# Patient Record
Sex: FEMALE | Race: WHITE | NOT HISPANIC OR LATINO | Employment: OTHER | URBAN - METROPOLITAN AREA
[De-identification: names, ages, dates, MRNs, and addresses within clinical notes are randomized per-mention and may not be internally consistent; named-entity substitution may affect disease eponyms.]

---

## 2017-01-23 ENCOUNTER — TELEPHONE (OUTPATIENT)
Dept: EDUCATION SERVICES | Facility: CLINIC | Age: 37
End: 2017-01-23

## 2017-01-23 DIAGNOSIS — E11.9 DIABETES MELLITUS (H): Primary | ICD-10-CM

## 2017-01-23 NOTE — TELEPHONE ENCOUNTER
Patient called stating she has changed insurance and will need to switch to a different meter that [facility] will cover. She still has to check on coverage for diabetes education.   She has appointment tonight with Dr. Ford. Will leave her a Contour meter and strips and lancets are ordered. She will call me back to schedule diabetes education follow up once she has information on insurance coverage.     Amanda Mari RDN, LD, CDE

## 2017-01-30 ENCOUNTER — OFFICE VISIT (OUTPATIENT)
Dept: FAMILY MEDICINE | Facility: CLINIC | Age: 37
End: 2017-01-30
Payer: COMMERCIAL

## 2017-01-30 VITALS
HEIGHT: 64 IN | HEART RATE: 86 BPM | SYSTOLIC BLOOD PRESSURE: 110 MMHG | OXYGEN SATURATION: 96 % | TEMPERATURE: 97.3 F | RESPIRATION RATE: 14 BRPM | DIASTOLIC BLOOD PRESSURE: 74 MMHG | WEIGHT: 229 LBS | BODY MASS INDEX: 39.09 KG/M2

## 2017-01-30 DIAGNOSIS — Z98.890 S/P CONIZATION OF CERVIX: ICD-10-CM

## 2017-01-30 DIAGNOSIS — Z12.4 SCREENING FOR MALIGNANT NEOPLASM OF CERVIX: ICD-10-CM

## 2017-01-30 DIAGNOSIS — E11.9 TYPE 2 DIABETES MELLITUS WITHOUT COMPLICATION, UNSPECIFIED LONG TERM INSULIN USE STATUS: ICD-10-CM

## 2017-01-30 DIAGNOSIS — J98.01 BRONCHOSPASM: ICD-10-CM

## 2017-01-30 DIAGNOSIS — Z23 NEED FOR PROPHYLACTIC VACCINATION AND INOCULATION AGAINST INFLUENZA: ICD-10-CM

## 2017-01-30 DIAGNOSIS — E66.01 MORBID OBESITY DUE TO EXCESS CALORIES (H): ICD-10-CM

## 2017-01-30 DIAGNOSIS — Z00.01 ENCOUNTER FOR WELL ADULT EXAM WITH ABNORMAL FINDINGS: Primary | ICD-10-CM

## 2017-01-30 LAB
ANION GAP SERPL CALCULATED.3IONS-SCNC: 8 MMOL/L (ref 3–14)
BUN SERPL-MCNC: 13 MG/DL (ref 7–30)
CALCIUM SERPL-MCNC: 8.6 MG/DL (ref 8.5–10.1)
CHLORIDE SERPL-SCNC: 106 MMOL/L (ref 94–109)
CO2 SERPL-SCNC: 27 MMOL/L (ref 20–32)
CREAT SERPL-MCNC: 0.83 MG/DL (ref 0.52–1.04)
CREAT UR-MCNC: 65 MG/DL
GFR SERPL CREATININE-BSD FRML MDRD: 78 ML/MIN/1.7M2
GLUCOSE SERPL-MCNC: 159 MG/DL (ref 70–99)
HBA1C MFR BLD: 6.6 % (ref 4.3–6)
MICROALBUMIN UR-MCNC: 6 MG/L
MICROALBUMIN/CREAT UR: 8.81 MG/G CR (ref 0–25)
POTASSIUM SERPL-SCNC: 3.9 MMOL/L (ref 3.4–5.3)
SODIUM SERPL-SCNC: 141 MMOL/L (ref 133–144)

## 2017-01-30 PROCEDURE — 90471 IMMUNIZATION ADMIN: CPT | Performed by: OBSTETRICS & GYNECOLOGY

## 2017-01-30 PROCEDURE — 82043 UR ALBUMIN QUANTITATIVE: CPT | Performed by: OBSTETRICS & GYNECOLOGY

## 2017-01-30 PROCEDURE — 80048 BASIC METABOLIC PNL TOTAL CA: CPT | Performed by: OBSTETRICS & GYNECOLOGY

## 2017-01-30 PROCEDURE — 83036 HEMOGLOBIN GLYCOSYLATED A1C: CPT | Performed by: OBSTETRICS & GYNECOLOGY

## 2017-01-30 PROCEDURE — G0145 SCR C/V CYTO,THINLAYER,RESCR: HCPCS | Performed by: OBSTETRICS & GYNECOLOGY

## 2017-01-30 PROCEDURE — 99395 PREV VISIT EST AGE 18-39: CPT | Mod: 25 | Performed by: OBSTETRICS & GYNECOLOGY

## 2017-01-30 PROCEDURE — 36415 COLL VENOUS BLD VENIPUNCTURE: CPT | Performed by: OBSTETRICS & GYNECOLOGY

## 2017-01-30 PROCEDURE — 87624 HPV HI-RISK TYP POOLED RSLT: CPT | Performed by: OBSTETRICS & GYNECOLOGY

## 2017-01-30 PROCEDURE — 90686 IIV4 VACC NO PRSV 0.5 ML IM: CPT | Performed by: OBSTETRICS & GYNECOLOGY

## 2017-01-30 ASSESSMENT — PAIN SCALES - GENERAL: PAINLEVEL: NO PAIN (0)

## 2017-01-30 NOTE — NURSING NOTE
"Chief Complaint   Patient presents with     Physical       Initial /74 mmHg  Pulse 86  Temp(Src) 97.3  F (36.3  C) (Temporal)  Resp 14  Ht 5' 4\" (1.626 m)  Wt 229 lb (103.874 kg)  BMI 39.29 kg/m2  SpO2 96%  LMP 01/01/2017  Breastfeeding? No Estimated body mass index is 39.29 kg/(m^2) as calculated from the following:    Height as of this encounter: 5' 4\" (1.626 m).    Weight as of this encounter: 229 lb (103.874 kg)..   BP completed using cuff size: doug Burton CMA    "

## 2017-01-30 NOTE — MR AVS SNAPSHOT
After Visit Summary   1/30/2017    Fallon Rivera    MRN: [id number]           Patient Information     Date Of Birth          1980        Visit Information        Provider Department      1/30/2017 4:40 PM Greg Ford MD [facility]        Today's Diagnoses     Encounter for well adult exam with abnormal findings    -  1     Morbid obesity due to excess calories (H)         Type 2 diabetes mellitus without complication, unspecified long term insulin use status (H)         Bronchospasm         Screening for malignant neoplasm of cervix            Follow-ups after your visit        Who to contact     If you have questions or need follow up information about today's clinic visit or your schedule please contact [facility] directly at [phone].  Normal or non-critical lab and imaging results will be communicated to you by MyChart, letter or phone within 4 business days after the clinic has received the results. If you do not hear from us within 7 days, please contact the clinic through [employer] or phone. If you have a critical or abnormal lab result, we will notify you by phone as soon as possible.  Submit refill requests through [employer] or call your pharmacy and they will forward the refill request to us. Please allow 3 business days for your refill to be completed.          Additional Information About Your Visit        MyChart Information     [employer] gives you secure access to your electronic health record. If you see a primary care provider, you can also send messages to your care team and make appointments. If you have questions, please call your primary care clinic.  If you do not have a primary care provider, please call [phone] and they will assist you.        Care EveryWhere ID     This is your Care EveryWhere ID. This could be used by other organizations to access your [place] medical records  [id number]        Your Vitals Were   "   Pulse Temperature Respirations    86 97.3  F (36.3  C) (Temporal) 14    Height BMI (Body Mass Index) Pulse Oximetry    5' 4\" (1.626 m) 39.29 kg/m2 96%    Last Period Breastfeeding?       01/01/2017 No        Blood Pressure from Last 3 Encounters:   01/30/17 110/74   11/02/16 120/78   11/02/16 119/79    Weight from Last 3 Encounters:   01/30/17 229 lb (103.874 kg)   10/24/16 219 lb (99.338 kg)   10/18/16 217 lb (98.431 kg)              We Performed the Following     Albumin Random Urine Quantitative     Basic metabolic panel     Hemoglobin A1c     HPV High Risk Types DNA Cervical     OFFICE/OUTPT VISIT,EST,LEVL III     Pap imaged thin layer screen with HPV - recommended age 30 - 65 years (select HPV order below)        Primary Care Provider Office Phone # Fax #    Greg Ford [phone]       [place] [id number] [place] [address]        Thank you!     Thank you for choosing [facility]  for your care. Our goal is always to provide you with excellent care. Hearing back from our patients is one way we can continue to improve our services. Please take a few minutes to complete the written survey that you may receive in the mail after your visit with us. Thank you!             Your Updated Medication List - Protect others around you: Learn how to safely use, store and throw away your medicines at www.disposemymeds.org.          This list is accurate as of: 1/30/17  5:48 PM.  Always use your most recent med list.                   Brand Name Dispense Instructions for use    ADVIL PO      Take 600 mg by mouth every 6 hours as needed       albuterol 108 (90 BASE) MCG/ACT Inhaler    PROAIR HFA/PROVENTIL HFA/VENTOLIN HFA    1 Inhaler    Inhale 2 puffs into the lungs every 6 hours as needed for shortness of breath / dyspnea or wheezing       blood glucose monitoring lancets     1 Box    Use to check blood sugar 1-2 times daily       blood glucose " monitoring test strip    LILLIAN CONTOUR NEXT    100 strip    Use to test blood sugar 2 times daily or as directed.       metFORMIN 500 MG tablet    GLUCOPHAGE    30 tablet    TAKE ONE TABLET BY MOUTH EVERY DAY WITH DINNER       MULTIPLE VITAMINS/WOMENS PO          omeprazole 20 MG tablet     30 tablet    Take 1 tablet (20 mg) by mouth daily Take 30-60 minutes before a meal.       oxyCODONE-acetaminophen 5-325 MG per tablet    PERCOCET    40 tablet    Take 1-2 tablets by mouth every 6 hours as needed for moderate to severe pain

## 2017-01-31 ASSESSMENT — PATIENT HEALTH QUESTIONNAIRE - PHQ9: SUM OF ALL RESPONSES TO PHQ QUESTIONS 1-9: 7

## 2017-01-31 NOTE — PROGRESS NOTES
Injectable Influenza Immunization Documentation    1.  Is the person to be vaccinated sick today?  No    2. Does the person to be vaccinated have an allergy to eggs or to a component of the vaccine?  No    3. Has the person to be vaccinated today ever had a serious reaction to influenza vaccine in the past?  No    4. Has the person to be vaccinated ever had Guillain-Jewell syndrome?  No     Form completed by Lynn Burton CMA

## 2017-02-01 NOTE — PROGRESS NOTES
Quick Note:    Lynn Please inform Fallon/ or caretaker that this result(s) is/are pretty good- the diabetes is in fairly good control, but weight loss will definitely improve the number on the A1C. Check it again in 3 months and continue to walk!!Thanks. SHARAN Ford MD    ______

## 2017-02-02 LAB
COPATH REPORT: NORMAL
PAP: NORMAL

## 2017-02-06 LAB
FINAL DIAGNOSIS: NORMAL
HPV HR 12 DNA CVX QL NAA+PROBE: NEGATIVE
HPV16 DNA SPEC QL NAA+PROBE: NEGATIVE
HPV18 DNA SPEC QL NAA+PROBE: NEGATIVE
SPECIMEN DESCRIPTION: NORMAL

## 2017-02-09 ENCOUNTER — TELEPHONE (OUTPATIENT)
Dept: FAMILY MEDICINE | Facility: CLINIC | Age: 37
End: 2017-02-09

## 2017-02-09 NOTE — TELEPHONE ENCOUNTER
Reason for Call:  Request for results:    Name of test or procedure: Pap results    Date of test of procedure: couple weeks ago    Location of the test or procedure: [facility] to leave the result message on voice mail or with a family member? YES    Phone number Patient can be reached at:  Home number on file [phone] (home)    Additional comments: Please call pt with results, if no answer, leave a message.     Call taken on 2/9/2017 at 7:21 AM by Vianca Gould

## 2017-02-11 ENCOUNTER — TRANSFERRED RECORDS (OUTPATIENT)
Dept: HEALTH INFORMATION MANAGEMENT | Facility: CLINIC | Age: 37
End: 2017-02-11

## 2017-03-22 DIAGNOSIS — E11.9 TYPE 2 DIABETES MELLITUS WITHOUT COMPLICATION, UNSPECIFIED LONG TERM INSULIN USE STATUS: ICD-10-CM

## 2017-03-22 NOTE — TELEPHONE ENCOUNTER
metFORMIN (GLUCOPHAGE) 500 MG   Last Written Prescription Date: 12/16/2016  Last Fill Quantity: 30, # refills: 1  Last Office Visit with [facility], P or  Health prescribing provider:  01/30/2017   Next 5 appointments (look out 90 days)     May 15, 2017  5:20 PM CDT   [facility]    [address] [place] [id number] [phone]                   BP Readings from Last 3 Encounters:   01/30/17 110/74   11/02/16 120/78   11/02/16 119/79     Lab Results   Component Value Date    MICROL 6 01/30/2017     No results found for: MICROALBUMIN  Creatinine   Date Value Ref Range Status   01/30/2017 0.83 0.52 - 1.04 mg/dL Final   ]  GFR Estimate   Date Value Ref Range Status   01/30/2017 78 >60 mL/min/1.7m2 Final     Comment:     Non  GFR Calc   01/30/2016 88 >60 mL/min/1.7m2 Final     Comment:     Non  GFR Calc   07/27/2015 81 >60 mL/min/1.7m2 Final     Comment:     Non  GFR Calc     GFR Estimate If Black   Date Value Ref Range Status   01/30/2017 >90   GFR Calc   >60 mL/min/1.7m2 Final   01/30/2016 >90   GFR Calc   >60 mL/min/1.7m2 Final   07/27/2015 >90   GFR Calc   >60 mL/min/1.7m2 Final     Lab Results   Component Value Date    CHOL 192 07/05/2016     Lab Results   Component Value Date    HDL 53 07/05/2016     Lab Results   Component Value Date     07/05/2016     Lab Results   Component Value Date    TRIG 166 07/05/2016     Lab Results   Component Value Date    CHOLHDLRATIO 3.0 05/08/2013     Lab Results   Component Value Date    AST 34 05/08/2014     Lab Results   Component Value Date    ALT 59 05/08/2014     Lab Results   Component Value Date    A1C 6.6 01/30/2017    A1C 6.6 07/05/2016    A1C 7.3 04/27/2016    A1C 6.2 07/27/2015    A1C 5.8 05/08/2013     Potassium   Date Value Ref Range Status   01/30/2017 3.9 3.4  - 5.3 mmol/L Final

## 2017-03-24 NOTE — TELEPHONE ENCOUNTER
Prescription approved per [facility] Refill Protocol.    Cece Kelley RN

## 2017-05-15 ENCOUNTER — OFFICE VISIT (OUTPATIENT)
Dept: FAMILY MEDICINE | Facility: CLINIC | Age: 37
End: 2017-05-15
Payer: COMMERCIAL

## 2017-05-15 VITALS
TEMPERATURE: 97.4 F | DIASTOLIC BLOOD PRESSURE: 74 MMHG | OXYGEN SATURATION: 100 % | RESPIRATION RATE: 16 BRPM | HEART RATE: 8 BPM | BODY MASS INDEX: 39.09 KG/M2 | SYSTOLIC BLOOD PRESSURE: 128 MMHG | WEIGHT: 229 LBS | HEIGHT: 64 IN

## 2017-05-15 DIAGNOSIS — E11.9 TYPE 2 DIABETES MELLITUS WITHOUT COMPLICATION, WITHOUT LONG-TERM CURRENT USE OF INSULIN (H): Primary | ICD-10-CM

## 2017-05-15 DIAGNOSIS — R82.90 CLOUDY URINE: ICD-10-CM

## 2017-05-15 LAB
ALBUMIN UR-MCNC: NEGATIVE MG/DL
ANION GAP SERPL CALCULATED.3IONS-SCNC: 8 MMOL/L (ref 3–14)
APPEARANCE UR: CLEAR
BILIRUB UR QL STRIP: NEGATIVE
BUN SERPL-MCNC: 9 MG/DL (ref 7–30)
CALCIUM SERPL-MCNC: 8.7 MG/DL (ref 8.5–10.1)
CHLORIDE SERPL-SCNC: 108 MMOL/L (ref 94–109)
CO2 SERPL-SCNC: 26 MMOL/L (ref 20–32)
COLOR UR AUTO: YELLOW
CREAT SERPL-MCNC: 0.67 MG/DL (ref 0.52–1.04)
GFR SERPL CREATININE-BSD FRML MDRD: ABNORMAL ML/MIN/1.7M2
GLUCOSE SERPL-MCNC: 104 MG/DL (ref 70–99)
GLUCOSE UR STRIP-MCNC: NEGATIVE MG/DL
HBA1C MFR BLD: 6.6 % (ref 4.3–6)
HGB UR QL STRIP: NEGATIVE
KETONES UR STRIP-MCNC: NEGATIVE MG/DL
LEUKOCYTE ESTERASE UR QL STRIP: NEGATIVE
NITRATE UR QL: NEGATIVE
PH UR STRIP: 7 PH (ref 5–7)
POTASSIUM SERPL-SCNC: 3.7 MMOL/L (ref 3.4–5.3)
SODIUM SERPL-SCNC: 142 MMOL/L (ref 133–144)
SP GR UR STRIP: 1.02 (ref 1–1.03)
URN SPEC COLLECT METH UR: NORMAL
UROBILINOGEN UR STRIP-MCNC: 0 MG/DL (ref 0–2)

## 2017-05-15 PROCEDURE — 36415 COLL VENOUS BLD VENIPUNCTURE: CPT | Performed by: OBSTETRICS & GYNECOLOGY

## 2017-05-15 PROCEDURE — 99214 OFFICE O/P EST MOD 30 MIN: CPT | Performed by: OBSTETRICS & GYNECOLOGY

## 2017-05-15 PROCEDURE — 80048 BASIC METABOLIC PNL TOTAL CA: CPT | Performed by: OBSTETRICS & GYNECOLOGY

## 2017-05-15 PROCEDURE — 83036 HEMOGLOBIN GLYCOSYLATED A1C: CPT | Performed by: OBSTETRICS & GYNECOLOGY

## 2017-05-15 PROCEDURE — 81003 URINALYSIS AUTO W/O SCOPE: CPT | Performed by: OBSTETRICS & GYNECOLOGY

## 2017-05-15 ASSESSMENT — PAIN SCALES - GENERAL: PAINLEVEL: NO PAIN (0)

## 2017-05-15 NOTE — PROGRESS NOTES
Subjective: Here for diabetic followup exam.   Has been monitoring sugars twice  times daily. Glucose values have been in the  130-180 range fasting, and 120-157 range postprandial.  There have been no   changes in vision except blurry sometimes when sugar is high.   No changes/ complaints   of neuropathy symptoms. - maybe some fidgty legs-  No other concerns        The past medical history, social history, past surgical history and family history as shown below have been reviewed by me today.  Past Medical History:   Diagnosis Date     Abnormal Pap smear 2006, 2007,     ASC-H, ASCUS + HPV 45     Calculus of kidney 2002     Cervical high risk HPV (human papillomavirus) test positive     + HPV 45 (high risk)     History of colposcopy with cervical biopsy 2/9/06, 3/15/07    koilocytosis 2007        Allergies   Allergen Reactions     Amoxicillin Hives     Anti-Nausea      Anxiety, agitation,severe paranoia. Zofran, Reglan are some of them.       Demerol Hcl [Meperidine Hcl] Nausea     Indomethacin Nausea and Vomiting     Macrobid [Nitrofuran Derivatives] Hives     Reglan [Metoclopramide] Other (See Comments)     Acute paranoia, severe     Zofran [Ondansetron] Other (See Comments)     Severe anxiety, agitation     Current Outpatient Prescriptions   Medication Sig Dispense Refill     metFORMIN (GLUCOPHAGE) 500 MG tablet TAKE ONE TABLET BY MOUTH EVERY DAY WITH DINNER 30 tablet 3     blood glucose monitoring (LILLIAN CONTOUR NEXT) test strip Use to test blood sugar 2 times daily or as directed. 100 strip 3     blood glucose monitoring (LILLIAN MICROLET) lancets Use to check blood sugar 1-2 times daily 1 Box 12     omeprazole 20 MG tablet Take 1 tablet (20 mg) by mouth daily Take 30-60 minutes before a meal. 30 tablet 9     oxyCODONE-acetaminophen (PERCOCET) 5-325 MG per tablet Take 1-2 tablets by mouth every 6 hours as needed for moderate to severe pain (Patient not taking: Reported on 5/15/2017) 40 tablet 0     Multiple  Vitamins-Minerals (MULTIPLE VITAMINS/WOMENS PO) Reported on 5/15/2017       albuterol (PROAIR HFA, PROVENTIL HFA, VENTOLIN HFA) 108 (90 BASE) MCG/ACT inhaler Inhale 2 puffs into the lungs every 6 hours as needed for shortness of breath / dyspnea or wheezing (Patient not taking: Reported on 5/15/2017) 1 Inhaler 1     Ibuprofen (ADVIL PO) Take 600 mg by mouth every 6 hours as needed Reported on 5/15/2017       Past Surgical History:   Procedure Laterality Date     C REMOVAL OF KIDNEY STONE      two surgeries, 2002,2003     CHOLECYSTECTOMY, LAPOROSCOPIC  5/11/2007    Cholecystectomy, Laparoscopic     COLONOSCOPY  05/17/10     CONIZATION  7/1/2011    Procedure:CONIZATION; cold knife and punch biopsy of mole on back; Surgeon:GREG FORD; Location:[facility] OR     DILATION AND CURETTAGE, HYSTEROSCOPY, LAPAROSCOPY, COMBINED Right 9/24/2015    Procedure: COMBINED DILATION AND CURETTAGE, HYSTEROSCOPY, LAPAROSCOPY;  Surgeon: Greg Ford MD;  Location: [facility] OR     ESOPHAGOSCOPY, GASTROSCOPY, DUODENOSCOPY (EGD), COMBINED  5/21/2014    Procedure: COMBINED ESOPHAGOSCOPY, GASTROSCOPY, DUODENOSCOPY (EGD), BIOPSY SINGLE OR MULTIPLE;  Surgeon: Earl Lane MD;  Location: [facility] GI     EXCISE LESION TRUNK  7/1/2011    Procedure:EXCISE LESION TRUNK; Surgeon:GREG FORD; Location:[facility] OR     HC FRAGMENTING OF KIDNEY STONE  11/30/2005     HC RX ECTOP PREG BY SCOPE,RMV TUBE/OVRY  12/20/2007    Right sided salpingectomy and removal of ruptured ectopic pregnancy. D&C.     HC UGI ENDOSCOPY, SIMPLE EXAM  09/01/09     LAPAROSCOPIC APPENDECTOMY N/A 9/24/2015    Procedure: LAPAROSCOPIC APPENDECTOMY;  Surgeon: James Cuellar MD;  Location: [facility] OR     LAPAROSCOPIC CYSTECTOMY OVARIAN (BENIGN) N/A 9/24/2015    Procedure: LAPAROSCOPIC CYSTECTOMY OVARIAN (BENIGN);  Surgeon: Greg Ford MD;  Location: [facility]     LAPAROSCOPIC OOPHORECTOMY Right 9/24/2015    Procedure: LAPAROSCOPIC OOPHORECTOMY;   Surgeon: Greg Ford MD;  Location: [facility] OR     LITHOTRIPSY  01/17/2007     PELVIS LAPAROSCOPY,DX  10/16/2000    Diagnostic laparoscopy, Endometrial biopsy.     TUBAL/ECTOPIC PREGNANCY  01/23/2007    Lap removal of left ruptured ectopic pregnancy & salpingectomy, D&C     Social History     Social History     Marital status:      Spouse name: Joseph     Number of children: 1     Years of education: 9     Occupational History      None     Social History Main Topics     Smoking status: Current Some Day Smoker     Years: 12.00     Types: Cigarettes     Last attempt to quit: 7/14/2009     Smokeless tobacco: Never Used      Comment: As of 10 /2014, pt has had a few cigs here and there     Alcohol use 0.0 oz/week     0 Standard drinks or equivalent per week      Comment: every few months     Drug use: No     Sexual activity: Yes     Partners: Male     Birth control/ protection: None     Other Topics Concern      Service No     Blood Transfusions No     Caffeine Concern Yes     Reports 1 can soda/week  Advised not more than 16 ounces caffeien/day.     Occupational Exposure No     Hobby Hazards No     Sleep Concern No     Stress Concern Yes     will discuss with      Weight Concern Yes     Eating disorder in childhood.  Hx. gestational diab.     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     Exercise No     Advised walking 30 min/day.     Bike Helmet No     Seat Belt Yes     Social History Narrative     and lives at home with her  and daughter.     Family History   Problem Relation Age of Onset     DIABETES Maternal Grandmother      adult onset     Anesthesia Reaction Maternal Grandmother      can't tolerate Novacaine.     Respiratory Maternal Grandmother      COPD and emphysema.     Thyroid Disease Maternal Grandmother      HEART DISEASE Maternal Grandmother      CHF     DIABETES Paternal Grandfather      adult o nset      "Hypertension Paternal Grandfather      CEREBROVASCULAR DISEASE Paternal Grandfather      HEART DISEASE Paternal Grandfather      MI, replaced valve,angioplasty     Thyroid Disease Paternal Grandfather      CANCER Maternal Grandfather      skin cancer     HEART DISEASE Maternal Grandfather      MI     Obesity Mother      on thyroid medication     Thyroid Disease Mother      DIABETES Mother      HEART DISEASE Father      Hypertension Father      and TIA     Lipids Father      Breast Cancer Paternal Grandmother      Arrhythmia Other      CANCER Maternal Aunt      breast/brain cancer     Depression Paternal Aunt      CEREBROVASCULAR DISEASE Paternal Uncle      CEREBROVASCULAR DISEASE Paternal Aunt        ROS: A 12 point review of systems was done. Except for what is listed above in the HPI, the systems review is negative .      Objective: Vital signs: Blood pressure 128/74, pulse (!) 8, temperature 97.4  F (36.3  C), temperature source Tympanic, resp. rate 16, height 5' 4\" (1.626 m), weight 229 lb (103.9 kg), SpO2 100 %, not currently breastfeeding.      HEENT:  Sclerae and conjunctiva are normal.   Ear canals and TMs look normal.  Nasal mucosa is pink  - no polyps or masses seen.  sinuses are non tender to palpation.  Throat is unremarkable . Mucous membranes are moist.   Fundi are benign- disc margins are sharp. No papilledema noted.    Neck is supple, mobile, no adenoapthy or masses palpable. Normal range of motion noted.  Chest is clear to auscultation. No wheezes, rales or rhonchi heard.  cardiac exam is normal with s1, s2, no murmurs or adventitious sounds.Normal rate and rhythm is heard.  Exam of the feet is negative for paresthesias.  Has normal sensation and color to both feet, and normal pulses and no trophic skin changes or ulcers.       Assessment: Diabetes is  uncertainly controlled.    Plan: 1. Ace inhibitor therapy:  We discussed- she declined for now.    2.Labs today: A1C     And lytes- she has had " lipids recently and UA/protein  3. Baby aspirin 65 or 81 mg advised daily as prophylaxis- watch for GI upset or tinnitus or bruising/signs of bleeding- stop if these occur.  4.Advised to recheck in  3 months.  Continue daily glucose monitoring. Recheck acutely if concerns.  5. Advised to have yearly ophthalmology exam,- she had this recently- also i advised her to see diabetic ed again- if A1C is high today we will advise increase of metformin- AND WEIGHT LOSS ADVISED- regular dental visits and keep up to date on flu vaccine and TDAP.   SHARAN Ford MD

## 2017-05-15 NOTE — MR AVS SNAPSHOT
"              After Visit Summary   5/15/2017    Fallon Rivera    MRN: [id number]           Patient Information     Date Of Birth          1980        Visit Information        Provider Department      5/15/2017 5:20 PM Greg Ford MD [facility]        Today's Diagnoses     Type 2 diabetes mellitus without complication, without long-term current use of insulin (H)    -  1    Cloudy urine           Follow-ups after your visit        Who to contact     If you have questions or need follow up information about today's clinic visit or your schedule please contact [facility] directly at [phone].  Normal or non-critical lab and imaging results will be communicated to you by [employer], letter or phone within 4 business days after the clinic has received the results. If you do not hear from us within 7 days, please contact the clinic through [employer] or phone. If you have a critical or abnormal lab result, we will notify you by phone as soon as possible.  Submit refill requests through [employer] or call your pharmacy and they will forward the refill request to us. Please allow 3 business days for your refill to be completed.          Additional Information About Your Visit        MyChart Information     [employer] gives you secure access to your electronic health record. If you see a primary care provider, you can also send messages to your care team and make appointments. If you have questions, please call your primary care clinic.  If you do not have a primary care provider, please call [phone] and they will assist you.        Care EveryWhere ID     This is your Care EveryWhere ID. This could be used by other organizations to access your [place] medical records  [id number]        Your Vitals Were     Pulse Temperature Respirations Height Pulse Oximetry Breastfeeding?    8 97.4  F (36.3  C) (Tympanic) 16 5' 4\" (1.626 m) 100% No    BMI (Body Mass Index)       "             39.31 kg/m2            Blood Pressure from Last 3 Encounters:   05/15/17 128/74   01/30/17 110/74   11/02/16 120/78    Weight from Last 3 Encounters:   05/15/17 229 lb (103.9 kg)   01/30/17 229 lb (103.9 kg)   10/24/16 219 lb (99.3 kg)              We Performed the Following     *UA reflex to Microscopic and Culture ([place]; [facility]; [facility]; [facility]; [place]; [place])     Basic metabolic panel     Hemoglobin A1c        Primary Care Provider Office Phone # Fax #    Greg Ford [phone]       [facility] [address] [place] [address]        Thank you!     Thank you for choosing [facility]  for your care. Our goal is always to provide you with excellent care. Hearing back from our patients is one way we can continue to improve our services. Please take a few minutes to complete the written survey that you may receive in the mail after your visit with us. Thank you!             Your Updated Medication List - Protect others around you: Learn how to safely use, store and throw away your medicines at www.disposemymeds.org.          This list is accurate as of: 5/15/17  5:24 PM.  Always use your most recent med list.                   Brand Name Dispense Instructions for use    ADVIL PO      Take 600 mg by mouth every 6 hours as needed Reported on 5/15/2017       albuterol 108 (90 BASE) MCG/ACT Inhaler    PROAIR HFA/PROVENTIL HFA/VENTOLIN HFA    1 Inhaler    Inhale 2 puffs into the lungs every 6 hours as needed for shortness of breath / dyspnea or wheezing       blood glucose monitoring lancets     1 Box    Use to check blood sugar 1-2 times daily       blood glucose monitoring test strip    LILLIAN CONTOUR NEXT    100 strip    Use to test blood sugar 2 times daily or as directed.       metFORMIN 500 MG tablet    GLUCOPHAGE    30 tablet    TAKE ONE TABLET BY MOUTH EVERY DAY WITH  DINNER       MULTIPLE VITAMINS/WOMENS PO      Reported on 5/15/2017       omeprazole 20 MG tablet     30 tablet    Take 1 tablet (20 mg) by mouth daily Take 30-60 minutes before a meal.       oxyCODONE-acetaminophen 5-325 MG per tablet    PERCOCET    40 tablet    Take 1-2 tablets by mouth every 6 hours as needed for moderate to severe pain

## 2017-05-15 NOTE — NURSING NOTE
"Chief Complaint   Patient presents with     Diabetes       Initial /74 (BP Location: Right arm, Patient Position: Chair, Cuff Size: Adult Large)  Pulse (!) 8  Temp 97.4  F (36.3  C) (Tympanic)  Resp 16  Ht 5' 4\" (1.626 m)  Wt 229 lb (103.9 kg)  SpO2 100%  Breastfeeding? No  BMI 39.31 kg/m2 Estimated body mass index is 39.31 kg/(m^2) as calculated from the following:    Height as of this encounter: 5' 4\" (1.626 m).    Weight as of this encounter: 229 lb (103.9 kg)..   BP completed using cuff size: large  Medication Rec Completed    Lynn Burton CMA    "

## 2017-05-16 NOTE — PROGRESS NOTES
Lynn Please inform Fallon/ or caretaker  that this result(s) is/are  Good- the A1C is 6.6- no change- that is good- but keep the appt with la from diabetic ed so she can figure out her diet better-Thanks. SHARAN Ford MD

## 2017-06-23 DIAGNOSIS — K21.9 GASTROESOPHAGEAL REFLUX DISEASE WITHOUT ESOPHAGITIS: ICD-10-CM

## 2017-06-23 NOTE — TELEPHONE ENCOUNTER
omeprazole 20 MG tablet      Last Written Prescription Date: 1/2/15  Last Fill Quantity: 30,  # refills: 9   Last Office Visit with G, [facility] or [facility] prescribing provider: 5/15/17

## 2017-06-23 NOTE — TELEPHONE ENCOUNTER
Routing refill request to provider for review/approval because:  A break in medication    Cece Kelley RN

## 2017-08-09 ENCOUNTER — TELEPHONE (OUTPATIENT)
Dept: FAMILY MEDICINE | Facility: CLINIC | Age: 37
End: 2017-08-09

## 2017-08-09 ENCOUNTER — OFFICE VISIT (OUTPATIENT)
Dept: FAMILY MEDICINE | Facility: CLINIC | Age: 37
End: 2017-08-09
Payer: COMMERCIAL

## 2017-08-09 VITALS
SYSTOLIC BLOOD PRESSURE: 126 MMHG | TEMPERATURE: 97.1 F | HEIGHT: 64 IN | BODY MASS INDEX: 38.24 KG/M2 | DIASTOLIC BLOOD PRESSURE: 72 MMHG | WEIGHT: 224 LBS | HEART RATE: 82 BPM | RESPIRATION RATE: 16 BRPM | OXYGEN SATURATION: 100 %

## 2017-08-09 DIAGNOSIS — E66.01 MORBID OBESITY DUE TO EXCESS CALORIES (H): Primary | ICD-10-CM

## 2017-08-09 DIAGNOSIS — F17.200 SMOKER: ICD-10-CM

## 2017-08-09 DIAGNOSIS — E11.9 TYPE 2 DIABETES MELLITUS WITHOUT COMPLICATION, UNSPECIFIED LONG TERM INSULIN USE STATUS: ICD-10-CM

## 2017-08-09 LAB
ALBUMIN UR-MCNC: NEGATIVE MG/DL
APPEARANCE UR: CLEAR
BILIRUB UR QL STRIP: NEGATIVE
CAOX CRY #/AREA URNS HPF: ABNORMAL /HPF
CHOLEST SERPL-MCNC: 194 MG/DL
COLOR UR AUTO: YELLOW
GLUCOSE UR STRIP-MCNC: NEGATIVE MG/DL
HBA1C MFR BLD: 6.3 % (ref 4.3–6)
HDLC SERPL-MCNC: 56 MG/DL
HGB UR QL STRIP: NEGATIVE
KETONES UR STRIP-MCNC: NEGATIVE MG/DL
LDLC SERPL CALC-MCNC: 118 MG/DL
LEUKOCYTE ESTERASE UR QL STRIP: NEGATIVE
MUCOUS THREADS #/AREA URNS LPF: PRESENT /LPF
NITRATE UR QL: NEGATIVE
NONHDLC SERPL-MCNC: 138 MG/DL
PH UR STRIP: 5 PH (ref 5–7)
RBC #/AREA URNS AUTO: <1 /HPF (ref 0–2)
SP GR UR STRIP: 1.03 (ref 1–1.03)
SQUAMOUS #/AREA URNS AUTO: 2 /HPF (ref 0–1)
TRIGL SERPL-MCNC: 102 MG/DL
URN SPEC COLLECT METH UR: ABNORMAL
UROBILINOGEN UR STRIP-MCNC: 0 MG/DL (ref 0–2)
WBC #/AREA URNS AUTO: <1 /HPF (ref 0–2)

## 2017-08-09 PROCEDURE — 36415 COLL VENOUS BLD VENIPUNCTURE: CPT | Performed by: OBSTETRICS & GYNECOLOGY

## 2017-08-09 PROCEDURE — 83036 HEMOGLOBIN GLYCOSYLATED A1C: CPT | Performed by: OBSTETRICS & GYNECOLOGY

## 2017-08-09 PROCEDURE — 99214 OFFICE O/P EST MOD 30 MIN: CPT | Performed by: OBSTETRICS & GYNECOLOGY

## 2017-08-09 PROCEDURE — 80061 LIPID PANEL: CPT | Performed by: OBSTETRICS & GYNECOLOGY

## 2017-08-09 PROCEDURE — 81001 URINALYSIS AUTO W/SCOPE: CPT | Performed by: OBSTETRICS & GYNECOLOGY

## 2017-08-09 RX ORDER — HYDROCODONE BITARTRATE AND ACETAMINOPHEN 5; 325 MG/1; MG/1
1 TABLET ORAL EVERY 6 HOURS PRN
COMMUNITY
End: 2017-10-28

## 2017-08-09 ASSESSMENT — PATIENT HEALTH QUESTIONNAIRE - PHQ9: SUM OF ALL RESPONSES TO PHQ QUESTIONS 1-9: 3

## 2017-08-09 ASSESSMENT — PAIN SCALES - GENERAL: PAINLEVEL: NO PAIN (0)

## 2017-08-09 NOTE — NURSING NOTE
"Chief Complaint   Patient presents with     Diabetes       Initial /72 (BP Location: Left arm, Patient Position: Chair, Cuff Size: Adult Regular)  Pulse 82  Temp 97.1  F (36.2  C) (Tympanic)  Resp 16  Ht 5' 4\" (1.626 m)  Wt 224 lb (101.6 kg)  LMP 07/31/2017  SpO2 100%  Breastfeeding? No  BMI 38.45 kg/m2 Estimated body mass index is 38.45 kg/(m^2) as calculated from the following:    Height as of this encounter: 5' 4\" (1.626 m).    Weight as of this encounter: 224 lb (101.6 kg)..   BP completed using cuff size: regular  Medication Rec Completed    Lynn Burton CMA    "

## 2017-08-09 NOTE — MR AVS SNAPSHOT
"              After Visit Summary   2017    Fallon Rivera    MRN: [id number]           Patient Information     Date Of Birth          1980        Visit Information        Provider Department      2017 7:30 AM Greg Ford MD [facility]        Today's Diagnoses     Type 2 diabetes mellitus without complication, unspecified long term insulin use status (H)           Follow-ups after your visit        Who to contact     If you have questions or need follow up information about today's clinic visit or your schedule please contact [facility] directly at [phone].  Normal or non-critical lab and imaging results will be communicated to you by [employer], letter or phone within 4 business days after the clinic has received the results. If you do not hear from us within 7 days, please contact the clinic through [employer] or phone. If you have a critical or abnormal lab result, we will notify you by phone as soon as possible.  Submit refill requests through [employer] or call your pharmacy and they will forward the refill request to us. Please allow 3 business days for your refill to be completed.          Additional Information About Your Visit        MyChart Information     [employer] lets you send messages to your doctor, view your test results, renew your prescriptions, schedule appointments and more. To sign up, go to [email or web address] . Click on \"Log in\" on the left side of the screen, which will take you to the Welcome page. Then click on \"Sign up Now\" on the right side of the page.     You will be asked to enter the access code listed below, as well as some personal information. Please follow the directions to create your username and password.     Your access code is: [email or web address]  Expires: 10/4/2017  4:03 PM     Your access code will  in 90 days. If you need help or a new code, please call your [facility] or [phone].        Care " "EveryWhere ID     This is your Care EveryWhere ID. This could be used by other organizations to access your [facility] medical records  [id number]        Your Vitals Were     Pulse Temperature Respirations Height Last Period Pulse Oximetry    82 97.1  F (36.2  C) (Tympanic) 16 5' 4\" (1.626 m) 07/31/2017 100%    Breastfeeding? BMI (Body Mass Index)                No 38.45 kg/m2           Blood Pressure from Last 3 Encounters:   08/09/17 126/72   05/15/17 128/74   01/30/17 110/74    Weight from Last 3 Encounters:   08/09/17 224 lb (101.6 kg)   05/15/17 229 lb (103.9 kg)   01/30/17 229 lb (103.9 kg)              We Performed the Following     Hemoglobin A1c     Lipid Profile     UA reflex to Microscopic and Culture          Today's Medication Changes          These changes are accurate as of: 8/9/17  7:48 AM.  If you have any questions, ask your nurse or doctor.               These medicines have changed or have updated prescriptions.        Dose/Directions    metFORMIN 500 MG tablet   Commonly known as:  GLUCOPHAGE   This may have changed:  See the new instructions.   Used for:  Type 2 diabetes mellitus without complication, unspecified long term insulin use status (H)   Changed by:  Greg Ford MD        Dose:  500 mg   Take 1 tablet (500 mg) by mouth daily (with dinner)   Quantity:  30 tablet   Refills:  3            Where to get your medicines      These medications were sent to [facility] [place] [address] [facility]   Formerly [facility], [facility] , [facility] [address]     Phone:  [phone]     metFORMIN 500 MG tablet                Primary Care Provider Office Phone # Fax #    Greg Ford [phone]       [facility]ly [facility], [facility] [place] [address]  [facility] [address]        Equal Access to Services     MICHAEL BALDWIN AH: Margarita Gaming, waainsley kaminski, qaybta kaalvida argueta, jeannie mcmahon. So [facility] [phone].    ATENCIÓN: Si habla " español, tiene a pierre disposición servicios gratuitos de asistencia lingüística. Tha morillo [phone].    We comply with applicable federal civil rights laws and [place] laws. We do not discriminate on the basis of race, color, national origin, age, disability sex, sexual orientation or gender identity.            Thank you!     Thank you for choosing [facility]  for your care. Our goal is always to provide you with excellent care. Hearing back from our patients is one way we can continue to improve our services. Please take a few minutes to complete the written survey that you may receive in the mail after your visit with us. Thank you!             Your Updated Medication List - Protect others around you: Learn how to safely use, store and throw away your medicines at www.disposemymeds.org.          This list is accurate as of: 8/9/17  7:48 AM.  Always use your most recent med list.                   Brand Name Dispense Instructions for use Diagnosis    ADVIL PO      Take 600 mg by mouth every 6 hours as needed Reported on 5/15/2017        albuterol 108 (90 BASE) MCG/ACT Inhaler    PROAIR HFA/PROVENTIL HFA/VENTOLIN HFA    1 Inhaler    Inhale 2 puffs into the lungs every 6 hours as needed for shortness of breath / dyspnea or wheezing    Cough, Upper respiratory tract infection, unspecified upper respiratory infection       blood glucose monitoring lancets     1 Box    Use to check blood sugar 1-2 times daily    Diabetes mellitus (H)       blood glucose monitoring test strip    LILLIAN CONTOUR NEXT    100 strip    Use to test blood sugar 2 times daily or as directed.    Diabetes mellitus (H)       HYDROcodone-acetaminophen 5-325 MG per tablet    NORCO     Take 1 tablet by mouth every 6 hours as needed for moderate to severe pain        metFORMIN 500 MG tablet    GLUCOPHAGE    30 tablet    Take 1 tablet (500 mg) by mouth daily (with dinner)    Type 2 diabetes mellitus without complication, unspecified  long term insulin use status (H)       MULTIPLE VITAMINS/WOMENS PO      Reported on 5/15/2017        omeprazole 20 MG tablet     30 tablet    Take 1 tablet (20 mg) by mouth daily Take 30-60 minutes before a meal.    Abdominal pain, other specified site

## 2017-08-09 NOTE — TELEPHONE ENCOUNTER
Pt returned Lynn's call, read her message from .  Pt was questioning urine?  Please call patient back. Thanks

## 2017-08-09 NOTE — TELEPHONE ENCOUNTER
----- Message from Greg Ford MD sent at 8/9/2017  9:13 AM CDT -----  Lynn Please inform Fallon/ or caretaker  that this result(s) is/are showing that the lipids are ok- about the same as last year- acceptable- and the A1C has improved- nice job!!Thanks. SHARAN Ford MD

## 2017-08-09 NOTE — PROGRESS NOTES
Subjective: Here for diabetic followup exam.   Has been monitoring sugars 2 times daily. Glucose values have been in the  120 range fasting, and 130 range postprandial.  There have been no   changes in vision  No changes/ complaints   of neuropathy symptoms.   No other concerns        The past medical history, social history, past surgical history and family history as shown below have been reviewed by me today.  Past Medical History:   Diagnosis Date     Abnormal Pap smear 2006, 2007,     ASC-H, ASCUS + HPV 45     Calculus of kidney 2002     Cervical high risk HPV (human papillomavirus) test positive     + HPV 45 (high risk)     History of colposcopy with cervical biopsy 2/9/06, 3/15/07    koilocytosis 2007        Allergies   Allergen Reactions     Amoxicillin Hives     Anti-Nausea      Anxiety, agitation,severe paranoia. Zofran, Reglan are some of them.       Demerol Hcl [Meperidine Hcl] Nausea     Indomethacin Nausea and Vomiting     Macrobid [Nitrofuran Derivatives] Hives     Reglan [Metoclopramide] Other (See Comments)     Acute paranoia, severe     Zofran [Ondansetron] Other (See Comments)     Severe anxiety, agitation     Current Outpatient Prescriptions   Medication Sig Dispense Refill     metFORMIN (GLUCOPHAGE) 500 MG tablet TAKE ONE TABLET BY MOUTH EVERY DAY WITH DINNER 30 tablet 3     blood glucose monitoring (LILLIAN CONTOUR NEXT) test strip Use to test blood sugar 2 times daily or as directed. 100 strip 3     blood glucose monitoring (LILLIAN MICROLET) lancets Use to check blood sugar 1-2 times daily 1 Box 12     Multiple Vitamins-Minerals (MULTIPLE VITAMINS/WOMENS PO) Reported on 5/15/2017       albuterol (PROAIR HFA, PROVENTIL HFA, VENTOLIN HFA) 108 (90 BASE) MCG/ACT inhaler Inhale 2 puffs into the lungs every 6 hours as needed for shortness of breath / dyspnea or wheezing 1 Inhaler 1     omeprazole 20 MG tablet Take 1 tablet (20 mg) by mouth daily Take 30-60 minutes before a meal. 30 tablet 9      HYDROcodone-acetaminophen (NORCO) 5-325 MG per tablet Take 1 tablet by mouth every 6 hours as needed for moderate to severe pain       Ibuprofen (ADVIL PO) Take 600 mg by mouth every 6 hours as needed Reported on 5/15/2017       Past Surgical History:   Procedure Laterality Date     C REMOVAL OF KIDNEY STONE      two surgeries, 2002,2003     CHOLECYSTECTOMY, LAPOROSCOPIC  5/11/2007    Cholecystectomy, Laparoscopic     COLONOSCOPY  05/17/10     CONIZATION  7/1/2011    Procedure:CONIZATION; cold knife and punch biopsy of mole on back; Surgeon:GREG FORD; Location:[facility]     DILATION AND CURETTAGE, HYSTEROSCOPY, LAPAROSCOPY, COMBINED Right 9/24/2015    Procedure: COMBINED DILATION AND CURETTAGE, HYSTEROSCOPY, LAPAROSCOPY;  Surgeon: Greg Ford MD;  Location: [facility]     ESOPHAGOSCOPY, GASTROSCOPY, DUODENOSCOPY (EGD), COMBINED  5/21/2014    Procedure: COMBINED ESOPHAGOSCOPY, GASTROSCOPY, DUODENOSCOPY (EGD), BIOPSY SINGLE OR MULTIPLE;  Surgeon: Earl Lane MD;  Location: [facility]     EXCISE LESION TRUNK  7/1/2011    Procedure:EXCISE LESION TRUNK; Surgeon:GREG FORD; Location:[facility] OR     HC FRAGMENTING OF KIDNEY STONE  11/30/2005     HC RX ECTOP PREG BY SCOPE,RMV TUBE/OVRY  12/20/2007    Right sided salpingectomy and removal of ruptured ectopic pregnancy. D&C.     HC UGI ENDOSCOPY, SIMPLE EXAM  09/01/09     LAPAROSCOPIC APPENDECTOMY N/A 9/24/2015    Procedure: LAPAROSCOPIC APPENDECTOMY;  Surgeon: James Cuellar MD;  Location: [facility]     LAPAROSCOPIC CYSTECTOMY OVARIAN (BENIGN) N/A 9/24/2015    Procedure: LAPAROSCOPIC CYSTECTOMY OVARIAN (BENIGN);  Surgeon: Greg Ford MD;  [facility]     LAPAROSCOPIC OOPHORECTOMY Right 9/24/2015    Procedure: LAPAROSCOPIC OOPHORECTOMY;  Surgeon: Greg Ford MD;  Location: [facility]     LITHOTRIPSY  01/17/2007     PELVIS LAPAROSCOPY,DX  10/16/2000    Diagnostic laparoscopy, Endometrial biopsy.      TUBAL/ECTOPIC PREGNANCY  01/23/2007    Lap removal of left ruptured ectopic pregnancy & salpingectomy, D&C     Social History     Social History     Marital status:      Spouse name: Joseph     Number of children: 1     Years of education: 9     Occupational History      None     Social History Main Topics     Smoking status: Current Some Day Smoker     Years: 12.00     Types: Cigarettes     Last attempt to quit: 7/14/2009     Smokeless tobacco: Never Used      Comment: As of 10 /2014, pt has had a few cigs here and there     Alcohol use 0.0 oz/week     0 Standard drinks or equivalent per week      Comment: every few months     Drug use: No     Sexual activity: Yes     Partners: Male     Birth control/ protection: None     Other Topics Concern      Service No     Blood Transfusions No     Caffeine Concern Yes     Reports 1 can soda/week  Advised not more than 16 ounces caffeien/day.     Occupational Exposure No     Hobby Hazards No     Sleep Concern No     Stress Concern Yes     will discuss with      Weight Concern Yes     Eating disorder in childhood.  Hx. gestational diab.     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     Exercise No     Advised walking 30 min/day.     Bike Helmet No     Seat Belt Yes     Social History Narrative     and lives at home with her  and daughter.     Family History   Problem Relation Age of Onset     DIABETES Maternal Grandmother      adult onset     Anesthesia Reaction Maternal Grandmother      can't tolerate Novacaine.     Respiratory Maternal Grandmother      COPD and emphysema.     Thyroid Disease Maternal Grandmother      HEART DISEASE Maternal Grandmother      CHF     DIABETES Paternal Grandfather      adult o nset     Hypertension Paternal Grandfather      CEREBROVASCULAR DISEASE Paternal Grandfather      HEART DISEASE Paternal Grandfather      MI, replaced valve,angioplasty     Thyroid Disease  "Paternal Grandfather      CANCER Maternal Grandfather      skin cancer     HEART DISEASE Maternal Grandfather      MI     Obesity Mother      on thyroid medication     Thyroid Disease Mother      DIABETES Mother      HEART DISEASE Father      Hypertension Father      and TIA     Lipids Father      Breast Cancer Paternal Grandmother      Arrhythmia Other      CANCER Maternal Aunt      breast/brain cancer     Depression Paternal Aunt      CEREBROVASCULAR DISEASE Paternal Uncle      CEREBROVASCULAR DISEASE Paternal Aunt        ROS: A 12 point review of systems was done. Except for what is listed above in the HPI, the systems review is negative .      Objective: Vital signs: Blood pressure 126/72, pulse 82, temperature 97.1  F (36.2  C), temperature source Tympanic, resp. rate 16, height 5' 4\" (1.626 m), weight 224 lb (101.6 kg), last menstrual period 07/31/2017, SpO2 100 %, not currently breastfeeding.      HEENT:  Sclerae and conjunctiva are normal.   Ear canals and TMs look normal.  Nasal mucosa is pink  - no polyps or masses seen.  sinuses are non tender to palpation.  Throat is unremarkable . Mucous membranes are moist.   Fundi not checked- normal at last visit.    Neck is supple, mobile, no adenoapthy or masses palpable. Normal range of motion noted.  Chest is clear to auscultation. No wheezes, rales or rhonchi heard.  cardiac exam is normal with s1, s2, no murmurs or adventitious sounds.Normal rate and rhythm is heard.  Exam of the feet is negative for paresthesias.  Has normal sensation and color to both feet, and normal pulses and no trophic skin changes or ulcers.       Assessment: Diabetes is  Moderately   well-controlled.  2. She is obese- we discussed weight control and she has lost weight-  3. She is a smoker- we discussed this- she is cutting down- smokes only occasionally    Plan: 1. Ace inhibitor therapy:     Not indicated.  She wants to avoid unnecessary pills  2.Labs today: A1C      Urine - she " wonders if she has a kidney stone-  Lipids    3. Baby aspirin 65 or 81 mg advised daily as prophylaxis- watch for GI upset or tinnitus or bruising/signs of bleeding- stop if these occur.  4.Advised to recheck in 3 months.  Continue daily glucose monitoring. Recheck acutely if concerns.  5. Advised to have yearly ophthalmology exam, regular dental visits and keep up to date on flu vaccine and TDAP.   SHARAN Ford MD

## 2017-08-09 NOTE — TELEPHONE ENCOUNTER
Patient calling back, gave message below, patient would still like a call back, call her on her cell #[phone]

## 2017-08-09 NOTE — PROGRESS NOTES
Lynn Please inform Fallon/ or caretaker  that this result(s) is/are normal.  Thanks. SHARAN Ford MD

## 2017-08-09 NOTE — PROGRESS NOTES
Lynn Please inform Fallon/ or caretaker  that this result(s) is/are showing that the lipids are ok- about the same as last year- acceptable- and the A1C has improved- nice job!!Thanks. SHARAN Ford MD

## 2017-08-09 NOTE — TELEPHONE ENCOUNTER
See result note on urine. Normal. Left message for patient to return call to clinic.  Lynn Burton, CMA

## 2017-09-22 ENCOUNTER — HOSPITAL ENCOUNTER (EMERGENCY)
Facility: CLINIC | Age: 37
Discharge: HOME | End: 2017-09-22
Attending: NURSE PRACTITIONER | Admitting: NURSE PRACTITIONER
Payer: COMMERCIAL

## 2017-09-22 VITALS
OXYGEN SATURATION: 99 % | RESPIRATION RATE: 18 BRPM | WEIGHT: 225.6 LBS | SYSTOLIC BLOOD PRESSURE: 121 MMHG | DIASTOLIC BLOOD PRESSURE: 85 MMHG | TEMPERATURE: 97.8 F | BODY MASS INDEX: 38.72 KG/M2

## 2017-09-22 DIAGNOSIS — Z87.442 PERSONAL HISTORY OF URINARY CALCULI: ICD-10-CM

## 2017-09-22 DIAGNOSIS — R10.9 FLANK PAIN: ICD-10-CM

## 2017-09-22 LAB
ALBUMIN SERPL-MCNC: 3.8 G/DL (ref 3.4–5)
ALBUMIN UR-MCNC: NEGATIVE MG/DL
ALP SERPL-CCNC: 70 U/L (ref 40–150)
ALT SERPL W P-5'-P-CCNC: 55 U/L (ref 0–50)
ANION GAP SERPL CALCULATED.3IONS-SCNC: 11 MMOL/L (ref 3–14)
APPEARANCE UR: ABNORMAL
AST SERPL W P-5'-P-CCNC: 32 U/L (ref 0–45)
BACTERIA #/AREA URNS HPF: ABNORMAL /HPF
BASOPHILS # BLD AUTO: 0 10E9/L (ref 0–0.2)
BASOPHILS NFR BLD AUTO: 0.3 %
BILIRUB SERPL-MCNC: 1.3 MG/DL (ref 0.2–1.3)
BILIRUB UR QL STRIP: NEGATIVE
BUN SERPL-MCNC: 11 MG/DL (ref 7–30)
CALCIUM SERPL-MCNC: 9.1 MG/DL (ref 8.5–10.1)
CHLORIDE SERPL-SCNC: 106 MMOL/L (ref 94–109)
CO2 SERPL-SCNC: 23 MMOL/L (ref 20–32)
COLOR UR AUTO: YELLOW
CREAT SERPL-MCNC: 0.71 MG/DL (ref 0.52–1.04)
CRP SERPL-MCNC: 3.9 MG/L (ref 0–8)
DIFFERENTIAL METHOD BLD: NORMAL
EOSINOPHIL # BLD AUTO: 0.1 10E9/L (ref 0–0.7)
EOSINOPHIL NFR BLD AUTO: 1.2 %
ERYTHROCYTE [DISTWIDTH] IN BLOOD BY AUTOMATED COUNT: 13 % (ref 10–15)
ERYTHROCYTE [SEDIMENTATION RATE] IN BLOOD BY WESTERGREN METHOD: 10 MM/H (ref 0–20)
GFR SERPL CREATININE-BSD FRML MDRD: >90 ML/MIN/1.7M2
GLUCOSE SERPL-MCNC: 118 MG/DL (ref 70–99)
GLUCOSE UR STRIP-MCNC: NEGATIVE MG/DL
HCG UR QL: NEGATIVE
HCT VFR BLD AUTO: 42.8 % (ref 35–47)
HGB BLD-MCNC: 14.9 G/DL (ref 11.7–15.7)
HGB UR QL STRIP: ABNORMAL
IMM GRANULOCYTES # BLD: 0 10E9/L (ref 0–0.4)
IMM GRANULOCYTES NFR BLD: 0.1 %
KETONES UR STRIP-MCNC: NEGATIVE MG/DL
LEUKOCYTE ESTERASE UR QL STRIP: NEGATIVE
LYMPHOCYTES # BLD AUTO: 2.2 10E9/L (ref 0.8–5.3)
LYMPHOCYTES NFR BLD AUTO: 29.1 %
MCH RBC QN AUTO: 30.5 PG (ref 26.5–33)
MCHC RBC AUTO-ENTMCNC: 34.8 G/DL (ref 31.5–36.5)
MCV RBC AUTO: 88 FL (ref 78–100)
MONOCYTES # BLD AUTO: 0.6 10E9/L (ref 0–1.3)
MONOCYTES NFR BLD AUTO: 7.6 %
MUCOUS THREADS #/AREA URNS LPF: PRESENT /LPF
NEUTROPHILS # BLD AUTO: 4.6 10E9/L (ref 1.6–8.3)
NEUTROPHILS NFR BLD AUTO: 61.7 %
NITRATE UR QL: NEGATIVE
PH UR STRIP: 5 PH (ref 5–7)
PLATELET # BLD AUTO: 161 10E9/L (ref 150–450)
POTASSIUM SERPL-SCNC: 3.9 MMOL/L (ref 3.4–5.3)
PROT SERPL-MCNC: 7.7 G/DL (ref 6.8–8.8)
RBC # BLD AUTO: 4.89 10E12/L (ref 3.8–5.2)
RBC #/AREA URNS AUTO: <1 /HPF (ref 0–2)
SODIUM SERPL-SCNC: 140 MMOL/L (ref 133–144)
SOURCE: ABNORMAL
SP GR UR STRIP: 1.02 (ref 1–1.03)
SQUAMOUS #/AREA URNS AUTO: 2 /HPF (ref 0–1)
UROBILINOGEN UR STRIP-MCNC: 0 MG/DL (ref 0–2)
WBC # BLD AUTO: 7.5 10E9/L (ref 4–11)
WBC #/AREA URNS AUTO: 1 /HPF (ref 0–2)

## 2017-09-22 PROCEDURE — 81025 URINE PREGNANCY TEST: CPT | Performed by: NURSE PRACTITIONER

## 2017-09-22 PROCEDURE — 85652 RBC SED RATE AUTOMATED: CPT | Performed by: NURSE PRACTITIONER

## 2017-09-22 PROCEDURE — 96375 TX/PRO/DX INJ NEW DRUG ADDON: CPT | Performed by: NURSE PRACTITIONER

## 2017-09-22 PROCEDURE — 86140 C-REACTIVE PROTEIN: CPT | Performed by: NURSE PRACTITIONER

## 2017-09-22 PROCEDURE — 25000132 ZZH RX MED GY IP 250 OP 250 PS 637: Performed by: NURSE PRACTITIONER

## 2017-09-22 PROCEDURE — 99285 EMERGENCY DEPT VISIT HI MDM: CPT | Mod: Z6 | Performed by: NURSE PRACTITIONER

## 2017-09-22 PROCEDURE — 96374 THER/PROPH/DIAG INJ IV PUSH: CPT | Performed by: NURSE PRACTITIONER

## 2017-09-22 PROCEDURE — 25000128 H RX IP 250 OP 636: Performed by: NURSE PRACTITIONER

## 2017-09-22 PROCEDURE — 96361 HYDRATE IV INFUSION ADD-ON: CPT | Performed by: NURSE PRACTITIONER

## 2017-09-22 PROCEDURE — 85025 COMPLETE CBC W/AUTO DIFF WBC: CPT | Performed by: NURSE PRACTITIONER

## 2017-09-22 PROCEDURE — 81001 URINALYSIS AUTO W/SCOPE: CPT | Performed by: EMERGENCY MEDICINE

## 2017-09-22 PROCEDURE — 80053 COMPREHEN METABOLIC PANEL: CPT | Performed by: NURSE PRACTITIONER

## 2017-09-22 PROCEDURE — 99285 EMERGENCY DEPT VISIT HI MDM: CPT | Mod: 25 | Performed by: NURSE PRACTITIONER

## 2017-09-22 RX ORDER — TAMSULOSIN HYDROCHLORIDE 0.4 MG/1
0.4 CAPSULE ORAL DAILY
Qty: 10 CAPSULE | Refills: 0 | Status: SHIPPED | OUTPATIENT
Start: 2017-09-22 | End: 2017-10-02

## 2017-09-22 RX ORDER — HYDROMORPHONE HYDROCHLORIDE 1 MG/ML
0.5 INJECTION, SOLUTION INTRAMUSCULAR; INTRAVENOUS; SUBCUTANEOUS
Status: DISCONTINUED | OUTPATIENT
Start: 2017-09-22 | End: 2017-09-22 | Stop reason: HOSPADM

## 2017-09-22 RX ORDER — SODIUM CHLORIDE 9 MG/ML
1000 INJECTION, SOLUTION INTRAVENOUS CONTINUOUS
Status: DISCONTINUED | OUTPATIENT
Start: 2017-09-22 | End: 2017-09-22 | Stop reason: HOSPADM

## 2017-09-22 RX ORDER — HYDROCODONE BITARTRATE AND ACETAMINOPHEN 5; 325 MG/1; MG/1
TABLET ORAL
Qty: 16 TABLET | Refills: 0 | Status: SHIPPED | OUTPATIENT
Start: 2017-09-22 | End: 2017-10-28

## 2017-09-22 RX ORDER — TAMSULOSIN HYDROCHLORIDE 0.4 MG/1
0.4 CAPSULE ORAL ONCE
Status: COMPLETED | OUTPATIENT
Start: 2017-09-22 | End: 2017-09-22

## 2017-09-22 RX ORDER — KETOROLAC TROMETHAMINE 30 MG/ML
30 INJECTION, SOLUTION INTRAMUSCULAR; INTRAVENOUS ONCE
Status: COMPLETED | OUTPATIENT
Start: 2017-09-22 | End: 2017-09-22

## 2017-09-22 RX ADMIN — TAMSULOSIN HYDROCHLORIDE 0.4 MG: 0.4 CAPSULE ORAL at 14:06

## 2017-09-22 RX ADMIN — KETOROLAC TROMETHAMINE 30 MG: 30 INJECTION, SOLUTION INTRAMUSCULAR at 14:06

## 2017-09-22 RX ADMIN — HYDROMORPHONE HYDROCHLORIDE 0.5 MG: 1 INJECTION, SOLUTION INTRAMUSCULAR; INTRAVENOUS; SUBCUTANEOUS at 14:07

## 2017-09-22 RX ADMIN — SODIUM CHLORIDE 1000 ML: 9 INJECTION, SOLUTION INTRAVENOUS at 14:06

## 2017-09-22 ASSESSMENT — ENCOUNTER SYMPTOMS
NAUSEA: 1
APPETITE CHANGE: 1
DYSURIA: 1
FLANK PAIN: 1
ABDOMINAL PAIN: 1

## 2017-09-22 NOTE — ED PROVIDER NOTES
History     Chief Complaint   Patient presents with     Flank Pain     HPI  Fallon Rivera is a 37 year old female who presents to the emergency department today with complaints of left flank pain that radiates to her lower abdomen for the last 2 days.  Patient reports she does have a history of ureteral stones, last one being 2 years ago.  Patient also has a history of ovarian cysts, she only has her left ovary but reports the pain typically does not radiate to her back with these.  Patient reports dysuria, nausea, she denies any fever, vomiting, diarrhea.  Patient has not taken anything for her pain today.  Patient denies any injury or trauma.  Patient reports that lying on her side improves the pain, the pain is stabbing in nature and comes and goes, nothing really makes the pain worse.  Patient's last normal menstrual cycle was 2 weeks ago.  She denies any lesia blood in her urine.    I have reviewed the Medications, Allergies, Past Medical and Surgical History, and Social History in the Epic system.    Allergies:   Allergies   Allergen Reactions     Amoxicillin Hives     Anti-Nausea      Anxiety, agitation,severe paranoia. Zofran, Reglan are some of them.       Demerol Hcl [Meperidine Hcl] Nausea     Indomethacin Nausea and Vomiting     Macrobid [Nitrofuran Derivatives] Hives     Reglan [Metoclopramide] Other (See Comments)     Acute paranoia, severe     Zofran [Ondansetron] Other (See Comments)     Severe anxiety, agitation         No current facility-administered medications on file prior to encounter.   Current Outpatient Prescriptions on File Prior to Encounter:  metFORMIN (GLUCOPHAGE) 500 MG tablet Take 1 tablet (500 mg) by mouth daily (with dinner)   albuterol (PROAIR HFA, PROVENTIL HFA, VENTOLIN HFA) 108 (90 BASE) MCG/ACT inhaler Inhale 2 puffs into the lungs every 6 hours as needed for shortness of breath / dyspnea or wheezing   Ibuprofen (ADVIL PO) Take 600 mg by mouth every 6 hours as needed  Reported on 5/15/2017   HYDROcodone-acetaminophen (NORCO) 5-325 MG per tablet Take 1 tablet by mouth every 6 hours as needed for moderate to severe pain   blood glucose monitoring (LILLIAN CONTOUR NEXT) test strip Use to test blood sugar 2 times daily or as directed.   blood glucose monitoring (LILLIAN MICROLET) lancets Use to check blood sugar 1-2 times daily   Multiple Vitamins-Minerals (MULTIPLE VITAMINS/WOMENS PO) Reported on 5/15/2017   omeprazole 20 MG tablet Take 1 tablet (20 mg) by mouth daily Take 30-60 minutes before a meal.       Patient Active Problem List   Diagnosis     Hemorrhage of rectum and anus     Female infertility     Papanicolaou smear of cervix with atypical squamous cells cannot exclude high grade squamous intraepithelial lesion (ASC-H)     S/P conization of cervix- KAYLA 2/3 in 2011     Kidney stones     Dyspnea     CARDIOVASCULAR SCREENING; LDL GOAL LESS THAN 160     GERD (gastroesophageal reflux disease)     Hyperlipidemia LDL goal <130     Mild major depression (H)     Right ovarian cyst     Glucosuria     Type 2 diabetes mellitus without complication (H)     Non morbid obesity due to excess calories       Past Surgical History:   Procedure Laterality Date     C REMOVAL OF KIDNEY STONE      two surgeries, 2002,2003     CHOLECYSTECTOMY, LAPOROSCOPIC  5/11/2007    Cholecystectomy, Laparoscopic     COLONOSCOPY  05/17/10     CONIZATION  7/1/2011    Procedure:CONIZATION; cold knife and punch biopsy of mole on back; Surgeon:SYDNEY FORD; Location:[facility]     DILATION AND CURETTAGE, HYSTEROSCOPY, LAPAROSCOPY, COMBINED Right 9/24/2015    Procedure: COMBINED DILATION AND CURETTAGE, HYSTEROSCOPY, LAPAROSCOPY;  Surgeon: Sydney Ford MD;  Location: [facility]     ESOPHAGOSCOPY, GASTROSCOPY, DUODENOSCOPY (EGD), COMBINED  5/21/2014    Procedure: COMBINED ESOPHAGOSCOPY, GASTROSCOPY, DUODENOSCOPY (EGD), BIOPSY SINGLE OR MULTIPLE;  Surgeon: Earl Lane MD;  Location: [facility]      "EXCISE LESION TRUNK  7/1/2011    Procedure:EXCISE LESION TRUNK; Surgeon:GREG GRAFF; Location:[facility]     HC FRAGMENTING OF KIDNEY STONE  11/30/2005     HC RX ECTOP PREG BY SCOPE,RMV TUBE/OVRY  12/20/2007    Right sided salpingectomy and removal of ruptured ectopic pregnancy. D&C.     HC UGI ENDOSCOPY, SIMPLE EXAM  09/01/09     LAPAROSCOPIC APPENDECTOMY N/A 9/24/2015    Procedure: LAPAROSCOPIC APPENDECTOMY;  Surgeon: James Cuellar MD;  Location[facility]     LAPAROSCOPIC CYSTECTOMY OVARIAN (BENIGN) N/A 9/24/2015    Procedure: LAPAROSCOPIC CYSTECTOMY OVARIAN (BENIGN);  Surgeon: Greg Graff MD;  Location[facility]     LAPAROSCOPIC OOPHORECTOMY Right 9/24/2015    Procedure: LAPAROSCOPIC OOPHORECTOMY;  Surgeon: Greg Graff MD;  Location: [facility]     LITHOTRIPSY  01/17/2007     PELVIS LAPAROSCOPY,DX  10/16/2000    Diagnostic laparoscopy, Endometrial biopsy.     TUBAL/ECTOPIC PREGNANCY  01/23/2007    Lap removal of left ruptured ectopic pregnancy & salpingectomy, D&C       Social History   Substance Use Topics     Smoking status: Current Some Day Smoker     Years: 12.00     Types: Cigarettes     Last attempt to quit: 7/14/2009     Smokeless tobacco: Never Used      Comment: As of 10 /2014, pt has had a few cigs here and there     Alcohol use 0.0 oz/week     0 Standard drinks or equivalent per week      Comment: every few months       Most Recent Immunizations   Administered Date(s) Administered     HPV 02/14/2008     Influenza (IIV3) 10/05/2010     Influenza Vaccine IM 3yrs+ 4 Valent IIV4 01/30/2017     TD (ADULT, 7+) 02/17/2002     TDAP Vaccine (Adacel) 05/07/2012       BMI: Estimated body mass index is 38.72 kg/(m^2) as calculated from the following:    Height as of 8/9/17: 1.626 m (5' 4\").    Weight as of this encounter: 102.3 kg (225 lb 9.6 oz).      Review of Systems   Constitutional: Positive for appetite change.   Gastrointestinal: Positive for abdominal pain and nausea. "   Genitourinary: Positive for dysuria and flank pain.   All other systems reviewed and are negative.      Physical Exam   BP: (!) 124/93  Heart Rate: 104  Temp: 97.8  F (36.6  C)  Resp: 18  Weight: 102.3 kg (225 lb 9.6 oz)  SpO2: 99 %  Physical Exam   Constitutional: She is oriented to person, place, and time. She appears well-developed and well-nourished. She appears distressed (Secondary to pain).   HENT:   Head: Normocephalic.   Eyes: Conjunctivae are normal.   Neck: Normal range of motion. Neck supple.   Cardiovascular: Regular rhythm.    Mildly tachycardic at 104   Pulmonary/Chest: Effort normal and breath sounds normal.   Abdominal: Soft. Bowel sounds are normal. There is tenderness (Left lower quadrant as well as left CVA tenderness).   Musculoskeletal: Normal range of motion.   Neurological: She is alert and oriented to person, place, and time.   Skin: Skin is warm and dry. She is not diaphoretic.   Psychiatric: She has a normal mood and affect.       ED Course     ED Course     Procedures    Results for orders placed or performed during the hospital encounter of 09/22/17   UA with Microscopic   Result Value Ref Range    Color Urine Yellow     Appearance Urine Slightly Cloudy     Glucose Urine Negative NEG^Negative mg/dL    Bilirubin Urine Negative NEG^Negative    Ketones Urine Negative NEG^Negative mg/dL    Specific Gravity Urine 1.020 1.003 - 1.035    Blood Urine Small (A) NEG^Negative    pH Urine 5.0 5.0 - 7.0 pH    Protein Albumin Urine Negative NEG^Negative mg/dL    Urobilinogen mg/dL 0.0 0.0 - 2.0 mg/dL    Nitrite Urine Negative NEG^Negative    Leukocyte Esterase Urine Negative NEG^Negative    Source Unspecified Urine     WBC Urine 1 0 - 2 /HPF    RBC Urine <1 0 - 2 /HPF    Bacteria Urine Few (A) NEG^Negative /HPF    Squamous Epithelial /HPF Urine 2 (H) 0 - 1 /HPF    Mucous Urine Present (A) NEG^Negative /LPF   CBC with platelets differential   Result Value Ref Range    WBC 7.5 4.0 - 11.0 10e9/L    RBC  Count 4.89 3.8 - 5.2 10e12/L    Hemoglobin 14.9 11.7 - 15.7 g/dL    Hematocrit 42.8 35.0 - 47.0 %    MCV 88 78 - 100 fl    MCH 30.5 26.5 - 33.0 pg    MCHC 34.8 31.5 - 36.5 g/dL    RDW 13.0 10.0 - 15.0 %    Platelet Count 161 150 - 450 10e9/L    Diff Method Automated Method     % Neutrophils 61.7 %    % Lymphocytes 29.1 %    % Monocytes 7.6 %    % Eosinophils 1.2 %    % Basophils 0.3 %    % Immature Granulocytes 0.1 %    Absolute Neutrophil 4.6 1.6 - 8.3 10e9/L    Absolute Lymphocytes 2.2 0.8 - 5.3 10e9/L    Absolute Monocytes 0.6 0.0 - 1.3 10e9/L    Absolute Eosinophils 0.1 0.0 - 0.7 10e9/L    Absolute Basophils 0.0 0.0 - 0.2 10e9/L    Abs Immature Granulocytes 0.0 0 - 0.4 10e9/L   Comprehensive metabolic panel   Result Value Ref Range    Sodium 140 133 - 144 mmol/L    Potassium 3.9 3.4 - 5.3 mmol/L    Chloride 106 94 - 109 mmol/L    Carbon Dioxide 23 20 - 32 mmol/L    Anion Gap 11 3 - 14 mmol/L    Glucose 118 (H) 70 - 99 mg/dL    Urea Nitrogen 11 7 - 30 mg/dL    Creatinine 0.71 0.52 - 1.04 mg/dL    GFR Estimate >90 >60 mL/min/1.7m2    GFR Estimate If Black >90 >60 mL/min/1.7m2    Calcium 9.1 8.5 - 10.1 mg/dL    Bilirubin Total 1.3 0.2 - 1.3 mg/dL    Albumin 3.8 3.4 - 5.0 g/dL    Protein Total 7.7 6.8 - 8.8 g/dL    Alkaline Phosphatase 70 40 - 150 U/L    ALT 55 (H) 0 - 50 U/L    AST 32 0 - 45 U/L   CRP inflammation   Result Value Ref Range    CRP Inflammation 3.9 0.0 - 8.0 mg/L   Erythrocyte sedimentation rate auto   Result Value Ref Range    Sed Rate 10 0 - 20 mm/h   HCG qualitative urine   Result Value Ref Range    HCG Qual Urine Negative NEG^Negative       Assessments & Plan (with Medical Decision Making)  Fallon is a 37-year-old female with a history of kidney stones who presents to the emergency department today with a 2 day history of flank pain radiating down into her left lower quadrant.  Please refer to HPI on focused exam.  Patient on exam is well-hydrated, she is nontoxic appearing, she is in distress  secondary to pain.  Peripheral IV was established and patient was given IV Toradol as well as IV Dilaudid which brought her pain on a 4 out of 10.    Patient reports severe intolerance to any antinausea medications so these were not attempted.  Patient is not actively vomiting.  Patient also reports that she was told that unless absolutely necessary she should not have any further CT scans as she has had so many in the past, she does have blood in her urine, I feel we can avoid this today if her blood work is reassuring.    CBC was obtained, this returned with a normal white count and hemoglobin, CMP returned with glucose of 118 and an ALT of 55 and is otherwise within normal limits.  CRP and ESR are negative, as is urine pregnancy.  Patient was given 1 dose of Flomax here in the ED as well as 1 L normal saline.  I feel she is stable to be discharged home, she will strain her urine at home, I will continue the Flomax as an outpatient medication, I encouraged ibuprofen and we'll give her a small supply Norco for severe pain.    Patient was instructed to follow-up with Dr. Barber on Monday whom she has seen in the past for her kidney stones, reasons to return to the ED were discussed in detail as well as narcotic safety.  I did check patient's narcotic use in the [place] prescription monitoring program, she does not have any signs concerning for opioid abuse.    Patient was encouraged to stay very well-hydrated, she is agreeable to plan of care and was discharged in stable condition with her mother driving.       I have reviewed the nursing notes.    I have reviewed the findings, diagnosis, plan and need for follow up with the patient.    New Prescriptions    HYDROCODONE-ACETAMINOPHEN (NORCO) 5-325 MG PER TABLET    1-2 tabs every 4-6 hours as needed for pain    TAMSULOSIN (FLOMAX) 0.4 MG CAPSULE    Take 1 capsule (0.4 mg) by mouth daily for 10 doses       Final diagnoses:   Flank pain - left, likely ureteral stone        9/22/2017   [facility] EMERGENCY DEPARTMENT     Anabella Mittal, SERENE CNP  09/22/17 1530

## 2017-09-22 NOTE — ED NOTES
Comes in with left flank pain that radiates to her back and lower abdomin. States has a history of kidney stones and ovarian cyst (has only one ovary on her left side.)

## 2017-09-22 NOTE — ED AVS SNAPSHOT
[facility] Emergency Department    [address] [place] [address]    Phone:  [phone]    Fax:  [phone]                                       Fallon Rivera   MRN: [id number]    Department:  [facility] Emergency Department   Date of Visit:  9/22/2017           After Visit Summary Signature Page     I have received my discharge instructions, and my questions have been answered. I have discussed any challenges I see with this plan with the nurse or doctor.    ..........................................................................................................................................  Patient/Patient Representative Signature      ..........................................................................................................................................  Patient Representative Print Name and Relationship to Patient    ..................................................               ................................................  Date                                            Time    ..........................................................................................................................................  Reviewed by Signature/Title    ...................................................              ..............................................  Date                                                            Time

## 2017-09-22 NOTE — DISCHARGE INSTRUCTIONS
"   * KIDNEY STONE (w/ Colic)    The sharp cramping pain and nausea/vomiting that you have is due to a small stone which has formed in the kidney and is now passing down a narrow tube (ureter) on its way to your bladder. Once it reaches your bladder, the pain will stop. The stone may pass in your urine stream in one piece. [The size may be 1/16\" to 1/4\" (1-6mm)]. Or, the stone may also break up into rhona fragments which you may not even notice.  Once you have had a kidney stone there is a risk for recurrence in the future.  HOME CARE:    Drink lots of fluid (at least 8-10 glasses of water a day).    Most stones will pass on their own, but may take from a few hours to a few days.    Each time you urinate, do so in a jar. Pour the urine from the jar through the strainer and into the toilet. Continue doing this until 24 hours after your pain stops. By then, if there was a kidney stone, it should pass from your bladder. Some stones dissolve into sand-like particles and pass right through the strainer. In that case, you won't ever see a stone.    Save any stone that you find in the strainer and bring it to your doctor for analysis. It may be possible to prevent certain types of stones from forming. Therefore, it is important to know what kind of stone you have.    Try to stay as active as possible since this will help the stone pass. Do not stay in bed unless your pain prevents you from getting up. You may notice a red, pink or brown color to your urine. This is normal while passing a kidney stone.  FOLLOW UP with your doctor or return to this facility if the pain lasts more than 48 hours.  GET PROMPT MEDICAL ATTENTION if any of the following occur:    Pain that is not controlled by the medicine given    Repeated vomiting or unable to keep down fluids    Weakness, dizziness or fainting    Fever over 101  F (38.3  C)    Passage of solid red or brown urine (can't see through it) or urine with lots of blood clots    Unable " to pass urine for 8 hours and increasing bladder pressure    [id number] [employer], [address] [place] [address], [place] [address]. All rights reserved. This information is not intended as a substitute for professional medical care. Always follow your healthcare professional's instructions.

## 2017-09-22 NOTE — ED AVS SNAPSHOT
" [facility]    [address] [place] [address]    Phone:  [phone]    Fax:  [phone]                                       Fallon Rivera   MRN: [id number]    Department:  [facility]   Date of Visit:  9/22/2017           Patient Information     Date Of Birth          1980        Your diagnoses for this visit were:     Flank pain left, likely ureteral stone       You were seen by Anabella Mittal, SERENE CNP.      Follow-up Information     Follow up with Km Barber MD In 3 days.    Specialty:  Urology    Contact information:    [address] [place] [address]  [phone]          Follow up with [facility].    Specialty:  EMERGENCY MEDICINE    Why:  If symptoms worsen    Contact information:    [address] [place] [address] [phone]    Additional information:    From [address]: Exit at [employer] on south side of [place]. Turn right on [address]. Turn left at stoplight on [address]. [facility] will be in view two blocks ahead        Discharge Instructions          * KIDNEY STONE (w/ Colic)    The sharp cramping pain and nausea/vomiting that you have is due to a small stone which has formed in the kidney and is now passing down a narrow tube (ureter) on its way to your bladder. Once it reaches your bladder, the pain will stop. The stone may pass in your urine stream in one piece. [The size may be 1/16\" to 1/4\" (1-6mm)]. Or, the stone may also break up into rhona fragments which you may not even notice.  Once you have had a kidney stone there is a risk for recurrence in the future.  HOME CARE:    Drink lots of fluid (at least 8-10 glasses of water a day).    Most stones will pass on their own, but may take from a few hours to a few days.    Each time you urinate, do so in a jar. Pour the urine from the jar through the strainer and " into the toilet. Continue doing this until 24 hours after your pain stops. By then, if there was a kidney stone, it should pass from your bladder. Some stones dissolve into sand-like particles and pass right through the strainer. In that case, you won't ever see a stone.    Save any stone that you find in the strainer and bring it to your doctor for analysis. It may be possible to prevent certain types of stones from forming. Therefore, it is important to know what kind of stone you have.    Try to stay as active as possible since this will help the stone pass. Do not stay in bed unless your pain prevents you from getting up. You may notice a red, pink or brown color to your urine. This is normal while passing a kidney stone.  FOLLOW UP with your doctor or return to this facility if the pain lasts more than 48 hours.  GET PROMPT MEDICAL ATTENTION if any of the following occur:    Pain that is not controlled by the medicine given    Repeated vomiting or unable to keep down fluids    Weakness, dizziness or fainting    Fever over 101  F (38.3  C)    Passage of solid red or brown urine (can't see through it) or urine with lots of blood clots    Unable to pass urine for 8 hours and increasing bladder pressure    [address] [place] [address]. All rights reserved. This information is not intended as a substitute for professional medical care. Always follow your healthcare professional's instructions.      24 Hour Appointment Hotline       To make an appointment at any [facility], call [phone] ([phone]). If you don't have a family doctor or clinic, we will help you find one. [facility] are conveniently located to serve the needs of you and your family.             Review of your medicines      START taking        Dose / Directions Last dose taken    tamsulosin 0.4 MG capsule   Commonly known as:  FLOMAX   Dose:  0.4 mg   Quantity:  10 capsule        Take 1  capsule (0.4 mg) by mouth daily for 10 doses   Refills:  0          CONTINUE these medicines which may have CHANGED, or have new prescriptions. If we are uncertain of the size of tablets/capsules you have at home, strength may be listed as something that might have changed.        Dose / Directions Last dose taken    * HYDROcodone-acetaminophen 5-325 MG per tablet   Commonly known as:  NORCO   Dose:  1 tablet   What changed:  Another medication with the same name was added. Make sure you understand how and when to take each.        Take 1 tablet by mouth every 6 hours as needed for moderate to severe pain   Refills:  0        * HYDROcodone-acetaminophen 5-325 MG per tablet   Commonly known as:  NORCO   What changed:  You were already taking a medication with the same name, and this prescription was added. Make sure you understand how and when to take each.   Quantity:  16 tablet        1-2 tabs every 4-6 hours as needed for pain   Refills:  0        * Notice:  This list has 2 medication(s) that are the same as other medications prescribed for you. Read the directions carefully, and ask your doctor or other care provider to review them with you.      Our records show that you are taking the medicines listed below. If these are incorrect, please call your family doctor or clinic.        Dose / Directions Last dose taken    ADVIL PO   Dose:  600 mg        Take 600 mg by mouth every 6 hours as needed Reported on 5/15/2017   Refills:  0        albuterol 108 (90 BASE) MCG/ACT Inhaler   Commonly known as:  PROAIR HFA/PROVENTIL HFA/VENTOLIN HFA   Dose:  2 puff   Quantity:  1 Inhaler        Inhale 2 puffs into the lungs every 6 hours as needed for shortness of breath / dyspnea or wheezing   Refills:  1        blood glucose monitoring lancets   Quantity:  1 Box        Use to check blood sugar 1-2 times daily   Refills:  12        blood glucose monitoring test strip   Commonly known as:  LILLIAN CONTOUR NEXT   Quantity:  100  strip        Use to test blood sugar 2 times daily or as directed.   Refills:  3        metFORMIN 500 MG tablet   Commonly known as:  GLUCOPHAGE   Dose:  500 mg   Quantity:  30 tablet        Take 1 tablet (500 mg) by mouth daily (with dinner)   Refills:  3        MULTIPLE VITAMINS/WOMENS PO        Reported on 5/15/2017   Refills:  0        omeprazole 20 MG tablet   Dose:  20 mg   Quantity:  30 tablet        Take 1 tablet (20 mg) by mouth daily Take 30-60 minutes before a meal.   Refills:  9                Prescriptions were sent or printed at these locations (2 Prescriptions)                   [facility] [place], [place] - [address]    [address] , [facility] [address]    Telephone:  [phone]   Fax:  [phone]   Hours:                  E-Prescribed (1 of 2)         tamsulosin (FLOMAX) 0.4 MG capsule                 Printed at Department/Unit printer (1 of 2)         HYDROcodone-acetaminophen (NORCO) 5-325 MG per tablet                Procedures and tests performed during your visit     CBC with platelets differential    CRP inflammation    Comprehensive metabolic panel    Erythrocyte sedimentation rate auto    HCG qualitative urine    Peripheral IV: Standard    UA with Microscopic      Orders Needing Specimen Collection     None      Pending Results     No orders found from 9/20/2017 to 9/23/2017.            Pending Culture Results     No orders found from 9/20/2017 to 9/23/2017.            Pending Results Instructions     If you had any lab results that were not finalized at the time of your Discharge, you can call the ED Lab Result RN at [phone]. You will be contacted by this team for any positive Lab results or changes in treatment. The nurses are available 7 days a week from 10A to 6:30P.  You can leave a message 24 hours per day and they will return your call.        Thank you for choosing [facility]       Thank you for choosing [facility] for your care. Our goal is always to  "provide you with excellent care. Hearing back from our patients is one way we can continue to improve our services. Please take a few minutes to complete the written survey that you may receive in the mail after you visit with us. Thank you!        [employer] Information     [employer] lets you send messages to your doctor, view your test results, renew your prescriptions, schedule appointments and more. To sign up, go to [email or web address] to [email or web address] . Click on \"Log in\" on the left side of the screen, which will take you to the Welcome page. Then click on \"Sign up Now\" on the right side of the page.     You will be asked to enter the access code listed below, as well as some personal information. Please follow the directions to create your username and password.     Your access code is: [email or web address]  Expires: 10/4/2017  4:03 PM     Your access code will  in 90 days. If you need help or a new code, please call your [facility] clinic or [phone].        Care EveryWhere ID     This is your Care EveryWhere ID. This could be used by other organizations to access your [facility] medical records  [id number]        Equal Access to Services     MICHAEL BALDWIN AH: [employer], waainsley kaminski, mohinder minoralvida argueta, jeannie mcmahon. So [facility] [phone].    ATENCIÓN: Si habla español, tiene a pierre disposición servicios gratuitos de asistencia lingüística. Tha al [phone].    We comply with applicable federal civil rights laws and [place] laws. We do not discriminate on the basis of race, color, national origin, age, disability sex, sexual orientation or gender identity.            After Visit Summary       This is your record. Keep this with you and show to your community pharmacist(s) and doctor(s) at your next visit.                  "

## 2017-09-28 ENCOUNTER — TELEPHONE (OUTPATIENT)
Dept: FAMILY MEDICINE | Facility: CLINIC | Age: 37
End: 2017-09-28

## 2017-09-28 NOTE — TELEPHONE ENCOUNTER
Reason for Call:  Request for results:    Name of test or procedure: lab     Date of test of procedure: 9/22/17  Location of the test or procedure:  [facility] ED     OK to leave the result message on voice mail or with a family member? YES    Phone number Patient can be reached at:  Home number on file [phone] (home)    Additional comments:      Call taken on 9/28/2017 at 11:08 AM by Allegra Gould

## 2017-09-28 NOTE — TELEPHONE ENCOUNTER
Spoke with patient, she is wondering if there was sugar in her urine, per RM no sugars noted. Patient informed  Lynn Burton CMA

## 2017-10-28 ENCOUNTER — HOSPITAL ENCOUNTER (EMERGENCY)
Facility: CLINIC | Age: 37
Discharge: HOME | End: 2017-10-28
Attending: EMERGENCY MEDICINE | Admitting: EMERGENCY MEDICINE
Payer: COMMERCIAL

## 2017-10-28 VITALS
TEMPERATURE: 98.6 F | HEART RATE: 130 BPM | SYSTOLIC BLOOD PRESSURE: 155 MMHG | WEIGHT: 225.53 LBS | RESPIRATION RATE: 16 BRPM | OXYGEN SATURATION: 98 % | DIASTOLIC BLOOD PRESSURE: 99 MMHG | BODY MASS INDEX: 38.71 KG/M2

## 2017-10-28 DIAGNOSIS — R10.9 RIGHT FLANK PAIN: ICD-10-CM

## 2017-10-28 DIAGNOSIS — N20.0 KIDNEY STONES: ICD-10-CM

## 2017-10-28 DIAGNOSIS — Z87.442 PERSONAL HISTORY OF URINARY CALCULI: ICD-10-CM

## 2017-10-28 DIAGNOSIS — R00.0 TACHYCARDIA: ICD-10-CM

## 2017-10-28 LAB
ALBUMIN SERPL-MCNC: 4 G/DL (ref 3.4–5)
ALBUMIN UR-MCNC: NEGATIVE MG/DL
ALP SERPL-CCNC: 93 U/L (ref 40–150)
ALT SERPL W P-5'-P-CCNC: 61 U/L (ref 0–50)
ANION GAP SERPL CALCULATED.3IONS-SCNC: 10 MMOL/L (ref 3–14)
APPEARANCE UR: CLEAR
AST SERPL W P-5'-P-CCNC: 29 U/L (ref 0–45)
BACTERIA #/AREA URNS HPF: ABNORMAL /HPF
BASOPHILS # BLD AUTO: 0 10E9/L (ref 0–0.2)
BASOPHILS NFR BLD AUTO: 0.2 %
BILIRUB SERPL-MCNC: 0.9 MG/DL (ref 0.2–1.3)
BILIRUB UR QL STRIP: NEGATIVE
BUN SERPL-MCNC: 11 MG/DL (ref 7–30)
CALCIUM SERPL-MCNC: 9 MG/DL (ref 8.5–10.1)
CHLORIDE SERPL-SCNC: 102 MMOL/L (ref 94–109)
CO2 SERPL-SCNC: 27 MMOL/L (ref 20–32)
COLOR UR AUTO: YELLOW
CREAT SERPL-MCNC: 0.81 MG/DL (ref 0.52–1.04)
CRP SERPL-MCNC: 3 MG/L (ref 0–8)
DIFFERENTIAL METHOD BLD: NORMAL
EOSINOPHIL # BLD AUTO: 0.1 10E9/L (ref 0–0.7)
EOSINOPHIL NFR BLD AUTO: 0.7 %
ERYTHROCYTE [DISTWIDTH] IN BLOOD BY AUTOMATED COUNT: 13.3 % (ref 10–15)
GFR SERPL CREATININE-BSD FRML MDRD: 80 ML/MIN/1.7M2
GLUCOSE SERPL-MCNC: 135 MG/DL (ref 70–99)
GLUCOSE UR STRIP-MCNC: NEGATIVE MG/DL
HCG UR QL: NEGATIVE
HCT VFR BLD AUTO: 45.6 % (ref 35–47)
HGB BLD-MCNC: 15.6 G/DL (ref 11.7–15.7)
HGB UR QL STRIP: NEGATIVE
IMM GRANULOCYTES # BLD: 0 10E9/L (ref 0–0.4)
IMM GRANULOCYTES NFR BLD: 0.3 %
KETONES UR STRIP-MCNC: NEGATIVE MG/DL
LEUKOCYTE ESTERASE UR QL STRIP: NEGATIVE
LYMPHOCYTES # BLD AUTO: 2.2 10E9/L (ref 0.8–5.3)
LYMPHOCYTES NFR BLD AUTO: 20.2 %
MCH RBC QN AUTO: 30.4 PG (ref 26.5–33)
MCHC RBC AUTO-ENTMCNC: 34.2 G/DL (ref 31.5–36.5)
MCV RBC AUTO: 89 FL (ref 78–100)
MONOCYTES # BLD AUTO: 0.6 10E9/L (ref 0–1.3)
MONOCYTES NFR BLD AUTO: 5 %
MUCOUS THREADS #/AREA URNS LPF: PRESENT /LPF
NEUTROPHILS # BLD AUTO: 8.1 10E9/L (ref 1.6–8.3)
NEUTROPHILS NFR BLD AUTO: 73.6 %
NITRATE UR QL: NEGATIVE
PH UR STRIP: 5 PH (ref 5–7)
PLATELET # BLD AUTO: 201 10E9/L (ref 150–450)
POTASSIUM SERPL-SCNC: 3.5 MMOL/L (ref 3.4–5.3)
PROT SERPL-MCNC: 8.5 G/DL (ref 6.8–8.8)
RBC # BLD AUTO: 5.14 10E12/L (ref 3.8–5.2)
RBC #/AREA URNS AUTO: <1 /HPF (ref 0–2)
SODIUM SERPL-SCNC: 139 MMOL/L (ref 133–144)
SOURCE: ABNORMAL
SP GR UR STRIP: 1.02 (ref 1–1.03)
SQUAMOUS #/AREA URNS AUTO: 3 /HPF (ref 0–1)
UROBILINOGEN UR STRIP-MCNC: 0 MG/DL (ref 0–2)
WBC # BLD AUTO: 11 10E9/L (ref 4–11)
WBC #/AREA URNS AUTO: 1 /HPF (ref 0–2)

## 2017-10-28 PROCEDURE — 25000128 H RX IP 250 OP 636: Performed by: EMERGENCY MEDICINE

## 2017-10-28 PROCEDURE — 99285 EMERGENCY DEPT VISIT HI MDM: CPT | Mod: 25 | Performed by: EMERGENCY MEDICINE

## 2017-10-28 PROCEDURE — 96365 THER/PROPH/DIAG IV INF INIT: CPT | Performed by: EMERGENCY MEDICINE

## 2017-10-28 PROCEDURE — 81001 URINALYSIS AUTO W/SCOPE: CPT | Performed by: EMERGENCY MEDICINE

## 2017-10-28 PROCEDURE — 96375 TX/PRO/DX INJ NEW DRUG ADDON: CPT | Performed by: EMERGENCY MEDICINE

## 2017-10-28 PROCEDURE — 86140 C-REACTIVE PROTEIN: CPT | Performed by: EMERGENCY MEDICINE

## 2017-10-28 PROCEDURE — 81025 URINE PREGNANCY TEST: CPT | Performed by: EMERGENCY MEDICINE

## 2017-10-28 PROCEDURE — 99285 EMERGENCY DEPT VISIT HI MDM: CPT | Mod: Z6 | Performed by: EMERGENCY MEDICINE

## 2017-10-28 PROCEDURE — 96374 THER/PROPH/DIAG INJ IV PUSH: CPT | Performed by: EMERGENCY MEDICINE

## 2017-10-28 PROCEDURE — 80053 COMPREHEN METABOLIC PANEL: CPT | Performed by: EMERGENCY MEDICINE

## 2017-10-28 PROCEDURE — 85025 COMPLETE CBC W/AUTO DIFF WBC: CPT | Performed by: EMERGENCY MEDICINE

## 2017-10-28 RX ORDER — HYDROMORPHONE HYDROCHLORIDE 1 MG/ML
0.5 INJECTION, SOLUTION INTRAMUSCULAR; INTRAVENOUS; SUBCUTANEOUS
Status: DISCONTINUED | OUTPATIENT
Start: 2017-10-28 | End: 2017-10-28 | Stop reason: HOSPADM

## 2017-10-28 RX ORDER — KETOROLAC TROMETHAMINE 15 MG/ML
15 INJECTION, SOLUTION INTRAMUSCULAR; INTRAVENOUS ONCE
Status: COMPLETED | OUTPATIENT
Start: 2017-10-28 | End: 2017-10-28

## 2017-10-28 RX ORDER — TAMSULOSIN HYDROCHLORIDE 0.4 MG/1
0.4 CAPSULE ORAL DAILY
Qty: 10 CAPSULE | Refills: 0 | Status: SHIPPED | OUTPATIENT
Start: 2017-10-28 | End: 2017-11-07

## 2017-10-28 RX ORDER — SODIUM CHLORIDE 9 MG/ML
1000 INJECTION, SOLUTION INTRAVENOUS CONTINUOUS
Status: DISCONTINUED | OUTPATIENT
Start: 2017-10-28 | End: 2017-10-28 | Stop reason: HOSPADM

## 2017-10-28 RX ORDER — HYDROCODONE BITARTRATE AND ACETAMINOPHEN 5; 325 MG/1; MG/1
1 TABLET ORAL EVERY 4 HOURS PRN
Qty: 20 TABLET | Refills: 0 | Status: SHIPPED | OUTPATIENT
Start: 2017-10-28 | End: 2018-03-27

## 2017-10-28 RX ADMIN — HYDROMORPHONE HYDROCHLORIDE 0.5 MG: 1 INJECTION, SOLUTION INTRAMUSCULAR; INTRAVENOUS; SUBCUTANEOUS at 17:39

## 2017-10-28 RX ADMIN — SODIUM CHLORIDE 1000 ML: 9 INJECTION, SOLUTION INTRAVENOUS at 17:36

## 2017-10-28 RX ADMIN — KETOROLAC TROMETHAMINE 15 MG: 15 INJECTION, SOLUTION INTRAMUSCULAR; INTRAVENOUS at 17:37

## 2017-10-28 ASSESSMENT — ENCOUNTER SYMPTOMS
CHILLS: 0
FLANK PAIN: 1
DIFFICULTY URINATING: 1
FEVER: 0

## 2017-10-28 NOTE — ED PROVIDER NOTES
History     Chief Complaint   Patient presents with     Flank Pain     Dysuria     The history is provided by the patient.     Fallon Rivera is a 37 year old female who presents to the ED for evaluation of right sided flank pain and difficulty voiding. Patient has a history of kidney stones. She woke up this morning feeling fine, but started experiencing a sharp pain around 1300 this afternoon. She tried voiding but had little amount of urine output. She has still continued to have difficulty voiding. She does not have a appendix, gallbladder or right ovary. She does not drink much caffeine. She is unsure of pregnancy, but does state that she hasn't had a period in two months. Denies fever or chills.      Problem List:    Patient Active Problem List    Diagnosis Date Noted     Non morbid obesity due to excess calories 07/05/2016     Priority: Medium     Type 2 diabetes mellitus without complication (H) 04/29/2016     Priority: Medium     Glucosuria 04/27/2016     Priority: Medium     Right ovarian cyst 09/15/2015     Priority: Medium     Hyperlipidemia LDL goal <130 07/18/2012     Priority: Medium     Mild major depression (H) 07/18/2012     Priority: Medium     GERD (gastroesophageal reflux disease) 07/03/2012     Priority: Medium     CARDIOVASCULAR SCREENING; LDL GOAL LESS THAN 160 10/31/2010     Priority: Medium     Dyspnea 11/24/2009     Priority: Medium     Kidney stones 02/17/2009     Priority: Medium     S/P conization of cervix- KAYLA 2/3 in 2011 06/27/2007     Priority: Medium     1/24/06 pap: ASC-H  2/9/06 colposcopy/bx (negative)/ECC (negative) pap- ASC-H  5/24/06 pap- ASC-H  9/6/07 pap NIL  1/23/07 pap ASCUS + HPV 45 (high risk)  3/15/07 pap ASCUS + HPV 45 (high risk) colposcopy- bx (negative)/ECC (koilocytosis)  6/19/07 ECC- koilocytosis.  Pap- AGS  10/23/07 pap- ASC-H, ECC- negative  2/14/08 pap NIL- return to yearly paps  2/17/09 pap NIL- repeat 1 year  3/8/10 pap NIL  3/3/11 pap ASCUS + HPV  45 (high risk)- colposcopy recommended  5/16/11 colpo- bx (KAYLA 2/3/HSIL) ECC (ASCUS)  pap (ASC-H)  6/2/11 recommend: CKC  7/1/11 CKC: path: KAYLA 2/3 with possible involvement of the endocervical margin.  ECC- neg.  Endometrium- neg.  7/14/11 plan: HIPOLITO in approx 2 months  10/10/11- hysterectomy planned.  (cancelled)  11/7/11 pap-- NIL. Plan--repeat pap in 6 months. (5/7/12)  5/7/12 pap NIL. Plan-- pap in 1 year (due 5/7/13)   5/8/13 pap NIL. Plan-pap in 1 year  5/8/14 pap NIL/neg HPV. Plan- repeat pap/HPV in 1 year (due 5/8/15)  10/14/14 pap NIL/neg HPV. Endometrial biopsy- WNL   1/6/16  Pap NIL/neg HR HPV Plan: paps yearly, per Dr. Ford.  1/30/17 NIL pap/neg HR HPV. Plan: pap in 1 year.          Papanicolaou smear of cervix with atypical squamous cells cannot exclude high grade squamous intraepithelial lesion (ASC-H) 09/07/2006     Priority: Medium     Female infertility 07/24/2006     Priority: Medium     Problem list name updated by automated process. Provider to review       Hemorrhage of rectum and anus 12/02/2004     Priority: Medium        Past Medical History:    Past Medical History:   Diagnosis Date     Abnormal Pap smear 2006, 2007,      Calculus of kidney 2002     Cervical high risk HPV (human papillomavirus) test positive      History of colposcopy with cervical biopsy 2/9/06, 3/15/07       Past Surgical History:    Past Surgical History:   Procedure Laterality Date     C REMOVAL OF KIDNEY STONE      two surgeries, 2002,2003     CHOLECYSTECTOMY, LAPOROSCOPIC  5/11/2007    Cholecystectomy, Laparoscopic     COLONOSCOPY  05/17/10     CONIZATION  7/1/2011    Procedure:CONIZATION; cold knife and punch biopsy of mole on back; Surgeon:GREG FORD; Location:[facility] OR     DILATION AND CURETTAGE, HYSTEROSCOPY, LAPAROSCOPY, COMBINED Right 9/24/2015    Procedure: COMBINED DILATION AND CURETTAGE, HYSTEROSCOPY, LAPAROSCOPY;  Surgeon: Greg Ford MD;  Location: [facility]     ESOPHAGOSCOPY,  GASTROSCOPY, DUODENOSCOPY (EGD), COMBINED  5/21/2014    Procedure: COMBINED ESOPHAGOSCOPY, GASTROSCOPY, DUODENOSCOPY (EGD), BIOPSY SINGLE OR MULTIPLE;  Surgeon: Earl Lane MD;  Location: [facility]     EXCISE LESION TRUNK  7/1/2011    Procedure:EXCISE LESION TRUNK; Surgeon:GREG GRAFF; Location:[facility] OR     HC FRAGMENTING OF KIDNEY STONE  11/30/2005     HC RX ECTOP PREG BY SCOPE,RMV TUBE/OVRY  12/20/2007    Right sided salpingectomy and removal of ruptured ectopic pregnancy. D&C.     HC UGI ENDOSCOPY, SIMPLE EXAM  09/01/09     LAPAROSCOPIC APPENDECTOMY N/A 9/24/2015    Procedure: LAPAROSCOPIC APPENDECTOMY;  Surgeon: James Cuellar MD;  Location: [facility]     LAPAROSCOPIC CYSTECTOMY OVARIAN (BENIGN) N/A 9/24/2015    Procedure: LAPAROSCOPIC CYSTECTOMY OVARIAN (BENIGN);  Surgeon: Greg Graff MD;  [facility]: [facility]     LAPAROSCOPIC OOPHORECTOMY Right 9/24/2015    Procedure: LAPAROSCOPIC OOPHORECTOMY;  Surgeon: Greg Graff MD;  Location: [facility]     LITHOTRIPSY  01/17/2007     PELVIS LAPAROSCOPY,DX  10/16/2000    Diagnostic laparoscopy, Endometrial biopsy.     TUBAL/ECTOPIC PREGNANCY  01/23/2007    Lap removal of left ruptured ectopic pregnancy & salpingectomy, D&C       Family History:    Family History   Problem Relation Age of Onset     DIABETES Maternal Grandmother      adult onset     Anesthesia Reaction Maternal Grandmother      can't tolerate Novacaine.     Respiratory Maternal Grandmother      COPD and emphysema.     Thyroid Disease Maternal Grandmother      HEART DISEASE Maternal Grandmother      CHF     DIABETES Paternal Grandfather      adult o nset     Hypertension Paternal Grandfather      CEREBROVASCULAR DISEASE Paternal Grandfather      HEART DISEASE Paternal Grandfather      MI, replaced valve,angioplasty     Thyroid Disease Paternal Grandfather      CANCER Maternal Grandfather      skin cancer     HEART DISEASE Maternal Grandfather      MI     Obesity  Mother      on thyroid medication     Thyroid Disease Mother      DIABETES Mother      HEART DISEASE Father      Hypertension Father      and TIA     Lipids Father      Breast Cancer Paternal Grandmother      Arrhythmia Other      CANCER Maternal Aunt      breast/brain cancer     Depression Paternal Aunt      CEREBROVASCULAR DISEASE Paternal Uncle      CEREBROVASCULAR DISEASE Paternal Aunt        Social History:  Marital Status:   [2]  Social History   Substance Use Topics     Smoking status: Current Some Day Smoker     Years: 12.00     Types: Cigarettes     Last attempt to quit: 7/14/2009     Smokeless tobacco: Never Used      Comment: As of 10 /2014, pt has had a few cigs here and there     Alcohol use 0.0 oz/week     0 Standard drinks or equivalent per week      Comment: every few months        Medications:      tamsulosin (FLOMAX) 0.4 MG capsule   HYDROcodone-acetaminophen (NORCO) 5-325 MG per tablet   metFORMIN (GLUCOPHAGE) 500 MG tablet   blood glucose monitoring (LILLIAN CONTOUR NEXT) test strip   blood glucose monitoring (LILLIAN MICROLET) lancets   Multiple Vitamins-Minerals (MULTIPLE VITAMINS/WOMENS PO)   albuterol (PROAIR HFA, PROVENTIL HFA, VENTOLIN HFA) 108 (90 BASE) MCG/ACT inhaler   omeprazole 20 MG tablet   Ibuprofen (ADVIL PO)       Review of Systems   Constitutional: Negative for chills and fever.   Genitourinary: Positive for difficulty urinating and flank pain (right-sided).   All other systems reviewed and are negative.    Physical Exam   BP: (!) 155/99  Pulse: 130  Temp: 98.6  F (37  C)  Resp: 16  Weight: 102.3 kg (225 lb 8.5 oz)  SpO2: 98 %    Physical Exam   Constitutional: She is oriented to person, place, and time. She appears well-developed and well-nourished. No distress.   HENT:   Head: Normocephalic and atraumatic.   Eyes: Conjunctivae are normal. No scleral icterus.   Neck: Normal range of motion.   Cardiovascular: Regular rhythm and normal heart sounds.  Tachycardia present.  Exam  reveals no gallop and no friction rub.    No murmur heard.  Pulmonary/Chest: Effort normal and breath sounds normal. No respiratory distress. She has no decreased breath sounds. She has no wheezes. She has no rhonchi. She has no rales.   Abdominal: There is CVA tenderness (right-sided).   Musculoskeletal: Normal range of motion.   Neurological: She is alert and oriented to person, place, and time.   Skin: Skin is warm and dry. No rash noted. No pallor.   Psychiatric: She has a normal mood and affect. Her behavior is normal.   Nursing note and vitals reviewed.    ED Course     ED Course     Procedures                    Results for orders placed or performed during the hospital encounter of 10/28/17 (from the past 24 hour(s))   Comprehensive metabolic panel   Result Value Ref Range    Sodium 139 133 - 144 mmol/L    Potassium 3.5 3.4 - 5.3 mmol/L    Chloride 102 94 - 109 mmol/L    Carbon Dioxide 27 20 - 32 mmol/L    Anion Gap 10 3 - 14 mmol/L    Glucose 135 (H) 70 - 99 mg/dL    Urea Nitrogen 11 7 - 30 mg/dL    Creatinine 0.81 0.52 - 1.04 mg/dL    GFR Estimate 80 >60 mL/min/1.7m2    GFR Estimate If Black >90 >60 mL/min/1.7m2    Calcium 9.0 8.5 - 10.1 mg/dL    Bilirubin Total 0.9 0.2 - 1.3 mg/dL    Albumin 4.0 3.4 - 5.0 g/dL    Protein Total 8.5 6.8 - 8.8 g/dL    Alkaline Phosphatase 93 40 - 150 U/L    ALT 61 (H) 0 - 50 U/L    AST 29 0 - 45 U/L   CRP inflammation   Result Value Ref Range    CRP Inflammation 3.0 0.0 - 8.0 mg/L   CBC with platelets differential   Result Value Ref Range    WBC 11.0 4.0 - 11.0 10e9/L    RBC Count 5.14 3.8 - 5.2 10e12/L    Hemoglobin 15.6 11.7 - 15.7 g/dL    Hematocrit 45.6 35.0 - 47.0 %    MCV 89 78 - 100 fl    MCH 30.4 26.5 - 33.0 pg    MCHC 34.2 31.5 - 36.5 g/dL    RDW 13.3 10.0 - 15.0 %    Platelet Count 201 150 - 450 10e9/L    Diff Method Automated Method     % Neutrophils 73.6 %    % Lymphocytes 20.2 %    % Monocytes 5.0 %    % Eosinophils 0.7 %    % Basophils 0.2 %    % Immature  Granulocytes 0.3 %    Absolute Neutrophil 8.1 1.6 - 8.3 10e9/L    Absolute Lymphocytes 2.2 0.8 - 5.3 10e9/L    Absolute Monocytes 0.6 0.0 - 1.3 10e9/L    Absolute Eosinophils 0.1 0.0 - 0.7 10e9/L    Absolute Basophils 0.0 0.0 - 0.2 10e9/L    Abs Immature Granulocytes 0.0 0 - 0.4 10e9/L   UA with Microscopic   Result Value Ref Range    Color Urine Yellow     Appearance Urine Clear     Glucose Urine Negative NEG^Negative mg/dL    Bilirubin Urine Negative NEG^Negative    Ketones Urine Negative NEG^Negative mg/dL    Specific Gravity Urine 1.016 1.003 - 1.035    Blood Urine Negative NEG^Negative    pH Urine 5.0 5.0 - 7.0 pH    Protein Albumin Urine Negative NEG^Negative mg/dL    Urobilinogen mg/dL 0.0 0.0 - 2.0 mg/dL    Nitrite Urine Negative NEG^Negative    Leukocyte Esterase Urine Negative NEG^Negative    Source Unspecified Urine     WBC Urine 1 0 - 2 /HPF    RBC Urine <1 0 - 2 /HPF    Bacteria Urine Few (A) NEG^Negative /HPF    Squamous Epithelial /HPF Urine 3 (H) 0 - 1 /HPF    Mucous Urine Present (A) NEG^Negative /LPF   HCG qualitative urine   Result Value Ref Range    HCG Qual Urine Negative NEG^Negative       Medications   HYDROmorphone (PF) (DILAUDID) injection 0.5 mg (0.5 mg Intravenous Given 10/28/17 1739)   0.9% sodium chloride BOLUS (0 mLs Intravenous Stopped 10/28/17 1836)     Followed by   0.9% sodium chloride infusion (not administered)   ketorolac (TORADOL) injection 15 mg (15 mg Intravenous Given 10/28/17 1737)       Assessments & Plan (with Medical Decision Making)  Fallon Rivera is a 37 year old female who presents to the ED for evaluation of right sided flank pain and difficulty voiding. Patient has a history of kidney stones. She woke up this morning feeling fine, but started experiencing a sharp pain around 1300 this afternoon.  She states that the pain is similar to previous episodes of kidney stones.  She is intolerant to antinausea medicines so she is declining these but she did have  substantial pain relief with Dilaudid IV.  Her labs are unremarkable and her pregnancy test is negative.  We will treat her as before with pain control using hydrocodone and Flomax for the ureterolithiasis that certainly is plaguing her.  No imaging was done because of the high volume of CTs done in the past.  The patient will follow-up with Dr. Barber as before for further care.       I have reviewed the nursing notes.    I have reviewed the findings, diagnosis, plan and need for follow up with the patient.       Discharge Medication List as of 10/28/2017  6:36 PM      START taking these medications    Details   tamsulosin (FLOMAX) 0.4 MG capsule Take 1 capsule (0.4 mg) by mouth daily for 10 doses, Disp-10 capsule, R-0, E-Prescribe      HYDROcodone-acetaminophen (NORCO) 5-325 MG per tablet Take 1 tablet by mouth every 4 hours as needed for moderate to severe pain, Disp-20 tablet, R-0, Local Print             Final diagnoses:   Kidney stones   Right flank pain     This document serves as a record of services personally performed by Earl Mcqueen MD. It was created on their behalf by Ellen Baker, a trained medical scribe. The creation of this record is based on the provider's personal observations and the statements of the patient. This document has been checked and approved by the attending provider.    Note: Chart documentation done in part with Dragon Voice Recognition software. Although reviewed after completion, some word and grammatical errors may remain.      10/28/2017   [facility] EMERGENCY DEPARTMENT     Earl Mcqueen MD  10/28/17 1917

## 2017-10-28 NOTE — ED AVS SNAPSHOT
[facility] Emergency Department    [address] [place] [address]    Phone:  [phone]    Fax:  [phone]                                       Fallon Rivera   MRN: [id number]    Department:  [facility] Emergency Department   Date of Visit:  10/28/2017           After Visit Summary Signature Page     I have received my discharge instructions, and my questions have been answered. I have discussed any challenges I see with this plan with the nurse or doctor.    ..........................................................................................................................................  Patient/Patient Representative Signature      ..........................................................................................................................................  Patient Representative Print Name and Relationship to Patient    ..................................................               ................................................  Date                                            Time    ..........................................................................................................................................  Reviewed by Signature/Title    ...................................................              ..............................................  Date                                                            Time

## 2017-10-28 NOTE — ED AVS SNAPSHOT
[facility] Emergency Department    [address] [place] [address]    Phone:  [phone]    Fax:  [phone]                                       Fallon Rivera   MRN: [id number]    Department:  [facility] Emergency Department   Date of Visit:  10/28/2017           Patient Information     Date Of Birth          1980        Your diagnoses for this visit were:     Kidney stones     Right flank pain        You were seen by Earl Mcqueen MD.      Follow-up Information     Schedule an appointment as soon as possible for a visit with Km Barber MD.    Specialty:  Urology    Contact information:    [address] [place] [address]  [phone]        Discharge References/Attachments     KIDNEY STONE W/ COLIC (ENGLISH)    FLANK PAIN, UNCERTAIN CAUSE (ENGLISH)      24 Hour Appointment Hotline       To make an appointment at any [facility] clinic, call [phone] ([phone]). If you don't have a family doctor or clinic, we will help you find one. [facility] clinics are conveniently located to serve the needs of you and your family.             Review of your medicines      START taking        Dose / Directions Last dose taken    HYDROcodone-acetaminophen 5-325 MG per tablet   Commonly known as:  NORCO   Dose:  1 tablet   Quantity:  20 tablet        Take 1 tablet by mouth every 4 hours as needed for moderate to severe pain   Refills:  0        tamsulosin 0.4 MG capsule   Commonly known as:  FLOMAX   Dose:  0.4 mg   Quantity:  10 capsule        Take 1 capsule (0.4 mg) by mouth daily for 10 doses   Refills:  0          Our records show that you are taking the medicines listed below. If these are incorrect, please call your family doctor or clinic.        Dose / Directions Last dose taken    ADVIL PO   Dose:  600 mg        Take 600 mg by mouth every 6 hours as needed Reported on 5/15/2017   Refills:  0        albuterol 108 (90 BASE) MCG/ACT Inhaler    Commonly known as:  PROAIR HFA/PROVENTIL HFA/VENTOLIN HFA   Dose:  2 puff   Quantity:  1 Inhaler        Inhale 2 puffs into the lungs every 6 hours as needed for shortness of breath / dyspnea or wheezing   Refills:  1        blood glucose monitoring lancets   Quantity:  1 Box        Use to check blood sugar 1-2 times daily   Refills:  12        blood glucose monitoring test strip   Commonly known as:  LILLIAN CONTOUR NEXT   Quantity:  100 strip        Use to test blood sugar 2 times daily or as directed.   Refills:  3        metFORMIN 500 MG tablet   Commonly known as:  GLUCOPHAGE   Dose:  500 mg   Quantity:  30 tablet        Take 1 tablet (500 mg) by mouth daily (with dinner)   Refills:  3        MULTIPLE VITAMINS/WOMENS PO        Reported on 5/15/2017   Refills:  0        omeprazole 20 MG tablet   Dose:  20 mg   Quantity:  30 tablet        Take 1 tablet (20 mg) by mouth daily Take 30-60 minutes before a meal.   Refills:  9                Prescriptions were sent or printed at these locations (2 Prescriptions)                   [facility]   [address] [address]    Telephone:  [phone]   Fax:  [phone]   Hours:                  Printed at Department/Unit printer (1 of 2)         HYDROcodone-acetaminophen (NORCO) 5-325 MG per tablet                 These medications are not ready yet because we are checking if your insurance will help you pay for them. Call your pharmacy to confirm that your medication is ready for pickup. It may take up to 24 hours for them to receive the prescription. If the prescription is not ready within 3 business days, please contact your clinic or your provider (1 of 2)         tamsulosin (FLOMAX) 0.4 MG capsule                Procedures and tests performed during your visit     CBC with platelets differential    CRP inflammation    Comprehensive metabolic panel    HCG qualitative urine    Peripheral IV: Standard    UA with Microscopic  "     Orders Needing Specimen Collection     None      Pending Results     No orders found from 10/26/2017 to 10/29/2017.            Pending Culture Results     No orders found from 10/26/2017 to 10/29/2017.            Pending Results Instructions     If you had any lab results that were not finalized at the time of your Discharge, you can call the ED Lab Result RN at [phone]. You will be contacted by this team for any positive Lab results or changes in treatment. The nurses are available 7 days a week from 10A to 6:30P.  You can leave a message 24 hours per day and they will return your call.        Thank you for choosing [facility]       Thank you for choosing [facility] for your care. Our goal is always to provide you with excellent care. Hearing back from our patients is one way we can continue to improve our services. Please take a few minutes to complete the written survey that you may receive in the mail after you visit with us. Thank you!        [employer] Information     [employer] lets you send messages to your doctor, view your test results, renew your prescriptions, schedule appointments and more. To sign up, go to [email or web address] . Click on \"Log in\" on the left side of the screen, which will take you to the Welcome page. Then click on \"Sign up Now\" on the right side of the page.     You will be asked to enter the access code listed below, as well as some personal information. Please follow the directions to create your username and password.     Your access code is: [email or web address]  Expires: 2018  6:36 PM     Your access code will  in 90 days. If you need help or a new code, please call your [facility] clinic or [phone].        Care EveryWhere ID     This is your Care EveryWhere ID. This could be used by other organizations to access your [facility] medical records  [id number]        Equal Access to Services     MICHAEL BALDWIN AH: Margarita Gaming, shanna kaminski, mohinder rubalcava " jeannie argueta ah. So [facility] [place] [phone].    ATENCIÓN: Si habla español, tiene a pierre disposición servicios gratuitos de asistencia lingüística. Llame al [phone].    We comply with applicable federal civil rights laws and [place] laws. We do not discriminate on the basis of race, color, national origin, age, disability, sex, sexual orientation, or gender identity.            After Visit Summary       This is your record. Keep this with you and show to your community pharmacist(s) and doctor(s) at your next visit.

## 2017-12-26 ENCOUNTER — OFFICE VISIT (OUTPATIENT)
Dept: FAMILY MEDICINE | Facility: CLINIC | Age: 37
End: 2017-12-26
Payer: COMMERCIAL

## 2017-12-26 VITALS
HEIGHT: 64 IN | HEART RATE: 86 BPM | BODY MASS INDEX: 39.09 KG/M2 | SYSTOLIC BLOOD PRESSURE: 126 MMHG | WEIGHT: 229 LBS | DIASTOLIC BLOOD PRESSURE: 74 MMHG | OXYGEN SATURATION: 99 % | RESPIRATION RATE: 16 BRPM | TEMPERATURE: 99.4 F

## 2017-12-26 DIAGNOSIS — Z23 NEED FOR VACCINATION: ICD-10-CM

## 2017-12-26 DIAGNOSIS — E78.5 HYPERLIPIDEMIA LDL GOAL <100: ICD-10-CM

## 2017-12-26 DIAGNOSIS — N20.0 CALCULUS OF KIDNEY: ICD-10-CM

## 2017-12-26 DIAGNOSIS — Z23 NEED FOR PROPHYLACTIC VACCINATION AND INOCULATION AGAINST INFLUENZA: ICD-10-CM

## 2017-12-26 DIAGNOSIS — E66.09 NON MORBID OBESITY DUE TO EXCESS CALORIES: ICD-10-CM

## 2017-12-26 DIAGNOSIS — E11.9 TYPE 2 DIABETES MELLITUS WITHOUT COMPLICATION, UNSPECIFIED LONG TERM INSULIN USE STATUS: Primary | ICD-10-CM

## 2017-12-26 PROCEDURE — 90732 PPSV23 VACC 2 YRS+ SUBQ/IM: CPT | Performed by: OBSTETRICS & GYNECOLOGY

## 2017-12-26 PROCEDURE — 90471 IMMUNIZATION ADMIN: CPT | Performed by: OBSTETRICS & GYNECOLOGY

## 2017-12-26 PROCEDURE — 99214 OFFICE O/P EST MOD 30 MIN: CPT | Mod: 25 | Performed by: OBSTETRICS & GYNECOLOGY

## 2017-12-26 PROCEDURE — 90686 IIV4 VACC NO PRSV 0.5 ML IM: CPT | Performed by: OBSTETRICS & GYNECOLOGY

## 2017-12-26 PROCEDURE — 90472 IMMUNIZATION ADMIN EACH ADD: CPT | Performed by: OBSTETRICS & GYNECOLOGY

## 2017-12-26 ASSESSMENT — PAIN SCALES - GENERAL: PAINLEVEL: NO PAIN (0)

## 2017-12-26 NOTE — NURSING NOTE
Prior to injection verified patient identity using patient's name and date of birth.  Per orders of Dr. Ford, injection of Pneumovax 23 and flu given by Lynn Burton. Patient instructed to remain in clinic for 15 minutes afterwards, and to report any adverse reaction to me immediately.  Lynn Burton, CMA

## 2017-12-26 NOTE — PROGRESS NOTES
Subjective: Here for diabetic followup exam.   Has been monitoring sugars 1-2 times daily. Glucose values have been in the  120-140 range fasting, and 135 range postprandial.  There have been no   changes in vision  No changes/ complaints   of neuropathy symptoms.   No other concerns        The past medical history, social history, past surgical history and family history as shown below have been reviewed by me today.  Past Medical History:   Diagnosis Date     Abnormal Pap smear 2006, 2007,     ASC-H, ASCUS + HPV 45     Calculus of kidney 2002     Cervical high risk HPV (human papillomavirus) test positive     + HPV 45 (high risk)     History of colposcopy with cervical biopsy 2/9/06, 3/15/07    koilocytosis 2007        Allergies   Allergen Reactions     Amoxicillin Hives     Anti-Nausea      Anxiety, agitation,severe paranoia. Zofran, Reglan are some of them.       Demerol Hcl [Meperidine Hcl] Nausea     Indomethacin Nausea and Vomiting     Macrobid [Nitrofuran Derivatives] Hives     Reglan [Metoclopramide] Other (See Comments)     Acute paranoia, severe     Zofran [Ondansetron] Other (See Comments)     Severe anxiety, agitation     Current Outpatient Prescriptions   Medication Sig Dispense Refill     HYDROcodone-acetaminophen (NORCO) 5-325 MG per tablet Take 1 tablet by mouth every 4 hours as needed for moderate to severe pain 20 tablet 0     metFORMIN (GLUCOPHAGE) 500 MG tablet Take 1 tablet (500 mg) by mouth daily (with dinner) 30 tablet 3     blood glucose monitoring (LILLIAN CONTOUR NEXT) test strip Use to test blood sugar 2 times daily or as directed. 100 strip 3     blood glucose monitoring (LILLIAN MICROLET) lancets Use to check blood sugar 1-2 times daily 1 Box 12     Multiple Vitamins-Minerals (MULTIPLE VITAMINS/WOMENS PO) Reported on 5/15/2017       albuterol (PROAIR HFA, PROVENTIL HFA, VENTOLIN HFA) 108 (90 BASE) MCG/ACT inhaler Inhale 2 puffs into the lungs every 6 hours as needed for shortness of breath  / dyspnea or wheezing 1 Inhaler 1     omeprazole 20 MG tablet Take 1 tablet (20 mg) by mouth daily Take 30-60 minutes before a meal. 30 tablet 9     Ibuprofen (ADVIL PO) Take 600 mg by mouth every 6 hours as needed Reported on 5/15/2017       Past Surgical History:   Procedure Laterality Date     C REMOVAL OF KIDNEY STONE      two surgeries, 2002,2003     CHOLECYSTECTOMY, LAPOROSCOPIC  5/11/2007    Cholecystectomy, Laparoscopic     COLONOSCOPY  05/17/10     CONIZATION  7/1/2011    Procedure:CONIZATION; cold knife and punch biopsy of mole on back; Surgeon:GREG FORD; Location:[facility] OR     DILATION AND CURETTAGE, HYSTEROSCOPY, LAPAROSCOPY, COMBINED Right 9/24/2015    Procedure: COMBINED DILATION AND CURETTAGE, HYSTEROSCOPY, LAPAROSCOPY;  Surgeon: Greg Ford MD;  Location: [facility] OR     ESOPHAGOSCOPY, GASTROSCOPY, DUODENOSCOPY (EGD), COMBINED  5/21/2014    Procedure: COMBINED ESOPHAGOSCOPY, GASTROSCOPY, DUODENOSCOPY (EGD), BIOPSY SINGLE OR MULTIPLE;  Surgeon: Earl Lane MD;  Location: [facility] GI     EXCISE LESION TRUNK  7/1/2011    Procedure:EXCISE LESION TRUNK; Surgeon:GREG FORD; Location:[facility] OR     HC FRAGMENTING OF KIDNEY STONE  11/30/2005     HC RX ECTOP PREG BY SCOPE,RMV TUBE/OVRY  12/20/2007    Right sided salpingectomy and removal of ruptured ectopic pregnancy. D&C.     HC UGI ENDOSCOPY, SIMPLE EXAM  09/01/09     LAPAROSCOPIC APPENDECTOMY N/A 9/24/2015    Procedure: LAPAROSCOPIC APPENDECTOMY;  Surgeon: James Cuellar MD;  Location: [facility]     LAPAROSCOPIC CYSTECTOMY OVARIAN (BENIGN) N/A 9/24/2015    Procedure: LAPAROSCOPIC CYSTECTOMY OVARIAN (BENIGN);  Surgeon: Greg Ford MD;  Location[facility]     LAPAROSCOPIC OOPHORECTOMY Right 9/24/2015    Procedure: LAPAROSCOPIC OOPHORECTOMY;  Surgeon: Greg Ford MD;  Location: [facility]     LITHOTRIPSY  01/17/2007     PELVIS LAPAROSCOPY,DX  10/16/2000    Diagnostic laparoscopy, Endometrial  biopsy.     TUBAL/ECTOPIC PREGNANCY  01/23/2007    Lap removal of left ruptured ectopic pregnancy & salpingectomy, D&C     Social History     Social History     Marital status:      Spouse name: Joseph     Number of children: 1     Years of education: 9     Occupational History      None     Social History Main Topics     Smoking status: Current Some Day Smoker     Years: 12.00     Types: Cigarettes     Last attempt to quit: 7/14/2009     Smokeless tobacco: Never Used      Comment: As of 10 /2014, pt has had a few cigs here and there     Alcohol use 0.0 oz/week     0 Standard drinks or equivalent per week      Comment: every few months     Drug use: No     Sexual activity: Yes     Partners: Male     Birth control/ protection: None     Other Topics Concern      Service No     Blood Transfusions No     Caffeine Concern Yes     Reports 1 can soda/week  Advised not more than 16 ounces caffeien/day.     Occupational Exposure No     Hobby Hazards No     Sleep Concern No     Stress Concern Yes     will discuss with      Weight Concern Yes     Eating disorder in childhood.  Hx. gestational diab.     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     Exercise No     Advised walking 30 min/day.     Bike Helmet No     Seat Belt Yes     Social History Narrative     and lives at home with her  and daughter.     Family History   Problem Relation Age of Onset     DIABETES Maternal Grandmother      adult onset     Anesthesia Reaction Maternal Grandmother      can't tolerate Novacaine.     Respiratory Maternal Grandmother      COPD and emphysema.     Thyroid Disease Maternal Grandmother      HEART DISEASE Maternal Grandmother      CHF     DIABETES Paternal Grandfather      adult o nset     Hypertension Paternal Grandfather      CEREBROVASCULAR DISEASE Paternal Grandfather      HEART DISEASE Paternal Grandfather      MI, replaced valve,angioplasty     Thyroid  "Disease Paternal Grandfather      CANCER Maternal Grandfather      skin cancer     HEART DISEASE Maternal Grandfather      MI     Obesity Mother      on thyroid medication     Thyroid Disease Mother      DIABETES Mother      HEART DISEASE Father      Hypertension Father      and TIA     Lipids Father      Breast Cancer Paternal Grandmother      Arrhythmia Other      CANCER Maternal Aunt      breast/brain cancer     Depression Paternal Aunt      CEREBROVASCULAR DISEASE Paternal Uncle      CEREBROVASCULAR DISEASE Paternal Aunt        ROS: A 12 point review of systems was done. Except for what is listed above in the HPI, the systems review is negative .      Objective: Vital signs: Blood pressure 126/74, pulse 86, temperature 99.4  F (37.4  C), temperature source Temporal, resp. rate 16, height 5' 4\" (1.626 m), weight 229 lb (103.9 kg), SpO2 99 %, not currently breastfeeding.      HEENT:  Sclerae and conjunctiva are normal.   Ear canals and TMs look normal.  Nasal mucosa is pink  - no polyps or masses seen.  sinuses are non tender to palpation.  Throat is unremarkable . Mucous membranes are moist.   Fundi are benign- disc margins are sharp. No papilledema noted.    Neck is supple, mobile, no adenoapthy or masses palpable. Normal range of motion noted.  Chest is clear to auscultation. No wheezes, rales or rhonchi heard.  cardiac exam is normal with s1, s2, no murmurs or adventitious sounds.Normal rate and rhythm is heard.  Exam of the feet is negative for paresthesias.  Has normal sensation and color to both feet, and normal pulses and no trophic skin changes or ulcers.       Assessment: Diabetes is  Moderately   well-controlled.    Plan: 1. Ace inhibitor therapy: we discussed it and she declined-     2.Labs today: A1C      Urine for microprotein    Lipids    All placed as standing orders-  3. Baby aspirin 65 or 81 mg advised daily as prophylaxis- watch for GI upset or tinnitus or bruising/signs of bleeding- stop if " these occur.  4.Advised to recheck in  3 months.  Continue daily glucose monitoring. Recheck acutely if concerns.  5. Advised to have yearly ophthalmology exam, regular dental visits and keep up to date on flu vaccine and TDAP.   SHARAN Ford MD

## 2017-12-26 NOTE — PROGRESS NOTES

## 2017-12-26 NOTE — NURSING NOTE
"Chief Complaint   Patient presents with     Diabetes       Initial /74  Pulse 86  Temp 99.4  F (37.4  C) (Temporal)  Resp 16  Ht 5' 4\" (1.626 m)  Wt 229 lb (103.9 kg)  SpO2 99%  Breastfeeding? No  BMI 39.31 kg/m2 Estimated body mass index is 39.31 kg/(m^2) as calculated from the following:    Height as of this encounter: 5' 4\" (1.626 m).    Weight as of this encounter: 229 lb (103.9 kg)..   BP completed using cuff size: large  Medication Rec Completed    Lynn Burton, MARI    "

## 2017-12-26 NOTE — MR AVS SNAPSHOT
"              After Visit Summary   12/26/2017    Fallon Rivera    MRN: [id number]           Patient Information     Date Of Birth          1980        Visit Information        Provider Department      12/26/2017 10:50 AM Greg Ford MD [facility]        Today's Diagnoses     Type 2 diabetes mellitus without complication, unspecified long term insulin use status (H)    -  1    Non morbid obesity due to excess calories        Hyperlipidemia LDL goal <100        Calculus of kidney           Follow-ups after your visit        Future tests that were ordered for you today     Open Future Orders        Priority Expected Expires Ordered    UA reflex to Microscopic and Culture Routine  12/26/2018 12/26/2017    Lipid Profile Routine  12/26/2018 12/26/2017    Hemoglobin A1c Routine  12/26/2018 12/26/2017            Who to contact     If you have questions or need follow up information about today's clinic visit or your schedule please contact [facility] directly at [phone].  Normal or non-critical lab and imaging results will be communicated to you by MyChart, letter or phone within 4 business days after the clinic has received the results. If you do not hear from us within 7 days, please contact the clinic through [employer] or phone. If you have a critical or abnormal lab result, we will notify you by phone as soon as possible.  Submit refill requests through [employer] or call your pharmacy and they will forward the refill request to us. Please allow 3 business days for your refill to be completed.          Additional Information About Your Visit        [employer] Information     [employer] lets you send messages to your doctor, view your test results, renew your prescriptions, schedule appointments and more. To sign up, go to [email or web address] . Click on \"Log in\" on the left side of the screen, which will take you to the Welcome page. Then click on \"Sign up Now\" " "on the right side of the page.     You will be asked to enter the access code listed below, as well as some personal information. Please follow the directions to create your username and password.     Your access code is: [email or web address]  Expires: 2018  5:36 PM     Your access code will  in 90 days. If you need help or a new code, please call your [facility] clinic or [phone].        Care EveryWhere ID     This is your Care EveryWhere ID. This could be used by other organizations to access your [facility] medical records  [id number]        Your Vitals Were     Pulse Temperature Respirations Height Pulse Oximetry Breastfeeding?    86 99.4  F (37.4  C) (Temporal) 16 5' 4\" (1.626 m) 99% No    BMI (Body Mass Index)                   39.31 kg/m2            Blood Pressure from Last 3 Encounters:   17 126/74   10/28/17 (!) 155/99   17 121/85    Weight from Last 3 Encounters:   17 229 lb (103.9 kg)   10/28/17 225 lb 8.5 oz (102.3 kg)   17 225 lb 9.6 oz (102.3 kg)               Primary Care Provider Office Phone # Fax #    Greg Fabian Ford [phone]       [address] [place] [address]        Equal Access to Services     [facility] Hospital[facility] AH: Hadii aad ku hadasho Soomaali, waaxda luqadaha, qaybta kaalmada adeegyada, jeannie whitney . So [facility] [phone].    ATENCIÓN: Si habla español, tiene a pierre disposición servicios gratuitos de asistencia lingüística. Llame al [phone].    We comply with applicable federal civil rights laws and [place] laws. We do not discriminate on the basis of race, color, national origin, age, disability, sex, sexual orientation, or gender identity.            Thank you!     Thank you for choosing [facility]  for your care. Our goal is always to provide you with excellent care. Hearing back from our patients is one way we can continue to improve our services. Please take a few minutes to " complete the written survey that you may receive in the mail after your visit with us. Thank you!             Your Updated Medication List - Protect others around you: Learn how to safely use, store and throw away your medicines at www.disposemymeds.org.          This list is accurate as of: 12/26/17 11:34 AM.  Always use your most recent med list.                   Brand Name Dispense Instructions for use Diagnosis    ADVIL PO      Take 600 mg by mouth every 6 hours as needed Reported on 5/15/2017        albuterol 108 (90 BASE) MCG/ACT Inhaler    PROAIR HFA/PROVENTIL HFA/VENTOLIN HFA    1 Inhaler    Inhale 2 puffs into the lungs every 6 hours as needed for shortness of breath / dyspnea or wheezing    Cough, Upper respiratory tract infection, unspecified upper respiratory infection       blood glucose monitoring lancets     1 Box    Use to check blood sugar 1-2 times daily    Diabetes mellitus (H)       blood glucose monitoring test strip    LILLIAN CONTOUR NEXT    100 strip    Use to test blood sugar 2 times daily or as directed.    Diabetes mellitus (H)       HYDROcodone-acetaminophen 5-325 MG per tablet    NORCO    20 tablet    Take 1 tablet by mouth every 4 hours as needed for moderate to severe pain        metFORMIN 500 MG tablet    GLUCOPHAGE    30 tablet    Take 1 tablet (500 mg) by mouth daily (with dinner)    Type 2 diabetes mellitus without complication, unspecified long term insulin use status (H)       MULTIPLE VITAMINS/WOMENS PO      Reported on 5/15/2017        omeprazole 20 MG tablet     30 tablet    Take 1 tablet (20 mg) by mouth daily Take 30-60 minutes before a meal.    Abdominal pain, other specified site

## 2018-01-07 DIAGNOSIS — N20.0 CALCULUS OF KIDNEY: ICD-10-CM

## 2018-01-07 DIAGNOSIS — E11.9 TYPE 2 DIABETES MELLITUS WITHOUT COMPLICATION, UNSPECIFIED LONG TERM INSULIN USE STATUS: ICD-10-CM

## 2018-01-07 DIAGNOSIS — E78.5 HYPERLIPIDEMIA LDL GOAL <100: ICD-10-CM

## 2018-01-07 LAB
ALBUMIN UR-MCNC: NEGATIVE MG/DL
APPEARANCE UR: CLEAR
BILIRUB UR QL STRIP: NEGATIVE
CHOLEST SERPL-MCNC: 198 MG/DL
COLOR UR AUTO: YELLOW
GLUCOSE UR STRIP-MCNC: NEGATIVE MG/DL
HBA1C MFR BLD: 7.1 % (ref 4.3–6)
HDLC SERPL-MCNC: 60 MG/DL
HGB UR QL STRIP: NEGATIVE
KETONES UR STRIP-MCNC: NEGATIVE MG/DL
LDLC SERPL CALC-MCNC: 97 MG/DL
LEUKOCYTE ESTERASE UR QL STRIP: NEGATIVE
MUCOUS THREADS #/AREA URNS LPF: PRESENT /LPF
NITRATE UR QL: NEGATIVE
NONHDLC SERPL-MCNC: 138 MG/DL
PH UR STRIP: 6 PH (ref 5–7)
RBC #/AREA URNS AUTO: 1 /HPF (ref 0–2)
SOURCE: ABNORMAL
SP GR UR STRIP: 1.02 (ref 1–1.03)
SQUAMOUS #/AREA URNS AUTO: 3 /HPF (ref 0–1)
TRIGL SERPL-MCNC: 206 MG/DL
UROBILINOGEN UR STRIP-MCNC: 0 MG/DL (ref 0–2)
WBC #/AREA URNS AUTO: 1 /HPF (ref 0–2)

## 2018-01-07 PROCEDURE — 36415 COLL VENOUS BLD VENIPUNCTURE: CPT | Performed by: OBSTETRICS & GYNECOLOGY

## 2018-01-07 PROCEDURE — 83036 HEMOGLOBIN GLYCOSYLATED A1C: CPT | Mod: QW | Performed by: OBSTETRICS & GYNECOLOGY

## 2018-01-07 PROCEDURE — 80061 LIPID PANEL: CPT | Performed by: OBSTETRICS & GYNECOLOGY

## 2018-01-07 PROCEDURE — 81001 URINALYSIS AUTO W/SCOPE: CPT | Performed by: OBSTETRICS & GYNECOLOGY

## 2018-01-08 NOTE — PROGRESS NOTES
Lynn Please inform Fallon/ or caretaker  that this result(s) is/are ok- the UA  Is normal, the lipids are acceptable(triglycerides a little high- weight loss wiill help with that), and the A1c=7.1- weight loss will help bring that down like it was before-encourage a long walk every day-Gab Ford MD

## 2018-03-08 DIAGNOSIS — E11.9 TYPE 2 DIABETES MELLITUS WITHOUT COMPLICATION, UNSPECIFIED LONG TERM INSULIN USE STATUS: ICD-10-CM

## 2018-03-08 NOTE — TELEPHONE ENCOUNTER
"Requested Prescriptions   Pending Prescriptions Disp Refills     metFORMIN (GLUCOPHAGE) 500 MG tablet [Pharmacy Med Name: METFORMIN HCL 500MG TABS] 30 tablet 3     Sig: TAKE ONE TABLET BY MOUTH EVERY DAY WITH DINNER    Biguanide Agents Failed    3/8/2018 11:00 AM       Failed - Patient has had a Microalbumin in the past 12 mos.    Recent Labs   Lab Test  01/30/17   1735   MICROL  6   UMALCR  8.81            Passed - Blood pressure less than 140/90 in past 6 months    BP Readings from Last 3 Encounters:   12/26/17 126/74   10/28/17 (!) 155/99   09/22/17 121/85                Passed - Patient has documented LDL within the past 12 mos.    Recent Labs   Lab Test  01/07/18   0833   LDL  97            Passed - Patient is age 10 or older       Passed - Patient has documented A1c within the specified period of time.    Recent Labs   Lab Test  01/07/18   0833   A1C  7.1*            Passed - Patient's CR is NOT>1.4 OR Patient's EGFR is NOT<45 within past 12 mos.    Recent Labs   Lab Test  10/28/17   1730   GFRESTIMATED  80   GFRESTBLACK  >90       Recent Labs   Lab Test  10/28/17   1730   CR  0.81            Passed - Patient does NOT have a diagnosis of CHF.       Passed - Patient is not pregnant       Passed - Patient has not had a positive pregnancy test within the past 12 mos.        Passed - Recent (6 mo) or future (30 days) visit within the authorizing provider's specialty    Patient had office visit in the last 6 months or has a visit in the next 30 days with authorizing provider.  See \"Patient Info\" tab in inbasket, or \"Choose Columns\" in Meds & Orders section of the refill encounter.              Last Written Prescription Date:  8/9/17  Last Fill Quantity: 30,  # refills: 3   Last Office Visit with G, P or [facility] prescribing provider:  12/26/17   Future Office Visit:    Next 5 appointments (look out 90 days)     Mar 12, 2018  5:40 PM CDT   Office Visit with Greg Ford MD   [facility] [place] ([facility])    [address] [address] [phone]

## 2018-03-12 ENCOUNTER — OFFICE VISIT (OUTPATIENT)
Dept: FAMILY MEDICINE | Facility: CLINIC | Age: 38
End: 2018-03-12
Payer: COMMERCIAL

## 2018-03-12 VITALS
WEIGHT: 230 LBS | OXYGEN SATURATION: 97 % | DIASTOLIC BLOOD PRESSURE: 86 MMHG | SYSTOLIC BLOOD PRESSURE: 138 MMHG | TEMPERATURE: 98.2 F | BODY MASS INDEX: 39.27 KG/M2 | HEIGHT: 64 IN | HEART RATE: 102 BPM | RESPIRATION RATE: 18 BRPM

## 2018-03-12 DIAGNOSIS — Z12.4 SCREENING FOR MALIGNANT NEOPLASM OF CERVIX: ICD-10-CM

## 2018-03-12 DIAGNOSIS — Z00.01 ENCOUNTER FOR WELL ADULT EXAM WITH ABNORMAL FINDINGS: Primary | ICD-10-CM

## 2018-03-12 PROCEDURE — G0145 SCR C/V CYTO,THINLAYER,RESCR: HCPCS | Performed by: OBSTETRICS & GYNECOLOGY

## 2018-03-12 PROCEDURE — 99395 PREV VISIT EST AGE 18-39: CPT | Performed by: OBSTETRICS & GYNECOLOGY

## 2018-03-12 PROCEDURE — 87624 HPV HI-RISK TYP POOLED RSLT: CPT | Performed by: OBSTETRICS & GYNECOLOGY

## 2018-03-12 ASSESSMENT — PAIN SCALES - GENERAL: PAINLEVEL: MODERATE PAIN (4)

## 2018-03-12 NOTE — TELEPHONE ENCOUNTER
Routing refill request to provider for review/approval because:  Labs out of range:  BP  1 month T'd up to get to appointment on 4/12/18.  Sia Landin, BSN, RN

## 2018-03-12 NOTE — LETTER
March 21, 2018    Fallon Rivera  [address] [place] [address]  [place] [address]    Dear Fallon,  We are happy to inform you that your PAP smear result from 3/12/18 is normal.  We are now able to do a follow up test on PAP smears. The DNA test is for HPV (Human Papilloma Virus). Cervical cancer is closely linked with certain types of HPV. Your result showed no evidence of high risk HPV.  Therefore we recommend you return in 1 year for your next pap smear and HPV test.  You will still need to return to the clinic every year for an annual exam and other preventive tests.  Please contact the clinic at [phone] with any questions.  Sincerely,    Greg Ford MD/altagracia

## 2018-03-12 NOTE — NURSING NOTE
"Chief Complaint   Patient presents with     Breast Pain       Initial /86 (Cuff Size: Adult Regular)  Pulse 102  Temp 98.2  F (36.8  C) (Temporal)  Resp 18  Ht 5' 4\" (1.626 m)  Wt 230 lb (104.3 kg)  LMP 02/28/2018 (Exact Date)  SpO2 97%  Breastfeeding? No  BMI 39.48 kg/m2 Estimated body mass index is 39.48 kg/(m^2) as calculated from the following:    Height as of this encounter: 5' 4\" (1.626 m).    Weight as of this encounter: 230 lb (104.3 kg).  Medication Reconciliation: complete   Brody Smalls MA      "

## 2018-03-12 NOTE — MR AVS SNAPSHOT
"              After Visit Summary   3/12/2018    Fallon Rivera    MRN: [id number]           Patient Information     Date Of Birth          1980        Visit Information        Provider Department      3/12/2018 5:40 PM Greg Fodr MD [facility]        Today's Diagnoses     Encounter for well adult exam with abnormal findings    -  1    Screening for malignant neoplasm of cervix           Follow-ups after your visit        Who to contact     If you have questions or need follow up information about today's clinic visit or your schedule please contact [facility] directly at [phone].  Normal or non-critical lab and imaging results will be communicated to you by [employer], letter or phone within 4 business days after the clinic has received the results. If you do not hear from us within 7 days, please contact the clinic through [employer] or phone. If you have a critical or abnormal lab result, we will notify you by phone as soon as possible.  Submit refill requests through [employer] or call your pharmacy and they will forward the refill request to us. Please allow 3 business days for your refill to be completed.          Additional Information About Your Visit        MyChart Information     [employer] lets you send messages to your doctor, view your test results, renew your prescriptions, schedule appointments and more. To sign up, go to [email or web address] . Click on \"Log in\" on the left side of the screen, which will take you to the Welcome page. Then click on \"Sign up Now\" on the right side of the page.     You will be asked to enter the access code listed below, as well as some personal information. Please follow the directions to create your username and password.     Your access code is: [email or web address]  Expires: 6/10/2018  6:19 PM     Your access code will  in 90 days. If you need help or a new code, please call your [facility] or [phone].   " "     Care EveryWhere ID     This is your Care EveryWhere ID. This could be used by other organizations to access your [place] medical records  [id number]        Your Vitals Were     Pulse Temperature Respirations Height Last Period Pulse Oximetry    102 98.2  F (36.8  C) (Temporal) 18 5' 4\" (1.626 m) 02/28/2018 (Exact Date) 97%    Breastfeeding? BMI (Body Mass Index)                No 39.48 kg/m2           Blood Pressure from Last 3 Encounters:   03/12/18 138/86   12/26/17 126/74   10/28/17 (!) 155/99    Weight from Last 3 Encounters:   03/12/18 230 lb (104.3 kg)   12/26/17 229 lb (103.9 kg)   10/28/17 225 lb 8.5 oz (102.3 kg)              We Performed the Following     HPV High Risk Types DNA Cervical     Pap imaged thin layer screen with HPV - recommended age 30 - 65 years (select HPV order below)        Primary Care Provider Office Phone # Fax #    Greg Ford [phone]       [address] [place] [address]        Equal Access to Services     [facility] CenterSHARAN : Hadii rehana escudero hadasho Soraduali, waaxda luqadaha, qaybta kaalmada adesammie, jeannie whitney . So [facility] [phone].    ATENCIÓN: Si habla español, tiene a pierre disposición servicios gratuitos de asistencia lingüística. [facility] [phone].    We comply with applicable federal civil rights laws and [place] laws. We do not discriminate on the basis of race, color, national origin, age, disability, sex, sexual orientation, or gender identity.            Thank you!     Thank you for choosing [facility]  for your care. Our goal is always to provide you with excellent care. Hearing back from our patients is one way we can continue to improve our services. Please take a few minutes to complete the written survey that you may receive in the mail after your visit with us. Thank you!             Your Updated Medication List - Protect others around you: Learn how to safely use, store " and throw away your medicines at www.disposemymeds.org.          This list is accurate as of 3/12/18  6:19 PM.  Always use your most recent med list.                   Brand Name Dispense Instructions for use Diagnosis    ADVIL PO      Take 600 mg by mouth every 6 hours as needed Reported on 5/15/2017        albuterol 108 (90 BASE) MCG/ACT Inhaler    PROAIR HFA/PROVENTIL HFA/VENTOLIN HFA    1 Inhaler    Inhale 2 puffs into the lungs every 6 hours as needed for shortness of breath / dyspnea or wheezing    Cough, Upper respiratory tract infection, unspecified upper respiratory infection       blood glucose monitoring lancets     1 Box    Use to check blood sugar 1-2 times daily    Diabetes mellitus (H)       blood glucose monitoring test strip    Gaudena CONTOUR NEXT    100 strip    Use to test blood sugar 2 times daily or as directed.    Diabetes mellitus (H)       HYDROcodone-acetaminophen 5-325 MG per tablet    NORCO    20 tablet    Take 1 tablet by mouth every 4 hours as needed for moderate to severe pain        metFORMIN 500 MG tablet    GLUCOPHAGE    30 tablet    TAKE ONE TABLET BY MOUTH EVERY DAY WITH DINNER    Type 2 diabetes mellitus without complication, unspecified long term insulin use status (H)       MULTIPLE VITAMINS/WOMENS PO      Reported on 5/15/2017        omeprazole 20 MG tablet     30 tablet    Take 1 tablet (20 mg) by mouth daily Take 30-60 minutes before a meal.    Abdominal pain, other specified site

## 2018-03-12 NOTE — PROGRESS NOTES
Subjective:Fallon is here for yearly physical. Current concerns are:  None   She has type 2 DM and states that her last A1C and diabetic exam was a month ago.        Past Medical History:   Diagnosis Date     Abnormal Pap smear 2006, 2007,     ASC-H, ASCUS + HPV 45     Calculus of kidney 2002     Cervical high risk HPV (human papillomavirus) test positive     + HPV 45 (high risk)     History of colposcopy with cervical biopsy 2/9/06, 3/15/07    koilocytosis 2007      Current Outpatient Prescriptions   Medication Sig Dispense Refill     metFORMIN (GLUCOPHAGE) 500 MG tablet TAKE ONE TABLET BY MOUTH EVERY DAY WITH DINNER 30 tablet 0     HYDROcodone-acetaminophen (NORCO) 5-325 MG per tablet Take 1 tablet by mouth every 4 hours as needed for moderate to severe pain 20 tablet 0     blood glucose monitoring (LILLIAN CONTOUR NEXT) test strip Use to test blood sugar 2 times daily or as directed. 100 strip 3     blood glucose monitoring (LILLIAN MICROLET) lancets Use to check blood sugar 1-2 times daily 1 Box 12     Multiple Vitamins-Minerals (MULTIPLE VITAMINS/WOMENS PO) Reported on 5/15/2017       albuterol (PROAIR HFA, PROVENTIL HFA, VENTOLIN HFA) 108 (90 BASE) MCG/ACT inhaler Inhale 2 puffs into the lungs every 6 hours as needed for shortness of breath / dyspnea or wheezing 1 Inhaler 1     omeprazole 20 MG tablet Take 1 tablet (20 mg) by mouth daily Take 30-60 minutes before a meal. 30 tablet 9     Ibuprofen (ADVIL PO) Take 600 mg by mouth every 6 hours as needed Reported on 5/15/2017        Allergies   Allergen Reactions     Amoxicillin Hives     Anti-Nausea      Anxiety, agitation,severe paranoia. Zofran, Reglan are some of them.       Demerol Hcl [Meperidine Hcl] Nausea     Indomethacin Nausea and Vomiting     Macrobid [Nitrofuran Derivatives] Hives     Reglan [Metoclopramide] Other (See Comments)     Acute paranoia, severe     Zofran [Ondansetron] Other (See Comments)     Severe anxiety, agitation      History    Smoking Status     Current Some Day Smoker     Years: 12.00     Types: Cigarettes     Last attempt to quit: 7/14/2009   Smokeless Tobacco     Never Used     Comment: As of 10 /2014, pt has had a few cigs here and there      Past Surgical History:   Procedure Laterality Date     C REMOVAL OF KIDNEY STONE      two surgeries, 2002,2003     CHOLECYSTECTOMY, LAPOROSCOPIC  5/11/2007    Cholecystectomy, Laparoscopic     COLONOSCOPY  05/17/10     CONIZATION  7/1/2011    Procedure:CONIZATION; cold knife and punch biopsy of mole on back; Surgeon:GREG FORD; Location:[facility]     DILATION AND CURETTAGE, HYSTEROSCOPY, LAPAROSCOPY, COMBINED Right 9/24/2015    Procedure: COMBINED DILATION AND CURETTAGE, HYSTEROSCOPY, LAPAROSCOPY;  Surgeon: Greg Ford MD;  Location: [facility] OR     ESOPHAGOSCOPY, GASTROSCOPY, DUODENOSCOPY (EGD), COMBINED  5/21/2014    Procedure: COMBINED ESOPHAGOSCOPY, GASTROSCOPY, DUODENOSCOPY (EGD), BIOPSY SINGLE OR MULTIPLE;  Surgeon: Earl Lane MD;  Location: [facility] GI     EXCISE LESION TRUNK  7/1/2011    Procedure:EXCISE LESION TRUNK; Surgeon:GREG FORD; Location:[facility] OR     HC FRAGMENTING OF KIDNEY STONE  11/30/2005     HC RX ECTOP PREG BY SCOPE,RMV TUBE/OVRY  12/20/2007    Right sided salpingectomy and removal of ruptured ectopic pregnancy. D&C.     HC UGI ENDOSCOPY, SIMPLE EXAM  09/01/09     LAPAROSCOPIC APPENDECTOMY N/A 9/24/2015    Procedure: LAPAROSCOPIC APPENDECTOMY;  Surgeon: James Cuellar MD;  Location: [facility]     LAPAROSCOPIC CYSTECTOMY OVARIAN (BENIGN) N/A 9/24/2015    Procedure: LAPAROSCOPIC CYSTECTOMY OVARIAN (BENIGN);  Surgeon: Greg Ford MD;  Location: [facility]     LAPAROSCOPIC OOPHORECTOMY Right 9/24/2015    Procedure: LAPAROSCOPIC OOPHORECTOMY;  Surgeon: Greg Ford MD;  Location: [facility]     LITHOTRIPSY  01/17/2007     PELVIS LAPAROSCOPY,DX  10/16/2000    Diagnostic laparoscopy, Endometrial biopsy.      "TUBAL/ECTOPIC PREGNANCY  01/23/2007    Lap removal of left ruptured ectopic pregnancy & salpingectomy, D&C      Social History   Substance Use Topics     Smoking status: Current Some Day Smoker     Years: 12.00     Types: Cigarettes     Last attempt to quit: 7/14/2009     Smokeless tobacco: Never Used      Comment: As of 10 /2014, pt has had a few cigs here and there     Alcohol use 0.0 oz/week     0 Standard drinks or equivalent per week      Comment: every few months      Family History   Problem Relation Age of Onset     DIABETES Maternal Grandmother      adult onset     Anesthesia Reaction Maternal Grandmother      can't tolerate Novacaine.     Respiratory Maternal Grandmother      COPD and emphysema.     Thyroid Disease Maternal Grandmother      HEART DISEASE Maternal Grandmother      CHF     DIABETES Paternal Grandfather      adult o nset     Hypertension Paternal Grandfather      CEREBROVASCULAR DISEASE Paternal Grandfather      HEART DISEASE Paternal Grandfather      MI, replaced valve,angioplasty     Thyroid Disease Paternal Grandfather      CANCER Maternal Grandfather      skin cancer     HEART DISEASE Maternal Grandfather      MI     Obesity Mother      on thyroid medication     Thyroid Disease Mother      DIABETES Mother      HEART DISEASE Father      Hypertension Father      and TIA     Lipids Father      Breast Cancer Paternal Grandmother      Arrhythmia Other      CANCER Maternal Aunt      breast/brain cancer     Depression Paternal Aunt      CEREBROVASCULAR DISEASE Paternal Uncle      CEREBROVASCULAR DISEASE Paternal Aunt        ROS: A 12 point review of systems is negative except for the following:  No concerns      Physical Exam: /86 (Cuff Size: Adult Regular)  Pulse 102  Temp 98.2  F (36.8  C) (Temporal)  Resp 18  Ht 5' 4\" (1.626 m)  Wt 230 lb (104.3 kg)  LMP 02/28/2018 (Exact Date)  SpO2 97%  Breastfeeding? No  BMI 39.48 kg/m2  HEENT:    Sclerae and conjunctiva are normal.   Ear " canals and TMs look normal.  Nasal mucosa is pink  - no polyps or masses seen.  sinuses are non tender to palpation.  Throat is unremarkable . Mucous membranes are moist.   Neck is supple, mobile, no adenopathy or masses palpable. The thyroid feels normal.   Normal range of motion noted.  Chest is clear to auscultation.  No wheezes, rales or rhonchi heard.   Cardiac exam is normal with s1, s2, no murmurs or adventitious sounds.Normal rate and rhythm is heard.   Abdomen is soft,  nondistended, No masses felt.No HSM. No guarding or rigidity or rebound   noted. Palpation reveals  no    tenderness   Normal bowel sounds heard.   Pelvic exam:My nurse Brody Paez was present to chaperone the exam.  The external genitalia appeared normal.    The vaginal vault was without bleeding  or   discharge   or odor.   The cervix was smooth   and shiny and normal in appearance.    A pap was obtained.    No vaginal support defects were noted,    Bimanual exam revealed a 7 week  sized uterus. It does not   descend   well in the vaginal vault.    No adnexal masses were felt.    There was no  cervical motion tenderness.    Exam was MODERATELY   limited by the patient's body habitus.          Breast exam:   An exam was performed in supine position.   My nurse was present as a chaperone.  No dominant masses were felt.   No asymmetry noted.   No nipple discharge noted.      fibrocystic changes were felt-   pt reassured that these are common and considered normal.            Assessment/Plan:        The yearly exam is normal except for :    1. Type 2 diabetes    2. Morbid obesity- we discussed walking and daily exercise and diet changes    3. Fibrocystic changes- bilateral- unchanged            Additional Plan:Diet and rest and exercise discussed.      I have advised going for a 5 mile walk daily if possible       See labs and orders.    .Immunizations reviewed(TDAP, pneumovax, shingles vaccine,etc)and discussed-including advice for yearly flu  shot/tetanus update.     Vaccines up to date      Calcium supplementation advised         Mammogram  Is advised beginning at age 40-pt to be responsible for getting this done         Labs done: see orders      I advised the following exams with specialists:    1. Dental evaluation yearly    2. Dermatology evaluation for mole exam yearly    3. Ophthalmology exam yearly to check for glaucoma, etc          Living will was discussed.         Followup in 12   months, sooner if concerns arise.   SHARAN Ford MD(electronic signature)

## 2018-03-14 LAB
COPATH REPORT: NORMAL
PAP: NORMAL

## 2018-03-15 LAB
FINAL DIAGNOSIS: NORMAL
HPV HR 12 DNA CVX QL NAA+PROBE: NEGATIVE
HPV16 DNA SPEC QL NAA+PROBE: NEGATIVE
HPV18 DNA SPEC QL NAA+PROBE: NEGATIVE
SPECIMEN DESCRIPTION: NORMAL
SPECIMEN SOURCE CVX/VAG CYTO: NORMAL

## 2018-03-23 ENCOUNTER — OFFICE VISIT (OUTPATIENT)
Dept: INTERNAL MEDICINE | Facility: CLINIC | Age: 38
End: 2018-03-23
Payer: COMMERCIAL

## 2018-03-23 VITALS
WEIGHT: 230 LBS | BODY MASS INDEX: 39.48 KG/M2 | DIASTOLIC BLOOD PRESSURE: 70 MMHG | HEART RATE: 94 BPM | SYSTOLIC BLOOD PRESSURE: 122 MMHG | OXYGEN SATURATION: 98 % | RESPIRATION RATE: 16 BRPM | TEMPERATURE: 97.4 F

## 2018-03-23 DIAGNOSIS — F32.0 MILD MAJOR DEPRESSION (H): ICD-10-CM

## 2018-03-23 DIAGNOSIS — J20.9 ACUTE BRONCHITIS, UNSPECIFIED ORGANISM: Primary | ICD-10-CM

## 2018-03-23 PROCEDURE — 99213 OFFICE O/P EST LOW 20 MIN: CPT | Performed by: INTERNAL MEDICINE

## 2018-03-23 RX ORDER — AZITHROMYCIN 250 MG/1
TABLET, FILM COATED ORAL
Qty: 6 TABLET | Refills: 0 | Status: SHIPPED | OUTPATIENT
Start: 2018-03-23 | End: 2018-04-05

## 2018-03-23 ASSESSMENT — PAIN SCALES - GENERAL: PAINLEVEL: NO PAIN (0)

## 2018-03-23 NOTE — MR AVS SNAPSHOT
"              After Visit Summary   3/23/2018    Fallon Rivera    MRN: [id number]           Patient Information     Date Of Birth          1980        Visit Information        Provider Department      3/23/2018 2:45 PM Spencer Mcintyre MD [facility]         Follow-ups after your visit        Your next 10 appointments already scheduled     Mar 23, 2018  2:45 PM CDT   [facility] with [facility]    [address] [place] [id number] [phone]              Who to contact     If you have questions or need follow up information about today's clinic visit or your schedule please contact [facility] directly at [phone].  Normal or non-critical lab and imaging results will be communicated to you by MyChart, letter or phone within 4 business days after the clinic has received the results. If you do not hear from us within 7 days, please contact the clinic through MyChart or phone. If you have a critical or abnormal lab result, we will notify you by phone as soon as possible.  Submit refill requests through [employer] or call your pharmacy and they will forward the refill request to us. Please allow 3 business days for your refill to be completed.          Additional Information About Your Visit        [employer] Information     [employer] lets you send messages to your doctor, view your test results, renew your prescriptions, schedule appointments and more. To sign up, go to [email or web address] . Click on \"Log in\" on the left side of the screen, which will take you to the Welcome page. Then click on \"Sign up Now\" on the right side of the page.     You will be asked to enter the access code listed below, as well as some personal information. Please follow the directions to create your username and password.     Your access code is: [email or web address]  Expires: 6/10/2018  6:19 PM     Your access code will  in 90 " days. If you need help or a new code, please call your [facility] clinic or [phone].        Care EveryWhere ID     This is your Care EveryWhere ID. This could be used by other organizations to access your [facility] medical records  [id number]        Your Vitals Were     Pulse Temperature Respirations Last Period Pulse Oximetry BMI (Body Mass Index)    94 97.4  F (36.3  C) (Temporal) 16 02/28/2018 (Exact Date) 98% 39.48 kg/m2       Blood Pressure from Last 3 Encounters:   03/23/18 122/70   03/12/18 138/86   12/26/17 126/74    Weight from Last 3 Encounters:   03/23/18 230 lb (104.3 kg)   03/12/18 230 lb (104.3 kg)   12/26/17 229 lb (103.9 kg)              Today, you had the following     No orders found for display         Today's Medication Changes          These changes are accurate as of 3/23/18  2:38 PM.  If you have any questions, ask your nurse or doctor.               These medicines have changed or have updated prescriptions.        Dose/Directions    HYDROcodone-acetaminophen 5-325 MG per tablet   Commonly known as:  NORCO   This may have changed:  when to take this        Dose:  1 tablet   Take 1 tablet by mouth every 4 hours as needed for moderate to severe pain   Quantity:  20 tablet   Refills:  0                Primary Care Provider Office Phone # Fax #    Greg Fabian Ford [phone]       [address] [place] [address]        Equal Access to Services     [facility] Hospital[facility]AN AH: Margarita mcneill Sonancy, waaxda lujeffadaha, qaybta kaalmajeannie abad [facility] Healthkhai mcmahon. So [facility] [phone].    ATENCIÓN: Si habla español, tiene a pierre disposición servicios gratuitos de asistencia lingüística. Llame al [phone].    We comply with applicable federal civil rights laws and [place] laws. We do not discriminate on the basis of race, color, national origin, age, disability, sex, sexual orientation, or gender identity.            Thank you!     Thank  you for choosing [facility]  for your care. Our goal is always to provide you with excellent care. Hearing back from our patients is one way we can continue to improve our services. Please take a few minutes to complete the written survey that you may receive in the mail after your visit with us. Thank you!             Your Updated Medication List - Protect others around you: Learn how to safely use, store and throw away your medicines at www.disposemymeds.org.          This list is accurate as of 3/23/18  2:38 PM.  Always use your most recent med list.                   Brand Name Dispense Instructions for use Diagnosis    ADVIL PO      Take 600 mg by mouth every 6 hours as needed Reported on 5/15/2017        blood glucose monitoring lancets     1 Box    Use to check blood sugar 1-2 times daily    Diabetes mellitus (H)       blood glucose monitoring test strip    LILLIAN CONTOUR NEXT    100 strip    Use to test blood sugar 2 times daily or as directed.    Diabetes mellitus (H)       HYDROcodone-acetaminophen 5-325 MG per tablet    NORCO    20 tablet    Take 1 tablet by mouth every 4 hours as needed for moderate to severe pain        metFORMIN 500 MG tablet    GLUCOPHAGE    30 tablet    TAKE ONE TABLET BY MOUTH EVERY DAY WITH DINNER    Type 2 diabetes mellitus without complication, unspecified long term insulin use status (H)       MULTIPLE VITAMINS/WOMENS PO      Reported on 5/15/2017        omeprazole 20 MG tablet     30 tablet    Take 1 tablet (20 mg) by mouth daily Take 30-60 minutes before a meal.    Abdominal pain, other specified site

## 2018-03-24 ASSESSMENT — PATIENT HEALTH QUESTIONNAIRE - PHQ9: SUM OF ALL RESPONSES TO PHQ QUESTIONS 1-9: 8

## 2018-03-27 ENCOUNTER — TRANSFERRED RECORDS (OUTPATIENT)
Dept: HEALTH INFORMATION MANAGEMENT | Facility: CLINIC | Age: 38
End: 2018-03-27

## 2018-03-27 ENCOUNTER — HOSPITAL ENCOUNTER (OUTPATIENT)
Dept: MRI IMAGING | Facility: CLINIC | Age: 38
Discharge: HOME | End: 2018-03-27
Attending: OBSTETRICS & GYNECOLOGY | Admitting: OBSTETRICS & GYNECOLOGY
Payer: COMMERCIAL

## 2018-03-27 ENCOUNTER — TELEPHONE (OUTPATIENT)
Dept: FAMILY MEDICINE | Facility: CLINIC | Age: 38
End: 2018-03-27

## 2018-03-27 ENCOUNTER — HOSPITAL ENCOUNTER (EMERGENCY)
Facility: CLINIC | Age: 38
Discharge: HOME | End: 2018-03-27
Attending: FAMILY MEDICINE | Admitting: FAMILY MEDICINE
Payer: COMMERCIAL

## 2018-03-27 VITALS
BODY MASS INDEX: 39.82 KG/M2 | HEART RATE: 88 BPM | WEIGHT: 232 LBS | TEMPERATURE: 97.7 F | DIASTOLIC BLOOD PRESSURE: 85 MMHG | RESPIRATION RATE: 18 BRPM | SYSTOLIC BLOOD PRESSURE: 120 MMHG | OXYGEN SATURATION: 97 %

## 2018-03-27 DIAGNOSIS — M54.16 LUMBAR RADICULOPATHY: Primary | ICD-10-CM

## 2018-03-27 DIAGNOSIS — M54.16 LUMBAR RADICULOPATHY: ICD-10-CM

## 2018-03-27 PROCEDURE — 72148 MRI LUMBAR SPINE W/O DYE: CPT

## 2018-03-27 PROCEDURE — 99284 EMERGENCY DEPT VISIT MOD MDM: CPT | Mod: Z6 | Performed by: FAMILY MEDICINE

## 2018-03-27 PROCEDURE — 25000128 H RX IP 250 OP 636: Performed by: FAMILY MEDICINE

## 2018-03-27 PROCEDURE — 99285 EMERGENCY DEPT VISIT HI MDM: CPT | Mod: 25 | Performed by: FAMILY MEDICINE

## 2018-03-27 PROCEDURE — 96375 TX/PRO/DX INJ NEW DRUG ADDON: CPT | Performed by: FAMILY MEDICINE

## 2018-03-27 PROCEDURE — 96374 THER/PROPH/DIAG INJ IV PUSH: CPT | Performed by: FAMILY MEDICINE

## 2018-03-27 RX ORDER — METHYLPREDNISOLONE SODIUM SUCCINATE 125 MG/2ML
125 INJECTION, POWDER, LYOPHILIZED, FOR SOLUTION INTRAMUSCULAR; INTRAVENOUS ONCE
Status: COMPLETED | OUTPATIENT
Start: 2018-03-27 | End: 2018-03-27

## 2018-03-27 RX ORDER — CYCLOBENZAPRINE HCL 5 MG
5 TABLET ORAL 3 TIMES DAILY PRN
Qty: 10 TABLET | Refills: 0 | Status: SHIPPED | OUTPATIENT
Start: 2018-03-27 | End: 2018-04-02

## 2018-03-27 RX ORDER — PREDNISONE 20 MG/1
TABLET ORAL
Qty: 10 TABLET | Refills: 0 | Status: SHIPPED | OUTPATIENT
Start: 2018-03-27 | End: 2018-04-05

## 2018-03-27 RX ORDER — HYDROCODONE BITARTRATE AND ACETAMINOPHEN 5; 325 MG/1; MG/1
1-2 TABLET ORAL EVERY 4 HOURS PRN
Qty: 15 TABLET | Refills: 0 | Status: SHIPPED | OUTPATIENT
Start: 2018-03-27 | End: 2018-07-20 | Stop reason: ALTCHOICE

## 2018-03-27 RX ORDER — KETOROLAC TROMETHAMINE 30 MG/ML
30 INJECTION, SOLUTION INTRAMUSCULAR; INTRAVENOUS ONCE
Status: COMPLETED | OUTPATIENT
Start: 2018-03-27 | End: 2018-03-27

## 2018-03-27 RX ADMIN — METHYLPREDNISOLONE SODIUM SUCCINATE 125 MG: 125 INJECTION, POWDER, FOR SOLUTION INTRAMUSCULAR; INTRAVENOUS at 01:36

## 2018-03-27 RX ADMIN — KETOROLAC TROMETHAMINE 30 MG: 30 INJECTION, SOLUTION INTRAMUSCULAR at 01:32

## 2018-03-27 RX ADMIN — HYDROMORPHONE HYDROCHLORIDE 1 MG: 1 INJECTION, SOLUTION INTRAMUSCULAR; INTRAVENOUS; SUBCUTANEOUS at 01:34

## 2018-03-27 NOTE — TELEPHONE ENCOUNTER
Reason for Call:  Other call back    Detailed comments: Patient is requesting a call back from PCP, patient states she was advised from the ED Doctor (seen last night) to follow up with PCP on next steps, patient states a MRI is needed, does patient need to see PCP first prior to the order, please advise    Phone Number Patient can be reached at: Cell number on file:    Telephone Information:   Mobile [phone]       Best Time:     Can we leave a detailed message on this number? YES    Call taken on 3/27/2018 at 7:59 AM by Lubna Mejia

## 2018-03-27 NOTE — ED PROVIDER NOTES
History     Chief Complaint   Patient presents with     Leg Pain     HPI  Fallon Rivera is a 37 year old female who presents with low back pain and left leg pain this been going on since last 3 days.  Patient has had a history of herniated disc in her back but that resolved with just chiropractic treatment.  She states in the last 3 days she has been having this stabbing pain going down her leg and down into her foot.  She is also noticed some numbness over the lateral aspect of her left foot.  Patient denies any bowel or bladder incontinence, denies any saddle anesthesia, denies any leg weakness.  Patient also has pain in the left side of her back.  Nothing seems to make the pain better or worse.  She is having trouble sleeping because of the pain.    Problem List:    Patient Active Problem List    Diagnosis Date Noted     Non morbid obesity due to excess calories 07/05/2016     Priority: Medium     Type 2 diabetes mellitus without complication (H) 04/29/2016     Priority: Medium     Glucosuria 04/27/2016     Priority: Medium     Right ovarian cyst 09/15/2015     Priority: Medium     Hyperlipidemia LDL goal <130 07/18/2012     Priority: Medium     Mild major depression (H) 07/18/2012     Priority: Medium     GERD (gastroesophageal reflux disease) 07/03/2012     Priority: Medium     CARDIOVASCULAR SCREENING; LDL GOAL LESS THAN 160 10/31/2010     Priority: Medium     Dyspnea 11/24/2009     Priority: Medium     Kidney stones 02/17/2009     Priority: Medium     S/P conization of cervix- KAYLA 2/3 in 2011 06/27/2007     Priority: Medium     1/24/06 pap: ASC-H  2/9/06 colposcopy/bx (negative)/ECC (negative) pap- ASC-H  5/24/06 pap- ASC-H  9/6/07 pap NIL  1/23/07 pap ASCUS + HPV 45 (high risk)  3/15/07 pap ASCUS + HPV 45 (high risk) colposcopy- bx (negative)/ECC (koilocytosis)  6/19/07 ECC- koilocytosis.  Pap- AGS  10/23/07 pap- ASC-H, ECC- negative  2/14/08 pap NIL- return to yearly paps  2/17/09 pap NIL- repeat  1 year  3/8/10 pap NIL  3/3/11 pap ASCUS + HPV 45 (high risk)- colposcopy recommended  5/16/11 colpo- bx (KAYLA 2/3/HSIL) ECC (ASCUS)  pap (ASC-H)  6/2/11 recommend: [facility]  7/1/11 [facility]: path: KAYLA 2/3 with possible involvement of the endocervical margin.  ECC- neg.  Endometrium- neg.  7/14/11 plan: HIPOLITO in approx 2 months  10/10/11- hysterectomy planned.  (cancelled)  11/7/11 pap-- NIL. Plan--repeat pap in 6 months. (5/7/12)  5/7/12 pap NIL. Plan-- pap in 1 year (due 5/7/13)   5/8/13 pap NIL. Plan-pap in 1 year  5/8/14 NIL pap, neg HR HPV.  Plan- repeat pap/HPV in 1 year (due 5/8/15)  10/14/14 NIL pap, neg HR HPV. Endometrial biopsy- WNL   1/6/16  NIL pap, neg HR HPV.  Plan: paps yearly, per Dr. Ford.  1/30/17 NIL pap/neg HR HPV. Plan: pap in 1 year.   3/12/18 NIL pap, neg HR HPV. Plan: cotest annually, per Dr. Ford.         Papanicolaou smear of cervix with atypical squamous cells cannot exclude high grade squamous intraepithelial lesion (ASC-H) 09/07/2006     Priority: Medium     Female infertility 07/24/2006     Priority: Medium     Problem list name updated by automated process. Provider to review       Hemorrhage of rectum and anus 12/02/2004     Priority: Medium        Past Medical History:    Past Medical History:   Diagnosis Date     Abnormal Pap smear 2006, 2007,      Calculus of kidney 2002     Cervical high risk HPV (human papillomavirus) test positive      History of colposcopy with cervical biopsy 2/9/06, 3/15/07       Past Surgical History:    Past Surgical History:   Procedure Laterality Date     C REMOVAL OF KIDNEY STONE      two surgeries, 2002,2003     CHOLECYSTECTOMY, LAPOROSCOPIC  5/11/2007    Cholecystectomy, Laparoscopic     COLONOSCOPY  05/17/10     CONIZATION  7/1/2011    Procedure:CONIZATION; cold knife and punch biopsy of mole on back; Surgeon:SYDNEY FORD; Location:[facility]     DILATION AND CURETTAGE, HYSTEROSCOPY, LAPAROSCOPY, COMBINED Right 9/24/2015    Procedure: COMBINED  DILATION AND CURETTAGE, HYSTEROSCOPY, LAPAROSCOPY;  Surgeon: Greg Ford MD;  Location: [facility]     ESOPHAGOSCOPY, GASTROSCOPY, DUODENOSCOPY (EGD), COMBINED  5/21/2014    Procedure: COMBINED ESOPHAGOSCOPY, GASTROSCOPY, DUODENOSCOPY (EGD), BIOPSY SINGLE OR MULTIPLE;  Surgeon: Earl Lane MD;  Location: [facility]     EXCISE LESION TRUNK  7/1/2011    Procedure:EXCISE LESION TRUNK; Surgeon:GREG FORD; Location:[facility] OR     HC FRAGMENTING OF KIDNEY STONE  11/30/2005     HC RX ECTOP PREG BY SCOPE,RMV TUBE/OVRY  12/20/2007    Right sided salpingectomy and removal of ruptured ectopic pregnancy. D&C.     HC UGI ENDOSCOPY, SIMPLE EXAM  09/01/09     LAPAROSCOPIC APPENDECTOMY N/A 9/24/2015    Procedure: LAPAROSCOPIC APPENDECTOMY;  Surgeon: James Cuellar MD;  [facility]: [facility]     LAPAROSCOPIC CYSTECTOMY OVARIAN (BENIGN) N/A 9/24/2015    Procedure: LAPAROSCOPIC CYSTECTOMY OVARIAN (BENIGN);  Surgeon: Greg Ford MD;  [facility]     LAPAROSCOPIC OOPHORECTOMY Right 9/24/2015    Procedure: LAPAROSCOPIC OOPHORECTOMY;  Surgeon: Greg Ford MD;  Location: [facility]     LITHOTRIPSY  01/17/2007     PELVIS LAPAROSCOPY,DX  10/16/2000    Diagnostic laparoscopy, Endometrial biopsy.     TUBAL/ECTOPIC PREGNANCY  01/23/2007    Lap removal of left ruptured ectopic pregnancy & salpingectomy, D&C       Family History:    Family History   Problem Relation Age of Onset     DIABETES Maternal Grandmother      adult onset     Anesthesia Reaction Maternal Grandmother      can't tolerate Novacaine.     Respiratory Maternal Grandmother      COPD and emphysema.     Thyroid Disease Maternal Grandmother      HEART DISEASE Maternal Grandmother      CHF     DIABETES Paternal Grandfather      adult o nset     Hypertension Paternal Grandfather      CEREBROVASCULAR DISEASE Paternal Grandfather      HEART DISEASE Paternal Grandfather      MI, replaced valve,angioplasty     Thyroid Disease  Paternal Grandfather      CANCER Maternal Grandfather      skin cancer     HEART DISEASE Maternal Grandfather      MI     Obesity Mother      on thyroid medication     Thyroid Disease Mother      DIABETES Mother      HEART DISEASE Father      Hypertension Father      and TIA     Lipids Father      Breast Cancer Paternal Grandmother      Arrhythmia Other      CANCER Maternal Aunt      breast/brain cancer     Depression Paternal Aunt      CEREBROVASCULAR DISEASE Paternal Uncle      CEREBROVASCULAR DISEASE Paternal Aunt        Social History:  Marital Status:   [2]  Social History   Substance Use Topics     Smoking status: Current Some Day Smoker     Years: 12.00     Types: Cigarettes     Last attempt to quit: 7/14/2009     Smokeless tobacco: Never Used      Comment: As of 10 /2014, pt has had a few cigs here and there     Alcohol use 0.0 oz/week     0 Standard drinks or equivalent per week      Comment: every few months        Medications:      azithromycin (ZITHROMAX) 250 MG tablet   metFORMIN (GLUCOPHAGE) 500 MG tablet   HYDROcodone-acetaminophen (NORCO) 5-325 MG per tablet   Multiple Vitamins-Minerals (MULTIPLE VITAMINS/WOMENS PO)   omeprazole 20 MG tablet   Ibuprofen (ADVIL PO)   blood glucose monitoring (LILLIAN CONTOUR NEXT) test strip   blood glucose monitoring (LILLIAN MICROLET) lancets         Review of Systems   All other systems reviewed and are negative.      Physical Exam   BP: 145/89  Heart Rate: 119  Temp: 97.7  F (36.5  C)  Resp: 24  Weight: 105.2 kg (232 lb)  SpO2: 100 %      Physical Exam   Constitutional: She appears well-developed and well-nourished. No distress.   Cardiovascular: Intact distal pulses and normal pulses.    Musculoskeletal:        Lumbar back: She exhibits tenderness. She exhibits normal range of motion.        Back:         Left upper leg: She exhibits tenderness.        Legs:  Neurological: She has normal strength. No cranial nerve deficit or sensory deficit.   Skin: She is not  diaphoretic.   Nursing note and vitals reviewed.      ED Course     ED Course     Procedures            Medications   HYDROmorphone (DILAUDID) injection 1 mg (1 mg Intravenous Given 3/27/18 0134)   ketorolac (TORADOL) injection 30 mg (30 mg Intravenous Given 3/27/18 0132)   methylPREDNISolone sodium succinate (solu-MEDROL) injection 125 mg (125 mg Intravenous Given 3/27/18 0136)       Patient is feeling much better after the above medications.  Her pain seems most consistent with lumbar radiculopathy.  I will have the patient call her primary care doctor  I am concerned with the numbness that the patient may need an outpatient MRI relatively soon for further evaluation of this.  We will discharge the patient home with some hydrocodone and prednisone and some Flexeril.  Patient will call her primary care doctor tomorrow to discuss further evaluation.  Another option would be is physical therapy referral.    Assessments & Plan (with Medical Decision Making)  Lumbar radiculopathy     I have reviewed the nursing notes.    I have reviewed the findings, diagnosis, plan and need for follow up with the patient.        3/27/2018   [facility] EMERGENCY DEPARTMENT     Edgardo Ferraro MD  03/27/18 0228

## 2018-03-27 NOTE — ED AVS SNAPSHOT
[facility] Emergency Department    [address] [place] [address]    Phone:  [phone]    Fax:  [phone]                                       Fallon Rivera   MRN: [id number]    Department:  [facility] Emergency Department   Date of Visit:  3/27/2018           Patient Information     Date Of Birth          1980        Your diagnoses for this visit were:     Lumbar radiculopathy        You were seen by Edgardo Ferraro MD.      Follow-up Information     Follow up with Greg Ford MD. Call in 1 day.    Specialties:  Family Practice, OB/Gyn    Contact information:    [address] [place] [address] [phone]        Discharge References/Attachments     BACK PAIN (LOW) OR LEG PAIN: POSSIBLE CAUSES (ENGLISH)    BACK EXERCISES, LUMBAR (ENGLISH)      24 Hour Appointment Hotline       To make an appointment at any [facility] clinic, call [phone] ([phone]). If you don't have a family doctor or clinic, we will help you find one. [facility] clinics are conveniently located to serve the needs of you and your family.             Review of your medicines      START taking        Dose / Directions Last dose taken    cyclobenzaprine 5 MG tablet   Commonly known as:  FLEXERIL   Dose:  5 mg   Quantity:  10 tablet        Take 1 tablet (5 mg) by mouth 3 times daily as needed for muscle spasms   Refills:  0        predniSONE 20 MG tablet   Commonly known as:  DELTASONE   Quantity:  10 tablet        Take two tablets (= 40mg) each day for 5 (five) days   Refills:  0          CONTINUE these medicines which may have CHANGED, or have new prescriptions. If we are uncertain of the size of tablets/capsules you have at home, strength may be listed as something that might have changed.        Dose / Directions Last dose taken    HYDROcodone-acetaminophen 5-325 MG per tablet   Commonly known as:  NORCO   Dose:  1-2 tablet   What changed:  how much to  take   Quantity:  15 tablet        Take 1-2 tablets by mouth every 4 hours as needed for moderate to severe pain   Refills:  0          Our records show that you are taking the medicines listed below. If these are incorrect, please call your family doctor or clinic.        Dose / Directions Last dose taken    ADVIL PO   Dose:  600 mg        Take 600 mg by mouth every 6 hours as needed Reported on 5/15/2017   Refills:  0        azithromycin 250 MG tablet   Commonly known as:  ZITHROMAX   Quantity:  6 tablet        Two tablets first day, then one tablet daily for four days.   Refills:  0        blood glucose monitoring lancets   Quantity:  1 Box        Use to check blood sugar 1-2 times daily   Refills:  12        blood glucose monitoring test strip   Commonly known as:  LILLIAN CONTOUR NEXT   Quantity:  100 strip        Use to test blood sugar 2 times daily or as directed.   Refills:  3        metFORMIN 500 MG tablet   Commonly known as:  GLUCOPHAGE   Quantity:  30 tablet        TAKE ONE TABLET BY MOUTH EVERY DAY WITH DINNER   Refills:  0        MULTIPLE VITAMINS/WOMENS PO        Reported on 5/15/2017   Refills:  0        omeprazole 20 MG tablet   Dose:  20 mg   Quantity:  30 tablet        Take 1 tablet (20 mg) by mouth daily Take 30-60 minutes before a meal.   Refills:  9                Prescriptions were sent or printed at these locations (3 Prescriptions)                   [facility] - [address]   [address] [place] [address]    Telephone:  [phone]   Fax:  [phone]   Hours:                  E-Prescribed (2 of 2)         cyclobenzaprine (FLEXERIL) 5 MG tablet               predniSONE (DELTASONE) 20 MG tablet                     Other Prescriptions                Printed at Department/Unit printer (1 of 1)         HYDROcodone-acetaminophen (NORCO) 5-325 MG per tablet                Procedures and tests performed during your visit     Peripheral IV  "catheter      Orders Needing Specimen Collection     None      Pending Results     No orders found from 3/25/2018 to 3/28/2018.            Pending Culture Results     No orders found from 3/25/2018 to 3/28/2018.            Pending Results Instructions     If you had any lab results that were not finalized at the time of your Discharge, you can call the ED Lab Result RN at [phone]. You will be contacted by this team for any positive Lab results or changes in treatment. The nurses are available 7 days a week from 10A to 6:30P.  You can leave a message 24 hours per day and they will return your call.        Thank you for choosing [facility]       Thank you for choosing [facility] for your care. Our goal is always to provide you with excellent care. Hearing back from our patients is one way we can continue to improve our services. Please take a few minutes to complete the written survey that you may receive in the mail after you visit with us. Thank you!        [employer] Information     [employer] lets you send messages to your doctor, view your test results, renew your prescriptions, schedule appointments and more. To sign up, go to [email or web address] . Click on \"Log in\" on the left side of the screen, which will take you to the Welcome page. Then click on \"Sign up Now\" on the right side of the page.     You will be asked to enter the access code listed below, as well as some personal information. Please follow the directions to create your username and password.     Your access code is: [email or web address]  Expires: 6/10/2018  6:19 PM     Your access code will  in 90 days. If you need help or a new code, please call your [facility] clinic or [phone].        Care EveryWhere ID     This is your Care EveryWhere ID. This could be used by other organizations to access your [facility] medical records  [id number]        Equal Access to Services     MICHAEL LOZA: shanna Pinzon qaybta " jeannie mlian ah. So [facility] [phone].    ATENCIÓN: Si habla español, tiene a pierre disposición servicios gratuitos de asistencia lingüística. Llame al [phone].    We comply with applicable federal civil rights laws and [place] laws. We do not discriminate on the basis of race, color, national origin, age, disability, sex, sexual orientation, or gender identity.            After Visit Summary       This is your record. Keep this with you and show to your community pharmacist(s) and doctor(s) at your next visit.

## 2018-03-27 NOTE — ED NOTES
Pt reports she started having pain on Saturday in left leg, states this evening she woke to intolerable pain in lower back going down her left leg. Pt denies any injury that would cause pain.

## 2018-03-27 NOTE — ED AVS SNAPSHOT
[facility] Emergency Department    [address] [place] [address]    Phone:  [phone]    Fax:  [phone]                                       Fallon Rivera   MRN: [id number]    Department:  [facility] Emergency Department   Date of Visit:  3/27/2018           After Visit Summary Signature Page     I have received my discharge instructions, and my questions have been answered. I have discussed any challenges I see with this plan with the nurse or doctor.    ..........................................................................................................................................  Patient/Patient Representative Signature      ..........................................................................................................................................  Patient Representative Print Name and Relationship to Patient    ..................................................               ................................................  Date                                            Time    ..........................................................................................................................................  Reviewed by Signature/Title    ...................................................              ..............................................  Date                                                            Time

## 2018-03-27 NOTE — TELEPHONE ENCOUNTER
Ok per RM for MRI of LS spine. Order placed. Patient informed and appt scheduled for today.  Lynn Burton CMA

## 2018-03-27 NOTE — ED NOTES
"Pt reports feeling warm, tingling and sweaty from her waist to below her buttocks, \"everywhere I put icy hot\". Pt reports her warm blanket and Dr. Ferraro notified.   "

## 2018-03-28 ENCOUNTER — TELEPHONE (OUTPATIENT)
Dept: FAMILY MEDICINE | Facility: CLINIC | Age: 38
End: 2018-03-28

## 2018-03-28 DIAGNOSIS — M51.26 PROTRUSION OF LUMBAR INTERVERTEBRAL DISC: Primary | ICD-10-CM

## 2018-03-28 NOTE — TELEPHONE ENCOUNTER
Referral placed and patient informed. appt scheduled. informed patient to seek emergency room if any loss of bowels or bladder.  Lynn Burton, CMA

## 2018-03-28 NOTE — TELEPHONE ENCOUNTER
----- Message from Greg Ford MD sent at 3/28/2018  8:16 AM CDT -----  Lynn Please inform Fallon/ or caretaker  that this result(s) is/are NOT NORMAL=L5-S1 disc protrusion--she needs to be referred to Dr. Roe please-Thanks. SHARAN Ford MD

## 2018-04-05 ENCOUNTER — OFFICE VISIT (OUTPATIENT)
Dept: NEUROSURGERY | Facility: OTHER | Age: 38
End: 2018-04-05
Attending: OBSTETRICS & GYNECOLOGY
Payer: COMMERCIAL

## 2018-04-05 VITALS — TEMPERATURE: 97.7 F | BODY MASS INDEX: 39.61 KG/M2 | WEIGHT: 232 LBS | HEIGHT: 64 IN

## 2018-04-05 DIAGNOSIS — M54.16 LUMBAR RADICULOPATHY: Primary | ICD-10-CM

## 2018-04-05 PROCEDURE — 99243 OFF/OP CNSLTJ NEW/EST LOW 30: CPT | Performed by: NEUROLOGICAL SURGERY

## 2018-04-05 RX ORDER — CYCLOBENZAPRINE HCL 5 MG
5 TABLET ORAL 3 TIMES DAILY PRN
Qty: 90 TABLET | Refills: 0 | Status: SHIPPED | OUTPATIENT
Start: 2018-04-05 | End: 2018-09-08

## 2018-04-05 NOTE — MR AVS SNAPSHOT
"              After Visit Summary   2018    Fallon Rivera    MRN: [id number]           Patient Information     Date Of Birth          1980        Visit Information        Provider Department      2018 10:40 AM Mason Roe MD [facility]        Today's Diagnoses     Lumbar radiculopathy    -  1      Care Instructions    1. Order for flexeril sent to pharmacy  2. Chiropractic care  3. Please call our clinic with any questions or concerns: [phone]            Follow-ups after your visit        Who to contact     If you have questions or need follow up information about today's clinic visit or your schedule please contact [facility] directly at [phone].  Normal or non-critical lab and imaging results will be communicated to you by MyChart, letter or phone within 4 business days after the clinic has received the results. If you do not hear from us within 7 days, please contact the clinic through MyChart or phone. If you have a critical or abnormal lab result, we will notify you by phone as soon as possible.  Submit refill requests through [facility] or call your pharmacy and they will forward the refill request to us. Please allow 3 business days for your refill to be completed.          Additional Information About Your Visit        [employer] Information     [facility] lets you send messages to your doctor, view your test results, renew your prescriptions, schedule appointments and more. To sign up, go to [email or web address] . Click on \"Log in\" on the left side of the screen, which will take you to the Welcome page. Then click on \"Sign up Now\" on the right side of the page.     You will be asked to enter the access code listed below, as well as some personal information. Please follow the directions to create your username and password.     Your access code is: [email or web address]  Expires: 6/10/2018  6:19 PM     Your access code will  in 90 days. If you " "need help or a new code, please call your [facility] clinic or [phone].        Care EveryWhere ID     This is your Care EveryWhere ID. This could be used by other organizations to access your [facility] medical records  [id number]        Your Vitals Were     Temperature Height BMI (Body Mass Index)             97.7  F (36.5  C) (Temporal) 5' 4\" (1.626 m) 39.82 kg/m2          Blood Pressure from Last 3 Encounters:   03/27/18 120/85   03/23/18 122/70   03/12/18 138/86    Weight from Last 3 Encounters:   04/05/18 232 lb (105.2 kg)   03/27/18 232 lb (105.2 kg)   03/23/18 230 lb (104.3 kg)              Today, you had the following     No orders found for display         Today's Medication Changes          These changes are accurate as of 4/5/18 11:19 AM.  If you have any questions, ask your nurse or doctor.               Start taking these medicines.        Dose/Directions    cyclobenzaprine 5 MG tablet   Commonly known as:  FLEXERIL   Used for:  Lumbar radiculopathy        Dose:  5 mg   Take 1 tablet (5 mg) by mouth 3 times daily as needed for muscle spasms   Quantity:  90 tablet   Refills:  0            Where to get your medicines      These medications were sent to [facility] [address] [address]     Phone:  [phone]     cyclobenzaprine 5 MG tablet                Primary Care Provider Office Phone # Fax #    Greg oFrd [phone]       [address] [place] [address]        Equal Access to Services     [facility] AH: Margarita Gaming, shanna kaminski, jeannie mckeon. So [facility] [phone].    ATENCIÓN: Si habla español, tiene a pierre disposición servicios gratuitos de asistencia lingüística. Llame al [phone].    We comply with applicable federal civil rights laws and [place] laws. We do not discriminate on the basis of race, color, " national origin, age, disability, sex, sexual orientation, or gender identity.            Thank you!     Thank you for choosing [facility]  for your care. Our goal is always to provide you with excellent care. Hearing back from our patients is one way we can continue to improve our services. Please take a few minutes to complete the written survey that you may receive in the mail after your visit with us. Thank you!             Your Updated Medication List - Protect others around you: Learn how to safely use, store and throw away your medicines at www.disposemymeds.org.          This list is accurate as of 4/5/18 11:19 AM.  Always use your most recent med list.                   Brand Name Dispense Instructions for use Diagnosis    ADVIL PO      Take 600 mg by mouth every 6 hours as needed Reported on 5/15/2017        blood glucose monitoring lancets     1 Box    Use to check blood sugar 1-2 times daily    Diabetes mellitus (H)       blood glucose monitoring test strip    LILLIAN CONTOUR NEXT    100 strip    Use to test blood sugar 2 times daily or as directed.    Diabetes mellitus (H)       cyclobenzaprine 5 MG tablet    FLEXERIL    90 tablet    Take 1 tablet (5 mg) by mouth 3 times daily as needed for muscle spasms    Lumbar radiculopathy       HYDROcodone-acetaminophen 5-325 MG per tablet    NORCO    15 tablet    Take 1-2 tablets by mouth every 4 hours as needed for moderate to severe pain        metFORMIN 500 MG tablet    GLUCOPHAGE    30 tablet    TAKE ONE TABLET BY MOUTH EVERY DAY WITH DINNER    Type 2 diabetes mellitus without complication, unspecified long term insulin use status (H)       MULTIPLE VITAMINS/WOMENS PO      Reported on 5/15/2017        omeprazole 20 MG tablet     30 tablet    Take 1 tablet (20 mg) by mouth daily Take 30-60 minutes before a meal.    Abdominal pain, other specified site

## 2018-04-05 NOTE — LETTER
4/5/2018         RE: Fallon Rivera  [address] [place] [id number]        Dear Colleague,    Thank you for referring your patient, Fallon Rivera, to the [facility]. Please see a copy of my visit note below.    I was asked by Dr. Ford to see this patient in consultation    37F w/ left leg pain, L5-S1 disk herniation.  Started 2 weeks ago, severe left leg pain.  No inciting factor.  7/10 sharp pain from left hip to thigh, calf, and lateral foot, with numbness and burning in lateral 3 toes.  No motor changes.       Past Medical History:   Diagnosis Date     Abnormal Pap smear 2006, 2007,     ASC-H, ASCUS + HPV 45     Calculus of kidney 2002     Cervical high risk HPV (human papillomavirus) test positive     + HPV 45 (high risk)     History of colposcopy with cervical biopsy 2/9/06, 3/15/07    koilocytosis 2007     Past Surgical History:   Procedure Laterality Date     C REMOVAL OF KIDNEY STONE      two surgeries, 2002,2003     CHOLECYSTECTOMY, LAPOROSCOPIC  5/11/2007    Cholecystectomy, Laparoscopic     COLONOSCOPY  05/17/10     CONIZATION  7/1/2011    Procedure:CONIZATION; cold knife and punch biopsy of mole on back; Surgeon:GREG FORD; [facility]     DILATION AND CURETTAGE, HYSTEROSCOPY, LAPAROSCOPY, COMBINED Right 9/24/2015    Procedure: COMBINED DILATION AND CURETTAGE, HYSTEROSCOPY, LAPAROSCOPY;  Surgeon: Greg Ford MD;  [facility]     ESOPHAGOSCOPY, GASTROSCOPY, DUODENOSCOPY (EGD), COMBINED  5/21/2014    Procedure: COMBINED ESOPHAGOSCOPY, GASTROSCOPY, DUODENOSCOPY (EGD), BIOPSY SINGLE OR MULTIPLE;  Surgeon: Earl Lane MD;  [facility]     EXCISE LESION TRUNK  7/1/2011    Procedure:EXCISE LESION TRUNK; Surgeon:GREG FORD; Location:[facility]     HC FRAGMENTING OF KIDNEY STONE  11/30/2005     HC RX ECTOP PREG BY SCOPE,RMV TUBE/OVRY  12/20/2007    Right sided salpingectomy and removal of ruptured ectopic  pregnancy. D&C.     HC UGI ENDOSCOPY, SIMPLE EXAM  09/01/09     LAPAROSCOPIC APPENDECTOMY N/A 9/24/2015    Procedure: LAPAROSCOPIC APPENDECTOMY;  Surgeon: James Cuellar MD;  [facility]     LAPAROSCOPIC CYSTECTOMY OVARIAN (BENIGN) N/A 9/24/2015    Procedure: LAPAROSCOPIC CYSTECTOMY OVARIAN (BENIGN);  Surgeon: Greg Ford MD;  [facility]     LAPAROSCOPIC OOPHORECTOMY Right 9/24/2015    Procedure: LAPAROSCOPIC OOPHORECTOMY;  Surgeon: Greg Ford MD;  [facility]     LITHOTRIPSY  01/17/2007     PELVIS LAPAROSCOPY,DX  10/16/2000    Diagnostic laparoscopy, Endometrial biopsy.     TUBAL/ECTOPIC PREGNANCY  01/23/2007    Lap removal of left ruptured ectopic pregnancy & salpingectomy, D&C     Social History     Social History     Marital status:      Spouse name: Joseph     Number of children: 1     Years of education: 9     Occupational History      None     Social History Main Topics     Smoking status: Current Some Day Smoker     Years: 12.00     Types: Cigarettes     Last attempt to quit: 7/14/2009     Smokeless tobacco: Never Used      Comment: As of 10 /2014, pt has had a few cigs here and there     Alcohol use 0.0 oz/week     0 Standard drinks or equivalent per week      Comment: every few months     Drug use: No     Sexual activity: Yes     Partners: Male     Birth control/ protection: None     Other Topics Concern      Service No     Blood Transfusions No     Caffeine Concern Yes     Reports 1 can soda/week  Advised not more than 16 ounces caffeien/day.     Occupational Exposure No     Hobby Hazards No     Sleep Concern No     Stress Concern Yes     will discuss with      Weight Concern Yes     Eating disorder in childhood.  Hx. gestational diab.     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     Exercise No     Advised walking 30 min/day.     Bike Helmet No     Seat Belt Yes     Social History  "Narrative     and lives at home with her  and daughter.     Family History   Problem Relation Age of Onset     DIABETES Maternal Grandmother      adult onset     Anesthesia Reaction Maternal Grandmother      can't tolerate Novacaine.     Respiratory Maternal Grandmother      COPD and emphysema.     Thyroid Disease Maternal Grandmother      HEART DISEASE Maternal Grandmother      CHF     DIABETES Paternal Grandfather      adult o nset     Hypertension Paternal Grandfather      CEREBROVASCULAR DISEASE Paternal Grandfather      HEART DISEASE Paternal Grandfather      MI, replaced valve,angioplasty     Thyroid Disease Paternal Grandfather      CANCER Maternal Grandfather      skin cancer     HEART DISEASE Maternal Grandfather      MI     Obesity Mother      on thyroid medication     Thyroid Disease Mother      DIABETES Mother      HEART DISEASE Father      Hypertension Father      and TIA     Lipids Father      Breast Cancer Paternal Grandmother      Arrhythmia Other      CANCER Maternal Aunt      breast/brain cancer     Depression Paternal Aunt      CEREBROVASCULAR DISEASE Paternal Uncle      CEREBROVASCULAR DISEASE Paternal Aunt         ROS: 10 point ROS neg other than the symptoms noted above in the HPI.    Physical Exam  Temp 97.7  F (36.5  C) (Temporal)  Ht 5' 4\" (1.626 m)  Wt 232 lb (105.2 kg)  BMI 39.82 kg/m2  HEENT:  Normocephalic, atraumatic.  PERRLA.  EOM s intact.  Visual fields full to gross exam  Neck:  Supple, non-tender, without lymphadenopathy.  Heart:  No peripheral edema  Lungs:  No SOB  Abdomen:  Non-distended.   Skin:  Warm and dry.  Extremities:  No edema, cyanosis or clubbing.  Psychiatric:  No apparent distress  Musculoskeletal:  Normal bulk and tone    NEUROLOGICAL EXAMINATION:     Mental status:  Alert and Oriented x 3, speech is fluent.  Cranial nerves:  II-XII intact.   Motor:    Shoulder Abduction:  Right:  5/5   Left:  5/5  Biceps:                      Right:  5/5   Left:  " 5/5  Triceps:                     Right:  5/5   Left:  5/5  Wrist Extensors:       Right:  5/5   Left:  5/5  Wrist Flexors:           Right:  5/5   Left:  5/5  interosseus :            Right:  5/5   Left:  5/5   Hip Flexor:                Right: 5/5  Left:  5/5  Hip Adductor:             Right:  5/5  Left:  5/5  Hip Abductor:             Right:  5/5  Left:  5/5  Gastroc Soleus:        Right:  5/5  Left:  5/5  Tib/Ant:                      Right:  5/5  Left:  5/5  EHL:                     Right:  5/5  Left:  5/5  Sensation:  Intact  Reflexes:  Negative Babinski.  Negative Clonus.  Negative Vivas's.  Coordination:  Smooth finger to nose testing.   Negative pronator drift.  Smooth tandem walking.    A/P:  37F w/ left leg pain, L5-S1 disk herniation    I had a discussion with the patient, reviewing the history, symptoms, and imaging  She is going to start working with a chiropractor  We also offered the option of PT, but she preferred to work with a chiropractor  If symptoms persist she will call and we will schedule ARMANDO           Again, thank you for allowing me to participate in the care of your patient.        Sincerely,        Mason Roe MD

## 2018-04-05 NOTE — PROGRESS NOTES
I was asked by Dr. Ford to see this patient in consultation    37F w/ left leg pain, L5-S1 disk herniation.  Started 2 weeks ago, severe left leg pain.  No inciting factor.  7/10 sharp pain from left hip to thigh, calf, and lateral foot, with numbness and burning in lateral 3 toes.  No motor changes.       Past Medical History:   Diagnosis Date     Abnormal Pap smear 2006, 2007,     ASC-H, ASCUS + HPV 45     Calculus of kidney 2002     Cervical high risk HPV (human papillomavirus) test positive     + HPV 45 (high risk)     History of colposcopy with cervical biopsy 2/9/06, 3/15/07    koilocytosis 2007     Past Surgical History:   Procedure Laterality Date     C REMOVAL OF KIDNEY STONE      two surgeries, 2002,2003     CHOLECYSTECTOMY, LAPOROSCOPIC  5/11/2007    Cholecystectomy, Laparoscopic     COLONOSCOPY  05/17/10     CONIZATION  7/1/2011    Procedure:CONIZATION; cold knife and punch biopsy of mole on back; Surgeon:GREG FORD; [facility]     DILATION AND CURETTAGE, HYSTEROSCOPY, LAPAROSCOPY, COMBINED Right 9/24/2015    Procedure: COMBINED DILATION AND CURETTAGE, HYSTEROSCOPY, LAPAROSCOPY;  Surgeon: Greg Ford MD;  [facility]     ESOPHAGOSCOPY, GASTROSCOPY, DUODENOSCOPY (EGD), COMBINED  5/21/2014    Procedure: COMBINED ESOPHAGOSCOPY, GASTROSCOPY, DUODENOSCOPY (EGD), BIOPSY SINGLE OR MULTIPLE;  Surgeon: Earl Lane MD;  [facility]     EXCISE LESION TRUNK  7/1/2011    Procedure:EXCISE LESION TRUNK; Surgeon:GREG FORD; [facility]     HC FRAGMENTING OF KIDNEY STONE  11/30/2005     HC RX ECTOP PREG BY SCOPE,RMV TUBE/OVRY  12/20/2007    Right sided salpingectomy and removal of ruptured ectopic pregnancy. D&C.     HC UGI ENDOSCOPY, SIMPLE EXAM  09/01/09     LAPAROSCOPIC APPENDECTOMY N/A 9/24/2015    Procedure: LAPAROSCOPIC APPENDECTOMY;  Surgeon: James Cuellar MD;  [facility]     LAPAROSCOPIC CYSTECTOMY OVARIAN (BENIGN) N/A  9/24/2015    Procedure: LAPAROSCOPIC CYSTECTOMY OVARIAN (BENIGN);  Surgeon: Greg Ford MD;  [facility]     LAPAROSCOPIC OOPHORECTOMY Right 9/24/2015    Procedure: LAPAROSCOPIC OOPHORECTOMY;  Surgeon: Greg Ford MD;  [facility]     LITHOTRIPSY  01/17/2007     PELVIS LAPAROSCOPY,DX  10/16/2000    Diagnostic laparoscopy, Endometrial biopsy.     TUBAL/ECTOPIC PREGNANCY  01/23/2007    Lap removal of left ruptured ectopic pregnancy & salpingectomy, D&C     Social History     Social History     Marital status:      Spouse name: Joseph     Number of children: 1     Years of education: 9     Occupational History      None     Social History Main Topics     Smoking status: Current Some Day Smoker     Years: 12.00     Types: Cigarettes     Last attempt to quit: 7/14/2009     Smokeless tobacco: Never Used      Comment: As of 10 /2014, pt has had a few cigs here and there     Alcohol use 0.0 oz/week     0 Standard drinks or equivalent per week      Comment: every few months     Drug use: No     Sexual activity: Yes     Partners: Male     Birth control/ protection: None     Other Topics Concern      Service No     Blood Transfusions No     Caffeine Concern Yes     Reports 1 can soda/week  Advised not more than 16 ounces caffeien/day.     Occupational Exposure No     Hobby Hazards No     Sleep Concern No     Stress Concern Yes     will discuss with      Weight Concern Yes     Eating disorder in childhood.  Hx. gestational diab.     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     Exercise No     Advised walking 30 min/day.     Bike Helmet No     Seat Belt Yes     Social History Narrative     and lives at home with her  and daughter.     Family History   Problem Relation Age of Onset     DIABETES Maternal Grandmother      adult onset     Anesthesia Reaction Maternal Grandmother      can't tolerate Novacaine.      "Respiratory Maternal Grandmother      COPD and emphysema.     Thyroid Disease Maternal Grandmother      HEART DISEASE Maternal Grandmother      CHF     DIABETES Paternal Grandfather      adult o nset     Hypertension Paternal Grandfather      CEREBROVASCULAR DISEASE Paternal Grandfather      HEART DISEASE Paternal Grandfather      MI, replaced valve,angioplasty     Thyroid Disease Paternal Grandfather      CANCER Maternal Grandfather      skin cancer     HEART DISEASE Maternal Grandfather      MI     Obesity Mother      on thyroid medication     Thyroid Disease Mother      DIABETES Mother      HEART DISEASE Father      Hypertension Father      and TIA     Lipids Father      Breast Cancer Paternal Grandmother      Arrhythmia Other      CANCER Maternal Aunt      breast/brain cancer     Depression Paternal Aunt      CEREBROVASCULAR DISEASE Paternal Uncle      CEREBROVASCULAR DISEASE Paternal Aunt         ROS: 10 point ROS neg other than the symptoms noted above in the HPI.    Physical Exam  Temp 97.7  F (36.5  C) (Temporal)  Ht 5' 4\" (1.626 m)  Wt 232 lb (105.2 kg)  BMI 39.82 kg/m2  HEENT:  Normocephalic, atraumatic.  PERRLA.  EOM s intact.  Visual fields full to gross exam  Neck:  Supple, non-tender, without lymphadenopathy.  Heart:  No peripheral edema  Lungs:  No SOB  Abdomen:  Non-distended.   Skin:  Warm and dry.  Extremities:  No edema, cyanosis or clubbing.  Psychiatric:  No apparent distress  Musculoskeletal:  Normal bulk and tone    NEUROLOGICAL EXAMINATION:     Mental status:  Alert and Oriented x 3, speech is fluent.  Cranial nerves:  II-XII intact.   Motor:    Shoulder Abduction:  Right:  5/5   Left:  5/5  Biceps:                      Right:  5/5   Left:  5/5  Triceps:                     Right:  5/5   Left:  5/5  Wrist Extensors:       Right:  5/5   Left:  5/5  Wrist Flexors:           Right:  5/5   Left:  5/5  interosseus :            Right:  5/5   Left:  5/5   Hip Flexor:                Right: 5/5  " Left:  5/5  Hip Adductor:             Right:  5/5  Left:  5/5  Hip Abductor:             Right:  5/5  Left:  5/5  Gastroc Soleus:        Right:  5/5  Left:  5/5  Tib/Ant:                      Right:  5/5  Left:  5/5  EHL:                     Right:  5/5  Left:  5/5  Sensation:  Intact  Reflexes:  Negative Babinski.  Negative Clonus.  Negative Vivas's.  Coordination:  Smooth finger to nose testing.   Negative pronator drift.  Smooth tandem walking.    A/P:  37F w/ left leg pain, L5-S1 disk herniation    I had a discussion with the patient, reviewing the history, symptoms, and imaging  She is going to start working with a chiropractor  We also offered the option of PT, but she preferred to work with a chiropractor  If symptoms persist she will call and we will schedule ARMANDO

## 2018-04-05 NOTE — NURSING NOTE
"Fallon Rivera is a 37 year old female who presents for:  Chief Complaint   Patient presents with     Consult     L5-S1 disc extrusion         Initial Vitals:  Temp 97.7  F (36.5  C) (Temporal)  Ht 5' 4\" (1.626 m)  Wt 232 lb (105.2 kg)  BMI 39.82 kg/m2 Estimated body mass index is 39.82 kg/(m^2) as calculated from the following:    Height as of this encounter: 5' 4\" (1.626 m).    Weight as of this encounter: 232 lb (105.2 kg).. Body surface area is 2.18 meters squared. BP completed using cuff size: NA (Not Taken)  Data Unavailable    Do you feel safe in your environment?  Yes  Do you need any refills today? Yes    Nursing Comments: [facility] refill needed        Raymond Overton    "

## 2018-04-05 NOTE — PATIENT INSTRUCTIONS
1. Order for flexeril sent to pharmacy  2. Chiropractic care  3. Please call our clinic with any questions or concerns: [phone]

## 2018-04-18 DIAGNOSIS — K21.9 GASTROESOPHAGEAL REFLUX DISEASE WITHOUT ESOPHAGITIS: ICD-10-CM

## 2018-04-18 DIAGNOSIS — E11.9 TYPE 2 DIABETES MELLITUS WITHOUT COMPLICATION, UNSPECIFIED LONG TERM INSULIN USE STATUS: ICD-10-CM

## 2018-04-18 NOTE — TELEPHONE ENCOUNTER
Prilosec:  Routing refill request to provider for review/approval because:  Drug interaction warning      Metformin:  Routing refill request to provider for review/approval because:  Labs not current:  Microalbumin - ordered.  Allergy/Contraindicated  T'd up 1 month for provider review.    Patient has an appointment on 5/14/18. Can get labs done then.  Sia Landin, NUZHATN, RN

## 2018-04-18 NOTE — TELEPHONE ENCOUNTER
"Requested Prescriptions   Pending Prescriptions Disp Refills     omeprazole (PRILOSEC) 20 MG CR capsule [Pharmacy Med Name: OMEPRAZOLE 20MG CPDR] 30 capsule 5     Sig: TAKE 1 CAPSULE BY MOUTH DAILY, 30 TO 60 MINUTES BEFORE A MEAL.    PPI Protocol Passed    4/18/2018 11:53 AM       Passed - Not on Clopidogrel (unless Pantoprazole ordered)       Passed - No diagnosis of osteoporosis on record       Passed - Recent (12 mo) or future (30 days) visit within the authorizing provider's specialty    Patient had office visit in the last 12 months or has a visit in the next 30 days with authorizing provider or within the authorizing provider's specialty.  See \"Patient Info\" tab in inbasket, or \"Choose Columns\" in Meds & Orders section of the refill encounter.           Passed - Patient is age 18 or older       Passed - No active pregnacy on record       Passed - No positive pregnancy test in past 12 months        metFORMIN (GLUCOPHAGE) 500 MG tablet [Pharmacy Med Name: METFORMIN HCL 500MG TABS] 30 tablet 0     Sig: TAKE ONE TABLET BY MOUTH EVERY DAY WITH DINNER    Biguanide Agents Failed    4/18/2018 11:53 AM       Failed - Patient has had a Microalbumin in the past 12 mos.    Recent Labs   Lab Test  01/30/17   1735   MICROL  6   UMALCR  8.81            Passed - Blood pressure less than 140/90 in past 6 months    BP Readings from Last 3 Encounters:   03/27/18 120/85   03/23/18 122/70   03/12/18 138/86                Passed - Patient has documented LDL within the past 12 mos.    Recent Labs   Lab Test  01/07/18   0833   LDL  97            Passed - Patient is age 10 or older       Passed - Patient has documented A1c within the specified period of time.    Recent Labs   Lab Test  01/07/18   0833   A1C  7.1*            Passed - Patient's CR is NOT>1.4 OR Patient's EGFR is NOT<45 within past 12 mos.    Recent Labs   Lab Test  10/28/17   1730   GFRESTIMATED  80   GFRESTBLACK  >90       Recent Labs   Lab Test  10/28/17   1730   CR  " "0.81            Passed - Patient does NOT have a diagnosis of CHF.       Passed - Patient is not pregnant       Passed - Patient has not had a positive pregnancy test within the past 12 mos.        Passed - Recent (6 mo) or future (30 days) visit within the authorizing provider's specialty    Patient had office visit in the last 6 months or has a visit in the next 30 days with authorizing provider or within the authorizing provider's specialty.  See \"Patient Info\" tab in inbasket, or \"Choose Columns\" in Meds & Orders section of the refill encounter.              Last Written Prescription Date:  1/2/15  Last Fill Quantity: 30,  # refills: 9   Last Office Visit with [facility], [facility] or [facility] prescribing provider:  3/12/18   Future Office Visit:    Next 5 appointments (look out 90 days)     May 14, 2018  5:40 PM CDT   Office Visit with Greg Ford MD   [facility] ([address] [id number] [phone]                 Metformin 500 MG      Last Written Prescription Date:  3/12/18  Last Fill Quantity: 30,   # refills: 0  Last Office Visit: 3/12/18  Future Office visit:    Next 5 appointments (look out 90 days)     May 14, 2018  5:40 PM CDT   Office Visit with Greg Ford MD   [facility] ([address] [id number]   [phone]                       "

## 2018-05-21 ENCOUNTER — OFFICE VISIT (OUTPATIENT)
Dept: FAMILY MEDICINE | Facility: CLINIC | Age: 38
End: 2018-05-21
Payer: COMMERCIAL

## 2018-05-21 VITALS
WEIGHT: 224 LBS | HEART RATE: 82 BPM | BODY MASS INDEX: 38.24 KG/M2 | TEMPERATURE: 97.6 F | SYSTOLIC BLOOD PRESSURE: 134 MMHG | DIASTOLIC BLOOD PRESSURE: 82 MMHG | OXYGEN SATURATION: 100 % | RESPIRATION RATE: 16 BRPM | HEIGHT: 64 IN

## 2018-05-21 DIAGNOSIS — E11.9 DIABETES MELLITUS (H): ICD-10-CM

## 2018-05-21 DIAGNOSIS — K21.9 GASTROESOPHAGEAL REFLUX DISEASE WITHOUT ESOPHAGITIS: ICD-10-CM

## 2018-05-21 DIAGNOSIS — E11.9 TYPE 2 DIABETES MELLITUS WITHOUT COMPLICATION, UNSPECIFIED LONG TERM INSULIN USE STATUS: ICD-10-CM

## 2018-05-21 DIAGNOSIS — L03.031 PARONYCHIA OF TOE, RIGHT: ICD-10-CM

## 2018-05-21 DIAGNOSIS — E11.9 TYPE 2 DIABETES MELLITUS WITHOUT COMPLICATION, WITHOUT LONG-TERM CURRENT USE OF INSULIN (H): Primary | ICD-10-CM

## 2018-05-21 LAB
ALBUMIN SERPL-MCNC: 3.9 G/DL (ref 3.4–5)
ALP SERPL-CCNC: 96 U/L (ref 40–150)
ALT SERPL W P-5'-P-CCNC: 68 U/L (ref 0–50)
ANION GAP SERPL CALCULATED.3IONS-SCNC: 8 MMOL/L (ref 3–14)
AST SERPL W P-5'-P-CCNC: 33 U/L (ref 0–45)
BILIRUB SERPL-MCNC: 0.7 MG/DL (ref 0.2–1.3)
BUN SERPL-MCNC: 10 MG/DL (ref 7–30)
CALCIUM SERPL-MCNC: 8.9 MG/DL (ref 8.5–10.1)
CHLORIDE SERPL-SCNC: 104 MMOL/L (ref 94–109)
CO2 SERPL-SCNC: 28 MMOL/L (ref 20–32)
CREAT SERPL-MCNC: 0.74 MG/DL (ref 0.52–1.04)
GFR SERPL CREATININE-BSD FRML MDRD: 89 ML/MIN/1.7M2
GLUCOSE SERPL-MCNC: 171 MG/DL (ref 70–99)
HBA1C MFR BLD: 8.2 % (ref 0–5.6)
POTASSIUM SERPL-SCNC: 3.7 MMOL/L (ref 3.4–5.3)
PROT SERPL-MCNC: 8 G/DL (ref 6.8–8.8)
SODIUM SERPL-SCNC: 140 MMOL/L (ref 133–144)

## 2018-05-21 PROCEDURE — 99214 OFFICE O/P EST MOD 30 MIN: CPT | Performed by: OBSTETRICS & GYNECOLOGY

## 2018-05-21 PROCEDURE — 36415 COLL VENOUS BLD VENIPUNCTURE: CPT | Performed by: OBSTETRICS & GYNECOLOGY

## 2018-05-21 PROCEDURE — 83036 HEMOGLOBIN GLYCOSYLATED A1C: CPT | Performed by: OBSTETRICS & GYNECOLOGY

## 2018-05-21 PROCEDURE — 80053 COMPREHEN METABOLIC PANEL: CPT | Performed by: OBSTETRICS & GYNECOLOGY

## 2018-05-21 RX ORDER — CLINDAMYCIN HCL 300 MG
300 CAPSULE ORAL 3 TIMES DAILY
Qty: 30 CAPSULE | Refills: 0 | Status: SHIPPED | OUTPATIENT
Start: 2018-05-21 | End: 2018-06-05

## 2018-05-21 ASSESSMENT — PAIN SCALES - GENERAL: PAINLEVEL: NO PAIN (0)

## 2018-05-21 NOTE — PROGRESS NOTES
Subjective: Here for diabetic followup exam.   Has been monitoring sugars 2 times daily. Glucose values have been in the  150 range fasting, and 180 range postprandial.  There have been no   changes in vision  No changes/ complaints   of neuropathy symptoms.   No other concerns        The past medical history, social history, past surgical history and family history as shown below have been reviewed by me today.  Past Medical History:   Diagnosis Date     Abnormal Pap smear 2006, 2007,     ASC-H, ASCUS + HPV 45     Calculus of kidney 2002     Cervical high risk HPV (human papillomavirus) test positive     + HPV 45 (high risk)     History of colposcopy with cervical biopsy 2/9/06, 3/15/07    koilocytosis 2007        Allergies   Allergen Reactions     Amoxicillin Hives     Anti-Nausea      Anxiety, agitation,severe paranoia. Zofran, Reglan are some of them.       Demerol Hcl [Meperidine Hcl] Nausea     Indomethacin Nausea and Vomiting     Macrobid [Nitrofuran Derivatives] Hives     Reglan [Metoclopramide] Other (See Comments)     Acute paranoia, severe     Zofran [Ondansetron] Other (See Comments)     Severe anxiety, agitation     Current Outpatient Prescriptions   Medication Sig Dispense Refill     blood glucose monitoring (LILLIAN CONTOUR NEXT) test strip Use to test blood sugar 2 times daily or as directed. 100 strip 3     blood glucose monitoring (LILLIAN MICROLET) lancets Use to check blood sugar 1-2 times daily 1 Box 12     HYDROcodone-acetaminophen (NORCO) 5-325 MG per tablet Take 1-2 tablets by mouth every 4 hours as needed for moderate to severe pain 15 tablet 0     Ibuprofen (ADVIL PO) Take 600 mg by mouth every 6 hours as needed Reported on 5/15/2017       metFORMIN (GLUCOPHAGE) 500 MG tablet TAKE ONE TABLET BY MOUTH EVERY DAY WITH DINNER 30 tablet 0     Multiple Vitamins-Minerals (MULTIPLE VITAMINS/WOMENS PO) Reported on 5/15/2017       omeprazole (PRILOSEC) 20 MG CR capsule TAKE 1 CAPSULE BY MOUTH DAILY, 30  TO 60 MINUTES BEFORE A MEAL. 30 capsule 5     omeprazole 20 MG tablet Take 1 tablet (20 mg) by mouth daily Take 30-60 minutes before a meal. 30 tablet 9     cyclobenzaprine (FLEXERIL) 5 MG tablet Take 1 tablet (5 mg) by mouth 3 times daily as needed for muscle spasms (Patient not taking: Reported on 5/21/2018) 90 tablet 0     Past Surgical History:   Procedure Laterality Date     C REMOVAL OF KIDNEY STONE      two surgeries, 2002,2003     CHOLECYSTECTOMY, LAPOROSCOPIC  5/11/2007    Cholecystectomy, Laparoscopic     COLONOSCOPY  05/17/10     CONIZATION  7/1/2011    Procedure:CONIZATION; cold knife and punch biopsy of mole on back; Surgeon:GREG FORD; Location:[facility]     DILATION AND CURETTAGE, HYSTEROSCOPY, LAPAROSCOPY, COMBINED Right 9/24/2015    Procedure: COMBINED DILATION AND CURETTAGE, HYSTEROSCOPY, LAPAROSCOPY;  Surgeon: Greg Ford MD;  Location: [facility]     ESOPHAGOSCOPY, GASTROSCOPY, DUODENOSCOPY (EGD), COMBINED  5/21/2014    Procedure: COMBINED ESOPHAGOSCOPY, GASTROSCOPY, DUODENOSCOPY (EGD), BIOPSY SINGLE OR MULTIPLE;  Surgeon: Earl Lane MD;  Location: [facility]     EXCISE LESION TRUNK  7/1/2011    Procedure:EXCISE LESION TRUNK; Surgeon:GREG FORD; Location:[facility]     HC FRAGMENTING OF KIDNEY STONE  11/30/2005     HC RX ECTOP PREG BY SCOPE,RMV TUBE/OVRY  12/20/2007    Right sided salpingectomy and removal of ruptured ectopic pregnancy. D&C.     HC UGI ENDOSCOPY, SIMPLE EXAM  09/01/09     LAPAROSCOPIC APPENDECTOMY N/A 9/24/2015    Procedure: LAPAROSCOPIC APPENDECTOMY;  Surgeon: James Cuellar MD;  [facility]     LAPAROSCOPIC CYSTECTOMY OVARIAN (BENIGN) N/A 9/24/2015    Procedure: LAPAROSCOPIC CYSTECTOMY OVARIAN (BENIGN);  Surgeon: Greg Ford MD;  [facility]     LAPAROSCOPIC OOPHORECTOMY Right 9/24/2015    Procedure: LAPAROSCOPIC OOPHORECTOMY;  Surgeon: Greg Ford MD;  Location[facility]     LITHOTRIPSY   01/17/2007     PELVIS LAPAROSCOPY,DX  10/16/2000    Diagnostic laparoscopy, Endometrial biopsy.     TUBAL/ECTOPIC PREGNANCY  01/23/2007    Lap removal of left ruptured ectopic pregnancy & salpingectomy, D&C     Social History     Social History     Marital status:      Spouse name: Joseph     Number of children: 1     Years of education: 9     Occupational History      None     Social History Main Topics     Smoking status: Current Some Day Smoker     Years: 12.00     Types: Cigarettes     Last attempt to quit: 7/14/2009     Smokeless tobacco: Never Used      Comment: As of 10 /2014, pt has had a few cigs here and there     Alcohol use 0.0 oz/week     0 Standard drinks or equivalent per week      Comment: every few months     Drug use: No     Sexual activity: Yes     Partners: Male     Birth control/ protection: None     Other Topics Concern      Service No     Blood Transfusions No     Caffeine Concern Yes     Reports 1 can soda/week  Advised not more than 16 ounces caffeien/day.     Occupational Exposure No     Hobby Hazards No     Sleep Concern No     Stress Concern Yes     will discuss with DR     Weight Concern Yes     Eating disorder in childhood.  Hx. gestational diab.     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     Exercise No     Advised walking 30 min/day.     Bike Helmet No     Seat Belt Yes     Social History Narrative     and lives at home with her  and daughter.     Family History   Problem Relation Age of Onset     DIABETES Maternal Grandmother      adult onset     Anesthesia Reaction Maternal Grandmother      can't tolerate Novacaine.     Respiratory Maternal Grandmother      COPD and emphysema.     Thyroid Disease Maternal Grandmother      HEART DISEASE Maternal Grandmother      CHF     DIABETES Paternal Grandfather      adult o nset     Hypertension Paternal Grandfather      CEREBROVASCULAR DISEASE Paternal Grandfather   "    HEART DISEASE Paternal Grandfather      MI, replaced valve,angioplasty     Thyroid Disease Paternal Grandfather      CANCER Maternal Grandfather      skin cancer     HEART DISEASE Maternal Grandfather      MI     Obesity Mother      on thyroid medication     Thyroid Disease Mother      DIABETES Mother      HEART DISEASE Father      Hypertension Father      and TIA     Lipids Father      Breast Cancer Paternal Grandmother      Arrhythmia Other      CANCER Maternal Aunt      breast/brain cancer     Depression Paternal Aunt      CEREBROVASCULAR DISEASE Paternal Uncle      CEREBROVASCULAR DISEASE Paternal Aunt        ROS: A 12 point review of systems was done. Except for what is listed above in the HPI, the systems review is negative .      Objective: Vital signs: Blood pressure 134/82, pulse 82, temperature 97.6  F (36.4  C), temperature source Temporal, resp. rate 16, height 5' 4\" (1.626 m), weight 224 lb (101.6 kg), SpO2 100 %, not currently breastfeeding.      HEENT:  Sclerae and conjunctiva are normal.   Ear canals and TMs look normal.  Nasal mucosa is pink  - no polyps or masses seen.  sinuses are non tender to palpation.  Throat is unremarkable . Mucous membranes are moist.   Fundi are benign- disc margins are sharp. No papilledema noted.    Neck is supple, mobile, no adenoapthy or masses palpable. Normal range of motion noted.  Chest is clear to auscultation. No wheezes, rales or rhonchi heard.  cardiac exam is normal with s1, s2, no murmurs or adventitious sounds.Normal rate and rhythm is heard.  Exam of the feet is negative for paresthesias.  Has normal sensation and color to both feet, and normal pulses and no trophic skin changes or ulcers.     Her right great toe has a mild ingrown nail/paronychial infection- she already opened it up and it drained- nothing to culture or open at this point.      Assessment: Diabetes is  Moderately   well-controlled.    Plan: 1. Ace inhibitor therapy:   Declined-  2.Labs " today: A1C      Urine for microprotein      3. Baby aspirin 65 or 81 mg advised daily as prophylaxis- watch for GI upset or tinnitus or bruising/signs of bleeding- stop if these occur.  4.Advised to recheck in  3 months.  Continue daily glucose monitoring. Recheck acutely if concerns.  5. Advised to have yearly ophthalmology exam, regular dental visits and keep up to date on flu vaccine and TDAP.     6. See rx for clindamycin for the paronychial infection- soak the toe daily to keep the skin soft so the nail will grow straight.. I explained that antibiotics can cause a rash or allergic reaction to develop and so the medication should be stopped if this occurs. Also there is a risk of diarrhea or clostridium difficile pseudomembranous enterocolitis with any antibiotic use so it should be stopped if diarrhea develops and then the clinic should be called so that we have a followup evaluation.    SHARAN Ford MD

## 2018-05-21 NOTE — MR AVS SNAPSHOT
"              After Visit Summary   5/21/2018    Fallon Rivera    MRN: [id number]           Patient Information     Date Of Birth          1980        Visit Information        Provider Department      5/21/2018 5:00 PM Greg Ford MD [facility]        Today's Diagnoses     Type 2 diabetes mellitus without complication, without long-term current use of insulin (H)    -  1    Paronychia of toe, right        Type 2 diabetes mellitus without complication, unspecified long term insulin use status (H)        Gastroesophageal reflux disease without esophagitis           Follow-ups after your visit        Who to contact     If you have questions or need follow up information about today's clinic visit or your schedule please contact [facility] directly at [phone].  Normal or non-critical lab and imaging results will be communicated to you by MyChart, letter or phone within 4 business days after the clinic has received the results. If you do not hear from us within 7 days, please contact the clinic through MyChart or phone. If you have a critical or abnormal lab result, we will notify you by phone as soon as possible.  Submit refill requests through [employer] or call your pharmacy and they will forward the refill request to us. Please allow 3 business days for your refill to be completed.          Additional Information About Your Visit        [employer] Information     [employer] lets you send messages to your doctor, view your test results, renew your prescriptions, schedule appointments and more. To sign up, go to [email or web address] . Click on \"Log in\" on the left side of the screen, which will take you to the Welcome page. Then click on \"Sign up Now\" on the right side of the page.     You will be asked to enter the access code listed below, as well as some personal information. Please follow the directions to create your username and password.     Your access " "code is: [email or web address]  Expires: 6/10/2018  6:19 PM     Your access code will  in 90 days. If you need help or a new code, please call your [place] clinic or [phone].        Care EveryWhere ID     This is your Care EveryWhere ID. This could be used by other organizations to access your [place] medical records  [id number]        Your Vitals Were     Pulse Temperature Respirations Height Pulse Oximetry Breastfeeding?    82 97.6  F (36.4  C) (Temporal) 16 5' 4\" (1.626 m) 100% No    BMI (Body Mass Index)                   38.45 kg/m2            Blood Pressure from Last 3 Encounters:   18 134/82   18 120/85   18 122/70    Weight from Last 3 Encounters:   18 224 lb (101.6 kg)   18 232 lb (105.2 kg)   18 232 lb (105.2 kg)              We Performed the Following     Comprehensive metabolic panel     Hemoglobin A1c          Today's Medication Changes          These changes are accurate as of 18  5:43 PM.  If you have any questions, ask your nurse or doctor.               Start taking these medicines.        Dose/Directions    clindamycin 300 MG capsule   Commonly known as:  CLEOCIN   Used for:  Paronychia of toe, right   Started by:  Greg Ford MD        Dose:  300 mg   Take 1 capsule (300 mg) by mouth 3 times daily   Quantity:  30 capsule   Refills:  0         These medicines have changed or have updated prescriptions.        Dose/Directions    metFORMIN 500 MG tablet   Commonly known as:  GLUCOPHAGE   This may have changed:  See the new instructions.   Used for:  Type 2 diabetes mellitus without complication, unspecified long term insulin use status (H), Gastroesophageal reflux disease without esophagitis   Changed by:  Greg Ford MD        TAKE ONE TABLET BY MOUTH EVERY DAY WITH DINNER   Quantity:  30 tablet   Refills:  11            Where to get your medicines      These medications were sent to [facility] [place], " [place] - [address]   [address] [place] [address]     Phone:  [phone]     clindamycin 300 MG capsule    metFORMIN 500 MG tablet                Primary Care Provider Office Phone # Fax #    Greg Fabian Ford [phone] [address] [place] [address]        Equal Access to Services     [facility]: Hadii aad ku hadasho Soomaali, waaxda luqadaha, qaybta kaalmada adeegyada, waxay idiin hayaan adeeg kharash la'aan ah. So [facility] [phone].    ATENCIÓN: Si habla español, tiene a pierre disposición servicios gratuitos de asistencia lingüística. Gunnerame al [phone].    We comply with applicable federal civil rights laws and [place] laws. We do not discriminate on the basis of race, color, national origin, age, disability, sex, sexual orientation, or gender identity.            Thank you!     Thank you for choosing [facility]  for your care. Our goal is always to provide you with excellent care. Hearing back from our patients is one way we can continue to improve our services. Please take a few minutes to complete the written survey that you may receive in the mail after your visit with us. Thank you!             Your Updated Medication List - Protect others around you: Learn how to safely use, store and throw away your medicines at www.disposemymeds.org.          This list is accurate as of 5/21/18  5:43 PM.  Always use your most recent med list.                   Brand Name Dispense Instructions for use Diagnosis    ADVIL PO      Take 600 mg by mouth every 6 hours as needed Reported on 5/15/2017        blood glucose monitoring lancets     1 Box    Use to check blood sugar 1-2 times daily    Diabetes mellitus (H)       blood glucose monitoring test strip    LILLIAN CONTOUR NEXT    100 strip    Use to test blood sugar 2 times daily or as directed.    Diabetes mellitus (H)       clindamycin 300 MG capsule    CLEOCIN    30 capsule    Take 1 capsule (300  mg) by mouth 3 times daily    Paronychia of toe, right       cyclobenzaprine 5 MG tablet    FLEXERIL    90 tablet    Take 1 tablet (5 mg) by mouth 3 times daily as needed for muscle spasms    Lumbar radiculopathy       HYDROcodone-acetaminophen 5-325 MG per tablet    NORCO    15 tablet    Take 1-2 tablets by mouth every 4 hours as needed for moderate to severe pain        metFORMIN 500 MG tablet    GLUCOPHAGE    30 tablet    TAKE ONE TABLET BY MOUTH EVERY DAY WITH DINNER    Type 2 diabetes mellitus without complication, unspecified long term insulin use status (H), Gastroesophageal reflux disease without esophagitis       MULTIPLE VITAMINS/WOMENS PO      Reported on 5/15/2017        * omeprazole 20 MG tablet     30 tablet    Take 1 tablet (20 mg) by mouth daily Take 30-60 minutes before a meal.    Abdominal pain, other specified site       * omeprazole 20 MG CR capsule    priLOSEC    30 capsule    TAKE 1 CAPSULE BY MOUTH DAILY, 30 TO 60 MINUTES BEFORE A MEAL.    Gastroesophageal reflux disease without esophagitis, Type 2 diabetes mellitus without complication, unspecified long term insulin use status (H)       * Notice:  This list has 2 medication(s) that are the same as other medications prescribed for you. Read the directions carefully, and ask your doctor or other care provider to review them with you.

## 2018-05-22 ENCOUNTER — TELEPHONE (OUTPATIENT)
Dept: FAMILY MEDICINE | Facility: CLINIC | Age: 38
End: 2018-05-22

## 2018-05-22 DIAGNOSIS — R94.5 ABNORMAL RESULTS OF LIVER FUNCTION STUDIES: Primary | ICD-10-CM

## 2018-05-22 NOTE — TELEPHONE ENCOUNTER
----- Message from Greg Ford MD sent at 5/22/2018 12:39 PM CDT -----  Lynn Please inform Fallon/ or caretaker  that this result(s) is/are showing a worse A1C as we expected- also the liver testing isnt quite normal but not bad- I would advise that we repeat the liver test in 1 month because she takes the metformin- please set up lab appt for 1 month to check ALT and AST-Thanks. SHARAN Ford MD

## 2018-05-22 NOTE — PROGRESS NOTES
Lynn Please inform Fallon/ or caretaker  that this result(s) is/are showing a worse A1C as we expected- also the liver testing isnt quite normal but not bad- I would advise that we repeat the liver test in 1 month because she takes the metformin- please set up lab appt for 1 month to check ALT and AST-Thanks. SHARAN Ford MD

## 2018-05-22 NOTE — TELEPHONE ENCOUNTER
"Requested Prescriptions   Pending Prescriptions Disp Refills     CONTOUR NEXT TEST test strip [Pharmacy Med Name: CONTOUR NEXT TEST  STRP] 100 each 3     Sig: USE TO TEST BLOOD SUGAR TWO TIMES A DAY OR AS DIRECTED    Diabetic Supplies Protocol Passed    5/21/2018  5:44 PM       Passed - Patient is 18 years of age or older       Passed - Recent (6 mo) or future (30 days) visit within the authorizing provider's specialty    Patient had office visit in the last 6 months or has a visit in the next 30 days with authorizing provider.  See \"Patient Info\" tab in inbasket, or \"Choose Columns\" in Meds & Orders section of the refill encounter.            Last Written Prescription Date:  1/23/17  Last Fill Quantity: 100,  # refills: 3   Last office visit: 7/5/2016 with prescribing provider:     Future Office Visit:      "

## 2018-05-22 NOTE — TELEPHONE ENCOUNTER
Patient informed and agreeable to plan. Future orders placed.  Lynn Burton, [facility]

## 2018-06-05 ENCOUNTER — HOSPITAL ENCOUNTER (EMERGENCY)
Facility: CLINIC | Age: 38
Discharge: HOME | End: 2018-06-05
Attending: PHYSICIAN ASSISTANT | Admitting: PHYSICIAN ASSISTANT
Payer: COMMERCIAL

## 2018-06-05 VITALS
WEIGHT: 224 LBS | TEMPERATURE: 98.7 F | OXYGEN SATURATION: 94 % | BODY MASS INDEX: 38.24 KG/M2 | SYSTOLIC BLOOD PRESSURE: 135 MMHG | DIASTOLIC BLOOD PRESSURE: 87 MMHG | HEIGHT: 64 IN | HEART RATE: 109 BPM | RESPIRATION RATE: 16 BRPM

## 2018-06-05 DIAGNOSIS — M51.26 LUMBAR DISC HERNIATION: ICD-10-CM

## 2018-06-05 PROCEDURE — 25000128 H RX IP 250 OP 636: Performed by: PHYSICIAN ASSISTANT

## 2018-06-05 PROCEDURE — 96374 THER/PROPH/DIAG INJ IV PUSH: CPT

## 2018-06-05 PROCEDURE — 99285 EMERGENCY DEPT VISIT HI MDM: CPT | Mod: Z6 | Performed by: PHYSICIAN ASSISTANT

## 2018-06-05 PROCEDURE — 96376 TX/PRO/DX INJ SAME DRUG ADON: CPT

## 2018-06-05 PROCEDURE — 25000132 ZZH RX MED GY IP 250 OP 250 PS 637: Performed by: PHYSICIAN ASSISTANT

## 2018-06-05 PROCEDURE — 99285 EMERGENCY DEPT VISIT HI MDM: CPT

## 2018-06-05 PROCEDURE — 96375 TX/PRO/DX INJ NEW DRUG ADDON: CPT

## 2018-06-05 RX ORDER — METHYLPREDNISOLONE 4 MG
TABLET, DOSE PACK ORAL
Qty: 21 TABLET | Refills: 0 | Status: SHIPPED | OUTPATIENT
Start: 2018-06-05 | End: 2018-07-20

## 2018-06-05 RX ORDER — LORAZEPAM 2 MG/ML
0.5 INJECTION INTRAMUSCULAR ONCE
Status: COMPLETED | OUTPATIENT
Start: 2018-06-05 | End: 2018-06-05

## 2018-06-05 RX ORDER — OXYCODONE AND ACETAMINOPHEN 5; 325 MG/1; MG/1
1-2 TABLET ORAL EVERY 6 HOURS PRN
Qty: 12 TABLET | Refills: 0 | Status: SHIPPED | OUTPATIENT
Start: 2018-06-05 | End: 2018-09-08

## 2018-06-05 RX ORDER — HYDROMORPHONE HYDROCHLORIDE 1 MG/ML
0.5 INJECTION, SOLUTION INTRAMUSCULAR; INTRAVENOUS; SUBCUTANEOUS
Status: DISCONTINUED | OUTPATIENT
Start: 2018-06-05 | End: 2018-06-06 | Stop reason: HOSPADM

## 2018-06-05 RX ORDER — LIDOCAINE 4 G/G
1 PATCH TOPICAL ONCE
Status: DISCONTINUED | OUTPATIENT
Start: 2018-06-05 | End: 2018-06-06 | Stop reason: HOSPADM

## 2018-06-05 RX ADMIN — LIDOCAINE 1 PATCH: 560 PATCH PERCUTANEOUS; TOPICAL; TRANSDERMAL at 20:44

## 2018-06-05 RX ADMIN — HYDROMORPHONE HYDROCHLORIDE 0.5 MG: 1 INJECTION, SOLUTION INTRAMUSCULAR; INTRAVENOUS; SUBCUTANEOUS at 21:04

## 2018-06-05 RX ADMIN — HYDROMORPHONE HYDROCHLORIDE 0.5 MG: 1 INJECTION, SOLUTION INTRAMUSCULAR; INTRAVENOUS; SUBCUTANEOUS at 20:32

## 2018-06-05 RX ADMIN — LORAZEPAM 0.5 MG: 2 INJECTION INTRAMUSCULAR; INTRAVENOUS at 20:33

## 2018-06-05 NOTE — ED AVS SNAPSHOT
[facility] Emergency Department    [address] [place] [address]    Phone:  [phone]    Fax:  [phone]                                       Fallon Rivera   MRN: [id number]    Department:  [facility] Emergency Department   Date of Visit:  6/5/2018           After Visit Summary Signature Page     I have received my discharge instructions, and my questions have been answered. I have discussed any challenges I see with this plan with the nurse or doctor.    ..........................................................................................................................................  Patient/Patient Representative Signature      ..........................................................................................................................................  Patient Representative Print Name and Relationship to Patient    ..................................................               ................................................  Date                                            Time    ..........................................................................................................................................  Reviewed by Signature/Title    ...................................................              ..............................................  Date                                                            Time

## 2018-06-05 NOTE — ED AVS SNAPSHOT
[facility] Emergency Department    [address] [place] [address]    Phone:  [phone]    Fax:  [phone]                                       Fallon Rivera   MRN: [id number]    Department:  [facility] Emergency Department   Date of Visit:  6/5/2018           Patient Information     Date Of Birth          1980        Your diagnoses for this visit were:     Lumbar disc herniation        You were seen by Freddy Dewitt PA-C.      Follow-up Information     Follow up with [facility] Emergency Department.    Specialty:  EMERGENCY MEDICINE    Why:  As needed, If symptoms worsen    Contact information:    [address]   [place] [address] [phone]    Additional information:    From [address]: Exit at [employer] on south side of [place]. Turn right on [address]. Turn left at stoplight on [address]. [facility] will be in view two blocks ahead        Discharge Instructions       It was a pleasure working with you today!  I hope your condition improves rapidly!     Please continue your home physical therapy exercises. It is okay to resume chiropractic care as well.    You can take ibuprofen 800 mg every 8 hours as needed for pain. If this does not work, then you can use the Percocet as needed for breakthrough pain. Try to use only 5 mg of the Flexeril as needed for muscle spasm, to avoid too much sedation when combined with Percocet. If the lidocaine patches work well, you can purchase these over-the-counter.    Please schedule a follow-up appointment with your neurosurgeon if you do not rebound well from this recent exacerbation.    [facility] [place] [facility] Scheduling Hotline     To schedule an appointment at [facility], please call [phone]. If you don't have a family doctor or clinic, we will help you find one. [facility] clinics are conveniently located to serve the needs of you and your family.           Review of your  medicines      START taking        Dose / Directions Last dose taken    methylPREDNISolone 4 MG tablet   Commonly known as:  MEDROL DOSEPAK   Quantity:  21 tablet        Follow package directions.   Refills:  0        oxyCODONE-acetaminophen 5-325 MG per tablet   Commonly known as:  PERCOCET   Dose:  1-2 tablet   Quantity:  12 tablet        Take 1-2 tablets by mouth every 6 hours as needed for pain   Refills:  0          Our records show that you are taking the medicines listed below. If these are incorrect, please call your family doctor or clinic.        Dose / Directions Last dose taken    ADVIL PO   Dose:  600 mg        Take 600 mg by mouth every 6 hours as needed Reported on 5/15/2017   Refills:  0        blood glucose monitoring lancets   Quantity:  1 Box        Use to check blood sugar 1-2 times daily   Refills:  12        CONTOUR NEXT TEST test strip   Quantity:  100 each   Generic drug:  blood glucose monitoring        USE TO TEST BLOOD SUGAR TWO TIMES A DAY OR AS DIRECTED   Refills:  3        cyclobenzaprine 5 MG tablet   Commonly known as:  FLEXERIL   Dose:  5 mg   Quantity:  90 tablet        Take 1 tablet (5 mg) by mouth 3 times daily as needed for muscle spasms   Refills:  0        HYDROcodone-acetaminophen 5-325 MG per tablet   Commonly known as:  NORCO   Dose:  1-2 tablet   Quantity:  15 tablet        Take 1-2 tablets by mouth every 4 hours as needed for moderate to severe pain   Refills:  0        metFORMIN 500 MG tablet   Commonly known as:  GLUCOPHAGE   Quantity:  30 tablet        TAKE ONE TABLET BY MOUTH EVERY DAY WITH DINNER   Refills:  11        MULTIPLE VITAMINS/WOMENS PO        Reported on 5/15/2017   Refills:  0        * omeprazole 20 MG tablet   Dose:  20 mg   Quantity:  30 tablet        Take 1 tablet (20 mg) by mouth daily Take 30-60 minutes before a meal.   Refills:  9        * omeprazole 20 MG CR capsule   Commonly known as:  priLOSEC   Quantity:  30 capsule        TAKE 1 CAPSULE BY MOUTH  DAILY, 30 TO 60 MINUTES BEFORE A MEAL.   Refills:  5        * Notice:  This list has 2 medication(s) that are the same as other medications prescribed for you. Read the directions carefully, and ask your doctor or other care provider to review them with you.            Information about OPIOIDS     PRESCRIPTION OPIOIDS: WHAT YOU NEED TO KNOW   You have a prescription for an opioid (narcotic) pain medicine. Opioids can cause addiction. If you have a history of chemical dependency of any type, you are at a higher risk of becoming addicted to opioids. Only take this medicine after all other options have been tried. Take it for as short a time and as few doses as possible.     Do not:    Drive. If you drive while taking these medicines, you could be arrested for driving under the influence (DUI).    Operate heavy machinery    Do any other dangerous activities while taking these medicines.     Drink any alcohol while taking these medicines.      Take with any other medicines that contain acetaminophen. Read all labels carefully. Look for the word  acetaminophen  or  Tylenol.  Ask your pharmacist if you have questions or are unsure.    Store your pills in a secure place, locked if possible. We will not replace any lost or stolen medicine. If you don t finish your medicine, please throw away (dispose) as directed by your pharmacist. The [place] Pollution Control Agency has more information about safe disposal: https://www.pca.[facility].[place]/living-green/managing-unwanted-medications    All opioids tend to cause constipation. Drink plenty of water and eat foods that have a lot of fiber, such as fruits, vegetables, prune juice, apple juice and high-fiber cereal. Take a laxative (Miralax, milk of magnesia, Colace, Senna) if you don t move your bowels at least every other day.         Prescriptions were sent or printed at these locations (2 Prescriptions)                   [facility] - [place], [place] - [address]  "[address] [address]    Telephone:  [phone]   Fax:  [phone]   Hours:                  Printed at Department/Unit printer (2 of 2)         methylPREDNISolone (MEDROL DOSEPAK) 4 MG tablet               oxyCODONE-acetaminophen (PERCOCET) 5-325 MG per tablet                Procedures and tests performed during your visit     Peripheral IV catheter      Orders Needing Specimen Collection     None      Pending Results     No orders found from 6/3/2018 to 6/6/2018.            Pending Culture Results     No orders found from 6/3/2018 to 6/6/2018.            Pending Results Instructions     If you had any lab results that were not finalized at the time of your Discharge, you can call the ED Lab Result RN at [phone]. You will be contacted by this team for any positive Lab results or changes in treatment. The nurses are available 7 days a week from 10A to 6:30P.  You can leave a message 24 hours per day and they will return your call.        Thank you for choosing [facility]       Thank you for choosing [facility] for your care. Our goal is always to provide you with excellent care. Hearing back from our patients is one way we can continue to improve our services. Please take a few minutes to complete the written survey that you may receive in the mail after you visit with us. Thank you!        [employer] Information     [employer] lets you send messages to your doctor, view your test results, renew your prescriptions, schedule appointments and more. To sign up, go to [email or web address] . Click on \"Log in\" on the left side of the screen, which will take you to the Welcome page. Then click on \"Sign up Now\" on the right side of the page.     You will be asked to enter the access code listed below, as well as some personal information. Please follow the directions to create your username and password.     Your access code is: [email or web address]  Expires: 6/10/2018  6:19 PM     Your access " code will  in 90 days. If you need help or a new code, please call your [facility] clinic or [phone].        Care EveryWhere ID     This is your Care EveryWhere ID. This could be used by other organizations to access your [facility] medical records  [id number]        Equal Access to Services     MICHAEL BALDWIN : Margarita Gaming, waaxda luqadaha, qaybta kaalmada kendall, jeannie mcmahon. So [facility] [phone].    ATENCIÓN: Si habla español, tiene a pierre disposición servicios gratuitos de asistencia lingüística. Llame al [phone].    We comply with applicable federal civil rights laws and [place] laws. We do not discriminate on the basis of race, color, national origin, age, disability, sex, sexual orientation, or gender identity.            After Visit Summary       This is your record. Keep this with you and show to your community pharmacist(s) and doctor(s) at your next visit.

## 2018-06-05 NOTE — ED AVS SNAPSHOT
[facility] Emergency Department    [address] [place] [address]    Phone:  [phone]    Fax:  [phone]                                       Fallon Rivera   MRN: [id number]    Department:  [facility] Emergency Department   Date of Visit:  6/5/2018           Patient Information     Date Of Birth          1980        Your diagnoses for this visit were:     Lumbar disc herniation        You were seen by Freddy Dewitt PA-C.      Follow-up Information     Follow up with [facility] Emergency Department.    Specialty:  EMERGENCY MEDICINE    Why:  As needed, If symptoms worsen    Contact information:    [address]   [place] [address] [phone]    Additional information:    From [address]: Exit at [employer] on south side of [place]. Turn right on [address]. Turn left at stoplight on [address]. [facility] will be in view two blocks ahead        Discharge Instructions       It was a pleasure working with you today!  I hope your condition improves rapidly!     Please continue your home physical therapy exercises. It is okay to resume chiropractic care as well.    You can take ibuprofen 800 mg every 8 hours as needed for pain. If this does not work, then you can use the Percocet as needed for breakthrough pain. Try to use only 5 mg of the Flexeril as needed for muscle spasm, to avoid too much sedation when combined with Percocet. If the lidocaine patches work well, you can purchase these over-the-counter.    Please schedule a follow-up appointment with your neurosurgeon if you do not rebound well from this recent exacerbation.    24 Hour Appointment Hotline       To make an appointment at any [facility], call [phone] ([phone]). If you don't have a family doctor or clinic, we will help you find one. [facility] clinics are conveniently located to serve the needs of you and your family.              Review of your medicines      START taking        Dose / Directions Last dose taken    methylPREDNISolone 4 MG tablet   Commonly known as:  MEDROL DOSEPAK   Quantity:  21 tablet        Follow package directions.   Refills:  0        oxyCODONE-acetaminophen 5-325 MG per tablet   Commonly known as:  PERCOCET   Dose:  1-2 tablet   Quantity:  12 tablet        Take 1-2 tablets by mouth every 6 hours as needed for pain   Refills:  0          Our records show that you are taking the medicines listed below. If these are incorrect, please call your family doctor or clinic.        Dose / Directions Last dose taken    ADVIL PO   Dose:  600 mg        Take 600 mg by mouth every 6 hours as needed Reported on 5/15/2017   Refills:  0        blood glucose monitoring lancets   Quantity:  1 Box        Use to check blood sugar 1-2 times daily   Refills:  12        CONTOUR NEXT TEST test strip   Quantity:  100 each   Generic drug:  blood glucose monitoring        USE TO TEST BLOOD SUGAR TWO TIMES A DAY OR AS DIRECTED   Refills:  3        cyclobenzaprine 5 MG tablet   Commonly known as:  FLEXERIL   Dose:  5 mg   Quantity:  90 tablet        Take 1 tablet (5 mg) by mouth 3 times daily as needed for muscle spasms   Refills:  0        HYDROcodone-acetaminophen 5-325 MG per tablet   Commonly known as:  NORCO   Dose:  1-2 tablet   Quantity:  15 tablet        Take 1-2 tablets by mouth every 4 hours as needed for moderate to severe pain   Refills:  0        metFORMIN 500 MG tablet   Commonly known as:  GLUCOPHAGE   Quantity:  30 tablet        TAKE ONE TABLET BY MOUTH EVERY DAY WITH DINNER   Refills:  11        MULTIPLE VITAMINS/WOMENS PO        Reported on 5/15/2017   Refills:  0        * omeprazole 20 MG tablet   Dose:  20 mg   Quantity:  30 tablet        Take 1 tablet (20 mg) by mouth daily Take 30-60 minutes before a meal.   Refills:  9        * omeprazole 20 MG CR capsule   Commonly known as:  priLOSEC   Quantity:  30 capsule        TAKE 1  CAPSULE BY MOUTH DAILY, 30 TO 60 MINUTES BEFORE A MEAL.   Refills:  5        * Notice:  This list has 2 medication(s) that are the same as other medications prescribed for you. Read the directions carefully, and ask your doctor or other care provider to review them with you.            Information about OPIOIDS     PRESCRIPTION OPIOIDS: WHAT YOU NEED TO KNOW   You have a prescription for an opioid (narcotic) pain medicine. Opioids can cause addiction. If you have a history of chemical dependency of any type, you are at a higher risk of becoming addicted to opioids. Only take this medicine after all other options have been tried. Take it for as short a time and as few doses as possible.     Do not:    Drive. If you drive while taking these medicines, you could be arrested for driving under the influence (DUI).    Operate heavy machinery    Do any other dangerous activities while taking these medicines.     Drink any alcohol while taking these medicines.      Take with any other medicines that contain acetaminophen. Read all labels carefully. Look for the word  acetaminophen  or  Tylenol.  Ask your pharmacist if you have questions or are unsure.    Store your pills in a secure place, locked if possible. We will not replace any lost or stolen medicine. If you don t finish your medicine, please throw away (dispose) as directed by your pharmacist. The [place] Pollution Control Agency has more information about safe disposal: https://www.pca.[facility].[place]/living-green/managing-unwanted-medications    All opioids tend to cause constipation. Drink plenty of water and eat foods that have a lot of fiber, such as fruits, vegetables, prune juice, apple juice and high-fiber cereal. Take a laxative (Miralax, milk of magnesia, Colace, Senna) if you don t move your bowels at least every other day.         Prescriptions were sent or printed at these locations (2 Prescriptions)                   [facility] -  "[address]   [address] [place] [address]    Telephone:  [phone]   Fax:  [phone]   Hours:                  Printed at Department/Unit printer (2 of 2)         methylPREDNISolone (MEDROL DOSEPAK) 4 MG tablet               oxyCODONE-acetaminophen (PERCOCET) 5-325 MG per tablet                Procedures and tests performed during your visit     Peripheral IV catheter      Orders Needing Specimen Collection     None      Pending Results     No orders found from 6/3/2018 to 6/6/2018.            Pending Culture Results     No orders found from 6/3/2018 to 6/6/2018.            Pending Results Instructions     If you had any lab results that were not finalized at the time of your Discharge, you can call the ED Lab Result RN at [phone]. You will be contacted by this team for any positive Lab results or changes in treatment. The nurses are available 7 days a week from 10A to 6:30P.  You can leave a message 24 hours per day and they will return your call.        Thank you for choosing [facility]       Thank you for choosing [facility] for your care. Our goal is always to provide you with excellent care. Hearing back from our patients is one way we can continue to improve our services. Please take a few minutes to complete the written survey that you may receive in the mail after you visit with us. Thank you!        [employer] Information     [employer] lets you send messages to your doctor, view your test results, renew your prescriptions, schedule appointments and more. To sign up, go to [email or web address] . Click on \"Log in\" on the left side of the screen, which will take you to the Welcome page. Then click on \"Sign up Now\" on the right side of the page.     You will be asked to enter the access code listed below, as well as some personal information. Please follow the directions to create your username and password.     Your access code is: [email or web address]  Expires: 6/10/2018  6:19 " PM     Your access code will  in 90 days. If you need help or a new code, please call your [facility] clinic or [phone].        Care EveryWhere ID     This is your Care EveryWhere ID. This could be used by other organizations to access your [facility] medical records  [id number]        Equal Access to Services     MICHAEL BALDWIN : Margarita Gaming, waaxda luqsuresh, qaybta kaalvida argueta, jeannie mcmahon. So [facility] [phone].    ATENCIÓN: Si habla español, tiene a pierre disposición servicios gratuitos de asistencia lingüística. Llame al [phone].    We comply with applicable federal civil rights laws and [place] laws. We do not discriminate on the basis of race, color, national origin, age, disability, sex, sexual orientation, or gender identity.            After Visit Summary       This is your record. Keep this with you and show to your community pharmacist(s) and doctor(s) at your next visit.

## 2018-06-06 NOTE — ED PROVIDER NOTES
History     Chief Complaint   Patient presents with     Back Pain     HPI  Fallon Rivera is a 38 year old female who presents for evaluation of a low back pain flare. She has no one to L5/S1 lumbar disc herniation and did see neurosurgery on or/5/18 for this. She opted for conservative management at that time and was under the care of a chiropractor with good results. She was down to once monthly maintenance visits and was functioning well without significant back pain. Symptoms worsened yesterday. She denies any injury. No falls. She started to feel stiff in her back yesterday. Symptoms worsened throughout the day today where she had severe low back pain with radiation down the left lower extremity extending into the plantar aspect of the foot, and also the third through fifth toes. Associated numbness and tingling. Pain rated 10 on a scale of 10 despite taking Lexapro, hydrocodone, and ibuprofen. Denies any loss of bowel/bladder function. No fever, chills, unexplained weight loss. No rashes.      Excerpt from her neurosurgery consultation on 4/5/18:  A/P:  37F w/ left leg pain, L5-S1 disk herniation     I had a discussion with the patient, reviewing the history, symptoms, and imaging  She is going to start working with a chiropractor  We also offered the option of PT, but she preferred to work with a chiropractor  If symptoms persist she will call and we will schedule ARMANDO    Dr. Roe - Neurosurgery.              MRI LUMBAR SPINE WITHOUT CONTRAST   3/27/2018 3:50 PM      HISTORY: Lumbar radiculopathy.     TECHNIQUE: Multiplanar multisequence MRI of the lumbar spine without  contrast.     COMPARISON: X-ray from 2/15/2011. MRI from 4/15/2008.     FINDINGS: The report is dictated assuming five lumbar-type vertebral  bodies. Sagittal images demonstrate normal vertebral body height. Bone  marrow signal is unremarkable. Tip of the conus medullaris and cauda  equina are unremarkable.      T11-T12: No disc  herniation or stenosis. Facet joints are  unremarkable.     T12-L1: No disc herniation or stenosis. Facet joints are unremarkable.     L1-L2: No disc herniation or stenosis. Facet joints are unremarkable.         L2-L3: No disc herniation or stenosis. Facet joints are unremarkable.     L3-L4: Mild disc dehydration and disc bulge. No stenosis. Mild facet  degenerative changes.     L4-L5: Mild facet degenerative changes. Degenerative disc disease with  loss of disc space height and mild disc bulge and osteophytic ridging.  No central stenosis. Mild inferior foraminal narrowing bilaterally.     L5-S1: There is a new large left central disc extrusion which extends  inferior from the disc level approximately 0.9 cm. This results in  posterior displacement and probable compression of the descending left  S1 nerve root. There is also moderate to severe left foraminal  stenosis. Mild inferior foraminal narrowing on the right as result of  broad-based disc bulge. Moderate facet degenerative changes.     Paraspinous soft tissues: Rounded area of increased signal intensity  in the right posterior iliac bone is likely a focus of fatty marrow.         IMPRESSION:    1. At L5-S1, there is a new large left central disc extrusion which  extends inferior from the disc level approximately 0.9 cm with  posterior displacement and probable compression of the descending left  S1 nerve root. Moderate to severe left foraminal stenosis. Mild  inferior foraminal narrowing on the right.  2. At L4-L5, there is mild inferior foraminal narrowing bilaterally.  3. At L3-L4, there is mild degenerative disc disease, but no stenosis.  See above for details at each level.     TIMO GALLOWAY MD        Problem List:    Patient Active Problem List    Diagnosis Date Noted     Non morbid obesity due to excess calories 07/05/2016     Priority: Medium     Type 2 diabetes mellitus without complication (H) 04/29/2016     Priority: Medium     Glucosuria  04/27/2016     Priority: Medium     Right ovarian cyst 09/15/2015     Priority: Medium     Hyperlipidemia LDL goal <130 07/18/2012     Priority: Medium     Mild major depression (H) 07/18/2012     Priority: Medium     GERD (gastroesophageal reflux disease) 07/03/2012     Priority: Medium     CARDIOVASCULAR SCREENING; LDL GOAL LESS THAN 160 10/31/2010     Priority: Medium     Dyspnea 11/24/2009     Priority: Medium     Kidney stones 02/17/2009     Priority: Medium     S/P conization of cervix- KAYLA 2/3 in 2011 06/27/2007     Priority: Medium     1/24/06 pap: ASC-H  2/9/06 colposcopy/bx (negative)/ECC (negative) pap- ASC-H  5/24/06 pap- ASC-H  9/6/07 pap NIL  1/23/07 pap ASCUS + HPV 45 (high risk)  3/15/07 pap ASCUS + HPV 45 (high risk) colposcopy- bx (negative)/ECC (koilocytosis)  6/19/07 ECC- koilocytosis.  Pap- AGS  10/23/07 pap- ASC-H, ECC- negative  2/14/08 pap NIL- return to yearly paps  2/17/09 pap NIL- repeat 1 year  3/8/10 pap NIL  3/3/11 pap ASCUS + HPV 45 (high risk)- colposcopy recommended  5/16/11 colpo- bx (KAYLA 2/3/HSIL) ECC (ASCUS)  pap (ASC-H)  6/2/11 recommend: [facility]  7/1/11 [facility]: path: KAYLA 2/3 with possible involvement of the endocervical margin.  ECC- neg.  Endometrium- neg.  7/14/11 plan: HIPOLITO in approx 2 months  10/10/11- hysterectomy planned.  (cancelled)  11/7/11 pap-- NIL. Plan--repeat pap in 6 months. (5/7/12)  5/7/12 pap NIL. Plan-- pap in 1 year (due 5/7/13)   5/8/13 pap NIL. Plan-pap in 1 year  5/8/14 NIL pap, neg HR HPV.  Plan- repeat pap/HPV in 1 year (due 5/8/15)  10/14/14 NIL pap, neg HR HPV. Endometrial biopsy- WNL   1/6/16  NIL pap, neg HR HPV.  Plan: paps yearly, per Dr. Ford.  1/30/17 NIL pap/neg HR HPV. Plan: pap in 1 year.   3/12/18 NIL pap, neg HR HPV. Plan: cotest annually, per Dr. Ford.         Papanicolaou smear of cervix with atypical squamous cells cannot exclude high grade squamous intraepithelial lesion (ASC-H) 09/07/2006     Priority: Medium     Female infertility  07/24/2006     Priority: Medium     Problem list name updated by automated process. Provider to review       Hemorrhage of rectum and anus 12/02/2004     Priority: Medium        Past Medical History:    Past Medical History:   Diagnosis Date     Abnormal Pap smear 2006, 2007,      Calculus of kidney 2002     Cervical high risk HPV (human papillomavirus) test positive      History of colposcopy with cervical biopsy 2/9/06, 3/15/07       Past Surgical History:    Past Surgical History:   Procedure Laterality Date     C REMOVAL OF KIDNEY STONE      two surgeries, 2002,2003     CHOLECYSTECTOMY, LAPOROSCOPIC  5/11/2007    Cholecystectomy, Laparoscopic     COLONOSCOPY  05/17/10     CONIZATION  7/1/2011    Procedure:CONIZATION; cold knife and punch biopsy of mole on back; Surgeon:GREG FORD; Location:[facility]     DILATION AND CURETTAGE, HYSTEROSCOPY, LAPAROSCOPY, COMBINED Right 9/24/2015    Procedure: COMBINED DILATION AND CURETTAGE, HYSTEROSCOPY, LAPAROSCOPY;  Surgeon: Greg Ford MD;  Location: [facility]     ESOPHAGOSCOPY, GASTROSCOPY, DUODENOSCOPY (EGD), COMBINED  5/21/2014    Procedure: COMBINED ESOPHAGOSCOPY, GASTROSCOPY, DUODENOSCOPY (EGD), BIOPSY SINGLE OR MULTIPLE;  Surgeon: Earl Lane MD;  Location: [facility]     EXCISE LESION TRUNK  7/1/2011    Procedure:EXCISE LESION TRUNK; Surgeon:GREG FORD; Location:[facility]     HC FRAGMENTING OF KIDNEY STONE  11/30/2005     HC RX ECTOP PREG BY SCOPE,RMV TUBE/OVRY  12/20/2007    Right sided salpingectomy and removal of ruptured ectopic pregnancy. D&C.     HC UGI ENDOSCOPY, SIMPLE EXAM  09/01/09     LAPAROSCOPIC APPENDECTOMY N/A 9/24/2015    Procedure: LAPAROSCOPIC APPENDECTOMY;  Surgeon: James Cuellar MD;  Location: [facility]     LAPAROSCOPIC CYSTECTOMY OVARIAN (BENIGN) N/A 9/24/2015    Procedure: LAPAROSCOPIC CYSTECTOMY OVARIAN (BENIGN);  Surgeon: Greg Ford MD;  [facility]     LAPAROSCOPIC OOPHORECTOMY  Right 9/24/2015    Procedure: LAPAROSCOPIC OOPHORECTOMY;  Surgeon: Greg Ford MD;  Location: [facility]     LITHOTRIPSY  01/17/2007     PELVIS LAPAROSCOPY,DX  10/16/2000    Diagnostic laparoscopy, Endometrial biopsy.     TUBAL/ECTOPIC PREGNANCY  01/23/2007    Lap removal of left ruptured ectopic pregnancy & salpingectomy, D&C       Family History:    Family History   Problem Relation Age of Onset     DIABETES Maternal Grandmother      adult onset     Anesthesia Reaction Maternal Grandmother      can't tolerate Novacaine.     Respiratory Maternal Grandmother      COPD and emphysema.     Thyroid Disease Maternal Grandmother      HEART DISEASE Maternal Grandmother      CHF     DIABETES Paternal Grandfather      adult o nset     Hypertension Paternal Grandfather      CEREBROVASCULAR DISEASE Paternal Grandfather      HEART DISEASE Paternal Grandfather      MI, replaced valve,angioplasty     Thyroid Disease Paternal Grandfather      CANCER Maternal Grandfather      skin cancer     HEART DISEASE Maternal Grandfather      MI     Obesity Mother      on thyroid medication     Thyroid Disease Mother      DIABETES Mother      HEART DISEASE Father      Hypertension Father      and TIA     Lipids Father      Breast Cancer Paternal Grandmother      Arrhythmia Other      CANCER Maternal Aunt      breast/brain cancer     Depression Paternal Aunt      CEREBROVASCULAR DISEASE Paternal Uncle      CEREBROVASCULAR DISEASE Paternal Aunt        Social History:  Marital Status:   [2]  Social History   Substance Use Topics     Smoking status: Current Some Day Smoker     Years: 12.00     Types: Cigarettes     Last attempt to quit: 7/14/2009     Smokeless tobacco: Never Used      Comment: As of 10 /2014, pt has had a few cigs here and there     Alcohol use 0.0 oz/week     0 Standard drinks or equivalent per week      Comment: every few months        Medications:      cyclobenzaprine (FLEXERIL) 5 MG tablet  "  HYDROcodone-acetaminophen (NORCO) 5-325 MG per tablet   Ibuprofen (ADVIL PO)   metFORMIN (GLUCOPHAGE) 500 MG tablet   methylPREDNISolone (MEDROL DOSEPAK) 4 MG tablet   Multiple Vitamins-Minerals (MULTIPLE VITAMINS/WOMENS PO)   omeprazole (PRILOSEC) 20 MG CR capsule   oxyCODONE-acetaminophen (PERCOCET) 5-325 MG per tablet   blood glucose monitoring (LILLIAN MICROLET) lancets   CONTOUR NEXT TEST test strip   omeprazole 20 MG tablet         Review of Systems   All other systems reviewed and are negative.      Physical Exam   BP: (!) 147/95  Pulse: 109  Heart Rate: 100  Temp: 97.6  F (36.4  C)  Resp: 20  Height: 162.6 cm (5' 4\")  Weight: 101.6 kg (224 lb)  SpO2: 100 %      Physical Exam   Nursing note and vitals reviewed.   laying supine in the exam room. Appears in distress secondary to her pain.  Skin:  No rashes or lesions are noted on inspection of the torso, face, and upper extremities.   Heart:  RRR with normal S1 and S2.  No S3 or S4.  No murmur, rub, gallop, or click.  PMI is nondisplaced.   Lungs:  CTA bilaterally without wheezes, rales, or rhonchi.  Good breath sounds heard throughout all lung fields.  Tympanitic to percussion with no areas of dullness.   Abdomen: Positive bowel sounds in all four quadrants.  No tenderness to palpation throughout.  No rebound and no guarding.  No hepatosplenomegaly.  No masses.   Lumbosacral spine area reveals no mass but there is tenderness of the paravertebral muscles.  Tender to palpation over the spinous processes of L4 through S1. Limited and painful lumbosacral range of motion is noted. Straight leg raise is positive at 20 degrees on the left. DTR's, motor strength and sensation normal. Peripheral pulses are palpable. Hips and knees have full range of motion without pain.         ED Course     ED Course     Procedures               Critical Care time:  none               No results found for this or any previous visit (from the past 24 hour(s)).    Medications "   HYDROmorphone (PF) (DILAUDID) injection 0.5 mg (0.5 mg Intravenous Given 6/5/18 2104)   Lidocaine (LIDOCARE) 4 % Patch 1 patch (1 patch Transdermal Given 6/5/18 2044)     And   lidocaine patch REMOVAL (not administered)     And   lidocaine patch in PLACE (1 each Transdermal Given 6/5/18 2044)   LORazepam (ATIVAN) injection 0.5 mg (0.5 mg Intravenous Given 6/5/18 2033)       Assessments & Plan (with Medical Decision Making)  Lumbar disc herniation     38 year old female with a prior history of L5/S1 disc herniation who presents for an exacerbation of her symptoms over the last 1.5 days. She had been doing quite well under the care of a chiropractor, but symptoms worse despite no injury. Increased pain in the low back with radiation into the left lower extremity associated with numbness and tingling. No red flag symptoms. No improvement with hydrocodone, ibuprofen, and Flexeril at home. Pain rated 10 on a scale of 10. On exam she is afebrile with temperature 97.6. Significant tenderness in the L4 to S1 area with paravertebral muscular spasm. Straight leg raising test positive on the left. Reflexes and sensation intact. Patient appears to have a flare of her underlying L5/S1 disc herniation. IV was established and she was given IV Dilaudid and IV Ativan with success. She felt much improved. Pain went down to 4 on a scale of 10. She was able to move her left lower extremity easily. Discussed home treatment to include a Medrol Dosepak to treat the acute inflammation. Possible side effects discussed. Monitor carbohydrate intake very closely to help prevent significant hyperglycemia. Monitor blood sugars closely. Percocet use as needed for breakthrough pain, #12 tablets given. Flexeril as needed for muscle spasm. Lidocaine patches to be used as needed. Return visit to her chiropractor, as this worked quite well for her past. Restart home physical therapy exercises. Patient was in agreement with this plan and was suitable  for discharge. Her  is present to drive her home.      I have reviewed the nursing notes.    I have reviewed the findings, diagnosis, plan and need for follow up with the patient.       Discharge Medication List as of 6/5/2018 10:08 PM      START taking these medications    Details   methylPREDNISolone (MEDROL DOSEPAK) 4 MG tablet Follow package directions., Disp-21 tablet, R-0, Local Print      oxyCODONE-acetaminophen (PERCOCET) 5-325 MG per tablet Take 1-2 tablets by mouth every 6 hours as needed for pain, Disp-12 tablet, R-0, Local Print             Final diagnoses:   Lumbar disc herniation       Disclaimer: This note consists of symbols derived from keyboarding, dictation and/or voice recognition software. As a result, there may be errors in the script that have gone undetected. Please consider this when interpreting information found in this chart.    6/5/2018   Freddy Dewitt PA-C   [facility] EMERGENCY DEPARTMENT     Freddy Dewitt PA-C  06/05/18 [id number]

## 2018-06-06 NOTE — DISCHARGE INSTRUCTIONS
It was a pleasure working with you today!  I hope your condition improves rapidly!     Please continue your home physical therapy exercises. It is okay to resume chiropractic care as well.    You can take ibuprofen 800 mg every 8 hours as needed for pain. If this does not work, then you can use the Percocet as needed for breakthrough pain. Try to use only 5 mg of the Flexeril as needed for muscle spasm, to avoid too much sedation when combined with Percocet. If the lidocaine patches work well, you can purchase these over-the-counter.    Please schedule a follow-up appointment with your neurosurgeon if you do not rebound well from this recent exacerbation.

## 2018-07-05 DIAGNOSIS — R94.5 ABNORMAL RESULTS OF LIVER FUNCTION STUDIES: ICD-10-CM

## 2018-07-05 DIAGNOSIS — K21.9 GASTROESOPHAGEAL REFLUX DISEASE WITHOUT ESOPHAGITIS: ICD-10-CM

## 2018-07-05 DIAGNOSIS — E11.9 TYPE 2 DIABETES MELLITUS WITHOUT COMPLICATION, UNSPECIFIED LONG TERM INSULIN USE STATUS: ICD-10-CM

## 2018-07-05 LAB
ALT SERPL W P-5'-P-CCNC: 67 U/L (ref 0–50)
AST SERPL W P-5'-P-CCNC: 31 U/L (ref 0–45)
CREAT UR-MCNC: 176 MG/DL
MICROALBUMIN UR-MCNC: 20 MG/L
MICROALBUMIN/CREAT UR: 11.19 MG/G CR (ref 0–25)

## 2018-07-05 PROCEDURE — 84450 TRANSFERASE (AST) (SGOT): CPT | Performed by: OBSTETRICS & GYNECOLOGY

## 2018-07-05 PROCEDURE — 84460 ALANINE AMINO (ALT) (SGPT): CPT | Performed by: OBSTETRICS & GYNECOLOGY

## 2018-07-05 PROCEDURE — 82043 UR ALBUMIN QUANTITATIVE: CPT | Performed by: OBSTETRICS & GYNECOLOGY

## 2018-07-05 PROCEDURE — 36415 COLL VENOUS BLD VENIPUNCTURE: CPT | Performed by: OBSTETRICS & GYNECOLOGY

## 2018-07-08 NOTE — PROGRESS NOTES
Lynn Please inform Fallon/ or caretaker  that this result(s) is/are no worse -the liver function tests is still slightly high.  Please set up an appointment for her to see Dr. Robles or Dr. Mcintyre just for an opinion about whether the Metformin should be stopped because of this slight liver elevation.  Thanks. SHARAN Ford MD

## 2018-07-09 NOTE — PROGRESS NOTES
Pt notified of labs results, No questions or concerns. appt scheduled with Dr. Francisco Javier Burton, MARI

## 2018-07-20 ENCOUNTER — OFFICE VISIT (OUTPATIENT)
Dept: INTERNAL MEDICINE | Facility: CLINIC | Age: 38
End: 2018-07-20
Payer: COMMERCIAL

## 2018-07-20 VITALS
HEART RATE: 88 BPM | TEMPERATURE: 96.8 F | OXYGEN SATURATION: 99 % | RESPIRATION RATE: 16 BRPM | DIASTOLIC BLOOD PRESSURE: 66 MMHG | WEIGHT: 217 LBS | BODY MASS INDEX: 37.25 KG/M2 | SYSTOLIC BLOOD PRESSURE: 118 MMHG

## 2018-07-20 DIAGNOSIS — Z72.0 TOBACCO ABUSE: ICD-10-CM

## 2018-07-20 DIAGNOSIS — E66.09 NON MORBID OBESITY DUE TO EXCESS CALORIES: ICD-10-CM

## 2018-07-20 DIAGNOSIS — E11.9 TYPE 2 DIABETES MELLITUS WITHOUT COMPLICATION, UNSPECIFIED LONG TERM INSULIN USE STATUS: ICD-10-CM

## 2018-07-20 DIAGNOSIS — R74.01 ELEVATED ALT MEASUREMENT: Primary | ICD-10-CM

## 2018-07-20 PROCEDURE — 99214 OFFICE O/P EST MOD 30 MIN: CPT | Performed by: INTERNAL MEDICINE

## 2018-07-20 RX ORDER — MULTIVIT-MIN/IRON/FOLIC ACID/K 18-600-40
CAPSULE ORAL DAILY
COMMUNITY
End: 2019-01-16

## 2018-07-20 RX ORDER — NAPROXEN SODIUM 220 MG
220 TABLET ORAL 2 TIMES DAILY WITH MEALS
Status: ON HOLD | COMMUNITY
End: 2019-01-23

## 2018-07-20 RX ORDER — PYRIDOXINE HCL (VITAMIN B6) 100 MG
TABLET ORAL DAILY
COMMUNITY
End: 2019-01-16

## 2018-07-20 ASSESSMENT — PAIN SCALES - GENERAL: PAINLEVEL: NO PAIN (0)

## 2018-07-20 NOTE — PROGRESS NOTES
SUBJECTIVE:   Fallon Rivera is a 38 year old female who presents to clinic today for the following health issues:    Chief Complaint   Patient presents with     Consult     Liver functions per Dr. Ford     Had some high ALT for quite a while.  AST was ok.    Smokes a little bit. 4 cigarettes a month.      two children.      No alcohol.      Weight is going down slowly.      Needs to stay on metformin for diabetes.      Past Medical History:   Diagnosis Date     Abnormal Pap smear 2006, 2007,     ASC-H, ASCUS + HPV 45     Calculus of kidney 2002     Cervical high risk HPV (human papillomavirus) test positive     + HPV 45 (high risk)     History of colposcopy with cervical biopsy 2/9/06, 3/15/07    koilocytosis 2007     Current Outpatient Prescriptions   Medication     B Complex-Biotin-FA (B COMPLETE) TABS     cyclobenzaprine (FLEXERIL) 5 MG tablet     metFORMIN (GLUCOPHAGE) 500 MG tablet     Multiple Vitamins-Minerals (MULTIPLE VITAMINS/WOMENS PO)     naproxen sodium (ALEVE) 220 MG tablet     omeprazole (PRILOSEC) 20 MG CR capsule     oxyCODONE-acetaminophen (PERCOCET) 5-325 MG per tablet     Vitamin D, Cholecalciferol, 1000 units TABS     blood glucose monitoring (LILLIAN MICROLET) lancets     CONTOUR NEXT TEST test strip     No current facility-administered medications for this visit.      Social History   Substance Use Topics     Smoking status: Current Some Day Smoker     Years: 12.00     Types: Cigarettes     Last attempt to quit: 7/14/2009     Smokeless tobacco: Never Used      Comment: As of 10 /2014, pt has had a few cigs here and there     Alcohol use 0.0 oz/week     0 Standard drinks or equivalent per week      Comment: every few months     Review of Systems  Constitutional-No fevers, chills, or weight changes..  Cardiac-No chest pain or palpitations.  Respiratory-No cough, sob, or hemoptysis.  GI-No nausea, vomitting, diarrhea, constipation, or blood in the stool.  Musculoskeletal-No  muscles aches or joint pains.  Skin-No rashes.  Neuro-No numbness, tingling, or weakness.    Physical Exam  /66  Pulse 88  Temp 96.8  F (36  C) (Temporal)  Resp 16  Wt 217 lb (98.4 kg)  SpO2 99%  BMI 37.25 kg/m2  General Appearance-healthy, alert, no distress  Cardiac-regular rate and rhythm  with normal S1, S2 ; no murmur, rub or gallops  Lungs-clear to auscultation  GI-Soft, nontender.  Normal bowel sounds.  No hepatosplenomegaly or abnormal masses  Extremities-no peripheral edema, peripheral pulses normal    ASSESSMENT:  This is a 38-year-old woman who has type 2 diabetes, obesity, gastroesophageal reflux disease.  She has had her appendix removed she had her gallbladder removed.  The concern and reason she came to see me today as her ALT continues to be elevated.  It has been this way for the last 10 years.  Comes and goes anywhere between 60 and 150 when it was worse.  Recently she has been at 60-67 for her ALT.  She denies any alcohol use, denies Tylenol use.  Her regular doctor had a question of whether she could continue metformin with an elevated ALT I would continue it I think her diabetes is more important she needs to treat her diabetes, get the fats out of her liver and she presumably has a fatty liver.  Will not do an ultrasound but I would assume she has that if we did one.  Avoid alcohol avoid Tylenol.  She can reduce her weight with exercise she is already down 7 pounds and congratulated on this.  She loses another 20 pounds I think her ALT he will return to normal.      Smoking cessation again recommended to her. She should try nicotine lozenges.      Electronically signed by Spencer Mcintyre MD

## 2018-07-20 NOTE — MR AVS SNAPSHOT
After Visit Summary   7/20/2018    Fallon Rivera    MRN: [id number]           Patient Information     Date Of Birth          1980        Visit Information        Provider Department      7/20/2018 3:15 PM Spencer Mcintyre MD [facility]         Follow-ups after your visit        Who to contact     If you have questions or need follow up information about today's clinic visit or your schedule please contact [facility] directly at [phone].  Normal or non-critical lab and imaging results will be communicated to you by [employer], letter or phone within 4 business days after the clinic has received the results. If you do not hear from us within 7 days, please contact the clinic through [employer] or phone. If you have a critical or abnormal lab result, we will notify you by phone as soon as possible.  Submit refill requests through [employer] or call your pharmacy and they will forward the refill request to us. Please allow 3 business days for your refill to be completed.          Additional Information About Your Visit        MyChart Information     [employer] gives you secure access to your electronic health record. If you see a primary care provider, you can also send messages to your care team and make appointments. If you have questions, please call your primary care clinic.  If you do not have a primary care provider, please call [phone] and they will assist you.        Care EveryWhere ID     This is your Care EveryWhere ID. This could be used by other organizations to access your [facility] medical records  [id number]        Your Vitals Were     Pulse Temperature Respirations Pulse Oximetry BMI (Body Mass Index)       88 96.8  F (36  C) (Temporal) 16 99% 37.25 kg/m2        Blood Pressure from Last 3 Encounters:   07/20/18 118/66   06/05/18 135/87   05/21/18 134/82    Weight from Last 3 Encounters:   07/20/18 217 lb (98.4 kg)   06/05/18 224 lb (101.6 kg)    05/21/18 224 lb (101.6 kg)              Today, you had the following     No orders found for display         Today's Medication Changes          These changes are accurate as of 7/20/18  3:19 PM.  If you have any questions, ask your nurse or doctor.               Stop taking these medicines if you haven't already. Please contact your care team if you have questions.     ADVIL PO   Stopped by:  Spencer Mcintyre MD           HYDROcodone-acetaminophen 5-325 MG per tablet   Commonly known as:  NORCO   Stopped by:  Spencer Mcintyre MD                    Primary Care Provider Office Phone # Fax #    Greg Fabian Ford [phone] [address] [place] [address]        Equal Access to Services     [facility]: Hadii rehana escudero hadasho Soomaali, waaxda luqadaha, qaybta kaalmada adeegyada, jeannie whitney . So [facility] [place] [phone].    ATENCIÓN: Si habla español, tiene a pierre disposición servicios gratuitos de asistencia lingüística. Llame al [phone].    We comply with applicable federal civil rights laws and [place] laws. We do not discriminate on the basis of race, color, national origin, age, disability, sex, sexual orientation, or gender identity.            Thank you!     Thank you for choosing [facility]  for your care. Our goal is always to provide you with excellent care. Hearing back from our patients is one way we can continue to improve our services. Please take a few minutes to complete the written survey that you may receive in the mail after your visit with us. Thank you!             Your Updated Medication List - Protect others around you: Learn how to safely use, store and throw away your medicines at www.disposemymeds.org.          This list is accurate as of 7/20/18  3:19 PM.  Always use your most recent med list.                   Brand Name Dispense Instructions for use Diagnosis    ALEVE 220 MG tablet   Generic drug:   naproxen sodium      Take 220 mg by mouth 2 times daily (with meals)        B COMPLETE Tabs      Take by mouth daily        blood glucose monitoring lancets     1 Box    Use to check blood sugar 1-2 times daily    Diabetes mellitus (H)       CONTOUR NEXT TEST test strip   Generic drug:  blood glucose monitoring     100 each    USE TO TEST BLOOD SUGAR TWO TIMES A DAY OR AS DIRECTED    Diabetes mellitus (H)       cyclobenzaprine 5 MG tablet    FLEXERIL    90 tablet    Take 1 tablet (5 mg) by mouth 3 times daily as needed for muscle spasms    Lumbar radiculopathy       metFORMIN 500 MG tablet    GLUCOPHAGE    30 tablet    TAKE ONE TABLET BY MOUTH EVERY DAY WITH DINNER    Type 2 diabetes mellitus without complication, unspecified long term insulin use status (H), Gastroesophageal reflux disease without esophagitis       MULTIPLE VITAMINS/WOMENS PO      Reported on 5/15/2017        omeprazole 20 MG CR capsule    priLOSEC    30 capsule    TAKE 1 CAPSULE BY MOUTH DAILY, 30 TO 60 MINUTES BEFORE A MEAL.    Gastroesophageal reflux disease without esophagitis, Type 2 diabetes mellitus without complication, unspecified long term insulin use status (H)       oxyCODONE-acetaminophen 5-325 MG per tablet    PERCOCET    12 tablet    Take 1-2 tablets by mouth every 6 hours as needed for pain        Vitamin D (Cholecalciferol) 1000 units Tabs      Take by mouth daily

## 2018-09-08 ENCOUNTER — HOSPITAL ENCOUNTER (EMERGENCY)
Facility: CLINIC | Age: 38
Discharge: HOME | End: 2018-09-08
Attending: FAMILY MEDICINE | Admitting: FAMILY MEDICINE
Payer: COMMERCIAL

## 2018-09-08 ENCOUNTER — APPOINTMENT (OUTPATIENT)
Dept: CT IMAGING | Facility: CLINIC | Age: 38
End: 2018-09-08
Attending: FAMILY MEDICINE
Payer: COMMERCIAL

## 2018-09-08 ENCOUNTER — APPOINTMENT (OUTPATIENT)
Dept: MRI IMAGING | Facility: CLINIC | Age: 38
End: 2018-09-08
Attending: FAMILY MEDICINE
Payer: COMMERCIAL

## 2018-09-08 VITALS
HEART RATE: 132 BPM | SYSTOLIC BLOOD PRESSURE: 124 MMHG | BODY MASS INDEX: 37.42 KG/M2 | HEIGHT: 64 IN | OXYGEN SATURATION: 100 % | TEMPERATURE: 97.6 F | RESPIRATION RATE: 16 BRPM | WEIGHT: 219.2 LBS | DIASTOLIC BLOOD PRESSURE: 75 MMHG

## 2018-09-08 DIAGNOSIS — R32 URINARY INCONTINENCE, UNSPECIFIED TYPE: ICD-10-CM

## 2018-09-08 DIAGNOSIS — M54.16 LUMBAR RADICULOPATHY: ICD-10-CM

## 2018-09-08 DIAGNOSIS — M54.50 ACUTE BILATERAL LOW BACK PAIN WITHOUT SCIATICA: ICD-10-CM

## 2018-09-08 LAB
ALBUMIN UR-MCNC: NEGATIVE MG/DL
ANION GAP SERPL CALCULATED.3IONS-SCNC: 9 MMOL/L (ref 3–14)
APPEARANCE UR: CLEAR
BASOPHILS # BLD AUTO: 0 10E9/L (ref 0–0.2)
BASOPHILS NFR BLD AUTO: 0.4 %
BILIRUB UR QL STRIP: NEGATIVE
BUN SERPL-MCNC: 12 MG/DL (ref 7–30)
CALCIUM SERPL-MCNC: 8.7 MG/DL (ref 8.5–10.1)
CHLORIDE SERPL-SCNC: 105 MMOL/L (ref 94–109)
CO2 SERPL-SCNC: 25 MMOL/L (ref 20–32)
COLOR UR AUTO: YELLOW
CREAT SERPL-MCNC: 0.55 MG/DL (ref 0.52–1.04)
DIFFERENTIAL METHOD BLD: NORMAL
EOSINOPHIL NFR BLD AUTO: 2.6 %
ERYTHROCYTE [DISTWIDTH] IN BLOOD BY AUTOMATED COUNT: 12.4 % (ref 10–15)
GFR SERPL CREATININE-BSD FRML MDRD: >90 ML/MIN/1.7M2
GLUCOSE SERPL-MCNC: 177 MG/DL (ref 70–99)
GLUCOSE UR STRIP-MCNC: NEGATIVE MG/DL
HCT VFR BLD AUTO: 42.3 % (ref 35–47)
HGB BLD-MCNC: 14.9 G/DL (ref 11.7–15.7)
HGB UR QL STRIP: NEGATIVE
IMM GRANULOCYTES # BLD: 0 10E9/L (ref 0–0.4)
IMM GRANULOCYTES NFR BLD: 0.1 %
KETONES UR STRIP-MCNC: NEGATIVE MG/DL
LEUKOCYTE ESTERASE UR QL STRIP: NEGATIVE
LYMPHOCYTES # BLD AUTO: 1.8 10E9/L (ref 0.8–5.3)
LYMPHOCYTES NFR BLD AUTO: 24.9 %
MCH RBC QN AUTO: 30.8 PG (ref 26.5–33)
MCHC RBC AUTO-ENTMCNC: 35.2 G/DL (ref 31.5–36.5)
MCV RBC AUTO: 88 FL (ref 78–100)
MONOCYTES # BLD AUTO: 0.5 10E9/L (ref 0–1.3)
MONOCYTES NFR BLD AUTO: 7 %
MUCOUS THREADS #/AREA URNS LPF: PRESENT /LPF
NEUTROPHILS # BLD AUTO: 4.7 10E9/L (ref 1.6–8.3)
NEUTROPHILS NFR BLD AUTO: 65 %
NITRATE UR QL: NEGATIVE
NRBC # BLD AUTO: 0 10*3/UL
NRBC BLD AUTO-RTO: 0 /100
PH UR STRIP: 6 PH (ref 5–7)
PLATELET # BLD AUTO: 186 10E9/L (ref 150–450)
POTASSIUM SERPL-SCNC: 3.6 MMOL/L (ref 3.4–5.3)
RBC # BLD AUTO: 4.83 10E12/L (ref 3.8–5.2)
RBC #/AREA URNS AUTO: 1 /HPF (ref 0–2)
SODIUM SERPL-SCNC: 139 MMOL/L (ref 133–144)
SOURCE: ABNORMAL
SP GR UR STRIP: 1.02 (ref 1–1.03)
SQUAMOUS #/AREA URNS AUTO: 1 /HPF (ref 0–1)
UROBILINOGEN UR STRIP-MCNC: 0 MG/DL (ref 0–2)
WBC # BLD AUTO: 7.3 10E9/L (ref 4–11)
WBC #/AREA URNS AUTO: 1 /HPF (ref 0–5)

## 2018-09-08 PROCEDURE — 96375 TX/PRO/DX INJ NEW DRUG ADDON: CPT | Performed by: FAMILY MEDICINE

## 2018-09-08 PROCEDURE — 81001 URINALYSIS AUTO W/SCOPE: CPT | Performed by: FAMILY MEDICINE

## 2018-09-08 PROCEDURE — 72148 MRI LUMBAR SPINE W/O DYE: CPT

## 2018-09-08 PROCEDURE — 96374 THER/PROPH/DIAG INJ IV PUSH: CPT | Performed by: FAMILY MEDICINE

## 2018-09-08 PROCEDURE — 80048 BASIC METABOLIC PNL TOTAL CA: CPT | Performed by: FAMILY MEDICINE

## 2018-09-08 PROCEDURE — 96361 HYDRATE IV INFUSION ADD-ON: CPT | Performed by: FAMILY MEDICINE

## 2018-09-08 PROCEDURE — 99285 EMERGENCY DEPT VISIT HI MDM: CPT | Mod: Z6 | Performed by: FAMILY MEDICINE

## 2018-09-08 PROCEDURE — 25000128 H RX IP 250 OP 636: Performed by: FAMILY MEDICINE

## 2018-09-08 PROCEDURE — 85025 COMPLETE CBC W/AUTO DIFF WBC: CPT | Performed by: FAMILY MEDICINE

## 2018-09-08 PROCEDURE — 99285 EMERGENCY DEPT VISIT HI MDM: CPT | Mod: 25 | Performed by: FAMILY MEDICINE

## 2018-09-08 PROCEDURE — 74176 CT ABD & PELVIS W/O CONTRAST: CPT

## 2018-09-08 RX ORDER — SODIUM CHLORIDE 9 MG/ML
1000 INJECTION, SOLUTION INTRAVENOUS CONTINUOUS
Status: DISCONTINUED | OUTPATIENT
Start: 2018-09-08 | End: 2018-09-08 | Stop reason: HOSPADM

## 2018-09-08 RX ORDER — METHYLPREDNISOLONE 4 MG
TABLET, DOSE PACK ORAL
Qty: 21 TABLET | Refills: 0 | Status: SHIPPED | OUTPATIENT
Start: 2018-09-08 | End: 2018-10-16

## 2018-09-08 RX ORDER — KETOROLAC TROMETHAMINE 30 MG/ML
30 INJECTION, SOLUTION INTRAMUSCULAR; INTRAVENOUS ONCE
Status: COMPLETED | OUTPATIENT
Start: 2018-09-08 | End: 2018-09-08

## 2018-09-08 RX ORDER — CYCLOBENZAPRINE HCL 5 MG
5 TABLET ORAL 3 TIMES DAILY PRN
Qty: 90 TABLET | Refills: 0 | Status: SHIPPED | OUTPATIENT
Start: 2018-09-08 | End: 2018-12-27

## 2018-09-08 RX ORDER — LORAZEPAM 2 MG/ML
1 INJECTION INTRAMUSCULAR ONCE
Status: COMPLETED | OUTPATIENT
Start: 2018-09-08 | End: 2018-09-08

## 2018-09-08 RX ORDER — OXYCODONE AND ACETAMINOPHEN 5; 325 MG/1; MG/1
1-2 TABLET ORAL EVERY 6 HOURS PRN
Qty: 12 TABLET | Refills: 0 | Status: SHIPPED | OUTPATIENT
Start: 2018-09-08 | End: 2018-10-16

## 2018-09-08 RX ADMIN — SODIUM CHLORIDE 1000 ML: 9 INJECTION, SOLUTION INTRAVENOUS at 03:31

## 2018-09-08 RX ADMIN — HYDROMORPHONE HYDROCHLORIDE 1 MG: 1 INJECTION, SOLUTION INTRAMUSCULAR; INTRAVENOUS; SUBCUTANEOUS at 05:08

## 2018-09-08 RX ADMIN — KETOROLAC TROMETHAMINE 30 MG: 30 INJECTION, SOLUTION INTRAMUSCULAR at 03:35

## 2018-09-08 RX ADMIN — LORAZEPAM 1 MG: 2 INJECTION INTRAMUSCULAR; INTRAVENOUS at 08:02

## 2018-09-08 NOTE — ED PROVIDER NOTES
SIGN OUT NOTE    Patient signed out to me at 7 AM by Dr. Noe.  This is a 38-year-old female with history of kidney stones and low back pain presenting with back pain.  She is also had incontinence of urine.  She has an L5/S1 disc herniation being managed conservatively.    She had a CT scan done that showed no kidney stones although she has had incontinence of urine with stones in the past.  Her urine is unremarkable.  Plan is for MRI scan after discussion with Dr. Roe.  Her pain is being managed by Dilaudid.    Results for orders placed or performed during the hospital encounter of 09/08/18   CT Abdomen Pelvis w/o Contrast    Narrative    CT ABDOMEN AND PELVIS WITHOUT CONTRAST  9/8/2018 3:47 AM     HISTORY: Bilateral back pain. Urinary frequency.    COMPARISON: 10/20/2014.    TECHNIQUE: Without intravenous or oral contrast, helical sections were  acquired from the top of the diaphragm through the pubic symphysis.  Coronal reconstructions were generated. Radiation dose for this scan  was reduced using automated exposure control, adjustment of the mA  and/or kV according to the patient's size, or iterative reconstruction  technique. (Renal stone protocol).    FINDINGS:  Right urinary tract: No renal or ureteral calculi. No dilatation of  the intrarenal collecting system or ureter.    Left urinary tract: No renal or ureteral calculi. No dilatation of the  intrarenal collecting system or ureter. Small low attenuation lesion  in the upper pole of the kidney, too small to characterize.    Urinary bladder: No visualized calculi.    Remainder of the abdomen and pelvis: Mild diffuse fatty infiltration  of the liver. The liver, spleen, pancreas and adrenal glands are  otherwise unremarkable to the limits of a noncontrast CT scan. Prior  cholecystectomy. The small and large bowel are normal in caliber. The  appendix is not visualized. No bowel wall thickening, pneumatosis or  free intraperitoneal gas. The uterus is  present. No enlarged lymph  nodes or free fluid in the abdomen or pelvis.    Scan through the lower chest is unremarkable.      Impression    IMPRESSION:  1. No cause of acute pain identified in the abdomen or pelvis.  2. No renal or ureteral calculi or evidence of urinary obstruction.    JUNIOR RAYMOND MD   Lumbar spine MRI w/o contrast    Narrative    MRI LUMBAR SPINE WITHOUT CONTRAST   9/8/2018 8:49 AM     HISTORY: Low back pain, two episodes of urinary incontinence in past  four days, known disc at L5-S1.      TECHNIQUE: Multiplanar multisequence MRI of the lumbar spine without  contrast.    COMPARISON: Lumbar spine MR 3/27/2018.     FINDINGS: There are 5 lumbar-type vertebral bodies assumed for the  purposes of this dictation. The distal spinal cord and cauda equina  appear normal.     Alignment of the lumbar spine is normal. No loss of vertebral body  height. There are no destructive osseous lesions. Marked loss of  intervertebral disc height with disc desiccation and Modic type  degenerative endplate changes at L4-L5. Moderate loss of disc height  and disc desiccation at L3-L4 and L5-S1. Mild disc desiccation at  T11-T12. The other disc levels appear normal. No significant endplate  marrow edema in any level.    Level by level as follows:     T11-T12: No significant spinal canal or neural foraminal narrowing.    T12-L1:  No significant spinal canal or neural foraminal narrowing.     L1-L2:  No significant spinal canal or neural foraminal narrowing.     L2-L3:  No significant spinal canal or neural foraminal narrowing.     L3-L4:  Mild posterior disc bulge without spinal canal or neural  foraminal narrowing.     L4-L5:  Mild posterior disc bulge and mild facet hypertrophy without  significant spinal canal or neural foraminal narrowing.     L5-S1:  Large left subarticular disc extrusion extending inferiorly to  the upper aspect of S1. This disc extrusion compresses the traversing  left S1 nerve root and the  lateral recess. It also causes moderate  left neural foraminal narrowing. There is no significant spinal canal  or right neural foraminal narrowing. This finding is unchanged since  previous MR.    Paraspinous soft tissues:  Normal.       Impression    IMPRESSION:    1. Large left subarticular disc extrusion at L5-S1 which compresses  the traversing left S1 nerve root and causes moderate left neural  foraminal narrowing. This is unchanged since previous MR 3/27/2018.  2. Mild degenerative disc changes at T11-12, L3-L4, and L4-L5 as  described above without significant change.    JADON CALVILLO MD   Routine UA with microscopic   Result Value Ref Range    Color Urine Yellow     Appearance Urine Clear     Glucose Urine Negative NEG^Negative mg/dL    Bilirubin Urine Negative NEG^Negative    Ketones Urine Negative NEG^Negative mg/dL    Specific Gravity Urine 1.021 1.003 - 1.035    Blood Urine Negative NEG^Negative    pH Urine 6.0 5.0 - 7.0 pH    Protein Albumin Urine Negative NEG^Negative mg/dL    Urobilinogen mg/dL 0.0 0.0 - 2.0 mg/dL    Nitrite Urine Negative NEG^Negative    Leukocyte Esterase Urine Negative NEG^Negative    Source Midstream Urine     WBC Urine 1 0 - 5 /HPF    RBC Urine 1 0 - 2 /HPF    Squamous Epithelial /HPF Urine 1 0 - 1 /HPF    Mucous Urine Present (A) NEG^Negative /LPF   CBC with platelets differential   Result Value Ref Range    WBC 7.3 4.0 - 11.0 10e9/L    RBC Count 4.83 3.8 - 5.2 10e12/L    Hemoglobin 14.9 11.7 - 15.7 g/dL    Hematocrit 42.3 35.0 - 47.0 %    MCV 88 78 - 100 fl    MCH 30.8 26.5 - 33.0 pg    MCHC 35.2 31.5 - 36.5 g/dL    RDW 12.4 10.0 - 15.0 %    Platelet Count 186 150 - 450 10e9/L    Diff Method Automated Method     % Neutrophils 65.0 %    % Lymphocytes 24.9 %    % Monocytes 7.0 %    % Eosinophils 2.6 %    % Basophils 0.4 %    % Immature Granulocytes 0.1 %    Nucleated RBCs 0 0 /100    Absolute Neutrophil 4.7 1.6 - 8.3 10e9/L    Absolute Lymphocytes 1.8 0.8 - 5.3 10e9/L     Absolute Monocytes 0.5 0.0 - 1.3 10e9/L    Absolute Basophils 0.0 0.0 - 0.2 10e9/L    Abs Immature Granulocytes 0.0 0 - 0.4 10e9/L    Absolute Nucleated RBC 0.0    Basic metabolic panel   Result Value Ref Range    Sodium 139 133 - 144 mmol/L    Potassium 3.6 3.4 - 5.3 mmol/L    Chloride 105 94 - 109 mmol/L    Carbon Dioxide 25 20 - 32 mmol/L    Anion Gap 9 3 - 14 mmol/L    Glucose 177 (H) 70 - 99 mg/dL    Urea Nitrogen 12 7 - 30 mg/dL    Creatinine 0.55 0.52 - 1.04 mg/dL    GFR Estimate >90 >60 mL/min/1.7m2    GFR Estimate If Black >90 >60 mL/min/1.7m2    Calcium 8.7 8.5 - 10.1 mg/dL        MRI scan is unchanged from previous.  Results discussed with the patient.  Plan is to discharge home.  She feels improved and comfortable with the plan.  Follow-up with spine specialist as recommended.  Rest, ice or heat to painful areas.  Return for concerns.    Clinical impression:  (M54.5) Acute bilateral low back pain without sciatica    (R32) Urinary incontinence, unspecified type  Comment: x2 episodes    (M54.16) Lumbar radiculopathy  Plan: cyclobenzaprine (FLEXERIL) 5 MG tablet    Disposition: Patient discharged home in stable condition.  Plan as above.  Return for concerns.           Farida Munroe MD  09/10/18 [phone]

## 2018-09-08 NOTE — ED TRIAGE NOTES
Back pain off and on for the past few days, has a history of kidney stones, patient states she loss control of her bladder, woke up tonight because she had an accident.  States this did happen a few days ago when she was standing outside.

## 2018-09-08 NOTE — ED PROVIDER NOTES
History     Chief Complaint   Patient presents with     Back Pain     HPI  Fallon Rivera is a 38 year old female who presents to the ED this evening with bilateral back pain.  It started suddenly on Wednesday, 2-1/2 days ago.  Feels very similar to previous kidney stones.  She did lose control of her bladder on Wednesday and then again tonight (early Saturday morning) which woke her from sleep.  She has lost control of her bladder in the past with previous kidney stones.  She has had some nausea and vomiting as well.  Feels like she has to urinate all the time.  There has been no dysuria.  Has not documented any fevers.  Does have some upper respiratory issues going on right now.    She does have a history of an L5-S1 disc herniation but that has been managed conservatively with chiropractic care with good results.  She does not have pain in her lumbar region right now and this pain is not like her disc pain.  She does not have any pain down the legs.  Does have some chronic numbness in the left leg and foot but nothing has changed in that regard.  She does not think this pain is because of her herniated disc.    She does not tolerate antiemetics including Zofran.  She would rather be nauseous and vomit then be treated with nausea medications.  Has not taken anything for the pain tonight.  Here with her .    She has had 2 ectopic pregnancies and both of her tubes have been removed.  Right ovary has also been removed.    ================  MRI    3/27/2018   =====================    IMPRESSION:    1. At L5-S1, there is a new large left central disc extrusion which  extends inferior from the disc level approximately 0.9 cm with  posterior displacement and probable compression of the descending left  S1 nerve root. Moderate to severe left foraminal stenosis. Mild  inferior foraminal narrowing on the right.  2. At L4-L5, there is mild inferior foraminal narrowing bilaterally.  3. At L3-L4, there is mild  degenerative disc disease, but no stenosis.  See above for details at each level.     ===================================================    Problem List:    Patient Active Problem List    Diagnosis Date Noted     Non morbid obesity due to excess calories 07/05/2016     Priority: Medium     Type 2 diabetes mellitus without complication (H) 04/29/2016     Priority: Medium     Glucosuria 04/27/2016     Priority: Medium     Right ovarian cyst 09/15/2015     Priority: Medium     Hyperlipidemia LDL goal <130 07/18/2012     Priority: Medium     Mild major depression (H) 07/18/2012     Priority: Medium     GERD (gastroesophageal reflux disease) 07/03/2012     Priority: Medium     CARDIOVASCULAR SCREENING; LDL GOAL LESS THAN 160 10/31/2010     Priority: Medium     Dyspnea 11/24/2009     Priority: Medium     Kidney stones 02/17/2009     Priority: Medium     S/P conization of cervix- KAYLA 2/3 in 2011 06/27/2007     Priority: Medium     1/24/06 pap: ASC-H  2/9/06 colposcopy/bx (negative)/ECC (negative) pap- ASC-H  5/24/06 pap- ASC-H  9/6/07 pap NIL  1/23/07 pap ASCUS + HPV 45 (high risk)  3/15/07 pap ASCUS + HPV 45 (high risk) colposcopy- bx (negative)/ECC (koilocytosis)  6/19/07 ECC- koilocytosis.  Pap- AGS  10/23/07 pap- ASC-H, ECC- negative  2/14/08 pap NIL- return to yearly paps  2/17/09 pap NIL- repeat 1 year  3/8/10 pap NIL  3/3/11 pap ASCUS + HPV 45 (high risk)- colposcopy recommended  5/16/11 colpo- bx (KAYLA 2/3/HSIL) ECC (ASCUS)  pap (ASC-H)  6/2/11 recommend: [facility]  7/1/11 [facility]: path: KAYLA 2/3 with possible involvement of the endocervical margin.  ECC- neg.  Endometrium- neg.  7/14/11 plan: HIPOLITO in approx 2 months  10/10/11- hysterectomy planned.  (cancelled)  11/7/11 pap-- NIL. Plan--repeat pap in 6 months. (5/7/12)  5/7/12 pap NIL. Plan-- pap in 1 year (due 5/7/13)   5/8/13 pap NIL. Plan-pap in 1 year  5/8/14 NIL pap, neg HR HPV.  Plan- repeat pap/HPV in 1 year (due 5/8/15)  10/14/14 NIL pap, neg HR HPV. Endometrial  biopsy- WNL   1/6/16  NIL pap, neg HR HPV.  Plan: paps yearly, per Dr. Ford.  1/30/17 NIL pap/neg HR HPV. Plan: pap in 1 year.   3/12/18 NIL pap, neg HR HPV. Plan: cotest annually, per Dr. Ford.         Papanicolaou smear of cervix with atypical squamous cells cannot exclude high grade squamous intraepithelial lesion (ASC-H) 09/07/2006     Priority: Medium     Female infertility 07/24/2006     Priority: Medium     Problem list name updated by automated process. Provider to review       Hemorrhage of rectum and anus 12/02/2004     Priority: Medium        Past Medical History:    Past Medical History:   Diagnosis Date     Abnormal Pap smear 2006, 2007,      Calculus of kidney 2002     Cervical high risk HPV (human papillomavirus) test positive      History of colposcopy with cervical biopsy 2/9/06, 3/15/07       Past Surgical History:    Past Surgical History:   Procedure Laterality Date     C REMOVAL OF KIDNEY STONE      two surgeries, 2002,2003     CHOLECYSTECTOMY, LAPOROSCOPIC  5/11/2007    Cholecystectomy, Laparoscopic     COLONOSCOPY  05/17/10     CONIZATION  7/1/2011    Procedure:CONIZATION; cold knife and punch biopsy of mole on back; Surgeon:GREG FORD; Location:[facility]     DILATION AND CURETTAGE, HYSTEROSCOPY, LAPAROSCOPY, COMBINED Right 9/24/2015    Procedure: COMBINED DILATION AND CURETTAGE, HYSTEROSCOPY, LAPAROSCOPY;  Surgeon: Greg Ford MD;  Location: [facility]     ESOPHAGOSCOPY, GASTROSCOPY, DUODENOSCOPY (EGD), COMBINED  5/21/2014    Procedure: COMBINED ESOPHAGOSCOPY, GASTROSCOPY, DUODENOSCOPY (EGD), BIOPSY SINGLE OR MULTIPLE;  Surgeon: Earl Lane MD;  Location: [facility]     EXCISE LESION TRUNK  7/1/2011    Procedure:EXCISE LESION TRUNK; Surgeon:GREG FORD; Location:[facility]     HC FRAGMENTING OF KIDNEY STONE  11/30/2005     HC RX ECTOP PREG BY SCOPE,RMV TUBE/OVRY  12/20/2007    Right sided salpingectomy and removal of ruptured ectopic pregnancy.  D&C.      UGI ENDOSCOPY, SIMPLE EXAM  09/01/09     LAPAROSCOPIC APPENDECTOMY N/A 9/24/2015    Procedure: LAPAROSCOPIC APPENDECTOMY;  Surgeon: James Cuellar MD;  Location: [facility]     LAPAROSCOPIC CYSTECTOMY OVARIAN (BENIGN) N/A 9/24/2015    Procedure: LAPAROSCOPIC CYSTECTOMY OVARIAN (BENIGN);  Surgeon: Greg Ford MD;  [facility]     LAPAROSCOPIC OOPHORECTOMY Right 9/24/2015    Procedure: LAPAROSCOPIC OOPHORECTOMY;  Surgeon: Greg Ford MD;  Location: [facility]     LITHOTRIPSY  01/17/2007     PELVIS LAPAROSCOPY,DX  10/16/2000    Diagnostic laparoscopy, Endometrial biopsy.     TUBAL/ECTOPIC PREGNANCY  01/23/2007    Lap removal of left ruptured ectopic pregnancy & salpingectomy, D&C       Family History:    Family History   Problem Relation Age of Onset     Diabetes Maternal Grandmother      adult onset     Anesthesia Reaction Maternal Grandmother      can't tolerate Novacaine.     Respiratory Maternal Grandmother      COPD and emphysema.     Thyroid Disease Maternal Grandmother      HEART DISEASE Maternal Grandmother      CHF     Diabetes Paternal Grandfather      adult o nset     Hypertension Paternal Grandfather      Cerebrovascular Disease Paternal Grandfather      HEART DISEASE Paternal Grandfather      MI, replaced valve,angioplasty     Thyroid Disease Paternal Grandfather      Cancer Maternal Grandfather      skin cancer     HEART DISEASE Maternal Grandfather      MI     Obesity Mother      on thyroid medication     Thyroid Disease Mother      Diabetes Mother      HEART DISEASE Father      Hypertension Father      and TIA     Lipids Father      Breast Cancer Paternal Grandmother      Arrhythmia Other      Cancer Maternal Aunt      breast/brain cancer     Depression Paternal Aunt      Cerebrovascular Disease Paternal Uncle      Cerebrovascular Disease Paternal Aunt        Social History:  Marital Status:   [2]  Social History   Substance Use Topics     Smoking  "status: Current Some Day Smoker     Years: 12.00     Types: Cigarettes     Last attempt to quit: 7/14/2009     Smokeless tobacco: Never Used      Comment: As of 10 /2014, pt has had a few cigs here and there     Alcohol use 0.0 oz/week     0 Standard drinks or equivalent per week      Comment: every few months        Medications:      B Complex-Biotin-FA (B COMPLETE) TABS   cyclobenzaprine (FLEXERIL) 5 MG tablet   metFORMIN (GLUCOPHAGE) 500 MG tablet   naproxen sodium (ALEVE) 220 MG tablet   omeprazole (PRILOSEC) 20 MG CR capsule   blood glucose monitoring (LILLIAN MICROLET) lancets   CONTOUR NEXT TEST test strip   Multiple Vitamins-Minerals (MULTIPLE VITAMINS/WOMENS PO)   oxyCODONE-acetaminophen (PERCOCET) 5-325 MG per tablet   Vitamin D, Cholecalciferol, 1000 units TABS         Review of Systems   All other systems reviewed and are negative.      Physical Exam   BP: (!) 139/96  Pulse: 132  Temp: 97.6  F (36.4  C)  Resp: 16  Height: 162.6 cm (5' 4\")  Weight: 99.4 kg (219 lb 3.2 oz)  SpO2: 100 %      Physical Exam   Constitutional: She is oriented to person, place, and time. She appears well-developed and well-nourished. No distress.   HENT:   Head: Normocephalic.   Eyes: EOM are normal.   Cardiovascular: Normal rate, regular rhythm and normal heart sounds.    Pulmonary/Chest: Effort normal. No respiratory distress.   Abdominal: Soft. There is CVA tenderness (moderate bilateral).   Genitourinary: Rectal exam shows anal tone normal.   Musculoskeletal: Normal range of motion.   Neurological: She is alert and oriented to person, place, and time. She has normal strength. No cranial nerve deficit or sensory deficit (no acute deficits). GCS eye subscore is 4. GCS verbal subscore is 5. GCS motor subscore is 6.   Reflex Scores:       Patellar reflexes are 2+ on the right side and 2+ on the left side.       Achilles reflexes are 1+ on the right side and 1+ on the left side.  She has no weakness or new numbness in her lower " extremities.  No perineal sensory loss.   Skin: Skin is warm and dry.   Psychiatric: She has a normal mood and affect.       ED Course  (with Medical Decision Making)    38-year-old female with an extensive history of kidney stones now with 3 day history of bilateral CVA pain without radiation.  This started suddenly and feels very similar to previous kidney stones.  She has been incontinent of urine twice, the first time on the day of onset 3 days ago and again tonight which woke her from sleep.  This has happened before with previous kidney stones as well.      She also has a known disc herniation at L5-S1 but this pain does not feel like that and she has no radicular pain tonight.    IV placed.  Labs drawn.  Urine sent.  1 L normal saline.  Toradol for pain.  She declined nausea medication.    Her urine is clear.    Abdominal CT was stone protocol ordered to look for evidence of ureteral calculi.  I'm concerned about her herniated disc causing her pain and urinary incontinence but she states this feels different than that and she has had incontinent episodes with previous stones.  If the CT is unrevealing, we will need to look at her lumbar spine more closely.    CT shows no evidence of renal or ureteral calculi.  Toradol was ineffective for pain.  Dilaudid 1 mg IV ordered and will discuss with neurosurgery.  I think she needs another MRI.    4:29 AM:  Neurosurgery ([facility]) paged.    5:01 AM:  Neurosurgery ([facility]) repaged.    5:15 AM:  I paged Dr Roe himself as we still haven't heard back from the provider on call.    5:32 AM: I called Dr. Roe directly on his cell phone and he was quite helpful.  He agreed with my plan to get an MRI this morning.  We should be able to get that around 8:00.  Neither of us think we will be able to get one any sooner by transferring her to another facility.  Will check a postvoid residual to see if she is retaining urine.    Post void residual is 83 mL's.  Her rectal tone is  excellent.      MRI has been ordered.  I also ordered some Ativan 1 mg prior to the MRI as she does have claustrophobia.  MRI is typically here at 8:00 and we hope to get this done right away.  Dr. Munroe will follow up on her MRI results and contact Dr. Roe if need be.      See Dr. Munroe's note for final diagnosis and disposition.         ED Course     Procedures               Critical Care time:  none               Results for orders placed or performed during the hospital encounter of 09/08/18 (from the past 24 hour(s))   Routine UA with microscopic   Result Value Ref Range    Color Urine Yellow     Appearance Urine Clear     Glucose Urine Negative NEG^Negative mg/dL    Bilirubin Urine Negative NEG^Negative    Ketones Urine Negative NEG^Negative mg/dL    Specific Gravity Urine 1.021 1.003 - 1.035    Blood Urine Negative NEG^Negative    pH Urine 6.0 5.0 - 7.0 pH    Protein Albumin Urine Negative NEG^Negative mg/dL    Urobilinogen mg/dL 0.0 0.0 - 2.0 mg/dL    Nitrite Urine Negative NEG^Negative    Leukocyte Esterase Urine Negative NEG^Negative    Source Midstream Urine     WBC Urine 1 0 - 5 /HPF    RBC Urine 1 0 - 2 /HPF    Squamous Epithelial /HPF Urine 1 0 - 1 /HPF    Mucous Urine Present (A) NEG^Negative /LPF   CBC with platelets differential   Result Value Ref Range    WBC 7.3 4.0 - 11.0 10e9/L    RBC Count 4.83 3.8 - 5.2 10e12/L    Hemoglobin 14.9 11.7 - 15.7 g/dL    Hematocrit 42.3 35.0 - 47.0 %    MCV 88 78 - 100 fl    MCH 30.8 26.5 - 33.0 pg    MCHC 35.2 31.5 - 36.5 g/dL    RDW 12.4 10.0 - 15.0 %    Platelet Count 186 150 - 450 10e9/L    Diff Method Automated Method     % Neutrophils 65.0 %    % Lymphocytes 24.9 %    % Monocytes 7.0 %    % Eosinophils 2.6 %    % Basophils 0.4 %    % Immature Granulocytes 0.1 %    Nucleated RBCs 0 0 /100    Absolute Neutrophil 4.7 1.6 - 8.3 10e9/L    Absolute Lymphocytes 1.8 0.8 - 5.3 10e9/L    Absolute Monocytes 0.5 0.0 - 1.3 10e9/L    Absolute Basophils 0.0 0.0 - 0.2  10e9/L    Abs Immature Granulocytes 0.0 0 - 0.4 10e9/L    Absolute Nucleated RBC 0.0    Basic metabolic panel   Result Value Ref Range    Sodium 139 133 - 144 mmol/L    Potassium 3.6 3.4 - 5.3 mmol/L    Chloride 105 94 - 109 mmol/L    Carbon Dioxide 25 20 - 32 mmol/L    Anion Gap 9 3 - 14 mmol/L    Glucose 177 (H) 70 - 99 mg/dL    Urea Nitrogen 12 7 - 30 mg/dL    Creatinine 0.55 0.52 - 1.04 mg/dL    GFR Estimate >90 >60 mL/min/1.7m2    GFR Estimate If Black >90 >60 mL/min/1.7m2    Calcium 8.7 8.5 - 10.1 mg/dL   CT Abdomen Pelvis w/o Contrast    Narrative    CT ABDOMEN AND PELVIS WITHOUT CONTRAST  9/8/2018 3:47 AM     HISTORY: Bilateral back pain. Urinary frequency.    COMPARISON: 10/20/2014.    TECHNIQUE: Without intravenous or oral contrast, helical sections were  acquired from the top of the diaphragm through the pubic symphysis.  Coronal reconstructions were generated. Radiation dose for this scan  was reduced using automated exposure control, adjustment of the mA  and/or kV according to the patient's size, or iterative reconstruction  technique. (Renal stone protocol).    FINDINGS:  Right urinary tract: No renal or ureteral calculi. No dilatation of  the intrarenal collecting system or ureter.    Left urinary tract: No renal or ureteral calculi. No dilatation of the  intrarenal collecting system or ureter. Small low attenuation lesion  in the upper pole of the kidney, too small to characterize.    Urinary bladder: No visualized calculi.    Remainder of the abdomen and pelvis: Mild diffuse fatty infiltration  of the liver. The liver, spleen, pancreas and adrenal glands are  otherwise unremarkable to the limits of a noncontrast CT scan. Prior  cholecystectomy. The small and large bowel are normal in caliber. The  appendix is not visualized. No bowel wall thickening, pneumatosis or  free intraperitoneal gas. The uterus is present. No enlarged lymph  nodes or free fluid in the abdomen or pelvis.    Scan through the  lower chest is unremarkable.      Impression    IMPRESSION:  1. No cause of acute pain identified in the abdomen or pelvis.  2. No renal or ureteral calculi or evidence of urinary obstruction.       Medications   0.9% sodium chloride BOLUS (0 mLs Intravenous Stopped 9/8/18 0584)     Followed by   sodium chloride 0.9% infusion (1,000 mLs Intravenous Not Given 9/8/18 0711)   LORazepam (ATIVAN) injection 1 mg (not administered)   ketorolac (TORADOL) injection 30 mg (30 mg Intravenous Given 9/8/18 4851)   HYDROmorphone (DILAUDID) injection 1 mg (1 mg Intravenous Given 9/8/18 7581)       Assessments & Plan      I have reviewed the nursing notes.    I have reviewed the findings, diagnosis, plan and need for follow up with the patient.       Current Discharge Medication List          Final diagnoses:   Acute bilateral low back pain without sciatica   Urinary incontinence, unspecified type - x2 episodes       9/8/2018   [facility] EMERGENCY DEPARTMENT     Jose Meraz MD  09/08/18 2523

## 2018-09-08 NOTE — DISCHARGE INSTRUCTIONS
Continue your usual regimen for pain and see your chiropractor also.    Medrol Dosepak as prescribed.  Take with food or milk.    See Dr. Roe for continued or worsening symptoms.    I also would consider seeing Dr. Barber for any further loss of urine.    I hope you feel better quickly!

## 2018-09-08 NOTE — ED AVS SNAPSHOT
[facility]    [address] [place] [address]    Phone:  [phone]    Fax:  [phone]                                       Fallon Rivera   MRN: [id number]    Department:  [facility]   Date of Visit:  9/8/2018           After Visit Summary Signature Page     I have received my discharge instructions, and my questions have been answered. I have discussed any challenges I see with this plan with the nurse or doctor.    ..........................................................................................................................................  Patient/Patient Representative Signature      ..........................................................................................................................................  Patient Representative Print Name and Relationship to Patient    ..................................................               ................................................  Date                                            Time    ..........................................................................................................................................  Reviewed by Signature/Title    ...................................................              ..............................................  Date                                                            Time          22EPIC Rev 08/18

## 2018-09-28 ENCOUNTER — TELEPHONE (OUTPATIENT)
Dept: FAMILY MEDICINE | Facility: CLINIC | Age: 38
End: 2018-09-28

## 2018-09-28 ENCOUNTER — TELEPHONE (OUTPATIENT)
Dept: UROLOGY | Facility: OTHER | Age: 38
End: 2018-09-28

## 2018-09-28 NOTE — TELEPHONE ENCOUNTER
Called and spoke to patient.   Dr. Barber reviewed CT no stones.   Patient contacted.   Giovanna Rojas RN

## 2018-09-28 NOTE — TELEPHONE ENCOUNTER
Reason for Call:  Other call back    Detailed comments: patient is calling stating that she was in the ED 09/08 with back/kidney pain and loss of bladder control. States that it went away for a little while but its back. She is wanting Dr. Contreras opinion on whether she should go to her urologist to see if this is related to her kidneys?    Phone Number Patient can be reached at: Cell number on file:    Telephone Information:   Mobile [phone]       Best Time: any    Can we leave a detailed message on this number? YES    Call taken on 9/28/2018 at 12:48 PM by Kasia Kelley

## 2018-09-28 NOTE — TELEPHONE ENCOUNTER
Patient is called and states she does not want to go back to the ED as it costs too much money and they did testing, but ultimately controlled her pain.  She still has Percocet at home, so does not need pain control.  She would like to know if she should call Dr. Barber for further instructions.  She is informed that Dr. Ford is not in office today and that if she would like an opinion on her Kidneys, then yes, she should call for an appointment with Dr. Barber.  Patient understands and agrees to this plan.  Closing this encounter.  Sia Landin, NUZHATN, RN

## 2018-09-28 NOTE — TELEPHONE ENCOUNTER
Reason for Call:  Request for results:    Name of test or procedure: CT scan    Date of test of procedure: 9-8-18    Location of the test or procedure: [facility]    OK to leave the result message on voice mail or with a family member? NO    Phone number Patient can be reached at:  [phone]    Additional comments: Patient would like Dr Barber to go over the CT scan. Do you think it could be kidney stones again?    Call taken on 9/28/2018 at 1:34 PM by Amber Stone

## 2018-10-16 ENCOUNTER — OFFICE VISIT (OUTPATIENT)
Dept: FAMILY MEDICINE | Facility: CLINIC | Age: 38
End: 2018-10-16
Payer: COMMERCIAL

## 2018-10-16 VITALS
HEIGHT: 64 IN | TEMPERATURE: 96.9 F | RESPIRATION RATE: 16 BRPM | BODY MASS INDEX: 37.05 KG/M2 | WEIGHT: 217 LBS | DIASTOLIC BLOOD PRESSURE: 70 MMHG | OXYGEN SATURATION: 98 % | HEART RATE: 78 BPM | SYSTOLIC BLOOD PRESSURE: 114 MMHG

## 2018-10-16 DIAGNOSIS — E66.01 MORBID OBESITY (H): ICD-10-CM

## 2018-10-16 DIAGNOSIS — Z23 NEED FOR PROPHYLACTIC VACCINATION AND INOCULATION AGAINST INFLUENZA: ICD-10-CM

## 2018-10-16 DIAGNOSIS — N97.0 ANOVULATION: ICD-10-CM

## 2018-10-16 DIAGNOSIS — E11.9 TYPE 2 DIABETES MELLITUS WITHOUT COMPLICATION, WITHOUT LONG-TERM CURRENT USE OF INSULIN (H): Primary | ICD-10-CM

## 2018-10-16 LAB
ANION GAP SERPL CALCULATED.3IONS-SCNC: 7 MMOL/L (ref 3–14)
BUN SERPL-MCNC: 12 MG/DL (ref 7–30)
CALCIUM SERPL-MCNC: 8.6 MG/DL (ref 8.5–10.1)
CHLORIDE SERPL-SCNC: 105 MMOL/L (ref 94–109)
CHOLEST SERPL-MCNC: 177 MG/DL
CO2 SERPL-SCNC: 28 MMOL/L (ref 20–32)
CREAT SERPL-MCNC: 0.75 MG/DL (ref 0.52–1.04)
CREAT UR-MCNC: 280 MG/DL
GFR SERPL CREATININE-BSD FRML MDRD: 87 ML/MIN/1.7M2
GLUCOSE SERPL-MCNC: 171 MG/DL (ref 70–99)
HBA1C MFR BLD: 7.3 % (ref 0–5.6)
HDLC SERPL-MCNC: 53 MG/DL
LDLC SERPL CALC-MCNC: 101 MG/DL
MICROALBUMIN UR-MCNC: 79 MG/L
MICROALBUMIN/CREAT UR: 28.18 MG/G CR (ref 0–25)
NONHDLC SERPL-MCNC: 124 MG/DL
POTASSIUM SERPL-SCNC: 3.9 MMOL/L (ref 3.4–5.3)
SODIUM SERPL-SCNC: 140 MMOL/L (ref 133–144)
TRIGL SERPL-MCNC: 117 MG/DL

## 2018-10-16 PROCEDURE — 90686 IIV4 VACC NO PRSV 0.5 ML IM: CPT | Performed by: OBSTETRICS & GYNECOLOGY

## 2018-10-16 PROCEDURE — 80048 BASIC METABOLIC PNL TOTAL CA: CPT | Performed by: OBSTETRICS & GYNECOLOGY

## 2018-10-16 PROCEDURE — 83036 HEMOGLOBIN GLYCOSYLATED A1C: CPT | Performed by: OBSTETRICS & GYNECOLOGY

## 2018-10-16 PROCEDURE — 82043 UR ALBUMIN QUANTITATIVE: CPT | Performed by: OBSTETRICS & GYNECOLOGY

## 2018-10-16 PROCEDURE — 80061 LIPID PANEL: CPT | Performed by: OBSTETRICS & GYNECOLOGY

## 2018-10-16 PROCEDURE — 36415 COLL VENOUS BLD VENIPUNCTURE: CPT | Performed by: OBSTETRICS & GYNECOLOGY

## 2018-10-16 PROCEDURE — 90471 IMMUNIZATION ADMIN: CPT | Performed by: OBSTETRICS & GYNECOLOGY

## 2018-10-16 PROCEDURE — 99214 OFFICE O/P EST MOD 30 MIN: CPT | Mod: 25 | Performed by: OBSTETRICS & GYNECOLOGY

## 2018-10-16 RX ORDER — MEDROXYPROGESTERONE ACETATE 10 MG
10 TABLET ORAL DAILY
Qty: 10 TABLET | Refills: 0 | Status: SHIPPED | OUTPATIENT
Start: 2018-10-16 | End: 2018-12-08

## 2018-10-16 ASSESSMENT — PAIN SCALES - GENERAL: PAINLEVEL: NO PAIN (0)

## 2018-10-16 NOTE — NURSING NOTE
Prior to injection verified patient identity using patient's name and date of birth.  Due to injection administration, patient instructed to remain in clinic for 15 minutes  afterwards, and to report any adverse reaction to me immediately.    Lynn Burton CMA

## 2018-10-16 NOTE — MR AVS SNAPSHOT
"              After Visit Summary   10/16/2018    Fallon Rivera    MRN: [id number]           Patient Information     Date Of Birth          1980        Visit Information        Provider Department      10/16/2018 7:30 AM Greg Ford MD [facility]        Today's Diagnoses     Type 2 diabetes mellitus without complication, without long-term current use of insulin (H)    -  1    Morbid obesity (H)        Anovulation           Follow-ups after your visit        Who to contact     If you have questions or need follow up information about today's clinic visit or your schedule please contact [facility] directly at [phone].  Normal or non-critical lab and imaging results will be communicated to you by MyChart, letter or phone within 4 business days after the clinic has received the results. If you do not hear from us within 7 days, please contact the clinic through [employer] or phone. If you have a critical or abnormal lab result, we will notify you by phone as soon as possible.  Submit refill requests through [employer] or call your pharmacy and they will forward the refill request to us. Please allow 3 business days for your refill to be completed.          Additional Information About Your Visit        MyChart Information     [employer] gives you secure access to your electronic health record. If you see a primary care provider, you can also send messages to your care team and make appointments. If you have questions, please call your primary care clinic.  If you do not have a primary care provider, please call [phone] and they will assist you.        Care EveryWhere ID     This is your Care EveryWhere ID. This could be used by other organizations to access your [place] medical records  [id number]        Your Vitals Were     Pulse Temperature Respirations Height Pulse Oximetry Breastfeeding?    78 96.9  F (36.1  C) (Temporal) 16 5' 4\" (1.626 m) 98% No    " BMI (Body Mass Index)                   37.25 kg/m2            Blood Pressure from Last 3 Encounters:   10/16/18 114/70   09/08/18 124/75   07/20/18 118/66    Weight from Last 3 Encounters:   10/16/18 217 lb (98.4 kg)   09/08/18 219 lb 3.2 oz (99.4 kg)   07/20/18 217 lb (98.4 kg)              We Performed the Following     Albumin Random Urine Quantitative with Creat Ratio     Basic metabolic panel     Hemoglobin A1c     Lipid Profile        Primary Care Provider Office Phone # Fax #    Greg Ford [phone] [address] [place] [address]        Equal Access to Services     MICHAEL BALDWIN : Margarita Gaming, shanna kaminski, mohinder kaalmamona argueta, jeannie mcmahon. So [facility] [phone].    ATENCIÓN: Si habla español, tiene a pierre disposición servicios gratuitos de asistencia lingüística. Llame al [phone].    We comply with applicable federal civil rights laws and [place] laws. We do not discriminate on the basis of race, color, national origin, age, disability, sex, sexual orientation, or gender identity.            Thank you!     Thank you for choosing [facility]  for your care. Our goal is always to provide you with excellent care. Hearing back from our patients is one way we can continue to improve our services. Please take a few minutes to complete the written survey that you may receive in the mail after your visit with us. Thank you!             Your Updated Medication List - Protect others around you: Learn how to safely use, store and throw away your medicines at www.disposemymeds.org.          This list is accurate as of 10/16/18  7:48 AM.  Always use your most recent med list.                   Brand Name Dispense Instructions for use Diagnosis    ALEVE 220 MG tablet   Generic drug:  naproxen sodium      Take 220 mg by mouth 2 times daily (with meals)        B COMPLETE Tabs      Take by mouth  daily        blood glucose monitoring lancets     1 Box    Use to check blood sugar 1-2 times daily    Diabetes mellitus (H)       CONTOUR NEXT TEST test strip   Generic drug:  blood glucose monitoring     100 each    USE TO TEST BLOOD SUGAR TWO TIMES A DAY OR AS DIRECTED    Diabetes mellitus (H)       cyclobenzaprine 5 MG tablet    FLEXERIL    90 tablet    Take 1 tablet (5 mg) by mouth 3 times daily as needed for muscle spasms    Lumbar radiculopathy       metFORMIN 500 MG tablet    GLUCOPHAGE    30 tablet    TAKE ONE TABLET BY MOUTH EVERY DAY WITH DINNER    Type 2 diabetes mellitus without complication, unspecified long term insulin use status, Gastroesophageal reflux disease without esophagitis       MULTIPLE VITAMINS/WOMENS PO      Reported on 5/15/2017        omeprazole 20 MG CR capsule    priLOSEC    30 capsule    TAKE 1 CAPSULE BY MOUTH DAILY, 30 TO 60 MINUTES BEFORE A MEAL.    Gastroesophageal reflux disease without esophagitis, Type 2 diabetes mellitus without complication, unspecified long term insulin use status       Vitamin D (Cholecalciferol) 1000 units Tabs      Take by mouth daily

## 2018-10-16 NOTE — PROGRESS NOTES
Subjective: Here for diabetic followup exam.   Has been monitoring sugars 2 times daily. Glucose values have been in the  140-200 range fasting, and  16--220 range postprandial.  There have been no   changes in vision  No changes/ complaints   of neuropathy symptoms.   No other concerns  Except she has irregular menses and the PCOS may be making her anovulatory- she has had bilateral salpingectomy for ectopic so she cant be pregnant- she can go months without a period and then spots for a few days- wants to use provera again to help regulate her.      The past medical history, social history, past surgical history and family history as shown below have been reviewed by me today.  Past Medical History:   Diagnosis Date     Abnormal Pap smear 2006, 2007,     ASC-H, ASCUS + HPV 45     Calculus of kidney 2002     Cervical high risk HPV (human papillomavirus) test positive     + HPV 45 (high risk)     History of colposcopy with cervical biopsy 2/9/06, 3/15/07    koilocytosis 2007        Allergies   Allergen Reactions     Amoxicillin Hives     Anti-Nausea      Anxiety, agitation,severe paranoia. Zofran, Reglan are some of them.       Demerol Hcl [Meperidine Hcl] Nausea     Indomethacin Nausea and Vomiting     Macrobid [Nitrofuran Derivatives] Hives     Reglan [Metoclopramide] Other (See Comments)     Acute paranoia, severe     Zofran [Ondansetron] Other (See Comments)     Severe anxiety, agitation     Current Outpatient Prescriptions   Medication Sig Dispense Refill     B Complex-Biotin-FA (B COMPLETE) TABS Take by mouth daily       blood glucose monitoring (LILLIAN MICROLET) lancets Use to check blood sugar 1-2 times daily 1 Box 12     CONTOUR NEXT TEST test strip USE TO TEST BLOOD SUGAR TWO TIMES A DAY OR AS DIRECTED 100 each 3     cyclobenzaprine (FLEXERIL) 5 MG tablet Take 1 tablet (5 mg) by mouth 3 times daily as needed for muscle spasms 90 tablet 0     metFORMIN (GLUCOPHAGE) 500 MG tablet TAKE ONE TABLET BY MOUTH  EVERY DAY WITH DINNER 30 tablet 11     Multiple Vitamins-Minerals (MULTIPLE VITAMINS/WOMENS PO) Reported on 5/15/2017       naproxen sodium (ALEVE) 220 MG tablet Take 220 mg by mouth 2 times daily (with meals)       omeprazole (PRILOSEC) 20 MG CR capsule TAKE 1 CAPSULE BY MOUTH DAILY, 30 TO 60 MINUTES BEFORE A MEAL. 30 capsule 5     Vitamin D, Cholecalciferol, 1000 units TABS Take by mouth daily       Past Surgical History:   Procedure Laterality Date     C REMOVAL OF KIDNEY STONE      two surgeries, 2002,2003     CHOLECYSTECTOMY, LAPOROSCOPIC  5/11/2007    Cholecystectomy, Laparoscopic     COLONOSCOPY  05/17/10     CONIZATION  7/1/2011    Procedure:CONIZATION; cold knife and punch biopsy of mole on back; Surgeon:GREG FORD; Location:[facility]     DILATION AND CURETTAGE, HYSTEROSCOPY, LAPAROSCOPY, COMBINED Right 9/24/2015    Procedure: COMBINED DILATION AND CURETTAGE, HYSTEROSCOPY, LAPAROSCOPY;  Surgeon: Greg Ford MD;  Location: [facility]     ESOPHAGOSCOPY, GASTROSCOPY, DUODENOSCOPY (EGD), COMBINED  5/21/2014    Procedure: COMBINED ESOPHAGOSCOPY, GASTROSCOPY, DUODENOSCOPY (EGD), BIOPSY SINGLE OR MULTIPLE;  Surgeon: Earl Lane MD;  Location: [facility] GI     EXCISE LESION TRUNK  7/1/2011    Procedure:EXCISE LESION TRUNK; Surgeon:GREG FORD; Location:[facility] OR     HC FRAGMENTING OF KIDNEY STONE  11/30/2005     HC RX ECTOP PREG BY SCOPE,RMV TUBE/OVRY  12/20/2007    Right sided salpingectomy and removal of ruptured ectopic pregnancy. D&C.     HC UGI ENDOSCOPY, SIMPLE EXAM  09/01/09     LAPAROSCOPIC APPENDECTOMY N/A 9/24/2015    Procedure: LAPAROSCOPIC APPENDECTOMY;  Surgeon: James Cuellar MD;  [facility]: [facility]     LAPAROSCOPIC CYSTECTOMY OVARIAN (BENIGN) N/A 9/24/2015    Procedure: LAPAROSCOPIC CYSTECTOMY OVARIAN (BENIGN);  Surgeon: Greg Ford MD;  Location: [facility]     LAPAROSCOPIC OOPHORECTOMY Right 9/24/2015    Procedure: LAPAROSCOPIC OOPHORECTOMY;   Surgeon: Greg Ford MD;  Location: [facility]     LITHOTRIPSY  01/17/2007     PELVIS LAPAROSCOPY,DX  10/16/2000    Diagnostic laparoscopy, Endometrial biopsy.     TUBAL/ECTOPIC PREGNANCY  01/23/2007    Lap removal of left ruptured ectopic pregnancy & salpingectomy, D&C     Social History     Social History     Marital status:      Spouse name: Joseph     Number of children: 1     Years of education: 9     Occupational History      None     Social History Main Topics     Smoking status: Current Some Day Smoker     Years: 12.00     Types: Cigarettes     Last attempt to quit: 7/14/2009     Smokeless tobacco: Never Used      Comment: As of 10 /2014, pt has had a few cigs here and there     Alcohol use 0.0 oz/week     0 Standard drinks or equivalent per week      Comment: every few months     Drug use: No     Sexual activity: Yes     Partners: Male     Birth control/ protection: None     Other Topics Concern      Service No     Blood Transfusions No     Caffeine Concern Yes     Reports 1 can soda/week  Advised not more than 16 ounces caffeien/day.     Occupational Exposure No     Hobby Hazards No     Sleep Concern No     Stress Concern Yes     will discuss with      Weight Concern Yes     Eating disorder in childhood.  Hx. gestational diab.     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     Exercise No     Advised walking 30 min/day.     Bike Helmet No     Seat Belt Yes     Social History Narrative     and lives at home with her  and daughter.     Family History   Problem Relation Age of Onset     Diabetes Maternal Grandmother      adult onset     Anesthesia Reaction Maternal Grandmother      can't tolerate Novacaine.     Respiratory Maternal Grandmother      COPD and emphysema.     Thyroid Disease Maternal Grandmother      HEART DISEASE Maternal Grandmother      CHF     Diabetes Paternal Grandfather      adult o nset      "Hypertension Paternal Grandfather      Cerebrovascular Disease Paternal Grandfather      HEART DISEASE Paternal Grandfather      MI, replaced valve,angioplasty     Thyroid Disease Paternal Grandfather      Cancer Maternal Grandfather      skin cancer     HEART DISEASE Maternal Grandfather      MI     Obesity Mother      on thyroid medication     Thyroid Disease Mother      Diabetes Mother      Rheumatologic Disease Mother      HEART DISEASE Father      Hypertension Father      and TIA     Lipids Father      Breast Cancer Paternal Grandmother      Arrhythmia Other      Cancer Maternal Aunt      breast/brain cancer     Depression Paternal Aunt      Cerebrovascular Disease Paternal Uncle      Cerebrovascular Disease Paternal Aunt        ROS: A 12 point review of systems was done. Except for what is listed above in the HPI, the systems review is negative .      Objective: Vital signs: Blood pressure 114/70, pulse 78, temperature 96.9  F (36.1  C), temperature source Temporal, resp. rate 16, height 5' 4\" (1.626 m), weight 217 lb (98.4 kg), SpO2 98 %, not currently breastfeeding.      HEENT:  Sclerae and conjunctiva are normal.   Ear canals and TMs look normal.  Nasal mucosa is pink  - no polyps or masses seen.  sinuses are non tender to palpation.  Throat is unremarkable . Mucous membranes are moist.   Fundi are benign- disc margins are sharp. No papilledema noted.    Neck is supple, mobile, no adenoapthy or masses palpable. Normal range of motion noted.  Chest is clear to auscultation. No wheezes, rales or rhonchi heard.  cardiac exam is normal with s1, s2, no murmurs or adventitious sounds.Normal rate and rhythm is heard.  Abdomen is soft,  nondistended, No masses felt.No HSM. No guarding or rigidity or rebound   noted. Palpation reveals  no    tenderness   Normal bowel sounds heard.     Exam of the feet is negative for paresthesias.  Has normal sensation and color to both feet, and normal pulses and no trophic skin " changes or ulcers.       Assessment: Diabetes is  Moderately   well-controlled.I will know for sure when I see the A1C from today-    Plan: 1. Ace inhibitor therapy:   Declined-  2.Labs today: A1C      Urine for microprotein    Lipids  Basic panel.  3. Baby aspirin 65 or 81 mg advised daily as prophylaxis- watch for GI upset or tinnitus or bruising/signs of bleeding- stop if these occur.  4.Advised to recheck in  3 months.  Continue daily glucose monitoring. Recheck acutely if concerns.  5. Advised to have yearly ophthalmology exam, regular dental visits and keep up to date on flu vaccine and TDAP.   6. Will start provera 10 mg daily for 10 days. The potential side effects and risks of the medication were thoroughly discussed with the patient.  If the irregular menses persists will consider embx.  SHARAN Ford MD

## 2018-10-17 ENCOUNTER — TELEPHONE (OUTPATIENT)
Dept: FAMILY MEDICINE | Facility: CLINIC | Age: 38
End: 2018-10-17

## 2018-10-17 ASSESSMENT — PATIENT HEALTH QUESTIONNAIRE - PHQ9: SUM OF ALL RESPONSES TO PHQ QUESTIONS 1-9: 0

## 2018-10-17 NOTE — TELEPHONE ENCOUNTER
Reason for Call:  Request for results:    Name of test or procedure: Labs    Date of test of procedure: 10.16    Location of the test or procedure: [facility]    OK to leave the result message on voice mail or with a family member? YES    Phone number Patient can be reached at:  Home number on file [phone] (home)    Additional comments: Please call patient with lab results    Call taken on 10/17/2018 at 11:59 AM by Dari Frankel

## 2018-10-17 NOTE — PROGRESS NOTES
Lynn Please inform Fallon/ or caretaker  that this result(s) is/are much improved- the lipid levels are good and the A1C is down to 7.3   It wasd 8.2   That is excellent!!Thanks. SHARAN Ford MD

## 2018-10-18 NOTE — TELEPHONE ENCOUNTER
Please inform Fallon/ or caretaker  that this result(s) is/are much improved- the lipid levels are good and the A1C is down to 7.3   It wasd 8.2   That is excellent!!Thanks. SHARAN Ford MD     Results with provider comments released to patients leobardo Burton, CMA

## 2018-11-14 ENCOUNTER — MYC REFILL (OUTPATIENT)
Dept: FAMILY MEDICINE | Facility: CLINIC | Age: 38
End: 2018-11-14

## 2018-11-14 DIAGNOSIS — E11.9 TYPE 2 DIABETES MELLITUS (H): ICD-10-CM

## 2018-11-14 DIAGNOSIS — K21.9 GASTROESOPHAGEAL REFLUX DISEASE WITHOUT ESOPHAGITIS: Primary | ICD-10-CM

## 2018-11-14 NOTE — TELEPHONE ENCOUNTER
Message from MyChart:  Original authorizing provider: MD Fallon Worley would like a refill of the following medications:  omeprazole (PRILOSEC) 20 MG CR capsule [Yamil Talley MD]    Preferred pharmacy: [address]     Comment:

## 2018-11-15 NOTE — TELEPHONE ENCOUNTER
"Bree refill given per RN protocol.   Pt notified via My Chart of refill given and that she is due for office visit f/u.     Anna Kolb RN, BSN      prilosec  Last Written Prescription Date:  4/19/2018  Last Fill Quantity: 30,  # refills: 5   Last office visit: 10/16/2018 with prescribing provider:   Future Office Visit:      Requested Prescriptions   Pending Prescriptions Disp Refills     omeprazole (PRILOSEC) 20 MG CR capsule 30 capsule 5     Sig: TAKE 1 CAPSULE BY MOUTH DAILY, 30 TO 60 MINUTES BEFORE A MEAL.    PPI Protocol Passed    11/14/2018  3:40 PM       Passed - Not on Clopidogrel (unless Pantoprazole ordered)       Passed - No diagnosis of osteoporosis on record       Passed - Recent (12 mo) or future (30 days) visit within the authorizing provider's specialty    Patient had office visit in the last 12 months or has a visit in the next 30 days with authorizing provider or within the authorizing provider's specialty.  See \"Patient Info\" tab in inbasket, or \"Choose Columns\" in Meds & Orders section of the refill encounter.             Passed - Patient is age 18 or older       Passed - No active pregnacy on record       Passed - No positive pregnancy test in past 12 months        Anna Kolb RN on 11/15/2018 at 11:32 AM    "

## 2018-12-08 ENCOUNTER — OFFICE VISIT (OUTPATIENT)
Dept: URGENT CARE | Facility: RETAIL CLINIC | Age: 38
End: 2018-12-08
Payer: COMMERCIAL

## 2018-12-08 VITALS — TEMPERATURE: 98.5 F | HEART RATE: 99 BPM | DIASTOLIC BLOOD PRESSURE: 83 MMHG | SYSTOLIC BLOOD PRESSURE: 127 MMHG

## 2018-12-08 DIAGNOSIS — R30.0 DYSURIA: Primary | ICD-10-CM

## 2018-12-08 LAB
BILIRUB UR QL: NORMAL
CLARITY: CLEAR
COLOR UR: YELLOW
GLUCOSE URINE: NORMAL MG/DL
HGB UR QL: NORMAL
KETONES UR QL: NORMAL MG/DL
NITRITE UR QL STRIP: NORMAL
PH UR STRIP: 6 PH (ref 5–7)
PROT UR QL: NORMAL MG/DL
SP GR UR STRIP: 1.03 (ref 1–1.03)
SPECIMEN VOL UR: NORMAL ML
UROBILINOGEN UR QL STRIP: 0.2 EU/DL (ref 0.2–1)
WBC #/AREA URNS HPF: NORMAL /[HPF]

## 2018-12-08 PROCEDURE — 99213 OFFICE O/P EST LOW 20 MIN: CPT | Performed by: FAMILY MEDICINE

## 2018-12-08 PROCEDURE — 87086 URINE CULTURE/COLONY COUNT: CPT | Performed by: FAMILY MEDICINE

## 2018-12-08 PROCEDURE — 81003 URINALYSIS AUTO W/O SCOPE: CPT | Performed by: FAMILY MEDICINE

## 2018-12-08 NOTE — PROGRESS NOTES
SUBJECTIVE:  Fallon Rivera is a 38 year old female who  presents today for a possible UTI. Symptoms of dysuria and burning have been going on for 5day(s).  Hematuria no.  still presentand mild.  There is no history of fever, chills, nausea or vomiting.  No history of vaginal or penile discharge. This patient does  have a history of urinary tract infections. Patient denies long duration, rigors, flank pain and temperature > 101 degrees F. or vaginal discharge and vaginal odor     Past Medical History:   Diagnosis Date     Abnormal Pap smear 2006, 2007,     ASC-H, ASCUS + HPV 45     Calculus of kidney 2002     Cervical high risk HPV (human papillomavirus) test positive     + HPV 45 (high risk)     History of colposcopy with cervical biopsy 2/9/06, 3/15/07    koilocytosis 2007     Current Outpatient Prescriptions   Medication Sig Dispense Refill     B Complex-Biotin-FA (B COMPLETE) TABS Take by mouth daily       blood glucose monitoring (LILLIAN MICROLET) lancets Use to check blood sugar 1-2 times daily 1 Box 12     CONTOUR NEXT TEST test strip USE TO TEST BLOOD SUGAR TWO TIMES A DAY OR AS DIRECTED 100 each 3     cyclobenzaprine (FLEXERIL) 5 MG tablet Take 1 tablet (5 mg) by mouth 3 times daily as needed for muscle spasms 90 tablet 0     metFORMIN (GLUCOPHAGE) 500 MG tablet TAKE ONE TABLET BY MOUTH EVERY DAY WITH DINNER 30 tablet 11     naproxen sodium (ALEVE) 220 MG tablet Take 220 mg by mouth 2 times daily (with meals)       omeprazole (PRILOSEC) 20 MG CR capsule TAKE 1 CAPSULE BY MOUTH DAILY, 30 TO 60 MINUTES BEFORE A MEAL. 30 capsule 1     Vitamin D, Cholecalciferol, 1000 units TABS Take by mouth daily       Social History   Substance Use Topics     Smoking status: Current Some Day Smoker     Years: 12.00     Types: Cigarettes     Last attempt to quit: 7/14/2009     Smokeless tobacco: Never Used      Comment: As of 10 /2014, pt has had a few cigs here and there     Alcohol use 0.0 oz/week     0 Standard  drinks or equivalent per week      Comment: every few months       ROS:   Review of systems negative except as stated above.    OBJECTIVE:  /83 (Cuff Size: Adult Large)  Pulse 99  Temp 98.5  F (36.9  C) (Oral)  GENERAL APPEARANCE: healthy, alert and no distress  RESP: lungs clear to auscultation - no rales, rhonchi or wheezes  CV: regular rates and rhythm, normal S1 S2, no murmur noted  ABDOMEN:  soft, nontender, no HSM or masses and bowel sounds normal  BACK: No CVA tenderness  SKIN: no suspicious lesions or rashes  UA: negative  ASSESSMENT:   Lower, uncomplicated urinary tract infection.    PLAN: Await UC.  See PCP if loss of bladder control  Drink plenty of fluids.  Prevention and treatment of UTI's discussed.Signs and symptoms of pyelonephritis mentioned.  Follow up with primary care provider if not improving.

## 2018-12-08 NOTE — MR AVS SNAPSHOT
After Visit Summary   12/8/2018    Fallon Rivera    MRN: [id number]           Patient Information     Date Of Birth          1980        Visit Information        Provider Department      12/8/2018 10:40 AM Earl Jones MD [facility]        Today's Diagnoses     Dysuria    -  1       Follow-ups after your visit        Who to contact     You can reach your care team any time of the day by calling [phone].  Notification of test results:  If you have an abnormal lab result, we will notify you by phone as soon as possible.         Additional Information About Your Visit        MyChart Information     [employer] gives you secure access to your electronic health record. If you see a primary care provider, you can also send messages to your care team and make appointments. If you have questions, please call your primary care clinic.  If you do not have a primary care provider, please call [phone] and they will assist you.        Care EveryWhere ID     This is your Care EveryWhere ID. This could be used by other organizations to access your [place] medical records  [id number]        Your Vitals Were     Pulse Temperature                99 98.5  F (36.9  C) (Oral)           Blood Pressure from Last 3 Encounters:   12/08/18 127/83   10/16/18 114/70   09/08/18 124/75    Weight from Last 3 Encounters:   10/16/18 217 lb (98.4 kg)   09/08/18 219 lb 3.2 oz (99.4 kg)   07/20/18 217 lb (98.4 kg)              We Performed the Following     UA without Microscopic     Urine Culture Aerobic Bacterial        Primary Care Provider Office Phone # Fax #    Greg Fabian Ford [phone]       [address] [place] [address]        Equal Access to Services     [facility] CenterSHARAN : Hadjinny Gaming, shanna kaminski, jeannie mckeon. So [facility] [phone].    ATENCIÓN: mayda Judge  a pierre disposición servicios gratuitos de asistencia lingüística. Tha morillo [phone].    We comply with applicable federal civil rights laws and [place] laws. We do not discriminate on the basis of race, color, national origin, age, disability, sex, sexual orientation, or gender identity.            Thank you!     Thank you for choosing [facility]  for your care. Our goal is always to provide you with excellent care. Hearing back from our patients is one way we can continue to improve our services. Please take a few minutes to complete the written survey that you may receive in the mail after your visit with us. Thank you!             Your Updated Medication List - Protect others around you: Learn how to safely use, store and throw away your medicines at www.disposemymeds.org.          This list is accurate as of 12/8/18 10:45 AM.  Always use your most recent med list.                   Brand Name Dispense Instructions for use Diagnosis    ALEVE 220 MG tablet   Generic drug:  naproxen sodium      Take 220 mg by mouth 2 times daily (with meals)        B COMPLETE Tabs      Take by mouth daily        blood glucose monitoring lancets     1 Box    Use to check blood sugar 1-2 times daily    Diabetes mellitus (H)       CONTOUR NEXT TEST test strip   Generic drug:  blood glucose monitoring     100 each    USE TO TEST BLOOD SUGAR TWO TIMES A DAY OR AS DIRECTED    Diabetes mellitus (H)       cyclobenzaprine 5 MG tablet    FLEXERIL    90 tablet    Take 1 tablet (5 mg) by mouth 3 times daily as needed for muscle spasms    Lumbar radiculopathy       metFORMIN 500 MG tablet    GLUCOPHAGE    30 tablet    TAKE ONE TABLET BY MOUTH EVERY DAY WITH DINNER    Type 2 diabetes mellitus without complication, unspecified long term insulin use status, Gastroesophageal reflux disease without esophagitis       omeprazole 20 MG DR capsule    priLOSEC    30 capsule    TAKE 1 CAPSULE BY MOUTH DAILY, 30 TO 60 MINUTES  BEFORE A MEAL.    Gastroesophageal reflux disease without esophagitis, Type 2 diabetes mellitus (H)       Vitamin D (Cholecalciferol) 1000 units Tabs      Take by mouth daily

## 2018-12-09 LAB
BACTERIA SPEC CULT: NORMAL
Lab: NORMAL
SPECIMEN SOURCE: NORMAL

## 2018-12-11 NOTE — RESULT ENCOUNTER NOTE
UC final >100,000 colonies/mL mixed urogenital kee, normal vaginal kee, no UTI. Not on antibiotics. No change in plan.  Destini Navarro PA-C  [facility]

## 2018-12-14 ENCOUNTER — TRANSFERRED RECORDS (OUTPATIENT)
Dept: HEALTH INFORMATION MANAGEMENT | Facility: CLINIC | Age: 38
End: 2018-12-14

## 2018-12-17 ENCOUNTER — HOSPITAL ENCOUNTER (EMERGENCY)
Facility: CLINIC | Age: 38
Discharge: HOME | End: 2018-12-17
Attending: FAMILY MEDICINE | Admitting: FAMILY MEDICINE
Payer: COMMERCIAL

## 2018-12-17 ENCOUNTER — TELEPHONE (OUTPATIENT)
Dept: FAMILY MEDICINE | Facility: CLINIC | Age: 38
End: 2018-12-17

## 2018-12-17 VITALS
TEMPERATURE: 98 F | RESPIRATION RATE: 16 BRPM | SYSTOLIC BLOOD PRESSURE: 138 MMHG | OXYGEN SATURATION: 99 % | DIASTOLIC BLOOD PRESSURE: 90 MMHG

## 2018-12-17 DIAGNOSIS — M54.16 LUMBAR RADICULOPATHY: ICD-10-CM

## 2018-12-17 PROCEDURE — 99285 EMERGENCY DEPT VISIT HI MDM: CPT | Performed by: FAMILY MEDICINE

## 2018-12-17 PROCEDURE — 99284 EMERGENCY DEPT VISIT MOD MDM: CPT | Mod: Z6 | Performed by: FAMILY MEDICINE

## 2018-12-17 PROCEDURE — 96372 THER/PROPH/DIAG INJ SC/IM: CPT | Performed by: FAMILY MEDICINE

## 2018-12-17 PROCEDURE — 25000128 H RX IP 250 OP 636: Performed by: FAMILY MEDICINE

## 2018-12-17 RX ORDER — OXYCODONE AND ACETAMINOPHEN 5; 325 MG/1; MG/1
1-2 TABLET ORAL EVERY 4 HOURS PRN
Qty: 12 TABLET | Refills: 0 | Status: SHIPPED | OUTPATIENT
Start: 2018-12-17 | End: 2019-04-25

## 2018-12-17 RX ORDER — LIDOCAINE 4 G/G
1 PATCH TOPICAL EVERY 24 HOURS
COMMUNITY
End: 2019-08-01

## 2018-12-17 RX ORDER — PREDNISONE 20 MG/1
60 TABLET ORAL DAILY
Qty: 90 TABLET | Refills: 0 | Status: SHIPPED | OUTPATIENT
Start: 2018-12-17 | End: 2019-01-04

## 2018-12-17 RX ORDER — KETOROLAC TROMETHAMINE 30 MG/ML
60 INJECTION, SOLUTION INTRAMUSCULAR; INTRAVENOUS ONCE
Status: COMPLETED | OUTPATIENT
Start: 2018-12-17 | End: 2018-12-17

## 2018-12-17 RX ADMIN — KETOROLAC TROMETHAMINE 60 MG: 30 INJECTION, SOLUTION INTRAMUSCULAR at 09:02

## 2018-12-17 RX ADMIN — HYDROMORPHONE HYDROCHLORIDE 1 MG: 1 INJECTION, SOLUTION INTRAMUSCULAR; INTRAVENOUS; SUBCUTANEOUS at 09:04

## 2018-12-17 NOTE — TELEPHONE ENCOUNTER
Reason for Call:  Medication or medication refill:    Do you use a [facility]?  Name of the pharmacy and phone number for the current request:  [facility]- [phone]    Name of the medication requested:  Medrol Dosepak     Other request: back pain. Pt stated she will be seen if needed. Would like to avoid a medical bill at this time of year if poss. Pt aware RM out of clinic today.    Can we leave a detailed message on this number? YES    Phone number patient can be reached at:     Best Time:     Call taken on 12/17/2018 at 7:38 AM by Lupe Lim

## 2018-12-17 NOTE — ED TRIAGE NOTES
Presents with low back pain. States she had a flare up that started last Thursday. She has seen the chiropractor but is unable to get the pain under control. She has a history of a herniated disc.

## 2018-12-17 NOTE — ED AVS SNAPSHOT
[facility] Emergency Department  [address] [place] [address]  Phone:  [phone]  Fax:  [phone]                                    Fallon Rivera   MRN: [id number]    Department:  [facility] Emergency Department   Date of Visit:  12/17/2018           After Visit Summary Signature Page    I have received my discharge instructions, and my questions have been answered. I have discussed any challenges I see with this plan with the nurse or doctor.    ..........................................................................................................................................  Patient/Patient Representative Signature      ..........................................................................................................................................  Patient Representative Print Name and Relationship to Patient    ..................................................               ................................................  Date                                   Time    ..........................................................................................................................................  Reviewed by Signature/Title    ...................................................              ..............................................  Date                                               Time          22EPIC Rev 08/18

## 2018-12-17 NOTE — ED PROVIDER NOTES
History     Chief Complaint   Patient presents with     Back Pain     HPI  Fallon Rivera is a 38 year old female who presents with exacerbation of her chronic back pain.  Patient was last seen here in September when she was having significant back issues.  She had a repeat MRI that showed everything was stable.  She is supposed to follow-up with Dr. Roe, neurosurgery but has not followed up yet.  Patient states about 4 days ago her back started flaring up again.  She is tried Aleve at home, Lidoderm patches and muscle relaxants but this is not helped the pain.  Patient denies any bowel or bladder incontinence.  She is having pain going down her left leg.  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    Problem List:    Patient Active Problem List    Diagnosis Date Noted     Obesity (BMI 35.0-39.9) with comorbidity (H) 10/16/2018     Priority: Medium     Non morbid obesity due to excess calories 07/05/2016     Priority: Medium     Type 2 diabetes mellitus without complication (H) 04/29/2016     Priority: Medium     Glucosuria 04/27/2016     Priority: Medium     Right ovarian cyst 09/15/2015     Priority: Medium     Hyperlipidemia LDL goal <130 07/18/2012     Priority: Medium     Mild major depression (H) 07/18/2012     Priority: Medium     GERD (gastroesophageal reflux disease) 07/03/2012     Priority: Medium     CARDIOVASCULAR SCREENING; LDL GOAL LESS THAN 160 10/31/2010     Priority: Medium     Dyspnea 11/24/2009     Priority: Medium     Kidney stones 02/17/2009     Priority: Medium     S/P conization of cervix- KAYLA 2/3 in 2011 06/27/2007     Priority: Medium     1/24/06 pap: ASC-H  2/9/06 colposcopy/bx (negative)/ECC (negative) pap- ASC-H  5/24/06 pap- ASC-H  9/6/07 pap NIL  1/23/07 pap ASCUS + HPV 45 (high risk)  3/15/07 pap ASCUS + HPV 45 (high risk) colposcopy- bx (negative)/ECC (koilocytosis)  6/19/07 ECC- koilocytosis.  Pap-  AGS  10/23/07 pap- ASC-H, ECC- negative  2/14/08 pap NIL- return to yearly paps  2/17/09 pap NIL- repeat 1 year  3/8/10 pap NIL  3/3/11 pap ASCUS + HPV 45 (high risk)- colposcopy recommended  5/16/11 colpo- bx (KAYLA 2/3/HSIL) ECC (ASCUS)  pap (ASC-H)  6/2/11 recommend: [facility]  7/1/11 [facility]: path: KAYLA 2/3 with possible involvement of the endocervical margin.  ECC- neg.  Endometrium- neg.  7/14/11 plan: HIPOLITO in approx 2 months  10/10/11- hysterectomy planned.  (cancelled)  11/7/11 pap-- NIL. Plan--repeat pap in 6 months. (5/7/12)  5/7/12 pap NIL. Plan-- pap in 1 year (due 5/7/13)   5/8/13 pap NIL. Plan-pap in 1 year  5/8/14 NIL pap, neg HR HPV.  Plan- repeat pap/HPV in 1 year (due 5/8/15)  10/14/14 NIL pap, neg HR HPV. Endometrial biopsy- WNL   1/6/16  NIL pap, neg HR HPV.  Plan: paps yearly, per Dr. Ford.  1/30/17 NIL pap/neg HR HPV. Plan: pap in 1 year.   3/12/18 NIL pap, neg HR HPV. Plan: cotest annually, per Dr. Ford.         Papanicolaou smear of cervix with atypical squamous cells cannot exclude high grade squamous intraepithelial lesion (ASC-H) 09/07/2006     Priority: Medium     Female infertility 07/24/2006     Priority: Medium     Problem list name updated by automated process. Provider to review       Hemorrhage of rectum and anus 12/02/2004     Priority: Medium        Past Medical History:    Past Medical History:   Diagnosis Date     Abnormal Pap smear 2006, 2007,      Calculus of kidney 2002     Cervical high risk HPV (human papillomavirus) test positive      History of colposcopy with cervical biopsy 2/9/06, 3/15/07       Past Surgical History:    Past Surgical History:   Procedure Laterality Date     C REMOVAL OF KIDNEY STONE      two surgeries, 2002,2003     CHOLECYSTECTOMY, LAPOROSCOPIC  5/11/2007    Cholecystectomy, Laparoscopic     COLONOSCOPY  05/17/10     CONIZATION  7/1/2011    Procedure:CONIZATION; cold knife and punch biopsy of mole on back; Surgeon:SYDNEY FORD; Location: OR      DILATION AND CURETTAGE, HYSTEROSCOPY, LAPAROSCOPY, COMBINED Right 9/24/2015    Procedure: COMBINED DILATION AND CURETTAGE, HYSTEROSCOPY, LAPAROSCOPY;  Surgeon: Greg Ford MD;  Location: [facility] OR     ESOPHAGOSCOPY, GASTROSCOPY, DUODENOSCOPY (EGD), COMBINED  5/21/2014    Procedure: COMBINED ESOPHAGOSCOPY, GASTROSCOPY, DUODENOSCOPY (EGD), BIOPSY SINGLE OR MULTIPLE;  Surgeon: Earl Lane MD;  Location: [facility] GI     EXCISE LESION TRUNK  7/1/2011    Procedure:EXCISE LESION TRUNK; Surgeon:GREG FORD; Location:[facility] OR     HC FRAGMENTING OF KIDNEY STONE  11/30/2005     HC RX ECTOP PREG BY SCOPE,RMV TUBE/OVRY  12/20/2007    Right sided salpingectomy and removal of ruptured ectopic pregnancy. D&C.     HC UGI ENDOSCOPY, SIMPLE EXAM  09/01/09     LAPAROSCOPIC APPENDECTOMY N/A 9/24/2015    Procedure: LAPAROSCOPIC APPENDECTOMY;  Surgeon: James Cuellar MD;  Location: [facility] OR     LAPAROSCOPIC CYSTECTOMY OVARIAN (BENIGN) N/A 9/24/2015    Procedure: LAPAROSCOPIC CYSTECTOMY OVARIAN (BENIGN);  Surgeon: Greg Ford MD;  Location: [facility]     LAPAROSCOPIC OOPHORECTOMY Right 9/24/2015    Procedure: LAPAROSCOPIC OOPHORECTOMY;  Surgeon: Greg Ford MD;  Location: [facility]     LITHOTRIPSY  01/17/2007     PELVIS LAPAROSCOPY,DX  10/16/2000    Diagnostic laparoscopy, Endometrial biopsy.     TUBAL/ECTOPIC PREGNANCY  01/23/2007    Lap removal of left ruptured ectopic pregnancy & salpingectomy, D&C       Family History:    Family History   Problem Relation Age of Onset     Diabetes Maternal Grandmother         adult onset     Anesthesia Reaction Maternal Grandmother         can't tolerate Novacaine.     Respiratory Maternal Grandmother         COPD and emphysema.     Thyroid Disease Maternal Grandmother      Heart Disease Maternal Grandmother         CHF     Diabetes Paternal Grandfather         adult o nset     Hypertension Paternal Grandfather      Cerebrovascular Disease  Paternal Grandfather      Heart Disease Paternal Grandfather         MI, replaced valve,angioplasty     Thyroid Disease Paternal Grandfather      Cancer Maternal Grandfather         skin cancer     Heart Disease Maternal Grandfather         MI     Obesity Mother         on thyroid medication     Thyroid Disease Mother      Diabetes Mother      Rheumatologic Disease Mother      Heart Disease Father      Hypertension Father         and TIA     Lipids Father      Breast Cancer Paternal Grandmother      Arrhythmia Other      Cancer Maternal Aunt         breast/brain cancer     Depression Paternal Aunt      Cerebrovascular Disease Paternal Uncle      Cerebrovascular Disease Paternal Aunt        Social History:  Marital Status:   [2]  Social History     Tobacco Use     Smoking status: Current Some Day Smoker     Years: 12.00     Types: Cigarettes     Last attempt to quit: 2009     Years since quittin.4     Smokeless tobacco: Never Used     Tobacco comment: As of 10 /2014, pt has had a few cigs here and there   Substance Use Topics     Alcohol use: Yes     Alcohol/week: 0.0 oz     Comment: every few months     Drug use: No        Medications:      Lidocaine (LIDOCARE) 4 % Patch   B Complex-Biotin-FA (B COMPLETE) TABS   blood glucose monitoring (LILLIAN MICROLET) lancets   CONTOUR NEXT TEST test strip   cyclobenzaprine (FLEXERIL) 5 MG tablet   metFORMIN (GLUCOPHAGE) 500 MG tablet   naproxen sodium (ALEVE) 220 MG tablet   omeprazole (PRILOSEC) 20 MG CR capsule   Vitamin D, Cholecalciferol, 1000 units TABS         Review of Systems   All other systems reviewed and are negative.      Physical Exam   BP: (!) 149/99  Heart Rate: 117  Temp: 98  F (36.7  C)  Resp: 16  SpO2: 96 %      Physical Exam   Constitutional: She is oriented to person, place, and time. She appears well-developed and well-nourished. No distress.   Musculoskeletal: Normal range of motion.        Lumbar back: She exhibits tenderness (Right lumbar  area) and pain. She exhibits no bony tenderness, no swelling, no edema, no deformity, no laceration, no spasm and normal pulse.   Neurological: She is alert and oriented to person, place, and time. She displays normal reflexes. No sensory deficit.   Skin: She is not diaphoretic.   Nursing note and vitals reviewed.      ED Course        Procedures          Medications   ketorolac (TORADOL) injection 60 mg (not administered)   HYDROmorphone (DILAUDID) injection 1 mg (not administered)     Patient appears to have an exacerbation of her chronic back pain.  There are no red flag symptoms, no indications for advanced imaging at the moment.  Will discharge patient home with a few Percocets and put her back on a course of steroids.  She was strongly encouraged to follow-up with neurosurgery this time.  Patient was given their number.    Assessments & Plan (with Medical Decision Making)  Lumbar radiculopathy     I have reviewed the nursing notes.    I have reviewed the findings, diagnosis, plan and need for follow up with the patient.              12/17/2018   [facility] EMERGENCY DEPARTMENT     Edgardo Ferraro MD  12/17/18 [id number]

## 2018-12-27 ENCOUNTER — OFFICE VISIT (OUTPATIENT)
Dept: NEUROSURGERY | Facility: CLINIC | Age: 38
End: 2018-12-27
Payer: COMMERCIAL

## 2018-12-27 ENCOUNTER — TELEPHONE (OUTPATIENT)
Dept: NEUROSURGERY | Facility: OTHER | Age: 38
End: 2018-12-27

## 2018-12-27 VITALS
DIASTOLIC BLOOD PRESSURE: 88 MMHG | SYSTOLIC BLOOD PRESSURE: 136 MMHG | WEIGHT: 218.7 LBS | TEMPERATURE: 99.8 F | BODY MASS INDEX: 37.34 KG/M2 | HEIGHT: 64 IN

## 2018-12-27 DIAGNOSIS — M54.16 LUMBAR RADICULOPATHY: ICD-10-CM

## 2018-12-27 DIAGNOSIS — M51.9 DISC DISORDER OF LUMBAR REGION: Primary | ICD-10-CM

## 2018-12-27 PROCEDURE — 99213 OFFICE O/P EST LOW 20 MIN: CPT | Performed by: NEUROLOGICAL SURGERY

## 2018-12-27 RX ORDER — CYCLOBENZAPRINE HCL 5 MG
5 TABLET ORAL 3 TIMES DAILY PRN
Qty: 90 TABLET | Refills: 0 | Status: SHIPPED | OUTPATIENT
Start: 2018-12-27 | End: 2019-02-04

## 2018-12-27 RX ORDER — OXYCODONE AND ACETAMINOPHEN 5; 325 MG/1; MG/1
1 TABLET ORAL EVERY 6 HOURS PRN
Qty: 12 TABLET | Refills: 0 | Status: SHIPPED | OUTPATIENT
Start: 2018-12-27 | End: 2019-03-11

## 2018-12-27 ASSESSMENT — PAIN SCALES - GENERAL: PAINLEVEL: MODERATE PAIN (4)

## 2018-12-27 ASSESSMENT — MIFFLIN-ST. JEOR: SCORE: 1667.02

## 2018-12-27 NOTE — PROGRESS NOTES
"Fallon Rivera is a 38 year old female who presents for:  Chief Complaint   Patient presents with     Neurologic Problem     Lumbar radiculopathy, seen in ED on 12/17/18        Initial Vitals:  /88   Temp 99.8  F (37.7  C) (Temporal)   Ht 5' 4\" (1.626 m)   Wt 218 lb 11.2 oz (99.2 kg)   BMI 37.54 kg/m   Estimated body mass index is 37.54 kg/m  as calculated from the following:    Height as of this encounter: 5' 4\" (1.626 m).    Weight as of this encounter: 218 lb 11.2 oz (99.2 kg).. Body surface area is 2.12 meters squared. BP completed using cuff size: large  Moderate Pain (4)    Do you feel safe in your environment?  Yes  Do you need any refills today? Yes    Nursing Comments: Down to 3 pain med tablets        Nelida Reyes    "

## 2018-12-27 NOTE — NURSING NOTE
Bhupinder told patient she can now taper from prednisone. Writer Instructed patient how to do so.    Percocet and flexeril scripts filled.    Sia Maxwell RN on 12/27/2018 at 3:50 PM

## 2018-12-27 NOTE — TELEPHONE ENCOUNTER
Type of surgery: MIS Left L5-S1 microdiscectomy  Location of surgery: [facility]   Date of surgery: 1/23/19  Surgeon: Dr. Roe  Pre-Op Appt Date: 1/17/19  Post-Op Appt Date: 3/7/19, 4/25/19   Packet sent out: Surgery packet was given to patient in clinic.   Pre-cert/Authorization completed: NA  Date: 12/27/2018    Mikayla Carnes  Surgery Scheduler

## 2018-12-27 NOTE — NURSING NOTE
Patient Education    Education included but not limited to:  - Surgical risks: blood clots, urinating difficulties, nerve damage, infection.  - Pre-operative physical with primary care physician within 30 days of surgical date.   - Pre-operative clearance from other pertaining specialties.   - Discontinue NSAIDS x 7 days prior to surgical date.   -Do not begin taking NSAIDs (Advil, Motrin, Ibuprofen, Nuprin, Diclofenac, Meloxicam, Aleve, Celebrex, Aspirin, etc.) until 6 weeks after surgery if you had a fusion. May cause bleeding and interfere with bone healing.    -May try Tylenol for pain.  - Smoking cessation:we advise you to stop immediately, through recovery to improve healing. Smoking Cessation handout provided.  -Discussed being off work after surgery, short term disability, FMLA, etc.   -Forms to be completed    -Pre-op timeline: NPO, shower, medications    -Hospital stay: Checking in, surgery, recovery room, hospital room.    - Post operative pain management: narcotics, muscle relaxants, ice, etc.   -No driving while taking narcotics     -Post operative incision care:   Keep your incision clean and dry.   Okay to shower. No submerging in water until incision healed.   Watch for signs of infection and notify clinic if drainage or fever develops.   - Post operative activity limitations recommended until follow up appointment: no lifting > 10 pounds; limited bending, twisting, overhead reaching.  -If a brace is required per Dr. Roe, [facility] will fit you for the brace in the hospital.  - Follow up appointments:  3 months post op.) 6 week post op, 3 months post op. You will need to an xray before each appointment. Please call to schedule follow up appointment at [phone].   - Education printout was also given to the patient for further review.     Patient anxious      Patient verbalized understanding of above instructions. All questions were answered to the best of my ability and the patient's  satisfaction. Patient advised to call with any additional questions or concerns.    Sia Maxwell RN ([facility] Nurse)  [facility]  [address] [address]  T:  [phone]  F:  [phone]

## 2018-12-27 NOTE — PATIENT INSTRUCTIONS
Surgery scheduled at [facility] for L5-S1 microdiscectomy     Pre-Operative:  -Surgical risks: blood clots in the leg or lung, problems urinating, nerve damage, drainage from the incision, infection, stiffness  - Pre-operative physical with primary care physician within 30 days of surgical date.   -Stop all foods and liquids 8 hours prior to surgery.  -Shower procedure: please shower with antibacterial soap the night before surgery and morning of surgery. Refer to information sheet in folder.   - Discontinue Aspirin, NSAIDs (Advil, Ibuprofen, Naproxen, Nuprin, Diclofenac, Meloxicam, Aleve, Celebrex) x 7 days prior to surgical date. After surgery, do not begin taking these medications until given clearance.  - May take Tylenol for pain.  Smoking Cessation: You are advised to quit smoking immediately through recovery to help with healing and reduce risk of complications.    Post-Operative:  -Hospital stay: likely same day procedure with discharge home day of surgery, may stay for 23 hour observation hospitalization for monitoring.  - Post operative pain  will require pain medications and muscle relaxants. You will receive medication upon discharge.  -Do NOT drive while taking narcotic pain medication.  -Post operative incision care-    -Watch for signs of infection: redness, swelling, warmth, drainage, and fever of 101 degrees or higher. Notify clinic [phone].   -Keep incision clean and dry. You may shower. No submerging incision in water such as pools, hot tubs, baths for at least 8 weeks or until incision is healed.   - Post operative activity limitations for 4-6 weeks after surgery: no lifting > 10 pounds, limited bending, twisting, or overhead reaching. You will be re-evaluated at your follow up appointments.   -If you are currently employed, you will need to be off work for recovery and healing. Please fax any FMLA/short term disability paperwork to [phone]. You may call our clinic when  you'd like to return to work and we can provide a work letter.   - Follow up appointments: 6 week post op and 3 months post op. Please call to schedule follow up appointment at [phone].  -Surgery folder provided to patient.    Sia TSANG RN ([facility] Nurse)  [facility]  [place] [address]  [place] [address]  T:  [phone]  F:  [phone]

## 2018-12-27 NOTE — LETTER
"    12/27/2018         RE: Fallon Rivera  [address] [place] [id number]        Dear Colleague,    Thank you for referring your patient, Fallon Rivera, to the [facility]. Please see a copy of my visit note below.    Fallon Rivera is a 38 year old female who presents for:  Chief Complaint   Patient presents with     Neurologic Problem     Lumbar radiculopathy, seen in ED on 12/17/18        Initial Vitals:  /88   Temp 99.8  F (37.7  C) (Temporal)   Ht 5' 4\" (1.626 m)   Wt 218 lb 11.2 oz (99.2 kg)   BMI 37.54 kg/m    Estimated body mass index is 37.54 kg/m  as calculated from the following:    Height as of this encounter: 5' 4\" (1.626 m).    Weight as of this encounter: 218 lb 11.2 oz (99.2 kg).. Body surface area is 2.12 meters squared. BP completed using cuff size: large  Moderate Pain (4)    Do you feel safe in your environment?  Yes  Do you need any refills today? Yes    Nursing Comments: Down to 3 pain med tablets        Nelida Reyes      I was asked by Dr. Ford to see this patient in consultation    37F w/ left leg pain, L5-S1 disk herniation.  Started 2 weeks ago, severe left leg pain.  No inciting factor.  7/10 sharp pain from left hip to thigh, calf, and lateral foot, with numbness and burning in lateral 3 toes.  No motor changes.    Returns for follow up.  Extensive waxing/waning pain throughout the year.  PT and Chiropractic care without improvement.       Past Medical History:   Diagnosis Date     Abnormal Pap smear 2006, 2007,     ASC-H, ASCUS + HPV 45     Calculus of kidney 2002     Cervical high risk HPV (human papillomavirus) test positive     + HPV 45 (high risk)     History of colposcopy with cervical biopsy 2/9/06, 3/15/07    koilocytosis 2007     Past Surgical History:   Procedure Laterality Date     C REMOVAL OF KIDNEY STONE      two surgeries, 2002,2003     CHOLECYSTECTOMY, LAPOROSCOPIC  5/11/2007    Cholecystectomy, Laparoscopic     " COLONOSCOPY  05/17/10     CONIZATION  7/1/2011    Procedure:CONIZATION; cold knife and punch biopsy of mole on back; Surgeon:SYDNEY FORD; Location:[facility] OR     DILATION AND CURETTAGE, HYSTEROSCOPY, LAPAROSCOPY, COMBINED Right 9/24/2015    Procedure: COMBINED DILATION AND CURETTAGE, HYSTEROSCOPY, LAPAROSCOPY;  Surgeon: Sydney Ford MD;  Location: [facility]     ESOPHAGOSCOPY, GASTROSCOPY, DUODENOSCOPY (EGD), COMBINED  5/21/2014    Procedure: COMBINED ESOPHAGOSCOPY, GASTROSCOPY, DUODENOSCOPY (EGD), BIOPSY SINGLE OR MULTIPLE;  Surgeon: Earl Lane MD;  Location: [facility]     EXCISE LESION TRUNK  7/1/2011    Procedure:EXCISE LESION TRUNK; Surgeon:SYDNEY FORD; Location:[facility] OR     HC FRAGMENTING OF KIDNEY STONE  11/30/2005     HC RX ECTOP PREG BY SCOPE,RMV TUBE/OVRY  12/20/2007    Right sided salpingectomy and removal of ruptured ectopic pregnancy. D&C.     HC UGI ENDOSCOPY, SIMPLE EXAM  09/01/09     LAPAROSCOPIC APPENDECTOMY N/A 9/24/2015    Procedure: LAPAROSCOPIC APPENDECTOMY;  Surgeon: James Cuellar MD;  [facility]     LAPAROSCOPIC CYSTECTOMY OVARIAN (BENIGN) N/A 9/24/2015    Procedure: LAPAROSCOPIC CYSTECTOMY OVARIAN (BENIGN);  Surgeon: Sydney Ford MD;  [facility]     LAPAROSCOPIC OOPHORECTOMY Right 9/24/2015    Procedure: LAPAROSCOPIC OOPHORECTOMY;  Surgeon: Sydney Ford MD;  Location: [facility]     LITHOTRIPSY  01/17/2007     PELVIS LAPAROSCOPY,DX  10/16/2000    Diagnostic laparoscopy, Endometrial biopsy.     TUBAL/ECTOPIC PREGNANCY  01/23/2007    Lap removal of left ruptured ectopic pregnancy & salpingectomy, D&C     Social History     Socioeconomic History     Marital status:      Spouse name: Joseph     Number of children: 1     Years of education: 9     Highest education level: Not on file   Social Needs     Financial resource strain: Not on file     Food insecurity - worry: Not on file     Food insecurity - inability: Not  on file     Transportation needs - medical: Not on file     Transportation needs - non-medical: Not on file   Occupational History     Employer: NONE   Tobacco Use     Smoking status: Current Some Day Smoker     Years: 12.00     Types: Cigarettes     Last attempt to quit: 2009     Years since quittin.4     Smokeless tobacco: Never Used     Tobacco comment: As of 10 /2014, pt has had a few cigs here and there   Substance and Sexual Activity     Alcohol use: Yes     Alcohol/week: 0.0 oz     Comment: every few months     Drug use: No     Sexual activity: Yes     Partners: Male     Birth control/protection: None   Other Topics Concern      Service No     Blood Transfusions No     Caffeine Concern Yes     Comment: Reports 1 can soda/week  Advised not more than 16 ounces caffeien/day.     Occupational Exposure No     Hobby Hazards No     Sleep Concern No     Stress Concern Yes     Comment: will discuss with      Weight Concern Yes     Comment: Eating disorder in childhood.  Hx. gestational diab.     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     Exercise No     Comment: Advised walking 30 min/day.     Bike Helmet No     Seat Belt Yes     Self-Exams Not Asked     Parent/sibling w/ CABG, MI or angioplasty before 65F 55M? Not Asked   Social History Narrative     and lives at home with her  and daughter.     Family History   Problem Relation Age of Onset     Diabetes Maternal Grandmother         adult onset     Anesthesia Reaction Maternal Grandmother         can't tolerate Novacaine.     Respiratory Maternal Grandmother         COPD and emphysema.     Thyroid Disease Maternal Grandmother      Heart Disease Maternal Grandmother         CHF     Diabetes Paternal Grandfather         adult o nset     Hypertension Paternal Grandfather      Cerebrovascular Disease Paternal Grandfather      Heart Disease Paternal Grandfather         MI,  "replaced valve,angioplasty     Thyroid Disease Paternal Grandfather      Cancer Maternal Grandfather         skin cancer     Heart Disease Maternal Grandfather         MI     Obesity Mother         on thyroid medication     Thyroid Disease Mother      Diabetes Mother      Rheumatologic Disease Mother      Heart Disease Father      Hypertension Father         and TIA     Lipids Father      Breast Cancer Paternal Grandmother      Arrhythmia Other      Cancer Maternal Aunt         breast/brain cancer     Depression Paternal Aunt      Cerebrovascular Disease Paternal Uncle      Cerebrovascular Disease Paternal Aunt         ROS: 10 point ROS neg other than the symptoms noted above in the HPI.    Physical Exam  /88   Temp 99.8  F (37.7  C) (Temporal)   Ht 1.626 m (5' 4\")   Wt 99.2 kg (218 lb 11.2 oz)   BMI 37.54 kg/m     HEENT:  Normocephalic, atraumatic.  PERRLA.  EOM s intact.  Visual fields full to gross exam  Neck:  Supple, non-tender, without lymphadenopathy.  Heart:  No peripheral edema  Lungs:  No SOB  Abdomen:  Non-distended.   Skin:  Warm and dry.  Extremities:  No edema, cyanosis or clubbing.  Psychiatric:  No apparent distress  Musculoskeletal:  Normal bulk and tone    NEUROLOGICAL EXAMINATION:     Mental status:  Alert and Oriented x 3, speech is fluent.  Cranial nerves:  II-XII intact.   Motor:    Shoulder Abduction:  Right:  5/5   Left:  5/5  Biceps:                      Right:  5/5   Left:  5/5  Triceps:                     Right:  5/5   Left:  5/5  Wrist Extensors:       Right:  5/5   Left:  5/5  Wrist Flexors:           Right:  5/5   Left:  5/5  interosseus :            Right:  5/5   Left:  5/5   Hip Flexor:                Right: 5/5  Left:  5/5  Hip Adductor:             Right:  5/5  Left:  5/5  Hip Abductor:             Right:  5/5  Left:  5/5  Gastroc Soleus:        Right:  5/5  Left:  5/5  Tib/Ant:                      Right:  5/5  Left:  5/5  EHL:                     Right:  5/5  Left:  " 5/5  Sensation:  Intact  Reflexes:  Negative Babinski.  Negative Clonus.  Negative Vivas's.  Coordination:  Smooth finger to nose testing.   Negative pronator drift.  Smooth tandem walking.    A/P:  37F w/ left leg pain, L5-S1 disk herniation    Will plan for microdiscectomy  Risks and benefits discussed           Again, thank you for allowing me to participate in the care of your patient.        Sincerely,        Mason Roe MD

## 2019-01-04 ENCOUNTER — OFFICE VISIT (OUTPATIENT)
Dept: URGENT CARE | Facility: RETAIL CLINIC | Age: 39
End: 2019-01-04
Payer: COMMERCIAL

## 2019-01-04 ENCOUNTER — E-VISIT (OUTPATIENT)
Dept: FAMILY MEDICINE | Facility: CLINIC | Age: 39
End: 2019-01-04
Payer: COMMERCIAL

## 2019-01-04 VITALS
HEART RATE: 111 BPM | OXYGEN SATURATION: 100 % | DIASTOLIC BLOOD PRESSURE: 78 MMHG | SYSTOLIC BLOOD PRESSURE: 131 MMHG | TEMPERATURE: 98.9 F

## 2019-01-04 DIAGNOSIS — Z53.9 ERRONEOUS ENCOUNTER--DISREGARD: Primary | ICD-10-CM

## 2019-01-04 DIAGNOSIS — B37.2 CANDIDIASIS OF SKIN: Primary | ICD-10-CM

## 2019-01-04 PROCEDURE — 99213 OFFICE O/P EST LOW 20 MIN: CPT | Performed by: INTERNAL MEDICINE

## 2019-01-04 RX ORDER — NYSTATIN 100000 [USP'U]/G
POWDER TOPICAL 3 TIMES DAILY
Qty: 60 G | Refills: 1 | Status: SHIPPED | OUTPATIENT
Start: 2019-01-04 | End: 2019-03-11

## 2019-01-04 RX ORDER — PREDNISONE 20 MG/1
TABLET ORAL
Refills: 0 | COMMUNITY
Start: 2018-12-17 | End: 2019-01-16

## 2019-01-04 NOTE — PROGRESS NOTES
[facility] Care Progress Note        Floyd Constantino MD, MPH  01/04/2019    aFllon Rivera is a pleasant   38  year old female seen for a painless and mildly  pruritic rash involving lower abdominal skin fold for  5 days . There has not been any change in the diet or new detergent, soap or toiletries.  No new medications, no insect bite. No fever or chills and no recent illness. No cough or shortness of breath or wheezing is referred.  No nausea, vomiting or diarrhea.  No arthralgia or myalgia.  No tongue, lip or mouth swelling or swallowing problems are referred. Patient has HX of diabetes and was on steroid recently for back pain.    Physical Exam   /78   Pulse 111   Temp 98.9  F (37.2  C) (Oral)   SpO2 100%      Constitutional: Patient is in no distress The patient is pleasant and cooperative.   HEENT: Head:  Head is atraumatic, normocephalic.    Eyes: Pupils are equal, round and reactive to light and accomodation.  Sclera is non-icteric. No conjunctival injection, or exudate noted. Extraocular motion is intact. Visual acuity is intact bilaterally.  Ears:  External acoustic canals are patent and clear.  There is no erythema and bulging( exudate)  of the ( R/L ) tympanic membrane(s ).   Nose:  No Nasal congestion w/o drainage or mucosal ulceration is noted.  Throat:  Oral mucosa is moist.  No oral lesions are noted.  No posterior pharyngeal hyperemia or exudate noted.     Neck Supple.  There is no cervical lymphadenopathy.  No nuchal rigidity noted.  There is no meningismus.     Cardiovascular: Heart is regular to rate and rhythm.  No murmur is noted.     Chest. Chest Symmetrical, no soft tissues, swelling, or tenderness upon palpation   Lungs: Clear in the anterior and posterior pulmonary fields.   Abdomen: Soft and non-tender.    Back No flank tenderness is noted.   Extremeties No edema, no calf tenderness.   Neuro: No focal deficit.   Skin No petechiae or purpura is noted.   There is an erythematous rash   involving: Central lower abdomen in the skin fold ( intertriginous) without ulcerations, pustules or vesicles and without dermatomal pattern. No cellulitis or lymphangitis is noted. No exudate, scaly or crusty lesions.     Mood Normal         Assessment & Plan          Candidiasis of skin (  Central lower abdomen):    - nystatin (MYCOSTATIN) 272291 UNIT/GM external powder; Apply topically 3 times daily for 14 days  Dispense: 60 g; Refill: 1       Follow-up with your PCP in 4-5 days, earlier if symptoms worsen..  Advised to avoid hot baths/showers.  Advised to avoid irritating soap/detergents.  Avoid warm environments..  Please wash the skin with soap and water daily.

## 2019-01-08 ENCOUNTER — TELEPHONE (OUTPATIENT)
Dept: NEUROSURGERY | Facility: OTHER | Age: 39
End: 2019-01-08

## 2019-01-08 NOTE — TELEPHONE ENCOUNTER
Please fax forms to our [facility] #[phone]. Will complete forms once they're received.

## 2019-01-08 NOTE — TELEPHONE ENCOUNTER
Reason for Call:  Form, our goal is to have forms completed with 72 hours, however, some forms may require a visit or additional information.    Type of letter, form or note:  disability    Who is the form from?: Insurance comp    Where did the form come from: Patient or family brought in       What clinic location was the form placed at?: [place]    Where the form was placed: 's Box    What number is listed as a contact on the form?: Fax [phone]  dept of labor for  James Rivera       Additional comments: none    Call taken on 1/8/2019 at 3:45 PM by Sia Lind

## 2019-01-16 RX ORDER — OXYCODONE AND ACETAMINOPHEN 5; 325 MG/1; MG/1
1 TABLET ORAL EVERY 6 HOURS PRN
Status: ON HOLD | COMMUNITY
End: 2019-01-23

## 2019-01-16 RX ORDER — ACETAMINOPHEN 500 MG
1000 TABLET ORAL EVERY 6 HOURS PRN
COMMUNITY
End: 2022-08-19

## 2019-01-16 NOTE — NURSING NOTE
"Chief Complaint   Patient presents with     URI     cough with chest congestion - started last Friday pm       Initial /70  Pulse 94  Temp 97.4  F (36.3  C) (Temporal)  Resp 16  Wt 230 lb (104.3 kg)  LMP 02/28/2018 (Exact Date)  SpO2 98%  BMI 39.48 kg/m2 Estimated body mass index is 39.48 kg/(m^2) as calculated from the following:    Height as of 3/12/18: 5' 4\" (1.626 m).    Weight as of this encounter: 230 lb (104.3 kg).  Medication Reconciliation: complete    "
Detail Level: Detailed

## 2019-01-17 ENCOUNTER — OFFICE VISIT (OUTPATIENT)
Dept: FAMILY MEDICINE | Facility: CLINIC | Age: 39
End: 2019-01-17
Payer: COMMERCIAL

## 2019-01-17 VITALS
TEMPERATURE: 98.3 F | BODY MASS INDEX: 37.29 KG/M2 | RESPIRATION RATE: 16 BRPM | HEART RATE: 88 BPM | OXYGEN SATURATION: 99 % | DIASTOLIC BLOOD PRESSURE: 78 MMHG | WEIGHT: 218.44 LBS | HEIGHT: 64 IN | SYSTOLIC BLOOD PRESSURE: 120 MMHG

## 2019-01-17 DIAGNOSIS — E66.01 MORBID OBESITY (H): ICD-10-CM

## 2019-01-17 DIAGNOSIS — F32.0 MILD MAJOR DEPRESSION (H): ICD-10-CM

## 2019-01-17 DIAGNOSIS — E11.9 TYPE 2 DIABETES MELLITUS WITHOUT COMPLICATION, WITHOUT LONG-TERM CURRENT USE OF INSULIN (H): ICD-10-CM

## 2019-01-17 DIAGNOSIS — Z01.818 PREOP GENERAL PHYSICAL EXAM: Primary | ICD-10-CM

## 2019-01-17 LAB
ANION GAP SERPL CALCULATED.3IONS-SCNC: 6 MMOL/L (ref 3–14)
B-HCG SERPL-ACNC: <1 IU/L (ref 0–5)
BUN SERPL-MCNC: 9 MG/DL (ref 7–30)
CALCIUM SERPL-MCNC: 8.4 MG/DL (ref 8.5–10.1)
CHLORIDE SERPL-SCNC: 103 MMOL/L (ref 94–109)
CO2 SERPL-SCNC: 29 MMOL/L (ref 20–32)
CREAT SERPL-MCNC: 0.71 MG/DL (ref 0.52–1.04)
GFR SERPL CREATININE-BSD FRML MDRD: >90 ML/MIN/{1.73_M2}
GLUCOSE SERPL-MCNC: 203 MG/DL (ref 70–99)
POTASSIUM SERPL-SCNC: 3.8 MMOL/L (ref 3.4–5.3)
SODIUM SERPL-SCNC: 138 MMOL/L (ref 133–144)

## 2019-01-17 PROCEDURE — 80048 BASIC METABOLIC PNL TOTAL CA: CPT | Performed by: OBSTETRICS & GYNECOLOGY

## 2019-01-17 PROCEDURE — 99214 OFFICE O/P EST MOD 30 MIN: CPT | Performed by: OBSTETRICS & GYNECOLOGY

## 2019-01-17 PROCEDURE — 36415 COLL VENOUS BLD VENIPUNCTURE: CPT | Performed by: OBSTETRICS & GYNECOLOGY

## 2019-01-17 PROCEDURE — 84702 CHORIONIC GONADOTROPIN TEST: CPT | Performed by: OBSTETRICS & GYNECOLOGY

## 2019-01-17 ASSESSMENT — MIFFLIN-ST. JEOR: SCORE: 1665.83

## 2019-01-17 ASSESSMENT — PAIN SCALES - GENERAL: PAINLEVEL: MODERATE PAIN (4)

## 2019-01-17 NOTE — PROGRESS NOTES
[address] [address]  [phone]  Dept: [phone]    PRE-OP EVALUATION:  Today's date: 2019    Fallon Rivera (: 1980) presents for pre-operative evaluation assessment as requested by Dr. Roe.  She requires evaluation and anesthesia risk assessment prior to undergoing surgery/procedure for treatment of Minimally Invasive Surgery left lumbar 5-Sacral 1 microdisectomy .      Primary Physician: Greg Ford  Type of Anesthesia Anticipated: General    Patient has a Health Care Directive or Living Will:  NO    Preop Questions 2019   Who is doing your surgery? Bhupinder   What are you having done? back surgery   Date of Surgery/Procedure: 2019   Facility or Hospital where procedure/surgery will be performed: [facility]   1.  Do you have a history of Heart attack, stroke, stent, coronary bypass surgery, or other heart surgery? No   2.  Do you ever have any pain or discomfort in your chest? No   3.  Do you have a history of  Heart Failure? No   4.   Are you troubled by shortness of breath when:  walking on a level surface, or up a slight hill, or at night? No   5.  Do you currently have a cold, bronchitis or other respiratory infection? No   6.  Do you have a cough, shortness of breath, or wheezing? No   7.  Do you sometimes get pains in the calves of your legs when you walk? YES - may be related to her disc issue- she has pain after walking for a long time.   8. Do you or anyone in your family have previous history of blood clots? YES - dad had a stroke- also her aunt-   9.  Do you or does anyone in your family have a serious bleeding problem such as prolonged bleeding following surgeries or cuts? No   10. Have you ever had problems with anemia or been told to take iron pills? No   11. Have you had any abnormal blood loss such as black, tarry or bloody stools, or abnormal vaginal bleeding? No   12. Have you ever had a  blood transfusion? UNKNOWN -     13. Have you or any of your relatives ever had problems with anesthesia? No   14. Do you have sleep apnea, excessive snoring or daytime drowsiness? No   15. Do you have any prosthetic heart valves? No   16. Do you have prosthetic joints? No   17. Is there any chance that you may be pregnant? No     Preoperative History and Physical    Chief complaint/indicated for surgery/planned surgery:    Fallon is planning to undergo lumbar microdiscectomy by Dr Roe.    Past Medical History:   Diagnosis Date     Abnormal Pap smear 2006, 2007,     ASC-H, ASCUS + HPV 45     Calculus of kidney 2002     Cervical high risk HPV (human papillomavirus) test positive     + HPV 45 (high risk)     History of colposcopy with cervical biopsy 2/9/06, 3/15/07    koilocytosis 2007     Current Outpatient Medications   Medication Sig Dispense Refill     acetaminophen (TYLENOL) 500 MG tablet Take 1,000 mg by mouth every 6 hours as needed for mild pain       blood glucose monitoring (LILLIAN MICROLET) lancets Use to check blood sugar 1-2 times daily 1 Box 12     CONTOUR NEXT TEST test strip USE TO TEST BLOOD SUGAR TWO TIMES A DAY OR AS DIRECTED 100 each 3     cyclobenzaprine (FLEXERIL) 5 MG tablet Take 1 tablet (5 mg) by mouth 3 times daily as needed for muscle spasms 90 tablet 0     metFORMIN (GLUCOPHAGE) 500 MG tablet TAKE ONE TABLET BY MOUTH EVERY DAY WITH DINNER 30 tablet 11     nystatin (MYCOSTATIN) 677645 UNIT/GM external powder Apply topically 3 times daily for 14 days 60 g 1     omeprazole (PRILOSEC) 20 MG CR capsule TAKE 1 CAPSULE BY MOUTH DAILY, 30 TO 60 MINUTES BEFORE A MEAL. 30 capsule 1     oxyCODONE-acetaminophen (PERCOCET) 5-325 MG tablet Take 1 tablet by mouth every 6 hours as needed for severe pain       phenylephrine-cocoa butter (PREPARATION H) 0.25-88.44 % suppository Place 1 suppository rectally nightly as needed for hemorrhoids or itching       pramox-pe-glycerin-petrolatum (PREPARATION H)  1-0.25-14.4-15 % CREA cream Place rectally as needed for hemorrhoids       Lidocaine (LIDOCARE) 4 % Patch Place 1 patch onto the skin every 24 hours       naproxen sodium (ALEVE) 220 MG tablet Take 220 mg by mouth 2 times daily (with meals)         There has been no recent use of OTC or herbal medications.   The patient denies any recent ASA or NSAID use.   The patient denies any alcohol or drug use.     She did use prednisone for 3 weeks but weaned off of it - none for the past 2 weeks.    Allergies   Allergen Reactions     Amoxicillin Hives     Anti-Nausea      Anxiety, agitation,severe paranoia. Zofran, Reglan are some of them.  DRAMAMINE WITHOUT ISSUES       Demerol Hcl [Meperidine Hcl] Nausea     Indomethacin Nausea and Vomiting     Macrobid [Nitrofuran Derivatives] Hives     Reglan [Metoclopramide] Other (See Comments)     Acute paranoia, severe     Zofran [Ondansetron] Other (See Comments)     Severe anxiety, agitation     History   Smoking Status     Current Some Day Smoker     Years: 12.00     Types: Cigarettes     Last attempt to quit: 7/14/2009   Smokeless Tobacco     Never Used     Comment: As of 10 /2014, pt has had a few cigs here and there     Past Surgical History:   Procedure Laterality Date     C REMOVAL OF KIDNEY STONE      two surgeries, 2002,2003     CHOLECYSTECTOMY, LAPOROSCOPIC  5/11/2007    Cholecystectomy, Laparoscopic     COLONOSCOPY  05/17/10     CONIZATION  7/1/2011    Procedure:CONIZATION; cold knife and punch biopsy of mole on back; Surgeon:GREG FORD; Location:[facility]     DILATION AND CURETTAGE, HYSTEROSCOPY, LAPAROSCOPY, COMBINED Right 9/24/2015    Procedure: COMBINED DILATION AND CURETTAGE, HYSTEROSCOPY, LAPAROSCOPY;  Surgeon: Greg Ford MD;  Location: [facility]     ESOPHAGOSCOPY, GASTROSCOPY, DUODENOSCOPY (EGD), COMBINED  5/21/2014    Procedure: COMBINED ESOPHAGOSCOPY, GASTROSCOPY, DUODENOSCOPY (EGD), BIOPSY SINGLE OR MULTIPLE;  Surgeon: Earl Lane  MD Fabian;  Location: [facility]     EXCISE LESION TRUNK  2011    Procedure:EXCISE LESION TRUNK; Surgeon:SYDNEY GRAFF; Location:[facility]     HC FRAGMENTING OF KIDNEY STONE  2005     HC RX ECTOP PREG BY SCOPE,RMV TUBE/OVRY  2007    Right sided salpingectomy and removal of ruptured ectopic pregnancy. D&C.     HC UGI ENDOSCOPY, SIMPLE EXAM  09     LAPAROSCOPIC APPENDECTOMY N/A 2015    Procedure: LAPAROSCOPIC APPENDECTOMY;  Surgeon: James Cuellar MD;  [facility]     LAPAROSCOPIC CYSTECTOMY OVARIAN (BENIGN) N/A 2015    Procedure: LAPAROSCOPIC CYSTECTOMY OVARIAN (BENIGN);  Surgeon: Sydney Graff MD;  [facility]     LAPAROSCOPIC OOPHORECTOMY Right 2015    Procedure: LAPAROSCOPIC OOPHORECTOMY;  Surgeon: Sydney Graff MD;  Location[facility]     LITHOTRIPSY  2007     PELVIS LAPAROSCOPY,DX  10/16/2000    Diagnostic laparoscopy, Endometrial biopsy.     TUBAL/ECTOPIC PREGNANCY  2007    Lap removal of left ruptured ectopic pregnancy & salpingectomy, D&C     Social History     Socioeconomic History     Marital status:      Spouse name: Joseph     Number of children: 1     Years of education: 9     Highest education level: Not on file   Social Needs     Financial resource strain: Not on file     Food insecurity - worry: Not on file     Food insecurity - inability: Not on file     Transportation needs - medical: Not on file     Transportation needs - non-medical: Not on file   Occupational History     Employer: NONE   Tobacco Use     Smoking status: Current Some Day Smoker     Years: 12.00     Types: Cigarettes     Last attempt to quit: 2009     Years since quittin.5     Smokeless tobacco: Never Used     Tobacco comment: As of 10 /2014, pt has had a few cigs here and there   Substance and Sexual Activity     Alcohol use: Yes     Alcohol/week: 0.0 oz     Comment: every few months     Drug use: No     Sexual activity: Yes      Partners: Male     Birth control/protection: None   Other Topics Concern      Service No     Blood Transfusions No     Caffeine Concern Yes     Comment: Reports 1 can soda/week  Advised not more than 16 ounces caffeien/day.     Occupational Exposure No     Hobby Hazards No     Sleep Concern No     Stress Concern Yes     Comment: will discuss with      Weight Concern Yes     Comment: Eating disorder in childhood.  Hx. gestational diab.     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     Exercise No     Comment: Advised walking 30 min/day.     Bike Helmet No     Seat Belt Yes     Self-Exams Not Asked     Parent/sibling w/ CABG, MI or angioplasty before 65F 55M? Not Asked   Social History Narrative     and lives at home with her  and daughter.     Family History   Problem Relation Age of Onset     Diabetes Maternal Grandmother         adult onset     Anesthesia Reaction Maternal Grandmother         can't tolerate Novacaine.     Respiratory Maternal Grandmother         COPD and emphysema.     Thyroid Disease Maternal Grandmother      Heart Disease Maternal Grandmother         CHF     Diabetes Paternal Grandfather         adult o nset     Hypertension Paternal Grandfather      Cerebrovascular Disease Paternal Grandfather      Heart Disease Paternal Grandfather         MI, replaced valve,angioplasty     Thyroid Disease Paternal Grandfather      Cancer Maternal Grandfather         skin cancer     Heart Disease Maternal Grandfather         MI     Obesity Mother         on thyroid medication     Thyroid Disease Mother      Diabetes Mother      Rheumatologic Disease Mother      Heart Disease Father      Hypertension Father         and TIA     Lipids Father      Breast Cancer Paternal Grandmother      Arrhythmia Other      Cancer Maternal Aunt         breast/brain cancer     Depression Paternal Aunt      Cerebrovascular Disease Paternal Uncle  "     Cerebrovascular Disease Paternal Aunt        There is no family history of anesthesia reactions or malignant hyperthermia or psevdocholinestergse problem or porphyria.    Review Of Systems  Skin: negative  Eyes: negative  Ears/Nose/Throat: negative  Respiratory: No shortness of breath, dyspnea on exertion, cough, or hemoptysis  Cardiovascular: negative  Gastrointestinal: negative  Genitourinary: negative  Musculoskeletal: negative  Neurologic: negative  Psychiatric: negative  Hematologic/Lymphatic/Immunologic: negative  Endocrine: HER BLOOD SUGARS WERE QUITE HIGH WHILE SHE WAS ON PREDNISONE BUT SHE HAS BEEN UNDER 200 CONSISTENTLY SINCE STOPPING IT 3 WEEKS AGO. SHE IS TYPE 2 DIABETIC AND TAKING METFORMIN.    Physical Exam:/78   Pulse 88   Temp 98.3  F (36.8  C) (Temporal)   Resp 16   Ht 1.626 m (5' 4\")   Wt 99.1 kg (218 lb 7 oz)   SpO2 99%   BMI 37.49 kg/m      HEENT:    Sclerae and conjunctiva are normal.   Ear canals and TMs look normal.  Nasal mucosa is pink  - no polyps or masses seen.  sinuses are non tender to palpation.  Throat is unremarkable . Mucous membranes are moist.     Neck is supple, mobile, no adenopathy or masses palpable. The thyroid feels normal.   Normal range of motion noted.    Chest is clear to auscultation.  No wheezes, rales or rhonchi heard.  Cardiac exam is normal with s1, s2, no murmurs or adventitious sounds.Normal rate and rhythm is heard.     Abdomen is soft,  nondistended, No masses felt.No HSM. No guarding or rigidity or rebound   noted. Palpation reveals  no    tenderness   Normal bowel sounds heard.     Extremities are pink and there is no cyanosis and there is no edema. Pulses are physiologic.    Motor and sensory exams are grossly normal. Cranial nerves 2-12 are intact. Cerebellar exam is normal.           Other:  Labs: She had a normal CBC on 9/8/18-this will not be repeated now.  A1C not repeated now because it will be disstorted by recent prednisone use. Will " check again in 1 month.  Basic panel is normal except glucose is 203. The recent A1C in October was 7.3 which is reassuring.    Assessment/Impression:  1. Herniation of a lumbar disc- to have microdiscectomy by Dr Roe    2.Preoperative  Examination: The patient is at MODERATE risk for general anesthesia for the proposed surgery.  She has type 2 diabetes mellitus, and morbid obesity. She is otherwise healthy-I believe the diabetic control is returning to normal now that she stopped prednisone  Several weeks ago.    3. Mild major depression- she is doing ok but having some rough days related to the upcoming surgery, etc.-    4. GERD- on omeprazole    Recommendations:  Approval given for general anesthesia.    Medications are to be stopped before surgery.   Discontinue ASA and NSAIDS 5 days prior to surgery to reduce bleeding risk.    If the surgery is of short duration, no special considerations should be given for steroids, but if a longer duration surgery (>1 hour) THEN I WOULD ADVISE CONSIDERATION FOR STRESS STEROIDS TO BE GIVEN AT THE TIME OF SURGERY DUE TO RECENT 3 WEEK BURST AND TAPER OF PREDNISONE. IF IT CAN BE AVOIDED IT WOULD BE PREFERABLE DUE TO THE FACT THAT IT WILL RAISE HER BLOOD SUGAR.         SHARAN Ford MD

## 2019-01-17 NOTE — NURSING NOTE
"Chief Complaint   Patient presents with     Pre-Op Exam     DOS 1/23/19     Estimated body mass index is 37.49 kg/m  as calculated from the following:    Height as of this encounter: 1.626 m (5' 4\").    Weight as of this encounter: 99.1 kg (218 lb 7 oz).  BP Readings from Last 1 Encounters:   01/17/19 120/78   ]  BP cuff size:  large  Do you feel safe in your environment?  Not asked  Does the patient need any medication refills today? No  Grace Madison CMA (AAMA)     "

## 2019-01-17 NOTE — TELEPHONE ENCOUNTER
Patient is stopping by to check on the progress of this form.  Please advise patient when received and completed.  Thanks

## 2019-01-21 ENCOUNTER — DOCUMENTATION ONLY (OUTPATIENT)
Dept: NEUROSURGERY | Facility: OTHER | Age: 39
End: 2019-01-21

## 2019-01-21 NOTE — PROGRESS NOTES
Completed forms for  (James Rivera)  Type: LOYDA    Faxed completed forms to attn: Shyla Cabello @ [phone]    Copy placed in bin and sent to medical records

## 2019-01-22 ENCOUNTER — ANESTHESIA EVENT (OUTPATIENT)
Dept: SURGERY | Facility: CLINIC | Age: 39
End: 2019-01-22
Payer: COMMERCIAL

## 2019-01-22 ASSESSMENT — LIFESTYLE VARIABLES: TOBACCO_USE: 1

## 2019-01-23 ENCOUNTER — HOSPITAL ENCOUNTER (OUTPATIENT)
Facility: CLINIC | Age: 39
Discharge: HOME | End: 2019-01-23
Attending: NEUROLOGICAL SURGERY | Admitting: NEUROLOGICAL SURGERY
Payer: COMMERCIAL

## 2019-01-23 ENCOUNTER — ANESTHESIA (OUTPATIENT)
Dept: SURGERY | Facility: CLINIC | Age: 39
End: 2019-01-23
Payer: COMMERCIAL

## 2019-01-23 ENCOUNTER — HOSPITAL ENCOUNTER (OUTPATIENT)
Dept: GENERAL RADIOLOGY | Facility: CLINIC | Age: 39
End: 2019-01-23
Attending: NEUROLOGICAL SURGERY | Admitting: NEUROLOGICAL SURGERY
Payer: COMMERCIAL

## 2019-01-23 VITALS
OXYGEN SATURATION: 96 % | SYSTOLIC BLOOD PRESSURE: 122 MMHG | DIASTOLIC BLOOD PRESSURE: 80 MMHG | RESPIRATION RATE: 10 BRPM | HEART RATE: 96 BPM

## 2019-01-23 DIAGNOSIS — M51.26 LUMBAR DISC HERNIATION: ICD-10-CM

## 2019-01-23 DIAGNOSIS — Z98.890 S/P LUMBAR MICRODISCECTOMY: Primary | ICD-10-CM

## 2019-01-23 LAB
GLUCOSE BLDC GLUCOMTR-MCNC: 151 MG/DL (ref 70–99)
GLUCOSE BLDC GLUCOMTR-MCNC: 177 MG/DL (ref 70–99)

## 2019-01-23 PROCEDURE — 71000014 ZZH RECOVERY PHASE 1 LEVEL 2 FIRST HR: Performed by: NEUROLOGICAL SURGERY

## 2019-01-23 PROCEDURE — 25000128 H RX IP 250 OP 636: Performed by: NURSE ANESTHETIST, CERTIFIED REGISTERED

## 2019-01-23 PROCEDURE — 27110028 ZZH OR GENERAL SUPPLY NON-STERILE: Performed by: NEUROLOGICAL SURGERY

## 2019-01-23 PROCEDURE — 25000125 ZZHC RX 250: Performed by: NEUROLOGICAL SURGERY

## 2019-01-23 PROCEDURE — 40000306 ZZH STATISTIC PRE PROC ASSESS II: Performed by: NEUROLOGICAL SURGERY

## 2019-01-23 PROCEDURE — 40000278 XR SURGERY CARM FLUORO LESS THAN 5 MIN: Mod: TC

## 2019-01-23 PROCEDURE — 25000128 H RX IP 250 OP 636: Performed by: PHYSICIAN ASSISTANT

## 2019-01-23 PROCEDURE — 36000063 ZZH SURGERY LEVEL 4 EA 15 ADDTL MIN: Performed by: NEUROLOGICAL SURGERY

## 2019-01-23 PROCEDURE — 27211024 ZZHC OR SUPPLY OTHER OPNP: Performed by: NEUROLOGICAL SURGERY

## 2019-01-23 PROCEDURE — 27210794 ZZH OR GENERAL SUPPLY STERILE: Performed by: NEUROLOGICAL SURGERY

## 2019-01-23 PROCEDURE — 25000566 ZZH SEVOFLURANE, EA 15 MIN: Performed by: NEUROLOGICAL SURGERY

## 2019-01-23 PROCEDURE — 82962 GLUCOSE BLOOD TEST: CPT

## 2019-01-23 PROCEDURE — 71000027 ZZH RECOVERY PHASE 2 EACH 15 MINS: Performed by: NEUROLOGICAL SURGERY

## 2019-01-23 PROCEDURE — 63030 LAMOT DCMPRN NRV RT 1 LMBR: CPT | Mod: AS | Performed by: PHYSICIAN ASSISTANT

## 2019-01-23 PROCEDURE — 25000125 ZZHC RX 250: Performed by: NURSE ANESTHETIST, CERTIFIED REGISTERED

## 2019-01-23 PROCEDURE — 25000128 H RX IP 250 OP 636: Performed by: NEUROLOGICAL SURGERY

## 2019-01-23 PROCEDURE — 27210995 ZZH RX 272: Performed by: NEUROLOGICAL SURGERY

## 2019-01-23 PROCEDURE — 25000132 ZZH RX MED GY IP 250 OP 250 PS 637: Performed by: PHYSICIAN ASSISTANT

## 2019-01-23 PROCEDURE — 37000009 ZZH ANESTHESIA TECHNICAL FEE, EACH ADDTL 15 MIN: Performed by: NEUROLOGICAL SURGERY

## 2019-01-23 PROCEDURE — 63030 LAMOT DCMPRN NRV RT 1 LMBR: CPT | Mod: LT | Performed by: NEUROLOGICAL SURGERY

## 2019-01-23 PROCEDURE — 36000065 ZZH SURGERY LEVEL 4 W FLUORO 1ST 30 MIN: Performed by: NEUROLOGICAL SURGERY

## 2019-01-23 PROCEDURE — 71000015 ZZH RECOVERY PHASE 1 LEVEL 2 EA ADDTL HR: Performed by: NEUROLOGICAL SURGERY

## 2019-01-23 PROCEDURE — 37000008 ZZH ANESTHESIA TECHNICAL FEE, 1ST 30 MIN: Performed by: NEUROLOGICAL SURGERY

## 2019-01-23 PROCEDURE — C1894 INTRO/SHEATH, NON-LASER: HCPCS | Performed by: NEUROLOGICAL SURGERY

## 2019-01-23 RX ORDER — CLINDAMYCIN PHOSPHATE 900 MG/50ML
900 INJECTION, SOLUTION INTRAVENOUS SEE ADMIN INSTRUCTIONS
Status: DISCONTINUED | OUTPATIENT
Start: 2019-01-23 | End: 2019-01-23 | Stop reason: HOSPADM

## 2019-01-23 RX ORDER — NAPROXEN SODIUM 220 MG
220 TABLET ORAL 2 TIMES DAILY WITH MEALS
Qty: 60 TABLET | Refills: 0 | Status: SHIPPED | OUTPATIENT
Start: 2019-02-06 | End: 2019-03-11

## 2019-01-23 RX ORDER — CLINDAMYCIN PHOSPHATE 900 MG/50ML
900 INJECTION, SOLUTION INTRAVENOUS
Status: COMPLETED | OUTPATIENT
Start: 2019-01-23 | End: 2019-01-23

## 2019-01-23 RX ORDER — OXYCODONE HYDROCHLORIDE 5 MG/1
5-10 TABLET ORAL
Status: COMPLETED | OUTPATIENT
Start: 2019-01-23 | End: 2019-01-23

## 2019-01-23 RX ORDER — NALOXONE HYDROCHLORIDE 0.4 MG/ML
.1-.4 INJECTION, SOLUTION INTRAMUSCULAR; INTRAVENOUS; SUBCUTANEOUS
Status: DISCONTINUED | OUTPATIENT
Start: 2019-01-23 | End: 2019-01-23 | Stop reason: HOSPADM

## 2019-01-23 RX ORDER — DIMENHYDRINATE 50 MG/ML
25 INJECTION, SOLUTION INTRAMUSCULAR; INTRAVENOUS
Status: COMPLETED | OUTPATIENT
Start: 2019-01-23 | End: 2019-01-23

## 2019-01-23 RX ORDER — METHYLPREDNISOLONE ACETATE 40 MG/ML
INJECTION, SUSPENSION INTRA-ARTICULAR; INTRALESIONAL; INTRAMUSCULAR; SOFT TISSUE PRN
Status: DISCONTINUED | OUTPATIENT
Start: 2019-01-23 | End: 2019-01-23 | Stop reason: HOSPADM

## 2019-01-23 RX ORDER — SODIUM CHLORIDE, SODIUM LACTATE, POTASSIUM CHLORIDE, CALCIUM CHLORIDE 600; 310; 30; 20 MG/100ML; MG/100ML; MG/100ML; MG/100ML
INJECTION, SOLUTION INTRAVENOUS CONTINUOUS
Status: DISCONTINUED | OUTPATIENT
Start: 2019-01-23 | End: 2019-01-23 | Stop reason: HOSPADM

## 2019-01-23 RX ORDER — OXYCODONE HYDROCHLORIDE 5 MG/1
5-10 TABLET ORAL EVERY 4 HOURS PRN
Qty: 50 TABLET | Refills: 0 | Status: SHIPPED | OUTPATIENT
Start: 2019-01-23 | End: 2019-02-07

## 2019-01-23 RX ORDER — FENTANYL CITRATE 50 UG/ML
25-50 INJECTION, SOLUTION INTRAMUSCULAR; INTRAVENOUS
Status: DISCONTINUED | OUTPATIENT
Start: 2019-01-23 | End: 2019-01-23 | Stop reason: HOSPADM

## 2019-01-23 RX ORDER — LIDOCAINE HYDROCHLORIDE 20 MG/ML
INJECTION, SOLUTION INFILTRATION; PERINEURAL PRN
Status: DISCONTINUED | OUTPATIENT
Start: 2019-01-23 | End: 2019-01-23

## 2019-01-23 RX ORDER — HYDROMORPHONE HYDROCHLORIDE 1 MG/ML
.3-.5 INJECTION, SOLUTION INTRAMUSCULAR; INTRAVENOUS; SUBCUTANEOUS EVERY 10 MIN PRN
Status: DISCONTINUED | OUTPATIENT
Start: 2019-01-23 | End: 2019-01-23 | Stop reason: HOSPADM

## 2019-01-23 RX ORDER — CYCLOBENZAPRINE HCL 5 MG
5-10 TABLET ORAL 3 TIMES DAILY
Status: DISCONTINUED | OUTPATIENT
Start: 2019-01-23 | End: 2019-01-23 | Stop reason: HOSPADM

## 2019-01-23 RX ORDER — PROPOFOL 10 MG/ML
INJECTION, EMULSION INTRAVENOUS PRN
Status: DISCONTINUED | OUTPATIENT
Start: 2019-01-23 | End: 2019-01-23

## 2019-01-23 RX ORDER — NYSTATIN 100000 [USP'U]/G
POWDER TOPICAL
COMMUNITY
End: 2019-03-11

## 2019-01-23 RX ORDER — LIDOCAINE HYDROCHLORIDE AND EPINEPHRINE 10; 10 MG/ML; UG/ML
INJECTION, SOLUTION INFILTRATION; PERINEURAL PRN
Status: DISCONTINUED | OUTPATIENT
Start: 2019-01-23 | End: 2019-01-23 | Stop reason: HOSPADM

## 2019-01-23 RX ORDER — AMOXICILLIN 250 MG
1-2 CAPSULE ORAL DAILY PRN
Qty: 30 TABLET | Refills: 0 | Status: SHIPPED | OUTPATIENT
Start: 2019-01-23 | End: 2019-03-11

## 2019-01-23 RX ORDER — HYDROXYZINE HYDROCHLORIDE 10 MG/1
10 TABLET, FILM COATED ORAL
Status: DISCONTINUED | OUTPATIENT
Start: 2019-01-23 | End: 2019-01-23 | Stop reason: HOSPADM

## 2019-01-23 RX ORDER — ESMOLOL HYDROCHLORIDE 10 MG/ML
INJECTION INTRAVENOUS PRN
Status: DISCONTINUED | OUTPATIENT
Start: 2019-01-23 | End: 2019-01-23

## 2019-01-23 RX ORDER — SCOLOPAMINE TRANSDERMAL SYSTEM 1 MG/1
1 PATCH, EXTENDED RELEASE TRANSDERMAL
Status: DISCONTINUED | OUTPATIENT
Start: 2019-01-23 | End: 2019-01-23 | Stop reason: HOSPADM

## 2019-01-23 RX ADMIN — HYDROMORPHONE HYDROCHLORIDE 0.5 MG: 1 INJECTION, SOLUTION INTRAMUSCULAR; INTRAVENOUS; SUBCUTANEOUS at 14:55

## 2019-01-23 RX ADMIN — ROCURONIUM BROMIDE 10 MG: 10 INJECTION INTRAVENOUS at 13:25

## 2019-01-23 RX ADMIN — SUGAMMADEX 200 MG: 100 INJECTION, SOLUTION INTRAVENOUS at 13:54

## 2019-01-23 RX ADMIN — ESMOLOL HYDROCHLORIDE 30 MG: 10 INJECTION, SOLUTION INTRAVENOUS at 12:54

## 2019-01-23 RX ADMIN — OXYCODONE HYDROCHLORIDE 5 MG: 5 TABLET ORAL at 15:13

## 2019-01-23 RX ADMIN — HYDROMORPHONE HYDROCHLORIDE 0.5 MG: 1 INJECTION, SOLUTION INTRAMUSCULAR; INTRAVENOUS; SUBCUTANEOUS at 13:37

## 2019-01-23 RX ADMIN — FENTANYL CITRATE 50 MCG: 50 INJECTION, SOLUTION INTRAMUSCULAR; INTRAVENOUS at 12:31

## 2019-01-23 RX ADMIN — MIDAZOLAM 2 MG: 1 INJECTION INTRAMUSCULAR; INTRAVENOUS at 12:31

## 2019-01-23 RX ADMIN — FENTANYL CITRATE 50 MCG: 50 INJECTION, SOLUTION INTRAMUSCULAR; INTRAVENOUS at 12:38

## 2019-01-23 RX ADMIN — FENTANYL CITRATE 50 MCG: 50 INJECTION, SOLUTION INTRAMUSCULAR; INTRAVENOUS at 14:39

## 2019-01-23 RX ADMIN — SCOPALAMINE 1 PATCH: 1 PATCH, EXTENDED RELEASE TRANSDERMAL at 15:54

## 2019-01-23 RX ADMIN — ESMOLOL HYDROCHLORIDE 20 MG: 10 INJECTION, SOLUTION INTRAVENOUS at 12:51

## 2019-01-23 RX ADMIN — LIDOCAINE HYDROCHLORIDE 100 MG: 20 INJECTION, SOLUTION INFILTRATION; PERINEURAL at 12:38

## 2019-01-23 RX ADMIN — ROCURONIUM BROMIDE 50 MG: 10 INJECTION INTRAVENOUS at 12:38

## 2019-01-23 RX ADMIN — VANCOMYCIN HYDROCHLORIDE 1500 MG: 10 INJECTION, POWDER, LYOPHILIZED, FOR SOLUTION INTRAVENOUS at 11:43

## 2019-01-23 RX ADMIN — FENTANYL CITRATE 50 MCG: 50 INJECTION, SOLUTION INTRAMUSCULAR; INTRAVENOUS at 14:17

## 2019-01-23 RX ADMIN — DIMENHYDRINATE 12.5 MG: 50 INJECTION, SOLUTION INTRAMUSCULAR; INTRAVENOUS at 14:45

## 2019-01-23 RX ADMIN — PROPOFOL 200 MG: 10 INJECTION, EMULSION INTRAVENOUS at 12:38

## 2019-01-23 RX ADMIN — SODIUM CHLORIDE, POTASSIUM CHLORIDE, SODIUM LACTATE AND CALCIUM CHLORIDE: 600; 310; 30; 20 INJECTION, SOLUTION INTRAVENOUS at 11:08

## 2019-01-23 RX ADMIN — FENTANYL CITRATE 50 MCG: 50 INJECTION, SOLUTION INTRAMUSCULAR; INTRAVENOUS at 14:13

## 2019-01-23 RX ADMIN — CLINDAMYCIN PHOSPHATE 900 MG: 18 INJECTION, SOLUTION INTRAVENOUS at 12:43

## 2019-01-23 NOTE — BRIEF OP NOTE
[facility] Brief Operative Note    Pre-operative diagnosis: Lumbar disc herniation   Post-operative diagnosis same   Procedure: Procedure(s):  Minimally Invasive Surgery Left Lumbar 5-Sacral 1 microdiscectomy   Surgeon(s): Surgeon(s) and Role:     * Mason Roe MD - Primary     * Thierno Sampson PA-C - Assisting   Estimated blood loss: 25 mL   Specimens: * No specimens in log *   Findings: Lumbar disc herniation         Thierno Sampson PA-C  [facility]

## 2019-01-23 NOTE — ANESTHESIA POSTPROCEDURE EVALUATION
Patient: Fallon Rivera    Procedure(s):  Minimally Invasive Surgery Left Lumbar 5-Sacral 1 microdiscectomy    Diagnosis:Lumbar disc herniation  Diagnosis Additional Information: No value filed.    Anesthesia Type:  General, ETT    Note:  Anesthesia Post Evaluation    Patient location during evaluation: Phase 2 and Bedside  Patient participation: Able to fully participate in evaluation  Level of consciousness: awake and alert  Pain management: adequate  Airway patency: patent  Cardiovascular status: acceptable  Respiratory status: acceptable  Hydration status: acceptable  PONV: none     Anesthetic complications: None    Comments: Patient was pleased with her care today. No complications noted. Will follow as needed.        Last vitals:  Vitals:    01/23/19 1415 01/23/19 1420   BP: 128/69 132/79   Pulse:  108   Resp: 10 8   SpO2: 100% 99%         Electronically Signed By: SERENE Sparks CRNA  January 23, 2019  2:47 PM

## 2019-01-23 NOTE — OP NOTE
Date of surgery: 1/23/2018  Surgeon: Mason Roe MD  Assistant: AIDEN Pedraza  Note: Thierno Sampson was present for and assisted with the entire surgery, and his role as an assistant was crucial for aid in positioning, exposure, suctioning, retraction, and closure    Preoperative diagnosis: Lumbar disk herniation  Postoperative diagnosis: Lumbar disk herniation    Procedure:  1.  Left L5-S1 MIS microdiscectomy  2.  Use of Medtronic Metrx minimally invasive system  3.  Use of intraoperative microscope and fluoroscopy    EBL: 25 mL    Indications: 38 year-old female who presented with severe leg pain and disk herniation.  Underwent non-operative management with failure to improve.  Risks, benefits, indications, and alternatives were discussed with the patient and family in detail, and they wished to proceed.    Description of surgery: The patient was positioned prone.  Sterile prepping and draping procedures were performed.  Antibiotics were administered and timeout was performed.  A paramedian lumbar incision was performed.  The minimally invasive dilating system was used to dock on the hemilamina.  L5-S1 laminotomies and medial facetectomy were performed with the high speed drill.  The kerrison rongeur was used to remove the ligamentum flavum, and the thecal sac and nerve root were exposed.  The nerve root was retracted medially, and the extruded disk fragment was removed with the pituitary rongeurs.  Hemostasis was achieved and antibiotic irrigation was performed.  The fascia was closed with 0-Vicryl sutures, and the dermal layer was closed with 2-0 vicryl sutures.  The skin was closed with a running subcuticular stitch.  There were no intraprocedural complications.

## 2019-01-23 NOTE — OR NURSING
Spoke with Dr. Roe and procedure should be under 1 hour, therefore no need for stress induced steroids.

## 2019-01-23 NOTE — ANESTHESIA CARE TRANSFER NOTE
Patient: Fallon Moranenter    Procedure(s):  Minimally Invasive Surgery Left Lumbar 5-Sacral 1 microdiscectomy    Diagnosis: Lumbar disc herniation  Diagnosis Additional Information: No value filed.    Anesthesia Type:   General, ETT     Note:  Airway :Face Mask  Patient transferred to:PACU  Handoff Report: Identifed the Patient, Identified the Reponsible Provider, Reviewed the pertinent medical history, Discussed the surgical course, Reviewed Intra-OP anesthesia mangement and issues during anesthesia, Set expectations for post-procedure period and Allowed opportunity for questions and acknowledgement of understanding      Vitals: (Last set prior to Anesthesia Care Transfer)    CRNA VITALS  1/23/2019 1333 - 1/23/2019 1426      1/23/2019             Pulse:  113    SpO2:  99 %                Electronically Signed By: SERENE Sparks CRNA  January 23, 2019  2:26 PM

## 2019-01-23 NOTE — OR NURSING
S-(situation): IV irritation    B-(background): In pre-op preparing for surgery.    A-(assessment): Patient had call light on and stated IV site is itching (patient receiving Vancomycin).  IV insertion site reddened, but patient denies any other symptoms, stated it is just that part of her arm that is itching.  Antibiotic stopped; IV line flushed; ice pack applied to site. Spoke with Dr. Roe, who stated to talk with anesthesia.  Saad Pickett CRNA notified-- currently here to see patient.    R-(recommendations): Monitor IV site.

## 2019-01-23 NOTE — DISCHARGE INSTRUCTIONS
Spine Clinic at [facility]   Dr. Roe Discharge Instructions Following Spine Surgery   [phone] ([facility] Office)   Monday - Friday; 8:00 AM - 4:00 PM   In General:   After you have had surgery on your spine, remember do not twist, or excessively flex or extend the area that you had surgery. These activities can prevent healing. Pain is normal and to be expected following surgery. Please call our office to schedule your appointment follow up appointment.   Bowel Care:   Many people have constipation (hard stools) after surgery. To help prevent constipation: Drink plenty of fluid (8-10 glasses/day); Eat more fiber, such as whole grain bread, bran cereal, and fruits and vegetables; Stay active by walking; Over the counter stool softener may also help.   Medications:   Spine surgery and pain management is unique to all patients. You will generally be given medications for pain, muscle spasms or tightness, and for constipation during the immediate post op period. It is important that you use these as prescribed. Please remember to bring your pill bottles to all of your appointments. Avoid driving while taking narcotic pain medications. Avoid alcoholic beverages while taking narcotic pain medications. You can use ice to areas of pain as needed, 20 minutes at a time. Changing positions and walking will help loosen your muscles as well. No NSAIDs (Ibuprofen, Advil, Motrin, Aleve, Naproxen) for 7 days.   Driving:   No driving while on narcotic pain medications. It is state law not to drive while under the influence of a drug to a degree which renders you incapable of safely driving. The narcotic medication you will be taking after surgery falls under this category.   Activity:   After surgery, most people feel less pain than they have had in a long time. Walking and light activities will help you regain the use of your muscles. You are encouraged to walk: start with short walks 5-10 minutes at a time  for 4-5 times per day and increase as tolerated. Stair climbing as tolerated, we recommend you use the railing. No lifting greater than 10 pounds: approximately equal to one gallon of milk. No twisting, bending in the area you have had surgery. No housework, vacuuming, laundry, leaf raking, lawn mowing, or snow removal. Wear your brace (if ordered) as directed.   Showers:   If you have sutures or staples you may shower two days after surgery. It is ok to let water run over your incision but do not touch or scrub on the incision. Pat dry immediately after showering. If there is a dressing in place, you may remove it 2 days after surgery. If you were closed with Derma steward (glue), you may shower without covering the incision. No baths, hot tubs, or pool activity for at least 6 weeks.   Nutrition:   In general, your diet restrictions will not change with your surgery. You may need to eat small frequent meals initially until your appetite returns. Eat plenty of high fiber foods and drink plenty of fluids. If you do not have a fluid restriction from or prior to surgery, we recommend 6-8 (8oz) glasses of water per day. Other fluids are fine, but water is best. Nausea is not uncommon; it is a common side effect to many pain medications. We recommend that you take the pain medications with food, if this does not improve your symptoms, please call us.   Smoking:   For proper healing it is required that you quit using all tobacco products. This includes smoking, chewing, nicotine gums, and nicotine patches.   Follow-Up Appointment   Neurosurgery Appointment with Dinorah Enrique PA-C or Thierno Sampson PA-C at the clinic in 6 weeks. Call [phone] for appointment.   Call Dr. Roe at [phone] if these occur: Drainage from your incision, increased pain/redness/swelling, temperatures greater than 101.5, increased leg pain or swelling or unrelieved headaches   Go to the nearest Emergency Room if you experience: chest pain, shortness  of breath, neck swelling or swallowing problems      [facility] Same-Day Surgery   Adult Discharge Orders & Instructions     For 24 hours after surgery    1. Get plenty of rest.  A responsible adult must stay with you for at least 24 hours after you leave the hospital.   2. Do not drive or use heavy equipment.  If you have weakness or tingling, don't drive or use heavy equipment until this feeling goes away.  3. Do not drink alcohol.  4. Avoid strenuous or risky activities.  Ask for help when climbing stairs.   5. You may feel lightheaded.  If so, sit for a few minutes before standing.  Have someone help you get up.   6. You may have a slight fever. Call the doctor if your fever is over 100 F (37.7 C) (taken under the tongue) or lasts longer than 24 hours.  7. You may have a dry mouth, a sore throat, muscle aches or trouble sleeping.  These should go away after 24 hours.  8. Do not make important or legal decisions.  We don t expect you to have any problems from the surgery or treatment you had today. Just in case, here s what to do if you have pain, upset stomach (nausea), bleeding or infection:  Pain:  Take medicines your doctor has prescribed or over-the-counter medicine they have suggested. Resting and using ice packs can help, too. For surgery on an arm or leg, raise it on a pillow to ease swelling. Call your doctor if these methods don t work.  Copyright [employer], Licensed under [employer]  Upset stomach (nausea):  Take anti-nausea medicine approved by your doctor. Drink clear liquids like apple juice, ginger ale, broth or 7-Up. Be sure to drink enough fluids. Rest can help, too. Move to normal foods when you re ready. Bleeding:  In the first 24 hours, you may see a little blood on your dressing, about the size of a quarter. You don t need to worry about this much blood, but if the blood spot keeps getting bigger:    Put pressure on the wound if you can, AND    Call your  doctor.  Copyright [employer], Licensed under [employer]  Fever/Infection: Please call your doctor if you have any of these signs:    Redness    Swelling    Wound feels warm    Pain gets worse    Bad-smelling fluid leaks from wound    Fever or chills  Call your doctor for any of the followin.  It has been over 8 to 10 hours since surgery and you are still not able to urinate (pass water).    2.  Headache for over 24 hours.    3.  Numbness , tingling or weakness in your legs the day after surgery (if you had spinal anesthesia).    Nurse advice line: [phone] (24 hour)    DISCHARGE INSTRUCTIONS FOR PATIENT   SCOPOLAMINE TRANSDERMAL PATCH  You may leave the patch on behind your ear for three days, but NO LONGER. You may have withdrawal symptoms (nausea, vomiting, headache, dizziness) if used longer.  When you remove the patch, you may wash and dry your hands thoroughly and before touching your eyes, as pupil may dilate.  Discard patch (away from children and pets).  You may develop some urinary hesitancy or urine retention.

## 2019-01-23 NOTE — ANESTHESIA PREPROCEDURE EVALUATION
Anesthesia Pre-Procedure Evaluation    Patient: Fallon Rivera   MRN: [id number] : 1980          Preoperative Diagnosis: Lumbar disc herniation    Procedure(s):  Minimally Invasive Surgery Left Lumbar 5-Sacral 1 microdiscectomy    Past Medical History:   Diagnosis Date     Abnormal Pap smear , ,     ASC-H, ASCUS + HPV 45     Calculus of kidney      Cervical high risk HPV (human papillomavirus) test positive     + HPV 45 (high risk)     History of colposcopy with cervical biopsy 06, 3/15/07    koilocytosis      Past Surgical History:   Procedure Laterality Date     C REMOVAL OF KIDNEY STONE      two surgeries, ,     CHOLECYSTECTOMY, LAPOROSCOPIC  2007    Cholecystectomy, Laparoscopic     COLONOSCOPY  05/17/10     CONIZATION  2011    Procedure:CONIZATION; cold knife and punch biopsy of mole on back; Surgeon:SYDNEY FORD; Location:[facility] OR     DILATION AND CURETTAGE, HYSTEROSCOPY, LAPAROSCOPY, COMBINED Right 2015    Procedure: COMBINED DILATION AND CURETTAGE, HYSTEROSCOPY, LAPAROSCOPY;  Surgeon: Sydney Ford MD;  Location: [facility] OR     ESOPHAGOSCOPY, GASTROSCOPY, DUODENOSCOPY (EGD), COMBINED  2014    Procedure: COMBINED ESOPHAGOSCOPY, GASTROSCOPY, DUODENOSCOPY (EGD), BIOPSY SINGLE OR MULTIPLE;  Surgeon: Earl Lane MD;  Location: [facility] GI     EXCISE LESION TRUNK  2011    Procedure:EXCISE LESION TRUNK; Surgeon:SYDNEY FORD; Location:[facility] OR     HC FRAGMENTING OF KIDNEY STONE  2005     HC RX ECTOP PREG BY SCOPE,RMV TUBE/OVRY  2007    Right sided salpingectomy and removal of ruptured ectopic pregnancy. D&C.      UGI ENDOSCOPY, SIMPLE EXAM  09     LAPAROSCOPIC APPENDECTOMY N/A 2015    Procedure: LAPAROSCOPIC APPENDECTOMY;  Surgeon: James Cuellar MD;  Location: [facility] OR     LAPAROSCOPIC CYSTECTOMY OVARIAN (BENIGN) N/A 2015    Procedure: LAPAROSCOPIC CYSTECTOMY OVARIAN (BENIGN);   "Surgeon: rGeg Ford MD;  Location: [facility] OR     LAPAROSCOPIC OOPHORECTOMY Right 9/24/2015    Procedure: LAPAROSCOPIC OOPHORECTOMY;  Surgeon: Greg Ford MD;  Location: [facility] OR     LITHOTRIPSY  01/17/2007     PELVIS LAPAROSCOPY,DX  10/16/2000    Diagnostic laparoscopy, Endometrial biopsy.     TUBAL/ECTOPIC PREGNANCY  01/23/2007    Lap removal of left ruptured ectopic pregnancy & salpingectomy, D&C       Anesthesia Evaluation     . Pt has had prior anesthetic. Type: General and MAC    History of anesthetic complications   - PONV        ROS/MED HX    ENT/Pulmonary:     (+)ELIZABETH risk factors snores loudly, obese, tobacco use, Current use 1 ppd x 12 years - quit 2004 packs/day  , . .    Neurologic:  - neg neurologic ROS     Cardiovascular:     (+) Dyslipidemia, ----. : . . . :. . Previous cardiac testing date:results:Stress Testdate:3-2011 results:Exam Information     Exam Date Exam Time Accession # Results   3/28/11  9:58 AM [id number]   Component  XCELERA     Interpretation Summary  The results of the stress echocardiogram were conveyed to the patient today.   THIS IS A NORMAL STRESS ECHO AND STRESS EKG.  This study was performed for   \"atypical\" chest pain.  PatientHeight: 64 in  PatientWeight: 215 lbs  SystolicPressure: 114 mmHg  DiastolicPressure: 78 mmHg  HeartRate: 101 bpm  BSA 2.0 m^2     Baseline  The patient is in normal sinus rhythm.  Resting ECG is normal.  Normal left ventricular wall motion.  No regional wall motion abnormalities noted.  The visual ejection fraction is estimated at 55-60%.     Stress  Normal blood pressure response to exercise.  The patient exhibited no chest pain during exercise.  The patient exercised 9:00.  Target Heart Rate was achieved.  No arrhythmia noted.  A treadmill exercise test according to the Jose protocol was performed.  A moderate workload was achieved.  There was no chest pain or significant ST changes with exercise.  The EKG portion of this " "stress test was negative for inducible ischemia (see   echo results below).  This study was performed for \"atypical\" chest pain.  Normal resting wall motion and no stress-induced wall motion abnormality.  The visual ejection fraction is estimated at >70%.  This was a normal stress echocardiogram.  Left ventricular cavity size decreases with exercise.  Global LV systolic function augments with exercise.     Mitral Valve  There is no mitral regurgitation noted.  There is no mitral valve stenosis.     Aortic Valve  Normal tricuspid aortic valve.     Procedure  Stress Echo Complete.        Stress Results  Protocol:  MN HEART HEVER    Target HR: 163 bpm            Maximum Predicted HR: 192 bpm      Stage        Duration(mm:ss)  HR(bpm)   BP        DOSE  Comment                    03:00          148       134/78                                     03:00          166       140/78                                     03:00          184       150/78          RPP [id number]   RECOVERY       09:05          129       122/78                          Stress Duration: 09:00 mm:ss  Recovery Time: 09:05 mm:ss    Maximum Stress HR: 184 bpm    METS: 11      Interpreting Physician:  Frederic Argueta MD electronically signed on 03-   11:58:58    ECG reviewed date: results:SR date: results:          METS/Exercise Tolerance:     Hematologic:         Musculoskeletal:         GI/Hepatic:     (+) GERD Asymptomatic on medication,       Renal/Genitourinary:  - ROS Renal section negative   (+) Nephrolithiasis ,       Endo:  - neg endo ROS   (+) type II DM Last HgA1c: 7.3 date:  Not using insulin - not using insulin pump Obesity, .      Psychiatric:     (+) psychiatric history anxiety and depression      Infectious Disease:  - neg infectious disease ROS       Malignancy:      - no malignancy   Other:    (+) Possibly pregnant   - neg other ROS                      Physical Exam  Normal systems: cardiovascular, pulmonary and " "dental    Airway   Mallampati: II  TM distance: >3 FB  Neck ROM: full    Dental   (+) missing  Comment: Left upper incisor missing    Cardiovascular   Rhythm and rate: regular and normal  (-) no murmur    Pulmonary    breath sounds clear to auscultation            Lab Results   Component Value Date    WBC 7.3 09/08/2018    HGB 14.9 09/08/2018    HCT 42.3 09/08/2018     09/08/2018    CRP 3.0 10/28/2017    SED 10 09/22/2017     01/17/2019    POTASSIUM 3.8 01/17/2019    CHLORIDE 103 01/17/2019    CO2 29 01/17/2019    BUN 9 01/17/2019    CR 0.71 01/17/2019     (H) 01/17/2019    SHADE 8.4 (L) 01/17/2019    ALBUMIN 3.9 05/21/2018    PROTTOTAL 8.0 05/21/2018    ALT 67 (H) 07/05/2018    AST 31 07/05/2018    ALKPHOS 96 05/21/2018    BILITOTAL 0.7 05/21/2018    LIPASE 103 05/08/2014    AMYLASE 66 05/08/2014    PTT 31 07/11/2011    INR 0.99 07/11/2011    TSH 2.43 07/27/2015    HCG Negative 10/28/2017       Preop Vitals  BP Readings from Last 3 Encounters:   01/17/19 120/78   01/04/19 131/78   12/27/18 136/88    Pulse Readings from Last 3 Encounters:   01/17/19 88   01/04/19 111   12/08/18 99      Resp Readings from Last 3 Encounters:   01/17/19 16   12/17/18 16   10/16/18 16    SpO2 Readings from Last 3 Encounters:   01/17/19 99%   01/04/19 100%   12/17/18 99%      Temp Readings from Last 1 Encounters:   01/17/19 98.3  F (36.8  C) (Temporal)    Ht Readings from Last 1 Encounters:   01/17/19 1.626 m (5' 4\")      Wt Readings from Last 1 Encounters:   01/17/19 99.1 kg (218 lb 7 oz)    Estimated body mass index is 37.49 kg/m  as calculated from the following:    Height as of 1/17/19: 1.626 m (5' 4\").    Weight as of 1/17/19: 99.1 kg (218 lb 7 oz).       Anesthesia Plan      History & Physical Review  History and physical reviewed and following examination; no interval change.    ASA Status:  2 .    NPO Status:  > 8 hours    Plan for General and ETT with Intravenous and Propofol induction. Maintenance will be " Balanced.    PONV prophylaxis:  Meclizine or Dimenhydrinate       Postoperative Care  Postoperative pain management:  IV analgesics, Oral pain medications and Multi-modal analgesia.      Consents  Anesthetic plan, risks, benefits and alternatives discussed with:  Patient or representative and Patient.  Use of blood products discussed: No .   .                 SERENE Sparks CRNA

## 2019-01-24 NOTE — OR NURSING
"[facility] Same Day Surgery  Discharge Call Back  Fallon Rivera  1980  MRN: [id number]  Home: [phone] (home)   PCP: Greg Ford    We are calling to see how you're doing since your surgery/procedure with us?   Comments: \"I'm doing really well, thank you.\"  Clinical Questions  1. Have you had time to look at your discharge instructions? Do you have any questions in regards to the instructions?   Comment: yes/no  2. Do you feel your pain is being controlled with the regimen the surgeon sent you home on? (ie: prescription medications, over the counter pain medications, ice packs)   Comments: yes  3. Have you noticed any drainage on your dressing? Do you know what to do if you have bleeding as a result of your procedure?   Comments: no/yes  4. Have you had any nausea/vomiting? Do you know how to treat this?   Comment: no/yes  5. Have you had any signs/symptoms of infection? (ie: fever, swelling, heat, drainage or redness) Do you know what to do if you have?   Comment: no/yes  6. Do you have a follow up appointment made with your surgeon? Do you have a number to contact them at if you need it?   Comment: yes/yes  Retained Foreign Object (JEAN PAUL, Hemovac, Penrose, Wound Packing, Vaginal Packing, Nasal Splints, Urethral Stents, Saldana Catheter)  1. Do you still have NA in place?   2. If the item is still in place, can you review the plan for removal with me? NA  Recognition  Is there anyone from your care team that you would like to recognize?   Comments: \"the whole team did an awesome job, especially the nurses!  Improvement  Our goal is to be the best. Do you have any suggestions for things that we could improve on?   Comments: none noted  You may be randomly selected to fill out a [facility] survey. We would appreciate you taking the time to fill this out. It is important to us if you would answer all of the questions on the survey.            "

## 2019-01-28 ENCOUNTER — NURSE TRIAGE (OUTPATIENT)
Dept: NURSING | Facility: CLINIC | Age: 39
End: 2019-01-28

## 2019-01-28 NOTE — TELEPHONE ENCOUNTER
Clinic Action Needed:No  Reason for Call: Fallon is calling to discuss the use of her pain medications post spine/back surgery.  She was relying heavily on her Oxy and is trying to use alternate pain control.  She said she isn't able to start her Naproxen yet and wanted instructions about her Tylenol usage.  I advised that generally speaking with recommend limiting Tylenol to 3000 mg daily.  She said that her narcotic pain medication does not have Tylenol in it.  Paged on call provider for [facility] to speak to caller at [phone].  AIDEN Sampson is on call, page sent @ 4:31 pm via smart web.    Caller advised to call back if they have not heard back from on call provider within 20 minutes.  Caller appears to understand directives and agrees with plan.    Routed to: Not routed.    Rose Guzman RN  [facility]

## 2019-01-31 ENCOUNTER — TELEPHONE (OUTPATIENT)
Dept: NEUROSURGERY | Facility: CLINIC | Age: 39
End: 2019-01-31

## 2019-01-31 NOTE — TELEPHONE ENCOUNTER
Contacted patient for post-op follow up call. Patient is s/p lumbar microdiscectomy on 1/23. After surgery patient is doing well overall, but reports some incisional and hip pain. Pain is controlled by ocycodone, flexeril, and tylenol. Patient has been eating and drinking well. No issues with bowel or bladder; advised to increase fluid, fiber intake, and use OTC stool softener if needed. No issues or concerns with incision. Reminded patient to watch for s/sx of infection, use proper incision care, and follow activity restrictions. Patient will call to schedule 6 week post op f/u appt.  All questions were answered to the best of my ability and the patient's satisfaction. Patient verbalized understanding and advised to call with any additional questions or concerns.

## 2019-02-03 DIAGNOSIS — E11.9 TYPE 2 DIABETES MELLITUS (H): ICD-10-CM

## 2019-02-03 DIAGNOSIS — K21.9 GASTROESOPHAGEAL REFLUX DISEASE WITHOUT ESOPHAGITIS: Primary | ICD-10-CM

## 2019-02-04 DIAGNOSIS — M54.16 LUMBAR RADICULOPATHY: ICD-10-CM

## 2019-02-04 RX ORDER — CYCLOBENZAPRINE HCL 5 MG
5 TABLET ORAL 3 TIMES DAILY PRN
Qty: 90 TABLET | Refills: 0 | Status: SHIPPED | OUTPATIENT
Start: 2019-02-04 | End: 2020-09-14 | Stop reason: ALTCHOICE

## 2019-02-04 NOTE — TELEPHONE ENCOUNTER
"Prilosec  Last Written Prescription Date:  11/15/18  Last Fill Quantity: 30,  # refills: 1    Last office visit: 1/17/2019 with prescribing provider:     Future Office Visit:   Next 5 appointments (look out 90 days)    Mar 11, 2019  3:10 PM CDT  Return Visit with Thierno Sampson PA-C  [facility] ([address] [address]  [phone]   Apr 25, 2019  2:30 PM CDT  Return Visit with Dinorah Enrique PA-C  [facility] ([address] [address]  [phone]         Requested Prescriptions   Pending Prescriptions Disp Refills     omeprazole (PRILOSEC) 20 MG DR capsule [Pharmacy Med Name: OMEPRAZOLE 20MG CPDR] 30 capsule 1     Sig: TAKE 1 CAPSULE BY MOUTH DAILY, 30 TO 60 MINUTES BEFORE A MEAL.    PPI Protocol Passed - 2/3/2019 10:42 AM       Passed - Not on Clopidogrel (unless Pantoprazole ordered)       Passed - No diagnosis of osteoporosis on record       Passed - Recent (12 mo) or future (30 days) visit within the authorizing provider's specialty    Patient had office visit in the last 12 months or has a visit in the next 30 days with authorizing provider or within the authorizing provider's specialty.  See \"Patient Info\" tab in inbasket, or \"Choose Columns\" in Meds & Orders section of the refill encounter.             Passed - Medication is active on med list       Passed - Patient is age 18 or older       Passed - No active pregnacy on record       Passed - No positive pregnancy test in past 12 months      Prescription approved per [facility] [place].  KRISTOFER Monroy      "

## 2019-02-04 NOTE — PROGRESS NOTES
Patient calling to request a refill on her flexeril S/p lumbar microdisc on 1/23, last refill on 12/21/18.

## 2019-02-06 DIAGNOSIS — Z98.890 S/P LUMBAR MICRODISCECTOMY: ICD-10-CM

## 2019-02-06 NOTE — TELEPHONE ENCOUNTER
Patient is calling to request a refill on her oxycodone. S/p lumbar microdiscectomy on 1/23, last refill on 1/23 for # 50. Patient is taking approximately 4 tablet per day to help with her post op pain. Patient would like to  refill if approved in , patient aware unable to complete this request until 2/7. Will forward to care team for review.

## 2019-02-07 RX ORDER — OXYCODONE HYDROCHLORIDE 5 MG/1
5-10 TABLET ORAL EVERY 4 HOURS PRN
Qty: 50 TABLET | Refills: 0 | Status: SHIPPED | OUTPATIENT
Start: 2019-02-07 | End: 2019-04-25

## 2019-03-11 ENCOUNTER — OFFICE VISIT (OUTPATIENT)
Dept: FAMILY MEDICINE | Facility: CLINIC | Age: 39
End: 2019-03-11
Payer: COMMERCIAL

## 2019-03-11 ENCOUNTER — OFFICE VISIT (OUTPATIENT)
Dept: NEUROSURGERY | Facility: CLINIC | Age: 39
End: 2019-03-11
Payer: COMMERCIAL

## 2019-03-11 VITALS
HEIGHT: 64 IN | HEART RATE: 100 BPM | OXYGEN SATURATION: 98 % | BODY MASS INDEX: 37.56 KG/M2 | WEIGHT: 220 LBS | RESPIRATION RATE: 18 BRPM | SYSTOLIC BLOOD PRESSURE: 110 MMHG | DIASTOLIC BLOOD PRESSURE: 74 MMHG

## 2019-03-11 VITALS
SYSTOLIC BLOOD PRESSURE: 130 MMHG | BODY MASS INDEX: 37.73 KG/M2 | WEIGHT: 219.8 LBS | DIASTOLIC BLOOD PRESSURE: 72 MMHG | TEMPERATURE: 97.5 F

## 2019-03-11 DIAGNOSIS — M79.605 LEFT LEG PAIN: Primary | ICD-10-CM

## 2019-03-11 DIAGNOSIS — Z00.01 ENCOUNTER FOR WELL ADULT EXAM WITH ABNORMAL FINDINGS: Primary | ICD-10-CM

## 2019-03-11 DIAGNOSIS — Z98.890 S/P CONIZATION OF CERVIX: ICD-10-CM

## 2019-03-11 DIAGNOSIS — E11.9 TYPE 2 DIABETES MELLITUS WITHOUT COMPLICATION, WITHOUT LONG-TERM CURRENT USE OF INSULIN (H): ICD-10-CM

## 2019-03-11 DIAGNOSIS — Z12.4 SCREENING FOR MALIGNANT NEOPLASM OF CERVIX: ICD-10-CM

## 2019-03-11 DIAGNOSIS — Z98.890 S/P LUMBAR MICRODISCECTOMY: ICD-10-CM

## 2019-03-11 PROCEDURE — 87624 HPV HI-RISK TYP POOLED RSLT: CPT | Performed by: OBSTETRICS & GYNECOLOGY

## 2019-03-11 PROCEDURE — G0145 SCR C/V CYTO,THINLAYER,RESCR: HCPCS | Performed by: OBSTETRICS & GYNECOLOGY

## 2019-03-11 PROCEDURE — 57505 ENDOCERVICAL CURETTAGE: CPT | Performed by: OBSTETRICS & GYNECOLOGY

## 2019-03-11 PROCEDURE — 88305 TISSUE EXAM BY PATHOLOGIST: CPT | Performed by: OBSTETRICS & GYNECOLOGY

## 2019-03-11 PROCEDURE — 99213 OFFICE O/P EST LOW 20 MIN: CPT | Mod: 25 | Performed by: OBSTETRICS & GYNECOLOGY

## 2019-03-11 PROCEDURE — 99395 PREV VISIT EST AGE 18-39: CPT | Mod: 25 | Performed by: OBSTETRICS & GYNECOLOGY

## 2019-03-11 PROCEDURE — 99024 POSTOP FOLLOW-UP VISIT: CPT | Performed by: PHYSICIAN ASSISTANT

## 2019-03-11 ASSESSMENT — ENCOUNTER SYMPTOMS
DIARRHEA: 1
COUGH: 1
ARTHRALGIAS: 1
HEMATOCHEZIA: 0
MYALGIAS: 1
FREQUENCY: 1
ABDOMINAL PAIN: 1
CONSTIPATION: 0
HEADACHES: 1
WEAKNESS: 1
JOINT SWELLING: 1
SHORTNESS OF BREATH: 0
HEMATURIA: 0
FEVER: 0
CHILLS: 0
PARESTHESIAS: 0
DYSURIA: 0
EYE PAIN: 0
DIZZINESS: 1
NERVOUS/ANXIOUS: 0
HEARTBURN: 1

## 2019-03-11 ASSESSMENT — MIFFLIN-ST. JEOR: SCORE: 1672.91

## 2019-03-11 ASSESSMENT — PAIN SCALES - GENERAL: PAINLEVEL: SEVERE PAIN (6)

## 2019-03-11 NOTE — PROGRESS NOTES
Subjective:Fallon is here for yearly physical. Current concerns are:      She has type 2 diabetes for the past several years.  She is currently taking metformin to treat this.  Her last visit with an ophthalmologist was 1 year ago when she has another appointment this coming month.  She does have some neuropathy on her left foot which is related to previous back surgery.  She does have burning in both feet and she is this is related to her back surgery as well.  It is relatively new in onset but her diabetes is only been diagnosed 2 years ago.  No numbness on the right foot.  Otherwise no current concerns.        Past Medical History:   Diagnosis Date     Abnormal Pap smear 2006, 2007,     ASC-H, ASCUS + HPV 45     Calculus of kidney 2002     Cervical high risk HPV (human papillomavirus) test positive     + HPV 45 (high risk)     History of colposcopy with cervical biopsy 2/9/06, 3/15/07    koilocytosis 2007      Current Outpatient Medications   Medication Sig Dispense Refill     acetaminophen (TYLENOL) 500 MG tablet Take 1,000 mg by mouth every 6 hours as needed for mild pain       blood glucose monitoring ([employer]) lancets Use to check blood sugar 1-2 times daily 1 Box 12     CONTOUR NEXT TEST test strip USE TO TEST BLOOD SUGAR TWO TIMES A DAY OR AS DIRECTED 100 each 3     cyclobenzaprine (FLEXERIL) 5 MG tablet Take 1 tablet (5 mg) by mouth 3 times daily as needed for muscle spasms 90 tablet 0     metFORMIN (GLUCOPHAGE) 500 MG tablet TAKE ONE TABLET BY MOUTH EVERY DAY WITH DINNER 30 tablet 11     omeprazole (PRILOSEC) 20 MG DR capsule TAKE 1 CAPSULE BY MOUTH DAILY, 30 TO 60 MINUTES BEFORE A MEAL. 30 capsule 11     oxyCODONE (ROXICODONE) 5 MG tablet Take 1-2 tablets (5-10 mg) by mouth every 4 hours as needed for moderate to severe pain 50 tablet 0     Lidocaine (LIDOCARE) 4 % Patch Place 1 patch onto the skin every 24 hours       phenylephrine-cocoa butter (PREPARATION H) 0.25-88.44 % suppository Place 1  suppository rectally nightly as needed for hemorrhoids or itching       pramox-pe-glycerin-petrolatum (PREPARATION H) 1-0.25-14.4-15 % CREA cream Place rectally as needed for hemorrhoids        Allergies   Allergen Reactions     Amoxicillin Hives     Anti-Nausea      Anxiety, agitation,severe paranoia. Zofran, Reglan are some of them.  DRAMAMINE WITHOUT ISSUES       Demerol Hcl [Meperidine Hcl] Nausea     Indomethacin Nausea and Vomiting     Macrobid [Nitrofuran Derivatives] Hives     Reglan [Metoclopramide] Other (See Comments)     Acute paranoia, severe     Zofran [Ondansetron] Other (See Comments)     Severe anxiety, agitation      History   Smoking Status     Current Some Day Smoker     Years: 12.00     Types: Cigarettes     Last attempt to quit: 7/14/2009   Smokeless Tobacco     Never Used     Comment: As of 10 /2014, pt has had a few cigs here and there      Past Surgical History:   Procedure Laterality Date     C REMOVAL OF KIDNEY STONE      two surgeries, 2002,2003     CHOLECYSTECTOMY, LAPOROSCOPIC  5/11/2007    Cholecystectomy, Laparoscopic     COLONOSCOPY  05/17/10     CONIZATION  7/1/2011    Procedure:CONIZATION; cold knife and punch biopsy of mole on back; Surgeon:GREG FORD; Location:[facility]     DILATION AND CURETTAGE, HYSTEROSCOPY, LAPAROSCOPY, COMBINED Right 9/24/2015    Procedure: COMBINED DILATION AND CURETTAGE, HYSTEROSCOPY, LAPAROSCOPY;  Surgeon: Greg Ford MD;  Location: [facility]     DISCECTOMY LUMBAR MINIMALLY INVASIVE ONE LEVEL Left 1/23/2019    Procedure: Minimally Invasive Surgery Left Lumbar 5-Sacral 1 microdiscectomy;  Surgeon: Mason Roe MD;  Location: [facility]     ESOPHAGOSCOPY, GASTROSCOPY, DUODENOSCOPY (EGD), COMBINED  5/21/2014    Procedure: COMBINED ESOPHAGOSCOPY, GASTROSCOPY, DUODENOSCOPY (EGD), BIOPSY SINGLE OR MULTIPLE;  Surgeon: Earl Lane MD;  Location: [facility]     EXCISE LESION TRUNK  7/1/2011    Procedure:EXCISE LESION TRUNK;  Surgeon:GREG GRAFF; Location:[facility]     HC FRAGMENTING OF KIDNEY STONE  2005     HC RX ECTOP PREG BY SCOPE,RMV TUBE/OVRY  2007    Right sided salpingectomy and removal of ruptured ectopic pregnancy. D&C.     HC UGI ENDOSCOPY, SIMPLE EXAM  09     LAPAROSCOPIC APPENDECTOMY N/A 2015    Procedure: LAPAROSCOPIC APPENDECTOMY;  Surgeon: James Cuellar MD;  [facility]     LAPAROSCOPIC CYSTECTOMY OVARIAN (BENIGN) N/A 2015    Procedure: LAPAROSCOPIC CYSTECTOMY OVARIAN (BENIGN);  Surgeon: Greg Graff MD;  [facility]     LAPAROSCOPIC OOPHORECTOMY Right 2015    Procedure: LAPAROSCOPIC OOPHORECTOMY;  Surgeon: Greg Graff MD;  Location: [facility]     LITHOTRIPSY  2007     PELVIS LAPAROSCOPY,DX  10/16/2000    Diagnostic laparoscopy, Endometrial biopsy.     TUBAL/ECTOPIC PREGNANCY  2007    Lap removal of left ruptured ectopic pregnancy & salpingectomy, D&C      Social History     Tobacco Use     Smoking status: Current Some Day Smoker     Years: 12.00     Types: Cigarettes     Last attempt to quit: 2009     Years since quittin.6     Smokeless tobacco: Never Used     Tobacco comment: As of 10 /2014, pt has had a few cigs here and there   Substance Use Topics     Alcohol use: Yes     Alcohol/week: 0.0 oz     Comment: every few months     Drug use: No      Family History   Problem Relation Age of Onset     Diabetes Maternal Grandmother         adult onset     Anesthesia Reaction Maternal Grandmother         can't tolerate Novacaine.     Respiratory Maternal Grandmother         COPD and emphysema.     Thyroid Disease Maternal Grandmother      Heart Disease Maternal Grandmother         CHF     Diabetes Paternal Grandfather         adult o nset     Hypertension Paternal Grandfather      Cerebrovascular Disease Paternal Grandfather      Heart Disease Paternal Grandfather         MI, replaced valve,angioplasty     Thyroid  "Disease Paternal Grandfather      Cancer Maternal Grandfather         skin cancer     Heart Disease Maternal Grandfather         MI     Obesity Mother         on thyroid medication     Thyroid Disease Mother      Diabetes Mother      Rheumatologic Disease Mother      Heart Disease Father      Hypertension Father         and TIA     Lipids Father      Breast Cancer Paternal Grandmother      Arrhythmia Other      Cancer Maternal Aunt         breast/brain cancer     Depression Paternal Aunt      Cerebrovascular Disease Paternal Uncle      Cerebrovascular Disease Paternal Aunt        ROS: A 12 point review of systems is negative except for the following: See above      Physical Exam: /74   Pulse 100   Resp 18   Ht 1.626 m (5' 4\")   Wt 99.8 kg (220 lb)   SpO2 98%   BMI 37.76 kg/m   Recheck of pulse is 80  HEENT:    Sclerae and conjunctiva are normal.   Ear canals and TMs look normal.  Nasal mucosa is pink  - no polyps or masses seen.  sinuses are non tender to palpation.  Throat is unremarkable . Mucous membranes are moist.     Fundi are benign- disc margins are sharp. No papilledema noted.      Neck is supple, mobile, no adenopathy or masses palpable. The thyroid feels normal.   Normal range of motion noted.  Chest is clear to auscultation.  No wheezes, rales or rhonchi heard.  Cardiac exam is normal with s1, s2, no murmurs or adventitious sounds.Normal rate and rhythm is heard.   Abdomen is soft,  nondistended, No masses felt.No HSM. No guarding or rigidity or rebound   noted. Palpation reveals  no    tenderness   Normal bowel sounds heard.   Exam of the feet is negative for paresthesias except on the left lateral sole of the foot and this developed after her back surgery.. OTHERWISE-Has normal sensation and color to both feet, and normal pulses and no trophic skin changes or ulcers.  Pelvic exam:My nurse Lynn   was present to chaperone the exam.  The external genitalia appeared normal.    The vaginal " vault was without bleeding  or   discharge   or odor.   The cervix was smooth   and shiny and normal in appearance.    A pap was obtained.     No vaginal support defects were noted,    Bimanual exam revealed a   week   Nulliparous  sized uterus. It does does not   descend   well in the vaginal vault.    No adnexal masses were felt.    There was no  cervical motion tenderness.    Exam was  MODERATELY  limited by the patient's body habitus.            The breast exam was declined.      We did discuss the option for an exam   and I suggested that she should be doing a self-breast exam   monthly and after the age of 40 a yearly mammogram   and she feels comfortable that this is sufficient.           Assessment/Plan:      The yearly exam is normal except for :    1. TYPE 2 DIABETES MELLITUS-this is stable with metformin medication.  She declines lisinopril at this time.  She is using a baby aspirin daily.  We will check fasting electrolytes and kidney function and lipids and A1c and urine for micro protein.  Future orders have been placed.    2.  We will call with Pap results.      Additional Plan:Diet and rest and exercise discussed.      I have advised going for a 5 mile walk daily if possible       See labs and orders.    .Immunizations reviewed(TDAP, pneumovax, shingles vaccine,etc)and discussed-including advice for yearly flu shot/tetanus update.     Vaccines were discussed and  declined        Calcium supplementation advised     Colonoscopy at age 50      Mammogram  Is advised beginning at age 40-pt to be responsible for getting this done     DEXA is advised beginning at age 65      Labs done: see orders      I advised the following exams with specialists:    1. Dental evaluation yearly    2. Dermatology evaluation for mole exam yearly    3. Ophthalmology exam yearly to check for glaucoma, etc        Living will was discussed.         Followup in 12   months, sooner if concerns arise.   SHARAN Ford MD(electronic  signature)    Answers for HPI/ROS submitted by the patient on 3/11/2019   Annual Exam:  Frequency of exercise:: 2-3 days/week  Getting at least 3 servings of Calcium per day:: Yes  Diet:: Diabetic  Taking medications regularly:: Yes  Medication side effects:: Muscle aches, Lightheadedness  Bi-annual eye exam:: Yes  Dental care twice a year:: Yes  Sleep apnea or symptoms of sleep apnea:: None  Positive for the following: abdominal pain, cough, diarrhea, dizziness, frequency, headaches, heartburn, arthralgias, joint swelling, myalgias, weakness  Negative for the following: Blood in stool, Blood in urine, chest pain, chills, congestion, constipation, ear pain, eye pain, nervous/anxious, fever, genital sores, hearing loss, peripheral edema, mood changes, nausea, dysuria, palpitations, Skin sensation changes, sore throat, urgency, rash, shortness of breath, visual disturbance  vaginal bleeding: No  tenderness: Yes  breast discharge: No  Additional concerns today:: No  Duration of exercise:: 30-45 minutes    Addendum a separate ECC was sent for pathology.SHARAN Ford MD

## 2019-03-11 NOTE — LETTER
3/11/2019         RE: Fallon Rivera  [address] [place] [address] [place] [address]        Dear Colleague,    Thank you for referring your patient, Fallon Rivera, to the [facility]. Please see a copy of my visit note below.    [facility]  Neurosurgery followup:    HPI: 6 weeks out from left L5-S1 microdiscectomy.  She is doing very well.  No leg pain.  Exam:  Constitutional:  Alert, well nourished, NAD.  HEENT: Normocephalic, atraumatic.   Pulm:  Without shortness of breath   CV:  No pitting edema of BLE.      Neurological:  Awake  Alert  Oriented x 3  Motor exam:        IP Q DF PF EHL  R   5  5   5   5    5  L   5  5   5   5    5     Reflexes are 2+ in the patellar and Achilles. There is no clonus. Downgoing Babinski.      Able to spontaneously move L/E bilaterally  Sensation intact throughout all L/E dermatomes     Incision: Well-healed.      A/P:  6 weeks status post left L5-S1  microdiscectomy.  She has had good resolution of her leg pain.  At this point she can continue to increase activity gradually.  She voiced agreement and understanding.  We will see her back at 3 months for repeat evaluation.  - Wean from brace per patient comfort     - Call the clinic at [phone] for increasing redness, swelling or pus draining from the incision, increased pain or any other questions and concerns.      Thierno Sampson PA-C  [facility]  [address] [address]    Tel [phone]  Pager [phone]      Again, thank you for allowing me to participate in the care of your patient.        Sincerely,        Thierno Sampson PA-C

## 2019-03-13 LAB
COPATH REPORT: NORMAL
COPATH REPORT: NORMAL
PAP: NORMAL

## 2019-03-16 DIAGNOSIS — E11.9 TYPE 2 DIABETES MELLITUS WITHOUT COMPLICATION, WITHOUT LONG-TERM CURRENT USE OF INSULIN (H): ICD-10-CM

## 2019-03-16 LAB
ANION GAP SERPL CALCULATED.3IONS-SCNC: 7 MMOL/L (ref 3–14)
BUN SERPL-MCNC: 10 MG/DL (ref 7–30)
CALCIUM SERPL-MCNC: 8.3 MG/DL (ref 8.5–10.1)
CHLORIDE SERPL-SCNC: 104 MMOL/L (ref 94–109)
CHOLEST SERPL-MCNC: 214 MG/DL
CO2 SERPL-SCNC: 28 MMOL/L (ref 20–32)
CREAT SERPL-MCNC: 0.67 MG/DL (ref 0.52–1.04)
CREAT UR-MCNC: 137 MG/DL
GFR SERPL CREATININE-BSD FRML MDRD: >90 ML/MIN/{1.73_M2}
GLUCOSE SERPL-MCNC: 177 MG/DL (ref 70–99)
HBA1C MFR BLD: 8.7 % (ref 0–5.6)
HDLC SERPL-MCNC: 50 MG/DL
LDLC SERPL CALC-MCNC: 126 MG/DL
MICROALBUMIN UR-MCNC: 21 MG/L
MICROALBUMIN/CREAT UR: 15.04 MG/G CR (ref 0–25)
NONHDLC SERPL-MCNC: 164 MG/DL
POTASSIUM SERPL-SCNC: 3.8 MMOL/L (ref 3.4–5.3)
SODIUM SERPL-SCNC: 139 MMOL/L (ref 133–144)
TRIGL SERPL-MCNC: 188 MG/DL

## 2019-03-16 PROCEDURE — 80048 BASIC METABOLIC PNL TOTAL CA: CPT | Performed by: OBSTETRICS & GYNECOLOGY

## 2019-03-16 PROCEDURE — 36415 COLL VENOUS BLD VENIPUNCTURE: CPT | Performed by: OBSTETRICS & GYNECOLOGY

## 2019-03-16 PROCEDURE — 82043 UR ALBUMIN QUANTITATIVE: CPT | Performed by: OBSTETRICS & GYNECOLOGY

## 2019-03-16 PROCEDURE — 83036 HEMOGLOBIN GLYCOSYLATED A1C: CPT | Mod: QW | Performed by: OBSTETRICS & GYNECOLOGY

## 2019-03-16 PROCEDURE — 80061 LIPID PANEL: CPT | Performed by: OBSTETRICS & GYNECOLOGY

## 2019-03-18 ENCOUNTER — TELEPHONE (OUTPATIENT)
Dept: FAMILY MEDICINE | Facility: CLINIC | Age: 39
End: 2019-03-18

## 2019-03-18 NOTE — TELEPHONE ENCOUNTER
Called and left message for patient to call clinic.  When they call back please give them the providers message.  Brody Smalls MA

## 2019-03-18 NOTE — TELEPHONE ENCOUNTER
----- Message from Greg Ford MD sent at 3/18/2019  2:51 PM CDT -----  Lynn Please inform Fallon/ or caretaker  that this result(s) is/are normal.  The Pap and HPV testing and the endocervical curettings are all normal.  She should just repeat in 1 year.  Thanks. SHARAN Ford MD

## 2019-03-18 NOTE — RESULT ENCOUNTER NOTE
Lynn Please inform Fallon/ or caretaker  that this result(s) is/are showing that her cholesterol numbers are worse and her diabetes is less well controlled.  I think if she can lose even a small amount of weight it will go a long way to helping both conditions.  We will plan to recheck it in 3 months.  Thanks. SHARAN Ford MD

## 2019-03-27 ENCOUNTER — HOSPITAL ENCOUNTER (OUTPATIENT)
Dept: PHYSICAL THERAPY | Facility: CLINIC | Age: 39
Setting detail: THERAPIES SERIES
End: 2019-03-27
Attending: PHYSICIAN ASSISTANT
Payer: COMMERCIAL

## 2019-03-27 DIAGNOSIS — M79.605 LEFT LEG PAIN: ICD-10-CM

## 2019-03-27 PROCEDURE — 97162 PT EVAL MOD COMPLEX 30 MIN: CPT | Mod: GP | Performed by: PHYSICAL THERAPIST

## 2019-03-27 NOTE — PROGRESS NOTES
" 03/27/19 5107   General Information   Type of Visit Initial OP Ortho PT Evaluation   Start of Care Date 03/27/19   Referring Physician Thierno Sampson PA-C   Patient/Family Goals Statement 'To strengthen leg muscles and lower back muscles, as well as core muscles, improve stability.  Get back to walking at least 5 miles a day (prior to surgery, was able to walk up to 9 miles/day)\".    Orders Evaluate and Treat   Date of Order 03/11/19   Insurance Type [employer]  ([facility])   Insurance Comments/Visits Authorized Send email 2 weeks prior to 41st visit   Medical Diagnosis Left leg pain    Surgical/Medical history reviewed Yes   Precautions/Limitations no known precautions/limitations   Weight-Bearing Status - LUE full weight-bearing   Weight-Bearing Status - RUE full weight-bearing   Weight-Bearing Status - LLE full weight-bearing   Weight-Bearing Status - RLE full weight-bearing   General Information Comments PMH: Hx of colposcopy with cervical biopsy, Calculus of kidney, Abnormal pap smear.  PSH: Tubal/ectopic pregnancy, Pelvic laparoscopy, Lithotripsy, Laparoscopic oophorectomy, Laparoscopic cystectomy ovarian, Laparoscopic appendectomy, UGI endoscopy, Rx ectop preg by score, Fragmenting of kidney stone, Excise lesion trunk, Lumbar discectomy Left, Dilation and curettage, Conization, Conoscopy, Cholecystectomy, Removal of kidney stone       Present No   Body Part(s)   Body Part(s) Lumbar Spine/SI   Presentation and Etiology   Pertinent history of current problem (include personal factors and/or comorbidities that impact the POC) Patient reports pain that started after pregnancy and birth of her first child 14 years ago.  Patient reports she had difficulty walking, wasn't able to stand up straight, she was \"angled\" (leaned forward and to the right), was visiting the ER almost every 2 months with pain.  Tried strengthening exercises, traction, TENS, chiropractor.  She had lost feeling in both " "of her feet, which was temporarily relieved for a few months, but then returned after sitting on bleachers for 2 hours at one of her kid's sports game.  She underwent left L5-S1 microdiscectomy on 1/23/19.     Impairments A. Pain;D. Decreased ROM;E. Decreased flexibility;F. Decreased strength and endurance;H. Impaired gait   Functional Limitations perform activities of daily living;perform desired leisure / sports activities   Symptom Location Patient reports stiffness and pain in low back (patient points to SIJ region) and reports it is mostly on the R side.  She also reports leg pain that is \"all on the left\", from the lateral lower leg to plantar aspect of foot.  She also reports pain and numbness in 4th and 5th toes on the L.  No pain in upper L leg or thigh region.     How/Where did it occur From insidious onset   Onset date of current episode/exacerbation 01/23/19   Chronicity Chronic   Pain rating (0-10 point scale) Best (/10);Worst (/10)   Best (/10) 1-2/10   Worst (/10) 6/10   Pain quality D. Burning;G. Cramping   Frequency of pain/symptoms D. Other   Pain frequency comment Patient reports she usually doesn't have pain with activity, but has pain the next day    Pain/symptoms are: Other   Pain symptoms comment Patient reports pain is worst in the morning and at night - \"I do pretty well in between\"   Pain/symptoms exacerbated by B. Walking   Pain/symptoms eased by I. OTC medication(s);K. Other   Pain eased by comment Hot shower   Progression of symptoms since onset: Unchanged   Current / Previous Interventions   Diagnostic Tests: MRI   MRI Results Results   MRI results MRI on 9/8/18: Large left subarticular disc extrusion at L5-S1 which compresses the traversing left S1 nerve root and causes moderate left neural foraminal narrowing.  This is unchanged since previous MR 3/27/2018.  Mild degenerative disc changes at T11-12, L3-L4, and L4-L5 as described above without significant change.    Prior Level of " Function   Prior Level of Function-Mobility Independent   Prior Level of Function-ADLs Independent   Current Level of Function   Patient role/employment history C. Homemaker   Living environment House/townhome   Home/community accessibility Split-level home, no issues with stairs    Current equipment-Gait/Locomotion None   Current equipment-ADL None   Fall Risk Screen   Fall screen completed by PT   Have you fallen 2 or more times in the past year? Yes  (Lost feeling in both feet, slipped going down the stairs)   Have you fallen and had an injury in the past year? No   Is patient a fall risk? No   Functional Scales   Functional Scales Other   Other Scales  LEFS score 67/80   Lumbar Spine/SI Objective Findings   Observation Increased B pronation in WB, very noticeable without shoes on   Posture Forward head   Gait/Locomotion B pronation, B excessive pelvic rotation    Balance/Proprioception (Single Leg Stance) R 15 secs, L 30 secs, some pain in L heel - got worse with increased time   Flexion ROM Limited, no pain    Extension ROM Limited, no pain    Right Side Bending ROM Hand to knee, slight compensation with rotation, no pain    Left Side Bending ROM Hand to knee, slight compensation with rotation, no pain    Repeated Extension-Standing ROM Limited, no pain    Repeated Flexion-Standing ROM Limited, some soreness R low back    Lumbar ROM Comment Excessive compensation with B legs   Hip Flexion (L2) Strength 5/5 B   Hip Abduction Strength 5/5 B   Hip Adduction Strength 5/5 B   Hip Extension Strength 3/5 B   Knee Flexion Strength 5/5 B   Knee Extension (L3) Strength 5/5 B   Ankle Dorsiflexion (L4) Strength 5/5 B   Great Toe Extension (L5) Strength 5/5 B   Ankle Plantar Flexion (S1) Strength 5/5 B   Hamstring Flexibility Increased tightness   Hip Flexor Flexibility Increased tightness    Quadricep Flexibility Increased tightness    Lumbar/SI Flexibility Comments PSIS equal with forward bending    SLR Negative R,  Positive L    Repeated Extension Prone Unable to maintain contact with pelvis on mat    Slump Test Negative B    Lumbar/SI Special Tests Comments No change with sacral compression, equal movement of PSIS with forward flexion    Segmental Mobility Pain with PA to L1   Sensation Testing Light touch sensation intact B   Patellar Tendon Reflexes  Present B    Achilles Tendon Reflexes Present on R, Absent on L    Planned Therapy Interventions   Planned Therapy Interventions joint mobilization;manual therapy;gait training;balance training;neuromuscular re-education;ROM;strengthening;stretching   Planned Modality Interventions   Planned Modality Interventions Comments Modalities as needed for symptom relief    Clinical Impression   Criteria for Skilled Therapeutic Interventions Met yes, treatment indicated   PT Diagnosis L LE pain, Decreased AROM lumbar extension, Decreased core/trunk stability, Increased muscle tightness   Influenced by the following impairments Pain, Decreased AROM, Muscle tightness, Decreased stability    Functional limitations due to impairments Difficulty sitting for prolonged periods of time, walking for long distances   Clinical Presentation Evolving/Changing   Clinical Presentation Rationale Based on observation, history, evaluation and clinical judgment.    Clinical Decision Making (Complexity) Moderate complexity   Therapy Frequency 2 times/Week   Predicted Duration of Therapy Intervention (days/wks) 12 weeks   Risk & Benefits of therapy have been explained Yes   Patient, Family & other staff in agreement with plan of care Yes   Clinical Impression Comments Patient presents with signs and symptoms consistent with referring diagnosis of left leg pain.  She demonstrates L LE pain, decreased AROM lumbar extension, decreased core/trunk stability, increased muscle tightness.  Functionally, she has increased pain and difficulty with sitting for longer than 45 mins, walking for distances longer than    "mile.  Patient will benefit from skilled physical therapy interventions to address physical impairments for return to functional activities.    Education Assessment   Preferred Learning Style Listening;Reading;Demonstration;Pictures/video   Barriers to Learning No barriers   ORTHO GOALS   PT Ortho Eval Goals 1;2;3   Ortho Goal 1   Goal Identifier 1   Goal Description Patient will tolerate sitting for at least 3 hours in order to tolerate plane ride in June.     Target Date 05/22/19   Ortho Goal 2   Goal Identifier 2   Goal Description Patient will tolerate walking at least 2 miles/day without \"paying for it\" the next day in order to prepare for upcoming trip in June.    Target Date 05/22/19   Ortho Goal 3   Goal Identifier 3   Goal Description Patient will demonstrate improvement as indicated by increase in LEFS score to at least 76/80.    Target Date 05/22/19   Total Evaluation Time   PT Eval, Moderate Complexity Minutes ([id number]) 57         Thank you for your referral.    Tiana Dickens, PT, DPT  [phone]  [facility]    "

## 2019-04-02 ENCOUNTER — HOSPITAL ENCOUNTER (OUTPATIENT)
Dept: PHYSICAL THERAPY | Facility: CLINIC | Age: 39
Setting detail: THERAPIES SERIES
End: 2019-04-02
Attending: PHYSICIAN ASSISTANT
Payer: COMMERCIAL

## 2019-04-02 PROCEDURE — 97112 NEUROMUSCULAR REEDUCATION: CPT | Mod: GP

## 2019-04-02 PROCEDURE — 97530 THERAPEUTIC ACTIVITIES: CPT | Mod: GP

## 2019-04-06 ENCOUNTER — TRANSFERRED RECORDS (OUTPATIENT)
Dept: HEALTH INFORMATION MANAGEMENT | Facility: CLINIC | Age: 39
End: 2019-04-06

## 2019-04-16 ENCOUNTER — HOSPITAL ENCOUNTER (OUTPATIENT)
Dept: PHYSICAL THERAPY | Facility: CLINIC | Age: 39
Setting detail: THERAPIES SERIES
End: 2019-04-16
Attending: PHYSICIAN ASSISTANT
Payer: COMMERCIAL

## 2019-04-16 PROCEDURE — 97530 THERAPEUTIC ACTIVITIES: CPT | Mod: GP | Performed by: PHYSICAL THERAPIST

## 2019-04-18 ENCOUNTER — HOSPITAL ENCOUNTER (OUTPATIENT)
Dept: PHYSICAL THERAPY | Facility: CLINIC | Age: 39
Setting detail: THERAPIES SERIES
End: 2019-04-18
Attending: PHYSICIAN ASSISTANT
Payer: COMMERCIAL

## 2019-04-18 PROCEDURE — 97110 THERAPEUTIC EXERCISES: CPT | Mod: GP | Performed by: PHYSICAL THERAPIST

## 2019-04-23 ENCOUNTER — HOSPITAL ENCOUNTER (OUTPATIENT)
Dept: PHYSICAL THERAPY | Facility: CLINIC | Age: 39
Setting detail: THERAPIES SERIES
End: 2019-04-23
Attending: PHYSICIAN ASSISTANT
Payer: COMMERCIAL

## 2019-04-23 PROCEDURE — 97110 THERAPEUTIC EXERCISES: CPT | Mod: GP | Performed by: PHYSICAL THERAPIST

## 2019-04-25 ENCOUNTER — OFFICE VISIT (OUTPATIENT)
Dept: NEUROSURGERY | Facility: CLINIC | Age: 39
End: 2019-04-25
Payer: COMMERCIAL

## 2019-04-25 VITALS — RESPIRATION RATE: 16 BRPM | HEIGHT: 64 IN | BODY MASS INDEX: 37.73 KG/M2 | WEIGHT: 221 LBS

## 2019-04-25 DIAGNOSIS — Z98.890 S/P LUMBAR MICRODISCECTOMY: Primary | ICD-10-CM

## 2019-04-25 PROCEDURE — 99024 POSTOP FOLLOW-UP VISIT: CPT | Performed by: NURSE PRACTITIONER

## 2019-04-25 ASSESSMENT — MIFFLIN-ST. JEOR: SCORE: 1677.45

## 2019-04-25 ASSESSMENT — PAIN SCALES - GENERAL: PAINLEVEL: MILD PAIN (3)

## 2019-04-25 NOTE — NURSING NOTE
"Fallon Rivera is a 38 year old female who presents for:  Chief Complaint   Patient presents with     Neurologic Problem     S/P left L5-S1 microdiscectomy        Initial Vitals:  Resp 16   Ht 1.626 m (5' 4\")   Wt 100.2 kg (221 lb)   BMI 37.93 kg/m   Estimated body mass index is 37.93 kg/m  as calculated from the following:    Height as of this encounter: 1.626 m (5' 4\").    Weight as of this encounter: 100.2 kg (221 lb).. Body surface area is 2.13 meters squared. BP completed using cuff size: NA (Not Taken)  Mild Pain (3)        Nursing Comments:         Bethanie Mg  "

## 2019-04-25 NOTE — LETTER
4/25/2019         RE: Fallon Rivera  [address] [place] [id number]        Dear Colleague,    Thank you for referring your patient, Fallon Rivera, to the [facility]. Please see a copy of my visit note below.    [facility]  Neurosurgery followup:    HPI: 3 months out from left L5-S1 microdiscectomy. Doing well. Denies leg pain. Continues to have residual numbness in left toes. Working with therapy.    Exam:  Constitutional:  Alert, well nourished, NAD.  HEENT: Normocephalic, atraumatic.   Pulm:  Without shortness of breath   CV:  No pitting edema of BLE.      Neurological:  Awake  Alert  Oriented x 3  Motor exam:        IP Q DF PF EHL  R   5  5   5   5    5  L   5  5   5   5    5     Able to spontaneously move L/E bilaterally  Sensation intact throughout all L/E dermatomes     Incisions:  Healed nicely    A/P: 3 months out from left L5-S1 microdiscectomy. Doing well. Denies leg pain. Continues to have residual numbness in left toes. Working with therapy.  Discussed increasing activity as tolerated. She is to contact her PCP regarding ongoing pain management. No scheduled follow up. She voiced understanding and agreement.    Patient Instructions   - May increase lifting restriction to 30+ pounds and increase activity as tolerated  - Continue physical therapy.  - Contact your primary care provider for ongoing pain management.  - No scheduled follow up.   - Call the clinic at [phone] for increased pain or any other questions and concerns.    Fallon Craig CNP  [facility]  [address] [address]    Tel [phone]      Again, thank you for allowing me to participate in the care of your patient.        Sincerely,        Fallon Craig NP

## 2019-04-25 NOTE — PATIENT INSTRUCTIONS
- May increase lifting restriction to 30+ pounds and increase activity as tolerated  - Continue physical therapy.  - Contact your primary care provider for ongoing pain management.  - No scheduled follow up.   - Call the clinic at [phone] for increased pain or any other questions and concerns.

## 2019-04-25 NOTE — PROGRESS NOTES
[facility]  Neurosurgery followup:    HPI: 3 months out from left L5-S1 microdiscectomy. Doing well. Denies leg pain. Continues to have residual numbness in left toes. Working with therapy.    Exam:  Constitutional:  Alert, well nourished, NAD.  HEENT: Normocephalic, atraumatic.   Pulm:  Without shortness of breath   CV:  No pitting edema of BLE.      Neurological:  Awake  Alert  Oriented x 3  Motor exam:        IP Q DF PF EHL  R   5  5   5   5    5  L   5  5   5   5    5     Able to spontaneously move L/E bilaterally  Sensation intact throughout all L/E dermatomes     Incisions:  Healed nicely    A/P: 3 months out from left L5-S1 microdiscectomy. Doing well. Denies leg pain. Continues to have residual numbness in left toes. Working with therapy.  Discussed increasing activity as tolerated. She is to contact her PCP regarding ongoing pain management. No scheduled follow up. She voiced understanding and agreement.    Patient Instructions   - May increase lifting restriction to 30+ pounds and increase activity as tolerated  - Continue physical therapy.  - Contact your primary care provider for ongoing pain management.  - No scheduled follow up.   - Call the clinic at [phone] for increased pain or any other questions and concerns.    Fallon Craig, CNP  [facility]  [address] [address]    Tel [phone]

## 2019-04-30 ENCOUNTER — HOSPITAL ENCOUNTER (OUTPATIENT)
Dept: PHYSICAL THERAPY | Facility: CLINIC | Age: 39
Setting detail: THERAPIES SERIES
End: 2019-04-30
Attending: PHYSICIAN ASSISTANT
Payer: COMMERCIAL

## 2019-04-30 PROCEDURE — 97530 THERAPEUTIC ACTIVITIES: CPT | Mod: GP | Performed by: PHYSICAL THERAPIST

## 2019-04-30 PROCEDURE — 97110 THERAPEUTIC EXERCISES: CPT | Mod: GP | Performed by: PHYSICAL THERAPIST

## 2019-05-14 ENCOUNTER — HOSPITAL ENCOUNTER (OUTPATIENT)
Dept: PHYSICAL THERAPY | Facility: CLINIC | Age: 39
Setting detail: THERAPIES SERIES
End: 2019-05-14
Attending: PHYSICIAN ASSISTANT
Payer: COMMERCIAL

## 2019-05-14 DIAGNOSIS — E11.9 TYPE 2 DIABETES MELLITUS WITHOUT COMPLICATION (H): Primary | ICD-10-CM

## 2019-05-14 PROCEDURE — 97110 THERAPEUTIC EXERCISES: CPT | Mod: GP | Performed by: PHYSICAL THERAPIST

## 2019-05-14 NOTE — TELEPHONE ENCOUNTER
"Requested Prescriptions   Pending Prescriptions Disp Refills     blood glucose monitoring (NO BRAND SPECIFIED) meter device kit 1 kit 0     Sig: Use to test blood sugar 2 times daily or as directed.       Diabetic Supplies Protocol Failed - 5/14/2019 12:54 PM        Failed - Medication is active on med list        Passed - Patient is 18 years of age or older        Passed - Recent (6 mo) or future (30 days) visit within the authorizing provider's specialty     Patient had office visit in the last 6 months or has a visit in the next 30 days with authorizing provider.  See \"Patient Info\" tab in inbasket, or \"Choose Columns\" in Meds & Orders section of the refill encounter.            Rx refilled per RN protocol.  NUZHAT StewardN, RN    "

## 2019-05-14 NOTE — TELEPHONE ENCOUNTER
Reason for Call:  Medication or medication refill:    Do you use a [facility]?  Name of the pharmacy and phone number for the current request:  [facility] - [phone]    Name of the medication requested: New glucose monitor. It is not working anymore. Would like to get it through our diabetic educator or through [facility] pharmacy. Please call her back to discuss.     Other request:     Can we leave a detailed message on this number? YES    Phone number patient can be reached at: Cell number on file:    Telephone Information:   Mobile [phone]       Best Time: any    Call taken on 5/14/2019 at 12:26 PM by Adina Mata

## 2019-05-16 ENCOUNTER — HOSPITAL ENCOUNTER (OUTPATIENT)
Dept: PHYSICAL THERAPY | Facility: CLINIC | Age: 39
Setting detail: THERAPIES SERIES
End: 2019-05-16
Attending: PHYSICIAN ASSISTANT
Payer: COMMERCIAL

## 2019-05-16 PROCEDURE — 97110 THERAPEUTIC EXERCISES: CPT | Mod: GP | Performed by: PHYSICAL THERAPIST

## 2019-05-21 ENCOUNTER — HOSPITAL ENCOUNTER (OUTPATIENT)
Dept: PHYSICAL THERAPY | Facility: CLINIC | Age: 39
Setting detail: THERAPIES SERIES
End: 2019-05-21
Attending: PHYSICIAN ASSISTANT
Payer: COMMERCIAL

## 2019-05-21 PROCEDURE — 97110 THERAPEUTIC EXERCISES: CPT | Mod: GP | Performed by: PHYSICAL THERAPIST

## 2019-05-23 ENCOUNTER — HOSPITAL ENCOUNTER (OUTPATIENT)
Dept: PHYSICAL THERAPY | Facility: CLINIC | Age: 39
Setting detail: THERAPIES SERIES
End: 2019-05-23
Attending: PHYSICIAN ASSISTANT
Payer: COMMERCIAL

## 2019-05-23 DIAGNOSIS — K21.9 GASTROESOPHAGEAL REFLUX DISEASE WITHOUT ESOPHAGITIS: ICD-10-CM

## 2019-05-23 DIAGNOSIS — E11.9 TYPE 2 DIABETES MELLITUS WITHOUT COMPLICATION (H): Primary | ICD-10-CM

## 2019-05-23 PROCEDURE — 97110 THERAPEUTIC EXERCISES: CPT | Mod: GP | Performed by: PHYSICAL THERAPIST

## 2019-05-23 NOTE — TELEPHONE ENCOUNTER
: 1980  PHONE #'s: [phone] (home)     PRESENTING PROBLEM:  Was doing CPR today on a woman that urinated on her. It happened around 1 pm today. Wondering if she needs to be concerned or not??    NURSING ASSESSMENT  Description:   I am a diabetic and just thought I should ask if there is anything I need to worry about.?  RN asked if the pt was HIV positive?    We aren't allowed to know what conditions the patient has.   Onset/duration:  Today around 1 pm.  Precip. factors:  Was doing CPR on someone and they got urinated on.   Assoc. Sx:  nond  Improves/worsens Sx:  NA  Pain scale (1-10)   0/10  Sx specific meds: Pt Is a diabetic and on Metformin  LMP/preg/breast feeding:  na  Last exam/Tx:  Has not been seen for this.     RECOMMENDED DISPOSITION:  Home care advice - Just thoroughly wash off in the shower with soap and water. Don't know if this is a serious concern. Never heard of bein worried about getting sick if someone's urine touches you.   Will comply with recommendation: Will not be concerned then a she showered off soon afrter the incident.   If further questions/concerns or if Sx do not improve, worsen or new Sx develop, call your PCP or [facility] as soon as possible.    NOTES:  Disposition was determined by the first positive assessment question, therefore all previous assessment questions were negative.  Informed to check provider manual or call insurance company to assure coverage.    Guideline used:  Basic nursing knowledge    Soheila Skinner RN    Metformin  Last Written Prescription Date:  18  Last Fill Quantity: 30,  # refills: 11   Last office visit: 3/11/2019 with prescribing provider:     Future Office Visit:  NONE  Requested Prescriptions   Pending Prescriptions Disp Refills     metFORMIN (GLUCOPHAGE) 500 MG tablet [Pharmacy Med Name: METFORMIN HCL 500MG TABS] 30 tablet 11     Sig: TAKE ONE TABLET BY MOUTH EVERY DAY WITH DINNER       Biguanide Agents Passed -  "5/23/2019  8:43 AM        Passed - Blood pressure less than 140/90 in past 6 months     BP Readings from Last 3 Encounters:   03/11/19 130/72   03/11/19 110/74   01/23/19 122/80           Passed - Patient has documented LDL within the past 12 mos.     Recent Labs   Lab Test 03/16/19  0840   *           Passed - Patient has had a Microalbumin in the past 15 mos.     Recent Labs   Lab Test 03/16/19  0840   MICROL 21   UMALCR 15.04           Passed - Patient is age 10 or older        Passed - Patient has documented A1c within the specified period of time.     If HgbA1C is 8 or greater, it needs to be on file within the past 3 months.  If less than 8, must be on file within the past 6 months.     Recent Labs   Lab Test 03/16/19  0840   A1C 8.7*           Passed - Patient's CR is NOT>1.4 OR Patient's EGFR is NOT<45 within past 12 mos.     Recent Labs   Lab Test 03/16/19  0840   GFRESTIMATED >90   GFRESTBLACK >90       Recent Labs   Lab Test 03/16/19  0840   CR 0.67             Passed - Patient does NOT have a diagnosis of CHF.        Passed - Medication is active on med list        Passed - Patient is not pregnant        Passed - Patient has not had a positive pregnancy test within the past 12 mos.         Passed - Recent (6 mo) or future (30 days) visit within the authorizing provider's specialty     Patient had office visit in the last 6 months or has a visit in the next 30 days with authorizing provider or within the authorizing provider's specialty.  See \"Patient Info\" tab in inbasket, or \"Choose Columns\" in Meds & Orders section of the refill encounter.          Prescription approved per [facility] [place] Protocol......KRISTOFER Monroy        "

## 2019-05-23 NOTE — PROGRESS NOTES
Outpatient Physical Therapy Progress Note     Patient: Fallon Rivera  : 1980    Beginning/End Dates of Reporting Period:  3/27/2019 to 2019    Referring Provider: Thierno Sampson PA-C       Therapy Diagnosis: L LE pain, Decreased AROM lumbar extension, Decreased core/trunk stability, Increased muscle tightness     Client Self Report: Patient reports buttocks are feeling good today.  No questions regarding pool HEP or clamshells.  Has been unable to get into her pool d/t rain and chilly weather recently.  Reports pain in L groin, but thinks it might be an ovarian cyst related.      Objective Measurements:  Objective Measure: LEFS  Details: Lower Extremity Functional Scale (LEFS) assesses the patients level of difficulty with various activities. The higher the score, the greater the level of function a patient demonstrates. Pt scored 59 points out of 80 possible indicating patient is at 73.75% of maximal function. MCID is 9 points. LEFS is validated for patients 18 years and older, and if completed by a younger patient, is done to help determine functional deficits and activity limitations for goal use.    Objective Measure: Pain   Details: 1/10 at start of session - in L groin    Goals:  Goal Identifier 1   Goal Description Patient will tolerate sitting for at least 3 hours in order to tolerate plane ride in .     Target Date 19   Date Met  19   Progress: Patient reports when at home she will usually only tolerate sitting for 1 hour, but states it is mostly because she feels fidgety and restless and tingling in her legs.  She reports she was able to tolerate sitting during a movie for 3 hours.  She states her legs were restless and tingling during then as well, but she was able to tolerate it  because I had to .  She feels she will be able to tolerate the plane ridge in  without too much difficulty.     Goal Identifier 2   Goal Description Patient will tolerate walking at least  "2 miles/day without \"paying for it\" the next day in order to prepare for upcoming trip in June.    Target Date 05/22/19   Date Met  05/23/19   Progress: Patient reports yesterday she was able to walk 2 miles yesterday and she didn t feel any pain afterwards and today she is still feeling great.  She made sure to do proper stretching before and after and thinks that made a lot of difference from previous long walks.         Goal Identifier 3   Goal Description Patient will demonstrate improvement as indicated by increase in LEFS score to at least 76/80.    Target Date 05/22/19   Date Met      Progress: Score on 3/27 = 67/80, score on 5/23 = 59/80.  Discussed with patient the reason for the decrease in score, as she reports that overall she is able to participate in more activities with less difficulty, but the score does not reflect that.  For instance, two months ago she stated she had no difficulty hopping but today she indicated quite a bit of difficulty hopping.  Patient clarified by saying that she would never have tried hopping before because she was under restrictions and it would have been too hard/too painful, so she just put 'no difficulty' because it is something she doesn't do.  In other words, she interpreted the questionnaire and the scoring of the questionnaire differently when she originally filled it out as compared to now.  As a result, scores cannot be accurately compared and most likely are not an accurate depiction of functional change.       Progress Toward Goals:   Progress this reporting period: Patient demonstrates improvement since initial evaluation on 3/27/19.  She reports overall decrease in pain, increased ability to participate in functional activities.  She has been able to garden, go for long walks (2+ miles), attend her kid s sporting events, all with significantly less difficulty and pain.  Continues to feel sore after therapy sessions, but reports feeling better the next day.  She " has acquired arch supports and more supportive footwear, which she reports wearing at all times (even in the house) and has noticed an improvement in overall B leg pain.  She feels her ankle strength and stability has improved, and she feels prepared for her vacation in June that will include a 3 hour plane ride and a 13 mile hike.      Plan:  Continue therapy per current plan of care.  Currently patient s last appointment is scheduled for 6/6/19 and she leaves for vacation on 6/7/19.  Will leave chart open for several weeks and will touch base with patient again towards the end of June/early July to see if further treatment is warranted at that time, and if not, will formally discharge from therapy.        Discharge:  No

## 2019-05-28 ENCOUNTER — HOSPITAL ENCOUNTER (OUTPATIENT)
Dept: PHYSICAL THERAPY | Facility: CLINIC | Age: 39
Setting detail: THERAPIES SERIES
End: 2019-05-28
Attending: PHYSICIAN ASSISTANT
Payer: COMMERCIAL

## 2019-05-28 PROCEDURE — 97110 THERAPEUTIC EXERCISES: CPT | Mod: GP | Performed by: PHYSICAL THERAPIST

## 2019-05-30 ENCOUNTER — HOSPITAL ENCOUNTER (OUTPATIENT)
Dept: PHYSICAL THERAPY | Facility: CLINIC | Age: 39
Setting detail: THERAPIES SERIES
End: 2019-05-30
Attending: PHYSICIAN ASSISTANT
Payer: COMMERCIAL

## 2019-05-30 PROCEDURE — 97110 THERAPEUTIC EXERCISES: CPT | Mod: GP | Performed by: PHYSICAL THERAPIST

## 2019-06-06 ENCOUNTER — HOSPITAL ENCOUNTER (OUTPATIENT)
Dept: PHYSICAL THERAPY | Facility: CLINIC | Age: 39
Setting detail: THERAPIES SERIES
End: 2019-06-06
Attending: PHYSICIAN ASSISTANT
Payer: COMMERCIAL

## 2019-06-06 PROCEDURE — 97110 THERAPEUTIC EXERCISES: CPT | Mod: GP | Performed by: PHYSICAL THERAPIST

## 2019-07-13 ENCOUNTER — APPOINTMENT (OUTPATIENT)
Dept: GENERAL RADIOLOGY | Facility: CLINIC | Age: 39
End: 2019-07-13
Attending: FAMILY MEDICINE
Payer: COMMERCIAL

## 2019-07-13 ENCOUNTER — HOSPITAL ENCOUNTER (EMERGENCY)
Facility: CLINIC | Age: 39
Discharge: HOME | End: 2019-07-14
Attending: FAMILY MEDICINE | Admitting: FAMILY MEDICINE
Payer: COMMERCIAL

## 2019-07-13 DIAGNOSIS — R10.9 RIGHT SIDED ABDOMINAL PAIN: ICD-10-CM

## 2019-07-13 LAB
ALBUMIN UR-MCNC: NEGATIVE MG/DL
APPEARANCE UR: ABNORMAL
BACTERIA #/AREA URNS HPF: ABNORMAL /HPF
BILIRUB UR QL STRIP: NEGATIVE
COLOR UR AUTO: YELLOW
GLUCOSE UR STRIP-MCNC: NEGATIVE MG/DL
HGB UR QL STRIP: NEGATIVE
KETONES UR STRIP-MCNC: NEGATIVE MG/DL
LEUKOCYTE ESTERASE UR QL STRIP: NEGATIVE
MUCOUS THREADS #/AREA URNS LPF: PRESENT /LPF
NITRATE UR QL: NEGATIVE
PH UR STRIP: 5 PH (ref 5–7)
RBC #/AREA URNS AUTO: <1 /HPF (ref 0–2)
SOURCE: ABNORMAL
SP GR UR STRIP: 1.01 (ref 1–1.03)
SQUAMOUS #/AREA URNS AUTO: 1 /HPF (ref 0–1)
UROBILINOGEN UR STRIP-MCNC: 0 MG/DL (ref 0–2)
WBC #/AREA URNS AUTO: 1 /HPF (ref 0–5)

## 2019-07-13 PROCEDURE — 99284 EMERGENCY DEPT VISIT MOD MDM: CPT | Mod: 25

## 2019-07-13 PROCEDURE — 81001 URINALYSIS AUTO W/SCOPE: CPT | Performed by: FAMILY MEDICINE

## 2019-07-13 PROCEDURE — 99283 EMERGENCY DEPT VISIT LOW MDM: CPT | Mod: Z6 | Performed by: FAMILY MEDICINE

## 2019-07-13 PROCEDURE — 74019 RADEX ABDOMEN 2 VIEWS: CPT | Mod: TC

## 2019-07-13 NOTE — ED AVS SNAPSHOT
[facility]  [address] [place] [address]  Phone:  [phone]  Fax:  [phone]                                    Fallon Rivera   MRN: [id number]    Department:  [facility]   Date of Visit:  7/13/2019           After Visit Summary Signature Page    I have received my discharge instructions, and my questions have been answered. I have discussed any challenges I see with this plan with the nurse or doctor.    ..........................................................................................................................................  Patient/Patient Representative Signature      ..........................................................................................................................................  Patient Representative Print Name and Relationship to Patient    ..................................................               ................................................  Date                                   Time    ..........................................................................................................................................  Reviewed by Signature/Title    ...................................................              ..............................................  Date                                               Time          22EPIC Rev 08/18

## 2019-07-14 VITALS
DIASTOLIC BLOOD PRESSURE: 90 MMHG | OXYGEN SATURATION: 96 % | TEMPERATURE: 97.8 F | RESPIRATION RATE: 17 BRPM | SYSTOLIC BLOOD PRESSURE: 130 MMHG

## 2019-07-14 NOTE — ED PROVIDER NOTES
ED Provider Note     Fallon Rivera  [id number]  July 13, 2019      CC:     Chief Complaint   Patient presents with     Flank Pain          History is obtained from the patient.  She is accompanied by her .    HPI: Fallon Rivera is a 39 year old female with a past history of kidney stones, lumbar discectomy, cholecystectomy, appendectomy, and oophorectomy presenting with acute right-sided abdominal pain, similar to her previous episodes of kidney stones.  Patient states that she has had previous episodes of kidney stones which runs in the family.  Her last known kidney stone was about 2 to 3 years ago.  Her father is in the hospital with 18 kidney stones, soon to be undergoing some surgery.  Patient reports onset of symptoms this evening.  She states that the pain was shooting down towards the groin earlier today.  She has not noticed any gross hematuria or frequency.  Pain is rated 7/10 and is not associated with nausea or vomiting.  She is having 4-5 bowel movements a day which is normal since she had her cholecystectomy.    PMH/Problem List:   Past Medical History:   Diagnosis Date     Abnormal Pap smear 2006, 2007,      Calculus of kidney 2002     Cervical high risk HPV (human papillomavirus) test positive      History of colposcopy with cervical biopsy 2/9/06, 3/15/07       PSH:   Past Surgical History:   Procedure Laterality Date     C REMOVAL OF KIDNEY STONE      two surgeries, 2002,2003     CHOLECYSTECTOMY, LAPOROSCOPIC  5/11/2007    Cholecystectomy, Laparoscopic     COLONOSCOPY  05/17/10     CONIZATION  7/1/2011    Procedure:CONIZATION; cold knife and punch biopsy of mole on back; Surgeon:GREG FORD; Location:[facility]     DILATION AND CURETTAGE, HYSTEROSCOPY, LAPAROSCOPY, COMBINED Right 9/24/2015    Procedure: COMBINED DILATION AND CURETTAGE, HYSTEROSCOPY, LAPAROSCOPY;  Surgeon: Greg Ford MD;   Location: [facility]     DISCECTOMY LUMBAR MINIMALLY INVASIVE ONE LEVEL Left 1/23/2019    Procedure: Minimally Invasive Surgery Left Lumbar 5-Sacral 1 microdiscectomy;  Surgeon: Mason Roe MD;  Location: [facility] OR     ESOPHAGOSCOPY, GASTROSCOPY, DUODENOSCOPY (EGD), COMBINED  5/21/2014    Procedure: COMBINED ESOPHAGOSCOPY, GASTROSCOPY, DUODENOSCOPY (EGD), BIOPSY SINGLE OR MULTIPLE;  Surgeon: Earl Lane MD;  Location: [facility] GI     EXCISE LESION TRUNK  7/1/2011    Procedure:EXCISE LESION TRUNK; Surgeon:SYDNEY FORD; Location:[facility] OR     HC FRAGMENTING OF KIDNEY STONE  11/30/2005     HC RX ECTOP PREG BY SCOPE,RMV TUBE/OVRY  12/20/2007    Right sided salpingectomy and removal of ruptured ectopic pregnancy. D&C.     HC UGI ENDOSCOPY, SIMPLE EXAM  09/01/09     LAPAROSCOPIC APPENDECTOMY N/A 9/24/2015    Procedure: LAPAROSCOPIC APPENDECTOMY;  Surgeon: James Cuellar MD;  [facility]     LAPAROSCOPIC CYSTECTOMY OVARIAN (BENIGN) N/A 9/24/2015    Procedure: LAPAROSCOPIC CYSTECTOMY OVARIAN (BENIGN);  Surgeon: Sydney Ford MD;  [facility]     LAPAROSCOPIC OOPHORECTOMY Right 9/24/2015    Procedure: LAPAROSCOPIC OOPHORECTOMY;  Surgeon: Sydney Ford MD;  Location: [facility]     LITHOTRIPSY  01/17/2007     PELVIS LAPAROSCOPY,DX  10/16/2000    Diagnostic laparoscopy, Endometrial biopsy.     TUBAL/ECTOPIC PREGNANCY  01/23/2007    Lap removal of left ruptured ectopic pregnancy & salpingectomy, D&C       MEDS:     No current facility-administered medications on file prior to encounter.   Current Outpatient Medications on File Prior to Encounter:  acetaminophen (TYLENOL) 500 MG tablet Take 1,000 mg by mouth every 6 hours as needed for mild pain   blood glucose monitoring (LILLIAN MICROLET) lancets Use to check blood sugar 1-2 times daily   blood glucose monitoring (NO BRAND SPECIFIED) meter device kit Use to test blood sugar 2 times daily or as directed.   CONTOUR NEXT TEST test  strip USE TO TEST BLOOD SUGAR TWO TIMES A DAY OR AS DIRECTED   cyclobenzaprine (FLEXERIL) 5 MG tablet Take 1 tablet (5 mg) by mouth 3 times daily as needed for muscle spasms   Lidocaine (LIDOCARE) 4 % Patch Place 1 patch onto the skin every 24 hours   metFORMIN (GLUCOPHAGE) 500 MG tablet TAKE ONE TABLET BY MOUTH EVERY DAY WITH DINNER   omeprazole (PRILOSEC) 20 MG DR capsule TAKE 1 CAPSULE BY MOUTH DAILY, 30 TO 60 MINUTES BEFORE A MEAL.       Allergies: Amoxicillin; Anti-nausea; Demerol hcl [meperidine hcl]; Indomethacin; Macrobid [nitrofuran derivatives]; Reglan [metoclopramide]; and Zofran [ondansetron]    Triage and nursing notes were reviewed.    ROS: All other systems were reviewed and are negative    Physical Exam:  Vitals:    07/13/19 2311 07/13/19 2313 07/14/19 0030   BP: (!) 152/110  130/90   Resp: 19 17   Temp:  97.8  F (36.6  C)    TempSrc:  Oral    SpO2: 98%  96%     GENERAL APPEARANCE: Alert, mild to moderate distress due to pain  HEAD: atraumatic  EYES: PERRL, EOMI  HENT: Normal external exam  NECK: no adenopathy or masses, trachea is midline  RESP: lungs clear to auscultation - no rales, rhonchi or wheezes  CV: regular rate and rhythm, no significant murmurs or rubs  ABDOMEN: soft, no localized tenderness, no masses with normal bowel sounds  EXT: Unremarkable  SKIN: no rash; as above  NEURO: mentation and speech normal; no focal deficits      Labs/Imaging Results:  Results for orders placed or performed during the hospital encounter of 07/13/19 (from the past 24 hour(s))   UA reflex to Microscopic   Result Value Ref Range    Color Urine Yellow     Appearance Urine Slightly Cloudy     Glucose Urine Negative NEG^Negative mg/dL    Bilirubin Urine Negative NEG^Negative    Ketones Urine Negative NEG^Negative mg/dL    Specific Gravity Urine 1.011 1.003 - 1.035    Blood Urine Negative NEG^Negative    pH Urine 5.0 5.0 - 7.0 pH    Protein Albumin Urine Negative NEG^Negative mg/dL    Urobilinogen mg/dL 0.0 0.0  - 2.0 mg/dL    Nitrite Urine Negative NEG^Negative    Leukocyte Esterase Urine Negative NEG^Negative    Source Midstream Urine     RBC Urine <1 0 - 2 /HPF    WBC Urine 1 0 - 5 /HPF    Bacteria Urine Few (A) NEG^Negative /HPF    Squamous Epithelial /HPF Urine 1 0 - 1 /HPF    Mucous Urine Present (A) NEG^Negative /LPF   KUB XR    Narrative    XR KUB   7/13/2019 11:44 PM     HISTORY: Right-sided abdominal and flank pain.    COMPARISON: None.       Impression    IMPRESSION: No calcifications are seen overlying the kidneys or  expected course of the ureters. The bowel gas pattern is unremarkable.    KRISTI HALL MD           Impression:  Final diagnoses:   Right sided abdominal pain         ED Course & Medical Decision Making (Plan):  Fallon Rivera is a 39 year old female with acute right-sided abdominal pain with a distant history of kidney stones.  The patient's pain coupled with her having 4-5 bowel movements a day seems to be more associated with a bowel issue versus kidney stones.  Patient's work-up today reveals a normal urinalysis without any blood.  She states that she has had kidney stones in the past without blood.  KUB reveals no calcifications seen overlying the kidneys or ureter.  Bowel gas is unremarkable but there is a moderate to large amount of retained stool.  I asked the patient to do a Fleet enema, and begin MiraLAX times a day for the next 7-10 days.  Follow-up with your primary care provider in 5 days if not improving.    Written after-visit summary and instructions were given at the time of discharge.          Follow Up Plan:  Greg Ford MD  [address] [place] [id number] [phone]    In 5 days  if not improving    [facility]  [address] [place] [address] [phone]    If symptoms worsen        Discharge Meds: Over-the-counter MiraLAX, Fleet enema          Disclaimer: This note consists of words and  symbols derived from keyboarding and dictation using voice recognition software.  As a result, there may be errors that have gone undetected.  Please consider this when interpreting information found in this note.       Corona Tellez MD  07/14/19 0618

## 2019-07-14 NOTE — DISCHARGE INSTRUCTIONS
We did not find worrisome cause for your abdominal pain.  The urinalysis was clear.  We do not see any calcifications in your x-ray.  This does not completely rule out a small stone in the ureter.  Your x-ray does show a moderate to large amount of stool that is retained.  Given that you are having 4-5 bowel movements a day, I suspect that you are constipated.  Do a Fleet enema to empty out the lower part of the colon, and begin MiraLAX 1 capful in a glass of water or Gatorade 2-3 times a day to promote more complete emptying of your bowels.  Do this for the next 7-10 days.  Follow-up with your primary care provider in 4-5 days if not improved.  Return to the emergency department at any time if your symptoms worsen.

## 2019-07-14 NOTE — ED TRIAGE NOTES
Pt presents with concerns of right sided flank pain.  Pt states that pain started tonight.  Pt stated that earlier this morning when she urinated there was pain up into the right side.  Pt states that she has a history of kidney stones.

## 2019-07-17 NOTE — PROGRESS NOTES
"Outpatient Physical Therapy Discharge Note     Patient: Fallon Rivera  : 1980    Beginning/End Dates of Reporting Period:  3/27/2019 to 2019    Referring Provider: Thierno Sampson PA-C    Therapy Diagnosis: L LE pain, Decreased AROM lumbar extension, Decreased core/trunk stability, Increased muscle tightness     Client Self Report: Patient reports she mowed the lawn yesterday (using a riding ), it was difficult because her yard is very bumpy and would cause jarring sensations occasionally.  On Friday (), she spent about 8 hours without wearing shoes and said she \"really paid for it\" for the next two days.  She states it was all she needed to really learn her lesson.      Objective Measurements:    Objective Measure: LEFS  Details: Lower Extremity Functional Scale (LEFS) assesses the patients level of difficulty with various activities. The higher the score, the greater the level of function a patient demonstrates. Pt scored 75 points out of 80 possible indicating patient is at 93.75% of maximal function. MCID is 9 points. LEFS is validated for patients 18 years and older, and if completed by a younger patient, is done to help determine functional deficits and activity limitations for goal use.    Objective Measure: Pain   Details: 2/10 at start of session - L calf and lower back     Goals:  Goal Identifier 1   Goal Description Patient will tolerate sitting for at least 3 hours in order to tolerate plane ride in .     Target Date 19   Date Met  19   Progress: Goal met     Goal Identifier 2   Goal Description Patient will tolerate walking at least 2 miles/day without \"paying for it\" the next day in order to prepare for upcoming trip in .    Target Date 19   Date Met  19   Progress: Goal met     Goal Identifier 3   Goal Description Patient will demonstrate improvement as indicated by increase in LEFS score to at least 76/80.    Target Date 19   Date " Met  06/06/19(Score 75/80 on 6/6/19 )   Progress: Goal met       Progress Toward Goals:   Progress this reporting period: Patient demonstrates improvement since initial evaluation on 3/27/19.  She reports overall decrease in pain and increased ability to participate in functional activities.  She has acquired arch supports and more supportive footwear, which she reports wearing at all times (even in the house) and has noticed an improvement in overall B leg pain.  She feels her ankle strength and stability has improved, and she feels prepared for her vacation in June that will include a 3 hour plane ride and a 13 mile hike.  Her LEFS score improved from 67/80 on 3/27/19 to 75/80 on 6/6/19, indicating patient is functioning at 93.75% maximal function.        Plan:  Discharge from therapy.    Discharge:    Reason for Discharge: Patient has met all goals.    Equipment Issued: N/A    Discharge Plan: Patient to continue home program.

## 2019-07-17 NOTE — ADDENDUM NOTE
Encounter addended by: Tiana Dickens, PT on: 7/17/2019 8:46 AM   Actions taken: Sign clinical note, Episode resolved

## 2019-08-01 ENCOUNTER — OFFICE VISIT (OUTPATIENT)
Dept: FAMILY MEDICINE | Facility: CLINIC | Age: 39
End: 2019-08-01
Payer: COMMERCIAL

## 2019-08-01 VITALS
TEMPERATURE: 97.7 F | HEART RATE: 78 BPM | OXYGEN SATURATION: 98 % | RESPIRATION RATE: 10 BRPM | BODY MASS INDEX: 37.3 KG/M2 | DIASTOLIC BLOOD PRESSURE: 82 MMHG | WEIGHT: 217.3 LBS | SYSTOLIC BLOOD PRESSURE: 112 MMHG

## 2019-08-01 DIAGNOSIS — K59.01 SLOW TRANSIT CONSTIPATION: ICD-10-CM

## 2019-08-01 DIAGNOSIS — K21.9 GASTROESOPHAGEAL REFLUX DISEASE WITHOUT ESOPHAGITIS: ICD-10-CM

## 2019-08-01 DIAGNOSIS — Z72.0 TOBACCO ABUSE: ICD-10-CM

## 2019-08-01 DIAGNOSIS — E11.9 TYPE 2 DIABETES MELLITUS WITHOUT COMPLICATION, WITHOUT LONG-TERM CURRENT USE OF INSULIN (H): ICD-10-CM

## 2019-08-01 DIAGNOSIS — M21.42 PES PLANUS OF BOTH FEET: Primary | ICD-10-CM

## 2019-08-01 DIAGNOSIS — M21.41 PES PLANUS OF BOTH FEET: Primary | ICD-10-CM

## 2019-08-01 DIAGNOSIS — E66.01 MORBID OBESITY (H): ICD-10-CM

## 2019-08-01 LAB
ALT SERPL W P-5'-P-CCNC: 55 U/L (ref 0–50)
ANION GAP SERPL CALCULATED.3IONS-SCNC: 8 MMOL/L (ref 3–14)
AST SERPL W P-5'-P-CCNC: 31 U/L (ref 0–45)
BUN SERPL-MCNC: 9 MG/DL (ref 7–30)
CALCIUM SERPL-MCNC: 8.5 MG/DL (ref 8.5–10.1)
CHLORIDE SERPL-SCNC: 105 MMOL/L (ref 94–109)
CO2 SERPL-SCNC: 26 MMOL/L (ref 20–32)
CREAT SERPL-MCNC: 0.77 MG/DL (ref 0.52–1.04)
GFR SERPL CREATININE-BSD FRML MDRD: >90 ML/MIN/{1.73_M2}
GLUCOSE SERPL-MCNC: 206 MG/DL (ref 70–99)
HBA1C MFR BLD: 8.4 % (ref 0–5.6)
POTASSIUM SERPL-SCNC: 4 MMOL/L (ref 3.4–5.3)
SODIUM SERPL-SCNC: 139 MMOL/L (ref 133–144)

## 2019-08-01 PROCEDURE — 80048 BASIC METABOLIC PNL TOTAL CA: CPT | Performed by: OBSTETRICS & GYNECOLOGY

## 2019-08-01 PROCEDURE — 84460 ALANINE AMINO (ALT) (SGPT): CPT | Performed by: OBSTETRICS & GYNECOLOGY

## 2019-08-01 PROCEDURE — 83036 HEMOGLOBIN GLYCOSYLATED A1C: CPT | Performed by: OBSTETRICS & GYNECOLOGY

## 2019-08-01 PROCEDURE — 99207 ZZC FOOT EXAM  NO CHARGE: CPT | Performed by: OBSTETRICS & GYNECOLOGY

## 2019-08-01 PROCEDURE — 36415 COLL VENOUS BLD VENIPUNCTURE: CPT | Performed by: OBSTETRICS & GYNECOLOGY

## 2019-08-01 PROCEDURE — 84450 TRANSFERASE (AST) (SGOT): CPT | Performed by: OBSTETRICS & GYNECOLOGY

## 2019-08-01 PROCEDURE — 99214 OFFICE O/P EST MOD 30 MIN: CPT | Performed by: OBSTETRICS & GYNECOLOGY

## 2019-08-01 RX ORDER — IBUPROFEN 200 MG
200 TABLET ORAL EVERY 4 HOURS PRN
Status: ON HOLD | COMMUNITY
End: 2020-07-28

## 2019-08-01 ASSESSMENT — PATIENT HEALTH QUESTIONNAIRE - PHQ9
SUM OF ALL RESPONSES TO PHQ QUESTIONS 1-9: 5
SUM OF ALL RESPONSES TO PHQ QUESTIONS 1-9: 5

## 2019-08-01 ASSESSMENT — PAIN SCALES - GENERAL: PAINLEVEL: MILD PAIN (3)

## 2019-08-01 NOTE — H&P (VIEW-ONLY)
Subjective: Here for diabetic followup exam.   Has been monitoring sugars 2 times daily. Glucose values have been in the  170-190 range fasting, and the same  range postprandial.  There have been no   changes in vision  No changes/ complaints   of neuropathy symptoms.   No other concerns except muscle cramps are more frequent.    She has Foot pain and she would like to see the podiatrist.    Also she had some recent constipation and was in the emergency room about this.  She has had a previous history of colon polyps and was told to repeat her colonoscopy.  She wants to have this arranged now.      The past medical history, social history, past surgical history and family history as shown below have been reviewed by me today.  Past Medical History:   Diagnosis Date     Abnormal Pap smear 2006, 2007,     ASC-H, ASCUS + HPV 45     Calculus of kidney 2002     Cervical high risk HPV (human papillomavirus) test positive     + HPV 45 (high risk)     History of colposcopy with cervical biopsy 2/9/06, 3/15/07    koilocytosis 2007        Allergies   Allergen Reactions     Amoxicillin Hives     Anti-Nausea      Anxiety, agitation,severe paranoia. Zofran, Reglan are some of them.  DRAMAMINE WITHOUT ISSUES       Demerol Hcl [Meperidine Hcl] Nausea     Indomethacin Nausea and Vomiting     Macrobid [Nitrofuran Derivatives] Hives     Reglan [Metoclopramide] Other (See Comments)     Acute paranoia, severe     Zofran [Ondansetron] Other (See Comments)     Severe anxiety, agitation     Current Outpatient Medications   Medication Sig Dispense Refill     acetaminophen (TYLENOL) 500 MG tablet Take 1,000 mg by mouth every 6 hours as needed for mild pain       blood glucose monitoring (NO BRAND SPECIFIED) meter device kit Use to test blood sugar 2 times daily or as directed. 1 kit 0     CONTOUR NEXT TEST test strip USE TO TEST BLOOD SUGAR TWO TIMES A DAY OR AS DIRECTED 100 each 3     cyclobenzaprine (FLEXERIL) 5 MG tablet Take 1 tablet (5  mg) by mouth 3 times daily as needed for muscle spasms 90 tablet 0     ibuprofen (ADVIL) 200 MG tablet Take 200 mg by mouth every 4 hours as needed for mild pain       metFORMIN (GLUCOPHAGE) 500 MG tablet TAKE ONE TABLET BY MOUTH EVERY DAY WITH DINNER 30 tablet 5     omeprazole (PRILOSEC) 20 MG DR capsule TAKE 1 CAPSULE BY MOUTH DAILY, 30 TO 60 MINUTES BEFORE A MEAL. 30 capsule 11     Past Surgical History:   Procedure Laterality Date     C REMOVAL OF KIDNEY STONE      two surgeries, 2002,2003     CHOLECYSTECTOMY, LAPOROSCOPIC  5/11/2007    Cholecystectomy, Laparoscopic     COLONOSCOPY  05/17/10     CONIZATION  7/1/2011    Procedure:CONIZATION; cold knife and punch biopsy of mole on back; Surgeon:SYDNEY FORD; Location:[facility]     DILATION AND CURETTAGE, HYSTEROSCOPY, LAPAROSCOPY, COMBINED Right 9/24/2015    Procedure: COMBINED DILATION AND CURETTAGE, HYSTEROSCOPY, LAPAROSCOPY;  Surgeon: Sydney Ford MD;  Location: [facility]     DISCECTOMY LUMBAR MINIMALLY INVASIVE ONE LEVEL Left 1/23/2019    Procedure: Minimally Invasive Surgery Left Lumbar 5-Sacral 1 microdiscectomy;  Surgeon: Mason Roe MD;  Location: [facility]     ESOPHAGOSCOPY, GASTROSCOPY, DUODENOSCOPY (EGD), COMBINED  5/21/2014    Procedure: COMBINED ESOPHAGOSCOPY, GASTROSCOPY, DUODENOSCOPY (EGD), BIOPSY SINGLE OR MULTIPLE;  Surgeon: Earl Lane MD;  Location: [facility]     EXCISE LESION TRUNK  7/1/2011    Procedure:EXCISE LESION TRUNK; Surgeon:SYDNEY FORD; Location:[facility]     HC FRAGMENTING OF KIDNEY STONE  11/30/2005     HC RX ECTOP PREG BY SCOPE,RMV TUBE/OVRY  12/20/2007    Right sided salpingectomy and removal of ruptured ectopic pregnancy. D&C.     HC UGI ENDOSCOPY, SIMPLE EXAM  09/01/09     LAPAROSCOPIC APPENDECTOMY N/A 9/24/2015    Procedure: LAPAROSCOPIC APPENDECTOMY;  Surgeon: James Cuellar MD;  [facility]     LAPAROSCOPIC CYSTECTOMY OVARIAN (BENIGN) N/A 9/24/2015    Procedure: LAPAROSCOPIC  CYSTECTOMY OVARIAN (BENIGN);  Surgeon: Greg Ford MD;  [facility]     LAPAROSCOPIC OOPHORECTOMY Right 9/24/2015    Procedure: LAPAROSCOPIC OOPHORECTOMY;  Surgeon: Greg Ford MD;  [facility]     LITHOTRIPSY  01/17/2007     PELVIS LAPAROSCOPY,DX  10/16/2000    Diagnostic laparoscopy, Endometrial biopsy.     TUBAL/ECTOPIC PREGNANCY  01/23/2007    Lap removal of left ruptured ectopic pregnancy & salpingectomy, D&C     Social History     Socioeconomic History     Marital status:      Spouse name: Joseph     Number of children: 1     Years of education: 9     Highest education level: None   Occupational History     Employer: NONE   Social Needs     Financial resource strain: None     Food insecurity:     Worry: None     Inability: None     Transportation needs:     Medical: None     Non-medical: None   Tobacco Use     Smoking status: Current Some Day Smoker     Years: 12.00     Types: Cigarettes     Last attempt to quit: 7/14/2009     Years since quitting: 10.0     Smokeless tobacco: Never Used     Tobacco comment: As of 10 /2014, pt has had a few cigs here and there   Substance and Sexual Activity     Alcohol use: Yes     Alcohol/week: 0.0 oz     Comment: every few months     Drug use: No     Sexual activity: Yes     Partners: Male     Birth control/protection: None   Lifestyle     Physical activity:     Days per week: None     Minutes per session: None     Stress: None   Relationships     Social connections:     Talks on phone: None     Gets together: None     Attends Church service: None     Active member of club or organization: None     Attends meetings of clubs or organizations: None     Relationship status: None     Intimate partner violence:     Fear of current or ex partner: None     Emotionally abused: None     Physically abused: None     Forced sexual activity: None   Other Topics Concern      Service No     Blood Transfusions No     Caffeine Concern Yes      Comment: Reports 1 can soda/week  Advised not more than 16 ounces caffeien/day.     Occupational Exposure No     Hobby Hazards No     Sleep Concern No     Stress Concern Yes     Comment: will discuss with      Weight Concern Yes     Comment: Eating disorder in childhood.  Hx. gestational diab.     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     Exercise No     Comment: Advised walking 30 min/day.     Bike Helmet No     Seat Belt Yes     Self-Exams Not Asked     Parent/sibling w/ CABG, MI or angioplasty before 65F 55M? Not Asked   Social History Narrative     and lives at home with her  and daughter.     Family History   Problem Relation Age of Onset     Diabetes Maternal Grandmother         adult onset     Anesthesia Reaction Maternal Grandmother         can't tolerate Novacaine.     Respiratory Maternal Grandmother         COPD and emphysema.     Thyroid Disease Maternal Grandmother      Heart Disease Maternal Grandmother         CHF     Diabetes Paternal Grandfather         adult o nset     Hypertension Paternal Grandfather      Cerebrovascular Disease Paternal Grandfather      Heart Disease Paternal Grandfather         MI, replaced valve,angioplasty     Thyroid Disease Paternal Grandfather      Cancer Maternal Grandfather         skin cancer     Heart Disease Maternal Grandfather         MI     Obesity Mother         on thyroid medication     Thyroid Disease Mother      Diabetes Mother      Rheumatologic Disease Mother      Heart Disease Father      Hypertension Father         and TIA     Lipids Father      Breast Cancer Paternal Grandmother      Arrhythmia Other      Cancer Maternal Aunt         breast/brain cancer     Depression Paternal Aunt      Cerebrovascular Disease Paternal Uncle      Cerebrovascular Disease Paternal Aunt        ROS: A 12 point review of systems was done. Except for what is listed above in the HPI, the systems  review is negative .      Objective: Vital signs: Blood pressure 112/82, pulse 78, temperature 97.7  F (36.5  C), temperature source Temporal, resp. rate 10, weight 98.6 kg (217 lb 4.8 oz), last menstrual period 07/01/2019, SpO2 98 %, not currently breastfeeding.      HEENT:  Sclerae and conjunctiva are normal.   Ear canals and TMs look normal.  Nasal mucosa is pink  - no polyps or masses seen.  sinuses are non tender to palpation.  Throat is unremarkable . Mucous membranes are moist.   Fundi are benign- disc margins are sharp. No papilledema noted.    Neck is supple, mobile, no adenoapthy or masses palpable. Normal range of motion noted.  Chest is clear to auscultation. No wheezes, rales or rhonchi heard.  cardiac exam is normal with s1, s2, no murmurs or adventitious sounds.Normal rate and rhythm is heard.    Abdomen is soft,  nondistended, No masses felt.No HSM. No guarding or rigidity or rebound   noted. Palpation reveals  no    tenderness   Normal bowel sounds heard.     Exam of the feet is negative for paresthesias.  Has normal sensation and color to both feet, and normal pulses and no trophic skin changes or ulcers.       Assessment/Plan: 1. Type 2   Diabetes is  Not  well-controlled.    . Ace inhibitor therapy:  She declined lisinopril   N    Labs today: A1C      Urine for microprotein  Was done in march-  Lipids  -done in March-she declines rechekc of these now- declines medication for this-     Baby aspirin 65 or 81 mg advised daily as prophylaxis- watch for GI upset or tinnitus or bruising/signs of bleeding- stop if these occur.    Because of her sugar readings at home and because the A1c was elevated on the last check I have advised her to increase the metformin to 500 mg twice daily.  I did tell her that if her sugars continue to be high then we may need to consider insulin.    Advised to recheck in 3 months.    Continue daily glucose monitoring. Recheck acutely if concerns.   Advised to have yearly  ophthalmology exam, regular dental visits and keep up to date on flu vaccine and TDAP.     2.  She continues to smoke-I have offered nicotine patch but she declined.  Strongly advised her to stop this at this time.    3.  She is morbidly obese and I have advised her to lose weight.  I strongly advised a [place] Walk daily. She is likely to have improved diabetic control if she can lose even 20 pounds. First goal is to get under 200 lbs.    4.  Recheck in 3 months.  I am checking liver function test today because of previous ALT in July 2018 was 67 which is slightly high.  Told her not to increase the metformin until after I see the results of the liver function test from today.    5.  Constipation and history of colon polyps-I have advised her to have another colonoscopy now.  We will schedule her for this.    SHARAN Ford MD

## 2019-08-01 NOTE — PROGRESS NOTES
Subjective: Here for diabetic followup exam.   Has been monitoring sugars 2 times daily. Glucose values have been in the  170-190 range fasting, and the same  range postprandial.  There have been no   changes in vision  No changes/ complaints   of neuropathy symptoms.   No other concerns except muscle cramps are more frequent.    She has Foot pain and she would like to see the podiatrist.    Also she had some recent constipation and was in the emergency room about this.  She has had a previous history of colon polyps and was told to repeat her colonoscopy.  She wants to have this arranged now.      The past medical history, social history, past surgical history and family history as shown below have been reviewed by me today.  Past Medical History:   Diagnosis Date     Abnormal Pap smear 2006, 2007,     ASC-H, ASCUS + HPV 45     Calculus of kidney 2002     Cervical high risk HPV (human papillomavirus) test positive     + HPV 45 (high risk)     History of colposcopy with cervical biopsy 2/9/06, 3/15/07    koilocytosis 2007        Allergies   Allergen Reactions     Amoxicillin Hives     Anti-Nausea      Anxiety, agitation,severe paranoia. Zofran, Reglan are some of them.  DRAMAMINE WITHOUT ISSUES       Demerol Hcl [Meperidine Hcl] Nausea     Indomethacin Nausea and Vomiting     Macrobid [Nitrofuran Derivatives] Hives     Reglan [Metoclopramide] Other (See Comments)     Acute paranoia, severe     Zofran [Ondansetron] Other (See Comments)     Severe anxiety, agitation     Current Outpatient Medications   Medication Sig Dispense Refill     acetaminophen (TYLENOL) 500 MG tablet Take 1,000 mg by mouth every 6 hours as needed for mild pain       blood glucose monitoring (NO BRAND SPECIFIED) meter device kit Use to test blood sugar 2 times daily or as directed. 1 kit 0     CONTOUR NEXT TEST test strip USE TO TEST BLOOD SUGAR TWO TIMES A DAY OR AS DIRECTED 100 each 3     cyclobenzaprine (FLEXERIL) 5 MG tablet Take 1 tablet (5  mg) by mouth 3 times daily as needed for muscle spasms 90 tablet 0     ibuprofen (ADVIL) 200 MG tablet Take 200 mg by mouth every 4 hours as needed for mild pain       metFORMIN (GLUCOPHAGE) 500 MG tablet TAKE ONE TABLET BY MOUTH EVERY DAY WITH DINNER 30 tablet 5     omeprazole (PRILOSEC) 20 MG DR capsule TAKE 1 CAPSULE BY MOUTH DAILY, 30 TO 60 MINUTES BEFORE A MEAL. 30 capsule 11     Past Surgical History:   Procedure Laterality Date     C REMOVAL OF KIDNEY STONE      two surgeries, 2002,2003     CHOLECYSTECTOMY, LAPOROSCOPIC  5/11/2007    Cholecystectomy, Laparoscopic     COLONOSCOPY  05/17/10     CONIZATION  7/1/2011    Procedure:CONIZATION; cold knife and punch biopsy of mole on back; Surgeon:SYDNEY FORD; [facility]     DILATION AND CURETTAGE, HYSTEROSCOPY, LAPAROSCOPY, COMBINED Right 9/24/2015    Procedure: COMBINED DILATION AND CURETTAGE, HYSTEROSCOPY, LAPAROSCOPY;  Surgeon: Sydney Ford MD;  [facility]     DISCECTOMY LUMBAR MINIMALLY INVASIVE ONE LEVEL Left 1/23/2019    Procedure: Minimally Invasive Surgery Left Lumbar 5-Sacral 1 microdiscectomy;  Surgeon: Mason Roe MD;  [facility]     ESOPHAGOSCOPY, GASTROSCOPY, DUODENOSCOPY (EGD), COMBINED  5/21/2014    Procedure: COMBINED ESOPHAGOSCOPY, GASTROSCOPY, DUODENOSCOPY (EGD), BIOPSY SINGLE OR MULTIPLE;  Surgeon: Earl Lane MD;  [facility]     EXCISE LESION TRUNK  7/1/2011    Procedure:EXCISE LESION TRUNK; Surgeon:SYDNEY FORD; [facility]     HC FRAGMENTING OF KIDNEY STONE  11/30/2005     HC RX ECTOP PREG BY SCOPE,RMV TUBE/OVRY  12/20/2007    Right sided salpingectomy and removal of ruptured ectopic pregnancy. D&C.     HC UGI ENDOSCOPY, SIMPLE EXAM  09/01/09     LAPAROSCOPIC APPENDECTOMY N/A 9/24/2015    Procedure: LAPAROSCOPIC APPENDECTOMY;  Surgeon: James Cuellar MD;  [facility]     LAPAROSCOPIC CYSTECTOMY OVARIAN (BENIGN) N/A 9/24/2015    Procedure: LAPAROSCOPIC  CYSTECTOMY OVARIAN (BENIGN);  Surgeon: Greg Ford MD;  [facility]     LAPAROSCOPIC OOPHORECTOMY Right 9/24/2015    Procedure: LAPAROSCOPIC OOPHORECTOMY;  Surgeon: Greg Ford MD;  [facility]     LITHOTRIPSY  01/17/2007     PELVIS LAPAROSCOPY,DX  10/16/2000    Diagnostic laparoscopy, Endometrial biopsy.     TUBAL/ECTOPIC PREGNANCY  01/23/2007    Lap removal of left ruptured ectopic pregnancy & salpingectomy, D&C     Social History     Socioeconomic History     Marital status:      Spouse name: Joseph     Number of children: 1     Years of education: 9     Highest education level: None   Occupational History     Employer: NONE   Social Needs     Financial resource strain: None     Food insecurity:     Worry: None     Inability: None     Transportation needs:     Medical: None     Non-medical: None   Tobacco Use     Smoking status: Current Some Day Smoker     Years: 12.00     Types: Cigarettes     Last attempt to quit: 7/14/2009     Years since quitting: 10.0     Smokeless tobacco: Never Used     Tobacco comment: As of 10 /2014, pt has had a few cigs here and there   Substance and Sexual Activity     Alcohol use: Yes     Alcohol/week: 0.0 oz     Comment: every few months     Drug use: No     Sexual activity: Yes     Partners: Male     Birth control/protection: None   Lifestyle     Physical activity:     Days per week: None     Minutes per session: None     Stress: None   Relationships     Social connections:     Talks on phone: None     Gets together: None     Attends Oriental orthodox service: None     Active member of club or organization: None     Attends meetings of clubs or organizations: None     Relationship status: None     Intimate partner violence:     Fear of current or ex partner: None     Emotionally abused: None     Physically abused: None     Forced sexual activity: None   Other Topics Concern      Service No     Blood Transfusions No     Caffeine Concern Yes      Comment: Reports 1 can soda/week  Advised not more than 16 ounces caffeien/day.     Occupational Exposure No     Hobby Hazards No     Sleep Concern No     Stress Concern Yes     Comment: will discuss with      Weight Concern Yes     Comment: Eating disorder in childhood.  Hx. gestational diab.     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     Exercise No     Comment: Advised walking 30 min/day.     Bike Helmet No     Seat Belt Yes     Self-Exams Not Asked     Parent/sibling w/ CABG, MI or angioplasty before 65F 55M? Not Asked   Social History Narrative     and lives at home with her  and daughter.     Family History   Problem Relation Age of Onset     Diabetes Maternal Grandmother         adult onset     Anesthesia Reaction Maternal Grandmother         can't tolerate Novacaine.     Respiratory Maternal Grandmother         COPD and emphysema.     Thyroid Disease Maternal Grandmother      Heart Disease Maternal Grandmother         CHF     Diabetes Paternal Grandfather         adult o nset     Hypertension Paternal Grandfather      Cerebrovascular Disease Paternal Grandfather      Heart Disease Paternal Grandfather         MI, replaced valve,angioplasty     Thyroid Disease Paternal Grandfather      Cancer Maternal Grandfather         skin cancer     Heart Disease Maternal Grandfather         MI     Obesity Mother         on thyroid medication     Thyroid Disease Mother      Diabetes Mother      Rheumatologic Disease Mother      Heart Disease Father      Hypertension Father         and TIA     Lipids Father      Breast Cancer Paternal Grandmother      Arrhythmia Other      Cancer Maternal Aunt         breast/brain cancer     Depression Paternal Aunt      Cerebrovascular Disease Paternal Uncle      Cerebrovascular Disease Paternal Aunt        ROS: A 12 point review of systems was done. Except for what is listed above in the HPI, the systems  review is negative .      Objective: Vital signs: Blood pressure 112/82, pulse 78, temperature 97.7  F (36.5  C), temperature source Temporal, resp. rate 10, weight 98.6 kg (217 lb 4.8 oz), last menstrual period 07/01/2019, SpO2 98 %, not currently breastfeeding.      HEENT:  Sclerae and conjunctiva are normal.   Ear canals and TMs look normal.  Nasal mucosa is pink  - no polyps or masses seen.  sinuses are non tender to palpation.  Throat is unremarkable . Mucous membranes are moist.   Fundi are benign- disc margins are sharp. No papilledema noted.    Neck is supple, mobile, no adenoapthy or masses palpable. Normal range of motion noted.  Chest is clear to auscultation. No wheezes, rales or rhonchi heard.  cardiac exam is normal with s1, s2, no murmurs or adventitious sounds.Normal rate and rhythm is heard.    Abdomen is soft,  nondistended, No masses felt.No HSM. No guarding or rigidity or rebound   noted. Palpation reveals  no    tenderness   Normal bowel sounds heard.     Exam of the feet is negative for paresthesias.  Has normal sensation and color to both feet, and normal pulses and no trophic skin changes or ulcers.       Assessment/Plan: 1. Type 2   Diabetes is  Not  well-controlled.    . Ace inhibitor therapy:  She declined lisinopril   N    Labs today: A1C      Urine for microprotein  Was done in march-  Lipids  -done in March-she declines rechekc of these now- declines medication for this-     Baby aspirin 65 or 81 mg advised daily as prophylaxis- watch for GI upset or tinnitus or bruising/signs of bleeding- stop if these occur.    Because of her sugar readings at home and because the A1c was elevated on the last check I have advised her to increase the metformin to 500 mg twice daily.  I did tell her that if her sugars continue to be high then we may need to consider insulin.    Advised to recheck in 3 months.    Continue daily glucose monitoring. Recheck acutely if concerns.   Advised to have yearly  ophthalmology exam, regular dental visits and keep up to date on flu vaccine and TDAP.     2.  She continues to smoke-I have offered nicotine patch but she declined.  Strongly advised her to stop this at this time.    3.  She is morbidly obese and I have advised her to lose weight.  I strongly advised a [place] Walk daily. She is likely to have improved diabetic control if she can lose even 20 pounds. First goal is to get under 200 lbs.    4.  Recheck in 3 months.  I am checking liver function test today because of previous ALT in July 2018 was 67 which is slightly high.  Told her not to increase the metformin until after I see the results of the liver function test from today.    5.  Constipation and history of colon polyps-I have advised her to have another colonoscopy now.  We will schedule her for this.    SHARAN Ford MD

## 2019-08-05 ENCOUNTER — TELEPHONE (OUTPATIENT)
Dept: FAMILY MEDICINE | Facility: CLINIC | Age: 39
End: 2019-08-05

## 2019-08-05 NOTE — RESULT ENCOUNTER NOTE
Triny/Brody Paez,Please inform Fallon/ or caretaker  that this result(s) is/are again showing that the hemoglobin A1c is high but it is 8.4 which is a slight improvement.  Also the liver function test has improved.  I would like her to repeat the AST and ALT in 1 month along with the hemoglobin A1c to see if the increased metformin is working.  If not then we will consider insulin.  Thanks. SHARAN Ford MD

## 2019-08-05 NOTE — LETTER
[address] [address]  [phone]        August 5, 2019    Fallon Rivera  [address] [place], [place] [address] [address]

## 2019-08-05 NOTE — LETTER

## 2019-08-07 ENCOUNTER — OFFICE VISIT (OUTPATIENT)
Dept: PODIATRY | Facility: CLINIC | Age: 39
End: 2019-08-07
Payer: COMMERCIAL

## 2019-08-07 VITALS
WEIGHT: 219 LBS | DIASTOLIC BLOOD PRESSURE: 82 MMHG | SYSTOLIC BLOOD PRESSURE: 124 MMHG | HEIGHT: 64 IN | BODY MASS INDEX: 37.39 KG/M2 | TEMPERATURE: 98.3 F

## 2019-08-07 DIAGNOSIS — Q66.6 PES VALGUS: Primary | ICD-10-CM

## 2019-08-07 DIAGNOSIS — E11.9 TYPE 2 DIABETES MELLITUS WITHOUT COMPLICATION, WITHOUT LONG-TERM CURRENT USE OF INSULIN (H): ICD-10-CM

## 2019-08-07 DIAGNOSIS — E66.01 MORBID OBESITY (H): ICD-10-CM

## 2019-08-07 PROCEDURE — 99203 OFFICE O/P NEW LOW 30 MIN: CPT | Performed by: PODIATRIST

## 2019-08-07 ASSESSMENT — MIFFLIN-ST. JEOR: SCORE: 1663.38

## 2019-08-07 ASSESSMENT — PAIN SCALES - GENERAL: PAINLEVEL: MILD PAIN (3)

## 2019-08-07 NOTE — LETTER
8/7/2019         RE: Fallon Rivera  [address] [place] [id number]        Dear Colleague,    Thank you for referring your patient, Fallon Rivera, to the [facility]. Please see a copy of my visit note below.    HPI:  Patient has painful flat feet loss of arch height early fatigue and aching in her feet after longer periods of walking.  She has collapsed disks that she feels her flat feet are no longer protecting.  She wonders what her options are to help her back secondary to flat feet.    Body mass index is 37.59 kg/m .     Fallon to follow up with Primary Care provider regarding elevated blood pressure.  Weight management plan: Patient was referred to their PCP to discuss a diet and exercise plan.    Patient to follow up with Primary Care provider regarding elevated blood pressure.    ROS:  10 point ROS neg other than the symptoms noted above in the HPI.    Patient Active Problem List   Diagnosis     Hemorrhage of rectum and anus     Female infertility     Papanicolaou smear of cervix with atypical squamous cells cannot exclude high grade squamous intraepithelial lesion (ASC-H)     S/P conization of cervix- KAYLA 2/3 in 2011     Kidney stones     Dyspnea     CARDIOVASCULAR SCREENING; LDL GOAL LESS THAN 160     GERD (gastroesophageal reflux disease)     Hyperlipidemia LDL goal <130     Mild major depression (H)     Right ovarian cyst     Glucosuria     Type 2 diabetes mellitus without complication (H)     Non morbid obesity due to excess calories     Obesity (BMI 35.0-39.9) with comorbidity (H)       PAST MEDICAL HISTORY:   Past Medical History:   Diagnosis Date     Abnormal Pap smear 2006, 2007,     ASC-H, ASCUS + HPV 45     Calculus of kidney 2002     Cervical high risk HPV (human papillomavirus) test positive     + HPV 45 (high risk)     History of colposcopy with cervical biopsy 2/9/06, 3/15/07    koilocytosis 2007        PAST SURGICAL HISTORY:   Past Surgical History:    Procedure Laterality Date     C REMOVAL OF KIDNEY STONE      two surgeries, 2002,2003     CHOLECYSTECTOMY, LAPOROSCOPIC  5/11/2007    Cholecystectomy, Laparoscopic     COLONOSCOPY  05/17/10     CONIZATION  7/1/2011    Procedure:CONIZATION; cold knife and punch biopsy of mole on back; Surgeon:SYDNEY FORD; [facility]     DILATION AND CURETTAGE, HYSTEROSCOPY, LAPAROSCOPY, COMBINED Right 9/24/2015    Procedure: COMBINED DILATION AND CURETTAGE, HYSTEROSCOPY, LAPAROSCOPY;  Surgeon: Sydney Ford MD;  [facility]     DISCECTOMY LUMBAR MINIMALLY INVASIVE ONE LEVEL Left 1/23/2019    Procedure: Minimally Invasive Surgery Left Lumbar 5-Sacral 1 microdiscectomy;  Surgeon: Mason Roe MD;  [facility]     ESOPHAGOSCOPY, GASTROSCOPY, DUODENOSCOPY (EGD), COMBINED  5/21/2014    Procedure: COMBINED ESOPHAGOSCOPY, GASTROSCOPY, DUODENOSCOPY (EGD), BIOPSY SINGLE OR MULTIPLE;  Surgeon: Earl Lane MD;  [facility]     EXCISE LESION TRUNK  7/1/2011    Procedure:EXCISE LESION TRUNK; Surgeon:SYDNEY FORD; [facility]     HC FRAGMENTING OF KIDNEY STONE  11/30/2005     HC RX ECTOP PREG BY SCOPE,RMV TUBE/OVRY  12/20/2007    Right sided salpingectomy and removal of ruptured ectopic pregnancy. D&C.     HC UGI ENDOSCOPY, SIMPLE EXAM  09/01/09     LAPAROSCOPIC APPENDECTOMY N/A 9/24/2015    Procedure: LAPAROSCOPIC APPENDECTOMY;  Surgeon: James Cuellar MD;  [facility]     LAPAROSCOPIC CYSTECTOMY OVARIAN (BENIGN) N/A 9/24/2015    Procedure: LAPAROSCOPIC CYSTECTOMY OVARIAN (BENIGN);  Surgeon: Sydney Ford MD;  [facility]     LAPAROSCOPIC OOPHORECTOMY Right 9/24/2015    Procedure: LAPAROSCOPIC OOPHORECTOMY;  Surgeon: Sydney Ford MD;  [facility]     LITHOTRIPSY  01/17/2007     PELVIS LAPAROSCOPY,DX  10/16/2000    Diagnostic laparoscopy, Endometrial biopsy.     TUBAL/ECTOPIC PREGNANCY  01/23/2007    Lap removal of left  ruptured ectopic pregnancy & salpingectomy, D&C        MEDICATIONS:   Current Outpatient Medications:      acetaminophen (TYLENOL) 500 MG tablet, Take 1,000 mg by mouth every 6 hours as needed for mild pain, Disp: , Rfl:      blood glucose (CONTOUR NEXT TEST) test strip, USE TO TEST BLOOD SUGAR TWO TIMES A DAY OR AS DIRECTED, Disp: 100 each, Rfl: 3     blood glucose monitoring (NO BRAND SPECIFIED) meter device kit, Use to test blood sugar 2 times daily or as directed., Disp: 1 kit, Rfl: 0     cyclobenzaprine (FLEXERIL) 5 MG tablet, Take 1 tablet (5 mg) by mouth 3 times daily as needed for muscle spasms, Disp: 90 tablet, Rfl: 0     ibuprofen (ADVIL) 200 MG tablet, Take 200 mg by mouth every 4 hours as needed for mild pain, Disp: , Rfl:      metFORMIN (GLUCOPHAGE) 500 MG tablet, 1 tablet BID, Disp: 60 tablet, Rfl: 11     omeprazole (PRILOSEC) 20 MG DR capsule, TAKE 1 CAPSULE BY MOUTH DAILY, 30 TO 60 MINUTES BEFORE A MEAL., Disp: 30 capsule, Rfl: 11     ALLERGIES:    Allergies   Allergen Reactions     Amoxicillin Hives     Anti-Nausea      Anxiety, agitation,severe paranoia. Zofran, Reglan are some of them.  DRAMAMINE WITHOUT ISSUES       Demerol Hcl [Meperidine Hcl] Nausea     Indomethacin Nausea and Vomiting     Macrobid [Nitrofuran Derivatives] Hives     Reglan [Metoclopramide] Other (See Comments)     Acute paranoia, severe     Zofran [Ondansetron] Other (See Comments)     Severe anxiety, agitation        SOCIAL HISTORY:   Social History     Socioeconomic History     Marital status:      Spouse name: Joseph     Number of children: 1     Years of education: 9     Highest education level: Not on file   Occupational History     Employer: NONE   Social Needs     Financial resource strain: Not on file     Food insecurity:     Worry: Not on file     Inability: Not on file     Transportation needs:     Medical: Not on file     Non-medical: Not on file   Tobacco Use     Smoking status: Current Some Day Smoker      Years: 12.00     Types: Cigarettes     Last attempt to quit: 7/14/2009     Years since quitting: 10.0     Smokeless tobacco: Never Used     Tobacco comment: As of 10 /2014, pt has had a few cigs here and there   Substance and Sexual Activity     Alcohol use: Yes     Alcohol/week: 0.0 oz     Comment: every few months     Drug use: No     Sexual activity: Yes     Partners: Male     Birth control/protection: None   Lifestyle     Physical activity:     Days per week: Not on file     Minutes per session: Not on file     Stress: Not on file   Relationships     Social connections:     Talks on phone: Not on file     Gets together: Not on file     Attends Jewish service: Not on file     Active member of club or organization: Not on file     Attends meetings of clubs or organizations: Not on file     Relationship status: Not on file     Intimate partner violence:     Fear of current or ex partner: Not on file     Emotionally abused: Not on file     Physically abused: Not on file     Forced sexual activity: Not on file   Other Topics Concern      Service No     Blood Transfusions No     Caffeine Concern Yes     Comment: Reports 1 can soda/week  Advised not more than 16 ounces caffeien/day.     Occupational Exposure No     Hobby Hazards No     Sleep Concern No     Stress Concern Yes     Comment: will discuss with      Weight Concern Yes     Comment: Eating disorder in childhood.  Hx. gestational diab.     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     Exercise No     Comment: Advised walking 30 min/day.     Bike Helmet No     Seat Belt Yes     Self-Exams Not Asked     Parent/sibling w/ CABG, MI or angioplasty before 65F 55M? Not Asked   Social History Narrative     and lives at home with her  and daughter.        FAMILY HISTORY:   Family History   Problem Relation Age of Onset     Diabetes Maternal Grandmother         adult onset      "Anesthesia Reaction Maternal Grandmother         can't tolerate Novacaine.     Respiratory Maternal Grandmother         COPD and emphysema.     Thyroid Disease Maternal Grandmother      Heart Disease Maternal Grandmother         CHF     Diabetes Paternal Grandfather         adult o nset     Hypertension Paternal Grandfather      Cerebrovascular Disease Paternal Grandfather      Heart Disease Paternal Grandfather         MI, replaced valve,angioplasty     Thyroid Disease Paternal Grandfather      Cancer Maternal Grandfather         skin cancer     Heart Disease Maternal Grandfather         MI     Obesity Mother         on thyroid medication     Thyroid Disease Mother      Diabetes Mother      Rheumatologic Disease Mother      Heart Disease Father      Hypertension Father         and TIA     Lipids Father      Breast Cancer Paternal Grandmother      Arrhythmia Other      Cancer Maternal Aunt         breast/brain cancer     Depression Paternal Aunt      Cerebrovascular Disease Paternal Uncle      Cerebrovascular Disease Paternal Aunt         EXAM:Vitals: /82   Temp 98.3  F (36.8  C) (Temporal)   Ht 1.626 m (5' 4\")   Wt 99.3 kg (219 lb)   BMI 37.59 kg/m     BMI= Body mass index is 37.59 kg/m .    General appearance: Patient is alert and fully cooperative with history & exam.  No sign of distress is noted during the visit.     Psychiatric: Affect is pleasant & appropriate.  Patient appears motivated to improve health.     Respiratory: Breathing is regular & unlabored while sitting.     HEENT: Hearing is intact to spoken word.  Speech is clear.  No gross evidence of visual impairment that would impact ambulation.     Vascular: DP & PT pulses are intact & regular bilaterally.  No significant edema or varicosities noted.  CFT and skin temperature is normal to both lower extremities.     Neurologic: Lower extremity sensation is intact to light touch.  No evidence of weakness or contracture in the lower extremities. "  No evidence of neuropathy.    Dermatologic: Skin is intact to both lower extremities with adequate texture, turgor and tone about the integument.  No paronychia or evidence of soft tissue infection is noted.     Musculoskeletal: Patient is ambulatory without assistive device or brace.  Patient is obese.  Patient has hypermobility about the subtalar joint with a medially deviated subtalar joint axis.  10 degrees of ankle joint dorsiflexion however this is likely due to considerable subluxation at the subtalar joint.  No crepitus throughout range of motion of the ankle subtalar midtarsal metatarsal phalangeal joints.  Manual muscle strength was 5/5 to all 4 quadrants.         Hemoglobin A1C (%)   Date Value   08/01/2019 8.4 (H)   03/16/2019 8.7 (H)   10/16/2018 7.3 (H)   05/21/2018 8.2 (H)   01/07/2018 7.1 (H)   08/09/2017 6.3 (H)   05/15/2017 6.6 (H)   01/30/2017 6.6 (H)     Creatinine (mg/dL)   Date Value   08/01/2019 0.77   03/16/2019 0.67   01/17/2019 0.71   10/16/2018 0.75   09/08/2018 0.55   05/21/2018 0.74       ASSESSMENT:       ICD-10-CM    1. Pes valgus Q66.6 ORTHOTICS REFERRAL   2. Obesity (BMI 35.0-39.9) with comorbidity (H) E66.01    3. Type 2 diabetes mellitus without complication, without long-term current use of insulin (H) E11.9         PLAN:  Reviewed patient's chart in [facility].      8/7/2019   Order for orthotic to reduce valgus  Written instructions regarding proper shoe gear  Recommended weight loss and discussed how this contributes to increased valgus.  All questions were answered.  She will follow-up in about 5 weeks after utilizing the orthotics.       Valentino Toribio DPM      Again, thank you for allowing me to participate in the care of your patient.        Sincerely,        Valentino Toribio DPM

## 2019-08-07 NOTE — PROGRESS NOTES
HPI:  Patient has painful flat feet loss of arch height early fatigue and aching in her feet after longer periods of walking.  She has collapsed disks that she feels her flat feet are no longer protecting.  She wonders what her options are to help her back secondary to flat feet.    Body mass index is 37.59 kg/m .     Fallon to follow up with Primary Care provider regarding elevated blood pressure.  Weight management plan: Patient was referred to their PCP to discuss a diet and exercise plan.    Patient to follow up with Primary Care provider regarding elevated blood pressure.    ROS:  10 point ROS neg other than the symptoms noted above in the HPI.    Patient Active Problem List   Diagnosis     Hemorrhage of rectum and anus     Female infertility     Papanicolaou smear of cervix with atypical squamous cells cannot exclude high grade squamous intraepithelial lesion (ASC-H)     S/P conization of cervix- KAYLA 2/3 in 2011     Kidney stones     Dyspnea     CARDIOVASCULAR SCREENING; LDL GOAL LESS THAN 160     GERD (gastroesophageal reflux disease)     Hyperlipidemia LDL goal <130     Mild major depression (H)     Right ovarian cyst     Glucosuria     Type 2 diabetes mellitus without complication (H)     Non morbid obesity due to excess calories     Obesity (BMI 35.0-39.9) with comorbidity (H)       PAST MEDICAL HISTORY:   Past Medical History:   Diagnosis Date     Abnormal Pap smear 2006, 2007,     ASC-H, ASCUS + HPV 45     Calculus of kidney 2002     Cervical high risk HPV (human papillomavirus) test positive     + HPV 45 (high risk)     History of colposcopy with cervical biopsy 2/9/06, 3/15/07    koilocytosis 2007        PAST SURGICAL HISTORY:   Past Surgical History:   Procedure Laterality Date     C REMOVAL OF KIDNEY STONE      two surgeries, 2002,2003     CHOLECYSTECTOMY, LAPOROSCOPIC  5/11/2007    Cholecystectomy, Laparoscopic     COLONOSCOPY  05/17/10     CONIZATION  7/1/2011    Procedure:CONIZATION; cold knife  and punch biopsy of mole on back; Surgeon:SYDNEY FORD; Location:[facility]     DILATION AND CURETTAGE, HYSTEROSCOPY, LAPAROSCOPY, COMBINED Right 9/24/2015    Procedure: COMBINED DILATION AND CURETTAGE, HYSTEROSCOPY, LAPAROSCOPY;  Surgeon: Sydney Ford MD;  [facility]     DISCECTOMY LUMBAR MINIMALLY INVASIVE ONE LEVEL Left 1/23/2019    Procedure: Minimally Invasive Surgery Left Lumbar 5-Sacral 1 microdiscectomy;  Surgeon: Mason Roe MD;  [facility]     ESOPHAGOSCOPY, GASTROSCOPY, DUODENOSCOPY (EGD), COMBINED  5/21/2014    Procedure: COMBINED ESOPHAGOSCOPY, GASTROSCOPY, DUODENOSCOPY (EGD), BIOPSY SINGLE OR MULTIPLE;  Surgeon: Earl Lane MD;  Location[facility]     EXCISE LESION TRUNK  7/1/2011    Procedure:EXCISE LESION TRUNK; Surgeon:SYDNEY FORD; Location[facility]     HC FRAGMENTING OF KIDNEY STONE  11/30/2005     HC RX ECTOP PREG BY SCOPE,RMV TUBE/OVRY  12/20/2007    Right sided salpingectomy and removal of ruptured ectopic pregnancy. D&C.     HC UGI ENDOSCOPY, SIMPLE EXAM  09/01/09     LAPAROSCOPIC APPENDECTOMY N/A 9/24/2015    Procedure: LAPAROSCOPIC APPENDECTOMY;  Surgeon: James Cuellar MD;  [facility]     LAPAROSCOPIC CYSTECTOMY OVARIAN (BENIGN) N/A 9/24/2015    Procedure: LAPAROSCOPIC CYSTECTOMY OVARIAN (BENIGN);  Surgeon: Sydney Ford MD;  [facility]     LAPAROSCOPIC OOPHORECTOMY Right 9/24/2015    Procedure: LAPAROSCOPIC OOPHORECTOMY;  Surgeon: Sydney Ford MD;  [facility]     LITHOTRIPSY  01/17/2007     PELVIS LAPAROSCOPY,DX  10/16/2000    Diagnostic laparoscopy, Endometrial biopsy.     TUBAL/ECTOPIC PREGNANCY  01/23/2007    Lap removal of left ruptured ectopic pregnancy & salpingectomy, D&C        MEDICATIONS:   Current Outpatient Medications:      acetaminophen (TYLENOL) 500 MG tablet, Take 1,000 mg by mouth every 6 hours as needed for mild pain, Disp: , Rfl:      blood glucose (CONTOUR NEXT  TEST) test strip, USE TO TEST BLOOD SUGAR TWO TIMES A DAY OR AS DIRECTED, Disp: 100 each, Rfl: 3     blood glucose monitoring (NO BRAND SPECIFIED) meter device kit, Use to test blood sugar 2 times daily or as directed., Disp: 1 kit, Rfl: 0     cyclobenzaprine (FLEXERIL) 5 MG tablet, Take 1 tablet (5 mg) by mouth 3 times daily as needed for muscle spasms, Disp: 90 tablet, Rfl: 0     ibuprofen (ADVIL) 200 MG tablet, Take 200 mg by mouth every 4 hours as needed for mild pain, Disp: , Rfl:      metFORMIN (GLUCOPHAGE) 500 MG tablet, 1 tablet BID, Disp: 60 tablet, Rfl: 11     omeprazole (PRILOSEC) 20 MG DR capsule, TAKE 1 CAPSULE BY MOUTH DAILY, 30 TO 60 MINUTES BEFORE A MEAL., Disp: 30 capsule, Rfl: 11     ALLERGIES:    Allergies   Allergen Reactions     Amoxicillin Hives     Anti-Nausea      Anxiety, agitation,severe paranoia. Zofran, Reglan are some of them.  DRAMAMINE WITHOUT ISSUES       Demerol Hcl [Meperidine Hcl] Nausea     Indomethacin Nausea and Vomiting     Macrobid [Nitrofuran Derivatives] Hives     Reglan [Metoclopramide] Other (See Comments)     Acute paranoia, severe     Zofran [Ondansetron] Other (See Comments)     Severe anxiety, agitation        SOCIAL HISTORY:   Social History     Socioeconomic History     Marital status:      Spouse name: Joesph     Number of children: 1     Years of education: 9     Highest education level: Not on file   Occupational History     Employer: NONE   Social Needs     Financial resource strain: Not on file     Food insecurity:     Worry: Not on file     Inability: Not on file     Transportation needs:     Medical: Not on file     Non-medical: Not on file   Tobacco Use     Smoking status: Current Some Day Smoker     Years: 12.00     Types: Cigarettes     Last attempt to quit: 7/14/2009     Years since quitting: 10.0     Smokeless tobacco: Never Used     Tobacco comment: As of 10 /2014, pt has had a few cigs here and there   Substance and Sexual Activity     Alcohol use:  Yes     Alcohol/week: 0.0 oz     Comment: every few months     Drug use: No     Sexual activity: Yes     Partners: Male     Birth control/protection: None   Lifestyle     Physical activity:     Days per week: Not on file     Minutes per session: Not on file     Stress: Not on file   Relationships     Social connections:     Talks on phone: Not on file     Gets together: Not on file     Attends Spiritism service: Not on file     Active member of club or organization: Not on file     Attends meetings of clubs or organizations: Not on file     Relationship status: Not on file     Intimate partner violence:     Fear of current or ex partner: Not on file     Emotionally abused: Not on file     Physically abused: Not on file     Forced sexual activity: Not on file   Other Topics Concern      Service No     Blood Transfusions No     Caffeine Concern Yes     Comment: Reports 1 can soda/week  Advised not more than 16 ounces caffeien/day.     Occupational Exposure No     Hobby Hazards No     Sleep Concern No     Stress Concern Yes     Comment: will discuss with      Weight Concern Yes     Comment: Eating disorder in childhood.  Hx. gestational diab.     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     Exercise No     Comment: Advised walking 30 min/day.     Bike Helmet No     Seat Belt Yes     Self-Exams Not Asked     Parent/sibling w/ CABG, MI or angioplasty before 65F 55M? Not Asked   Social History Narrative     and lives at home with her  and daughter.        FAMILY HISTORY:   Family History   Problem Relation Age of Onset     Diabetes Maternal Grandmother         adult onset     Anesthesia Reaction Maternal Grandmother         can't tolerate Novacaine.     Respiratory Maternal Grandmother         COPD and emphysema.     Thyroid Disease Maternal Grandmother      Heart Disease Maternal Grandmother         CHF     Diabetes Paternal Grandfather   "       adult o nset     Hypertension Paternal Grandfather      Cerebrovascular Disease Paternal Grandfather      Heart Disease Paternal Grandfather         MI, replaced valve,angioplasty     Thyroid Disease Paternal Grandfather      Cancer Maternal Grandfather         skin cancer     Heart Disease Maternal Grandfather         MI     Obesity Mother         on thyroid medication     Thyroid Disease Mother      Diabetes Mother      Rheumatologic Disease Mother      Heart Disease Father      Hypertension Father         and TIA     Lipids Father      Breast Cancer Paternal Grandmother      Arrhythmia Other      Cancer Maternal Aunt         breast/brain cancer     Depression Paternal Aunt      Cerebrovascular Disease Paternal Uncle      Cerebrovascular Disease Paternal Aunt         EXAM:Vitals: /82   Temp 98.3  F (36.8  C) (Temporal)   Ht 1.626 m (5' 4\")   Wt 99.3 kg (219 lb)   BMI 37.59 kg/m    BMI= Body mass index is 37.59 kg/m .    General appearance: Patient is alert and fully cooperative with history & exam.  No sign of distress is noted during the visit.     Psychiatric: Affect is pleasant & appropriate.  Patient appears motivated to improve health.     Respiratory: Breathing is regular & unlabored while sitting.     HEENT: Hearing is intact to spoken word.  Speech is clear.  No gross evidence of visual impairment that would impact ambulation.     Vascular: DP & PT pulses are intact & regular bilaterally.  No significant edema or varicosities noted.  CFT and skin temperature is normal to both lower extremities.     Neurologic: Lower extremity sensation is intact to light touch.  No evidence of weakness or contracture in the lower extremities.  No evidence of neuropathy.    Dermatologic: Skin is intact to both lower extremities with adequate texture, turgor and tone about the integument.  No paronychia or evidence of soft tissue infection is noted.     Musculoskeletal: Patient is ambulatory without " assistive device or brace.  Patient is obese.  Patient has hypermobility about the subtalar joint with a medially deviated subtalar joint axis.  10 degrees of ankle joint dorsiflexion however this is likely due to considerable subluxation at the subtalar joint.  No crepitus throughout range of motion of the ankle subtalar midtarsal metatarsal phalangeal joints.  Manual muscle strength was 5/5 to all 4 quadrants.         Hemoglobin A1C (%)   Date Value   08/01/2019 8.4 (H)   03/16/2019 8.7 (H)   10/16/2018 7.3 (H)   05/21/2018 8.2 (H)   01/07/2018 7.1 (H)   08/09/2017 6.3 (H)   05/15/2017 6.6 (H)   01/30/2017 6.6 (H)     Creatinine (mg/dL)   Date Value   08/01/2019 0.77   03/16/2019 0.67   01/17/2019 0.71   10/16/2018 0.75   09/08/2018 0.55   05/21/2018 0.74       ASSESSMENT:       ICD-10-CM    1. Pes valgus Q66.6 ORTHOTICS REFERRAL   2. Obesity (BMI 35.0-39.9) with comorbidity (H) E66.01    3. Type 2 diabetes mellitus without complication, without long-term current use of insulin (H) E11.9         PLAN:  Reviewed patient's chart in [facility].      8/7/2019   Order for orthotic to reduce valgus  Written instructions regarding proper shoe gear  Recommended weight loss and discussed how this contributes to increased valgus.  All questions were answered.  She will follow-up in about 5 weeks after utilizing the orthotics.       Valentino Toribio DPM

## 2019-08-07 NOTE — PATIENT INSTRUCTIONS
Reliable shoe stores: To maximize your experience and provide the best possible fit.  Be sure to show them your foot concerns and tell them Dr. Toribio sent you.      Stores listed in bold have only athletic shoes, and stores that are not bold are mostly casual or variety of shoes    [employer]  [address] [place] [address]  [phone]    [facility] [employer] [facility] [place]  [address] [place] [address]  [phone]     [employer] [facility]  [address] [place] [address]  [phone]    [employer]  [address] [facility]  [facility] [address]  [phone]    [employer]  [address] [place], [place] [address] [phone]    [employer]  [address] [place] [address]  [phone]                         [employer] Locations:     [address] [place] [address]   [phone]     [address] [place] [address] [address] [place] [address]   [phone]     [address] [place] [address]   [phone]     [address] [facility] [place] [address]   [phone]     [address] [place] [address]   [phone]     [address] [place] [address]   [phone]     [address] [facility] [address]   [place] [address]   [phone]     [address] [facility]   [place] [address]   [phone]             How to find reasonable shoes          The correct width    Correct Fitting    Correct Length      Foot Distortion    Posture Distortion                          Torsional Rigidity      Grasp behind the heel and underneath the foot and twist      Bad    Excessive torsion/twist in midfoot     Less torsion/twist in midfoot is better                   Heel Counter Rigidity      Grasp just above   midsole and squeeze      Bad    Soft heel counter      Good    Rigid Heel Counter      Flexion Rigidity      Grasp shoe and bend from forefoot to rearfoot            DIABETES AND YOUR FEET    What effect does diabetes  "have on the feet?  Diabetes can result in several problems in the feet including contractures of the tendons leading to deformities and reduced function of the bones, skin ulcers or open sores on pressure points or prominent deformities, reduced sensation, reduced blood flow and thus reduced oxygen and immune cells to the tiny vessels in our feet. This all leads to higher risk of hospitalization, infections, and amputations.     What is neuropathy?  Neuropathy is a term used to describe a loss of nerve function.  Patients with diabetes are at risk of developing neuropathy if their sugars continue to run high and are above the normal value of 140.  The elevated blood sugar in the body enters the nerves causing it to swell and impair nerve function.  The higher the blood sugar and the longer it is elevated, the more damage is done to nerves.  This damage is permanent and irreversible.  These damaged sensory nerves can then cause reduced feeling or cause pain.  Damaged motor nerves can reduce blood flow and white blood cells into into your foot, skin and bones reducing your ability to heal a small problem. And neuropathy can cause tendons to become unbalanced and contribute to the formation of deformity and contractures in our feet. Often times, neuropathy can be prevented by controlling your blood sugar.  Your risk of developing neuropathy goes up dramatically as your hemoglobin A1C raises above 7.5.      How do I know if I have neuropathy?  When a person develops neuropathy, they usually begin to feel numbness or tingling in their feet and sometimes in their legs.  Other symptoms may include painful burning or hot feet, tingling, electrical sensations or feeling like insects or ants are crawling on your feet or legs.  If blood sugar remains above 140  for long periods of time, neuropathy can also occur in the hands.  When a person loses their \"protective threshold\" or ability to detect a 5.07 Manson Jeremi " monofilament is when they have elevated risk for developing foot deformity, contractures, foot infections, amputations, Charcot arthropathy, or other complications. Keep your hemoglobin A1C below 7.5 to reduce this risk.    What is vascular disease?  Peripheral vascular disease is a term used to describe a loss or decrease in circulation (blood flow).  There is a problem in getting blood, immune cells, and oxygen to areas that need it.  Similar to neuropathy, sugars can build up in the walls of the arteries (blood vessels) and cause them to become swollen, thickened and hardened.  This decreases the amount of blood that can go to an area that needs it.  Though this is common in the legs of diabetic patients, it can also affect other arteries (blood vessels) in the body such as in the heart, kidney, eyes, and the blood flow into bones.  It is often seen first in the small vessels of her body notably our feet and toes.    How do I know if I have vascular disease?  In the legs, vascular disease usually results in cramping.  Patients who develop leg cramps after walking the same distance every time (i.e. One block, half a mile, ect.) need to let their doctors know so that their circulation may be checked.  Cramps causing severe pain in the feet and/or legs while sleeping and the cramps go away when you stand or hang your legs off the side of the bed, may also be a sign of poor blood circulation.  Occasional cramping in cold weather or on rare occasions with activity may not be due to poor circulation, but you should inform your doctor.    How can these problems be prevented?  The key to prevention is good blood sugar control all day every day.  Inadequate blood sugar control is the most common way patients experience these problems. Reducing, controlling and measuring your daily consumption of sugar or carbohydrates is essential to understanding and managing diabetes.  Physical activity (exercise) is a very good way to  help decrease your blood sugars.  Exercise can lower your blood sugar, blood pressure, and cholesterol.  It also reduces your risk for heart disease and stroke, relieves stress, and strengthens your heart, muscles and bones. Physical activity also increases your balance and reduces development of contractures and foot deformities over time. In addition, regular activity helps insulin work better, improves your blood circulation, and keeps your joints flexible.  If you're trying to lose weight, a combination of exercise and wise food choices can help you reach your target weight and maintain it.  Activity and exercise alone can not make up for poor diet choices, eating too much, or eating too many sugars or carbohydrates.  Ask your doctor for help when you are not meeting your blood sugar goals. Changes or increases in medication are powerful tools in reducing your blood sugar.    Know your blood sugar and hemoglobin A1C trend.  Upon first diagnosis or during acute illness, checking your blood sugar 4 times a day can help you understand how your diet, activity, and lifestyle affect your blood sugar.  Monitoring your hemoglobin A1C can help you understand how well you are managing blood sugar over the long run.  Your hemoglobin A1C tells you what your blood sugar averages all day, every day, over the past 90 days.       To experience the lowest risk of complications associated with diabetes such as neuropathy, loss of blood flow, bone or joint infection, charcot arthropathy, or amputation, the American Diabetes Association recommends a target hemoglobin A1C of less than 7.0%, while the American Association of Clinical Endocrinologists' recommendation is 6.5% or less.  Both organizations advise that the goals be individualized based on patient factors such as other health conditions, history of hypoglycemia, education, and life expectancy.  A patients risk of experiencing complications associated with diabetes is only  slightly elevated with a hemoglobin A1C above 6.0.  However, this risk goes up exponentially when the hemoglobin A1C is above 7.5.  The longer the hemoglobin A1C is elevated, the more risk that patient will experience in their lifetime. The damage that occurs to nerves, blood vessels, tendons, bones and body organs, while their hemoglobin A1C is elevated is mostly irreversible and worsens with each additional time period of elevated hemoglobin A1C.     You must understand and manage your disease.  Your health insurance or medical team cannot manage this disease for you.  When you take responsibility for understanding and managing your disease, you can expect to experience fewer problems associated with diabetes in your lifetime.  You will  Also experience a higher quality of life and health and reduced cost of health care.    Diabetic Foot Care Recommendations  The following are recommendations for avoiding serious foot problems or injury    DO'S  1. Be aware of your hemoglobin A1C and continue to follow up with your medical team for adjustments in your lifestyle and medication until your reach your A1C goal.  Keep this below 7.5 to reduce your risk of developing complications associated with diabetes.    2.  Wash your feet with lukewarm water and a mild soap and then dry them thoroughly, especially between the toes.  Gently floss your towel or washcloth between each toe at every bath.  Soaking your feet in water cannot clean dead skin, debris, and bacteria from your feet and is not necessary.   3. Examine your feet daily looking for cuts, corns, blisters, cracks, ect..., especially after wearing new shoes or increased or changed activities.  Make sure to look between your toes.  If you cannot see the bottom of your feet, set a mirror on the floor and hold your foot over it, or ask a family member to examine your feet for you daily.  Contact your doctor immediately if new problems are noted or if sores are not  healing.  4.  Immediately apply moisturizer cream such as Cetaphil to the tops and bottoms of your feet, avoiding areas between the toes.  Apply sunscreen or cover your feet if they will be exposed to extended sunlight.  5.Use clean comfortable shoes.  Socks should not have thick seams or cut off the circulation around the leg.  Break in new shoes slowly and rotate with older shoes until broken in.  Check the inside of your shoes with your hand to look for areas of irritation or objects that may have fallen into your shoes.    6. Keep slippers by the side of your bed for use during the night.  7. Shoes should be fitted by a professional and should not cause areas of irritation.  Check your feet regularly when wearing a new pair of shoes and replace them as needed.  8.  Talk to your doctor about proper exercise.  Exercise and stretching stimulate blood flow to your feet and maintain proper glucose levels.  Use it or lose it!  9.  Monitor your blood glucose level and your hemoglobin A1C.  Notify your doctor immediately if your blood sugar is abnormally high or low.  10.  Cut your nails straight across, but then gently round any sharp edges with a nail file.  If you have neuropathy, peripheral vascular disease or cannot see that well to trim your own toenails, see a medical professional for care.    DONT'S  1.  Do not soak your feet if you have an open sore or your provider has informed you that you have neuropathy or loss of protective threshold.  Use only lukewarm water and always check the temperature with your hand as hot water can easily burn your feet.    2.  Never use a hot water bottle or heating pad on your feet.  Also do not apply hot or cold compresses to your feet.  With decreased sensation, you could burn or freeze your feet.  Do not rest your feet near a heat source such as a heater or heat register.    3.  Do not apply any of these to your feet:    - over the counter medicine for corns or warts    -   Harsh chemicals like boric acid    -  Do not self-treat corns, cuts, blisters or infections.  Always consult your doctor.   4.  Do not wear sandals, slippers or walk barefoot, especially on harsh surfaces.  5.  If you smoke, stop!!!

## 2019-08-27 ENCOUNTER — ANESTHESIA (OUTPATIENT)
Dept: GASTROENTEROLOGY | Facility: CLINIC | Age: 39
End: 2019-08-27
Payer: COMMERCIAL

## 2019-08-27 ENCOUNTER — HOSPITAL ENCOUNTER (OUTPATIENT)
Facility: CLINIC | Age: 39
Discharge: HOME | End: 2019-08-27
Attending: SURGERY | Admitting: SURGERY
Payer: COMMERCIAL

## 2019-08-27 ENCOUNTER — ANESTHESIA EVENT (OUTPATIENT)
Dept: GASTROENTEROLOGY | Facility: CLINIC | Age: 39
End: 2019-08-27
Payer: COMMERCIAL

## 2019-08-27 VITALS
RESPIRATION RATE: 16 BRPM | OXYGEN SATURATION: 98 % | SYSTOLIC BLOOD PRESSURE: 123 MMHG | HEART RATE: 80 BPM | TEMPERATURE: 98 F | DIASTOLIC BLOOD PRESSURE: 83 MMHG

## 2019-08-27 LAB
COLONOSCOPY: NORMAL
GLUCOSE BLDC GLUCOMTR-MCNC: 161 MG/DL (ref 70–99)

## 2019-08-27 PROCEDURE — 25000125 ZZHC RX 250: Performed by: NURSE ANESTHETIST, CERTIFIED REGISTERED

## 2019-08-27 PROCEDURE — 45378 DIAGNOSTIC COLONOSCOPY: CPT | Performed by: SURGERY

## 2019-08-27 PROCEDURE — 25800030 ZZH RX IP 258 OP 636: Performed by: NURSE ANESTHETIST, CERTIFIED REGISTERED

## 2019-08-27 PROCEDURE — 25000128 H RX IP 250 OP 636: Performed by: NURSE ANESTHETIST, CERTIFIED REGISTERED

## 2019-08-27 PROCEDURE — 82962 GLUCOSE BLOOD TEST: CPT

## 2019-08-27 PROCEDURE — 37000008 ZZH ANESTHESIA TECHNICAL FEE, 1ST 30 MIN: Performed by: SURGERY

## 2019-08-27 PROCEDURE — 25000132 ZZH RX MED GY IP 250 OP 250 PS 637: Performed by: SURGERY

## 2019-08-27 PROCEDURE — 25800030 ZZH RX IP 258 OP 636: Performed by: SURGERY

## 2019-08-27 PROCEDURE — 25000125 ZZHC RX 250: Performed by: SURGERY

## 2019-08-27 RX ORDER — SODIUM CHLORIDE, SODIUM LACTATE, POTASSIUM CHLORIDE, CALCIUM CHLORIDE 600; 310; 30; 20 MG/100ML; MG/100ML; MG/100ML; MG/100ML
INJECTION, SOLUTION INTRAVENOUS CONTINUOUS
Status: DISCONTINUED | OUTPATIENT
Start: 2019-08-27 | End: 2019-08-27 | Stop reason: HOSPADM

## 2019-08-27 RX ORDER — ONDANSETRON 2 MG/ML
4 INJECTION INTRAMUSCULAR; INTRAVENOUS EVERY 6 HOURS PRN
Status: DISCONTINUED | OUTPATIENT
Start: 2019-08-27 | End: 2019-08-27 | Stop reason: HOSPADM

## 2019-08-27 RX ORDER — PROPOFOL 10 MG/ML
INJECTION, EMULSION INTRAVENOUS CONTINUOUS PRN
Status: DISCONTINUED | OUTPATIENT
Start: 2019-08-27 | End: 2019-08-27

## 2019-08-27 RX ORDER — NALOXONE HYDROCHLORIDE 0.4 MG/ML
.1-.4 INJECTION, SOLUTION INTRAMUSCULAR; INTRAVENOUS; SUBCUTANEOUS
Status: DISCONTINUED | OUTPATIENT
Start: 2019-08-27 | End: 2019-08-27 | Stop reason: HOSPADM

## 2019-08-27 RX ORDER — ONDANSETRON 4 MG/1
4 TABLET, ORALLY DISINTEGRATING ORAL EVERY 6 HOURS PRN
Status: DISCONTINUED | OUTPATIENT
Start: 2019-08-27 | End: 2019-08-27 | Stop reason: HOSPADM

## 2019-08-27 RX ORDER — ONDANSETRON 2 MG/ML
4 INJECTION INTRAMUSCULAR; INTRAVENOUS
Status: DISCONTINUED | OUTPATIENT
Start: 2019-08-27 | End: 2019-08-27 | Stop reason: HOSPADM

## 2019-08-27 RX ORDER — FLUMAZENIL 0.1 MG/ML
0.2 INJECTION, SOLUTION INTRAVENOUS
Status: DISCONTINUED | OUTPATIENT
Start: 2019-08-27 | End: 2019-08-27 | Stop reason: HOSPADM

## 2019-08-27 RX ORDER — PROPOFOL 10 MG/ML
INJECTION, EMULSION INTRAVENOUS PRN
Status: DISCONTINUED | OUTPATIENT
Start: 2019-08-27 | End: 2019-08-27

## 2019-08-27 RX ORDER — MEPERIDINE HYDROCHLORIDE 25 MG/ML
12.5 INJECTION INTRAMUSCULAR; INTRAVENOUS; SUBCUTANEOUS
Status: DISCONTINUED | OUTPATIENT
Start: 2019-08-27 | End: 2019-08-27 | Stop reason: HOSPADM

## 2019-08-27 RX ORDER — LIDOCAINE 40 MG/G
CREAM TOPICAL
Status: DISCONTINUED | OUTPATIENT
Start: 2019-08-27 | End: 2019-08-27 | Stop reason: HOSPADM

## 2019-08-27 RX ADMIN — PROPOFOL 50 MG: 10 INJECTION, EMULSION INTRAVENOUS at 08:42

## 2019-08-27 RX ADMIN — PROPOFOL 200 MCG/KG/MIN: 10 INJECTION, EMULSION INTRAVENOUS at 08:37

## 2019-08-27 RX ADMIN — PROPOFOL 50 MG: 10 INJECTION, EMULSION INTRAVENOUS at 08:37

## 2019-08-27 RX ADMIN — SODIUM CHLORIDE, POTASSIUM CHLORIDE, SODIUM LACTATE AND CALCIUM CHLORIDE: 600; 310; 30; 20 INJECTION, SOLUTION INTRAVENOUS at 08:06

## 2019-08-27 RX ADMIN — LIDOCAINE HYDROCHLORIDE 1 ML: 10 INJECTION, SOLUTION EPIDURAL; INFILTRATION; INTRACAUDAL; PERINEURAL at 08:06

## 2019-08-27 NOTE — ANESTHESIA POSTPROCEDURE EVALUATION
Patient: Fallon Rivera    Procedure(s):  COLONOSCOPY    Diagnosis:Gastroesophageal reflux disease without esophagitis, Constipation  Diagnosis Additional Information: No value filed.    Anesthesia Type:  MAC    Note:  Anesthesia Post Evaluation    Patient location during evaluation: Phase 2  Patient participation: Able to fully participate in evaluation  Level of consciousness: awake and alert  Pain management: adequate  Airway patency: patent  Cardiovascular status: acceptable  Respiratory status: acceptable  Hydration status: acceptable  PONV: none       Comments: Pt doing well         Last vitals:  Vitals:    08/27/19 0905 08/27/19 0910 08/27/19 0915   BP:   123/83   Pulse:   80   Resp:      Temp:      SpO2: 96% 98% 98%         Electronically Signed By: SERENE Montoya CRNA  August 27, 2019  9:34 AM

## 2019-08-27 NOTE — ANESTHESIA PREPROCEDURE EVALUATION
Anesthesia Pre-Procedure Evaluation    Patient: Fallon Rivera   MRN: [id number] : 1980          Preoperative Diagnosis: Gastroesophageal reflux disease without esophagitis, Constipation    Procedure(s):  COLONOSCOPY    Past Medical History:   Diagnosis Date     Abnormal Pap smear , ,     ASC-H, ASCUS + HPV 45     Calculus of kidney      Cervical high risk HPV (human papillomavirus) test positive     + HPV 45 (high risk)     History of colposcopy with cervical biopsy 06, 3/15/07    koilocytosis      Past Surgical History:   Procedure Laterality Date     C REMOVAL OF KIDNEY STONE      two surgeries, ,     CHOLECYSTECTOMY, LAPOROSCOPIC  2007    Cholecystectomy, Laparoscopic     COLONOSCOPY  05/17/10     CONIZATION  2011    Procedure:CONIZATION; cold knife and punch biopsy of mole on back; Surgeon:SYDNEY FORD; [facility]     DILATION AND CURETTAGE, HYSTEROSCOPY, LAPAROSCOPY, COMBINED Right 2015    Procedure: COMBINED DILATION AND CURETTAGE, HYSTEROSCOPY, LAPAROSCOPY;  Surgeon: Sydney Ford MD;  [facility]     DISCECTOMY LUMBAR MINIMALLY INVASIVE ONE LEVEL Left 2019    Procedure: Minimally Invasive Surgery Left Lumbar 5-Sacral 1 microdiscectomy;  Surgeon: Mason Roe MD;  [facility]     ESOPHAGOSCOPY, GASTROSCOPY, DUODENOSCOPY (EGD), COMBINED  2014    Procedure: COMBINED ESOPHAGOSCOPY, GASTROSCOPY, DUODENOSCOPY (EGD), BIOPSY SINGLE OR MULTIPLE;  Surgeon: Earl Lane MD;  [facility]     EXCISE LESION TRUNK  2011    Procedure:EXCISE LESION TRUNK; Surgeon:SYDNEY FORD; [facility]     HC FRAGMENTING OF KIDNEY STONE  2005     HC RX ECTOP PREG BY SCOPE,RMV TUBE/OVRY  2007    Right sided salpingectomy and removal of ruptured ectopic pregnancy. D&C.     HC UGI ENDOSCOPY, SIMPLE EXAM  09     LAPAROSCOPIC APPENDECTOMY N/A 2015    Procedure: LAPAROSCOPIC  APPENDECTOMY;  Surgeon: James Cuellar MD;  [facility]     LAPAROSCOPIC CYSTECTOMY OVARIAN (BENIGN) N/A 9/24/2015    Procedure: LAPAROSCOPIC CYSTECTOMY OVARIAN (BENIGN);  Surgeon: Greg Ford MD;  [facility]     LAPAROSCOPIC OOPHORECTOMY Right 9/24/2015    Procedure: LAPAROSCOPIC OOPHORECTOMY;  Surgeon: Greg Ford MD;  [facility]     LITHOTRIPSY  01/17/2007     PELVIS LAPAROSCOPY,DX  10/16/2000    Diagnostic laparoscopy, Endometrial biopsy.     TUBAL/ECTOPIC PREGNANCY  01/23/2007    Lap removal of left ruptured ectopic pregnancy & salpingectomy, D&C       Anesthesia Evaluation     . Pt has had prior anesthetic. Type: General and MAC           ROS/MED HX    ENT/Pulmonary:     (+)ELIZABETH risk factors snores loudly, obese, , . .    Neurologic:  - neg neurologic ROS     Cardiovascular:         METS/Exercise Tolerance:  >4 METS   Hematologic:  - neg hematologic  ROS       Musculoskeletal:  - neg musculoskeletal ROS       GI/Hepatic:     (+) GERD Asymptomatic on medication,       Renal/Genitourinary:  - ROS Renal section negative       Endo:     (+) type II DM Obesity, .      Psychiatric:  - neg psychiatric ROS       Infectious Disease:  - neg infectious disease ROS       Malignancy:      - no malignancy   Other:    - neg other ROS                      Physical Exam  Normal systems: cardiovascular, pulmonary and dental    Airway   Mallampati: II  TM distance: >3 FB    Dental     Cardiovascular   Rhythm and rate: regular and normal      Pulmonary    breath sounds clear to auscultation            Lab Results   Component Value Date    WBC 7.3 09/08/2018    HGB 14.9 09/08/2018    HCT 42.3 09/08/2018     09/08/2018    CRP 3.0 10/28/2017    SED 10 09/22/2017     08/01/2019    POTASSIUM 4.0 08/01/2019    CHLORIDE 105 08/01/2019    CO2 26 08/01/2019    BUN 9 08/01/2019    CR 0.77 08/01/2019     (H) 08/01/2019    SHADE 8.5 08/01/2019    ALBUMIN 3.9 05/21/2018     "PROTTOTAL 8.0 05/21/2018    ALT 55 (H) 08/01/2019    AST 31 08/01/2019    ALKPHOS 96 05/21/2018    BILITOTAL 0.7 05/21/2018    LIPASE 103 05/08/2014    AMYLASE 66 05/08/2014    PTT 31 07/11/2011    INR 0.99 07/11/2011    TSH 2.43 07/27/2015    HCG Negative 10/28/2017       Preop Vitals  BP Readings from Last 3 Encounters:   08/07/19 124/82   08/01/19 112/82   07/14/19 130/90    Pulse Readings from Last 3 Encounters:   08/01/19 78   03/11/19 100   01/23/19 96      Resp Readings from Last 3 Encounters:   08/01/19 10   07/14/19 17   04/25/19 16    SpO2 Readings from Last 3 Encounters:   08/01/19 98%   07/14/19 96%   03/11/19 98%      Temp Readings from Last 1 Encounters:   08/07/19 98.3  F (36.8  C) (Temporal)    Ht Readings from Last 1 Encounters:   08/07/19 1.626 m (5' 4\")      Wt Readings from Last 1 Encounters:   08/07/19 99.3 kg (219 lb)    Estimated body mass index is 37.59 kg/m  as calculated from the following:    Height as of 8/7/19: 1.626 m (5' 4\").    Weight as of 8/7/19: 99.3 kg (219 lb).       Anesthesia Plan      History & Physical Review  History and physical reviewed and following examination; no interval change.    ASA Status:  3 .    NPO Status:  > 2 hours    Plan for MAC with Intravenous induction. Reason for MAC:  Deep or markedly invasive procedure (G8)    H&P to be completed by Dr. Kingston prior to anesthetic      Postoperative Care      Consents  Anesthetic plan, risks, benefits and alternatives discussed with:  Patient..                 SERENE Montoya CRNA  "

## 2019-08-27 NOTE — ANESTHESIA CARE TRANSFER NOTE
Patient: Fallon Moranenter    Procedure(s):  COLONOSCOPY    Diagnosis: Gastroesophageal reflux disease without esophagitis, Constipation  Diagnosis Additional Information: No value filed.    Anesthesia Type:   MAC     Note:  Airway :Room Air  Patient transferred to:Phase II  Comments: Patient doing well awake and spont breathing. Handoff Report: Identifed the Patient, Identified the Reponsible Provider, Reviewed the pertinent medical history, Discussed the surgical course, Reviewed Intra-OP anesthesia mangement and issues during anesthesia, Set expectations for post-procedure period and Allowed opportunity for questions and acknowledgement of understanding      Vitals: (Last set prior to Anesthesia Care Transfer)    CRNA VITALS  8/27/2019 0822 - 8/27/2019 0854      8/27/2019             Pulse:  78    SpO2:  99 %    Resp Rate (observed):  17                Electronically Signed By: SERENE Montoya CRNA  August 27, 2019  8:54 AM

## 2019-08-27 NOTE — DISCHARGE INSTRUCTIONS
[facility]    Home Care Following Endoscopy          Activity:    You have just undergone an endoscopic procedure usually performed with conscious sedation.  Do not work or operate machinery (including a car) for at least 12 hours.      I encourage you to walk and attempt to pass this air as soon as possible.    Diet:    Return to the diet you were on before your procedure but eat lightly for the first 12-24 hours.    Drink plenty of water.    Resume any regular medications unless otherwise advised by your physician.  Please begin any new medication prescribed as a result of your procedure as directed by your physician.     If you had any biopsy or polyp removed please refrain from aspirin or aspirin products for 2 days.  If on Coumadin please restart as instructed by your physician.   Pain:    You may take Tylenol as needed for pain.  Expected Recovery:    You can expect some mild abdominal fullness and/or discomfort due to the air used to inflate your intestinal tract. It is also normal to have a mild sore throat after upper endoscopy.    Call Your Physician if You Have:    After Colonoscopy:  o Worsening persisting abdominal pain which is worse with activity.  o Fevers (>101 degrees F), chills or shakes.  o Passage of continued blood with bowel movements.   Any questions or concerns about your recovery, please call [phone] or after hours [phone] Nurse Advice Line.    Follow-up Care:    You should receive a call or letter with your results within 1 week. Please call if you have not received a notification of your results.  If asked to return to clinic please make an appointment 1 week after your procedure.  Call [phone].     [facility] Same-Day Surgery   Adult Discharge Orders & Instructions     For 24 hours after surgery    1. Get plenty of rest.  A responsible adult must stay with you for at least 24 hours after you leave the hospital.   2. Do not drive or use heavy  equipment.  If you have weakness or tingling, don't drive or use heavy equipment until this feeling goes away.  3. Do not drink alcohol.  4. Avoid strenuous or risky activities.  Ask for help when climbing stairs.   5. You may feel lightheaded.  If so, sit for a few minutes before standing.  Have someone help you get up.   6. You may have a slight fever. Call the doctor if your fever is over 100 F (37.7 C) (taken under the tongue) or lasts longer than 24 hours.  7. You may have a dry mouth, a sore throat, muscle aches or trouble sleeping.  These should go away after 24 hours.  8. Do not make important or legal decisions.  We don t expect you to have any problems from the surgery or treatment you had today. Just in case, here s what to do if you have pain, upset stomach (nausea), bleeding or infection:  Pain:  Take medicines your doctor has prescribed or over-the-counter medicine they have suggested. Resting and using ice packs can help, too. For surgery on an arm or leg, raise it on a pillow to ease swelling. Call your doctor if these methods don t work.  Copyright [employer], Licensed under [employer]  Upset stomach (nausea):  Take anti-nausea medicine approved by your doctor. Drink clear liquids like apple juice, ginger ale, broth or 7-Up. Be sure to drink enough fluids. Rest can help, too. Move to normal foods when you re ready. Bleeding:  In the first 24 hours, you may see a little blood on your dressing, about the size of a quarter. You don t need to worry about this much blood, but if the blood spot keeps getting bigger:    Put pressure on the wound if you can, AND    Call your doctor.  Copyright [employer], Licensed under [employer]  Fever/Infection: Please call your doctor if you have any of these signs:    Redness    Swelling    Wound feels warm    Pain gets worse    Bad-smelling fluid leaks from wound    Fever or chills  Call your doctor for any of the followin.  It has been  over 8 to 10 hours since surgery and you are still not able to urinate (pass water).    2.  Headache for over 24 hours.    3.  Numbness, tingling or weakness in your legs the day after surgery (if you had spinal anesthesia).    Nurse advice line: [phone]

## 2019-09-05 ENCOUNTER — HOSPITAL ENCOUNTER (EMERGENCY)
Facility: CLINIC | Age: 39
Discharge: HOME | End: 2019-09-05
Attending: EMERGENCY MEDICINE | Admitting: EMERGENCY MEDICINE
Payer: COMMERCIAL

## 2019-09-05 ENCOUNTER — APPOINTMENT (OUTPATIENT)
Dept: CT IMAGING | Facility: CLINIC | Age: 39
End: 2019-09-05
Attending: EMERGENCY MEDICINE
Payer: COMMERCIAL

## 2019-09-05 VITALS
OXYGEN SATURATION: 99 % | DIASTOLIC BLOOD PRESSURE: 102 MMHG | RESPIRATION RATE: 16 BRPM | HEART RATE: 128 BPM | BODY MASS INDEX: 37.25 KG/M2 | TEMPERATURE: 98.9 F | WEIGHT: 217 LBS | SYSTOLIC BLOOD PRESSURE: 152 MMHG

## 2019-09-05 DIAGNOSIS — R10.9 FLANK PAIN: ICD-10-CM

## 2019-09-05 LAB
ALBUMIN SERPL-MCNC: 4 G/DL (ref 3.4–5)
ALBUMIN UR-MCNC: NEGATIVE MG/DL
ALP SERPL-CCNC: 109 U/L (ref 40–150)
ALT SERPL W P-5'-P-CCNC: 48 U/L (ref 0–50)
ANION GAP SERPL CALCULATED.3IONS-SCNC: 7 MMOL/L (ref 3–14)
APPEARANCE UR: CLEAR
AST SERPL W P-5'-P-CCNC: 17 U/L (ref 0–45)
BASOPHILS # BLD AUTO: 0 10E9/L (ref 0–0.2)
BASOPHILS NFR BLD AUTO: 0.4 %
BILIRUB SERPL-MCNC: 1 MG/DL (ref 0.2–1.3)
BILIRUB UR QL STRIP: NEGATIVE
BUN SERPL-MCNC: 12 MG/DL (ref 7–30)
CALCIUM SERPL-MCNC: 9 MG/DL (ref 8.5–10.1)
CHLORIDE SERPL-SCNC: 102 MMOL/L (ref 94–109)
CO2 SERPL-SCNC: 28 MMOL/L (ref 20–32)
COLOR UR AUTO: ABNORMAL
CREAT SERPL-MCNC: 0.68 MG/DL (ref 0.52–1.04)
DIFFERENTIAL METHOD BLD: ABNORMAL
EOSINOPHIL NFR BLD AUTO: 1 %
ERYTHROCYTE [DISTWIDTH] IN BLOOD BY AUTOMATED COUNT: 12.2 % (ref 10–15)
GFR SERPL CREATININE-BSD FRML MDRD: >90 ML/MIN/{1.73_M2}
GLUCOSE SERPL-MCNC: 223 MG/DL (ref 70–99)
GLUCOSE UR STRIP-MCNC: >499 MG/DL
HCT VFR BLD AUTO: 45.5 % (ref 35–47)
HGB BLD-MCNC: 15.9 G/DL (ref 11.7–15.7)
HGB UR QL STRIP: ABNORMAL
IMM GRANULOCYTES # BLD: 0 10E9/L (ref 0–0.4)
IMM GRANULOCYTES NFR BLD: 0.3 %
KETONES UR STRIP-MCNC: NEGATIVE MG/DL
LACTATE BLD-SCNC: 1.6 MMOL/L (ref 0.7–2)
LEUKOCYTE ESTERASE UR QL STRIP: NEGATIVE
LYMPHOCYTES # BLD AUTO: 1.9 10E9/L (ref 0.8–5.3)
LYMPHOCYTES NFR BLD AUTO: 26 %
MCH RBC QN AUTO: 30.8 PG (ref 26.5–33)
MCHC RBC AUTO-ENTMCNC: 34.9 G/DL (ref 31.5–36.5)
MCV RBC AUTO: 88 FL (ref 78–100)
MONOCYTES # BLD AUTO: 0.4 10E9/L (ref 0–1.3)
MONOCYTES NFR BLD AUTO: 5.2 %
MUCOUS THREADS #/AREA URNS LPF: PRESENT /LPF
NEUTROPHILS # BLD AUTO: 4.8 10E9/L (ref 1.6–8.3)
NEUTROPHILS NFR BLD AUTO: 67.1 %
NITRATE UR QL: NEGATIVE
NRBC # BLD AUTO: 0 10*3/UL
NRBC BLD AUTO-RTO: 0 /100
PH UR STRIP: 6 PH (ref 5–7)
PLATELET # BLD AUTO: 216 10E9/L (ref 150–450)
POTASSIUM SERPL-SCNC: 3.8 MMOL/L (ref 3.4–5.3)
PROT SERPL-MCNC: 8.1 G/DL (ref 6.8–8.8)
RBC # BLD AUTO: 5.16 10E12/L (ref 3.8–5.2)
RBC #/AREA URNS AUTO: 35 /HPF (ref 0–2)
SODIUM SERPL-SCNC: 137 MMOL/L (ref 133–144)
SOURCE: ABNORMAL
SP GR UR STRIP: 1.01 (ref 1–1.03)
SQUAMOUS #/AREA URNS AUTO: <1 /HPF (ref 0–1)
UROBILINOGEN UR STRIP-MCNC: 0 MG/DL (ref 0–2)
WBC # BLD AUTO: 7.2 10E9/L (ref 4–11)
WBC #/AREA URNS AUTO: 3 /HPF (ref 0–5)

## 2019-09-05 PROCEDURE — 81001 URINALYSIS AUTO W/SCOPE: CPT | Performed by: EMERGENCY MEDICINE

## 2019-09-05 PROCEDURE — 74176 CT ABD & PELVIS W/O CONTRAST: CPT

## 2019-09-05 PROCEDURE — 87086 URINE CULTURE/COLONY COUNT: CPT | Performed by: EMERGENCY MEDICINE

## 2019-09-05 PROCEDURE — 96361 HYDRATE IV INFUSION ADD-ON: CPT | Performed by: EMERGENCY MEDICINE

## 2019-09-05 PROCEDURE — 99284 EMERGENCY DEPT VISIT MOD MDM: CPT | Mod: Z6 | Performed by: EMERGENCY MEDICINE

## 2019-09-05 PROCEDURE — 99285 EMERGENCY DEPT VISIT HI MDM: CPT | Mod: 25 | Performed by: EMERGENCY MEDICINE

## 2019-09-05 PROCEDURE — 96374 THER/PROPH/DIAG INJ IV PUSH: CPT | Performed by: EMERGENCY MEDICINE

## 2019-09-05 PROCEDURE — 25000128 H RX IP 250 OP 636: Performed by: EMERGENCY MEDICINE

## 2019-09-05 PROCEDURE — 80053 COMPREHEN METABOLIC PANEL: CPT | Performed by: EMERGENCY MEDICINE

## 2019-09-05 PROCEDURE — 85025 COMPLETE CBC W/AUTO DIFF WBC: CPT | Performed by: EMERGENCY MEDICINE

## 2019-09-05 PROCEDURE — 83605 ASSAY OF LACTIC ACID: CPT | Performed by: EMERGENCY MEDICINE

## 2019-09-05 RX ORDER — SODIUM CHLORIDE 9 MG/ML
1000 INJECTION, SOLUTION INTRAVENOUS CONTINUOUS
Status: DISCONTINUED | OUTPATIENT
Start: 2019-09-05 | End: 2019-09-05 | Stop reason: HOSPADM

## 2019-09-05 RX ORDER — KETOROLAC TROMETHAMINE 30 MG/ML
30 INJECTION, SOLUTION INTRAMUSCULAR; INTRAVENOUS ONCE
Status: COMPLETED | OUTPATIENT
Start: 2019-09-05 | End: 2019-09-05

## 2019-09-05 RX ORDER — CYCLOBENZAPRINE HCL 10 MG
10 TABLET ORAL 3 TIMES DAILY PRN
Qty: 20 TABLET | Refills: 0 | Status: SHIPPED | OUTPATIENT
Start: 2019-09-05 | End: 2019-12-08

## 2019-09-05 RX ADMIN — SODIUM CHLORIDE 1000 ML: 9 INJECTION, SOLUTION INTRAVENOUS at 13:44

## 2019-09-05 RX ADMIN — KETOROLAC TROMETHAMINE 30 MG: 30 INJECTION, SOLUTION INTRAMUSCULAR at 13:44

## 2019-09-05 NOTE — ED AVS SNAPSHOT
[facility] Emergency Department  [address] [place] [address]  Phone:  [phone]  Fax:  [phone]                                    Fallon Rivera   MRN: [id number]    Department:  [facility] Emergency Department   Date of Visit:  9/5/2019           After Visit Summary Signature Page    I have received my discharge instructions, and my questions have been answered. I have discussed any challenges I see with this plan with the nurse or doctor.    ..........................................................................................................................................  Patient/Patient Representative Signature      ..........................................................................................................................................  Patient Representative Print Name and Relationship to Patient    ..................................................               ................................................  Date                                   Time    ..........................................................................................................................................  Reviewed by Signature/Title    ...................................................              ..............................................  Date                                               Time          22EPIC Rev 08/18

## 2019-09-05 NOTE — ED PROVIDER NOTES
History     Chief Complaint   Patient presents with     Flank Pain     The history is provided by the patient.     Fallon Rivera is a 39 year old female who presents to the emergency department for flank pain. Patient reports having right flank pain since last night. She states the pain started last night along with vomiting. Patient is unsure if she has hematuria, is not sure what it looks like. She has had a history of kidney infection and pyelitis. She reports being seen in 7/19, diagnosed with constipation, however, she states she passed a kidney stone 2 days later. She has had both of her tubes removed, ovaries, appendix and gallbladder.    Allergies:  Allergies   Allergen Reactions     Amoxicillin Hives     Anti-Nausea      Anxiety, agitation,severe paranoia. Zofran, Reglan are some of them.  DRAMAMINE WITHOUT ISSUES       Demerol Hcl [Meperidine Hcl] Nausea     Indomethacin Nausea and Vomiting     Macrobid [Nitrofuran Derivatives] Hives     Reglan [Metoclopramide] Other (See Comments)     Acute paranoia, severe     Zofran [Ondansetron] Other (See Comments)     Severe anxiety, agitation       Problem List:    Patient Active Problem List    Diagnosis Date Noted     Obesity (BMI 35.0-39.9) with comorbidity (H) 10/16/2018     Priority: Medium     Non morbid obesity due to excess calories 07/05/2016     Priority: Medium     Type 2 diabetes mellitus without complication (H) 04/29/2016     Priority: Medium     Glucosuria 04/27/2016     Priority: Medium     Right ovarian cyst 09/15/2015     Priority: Medium     Hyperlipidemia LDL goal <130 07/18/2012     Priority: Medium     Mild major depression (H) 07/18/2012     Priority: Medium     GERD (gastroesophageal reflux disease) 07/03/2012     Priority: Medium     CARDIOVASCULAR SCREENING; LDL GOAL LESS THAN 160 10/31/2010     Priority: Medium     Dyspnea 11/24/2009     Priority: Medium     Kidney stones 02/17/2009     Priority: Medium     S/P conization of  cervix- KAYLA 2/3 in 2011 06/27/2007     Priority: Medium     1/24/06 pap: ASC-H  2/9/06 colposcopy/bx (negative)/ECC (negative) pap- ASC-H  5/24/06 pap- ASC-H  9/6/07 pap NIL  1/23/07 pap ASCUS + HPV 45 (high risk)  3/15/07 pap ASCUS + HPV 45 (high risk) colposcopy- bx (negative)/ECC (koilocytosis)  6/19/07 ECC- koilocytosis.  Pap- AGS  10/23/07 pap- ASC-H, ECC- negative  2/14/08 pap NIL- return to yearly paps  2/17/09 pap NIL- repeat 1 year  3/8/10 pap NIL  3/3/11 pap ASCUS + HPV 45 (high risk)- colposcopy recommended  5/16/11 colpo- bx (KAYLA 2/3/HSIL) ECC (ASCUS)  pap (ASC-H)  6/2/11 recommend: [facility]C  7/1/11 CKC: path: KAYLA 2/3 with possible involvement of the endocervical margin.  ECC- neg.  Endometrium- neg.  7/14/11 plan: HIPOLITO in approx 2 months  10/10/11- hysterectomy planned.  (cancelled)  11/7/11 pap-- NIL. Plan--repeat pap in 6 months. (5/7/12)  5/7/12 pap NIL. Plan-- pap in 1 year (due 5/7/13)   5/8/13 pap NIL. Plan-pap in 1 year  5/8/14 NIL pap, neg HR HPV.  Plan- repeat pap/HPV in 1 year (due 5/8/15)  10/14/14 NIL pap, neg HR HPV. Endometrial biopsy- WNL   1/6/16  NIL pap, neg HR HPV.  Plan: paps yearly, per Dr. Ford.  1/30/17 NIL pap, neg HR HPV. Plan: pap in 1 year.   3/12/18 NIL pap, neg HR HPV. Plan: cotest annually, per Dr. Ford.  3/11/19 ECC: negative. NIL pap, neg HR HPV. Plan: cotest in 1 yr         Papanicolaou smear of cervix with atypical squamous cells cannot exclude high grade squamous intraepithelial lesion (ASC-H) 09/07/2006     Priority: Medium     Female infertility 07/24/2006     Priority: Medium     Problem list name updated by automated process. Provider to review       Hemorrhage of rectum and anus 12/02/2004     Priority: Medium        Past Medical History:    Past Medical History:   Diagnosis Date     Abnormal Pap smear 2006, 2007,      Calculus of kidney 2002     Cervical high risk HPV (human papillomavirus) test positive      History of colposcopy with cervical biopsy 2/9/06,  3/15/07       Past Surgical History:    Past Surgical History:   Procedure Laterality Date     C REMOVAL OF KIDNEY STONE      two surgeries, 2002,2003     CHOLECYSTECTOMY, LAPOROSCOPIC  5/11/2007    Cholecystectomy, Laparoscopic     COLONOSCOPY  05/17/10     COLONOSCOPY N/A 8/27/2019    Procedure: COLONOSCOPY;  Surgeon: Paramjit Kingston DO;  [facility]     CONIZATION  7/1/2011    Procedure:CONIZATION; cold knife and punch biopsy of mole on back; Surgeon:SYDNEY FORD; [facility]     DILATION AND CURETTAGE, HYSTEROSCOPY, LAPAROSCOPY, COMBINED Right 9/24/2015    Procedure: COMBINED DILATION AND CURETTAGE, HYSTEROSCOPY, LAPAROSCOPY;  Surgeon: Sydney Ford MD;  [facility]     DISCECTOMY LUMBAR MINIMALLY INVASIVE ONE LEVEL Left 1/23/2019    Procedure: Minimally Invasive Surgery Left Lumbar 5-Sacral 1 microdiscectomy;  Surgeon: Mason Roe MD;  [facility]     ESOPHAGOSCOPY, GASTROSCOPY, DUODENOSCOPY (EGD), COMBINED  5/21/2014    Procedure: COMBINED ESOPHAGOSCOPY, GASTROSCOPY, DUODENOSCOPY (EGD), BIOPSY SINGLE OR MULTIPLE;  Surgeon: Earl Lane MD;  [facility]     EXCISE LESION TRUNK  7/1/2011    Procedure:EXCISE LESION TRUNK; Surgeon:SYDNEY FORD; [facility]     HC FRAGMENTING OF KIDNEY STONE  11/30/2005     HC RX ECTOP PREG BY SCOPE,RMV TUBE/OVRY  12/20/2007    Right sided salpingectomy and removal of ruptured ectopic pregnancy. D&C.     HC UGI ENDOSCOPY, SIMPLE EXAM  09/01/09     LAPAROSCOPIC APPENDECTOMY N/A 9/24/2015    Procedure: LAPAROSCOPIC APPENDECTOMY;  Surgeon: James Cuellar MD;  [facility]     LAPAROSCOPIC CYSTECTOMY OVARIAN (BENIGN) N/A 9/24/2015    Procedure: LAPAROSCOPIC CYSTECTOMY OVARIAN (BENIGN);  Surgeon: Sydney Ford MD;  [facility]     LAPAROSCOPIC OOPHORECTOMY Right 9/24/2015    Procedure: LAPAROSCOPIC OOPHORECTOMY;  Surgeon: Sydney Ford MD;  [facility]      LITHOTRIPSY  01/17/2007     PELVIS LAPAROSCOPY,DX  10/16/2000    Diagnostic laparoscopy, Endometrial biopsy.     TUBAL/ECTOPIC PREGNANCY  01/23/2007    Lap removal of left ruptured ectopic pregnancy & salpingectomy, D&C       Family History:    Family History   Problem Relation Age of Onset     Diabetes Maternal Grandmother         adult onset     Anesthesia Reaction Maternal Grandmother         can't tolerate Novacaine.     Respiratory Maternal Grandmother         COPD and emphysema.     Thyroid Disease Maternal Grandmother      Heart Disease Maternal Grandmother         CHF     Diabetes Paternal Grandfather         adult o nset     Hypertension Paternal Grandfather      Cerebrovascular Disease Paternal Grandfather      Heart Disease Paternal Grandfather         MI, replaced valve,angioplasty     Thyroid Disease Paternal Grandfather      Cancer Maternal Grandfather         skin cancer     Heart Disease Maternal Grandfather         MI     Obesity Mother         on thyroid medication     Thyroid Disease Mother      Diabetes Mother      Rheumatologic Disease Mother      Heart Disease Father      Hypertension Father         and TIA     Lipids Father      Breast Cancer Paternal Grandmother      Arrhythmia Other      Cancer Maternal Aunt         breast/brain cancer     Depression Paternal Aunt      Cerebrovascular Disease Paternal Uncle      Cerebrovascular Disease Paternal Aunt        Social History:  Marital Status:   [2]  Social History     Tobacco Use     Smoking status: Current Some Day Smoker     Years: 12.00     Types: Cigarettes     Last attempt to quit: 7/14/2009     Years since quitting: 10.1     Smokeless tobacco: Never Used     Tobacco comment: As of 10 /2014, pt has had a few cigs here and there   Substance Use Topics     Alcohol use: Yes     Alcohol/week: 0.0 oz     Comment: every few months     Drug use: No        Medications:      cyclobenzaprine (FLEXERIL) 10 MG tablet   acetaminophen (TYLENOL)  500 MG tablet   blood glucose (CONTOUR NEXT TEST) test strip   blood glucose monitoring (NO BRAND SPECIFIED) meter device kit   cyclobenzaprine (FLEXERIL) 5 MG tablet   ibuprofen (ADVIL) 200 MG tablet   metFORMIN (GLUCOPHAGE) 500 MG tablet   omeprazole (PRILOSEC) 20 MG DR capsule         Review of Systems   All other systems reviewed and are negative.      Physical Exam   BP: (!) 152/102  Pulse: 128  Temp: 98.9  F (37.2  C)  Resp: 16  Weight: 98.4 kg (217 lb)  SpO2: 99 %      Physical Exam   Nursing note and vitals reviewed.  General alert cooperative female in mild to moderate distress.  HEENT shows no scleral icterus.  Mucosa is moist.  Neck is supple.  Lungs are clear.  Cardiac auscultation is tachycardic and regular.  No murmur.  She has bilateral CVA tenderness.  Abdomen reveals active bowel sounds and does not have localizing tenderness.  No leg swelling, calf or thigh tenderness, and Homans is negative.  No skin rash of the flank or abdomen.    ED Course        Procedures               Critical Care time:  none               No results found for this or any previous visit (from the past 24 hour(s)).    Medications   0.9% sodium chloride BOLUS (0 mLs Intravenous Stopped 9/5/19 1438)   ketorolac (TORADOL) injection 30 mg (30 mg Intravenous Given 9/5/19 1344)       Assessments & Plan (with Medical Decision Making)   Fallon Rivera is a 39 year old female who presents to the emergency department for flank pain. Patient reports having right flank pain since last night. She states the pain started last night along with vomiting. Patient is unsure if she has hematuria, is not sure what it looks like. She has had a history of kidney infection and pyelitis. She reports being seen in 7/19, diagnosed with constipation, however, she states she passed a kidney stone 2 days later. She has had both of her tubes removed, ovaries, appendix and gallbladder.  On presentation patient was tachycardic but not febrile or  hypoxic.  On exam she did have/CVA tenderness bilaterally with right greater than left.  Given IV fluids and Toradol with some improvement.  Urinalysis did show some red cells and blood.  A culture was added.  Abdominal CT stone run did not show any acute kidney stone or other acute abnormality.  Did have a cyst on her left ovary.  His blood work except her glucose was normal.  She is a diabetic and takes metformin.  The exact cause of her symptoms are unclear.  Suspect muscular pain.  She is given Flexeril to see if that will benefit.  Handout on back pain is provided and reasons to return for reassessment discussed.  Patient and her mother in agreement with the plan.  I have reviewed the nursing notes.    I have reviewed the findings, diagnosis, plan and need for follow up with the patient.       Discharge Medication List as of 9/5/2019  2:39 PM      START taking these medications    Details   !! cyclobenzaprine (FLEXERIL) 10 MG tablet Take 1 tablet (10 mg) by mouth 3 times daily as needed, Disp-20 tablet, R-0, E-Prescribe       !! - Potential duplicate medications found. Please discuss with provider.          Final diagnoses:   Flank pain     This document serves as a record of services personally performed by Rupesh Albert MD. It was created on their behalf by Heather Belcher, a trained medical scribe. The creation of this record is based on the provider's personal observations and the statements of the patient. This document has been checked and approved by the attending provider.    Note: Chart documentation done in part with Dragon Voice Recognition software. Although reviewed after completion, some word and grammatical errors may remain.    9/5/2019   [facility] EMERGENCY DEPARTMENT     Rupesh Albert MD  09/05/19 9385       Rupesh Albert MD  09/11/19 1007

## 2019-09-06 LAB
BACTERIA SPEC CULT: NORMAL
Lab: NORMAL
SPECIMEN SOURCE: NORMAL

## 2019-09-28 ENCOUNTER — HEALTH MAINTENANCE LETTER (OUTPATIENT)
Age: 39
End: 2019-09-28

## 2019-10-11 ENCOUNTER — TELEPHONE (OUTPATIENT)
Dept: FAMILY MEDICINE | Facility: CLINIC | Age: 39
End: 2019-10-11

## 2019-10-11 DIAGNOSIS — E11.9 TYPE 2 DIABETES MELLITUS WITHOUT COMPLICATION, WITHOUT LONG-TERM CURRENT USE OF INSULIN (H): Primary | ICD-10-CM

## 2019-10-11 NOTE — TELEPHONE ENCOUNTER
Pt would like to meet with a diabetes educator. Orders are required to meet with a CDE, no orders in  Chart.   please advise.        .[facility] Specialist  Diabetes & Nutrition Scheduling  [address] [place] [address] [phone]

## 2019-10-14 ENCOUNTER — TELEPHONE (OUTPATIENT)
Dept: FAMILY MEDICINE | Facility: CLINIC | Age: 39
End: 2019-10-14

## 2019-10-14 NOTE — TELEPHONE ENCOUNTER
Diabetes Education Scheduling Outreach #1:    Call to patient to schedule. Left message with phone number to call to schedule.    Plan for 2nd outreach attempt within 1 week.    Lauren Blair  [facility]  Diabetes and Nutrition Scheduling

## 2019-11-06 ENCOUNTER — ALLIED HEALTH/NURSE VISIT (OUTPATIENT)
Dept: EDUCATION SERVICES | Facility: CLINIC | Age: 39
End: 2019-11-06
Payer: COMMERCIAL

## 2019-11-06 ENCOUNTER — OFFICE VISIT (OUTPATIENT)
Dept: FAMILY MEDICINE | Facility: CLINIC | Age: 39
End: 2019-11-06
Payer: COMMERCIAL

## 2019-11-06 VITALS
SYSTOLIC BLOOD PRESSURE: 122 MMHG | DIASTOLIC BLOOD PRESSURE: 84 MMHG | RESPIRATION RATE: 16 BRPM | HEART RATE: 103 BPM | TEMPERATURE: 96.7 F | OXYGEN SATURATION: 99 % | BODY MASS INDEX: 37.51 KG/M2 | WEIGHT: 218.5 LBS

## 2019-11-06 DIAGNOSIS — K21.9 GASTROESOPHAGEAL REFLUX DISEASE WITHOUT ESOPHAGITIS: ICD-10-CM

## 2019-11-06 DIAGNOSIS — Z23 NEED FOR PROPHYLACTIC VACCINATION AND INOCULATION AGAINST INFLUENZA: ICD-10-CM

## 2019-11-06 DIAGNOSIS — E11.9 TYPE 2 DIABETES MELLITUS WITHOUT COMPLICATION, WITHOUT LONG-TERM CURRENT USE OF INSULIN (H): Primary | ICD-10-CM

## 2019-11-06 DIAGNOSIS — E78.5 HYPERLIPIDEMIA LDL GOAL <100: ICD-10-CM

## 2019-11-06 DIAGNOSIS — Z72.0 TOBACCO ABUSE: ICD-10-CM

## 2019-11-06 DIAGNOSIS — E11.9 TYPE 2 DIABETES MELLITUS WITHOUT COMPLICATION, WITHOUT LONG-TERM CURRENT USE OF INSULIN (H): ICD-10-CM

## 2019-11-06 LAB
ANION GAP SERPL CALCULATED.3IONS-SCNC: 5 MMOL/L (ref 3–14)
BUN SERPL-MCNC: 9 MG/DL (ref 7–30)
CALCIUM SERPL-MCNC: 8.8 MG/DL (ref 8.5–10.1)
CHLORIDE SERPL-SCNC: 102 MMOL/L (ref 94–109)
CO2 SERPL-SCNC: 29 MMOL/L (ref 20–32)
CREAT SERPL-MCNC: 0.78 MG/DL (ref 0.52–1.04)
GFR SERPL CREATININE-BSD FRML MDRD: >90 ML/MIN/{1.73_M2}
GLUCOSE SERPL-MCNC: 162 MG/DL (ref 70–99)
HBA1C MFR BLD: 7.6 % (ref 0–5.6)
POTASSIUM SERPL-SCNC: 3.7 MMOL/L (ref 3.4–5.3)
SODIUM SERPL-SCNC: 136 MMOL/L (ref 133–144)

## 2019-11-06 PROCEDURE — G0108 DIAB MANAGE TRN  PER INDIV: HCPCS

## 2019-11-06 PROCEDURE — 90686 IIV4 VACC NO PRSV 0.5 ML IM: CPT | Performed by: OBSTETRICS & GYNECOLOGY

## 2019-11-06 PROCEDURE — 80048 BASIC METABOLIC PNL TOTAL CA: CPT | Performed by: OBSTETRICS & GYNECOLOGY

## 2019-11-06 PROCEDURE — 90471 IMMUNIZATION ADMIN: CPT | Performed by: OBSTETRICS & GYNECOLOGY

## 2019-11-06 PROCEDURE — 36415 COLL VENOUS BLD VENIPUNCTURE: CPT | Performed by: OBSTETRICS & GYNECOLOGY

## 2019-11-06 PROCEDURE — 99214 OFFICE O/P EST MOD 30 MIN: CPT | Mod: 25 | Performed by: OBSTETRICS & GYNECOLOGY

## 2019-11-06 PROCEDURE — 83036 HEMOGLOBIN GLYCOSYLATED A1C: CPT | Performed by: OBSTETRICS & GYNECOLOGY

## 2019-11-06 RX ORDER — AMOXICILLIN 500 MG
1200 CAPSULE ORAL DAILY
COMMUNITY
End: 2020-06-16

## 2019-11-06 ASSESSMENT — PAIN SCALES - GENERAL: PAINLEVEL: NO PAIN (1)

## 2019-11-06 NOTE — PROGRESS NOTES
"Diabetes Self-Management Education & Support    Diabetes Education Self Management & Training    SUBJECTIVE/OBJECTIVE:  Diabetes education in the past 24mo: No  Diabetes type: Type 2  Disease course: Worsening  Diabetes management related comments/concerns: diet  Cultural Influences/Ethnic Background:  American    Wants to get BG down; Herniated discs in back which has led to need to stop exercise, depression and more eating.     Diabetes Symptoms & Complications  Blurred vision: No  Fatigue: No  Foot ulcerations: No  Polydipsia: No  Polyphagia: Yes  Polyuria: No  Visual change: No  Weakness: No  Weight loss: No  Slow healing wounds: No  Weight trend: Fluctuating minimally  Autonomic neuropathy: No  CVA: No  Heart disease: No  Nephropathy: No  Peripheral neuropathy: No  Peripheral Vascular Disease: No  Retinopathy: No  Sexual dysfunction: No    Patient Problem List and Family Medical History reviewed for relevant medical history, current medical status, and diabetes risk factors.    Vitals:  There were no vitals taken for this visit.  Estimated body mass index is 37.51 kg/m  as calculated from the following:    Height as of 8/7/19: 1.626 m (5' 4\").    Weight as of an earlier encounter on 11/6/19: 99.1 kg (218 lb 8 oz).   Last 3 BP:   BP Readings from Last 3 Encounters:   11/06/19 122/84   09/05/19 (!) 152/102   08/27/19 123/83       History   Smoking Status     Current Some Day Smoker     Packs/day: 0.00     Years: 12.00     Types: Cigarettes     Last attempt to quit: 7/14/2009   Smokeless Tobacco     Never Used     Comment: As of 10 /2014, pt has had a few cigs here and there       Labs:  Lab Results   Component Value Date    A1C 7.6 11/06/2019     Lab Results   Component Value Date    GLC PENDING 11/06/2019     Lab Results   Component Value Date     03/16/2019     HDL Cholesterol   Date Value Ref Range Status   03/16/2019 50 >49 mg/dL Final   ]  GFR Estimate   Date Value Ref Range Status   11/06/2019 " PENDING >60 mL/min/[1.73_m2] Incomplete     GFR Estimate If Black   Date Value Ref Range Status   2019 PENDING >60 mL/min/[1.73_m2] Incomplete     Lab Results   Component Value Date    CR PENDING 2019     No results found for: MICROALBUMIN    Healthy Eating  Cultural/Sabianist diet restrictions?: No  Meal planning: None  Meals include: Breakfast, Lunch, Dinner, Snacks  Beverages: Water, Coffee, Milk, Diet soda, Coffee drinks  Has patient met with a dietitian in the past?: No  Gets up 4:30 am to get 's stuff ready and goes back to bed until 6:30 am. Then 8-10 oz coffee with 1-2 tbs flavored creamer, and toast or bagel with cream cheese or some days no breakfast.   L: weekends chili or hotdog or burger or sandwich. During week sometimes skip lunch and only coffee. Might have salads or fruit or snacks.   D: meat, potato, vegetable or baked beans. Last night wild rice soup and ham. Spaghetti.   Would like to lose weight.       Being Active  How intense was your typical exercise? : Moderate (like brisk walking)  Barrier to exercise: Physical limitation    Monitoring  Blood Glucose Meter: i.Metert  Home Glucose (Sugar) Monitorin-2 times per day  Blood glucose trend: Fluctuating dramtically  Low Glucose Range (mg/dL): 140-180  High Glucose Range (mg/dL): >200  Overall Range (mg/dL): 180-200    Today 157 fasting which is the lowest lately. Last night after dinner 190 and a minute later was 184.     Taking Medications  Diabetes Medication(s)     Biguanides       metFORMIN (GLUCOPHAGE) 500 MG tablet    1 tablet BID          Current Treatments: Oral Agent (monotherapy)    Problem Solving  Hypoglycemia Frequency: Rarely  Hypoglycemia Treatment: Juice, Candy  Patient carries a carbohydrate source: No  Medical alert: No  Severe weather/disaster plan for diabetes management?: No  DKA prevention plan?: No  Sick day plan for diabetes management?: (P) No    Gets headaches and blurred vision with BG in 200s.      Hypoglycemia symptoms  Confusion: No  Dizziness or Light-Headedness: No  Headaches: No  Hunger: No  Mood changes: No  Nervousness/Anxiety: No  Sleepiness: No  Speech difficulty: No  Sweats: No  Tremors: Yes    Hypoglycemia Complications  Blackouts: No  Hospitalization: No  Nocturnal hypoglycemia: No  Required assistance: No  Required glucagon injection: No  Seizures: No    Reducing Risks  CAD Risks: Family history, Obesity, Tobacco exposure  Has dilated eye exam at least once a year?: Yes  Sees dentist every 6 months?: No  Sees podiatrist (foot doctor)?: Yes    Healthy Coping  Informal Support system:: Children, Family, Friends, Parent, Spouse, Other  Difficulty affording diabetes management supplies?: No  Patient Activation Measure Survey Score:  JACINDA Score (Last Two) 3/3/2011   JACINDA Raw Score 38   Activation Score 52.9   JACINDA Level 2       ASSESSMENT:  Would like to continue         Patient's most recent   Lab Results   Component Value Date    A1C 7.6 11/06/2019    is meeting goal of <8.0    INTERVENTION:   Diabetes knowledge and skills assessment:     Patient is knowledgeable in diabetes management concepts related to: Healthy Eating, Being Active, Monitoring, Taking Medication and Healthy Coping    Patient needs further education on the following diabetes management concepts: Healthy Eating, Monitoring, Problem Solving and Reducing Risks    Based on learning assessment above, most appropriate setting for further diabetes education would be: Individual setting.    Education provided today on:  AADE Self-Care Behaviors:  Healthy Eating: carbohydrate counting, weight reduction and portion control  Being Active: relationship to blood glucose and precautions to take  Monitoring: frequency of monitoring  Problem Solving: low blood glucose - causes, signs/symptoms, treatment and prevention and carrying a carbohydrate source at all times  Reducing Risks: A1C - goals, relating to blood glucose levels, how often to  check  Patient was instructed on Contour Next One meter and was able to provide an accurate return demonstration.     Opportunities for ongoing education and support in diabetes-self management were discussed.    Pt verbalized understanding of concepts discussed and recommendations provided today.       Education Materials Provided:  [facility] Understanding Diabetes Booklet and Contour Next One meter kit    PLAN:  Try food plan of 2-3 carb choices per meal, 1-2 if snacks and total 10-12 choices per day, for weight loss and BG management.      .  Amanda Mari RDN, CHAKA, CDE    Time Spent: 60 minutes  Encounter Type: Individual    Any diabetes medication dose changes were made via the CDE Protocol and Collaborative Practice Agreement with the patient's primary care provider. A copy of this encounter was shared with the provider.

## 2019-11-06 NOTE — PROGRESS NOTES
Subjective:1. Here for diabetic followup exam.   Has been monitoring sugars 2 times daily. Glucose values have been in the  150 range fasting, and 150-190 range postprandial.  There have been no   changes in vision  No changes/ complaints   of neuropathy symptoms.   No other concerns        2. rechekc of hyperlipidemia- not fasting and wants to hold off testing this.      3.  Morbid obesity-she was walking but recently has stopped.  I encouraged her to walk 5 miles a day.    4.  Tobacco abuse-she is still smoking.      The past medical history, social history, past surgical history and family history as shown below have been reviewed by me today.  Past Medical History:   Diagnosis Date     Abnormal Pap smear 2006, 2007,     ASC-H, ASCUS + HPV 45     Calculus of kidney 2002     Cervical high risk HPV (human papillomavirus) test positive     + HPV 45 (high risk)     History of colposcopy with cervical biopsy 2/9/06, 3/15/07    koilocytosis 2007        Allergies   Allergen Reactions     Amoxicillin Hives     Anti-Nausea      Anxiety, agitation,severe paranoia. Zofran, Reglan are some of them.  DRAMAMINE WITHOUT ISSUES       Demerol Hcl [Meperidine Hcl] Nausea     Indomethacin Nausea and Vomiting     Macrobid [Nitrofuran Derivatives] Hives     Reglan [Metoclopramide] Other (See Comments)     Acute paranoia, severe     Zofran [Ondansetron] Other (See Comments)     Severe anxiety, agitation     Current Outpatient Medications   Medication Sig Dispense Refill     blood glucose (CONTOUR NEXT TEST) test strip USE TO TEST BLOOD SUGAR TWO TIMES A DAY OR AS DIRECTED 100 each 3     blood glucose monitoring (NO BRAND SPECIFIED) meter device kit Use to test blood sugar 2 times daily or as directed. 1 kit 0     cyclobenzaprine (FLEXERIL) 5 MG tablet Take 1 tablet (5 mg) by mouth 3 times daily as needed for muscle spasms 90 tablet 0     ibuprofen (ADVIL) 200 MG tablet Take 200 mg by mouth every 4 hours as needed for mild pain        metFORMIN (GLUCOPHAGE) 500 MG tablet 1 tablet BID 60 tablet 11     Omega-3 Fatty Acids (FISH OIL) 1200 MG capsule Take 1,200 mg by mouth daily       omeprazole (PRILOSEC) 20 MG DR capsule TAKE 1 CAPSULE BY MOUTH DAILY, 30 TO 60 MINUTES BEFORE A MEAL. 30 capsule 11     acetaminophen (TYLENOL) 500 MG tablet Take 1,000 mg by mouth every 6 hours as needed for mild pain       Past Surgical History:   Procedure Laterality Date     C REMOVAL OF KIDNEY STONE      two surgeries, 2002,2003     CHOLECYSTECTOMY, LAPOROSCOPIC  5/11/2007    Cholecystectomy, Laparoscopic     COLONOSCOPY  05/17/10     COLONOSCOPY N/A 8/27/2019    Procedure: COLONOSCOPY;  Surgeon: Paramjit Kingston DO;  Location: [facility]     CONIZATION  7/1/2011    Procedure:CONIZATION; cold knife and punch biopsy of mole on back; Surgeon:SYDNEY FORD; Location:[facility]     DILATION AND CURETTAGE, HYSTEROSCOPY, LAPAROSCOPY, COMBINED Right 9/24/2015    Procedure: COMBINED DILATION AND CURETTAGE, HYSTEROSCOPY, LAPAROSCOPY;  Surgeon: Sydney Ford MD;  Location: [facility]     DISCECTOMY LUMBAR MINIMALLY INVASIVE ONE LEVEL Left 1/23/2019    Procedure: Minimally Invasive Surgery Left Lumbar 5-Sacral 1 microdiscectomy;  Surgeon: Mason Roe MD;  Location: [facility]     ESOPHAGOSCOPY, GASTROSCOPY, DUODENOSCOPY (EGD), COMBINED  5/21/2014    Procedure: COMBINED ESOPHAGOSCOPY, GASTROSCOPY, DUODENOSCOPY (EGD), BIOPSY SINGLE OR MULTIPLE;  Surgeon: Earl Lane MD;  Location: [facility]     EXCISE LESION TRUNK  7/1/2011    Procedure:EXCISE LESION TRUNK; Surgeon:SYDNEY FORD; Location:[facility]     HC FRAGMENTING OF KIDNEY STONE  11/30/2005     HC RX ECTOP PREG BY SCOPE,RMV TUBE/OVRY  12/20/2007    Right sided salpingectomy and removal of ruptured ectopic pregnancy. D&C.     HC UGI ENDOSCOPY, SIMPLE EXAM  09/01/09     LAPAROSCOPIC APPENDECTOMY N/A 9/24/2015    Procedure: LAPAROSCOPIC APPENDECTOMY;  Surgeon: James Cuellar MD;   [facility]     LAPAROSCOPIC CYSTECTOMY OVARIAN (BENIGN) N/A 9/24/2015    Procedure: LAPAROSCOPIC CYSTECTOMY OVARIAN (BENIGN);  Surgeon: Greg Ford MD;  [facility]     LAPAROSCOPIC OOPHORECTOMY Right 9/24/2015    Procedure: LAPAROSCOPIC OOPHORECTOMY;  Surgeon: Greg Ford MD;  [facility]     LITHOTRIPSY  01/17/2007     PELVIS LAPAROSCOPY,DX  10/16/2000    Diagnostic laparoscopy, Endometrial biopsy.     TUBAL/ECTOPIC PREGNANCY  01/23/2007    Lap removal of left ruptured ectopic pregnancy & salpingectomy, D&C     Social History     Socioeconomic History     Marital status:      Spouse name: Joseph     Number of children: 1     Years of education: 9     Highest education level: None   Occupational History     Employer: NONE   Social Needs     Financial resource strain: None     Food insecurity:     Worry: None     Inability: None     Transportation needs:     Medical: None     Non-medical: None   Tobacco Use     Smoking status: Current Some Day Smoker     Packs/day: 0.00     Years: 12.00     Pack years: 0.00     Types: Cigarettes     Last attempt to quit: 7/14/2009     Years since quitting: 10.3     Smokeless tobacco: Never Used     Tobacco comment: As of 10 /2014, pt has had a few cigs here and there   Substance and Sexual Activity     Alcohol use: Yes     Alcohol/week: 0.0 standard drinks     Comment: every few months     Drug use: No     Sexual activity: Yes     Partners: Male     Birth control/protection: None   Lifestyle     Physical activity:     Days per week: None     Minutes per session: None     Stress: None   Relationships     Social connections:     Talks on phone: None     Gets together: None     Attends Hoahaoism service: None     Active member of club or organization: None     Attends meetings of clubs or organizations: None     Relationship status: None     Intimate partner violence:     Fear of current or ex partner: None     Emotionally abused: None      Physically abused: None     Forced sexual activity: None   Other Topics Concern      Service No     Blood Transfusions No     Caffeine Concern Yes     Comment: Reports 1 can soda/week  Advised not more than 16 ounces caffeien/day.     Occupational Exposure No     Hobby Hazards No     Sleep Concern No     Stress Concern Yes     Comment: will discuss with      Weight Concern Yes     Comment: Eating disorder in childhood.  Hx. gestational diab.     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     Exercise No     Comment: Advised walking 30 min/day.     Bike Helmet No     Seat Belt Yes     Self-Exams Not Asked     Parent/sibling w/ CABG, MI or angioplasty before 65F 55M? Not Asked   Social History Narrative     and lives at home with her  and daughter.     Family History   Problem Relation Age of Onset     Diabetes Maternal Grandmother         adult onset     Anesthesia Reaction Maternal Grandmother         can't tolerate Novacaine.     Respiratory Maternal Grandmother         COPD and emphysema.     Thyroid Disease Maternal Grandmother      Heart Disease Maternal Grandmother         CHF     Diabetes Paternal Grandfather         adult o nset     Hypertension Paternal Grandfather      Cerebrovascular Disease Paternal Grandfather      Heart Disease Paternal Grandfather         MI, replaced valve,angioplasty     Thyroid Disease Paternal Grandfather      Cancer Maternal Grandfather         skin cancer     Heart Disease Maternal Grandfather         MI     Obesity Mother         on thyroid medication     Thyroid Disease Mother      Diabetes Mother      Rheumatologic Disease Mother      Heart Disease Father      Hypertension Father         and TIA     Lipids Father      Breast Cancer Paternal Grandmother      Arrhythmia Other      Cancer Maternal Aunt         breast/brain cancer     Depression Paternal Aunt      Cerebrovascular Disease Paternal  Uncle      Cerebrovascular Disease Paternal Aunt        ROS: A 12 point review of systems was done. Except for what is listed above in the HPI, the systems review is negative .      Objective: Vital signs: Blood pressure 122/84, pulse 103, temperature 96.7  F (35.9  C), temperature source Temporal, resp. rate 16, weight 99.1 kg (218 lb 8 oz), SpO2 99 %, not currently breastfeeding.      HEENT:  Sclerae and conjunctiva are normal.   Ear canals and TMs look normal.  Nasal mucosa is pink  - no polyps or masses seen.  sinuses are non tender to palpation.  Throat is unremarkable . Mucous membranes are moist.   Fundi are benign- disc margins are sharp. No papilledema noted.    Neck is supple, mobile, no adenoapthy or masses palpable. Normal range of motion noted.  Chest is clear to auscultation. No wheezes, rales or rhonchi heard.  cardiac exam is normal with s1, s2, no murmurs or adventitious sounds.Normal rate and rhythm is heard.  Exam of the feet is negative for paresthesias except mild numbness on the lateral aspect of the left foot related to previous back surgery.  Has normal sensation and color to both feet, and normal pulses and no trophic skin changes or ulcers.       Assessment/Plan: 1. Type 2   Diabetes is  not well-controlled.  She has not been checking her sugars frequently but will start this again.  She is meeting with the diabetic educator in the near future.  She did back off the metformin for a while because she was having multiple menses in 1 month.  She is back on the metformin and periods have been more regular.    . Ace inhibitor therapy: I have suggested that she take a low-dose of lisinopril but she declined.    Labs today: A1C  , basic panel today.        Lipids will be done when she is fasting in the near future and we will check a urine micro protein at that time.     Baby aspirin 65 or 81 mg advised daily as prophylaxis- watch for GI upset or tinnitus or bruising/signs of bleeding- stop if  these occur.    Advised to recheck in 3 months.    Continue daily glucose monitoring. Recheck acutely if concerns.   Advised to have yearly ophthalmology exam, regular dental visits and keep up to date on flu vaccine and TDAP.     2.  Morbid obesity-I advised her to walk 5 miles a day and to really watch her diet.    3.  Tobacco abuse-offered her nicotine patch.  She declined.  She will cut down and quit on her own.    4.  Hyperlipidemia- strongly advised her to lose weight and to watch her diet.  I talked about going on a cholesterol medication.  For now she wants to hold off and work on her weight but we will check lipids in the near future and then make a recommendation based on that.    5.  Advised to recheck in 3 months.      SHARAN Ford MD

## 2019-11-06 NOTE — RESULT ENCOUNTER NOTE
Triny/Brody Paez,Please inform Fallon/ or caretaker  that this result(s) is/are improved  the A1c is down to 7.6.  Her kidney function is fine.  He can stay on the same dose of metformin but she should meet with the diabetic educator.  Continue to work on losing weight.  Recheck in 3 months.  Thanks. SHARAN Ford MD

## 2019-11-18 ENCOUNTER — E-VISIT (OUTPATIENT)
Dept: FAMILY MEDICINE | Facility: CLINIC | Age: 39
End: 2019-11-18
Payer: COMMERCIAL

## 2019-11-18 DIAGNOSIS — J06.9 UPPER RESPIRATORY TRACT INFECTION, UNSPECIFIED TYPE: Primary | ICD-10-CM

## 2019-11-18 PROCEDURE — 99444 ZZC PHYSICIAN ONLINE EVALUATION & MANAGEMENT SERVICE: CPT | Performed by: OBSTETRICS & GYNECOLOGY

## 2019-11-18 RX ORDER — AZITHROMYCIN 250 MG/1
TABLET, FILM COATED ORAL
Qty: 6 TABLET | Refills: 0 | Status: SHIPPED | OUTPATIENT
Start: 2019-11-18 | End: 2019-12-08

## 2019-11-18 NOTE — TELEPHONE ENCOUNTER
Per Dr. Ford patient should be seen today. Called patient to work her into the schedule today. She declined due to family needs and not being able to make it in before closing. She will present to express care tomorrow.  MD advised and he states he will start patient on Z-da today but that she needs to come in to see him for a follow up visit in the next 2-3 days.  Left message on patient's cell phone advising her of the above note from Dr. Ford. Gave [phone] for questions.   Triny Hinkle CMA     evisit:  This patient is well-known to me.  Her symptoms suggest upper respiratory infection.  It may be viral but she is diabetic.  She is requesting Zithromax.  I will start her on this and then I asked her to come in to see me sometime in the next several days.  She will come in when she can.  See Rx for Zithromax.    I will examine her soon- SHARAN Ford MD

## 2019-12-08 ENCOUNTER — HOSPITAL ENCOUNTER (EMERGENCY)
Facility: CLINIC | Age: 39
Discharge: HOME | End: 2019-12-08
Attending: EMERGENCY MEDICINE | Admitting: EMERGENCY MEDICINE
Payer: COMMERCIAL

## 2019-12-08 ENCOUNTER — APPOINTMENT (OUTPATIENT)
Dept: ULTRASOUND IMAGING | Facility: CLINIC | Age: 39
End: 2019-12-08
Attending: EMERGENCY MEDICINE
Payer: COMMERCIAL

## 2019-12-08 VITALS
TEMPERATURE: 98 F | RESPIRATION RATE: 16 BRPM | WEIGHT: 222 LBS | SYSTOLIC BLOOD PRESSURE: 136 MMHG | BODY MASS INDEX: 38.11 KG/M2 | OXYGEN SATURATION: 99 % | DIASTOLIC BLOOD PRESSURE: 93 MMHG | HEART RATE: 88 BPM

## 2019-12-08 DIAGNOSIS — R10.2 PELVIC PAIN IN FEMALE: ICD-10-CM

## 2019-12-08 DIAGNOSIS — N83.202 CYST OF LEFT OVARY: ICD-10-CM

## 2019-12-08 DIAGNOSIS — N92.1 MENORRHAGIA WITH IRREGULAR CYCLE: ICD-10-CM

## 2019-12-08 LAB
ALBUMIN SERPL-MCNC: 3.8 G/DL (ref 3.4–5)
ALBUMIN UR-MCNC: NEGATIVE MG/DL
ALP SERPL-CCNC: 84 U/L (ref 40–150)
ALT SERPL W P-5'-P-CCNC: 65 U/L (ref 0–50)
ANION GAP SERPL CALCULATED.3IONS-SCNC: 9 MMOL/L (ref 3–14)
APPEARANCE UR: CLEAR
AST SERPL W P-5'-P-CCNC: 30 U/L (ref 0–45)
BASOPHILS # BLD AUTO: 0 10E9/L (ref 0–0.2)
BASOPHILS NFR BLD AUTO: 0.4 %
BILIRUB SERPL-MCNC: 0.9 MG/DL (ref 0.2–1.3)
BILIRUB UR QL STRIP: NEGATIVE
BUN SERPL-MCNC: 10 MG/DL (ref 7–30)
CALCIUM SERPL-MCNC: 8.9 MG/DL (ref 8.5–10.1)
CHLORIDE SERPL-SCNC: 104 MMOL/L (ref 94–109)
CO2 SERPL-SCNC: 25 MMOL/L (ref 20–32)
COLOR UR AUTO: YELLOW
CREAT SERPL-MCNC: 0.68 MG/DL (ref 0.52–1.04)
DIFFERENTIAL METHOD BLD: NORMAL
EOSINOPHIL NFR BLD AUTO: 2 %
ERYTHROCYTE [DISTWIDTH] IN BLOOD BY AUTOMATED COUNT: 12.8 % (ref 10–15)
GFR SERPL CREATININE-BSD FRML MDRD: >90 ML/MIN/{1.73_M2}
GLUCOSE SERPL-MCNC: 187 MG/DL (ref 70–99)
GLUCOSE UR STRIP-MCNC: NEGATIVE MG/DL
HCT VFR BLD AUTO: 44.1 % (ref 35–47)
HGB BLD-MCNC: 15.7 G/DL (ref 11.7–15.7)
HGB UR QL STRIP: ABNORMAL
IMM GRANULOCYTES # BLD: 0 10E9/L (ref 0–0.4)
IMM GRANULOCYTES NFR BLD: 0.3 %
KETONES UR STRIP-MCNC: NEGATIVE MG/DL
LEUKOCYTE ESTERASE UR QL STRIP: NEGATIVE
LYMPHOCYTES # BLD AUTO: 1.8 10E9/L (ref 0.8–5.3)
LYMPHOCYTES NFR BLD AUTO: 26.7 %
MCH RBC QN AUTO: 31.3 PG (ref 26.5–33)
MCHC RBC AUTO-ENTMCNC: 35.6 G/DL (ref 31.5–36.5)
MCV RBC AUTO: 88 FL (ref 78–100)
MONOCYTES # BLD AUTO: 0.4 10E9/L (ref 0–1.3)
MONOCYTES NFR BLD AUTO: 5.2 %
MUCOUS THREADS #/AREA URNS LPF: PRESENT /LPF
NEUTROPHILS # BLD AUTO: 4.5 10E9/L (ref 1.6–8.3)
NEUTROPHILS NFR BLD AUTO: 65.4 %
NITRATE UR QL: NEGATIVE
NRBC # BLD AUTO: 0 10*3/UL
NRBC BLD AUTO-RTO: 0 /100
PH UR STRIP: 6 PH (ref 5–7)
PLATELET # BLD AUTO: 230 10E9/L (ref 150–450)
POTASSIUM SERPL-SCNC: 4.5 MMOL/L (ref 3.4–5.3)
PROT SERPL-MCNC: 7.9 G/DL (ref 6.8–8.8)
RBC # BLD AUTO: 5.02 10E12/L (ref 3.8–5.2)
RBC #/AREA URNS AUTO: 54 /HPF (ref 0–2)
SODIUM SERPL-SCNC: 138 MMOL/L (ref 133–144)
SOURCE: ABNORMAL
SP GR UR STRIP: 1.02 (ref 1–1.03)
SQUAMOUS #/AREA URNS AUTO: <1 /HPF (ref 0–1)
UROBILINOGEN UR STRIP-MCNC: 0 MG/DL (ref 0–2)
WBC # BLD AUTO: 6.9 10E9/L (ref 4–11)
WBC #/AREA URNS AUTO: 1 /HPF (ref 0–5)

## 2019-12-08 PROCEDURE — 85025 COMPLETE CBC W/AUTO DIFF WBC: CPT | Performed by: EMERGENCY MEDICINE

## 2019-12-08 PROCEDURE — 93976 VASCULAR STUDY: CPT

## 2019-12-08 PROCEDURE — 81001 URINALYSIS AUTO W/SCOPE: CPT | Performed by: EMERGENCY MEDICINE

## 2019-12-08 PROCEDURE — 25000128 H RX IP 250 OP 636: Performed by: EMERGENCY MEDICINE

## 2019-12-08 PROCEDURE — 96361 HYDRATE IV INFUSION ADD-ON: CPT | Performed by: EMERGENCY MEDICINE

## 2019-12-08 PROCEDURE — 80053 COMPREHEN METABOLIC PANEL: CPT | Performed by: EMERGENCY MEDICINE

## 2019-12-08 PROCEDURE — 99285 EMERGENCY DEPT VISIT HI MDM: CPT | Mod: Z6 | Performed by: EMERGENCY MEDICINE

## 2019-12-08 PROCEDURE — 25800030 ZZH RX IP 258 OP 636: Performed by: EMERGENCY MEDICINE

## 2019-12-08 PROCEDURE — 96374 THER/PROPH/DIAG INJ IV PUSH: CPT | Performed by: EMERGENCY MEDICINE

## 2019-12-08 PROCEDURE — 96375 TX/PRO/DX INJ NEW DRUG ADDON: CPT | Performed by: EMERGENCY MEDICINE

## 2019-12-08 PROCEDURE — 99285 EMERGENCY DEPT VISIT HI MDM: CPT | Mod: 25 | Performed by: EMERGENCY MEDICINE

## 2019-12-08 RX ORDER — KETOROLAC TROMETHAMINE 30 MG/ML
30 INJECTION, SOLUTION INTRAMUSCULAR; INTRAVENOUS ONCE
Status: COMPLETED | OUTPATIENT
Start: 2019-12-08 | End: 2019-12-08

## 2019-12-08 RX ORDER — HYDROMORPHONE HYDROCHLORIDE 1 MG/ML
0.5 INJECTION, SOLUTION INTRAMUSCULAR; INTRAVENOUS; SUBCUTANEOUS
Status: DISCONTINUED | OUTPATIENT
Start: 2019-12-08 | End: 2019-12-08 | Stop reason: HOSPADM

## 2019-12-08 RX ORDER — HYDROCODONE BITARTRATE AND ACETAMINOPHEN 5; 325 MG/1; MG/1
1 TABLET ORAL EVERY 6 HOURS PRN
Qty: 8 TABLET | Refills: 0 | Status: SHIPPED | OUTPATIENT
Start: 2019-12-08 | End: 2020-02-11

## 2019-12-08 RX ADMIN — KETOROLAC TROMETHAMINE 30 MG: 30 INJECTION, SOLUTION INTRAMUSCULAR at 08:39

## 2019-12-08 RX ADMIN — HYDROMORPHONE HYDROCHLORIDE 0.5 MG: 1 INJECTION, SOLUTION INTRAMUSCULAR; INTRAVENOUS; SUBCUTANEOUS at 08:40

## 2019-12-08 RX ADMIN — SODIUM CHLORIDE 1000 ML: 9 INJECTION, SOLUTION INTRAVENOUS at 08:39

## 2019-12-08 NOTE — DISCHARGE INSTRUCTIONS
You can try a heating pad over the area of discomfort.    Okay to take ibuprofen sparingly for pain and inflammation and cramping.    Vicodin if needed for severe pain.  This can cause constipation so take stool softeners if needed.  No driving while taking this medication.    Call tomorrow to follow-up with Dr. Ford.    Return for significant worsening, changes or concerns.    I hope this improves quickly and you enjoy your Jing!!

## 2019-12-08 NOTE — ED TRIAGE NOTES
Patient presents with concerns for L) pelvic pain and severe vaginal bleeding since Friday. She reports saturating 12 pads in 24 hours. H/O polycystic ovarian disease. Antonette Marin RN

## 2019-12-08 NOTE — ED PROVIDER NOTES
History     Chief Complaint   Patient presents with     Vaginal Bleeding     HPI  History per patient and medical records    This is a 39-year-old female with history of polycystic ovarian syndrome, obesity, type 2 diabetes, hyperlipidemia, kidney stones  presenting with vaginal bleeding.  Patient's period is irregular.  Bleeding started 12/6/2019 night.  She states that she has gone through 12 pads in 24 hours.  She usually does not have this much bleeding.  She also had a period/bleeding for 3 days 2 weeks ago.  Patient is status post right oophorectomy and bilateral salpingectomies secondary to ectopic pregnancies.  She had a CT scan for possible kidney stones in 9/19 which showed a left ovarian cyst, no stones in the kidneys.  She has had some left lower quadrant/left pelvic stabbing pain, initially intermittently, now she states is constant.  She took some Naprosyn yesterday with only mild relief.  No change in bowel or bladder.  No fevers or chills.  No chest pain, shortness of breath, cold or cough to symptoms, weakness, dizziness.  She is very anxious.  Patient is also status post appendectomy and cholecystectomy.  She had a colonoscopy 8/19 which showed hemorrhoids.    Allergies:  Allergies   Allergen Reactions     Amoxicillin Hives     Anti-Nausea      Anxiety, agitation,severe paranoia. Zofran, Reglan are some of them.  DRAMAMINE WITHOUT ISSUES       Demerol Hcl [Meperidine Hcl] Nausea     Indomethacin Nausea and Vomiting     Macrobid [Nitrofuran Derivatives] Hives     Reglan [Metoclopramide] Other (See Comments)     Acute paranoia, severe     Zofran [Ondansetron] Other (See Comments)     Severe anxiety, agitation       Problem List:    Patient Active Problem List    Diagnosis Date Noted     Obesity (BMI 35.0-39.9) with comorbidity (H) 10/16/2018     Priority: Medium     Non morbid obesity due to excess calories 07/05/2016     Priority: Medium     Type 2 diabetes mellitus without complication (H)  04/29/2016     Priority: Medium     Glucosuria 04/27/2016     Priority: Medium     Right ovarian cyst 09/15/2015     Priority: Medium     Hyperlipidemia LDL goal <130 07/18/2012     Priority: Medium     Mild major depression (H) 07/18/2012     Priority: Medium     GERD (gastroesophageal reflux disease) 07/03/2012     Priority: Medium     CARDIOVASCULAR SCREENING; LDL GOAL LESS THAN 160 10/31/2010     Priority: Medium     Dyspnea 11/24/2009     Priority: Medium     Kidney stones 02/17/2009     Priority: Medium     S/P conization of cervix- KAYLA 2/3 in 2011 06/27/2007     Priority: Medium     1/24/06 pap: ASC-H  2/9/06 colposcopy/bx (negative)/ECC (negative) pap- ASC-H  5/24/06 pap- ASC-H  9/6/07 pap NIL  1/23/07 pap ASCUS + HPV 45 (high risk)  3/15/07 pap ASCUS + HPV 45 (high risk) colposcopy- bx (negative)/ECC (koilocytosis)  6/19/07 ECC- koilocytosis.  Pap- AGS  10/23/07 pap- ASC-H, ECC- negative  2/14/08 pap NIL- return to yearly paps  2/17/09 pap NIL- repeat 1 year  3/8/10 pap NIL  3/3/11 pap ASCUS + HPV 45 (high risk)- colposcopy recommended  5/16/11 colpo- bx (KAYLA 2/3/HSIL) ECC (ASCUS)  pap (ASC-H)  6/2/11 recommend: [facility]  7/1/11 [facility]: path: KAYLA 2/3 with possible involvement of the endocervical margin.  ECC- neg.  Endometrium- neg.  7/14/11 plan: HIPOLITO in approx 2 months  10/10/11- hysterectomy planned.  (cancelled)  11/7/11 pap-- NIL. Plan--repeat pap in 6 months. (5/7/12)  5/7/12 pap NIL. Plan-- pap in 1 year (due 5/7/13)   5/8/13 pap NIL. Plan-pap in 1 year  5/8/14 NIL pap, neg HR HPV.  Plan- repeat pap/HPV in 1 year (due 5/8/15)  10/14/14 NIL pap, neg HR HPV. Endometrial biopsy- WNL   1/6/16  NIL pap, neg HR HPV.  Plan: paps yearly, per Dr. Ford.  1/30/17 NIL pap, neg HR HPV. Plan: pap in 1 year.   3/12/18 NIL pap, neg HR HPV. Plan: cotest annually, per Dr. Ford.  3/11/19 ECC: negative. NIL pap, neg HR HPV. Plan: cotest in 1 yr         Papanicolaou smear of cervix with atypical squamous cells cannot  exclude high grade squamous intraepithelial lesion (ASC-H) 09/07/2006     Priority: Medium     Female infertility 07/24/2006     Priority: Medium     Problem list name updated by automated process. Provider to review       Hemorrhage of rectum and anus 12/02/2004     Priority: Medium        Past Medical History:    Past Medical History:   Diagnosis Date     Abnormal Pap smear 2006, 2007,      Calculus of kidney 2002     Cervical high risk HPV (human papillomavirus) test positive      History of colposcopy with cervical biopsy 2/9/06, 3/15/07       Past Surgical History:    Past Surgical History:   Procedure Laterality Date     C REMOVAL OF KIDNEY STONE      two surgeries, 2002,2003     CHOLECYSTECTOMY, LAPOROSCOPIC  5/11/2007    Cholecystectomy, Laparoscopic     COLONOSCOPY  05/17/10     COLONOSCOPY N/A 8/27/2019    Procedure: COLONOSCOPY;  Surgeon: Paramjit Kingston DO;  Location: [facility]     CONIZATION  7/1/2011    Procedure:CONIZATION; cold knife and punch biopsy of mole on back; Surgeon:SYDNEY FORD; Location:[facility]     DILATION AND CURETTAGE, HYSTEROSCOPY, LAPAROSCOPY, COMBINED Right 9/24/2015    Procedure: COMBINED DILATION AND CURETTAGE, HYSTEROSCOPY, LAPAROSCOPY;  Surgeon: Sydney Ford MD;  Location: [facility]     DISCECTOMY LUMBAR MINIMALLY INVASIVE ONE LEVEL Left 1/23/2019    Procedure: Minimally Invasive Surgery Left Lumbar 5-Sacral 1 microdiscectomy;  Surgeon: Mason Roe MD;  Location: [facility]     ESOPHAGOSCOPY, GASTROSCOPY, DUODENOSCOPY (EGD), COMBINED  5/21/2014    Procedure: COMBINED ESOPHAGOSCOPY, GASTROSCOPY, DUODENOSCOPY (EGD), BIOPSY SINGLE OR MULTIPLE;  Surgeon: Earl Lane MD;  Location: [facility]     EXCISE LESION TRUNK  7/1/2011    Procedure:EXCISE LESION TRUNK; Surgeon:SYDNEY FORD; Location:[facility]     HC FRAGMENTING OF KIDNEY STONE  11/30/2005     HC RX ECTOP PREG BY SCOPE,RMV TUBE/OVRY  12/20/2007    Right sided salpingectomy and removal of  ruptured ectopic pregnancy. D&C.      UGI ENDOSCOPY, SIMPLE EXAM  09/01/09     LAPAROSCOPIC APPENDECTOMY N/A 9/24/2015    Procedure: LAPAROSCOPIC APPENDECTOMY;  Surgeon: James Cuellar MD;  [facility]     LAPAROSCOPIC CYSTECTOMY OVARIAN (BENIGN) N/A 9/24/2015    Procedure: LAPAROSCOPIC CYSTECTOMY OVARIAN (BENIGN);  Surgeon: Greg Ford MD;  [facility]     LAPAROSCOPIC OOPHORECTOMY Right 9/24/2015    Procedure: LAPAROSCOPIC OOPHORECTOMY;  Surgeon: Greg Ford MD;  [facility]     LITHOTRIPSY  01/17/2007     PELVIS LAPAROSCOPY,DX  10/16/2000    Diagnostic laparoscopy, Endometrial biopsy.     TUBAL/ECTOPIC PREGNANCY  01/23/2007    Lap removal of left ruptured ectopic pregnancy & salpingectomy, D&C       Family History:    Family History   Problem Relation Age of Onset     Diabetes Maternal Grandmother         adult onset     Anesthesia Reaction Maternal Grandmother         can't tolerate Novacaine.     Respiratory Maternal Grandmother         COPD and emphysema.     Thyroid Disease Maternal Grandmother      Heart Disease Maternal Grandmother         CHF     Diabetes Paternal Grandfather         adult o nset     Hypertension Paternal Grandfather      Cerebrovascular Disease Paternal Grandfather      Heart Disease Paternal Grandfather         MI, replaced valve,angioplasty     Thyroid Disease Paternal Grandfather      Cancer Maternal Grandfather         skin cancer     Heart Disease Maternal Grandfather         MI     Obesity Mother         on thyroid medication     Thyroid Disease Mother      Diabetes Mother      Rheumatologic Disease Mother      Heart Disease Father      Hypertension Father         and TIA     Lipids Father      Breast Cancer Paternal Grandmother      Arrhythmia Other      Cancer Maternal Aunt         breast/brain cancer     Depression Paternal Aunt      Cerebrovascular Disease Paternal Uncle      Cerebrovascular Disease Paternal Aunt         Social History:  Marital Status:   [2]  Social History     Tobacco Use     Smoking status: Current Some Day Smoker     Packs/day: 0.00     Years: 12.00     Pack years: 0.00     Types: Cigarettes     Last attempt to quit: 7/14/2009     Years since quitting: 10.4     Smokeless tobacco: Never Used     Tobacco comment: As of 10 /2014, pt has had a few cigs here and there   Substance Use Topics     Alcohol use: Yes     Alcohol/week: 0.0 standard drinks     Comment: every few months     Drug use: No        Medications:    cyclobenzaprine (FLEXERIL) 5 MG tablet  HYDROcodone-acetaminophen (NORCO) 5-325 MG tablet  ibuprofen (ADVIL) 200 MG tablet  metFORMIN (GLUCOPHAGE) 500 MG tablet  Omega-3 Fatty Acids (FISH OIL) 1200 MG capsule  omeprazole (PRILOSEC) 20 MG DR capsule  acetaminophen (TYLENOL) 500 MG tablet  blood glucose (CONTOUR NEXT TEST) test strip  blood glucose monitoring (NO BRAND SPECIFIED) meter device kit      Review of Systems   All other ROS reviewed and are negative or non-contributory except as stated in HPI.     Physical Exam   BP: (!) 153/109  Pulse: 123( crying)  Temp: 98  F (36.7  C)  Resp: 20(crying)  Weight: 100.7 kg (222 lb)  SpO2: 100 %      Physical Exam  Vitals signs and nursing note reviewed.   Constitutional:       Comments: Extremely anxious, crying, obese female lying in the bed   HENT:      Head: Normocephalic.      Nose: Nose normal.      Mouth/Throat:      Mouth: Mucous membranes are moist.      Pharynx: Oropharynx is clear.   Eyes:      Extraocular Movements: Extraocular movements intact.      Conjunctiva/sclera: Conjunctivae normal.   Neck:      Musculoskeletal: Normal range of motion and neck supple.   Cardiovascular:      Rate and Rhythm: Regular rhythm. Tachycardia present.   Pulmonary:      Effort: Pulmonary effort is normal.      Breath sounds: Normal breath sounds.   Abdominal:       Genitourinary:     Comments: Deferred for ultrasound  Musculoskeletal: Normal range of  motion.   Skin:     General: Skin is warm and dry.      Coloration: Skin is not pale.   Neurological:      General: No focal deficit present.      Mental Status: She is alert. Mental status is at baseline.   Psychiatric:         Behavior: Behavior normal.      Comments: Very anxious         ED Course (with Medical Decision Making)    Pt seen and examined by me.  RN and EPIC notes reviewed.      Patient with concerns of excessive menstrual bleeding, left pelvic/left lower quadrant pain.  She does not have any tubes so obviously ectopic pregnancy is not possible.  She has history of ovarian cysts and this is the likely etiology.  Possible intermittent torsion.  It is also possible that she has an unusual presentation of diverticulitis although she did not have diverticuli on her colonoscopy.  I also do not suspect a kidney stone as she had a recent CT with no stones.  Possible musculoskeletal pain, other cause.  Plan to start with some labs and an ultrasound.  Check urine.    Labs are completely normal as noted below.  There is some blood in the urine but she also has her period.  Ultrasound shows results as below with a small cyst in the left ovary and a nonspecific cyst like focus in the myometrium.    All these results were discussed at length with the patient.  She was quite reassured.  She plans to have close follow-up with Dr. Ford.  I did give her a small amount of Norco to use for severe pain.  Return for significant worsening, changes or concerns.        Procedures    Results for orders placed or performed during the hospital encounter of 12/08/19 (from the past 24 hour(s))   CBC with platelets differential   Result Value Ref Range    WBC 6.9 4.0 - 11.0 10e9/L    RBC Count 5.02 3.8 - 5.2 10e12/L    Hemoglobin 15.7 11.7 - 15.7 g/dL    Hematocrit 44.1 35.0 - 47.0 %    MCV 88 78 - 100 fl    MCH 31.3 26.5 - 33.0 pg    MCHC 35.6 31.5 - 36.5 g/dL    RDW 12.8 10.0 - 15.0 %    Platelet Count 230 150 - 450 10e9/L     Diff Method Automated Method     % Neutrophils 65.4 %    % Lymphocytes 26.7 %    % Monocytes 5.2 %    % Eosinophils 2.0 %    % Basophils 0.4 %    % Immature Granulocytes 0.3 %    Nucleated RBCs 0 0 /100    Absolute Neutrophil 4.5 1.6 - 8.3 10e9/L    Absolute Lymphocytes 1.8 0.8 - 5.3 10e9/L    Absolute Monocytes 0.4 0.0 - 1.3 10e9/L    Absolute Basophils 0.0 0.0 - 0.2 10e9/L    Abs Immature Granulocytes 0.0 0 - 0.4 10e9/L    Absolute Nucleated RBC 0.0    Comprehensive metabolic panel   Result Value Ref Range    Sodium 138 133 - 144 mmol/L    Potassium 4.5 3.4 - 5.3 mmol/L    Chloride 104 94 - 109 mmol/L    Carbon Dioxide 25 20 - 32 mmol/L    Anion Gap 9 3 - 14 mmol/L    Glucose 187 (H) 70 - 99 mg/dL    Urea Nitrogen 10 7 - 30 mg/dL    Creatinine 0.68 0.52 - 1.04 mg/dL    GFR Estimate >90 >60 mL/min/[1.73_m2]    GFR Estimate If Black >90 >60 mL/min/[1.73_m2]    Calcium 8.9 8.5 - 10.1 mg/dL    Bilirubin Total 0.9 0.2 - 1.3 mg/dL    Albumin 3.8 3.4 - 5.0 g/dL    Protein Total 7.9 6.8 - 8.8 g/dL    Alkaline Phosphatase 84 40 - 150 U/L    ALT 65 (H) 0 - 50 U/L    AST 30 0 - 45 U/L   UA with Microscopic   Result Value Ref Range    Color Urine Yellow     Appearance Urine Clear     Glucose Urine Negative NEG^Negative mg/dL    Bilirubin Urine Negative NEG^Negative    Ketones Urine Negative NEG^Negative mg/dL    Specific Gravity Urine 1.016 1.003 - 1.035    Blood Urine Large (A) NEG^Negative    pH Urine 6.0 5.0 - 7.0 pH    Protein Albumin Urine Negative NEG^Negative mg/dL    Urobilinogen mg/dL 0.0 0.0 - 2.0 mg/dL    Nitrite Urine Negative NEG^Negative    Leukocyte Esterase Urine Negative NEG^Negative    Source Midstream Urine     WBC Urine 1 0 - 5 /HPF    RBC Urine 54 (H) 0 - 2 /HPF    Squamous Epithelial /HPF Urine <1 0 - 1 /HPF    Mucous Urine Present (A) NEG^Negative /LPF   US Pelvic Complete w Transvaginal & Abd/Pel Duplex Limited    Narrative    US PELVIS COMPLETE WITH TRANSVAGINAL AND DOPPLER LIMITED     12/8/2019  9:45 AM     HISTORY: Left-sided pelvic pain.     TECHNIQUE: Transvaginal imaging was added to the transabdominal scans.  Spectral Doppler and wave form analysis was also performed to evaluate  blood flow to the ovaries.    COMPARISON: None.    FINDINGS: The uterus is measured at 9.5 x 4.3 x 4.9 cm. No fibroids  are evident. Tiny cystic focus within the myometrium on the right  measures 0.5 cm. A few small nabothian cysts are noted within the  cervix. Endometrial stripe measures 0.6 cm and is within normal limits  for a premenopausal patient. The right ovary is not seen, and is  surgically absent by history. The left ovary contains a 1.8 cm  slightly complex cystic lesion. No solid adnexal masses are  identified. No free pelvic fluid is present. Spectral Doppler and  waveform analysis demonstrate arterial and venous flow to the left  ovary.       Impression    IMPRESSION:   1. Prior right oophorectomy.  2. Small nonspecific cystic focus within the myometrium on the right  measures 0.5 cm.  3. No definite cause for left-sided pain is identified.    PRICILA THOMAS MD       Medications   ketorolac (TORADOL) injection 30 mg (30 mg Intravenous Given 12/8/19 0839)   0.9% sodium chloride BOLUS (0 mLs Intravenous Stopped 12/8/19 0958)       Assessments & Plan     I have reviewed the findings, diagnosis, plan and need for follow up with the patient.    Discharge Medication List as of 12/8/2019 10:24 AM      START taking these medications    Details   HYDROcodone-acetaminophen (NORCO) 5-325 MG tablet Take 1 tablet by mouth every 6 hours as needed for severe pain, Disp-8 tablet, R-0, Local Print             Final diagnoses:   Pelvic pain in female - left   Cyst of left ovary   Menorrhagia with irregular cycle     Disposition: Patient discharged home in stable condition.  Plan as above.  Return for concerns.     Note: Chart documentation done in part with Dragon Voice Recognition software. Although reviewed after  completion, some word and grammatical errors may remain.       12/8/2019   [facility] EMERGENCY DEPARTMENT     Farida Munroe MD  12/08/19 1952

## 2019-12-08 NOTE — ED AVS SNAPSHOT
[facility] Emergency Department  [address] [place] [address]  Phone:  [phone]  Fax:  [phone]                                    Fallon Rivera   MRN: [id number]    Department:  [facility] Emergency Department   Date of Visit:  12/8/2019           After Visit Summary Signature Page    I have received my discharge instructions, and my questions have been answered. I have discussed any challenges I see with this plan with the nurse or doctor.    ..........................................................................................................................................  Patient/Patient Representative Signature      ..........................................................................................................................................  Patient Representative Print Name and Relationship to Patient    ..................................................               ................................................  Date                                   Time    ..........................................................................................................................................  Reviewed by Signature/Title    ...................................................              ..............................................  Date                                               Time          22EPIC Rev 08/18

## 2019-12-09 ENCOUNTER — TELEPHONE (OUTPATIENT)
Dept: FAMILY MEDICINE | Facility: CLINIC | Age: 39
End: 2019-12-09

## 2019-12-09 NOTE — TELEPHONE ENCOUNTER
Reason for call:  Patient reporting a symptom    Symptom or request: ovarian cyst? Heavy bleeding     Duration (how long have symptoms been present): yesterday     Have you been treated for this before? Yes    Additional comments: Pt was in the ER yesterday. She was told to call you and see what you would like to do for follow up?     Phone Number patient can be reached at:  Home number on file [phone] (home)    Best Time:  Any     Can we leave a detailed message on this number:  YES    Call taken on 12/9/2019 at 8:06 AM by Bethanie Wilson

## 2019-12-09 NOTE — TELEPHONE ENCOUNTER
Per Dr. Ford patient can be seen tomorrow at 1:20pm for ED follow up. Pt advised on MD notes as written and states understanding. Appointment made   Triny Hinkle CMA

## 2019-12-10 ENCOUNTER — OFFICE VISIT (OUTPATIENT)
Dept: FAMILY MEDICINE | Facility: CLINIC | Age: 39
End: 2019-12-10
Payer: COMMERCIAL

## 2019-12-10 VITALS
SYSTOLIC BLOOD PRESSURE: 130 MMHG | WEIGHT: 223 LBS | DIASTOLIC BLOOD PRESSURE: 78 MMHG | TEMPERATURE: 97.5 F | HEART RATE: 84 BPM | BODY MASS INDEX: 38.28 KG/M2 | RESPIRATION RATE: 12 BRPM | OXYGEN SATURATION: 97 %

## 2019-12-10 DIAGNOSIS — N92.1 MENORRHAGIA WITH IRREGULAR CYCLE: ICD-10-CM

## 2019-12-10 DIAGNOSIS — N83.202 CYST OF LEFT OVARY: Primary | ICD-10-CM

## 2019-12-10 DIAGNOSIS — R10.2 PELVIC PAIN IN FEMALE: ICD-10-CM

## 2019-12-10 PROCEDURE — 99214 OFFICE O/P EST MOD 30 MIN: CPT | Performed by: OBSTETRICS & GYNECOLOGY

## 2019-12-10 ASSESSMENT — PAIN SCALES - GENERAL: PAINLEVEL: MODERATE PAIN (4)

## 2019-12-10 NOTE — PROGRESS NOTES
Subjective: She was seen in ER 2 days ago with menorrhagia and left-sided pelvic pain.  Ultrasound showed:    FINDINGS: The uterus is measured at 9.5 x 4.3 x 4.9 cm. No fibroids  are evident. Tiny cystic focus within the myometrium on the right  measures 0.5 cm. A few small nabothian cysts are noted within the  cervix. Endometrial stripe measures 0.6 cm and is within normal limits  for a premenopausal patient. The right ovary is not seen, and is  surgically absent by history. The left ovary contains a 1.8 cm  slightly complex cystic lesion. No solid adnexal masses are  identified. No free pelvic fluid is present. Spectral Doppler and  waveform analysis demonstrate arterial and venous flow to the left  ovary.                                                                       IMPRESSION:   1. Prior right oophorectomy.  2. Small nonspecific cystic focus within the myometrium on the right  measures 0.5 cm.  3. No definite cause for left-sided pain is identified.     PRICILA THOMAS MD      CBC was normal and comprehensive panel was unremarkable and UA was difficult to interpret because of the menorrhagia.  She has a history of kidney stones.  Now her bleeding has subsided and her pain persists but is now a 4 out of 10 in the left adnexal area.      The past medical history, social history, past surgical history and family history as shown below have been reviewed by me today.    Past Medical History:   Diagnosis Date     Abnormal Pap smear 2006, 2007,     ASC-H, ASCUS + HPV 45     Calculus of kidney 2002     Cervical high risk HPV (human papillomavirus) test positive     + HPV 45 (high risk)     History of colposcopy with cervical biopsy 2/9/06, 3/15/07    koilocytosis 2007        Allergies   Allergen Reactions     Amoxicillin Hives     Anti-Nausea      Anxiety, agitation,severe paranoia. Zofran, Reglan are some of them.  DRAMAMINE WITHOUT ISSUES       Demerol Hcl [Meperidine Hcl] Nausea     Indomethacin Nausea and  Vomiting     Macrobid [Nitrofuran Derivatives] Hives     Reglan [Metoclopramide] Other (See Comments)     Acute paranoia, severe     Zofran [Ondansetron] Other (See Comments)     Severe anxiety, agitation     Current Outpatient Medications   Medication Sig Dispense Refill     acetaminophen (TYLENOL) 500 MG tablet Take 1,000 mg by mouth every 6 hours as needed for mild pain       blood glucose (CONTOUR NEXT TEST) test strip 1 strip by In Vitro route 4 times daily 150 each 3     blood glucose monitoring (NO BRAND SPECIFIED) meter device kit Use to test blood sugar 2 times daily or as directed. 1 kit 0     cyclobenzaprine (FLEXERIL) 5 MG tablet Take 1 tablet (5 mg) by mouth 3 times daily as needed for muscle spasms 90 tablet 0     HYDROcodone-acetaminophen (NORCO) 5-325 MG tablet Take 1 tablet by mouth every 6 hours as needed for severe pain 8 tablet 0     ibuprofen (ADVIL) 200 MG tablet Take 200 mg by mouth every 4 hours as needed for mild pain       metFORMIN (GLUCOPHAGE) 500 MG tablet 1 tablet BID 60 tablet 11     Omega-3 Fatty Acids (FISH OIL) 1200 MG capsule Take 1,200 mg by mouth daily       omeprazole (PRILOSEC) 20 MG DR capsule TAKE 1 CAPSULE BY MOUTH DAILY, 30 TO 60 MINUTES BEFORE A MEAL. 30 capsule 11     Past Surgical History:   Procedure Laterality Date     C REMOVAL OF KIDNEY STONE      two surgeries, 2002,2003     CHOLECYSTECTOMY, LAPOROSCOPIC  5/11/2007    Cholecystectomy, Laparoscopic     COLONOSCOPY  05/17/10     COLONOSCOPY N/A 8/27/2019    Procedure: COLONOSCOPY;  Surgeon: Paramjit Kingston DO;  Location:  GI     CONIZATION  7/1/2011    Procedure:CONIZATION; cold knife and punch biopsy of mole on back; Surgeon:GREG FORD; Location[facility]     DILATION AND CURETTAGE, HYSTEROSCOPY, LAPAROSCOPY, COMBINED Right 9/24/2015    Procedure: COMBINED DILATION AND CURETTAGE, HYSTEROSCOPY, LAPAROSCOPY;  Surgeon: Greg Ford MD;  [facility]     DISCECTOMY LUMBAR MINIMALLY  INVASIVE ONE LEVEL Left 1/23/2019    Procedure: Minimally Invasive Surgery Left Lumbar 5-Sacral 1 microdiscectomy;  Surgeon: Mason Roe MD;  [facility]     ESOPHAGOSCOPY, GASTROSCOPY, DUODENOSCOPY (EGD), COMBINED  5/21/2014    Procedure: COMBINED ESOPHAGOSCOPY, GASTROSCOPY, DUODENOSCOPY (EGD), BIOPSY SINGLE OR MULTIPLE;  Surgeon: Earl Lane MD;  [facility]     EXCISE LESION TRUNK  7/1/2011    Procedure:EXCISE LESION TRUNK; Surgeon:SYDNEY FORD; Location:[facility]     HC FRAGMENTING OF KIDNEY STONE  11/30/2005     HC RX ECTOP PREG BY SCOPE,RMV TUBE/OVRY  12/20/2007    Right sided salpingectomy and removal of ruptured ectopic pregnancy. D&C.     HC UGI ENDOSCOPY, SIMPLE EXAM  09/01/09     LAPAROSCOPIC APPENDECTOMY N/A 9/24/2015    Procedure: LAPAROSCOPIC APPENDECTOMY;  Surgeon: James Cuellar MD;  [facility]     LAPAROSCOPIC CYSTECTOMY OVARIAN (BENIGN) N/A 9/24/2015    Procedure: LAPAROSCOPIC CYSTECTOMY OVARIAN (BENIGN);  Surgeon: Sydney Ford MD;  [facility]     LAPAROSCOPIC OOPHORECTOMY Right 9/24/2015    Procedure: LAPAROSCOPIC OOPHORECTOMY;  Surgeon: Sydney Ford MD;  [facility]     LITHOTRIPSY  01/17/2007     PELVIS LAPAROSCOPY,DX  10/16/2000    Diagnostic laparoscopy, Endometrial biopsy.     TUBAL/ECTOPIC PREGNANCY  01/23/2007    Lap removal of left ruptured ectopic pregnancy & salpingectomy, D&C     Social History     Socioeconomic History     Marital status:      Spouse name: Joseph     Number of children: 1     Years of education: 9     Highest education level: None   Occupational History     Employer: NONE   Social Needs     Financial resource strain: None     Food insecurity:     Worry: None     Inability: None     Transportation needs:     Medical: None     Non-medical: None   Tobacco Use     Smoking status: Current Some Day Smoker     Packs/day: 0.00     Years: 12.00     Pack years: 0.00     Types: Cigarettes      Last attempt to quit: 7/14/2009     Years since quitting: 10.4     Smokeless tobacco: Never Used     Tobacco comment: As of 10 /2014, pt has had a few cigs here and there   Substance and Sexual Activity     Alcohol use: Yes     Alcohol/week: 0.0 standard drinks     Comment: every few months     Drug use: No     Sexual activity: Yes     Partners: Male     Birth control/protection: None   Lifestyle     Physical activity:     Days per week: None     Minutes per session: None     Stress: None   Relationships     Social connections:     Talks on phone: None     Gets together: None     Attends Muslim service: None     Active member of club or organization: None     Attends meetings of clubs or organizations: None     Relationship status: None     Intimate partner violence:     Fear of current or ex partner: None     Emotionally abused: None     Physically abused: None     Forced sexual activity: None   Other Topics Concern      Service No     Blood Transfusions No     Caffeine Concern Yes     Comment: Reports 1 can soda/week  Advised not more than 16 ounces caffeien/day.     Occupational Exposure No     Hobby Hazards No     Sleep Concern No     Stress Concern Yes     Comment: will discuss with      Weight Concern Yes     Comment: Eating disorder in childhood.  Hx. gestational diab.     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     Exercise No     Comment: Advised walking 30 min/day.     Bike Helmet No     Seat Belt Yes     Self-Exams Not Asked     Parent/sibling w/ CABG, MI or angioplasty before 65F 55M? Not Asked   Social History Narrative     and lives at home with her  and daughter.     Family History   Problem Relation Age of Onset     Diabetes Maternal Grandmother         adult onset     Anesthesia Reaction Maternal Grandmother         can't tolerate Novacaine.     Respiratory Maternal Grandmother         COPD and emphysema.      Thyroid Disease Maternal Grandmother      Heart Disease Maternal Grandmother         CHF     Diabetes Paternal Grandfather         adult o nset     Hypertension Paternal Grandfather      Cerebrovascular Disease Paternal Grandfather      Heart Disease Paternal Grandfather         MI, replaced valve,angioplasty     Thyroid Disease Paternal Grandfather      Cancer Maternal Grandfather         skin cancer     Heart Disease Maternal Grandfather         MI     Obesity Mother         on thyroid medication     Thyroid Disease Mother      Diabetes Mother      Rheumatologic Disease Mother      Heart Disease Father      Hypertension Father         and TIA     Lipids Father      Breast Cancer Paternal Grandmother      Arrhythmia Other      Cancer Maternal Aunt         breast/brain cancer     Depression Paternal Aunt      Cerebrovascular Disease Paternal Uncle      Cerebrovascular Disease Paternal Aunt        ROS: A 12 point review of systems was done. Except for what is listed above in the HPI, the systems review is negative .      Objective: Vital signs: Blood pressure (!) 138/92, pulse 84, temperature 97.5  F (36.4  C), temperature source Temporal, resp. rate 12, weight 101.2 kg (223 lb), last menstrual period 12/06/2019, SpO2 97 %, not currently breastfeeding.  BP recheck 130/78    HEENT normal  Neck is supple, mobile, no adenopathy or masses palpable. The thyroid feels normal.   Normal range of motion noted.  Chest is clear to auscultation.  No wheezes, rales or rhonchi heard.  Cardiac exam is normal with s1, s2, no murmurs or adventitious sounds.Normal rate and rhythm is heard.   Abdomen is soft,  nondistended, No masses felt.No HSM. No guarding or rigidity or rebound   noted. Palpation reveals   tenderness in the left lower quadrant but it is not very impressive.  Normal bowel sounds heard.  There is no evidence of an acute abdomen.  Pel;ronnie exam deferred        Assessment/Plan:  A total of 25 minutes were spent  face-to-face with this patient during today's consultation, with more than 50% of that time devoted to conversation and counseling about the management decisions.      1.  Pelvic pain/left ovarian cyst: She has a small left ovarian cyst but I do not feel that this is the cause of her pelvic pain.  I think it is more likely another ureteral stone.  She has had many of these in the past.  However the urinalysis is not very helpful because she still has some menorrhagia.  We will wait till the bleeding stops completely and then she will get a urinalysis.  If it shows some red cells then I will order a CT scan with the stone protocol.  She will let me know if the pain increases in the near future.    If her pain worsens and the urinalysis is negative for blood in the CT stone protocol is negative then I will consider laparoscopy.  She wants to hold off on surgery for now.    2.  Menorrhagia: She has menorrhagia but it has subsided.  We discussed options for long-term management.  I suggested that she consider hysterectomy if this continues to be an ongoing problem.  For now the CBC is normal and the bleeding has slowed down substantially and there is nothing alarming on the ultrasound.  She may be developing a small fibroid in the myometrium.  We will recheck an ultrasound in 2 months to recheck the ovarian cyst and look at the myometrium again.         The above information was dictating using Dragon voice software and as a result there may be some irregularities that were not detected in my review of this note.    SHARAN Ford MD

## 2019-12-16 DIAGNOSIS — R10.2 PELVIC PAIN IN FEMALE: ICD-10-CM

## 2019-12-16 LAB
ALBUMIN UR-MCNC: NEGATIVE MG/DL
APPEARANCE UR: CLEAR
BILIRUB UR QL STRIP: NEGATIVE
COLOR UR AUTO: YELLOW
GLUCOSE UR STRIP-MCNC: NEGATIVE MG/DL
HGB UR QL STRIP: NEGATIVE
KETONES UR STRIP-MCNC: NEGATIVE MG/DL
LEUKOCYTE ESTERASE UR QL STRIP: NEGATIVE
NITRATE UR QL: NEGATIVE
PH UR STRIP: 5 PH (ref 5–7)
SOURCE: NORMAL
SP GR UR STRIP: 1.02 (ref 1–1.03)
UROBILINOGEN UR STRIP-MCNC: 0 MG/DL (ref 0–2)

## 2019-12-16 PROCEDURE — 81003 URINALYSIS AUTO W/O SCOPE: CPT | Performed by: OBSTETRICS & GYNECOLOGY

## 2019-12-16 NOTE — RESULT ENCOUNTER NOTE
Triny/Daiana,Please inform Fallon/ or caretaker  that this result(s) is/are normal.  The urinalysis is negative for blood- please ask her how her pain is.  If it is still intense we can order a CT scan for stone protocol to look for kidney stones but it is not as likely to show anything if there is no blood in the urine.  Thanks. SHARAN Ford MD

## 2020-01-27 ENCOUNTER — NURSE TRIAGE (OUTPATIENT)
Dept: FAMILY MEDICINE | Facility: CLINIC | Age: 40
End: 2020-01-27

## 2020-01-27 ENCOUNTER — OFFICE VISIT (OUTPATIENT)
Dept: URGENT CARE | Facility: RETAIL CLINIC | Age: 40
End: 2020-01-27
Payer: COMMERCIAL

## 2020-01-27 VITALS
SYSTOLIC BLOOD PRESSURE: 118 MMHG | DIASTOLIC BLOOD PRESSURE: 85 MMHG | TEMPERATURE: 99.6 F | OXYGEN SATURATION: 97 % | RESPIRATION RATE: 16 BRPM | HEART RATE: 115 BPM

## 2020-01-27 DIAGNOSIS — J11.1 INFLUENZA-LIKE ILLNESS: Primary | ICD-10-CM

## 2020-01-27 PROCEDURE — 99213 OFFICE O/P EST LOW 20 MIN: CPT | Performed by: NURSE PRACTITIONER

## 2020-01-27 RX ORDER — OSELTAMIVIR PHOSPHATE 75 MG/1
75 CAPSULE ORAL DAILY
Qty: 7 CAPSULE | Refills: 0 | Status: SHIPPED | OUTPATIENT
Start: 2020-01-27 | End: 2020-02-11

## 2020-01-27 ASSESSMENT — ENCOUNTER SYMPTOMS
FEVER: 1
COUGH: 1
CARDIOVASCULAR NEGATIVE: 1
HEADACHES: 1
SPUTUM PRODUCTION: 0
SORE THROAT: 1
NAUSEA: 1
SINUS PAIN: 1
HEMOPTYSIS: 0
SHORTNESS OF BREATH: 0
MUSCULOSKELETAL NEGATIVE: 1
WHEEZING: 0

## 2020-01-27 NOTE — TELEPHONE ENCOUNTER
"  Additional Information    Probable influenza (fever) with no complications and not HIGH RISK    Answer Assessment - Initial Assessment Questions  1. WORST SYMPTOM: \"What is your worst symptom?\" (e.g., cough, runny nose, muscle aches, headache, sore throat, fever)      congestion  2. ONSET: \"When did your flu symptoms start?\"       Late Friday night  3. COUGH: \"How bad is the cough?\"        Starting to hurt  4. RESPIRATORY DISTRESS: \"Describe your breathing.\"       No concerns  5. FEVER: \"Do you have a fever?\" If so, ask: \"What is your temperature, how was it measured, and when did it start?\"      Yes 103 last night  6. EXPOSURE: \"Were you exposed to someone with influenza?\"        unsure  7. FLU VACCINE: \"Did you get a flu shot this year?\"        8. HIGH RISK DISEASE: \"Do you any chronic medical problems?\" (e.g., heart or lung disease, asthma, weak immune system, or other HIGH RISK conditions)        9. PREGNANCY: \"Is there any chance you are pregnant?\" \"When was your last menstrual period?\"        10. OTHER SYMPTOMS: \"Do you have any other symptoms?\"  (e.g., runny nose, muscle aches, headache, sore throat)        Muscle aches congestions fever cough    Protocols used: INFLUENZA - SEASONAL-A-OH    "

## 2020-01-27 NOTE — TELEPHONE ENCOUNTER
Reason for call:  Patient reporting a symptom    Symptom or request: head congestion/body aches/fever/cough    Duration (how long have symptoms been present): 2 days    Have you been treated for this before? No    Additional comments:     Phone Number patient can be reached at:  Home number on file [phone] (home)    Best Time:      Can we leave a detailed message on this number:  YES    Call taken on 1/27/2020 at 8:06 AM by Lupe Lim

## 2020-01-27 NOTE — PATIENT INSTRUCTIONS
Patient Education     Influenza (Adult)    Influenza is also called the flu. It is a viral illness that affects the air passages of your lungs. It is different from the common cold. The flu can easily be passed from one to person to another. It may be spread through the air by coughing and sneezing. Or it can be spread by touching the sick person and then touching your own eyes, nose, or mouth.  The flu starts 1 to 3 days after you are exposed to the flu virus. It may last for 1 to 2 weeks but many people feel tired or fatigued for many weeks afterward. You usually don t need to take antibiotics unless you have a complication. This might be an ear or sinus infection or pneumonia.  Symptoms of the flu may be mild or severe. They can include extreme tiredness (wanting to stay in bed all day), chills, fevers, muscle aches, soreness with eye movement, headache, and a dry, hacking cough.  Home care  Follow these guidelines when caring for yourself at home:    Avoid being around cigarette smoke, whether yours or other people s.    Acetaminophen or ibuprofen will help ease your fever, muscle aches, and headache. Don t give aspirin to anyone younger than 18 who has the flu. Aspirin can harm the liver.    Nausea and loss of appetite are common with the flu. Eat light meals. Drink 6 to 8 glasses of liquids every day. Good choices are water, sport drinks, soft drinks without caffeine, juices, tea, and soup. Extra fluids will also help loosen secretions in your nose and lungs.    Over-the-counter cold medicines will not make the flu go away faster. But the medicines may help with coughing, sore throat, and congestion in your nose and sinuses. Don t use a decongestant if you have high blood pressure.    Stay home until your fever has been gone for at least 24 hours without using medicine to reduce fever.  Follow-up care  Follow up with your healthcare provider, or as advised, if you are not getting better over the next  week.  If you are age 65 or older, talk with your provider about getting a pneumococcal vaccine every 5 years. You should also get this vaccine if you have chronic asthma or COPD. All adults should get a flu vaccine every fall. Ask your provider about this.  When to seek medical advice  Call your healthcare provider right away if any of these occur:    Cough with lots of colored mucus (sputum) or blood in your mucus    Chest pain, shortness of breath, wheezing, or trouble breathing    Severe headache, or face, neck, or ear pain    New rash with fever    Fever of 100.4 F (38 C) or higher, or as directed by your healthcare provider    Confusion, behavior change, or seizure    Severe weakness or dizziness    You get a new fever or cough after getting better for a few days  Date Last Reviewed: 1/1/2017 2000-2019 [employer]. [address] [place] [address], [place] [address]. All rights reserved. This information is not intended as a substitute for professional medical care. Always follow your healthcare professional's instructions.

## 2020-01-27 NOTE — TELEPHONE ENCOUNTER
Patient reports fever, congestion, body aches starting 2 days ago. She does not know of any exposure to flu. Over the counter remedies are not effective for her. She will go to same day clinic for evaluation.  Joanne Doe RN BSN

## 2020-01-28 NOTE — PROGRESS NOTES
HPI  Fallon Rivera 39 year old presents to the  with chills, fever, dry cough and myalgias. Symptoms for 2 days. Taking ibuprofen with some relief. Patient called her provider and was advised to come in for treatment for influenza due to her diabetes.    Past Medical History:   Diagnosis Date     Abnormal Pap smear 2006, 2007,     ASC-H, ASCUS + HPV 45     Calculus of kidney 2002     Cervical high risk HPV (human papillomavirus) test positive     + HPV 45 (high risk)     History of colposcopy with cervical biopsy 2/9/06, 3/15/07    koilocytosis 2007     Patient Active Problem List   Diagnosis     Hemorrhage of rectum and anus     Female infertility     Papanicolaou smear of cervix with atypical squamous cells cannot exclude high grade squamous intraepithelial lesion (ASC-H)     S/P conization of cervix- KAYLA 2/3 in 2011     Kidney stones     Dyspnea     CARDIOVASCULAR SCREENING; LDL GOAL LESS THAN 160     GERD (gastroesophageal reflux disease)     Hyperlipidemia LDL goal <130     Mild major depression (H)     Right ovarian cyst     Glucosuria     Type 2 diabetes mellitus without complication (H)     Non morbid obesity due to excess calories     Obesity (BMI 35.0-39.9) with comorbidity (H)      Past Surgical History:   Procedure Laterality Date     C REMOVAL OF KIDNEY STONE      two surgeries, 2002,2003     CHOLECYSTECTOMY, LAPOROSCOPIC  5/11/2007    Cholecystectomy, Laparoscopic     COLONOSCOPY  05/17/10     COLONOSCOPY N/A 8/27/2019    Procedure: COLONOSCOPY;  Surgeon: Paramjit Kingston DO;  Location:  GI     CONIZATION  7/1/2011    Procedure:CONIZATION; cold knife and punch biopsy of mole on back; Surgeon:SYDNEY FORD; Location[facility]     DILATION AND CURETTAGE, HYSTEROSCOPY, LAPAROSCOPY, COMBINED Right 9/24/2015    Procedure: COMBINED DILATION AND CURETTAGE, HYSTEROSCOPY, LAPAROSCOPY;  Surgeon: Sydney Ford MD;  [facility]     DISCECTOMY LUMBAR MINIMALLY INVASIVE  ONE LEVEL Left 1/23/2019    Procedure: Minimally Invasive Surgery Left Lumbar 5-Sacral 1 microdiscectomy;  Surgeon: Mason Roe MD;  [facility]     ESOPHAGOSCOPY, GASTROSCOPY, DUODENOSCOPY (EGD), COMBINED  5/21/2014    Procedure: COMBINED ESOPHAGOSCOPY, GASTROSCOPY, DUODENOSCOPY (EGD), BIOPSY SINGLE OR MULTIPLE;  Surgeon: Earl Lane MD;  [facility]     EXCISE LESION TRUNK  7/1/2011    Procedure:EXCISE LESION TRUNK; Surgeon:SYDNEY FORD; Location[facility]     HC FRAGMENTING OF KIDNEY STONE  11/30/2005     HC RX ECTOP PREG BY SCOPE,RMV TUBE/OVRY  12/20/2007    Right sided salpingectomy and removal of ruptured ectopic pregnancy. D&C.     HC UGI ENDOSCOPY, SIMPLE EXAM  09/01/09     LAPAROSCOPIC APPENDECTOMY N/A 9/24/2015    Procedure: LAPAROSCOPIC APPENDECTOMY;  Surgeon: James Cuellar MD;  [facility]     LAPAROSCOPIC CYSTECTOMY OVARIAN (BENIGN) N/A 9/24/2015    Procedure: LAPAROSCOPIC CYSTECTOMY OVARIAN (BENIGN);  Surgeon: Sydney Ford MD;  [facility]     LAPAROSCOPIC OOPHORECTOMY Right 9/24/2015    Procedure: LAPAROSCOPIC OOPHORECTOMY;  Surgeon: Sydney Ford MD;  [facility]     LITHOTRIPSY  01/17/2007     PELVIS LAPAROSCOPY,DX  10/16/2000    Diagnostic laparoscopy, Endometrial biopsy.     TUBAL/ECTOPIC PREGNANCY  01/23/2007    Lap removal of left ruptured ectopic pregnancy & salpingectomy, D&C     Allergies   Allergen Reactions     Amoxicillin Hives     Anti-Nausea      Anxiety, agitation,severe paranoia. Zofran, Reglan are some of them.  DRAMAMINE WITHOUT ISSUES       Demerol Hcl [Meperidine Hcl] Nausea     Indomethacin Nausea and Vomiting     Macrobid [Nitrofuran Derivatives] Hives     Reglan [Metoclopramide] Other (See Comments)     Acute paranoia, severe     Zofran [Ondansetron] Other (See Comments)     Severe anxiety, agitation     Current Outpatient Medications   Medication     acetaminophen (TYLENOL) 500 MG tablet      blood glucose (CONTOUR NEXT TEST) test strip     blood glucose monitoring (NO BRAND SPECIFIED) meter device kit     cyclobenzaprine (FLEXERIL) 5 MG tablet     ibuprofen (ADVIL) 200 MG tablet     metFORMIN (GLUCOPHAGE) 500 MG tablet     Omega-3 Fatty Acids (FISH OIL) 1200 MG capsule     omeprazole (PRILOSEC) 20 MG DR capsule     oseltamivir (TAMIFLU) 75 MG capsule     No current facility-administered medications for this visit.          Review of Systems   Constitutional: Positive for fever and malaise/fatigue.   HENT: Positive for congestion, sinus pain and sore throat.    Respiratory: Positive for cough. Negative for hemoptysis, sputum production, shortness of breath and wheezing.    Cardiovascular: Negative.    Gastrointestinal: Positive for nausea.   Genitourinary: Negative.    Musculoskeletal: Negative.    Skin: Negative.    Neurological: Positive for headaches.   Endo/Heme/Allergies: Negative.          Physical Exam  Vitals signs and nursing note reviewed.   Constitutional:       General: She is not in acute distress.     Appearance: She is not toxic-appearing.      Comments: /85 (BP Location: Right arm, Patient Position: Sitting, Cuff Size: Adult Large)   Pulse 115   Temp 99.6  F (37.6  C) (Oral)   Resp 16   SpO2 97%      HENT:      Head: Normocephalic.      Right Ear: Tympanic membrane normal.      Left Ear: Tympanic membrane normal.      Nose: Congestion and rhinorrhea present.      Mouth/Throat:      Pharynx: Oropharynx is clear. Posterior oropharyngeal erythema present.   Eyes:      Conjunctiva/sclera: Conjunctivae normal.   Cardiovascular:      Rate and Rhythm: Tachycardia present.      Heart sounds: Normal heart sounds.   Pulmonary:      Effort: Pulmonary effort is normal.      Breath sounds: Normal breath sounds.   Abdominal:      General: Bowel sounds are normal.      Tenderness: There is no abdominal tenderness.   Lymphadenopathy:      Cervical: Cervical adenopathy present.   Skin:      Capillary Refill: Capillary refill takes less than 2 seconds.   Neurological:      Mental Status: She is alert and oriented to person, place, and time.       Assessment:  1. Influenza-like illness        Plan:  Orders Placed This Encounter     oseltamivir (TAMIFLU) 75 MG capsule   Tylenol or Ibuprofen as directed on package for pain or fever  Instructions regarding self-care of patient/child reviewed.   Written instructions provided in after visit summary and reviewed.  Patient instructed to see primary care provider for new or persistent symptoms.   Red flag symptoms reviewed and patient has been instructed to seek emergent care  Please contact pharmacy for medication questions.  Patient instructed to take medications as directed on package.      Dai Bynum, DNP, APRN, CNP

## 2020-02-07 ENCOUNTER — NURSE TRIAGE (OUTPATIENT)
Dept: FAMILY MEDICINE | Facility: CLINIC | Age: 40
End: 2020-02-07

## 2020-02-07 NOTE — TELEPHONE ENCOUNTER
"Patient reports having increased frequency of menstrual cycles. She has had more than one cycle in a month. Cycles are  for 2-3 days at a time with heavier flow.   She would like to know when she should be seen for the increased cycles.  She has an upcoming ultrasound scheduled for 02/11/2020. Advised that if she has increased bleeding of 2 pads per hour for 2 hours she should call back.  Routing to provider to see when she should follow up.  Joanne Doe RN BSN     Additional Information    Normal menstrual flow    Answer Assessment - Initial Assessment Questions  1. AMOUNT: \"Describe the bleeding that you are having.\"     - SPOTTING: spotting, or pinkish / brownish mucous discharge; does not fill panti-liner or pad     - MILD:  less than 1 pad / hour; less than patient's usual menstrual bleeding    - MODERATE: 1-2 pads / hour; 1 menstrual cup every 6 hours; small-medium blood clots (e.g., pea, grape, small coin)    - SEVERE: soaking 2 or more pads/hour for 2 or more hours; 1 menstrual cup every 2 hours; bleeding not contained by pads or continuous red blood from vagina; large blood clots (e.g., golf ball, large coin)       mild  2. ONSET: \"When did the bleeding begin?\" \"Is it continuing now?\"      One month ago  3. MENSTRUAL PERIOD: \"When was the last normal menstrual period?\" \"How is this different than your period?\"      shorter and heavier more frequently  4. REGULARITY: \"How regular are your periods?\"     Polycystic   5. ABDOMINAL PAIN: \"Do you have any pain?\" \"How bad is the pain?\"  (e.g., Scale 1-10; mild, moderate, or severe)    - MILD (1-3): doesn't interfere with normal activities, abdomen soft and not tender to touch     - MODERATE (4-7): interferes with normal activities or awakens from sleep, tender to touch     - SEVERE (8-10): excruciating pain, doubled over, unable to do any normal activities       mild  6. PREGNANCY: \"Could you be pregnant?\" \"Are you sexually active?\" \"Did you recently give " "birth?\"      none  7. BREASTFEEDING: \"Are you breastfeeding?\"      no  8. HORMONES: \"Are you taking any hormone medications, prescription or OTC?\" (e.g., birth control pills, estrogen)        9. BLOOD THINNERS: \"Do you take any blood thinners?\" (e.g., Coumadin/warfarin, Pradaxa/dabigatran, aspirin)      none  10. CAUSE: \"What do you think is causing the bleeding?\" (e.g., recent gyn surgery, recent gyn procedure; known bleeding disorder, cervical cancer, polycystic ovarian disease, fibroids)          unsure  11. HEMODYNAMIC STATUS: \"Are you weak or feeling lightheaded?\" If so, ask: \"Can you stand and walk normally?\"         ok  12. OTHER SYMPTOMS: \"What other symptoms are you having with the bleeding?\" (e.g., passed tissue, vaginal discharge, fever, menstrual-type cramps)    Protocols used: VAGINAL BLEEDING - [id number]    "

## 2020-02-07 NOTE — TELEPHONE ENCOUNTER
Reason for call:  Patient reporting a symptom    [facility] is aware Dr Rivera is not in today    Symptom or request: Bleeding on and off every other week x 1 month    Duration (how long have symptoms been present): 1 Month    Have you been treated for this before? Yes    Additional comments: Discussed her heavy bleeding with Dr Ford before. Has an appointment on the 02/11 for ultra sound, does Dr Ford want to see her on that day or before?    Phone Number patient can be reached at:  Home number on file [phone] (home)    Best Time:  any    Can we leave a detailed message on this number:  YES    Call taken on 2/7/2020 at 11:06 AM by Lauren Delvalle

## 2020-02-07 NOTE — TELEPHONE ENCOUNTER
Patient is having an increase in frequency of menstrual cycles. She is having an ultrasound done on Tuesday 02/11/2019. She would like to know depending on results of ultrasound if she should be seen.    Joanne Doe RN BSN

## 2020-02-11 ENCOUNTER — HOSPITAL ENCOUNTER (OUTPATIENT)
Dept: ULTRASOUND IMAGING | Facility: CLINIC | Age: 40
Discharge: HOME | End: 2020-02-11
Attending: OBSTETRICS & GYNECOLOGY | Admitting: OBSTETRICS & GYNECOLOGY
Payer: COMMERCIAL

## 2020-02-11 ENCOUNTER — PATIENT OUTREACH (OUTPATIENT)
Dept: PEDIATRICS | Facility: CLINIC | Age: 40
End: 2020-02-11

## 2020-02-11 ENCOUNTER — OFFICE VISIT (OUTPATIENT)
Dept: FAMILY MEDICINE | Facility: CLINIC | Age: 40
End: 2020-02-11
Payer: COMMERCIAL

## 2020-02-11 VITALS
RESPIRATION RATE: 14 BRPM | TEMPERATURE: 97.7 F | HEART RATE: 74 BPM | OXYGEN SATURATION: 97 % | DIASTOLIC BLOOD PRESSURE: 84 MMHG | WEIGHT: 216.6 LBS | SYSTOLIC BLOOD PRESSURE: 124 MMHG | BODY MASS INDEX: 37.18 KG/M2

## 2020-02-11 DIAGNOSIS — N83.202 CYST OF LEFT OVARY: ICD-10-CM

## 2020-02-11 DIAGNOSIS — N92.1 MENORRHAGIA WITH IRREGULAR CYCLE: Primary | ICD-10-CM

## 2020-02-11 PROCEDURE — 76830 TRANSVAGINAL US NON-OB: CPT

## 2020-02-11 PROCEDURE — 99000 SPECIMEN HANDLING OFFICE-LAB: CPT | Performed by: OBSTETRICS & GYNECOLOGY

## 2020-02-11 PROCEDURE — 88342 IMHCHEM/IMCYTCHM 1ST ANTB: CPT | Performed by: OBSTETRICS & GYNECOLOGY

## 2020-02-11 PROCEDURE — 87624 HPV HI-RISK TYP POOLED RSLT: CPT | Performed by: OBSTETRICS & GYNECOLOGY

## 2020-02-11 PROCEDURE — 99214 OFFICE O/P EST MOD 30 MIN: CPT | Mod: 25 | Performed by: OBSTETRICS & GYNECOLOGY

## 2020-02-11 PROCEDURE — 88305 TISSUE EXAM BY PATHOLOGIST: CPT | Performed by: OBSTETRICS & GYNECOLOGY

## 2020-02-11 PROCEDURE — 58100 BIOPSY OF UTERUS LINING: CPT | Performed by: OBSTETRICS & GYNECOLOGY

## 2020-02-11 PROCEDURE — G0145 SCR C/V CYTO,THINLAYER,RESCR: HCPCS | Performed by: OBSTETRICS & GYNECOLOGY

## 2020-02-11 ASSESSMENT — PAIN SCALES - GENERAL: PAINLEVEL: MODERATE PAIN (4)

## 2020-02-11 ASSESSMENT — PATIENT HEALTH QUESTIONNAIRE - PHQ9
SUM OF ALL RESPONSES TO PHQ QUESTIONS 1-9: 6
10. IF YOU CHECKED OFF ANY PROBLEMS, HOW DIFFICULT HAVE THESE PROBLEMS MADE IT FOR YOU TO DO YOUR WORK, TAKE CARE OF THINGS AT HOME, OR GET ALONG WITH OTHER PEOPLE: SOMEWHAT DIFFICULT
SUM OF ALL RESPONSES TO PHQ QUESTIONS 1-9: 6

## 2020-02-11 NOTE — TELEPHONE ENCOUNTER
Per Dr. Ford patient should see him tomorrow after her ultrasound is complete.  Attempted to contact patient at number given, the line was answered but no one was there then it hung up. Please attempt to contact patient to advise. Appointment made  Triny Hinkle CMA

## 2020-02-11 NOTE — TELEPHONE ENCOUNTER
Called patient again and she answered. Patient advised on information below and is ok with this plan.  Triny Hinkle CMA

## 2020-02-13 LAB
COPATH REPORT: NORMAL
PAP: NORMAL

## 2020-02-19 LAB — COPATH REPORT: NORMAL

## 2020-02-26 DIAGNOSIS — K21.9 GASTROESOPHAGEAL REFLUX DISEASE WITHOUT ESOPHAGITIS: ICD-10-CM

## 2020-02-27 NOTE — TELEPHONE ENCOUNTER
"Omeprazole  Last Written Prescription Date:  02/04/2019  Last Fill Quantity: 30,  # refills: 11   Last office visit: 2/11/2020 with prescribing provider:  Liliana  Future Office Visit:   Next 5 appointments (look out 90 days)    Mar 02, 2020  4:40 PM CST  [facility] with Greg Ford MD  [facility] [address] [place] [id number] [phone]         Requested Prescriptions   Pending Prescriptions Disp Refills     omeprazole (PRILOSEC) 20 MG DR capsule [Pharmacy Med Name: OMEPRAZOLE 20MG CPDR] 30 capsule 11     Sig: TAKE ONE CAPSULE BY MOUTH ONCE DAILY 30 TO 60 MINUTES BEFORE A MEAL       PPI Protocol Passed - 2/26/2020  5:21 PM        Passed - Not on Clopidogrel (unless Pantoprazole ordered)        Passed - No diagnosis of osteoporosis on record        Passed - Recent (12 mo) or future (30 days) visit within the authorizing provider's specialty     Patient has had an office visit with the authorizing provider or a provider within the authorizing providers department within the previous 12 mos or has a future within next 30 days. See \"Patient Info\" tab in inbasket, or \"Choose Columns\" in Meds & Orders section of the refill encounter.              Passed - Medication is active on med list        Passed - Patient is age 18 or older        Passed - No active pregnacy on record        Passed - No positive pregnancy test in past 12 months        Joanne Doe RN BSN    "

## 2020-03-02 ENCOUNTER — OFFICE VISIT (OUTPATIENT)
Dept: FAMILY MEDICINE | Facility: CLINIC | Age: 40
End: 2020-03-02
Payer: COMMERCIAL

## 2020-03-02 VITALS
DIASTOLIC BLOOD PRESSURE: 88 MMHG | WEIGHT: 217.9 LBS | TEMPERATURE: 97.1 F | BODY MASS INDEX: 37.4 KG/M2 | SYSTOLIC BLOOD PRESSURE: 114 MMHG | OXYGEN SATURATION: 99 % | RESPIRATION RATE: 14 BRPM | HEART RATE: 100 BPM

## 2020-03-02 DIAGNOSIS — N85.02 ENDOMETRIAL INTRAEPITHELIAL NEOPLASIA (EIN): Primary | ICD-10-CM

## 2020-03-02 PROCEDURE — 99214 OFFICE O/P EST MOD 30 MIN: CPT | Performed by: OBSTETRICS & GYNECOLOGY

## 2020-03-02 ASSESSMENT — PAIN SCALES - GENERAL: PAINLEVEL: MODERATE PAIN (4)

## 2020-03-02 NOTE — PROGRESS NOTES
Subjective: Tamiko had a recent endometrial biopsy showing intraepithelial neoplasia and I spoke with the pathologist who advised that she needs a hysterectomy.  This is not something that will be treated appropriately with conservative measures.  She is here today to discuss the logistics of this.      The past medical history, social history, past surgical history and family history as shown below have been reviewed by me today.    Past Medical History:   Diagnosis Date     Abnormal Pap smear 2006, 2007,     ASC-H, ASCUS + HPV 45     Calculus of kidney 2002     Cervical high risk HPV (human papillomavirus) test positive     + HPV 45 (high risk)     History of colposcopy with cervical biopsy 2/9/06, 3/15/07    koilocytosis 2007        Allergies   Allergen Reactions     Amoxicillin Hives     Anti-Nausea      Anxiety, agitation,severe paranoia. Zofran, Reglan are some of them.  DRAMAMINE WITHOUT ISSUES       Demerol Hcl [Meperidine Hcl] Nausea     Indomethacin Nausea and Vomiting     Macrobid [Nitrofuran Derivatives] Hives     Reglan [Metoclopramide] Other (See Comments)     Acute paranoia, severe     Zofran [Ondansetron] Other (See Comments)     Severe anxiety, agitation     Current Outpatient Medications   Medication Sig Dispense Refill     acetaminophen (TYLENOL) 500 MG tablet Take 1,000 mg by mouth every 6 hours as needed for mild pain       blood glucose (CONTOUR NEXT TEST) test strip 1 strip by In Vitro route 4 times daily 150 each 3     blood glucose monitoring (NO BRAND SPECIFIED) meter device kit Use to test blood sugar 2 times daily or as directed. 1 kit 0     cyclobenzaprine (FLEXERIL) 5 MG tablet Take 1 tablet (5 mg) by mouth 3 times daily as needed for muscle spasms 90 tablet 0     ibuprofen (ADVIL) 200 MG tablet Take 200 mg by mouth every 4 hours as needed for mild pain       metFORMIN (GLUCOPHAGE) 500 MG tablet 1 tablet BID 60 tablet 11     Omega-3 Fatty Acids (FISH OIL) 1200 MG capsule Take 1,200 mg  by mouth daily       omeprazole (PRILOSEC) 20 MG DR capsule TAKE ONE CAPSULE BY MOUTH ONCE DAILY 30 TO 60 MINUTES BEFORE A MEAL 30 capsule 11     Past Surgical History:   Procedure Laterality Date     C REMOVAL OF KIDNEY STONE      two surgeries, 2002,2003     CHOLECYSTECTOMY, LAPOROSCOPIC  5/11/2007    Cholecystectomy, Laparoscopic     COLONOSCOPY  05/17/10     COLONOSCOPY N/A 8/27/2019    Procedure: COLONOSCOPY;  Surgeon: Paramjit Kingston DO;  Location: [facility]     CONIZATION  7/1/2011    Procedure:CONIZATION; cold knife and punch biopsy of mole on back; Surgeon:SYDNEY FORD; Location:[facility]     DILATION AND CURETTAGE, HYSTEROSCOPY, LAPAROSCOPY, COMBINED Right 9/24/2015    Procedure: COMBINED DILATION AND CURETTAGE, HYSTEROSCOPY, LAPAROSCOPY;  Surgeon: Sydney Ford MD;  Location: [facility]     DISCECTOMY LUMBAR MINIMALLY INVASIVE ONE LEVEL Left 1/23/2019    Procedure: Minimally Invasive Surgery Left Lumbar 5-Sacral 1 microdiscectomy;  Surgeon: Mason Roe MD;  [facility]     ESOPHAGOSCOPY, GASTROSCOPY, DUODENOSCOPY (EGD), COMBINED  5/21/2014    Procedure: COMBINED ESOPHAGOSCOPY, GASTROSCOPY, DUODENOSCOPY (EGD), BIOPSY SINGLE OR MULTIPLE;  Surgeon: Earl Lane MD;  Location: [facility]     EXCISE LESION TRUNK  7/1/2011    Procedure:EXCISE LESION TRUNK; Surgeon:SYDNEY FORD; Location:[facility]     HC FRAGMENTING OF KIDNEY STONE  11/30/2005     HC RX ECTOP PREG BY SCOPE,RMV TUBE/OVRY  12/20/2007    Right sided salpingectomy and removal of ruptured ectopic pregnancy. D&C.     HC UGI ENDOSCOPY, SIMPLE EXAM  09/01/09     LAPAROSCOPIC APPENDECTOMY N/A 9/24/2015    Procedure: LAPAROSCOPIC APPENDECTOMY;  Surgeon: James Cuellar MD;  [facility]     LAPAROSCOPIC CYSTECTOMY OVARIAN (BENIGN) N/A 9/24/2015    Procedure: LAPAROSCOPIC CYSTECTOMY OVARIAN (BENIGN);  Surgeon: Sydney Ford MD;  [facility]     LAPAROSCOPIC OOPHORECTOMY Right  2015    Procedure: LAPAROSCOPIC OOPHORECTOMY;  Surgeon: Greg Ford MD;  [facility]     LITHOTRIPSY  2007     PELVIS LAPAROSCOPY,DX  10/16/2000    Diagnostic laparoscopy, Endometrial biopsy.     TUBAL/ECTOPIC PREGNANCY  2007    Lap removal of left ruptured ectopic pregnancy & salpingectomy, D&C     Social History     Socioeconomic History     Marital status:      Spouse name: Joseph     Number of children: 1     Years of education: 9     Highest education level: None   Occupational History     Employer: NONE   Social Needs     Financial resource strain: None     Food insecurity:     Worry: None     Inability: None     Transportation needs:     Medical: None     Non-medical: None   Tobacco Use     Smoking status: Former Smoker     Packs/day: 0.00     Years: 12.00     Pack years: 0.00     Types: Cigarettes     Last attempt to quit: 2019     Years since quittin.7     Smokeless tobacco: Never Used     Tobacco comment: As of 10 /2014, pt has had a few cigs here and there   Substance and Sexual Activity     Alcohol use: Yes     Alcohol/week: 0.0 standard drinks     Comment: every few months     Drug use: No     Sexual activity: Yes     Partners: Male     Birth control/protection: None   Lifestyle     Physical activity:     Days per week: None     Minutes per session: None     Stress: None   Relationships     Social connections:     Talks on phone: None     Gets together: None     Attends Alevism service: None     Active member of club or organization: None     Attends meetings of clubs or organizations: None     Relationship status: None     Intimate partner violence:     Fear of current or ex partner: None     Emotionally abused: None     Physically abused: None     Forced sexual activity: None   Other Topics Concern      Service No     Blood Transfusions No     Caffeine Concern Yes     Comment: Reports 1 can soda/week  Advised not more than 16 ounces  caffeien/day.     Occupational Exposure No     Hobby Hazards No     Sleep Concern No     Stress Concern Yes     Comment: will discuss with      Weight Concern Yes     Comment: Eating disorder in childhood.  Hx. gestational diab.     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     Exercise No     Comment: Advised walking 30 min/day.     Bike Helmet No     Seat Belt Yes     Self-Exams Not Asked     Parent/sibling w/ CABG, MI or angioplasty before 65F 55M? Not Asked   Social History Narrative     and lives at home with her  and daughter.     Family History   Problem Relation Age of Onset     Diabetes Maternal Grandmother         adult onset     Anesthesia Reaction Maternal Grandmother         can't tolerate Novacaine.     Respiratory Maternal Grandmother         COPD and emphysema.     Thyroid Disease Maternal Grandmother      Heart Disease Maternal Grandmother         CHF     Diabetes Paternal Grandfather         adult o nset     Hypertension Paternal Grandfather      Cerebrovascular Disease Paternal Grandfather      Heart Disease Paternal Grandfather         MI, replaced valve,angioplasty     Thyroid Disease Paternal Grandfather      Cancer Maternal Grandfather         skin cancer     Heart Disease Maternal Grandfather         MI     Obesity Mother         on thyroid medication     Thyroid Disease Mother      Diabetes Mother      Rheumatologic Disease Mother      Heart Disease Father      Hypertension Father         and TIA     Lipids Father      Breast Cancer Paternal Grandmother      Arrhythmia Other      Cancer Maternal Aunt         breast/brain cancer     Depression Paternal Aunt      Cerebrovascular Disease Paternal Uncle      Cerebrovascular Disease Paternal Aunt        ROS: A 12 point review of systems was done. Except for what is listed above in the HPI, the systems review is negative .      Objective: Vital signs: Blood pressure  114/88, pulse 100, temperature 97.1  F (36.2  C), temperature source Temporal, resp. rate 14, weight 98.8 kg (217 lb 14.4 oz), SpO2 99 %, not currently breastfeeding.    No exam is repeated today.    Assessment/Plan: A total of 25 minutes were spent face-to-face with this patient during today's consultation, with more than 50% of that time devoted to conversation and counseling about the management decisions.      1.  Intraepithelial neoplasia of the endometrium.  I have advised her to have a hysterectomy.  I have offered her a second opinion.  I was going to refer her to Dr. Sia Conroy at the [facility] [place].  She declined a second opinion.  I also offered Dr. Joanie Dixon at [facility] and she declined.  She is definitely interested in having the hysterectomy because she is having pain and troublesome bleeding.  I informed her that I am out of operative time between now and May 18.  I will look at possibly operating at [facility] and if we cannot find some OR time there between now and May 18 she is willing to wait rather than be seen elsewhere.  I will get back to her tomorrow after I have made some inquiries.  If there is no easily accessible operative time in the near future I will refer to the [place] or [place] as needed.  She really wants to stay with me if possible and we discussed the likelihood that this would be a laparoscopic procedure.  I think there is a fairly strong likelihood that we can be successful laparoscopically but she does understand there is a risk of an open surgery most likely through a Pfannenstiel incision.             The above information was dictating using Dragon voice software and as a result there may be some irregularities that were not detected in my review of this note.    SHARAN Ford MD

## 2020-03-03 ENCOUNTER — HOSPITAL ENCOUNTER (OUTPATIENT)
Facility: CLINIC | Age: 40
End: 2020-03-03
Attending: OBSTETRICS & GYNECOLOGY | Admitting: OBSTETRICS & GYNECOLOGY
Payer: COMMERCIAL

## 2020-03-03 NOTE — TELEPHONE ENCOUNTER
1/24/06 pap: ASC-H  2/9/06 colposcopy/bx (negative)/ECC (negative) pap- ASC-H  5/24/06 pap- ASC-H  9/6/07 pap NIL  1/23/07 pap ASCUS + HPV 45 (high risk)  3/15/07 pap ASCUS + HPV 45 (high risk) colposcopy- bx (negative)/ECC (koilocytosis)  6/19/07 ECC- koilocytosis.  Pap- AGS  10/23/07 pap- ASC-H, ECC- negative  2/14/08 pap NIL- return to yearly paps  2/17/09 pap NIL- repeat 1 year  3/8/10 pap NIL  3/3/11 pap ASCUS + HPV 45 (high risk)- colposcopy recommended  5/16/11 colpo- bx (KAYLA 2/3/HSIL) ECC (ASCUS)  pap (ASC-H)  6/2/11 recommend: [facility]  7/1/11 [facility]: path: KAYLA 2/3 with possible involvement of the endocervical margin.  ECC- neg.  Endometrium- neg.  7/14/11 plan: HIPOLITO in approx 2 months  10/10/11- hysterectomy planned.  (cancelled)  11/7/11 pap-- NIL. Plan--repeat pap in 6 months. (5/7/12)  5/7/12 pap NIL. Plan-- pap in 1 year (due 5/7/13)   5/8/13 pap NIL. Plan-pap in 1 year  5/8/14 pap NIL/neg HPV. Plan- repeat pap/HPV in 1 year (due 5/8/15)  10/14/14 pap NIL/neg HPV. Endometrial biopsy- WNL   1/6/16  Pap NIL/neg HR HPV Plan: paps yearly, per Dr. Ford.  1/30/17 NIL pap/neg HR HPV. Plan: pap in 1 year.   1/23/18 Pap reminder letter sent (rlm)  3/12/18 NIL pap, neg HR HPV. Plan: cotest annually, per Dr. Ford.  3/21/18 Result letter sent per RN request (rlm)  2/27/19 My Chart cotest reminder message sent (rlm)  3/11/19 ECC: negative. NIL pap, neg HR HPV. Plan: cotest in 1 yr  2/11/20 NIL Pap, Neg HPV. EMB: intraepithelial neoplasia. Plan: Hysterectomy.

## 2020-03-04 ENCOUNTER — TELEPHONE (OUTPATIENT)
Dept: FAMILY MEDICINE | Facility: CLINIC | Age: 40
End: 2020-03-04

## 2020-03-04 DIAGNOSIS — Z01.812 PRE-PROCEDURE LAB EXAM: Primary | ICD-10-CM

## 2020-03-04 NOTE — TELEPHONE ENCOUNTER
Patient is scheduled for preop on Monday, 3/9 at 6:00 pm. Post op is scheduled for 3/19 at 11:10 am. Lab appointment is setup on 3/16 at 3:00 pm, which we will need to follow up with patient at her preop if this time will work for her or not. Socorro Woo LPN

## 2020-03-05 ENCOUNTER — OFFICE VISIT (OUTPATIENT)
Dept: FAMILY MEDICINE | Facility: CLINIC | Age: 40
End: 2020-03-05
Payer: COMMERCIAL

## 2020-03-05 ENCOUNTER — TELEPHONE (OUTPATIENT)
Dept: FAMILY MEDICINE | Facility: CLINIC | Age: 40
End: 2020-03-05

## 2020-03-05 VITALS
RESPIRATION RATE: 12 BRPM | SYSTOLIC BLOOD PRESSURE: 98 MMHG | TEMPERATURE: 97.8 F | BODY MASS INDEX: 37.25 KG/M2 | DIASTOLIC BLOOD PRESSURE: 72 MMHG | OXYGEN SATURATION: 98 % | WEIGHT: 217 LBS | HEART RATE: 74 BPM

## 2020-03-05 DIAGNOSIS — Z01.818 PREOP GENERAL PHYSICAL EXAM: ICD-10-CM

## 2020-03-05 DIAGNOSIS — E66.01 MORBID (SEVERE) OBESITY DUE TO EXCESS CALORIES (H): ICD-10-CM

## 2020-03-05 DIAGNOSIS — E11.9 TYPE 2 DIABETES MELLITUS WITHOUT COMPLICATION, WITHOUT LONG-TERM CURRENT USE OF INSULIN (H): ICD-10-CM

## 2020-03-05 DIAGNOSIS — N85.02 ENDOMETRIAL INTRAEPITHELIAL NEOPLASIA (EIN): Primary | ICD-10-CM

## 2020-03-05 LAB
ANION GAP SERPL CALCULATED.3IONS-SCNC: 7 MMOL/L (ref 3–14)
BUN SERPL-MCNC: 10 MG/DL (ref 7–30)
CALCIUM SERPL-MCNC: 8.6 MG/DL (ref 8.5–10.1)
CHLORIDE SERPL-SCNC: 107 MMOL/L (ref 94–109)
CO2 SERPL-SCNC: 23 MMOL/L (ref 20–32)
CREAT SERPL-MCNC: 0.76 MG/DL (ref 0.52–1.04)
ERYTHROCYTE [DISTWIDTH] IN BLOOD BY AUTOMATED COUNT: 12.3 % (ref 10–15)
GFR SERPL CREATININE-BSD FRML MDRD: >90 ML/MIN/{1.73_M2}
GLUCOSE SERPL-MCNC: 174 MG/DL (ref 70–99)
HBA1C MFR BLD: 7.8 % (ref 0–5.6)
HCT VFR BLD AUTO: 45.1 % (ref 35–47)
HGB BLD-MCNC: 15.8 G/DL (ref 11.7–15.7)
MCH RBC QN AUTO: 30.7 PG (ref 26.5–33)
MCHC RBC AUTO-ENTMCNC: 35 G/DL (ref 31.5–36.5)
MCV RBC AUTO: 88 FL (ref 78–100)
PLATELET # BLD AUTO: 242 10E9/L (ref 150–450)
POTASSIUM SERPL-SCNC: 3.8 MMOL/L (ref 3.4–5.3)
RBC # BLD AUTO: 5.14 10E12/L (ref 3.8–5.2)
SODIUM SERPL-SCNC: 137 MMOL/L (ref 133–144)
WBC # BLD AUTO: 8.2 10E9/L (ref 4–11)

## 2020-03-05 PROCEDURE — 99215 OFFICE O/P EST HI 40 MIN: CPT | Performed by: OBSTETRICS & GYNECOLOGY

## 2020-03-05 PROCEDURE — 83036 HEMOGLOBIN GLYCOSYLATED A1C: CPT | Performed by: OBSTETRICS & GYNECOLOGY

## 2020-03-05 PROCEDURE — 36415 COLL VENOUS BLD VENIPUNCTURE: CPT | Performed by: OBSTETRICS & GYNECOLOGY

## 2020-03-05 PROCEDURE — 85027 COMPLETE CBC AUTOMATED: CPT | Performed by: OBSTETRICS & GYNECOLOGY

## 2020-03-05 PROCEDURE — 80048 BASIC METABOLIC PNL TOTAL CA: CPT | Performed by: OBSTETRICS & GYNECOLOGY

## 2020-03-05 RX ORDER — CLINDAMYCIN PHOSPHATE 900 MG/50ML
900 INJECTION, SOLUTION INTRAVENOUS
Status: CANCELLED | OUTPATIENT
Start: 2020-03-05

## 2020-03-05 RX ORDER — CLINDAMYCIN PHOSPHATE 900 MG/50ML
900 INJECTION, SOLUTION INTRAVENOUS SEE ADMIN INSTRUCTIONS
Status: CANCELLED | OUTPATIENT
Start: 2020-03-05

## 2020-03-05 ASSESSMENT — PAIN SCALES - GENERAL: PAINLEVEL: MODERATE PAIN (4)

## 2020-03-05 NOTE — TELEPHONE ENCOUNTER
Type of surgery: HYSTERECTOMY, VAGINAL, LAPAROSCOPY-ASSISTED, WITH SALPINGECTOMY  Location of surgery: [facility]  Date and time of surgery: 3/17/2020 @ 9:30am  Surgeon: Dr. Ford   Assisting: Dr. Dixon  Pre-Op Appt Date: 3/5/2020  Post-Op Appt Date: 3/19/2020   Packet sent out: Yes  Arrival time reviewed with patient:Yes   Transportation reviewed: Yes   NPO reviewed: Yes   Medications reviewed: Yes   Consent obtained: yes  -------------------------------------------------------------  Same day surgery informed: yes  Special considerations: none    Triny Hinkle MA

## 2020-03-05 NOTE — PROGRESS NOTES
Fax number for surgical facility: NA  Primary Physician: Greg Ford  Type of Anesthesia Anticipated: General    Patient has a Health Care Directive or Living Will:  NO    Preop Questions 3/5/2020   Who is doing your surgery? Liliana   What are you having done? Hysterectomy   Date of Surgery/Procedure: march 17 2020   Facility or Hospital where procedure/surgery will be performed: [facility]   1.  Do you have a history of Heart attack, stroke, stent, coronary bypass surgery, or other heart surgery? No   2.  Do you ever have any pain or discomfort in your chest? No   3.  Do you have a history of  Heart Failure? No   4.   Are you troubled by shortness of breath when:  walking on a level surface, or up a slight hill, or at night? No   5.  Do you currently have a cold, bronchitis or other respiratory infection? No   6.  Do you have a cough, shortness of breath, or wheezing? No   7.  Do you sometimes get pains in the calves of your legs when you walk? No   8. Do you or anyone in your family have previous history of blood clots? YES -  Aunt and uncle   9.  Do you or does anyone in your family have a serious bleeding problem such as prolonged bleeding following surgeries or cuts? No   10. Have you ever had problems with anemia or been told to take iron pills? No   11. Have you had any abnormal blood loss such as black, tarry or bloody stools, or abnormal vaginal bleeding? YES -  Heavy vaginal bleeidng   12. Have you ever had a blood transfusion? No   13. Have you or any of your relatives ever had problems with anesthesia? YES - grandma   Kiera Lehman has tolerated anesthesia well   14. Do you have sleep apnea, excessive snoring or daytime drowsiness? No   15. Do you have any prosthetic heart valves? No   16. Do you have prosthetic joints? No   17. Is there any chance that you may be pregnant? 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  She was advised to have hysterectomy    ASSESSMENT: Endometrial intraepithelial neoplasia    PLAN:   HYSTERECTOMY-SEE BELOW-    REFERRING PHYSICIAN:  Liliana  AGE:  39 year old  HEIGHT:  Data Unavailable  WEIGHT:  0 lbs 0 oz  ALLERGIES:     Allergies   Allergen Reactions     Amoxicillin Hives     Anti-Nausea      Anxiety, agitation,severe paranoia. Zofran, Reglan are some of them.  DRAMAMINE WITHOUT ISSUES       Demerol Hcl [Meperidine Hcl] Nausea     Indomethacin Nausea and Vomiting     Macrobid [Nitrofuran Derivatives] Hives     Reglan [Metoclopramide] Other (See Comments)     Acute paranoia, severe     Zofran [Ondansetron] Other (See Comments)     Severe anxiety, agitation       ULTRASOUND FINDINGS:   FINDINGS: Uterus measures 8.9 x 4.3 x 5.6 cm. There are multiple  nabothian cysts in the cervix. One appears to have some debris. There  is prominent vascularity in the endometrium. There is a hypoechoic  structure in the endometrium measuring 0.5 x 0.4 x 0.5 cm which could  represent small polyp. Endometrium is otherwise of normal thickness at  0.6 cm.     The right ovary is surgically absent. The left ovary measures 4.1 x  2.6 x 2.4 cm and contains multiple small cystic structures likely  representing small follicles. The larger minimally complex cystic  structure, in the left ovary on the prior study, is no longer  identified.      No abnormal free fluid collections are identified.                                                                      IMPRESSION:  1. Focal hypoechoic structure in the endometrial stripe could  represent small polyp measuring 0.5 x 4 x 0.6 cm  2. Prominent vascularity in the endometrial stripe is noted.  3. Probable small follicles scattered throughout the left ovary. The  larger mildly complex cystic structure in the left ovary, on the prior  study, is no longer identified.  4.  Multiple nabothian cysts in the cervix. These likely represent  nabothian cysts. One is mildly complex.     I discussed these findings with Dr. Ford on 2/11/2020 at  approximately 1:25 PM.     IVETH FAN MD    Past Medical History:   Diagnosis Date     Abnormal Pap smear 2006, 2007,     ASC-H, ASCUS + HPV 45     Calculus of kidney 2002     Cervical high risk HPV (human papillomavirus) test positive     + HPV 45 (high risk)     History of colposcopy with cervical biopsy 2/9/06, 3/15/07    koilocytosis 2007     PLANNED SURGERY: Laparoscopic assisted vaginal hysterectomy.    PLAN FOR INCISION:  Probable pfannenstiel incision, if incision is necessary. Final determination will be made at the time of surgery.      I did inform the patient that the final decision about incision will be based on my examination under anesthesia.  I will use clinical judgment regarding how much surgical exposure is necessary and then determine if a midline or a transverse incision is best.    PLAN REGARDING HER CERVIX:  Remove if possible      The patient did read over an information sheet regarding whether to remove the cervix with the uterus or keep it intact.  I explained that there are some controversies surrounding whether the cervix is involved in sexual orgasm.  Therefore, some women choose to keep it.  Others feel that keeping the cervix contributes to the pelvic support of the vaginal apex and cuff.  However, she understands that if the cervix is kept, a Pap should be done at regular intervals to rule out cervical dysplasia.  Keeping the cervix is not generally advised for women with previously-abnormal Paps.  Also, retaining the cervix might lead to minor spotting at the time of menses because there is uterine tissue in the cervix.  If she decides to have the cervix removed at a future date, it will be more challenging surgery because the bladder may adhere to the back side of it, requiring surgical expertise at another  [facility] to perform a trachelectomy.  She was advised to read more about this on line.  I explained that sometimes it is quite difficult to remove the cervix especially if there has been previous surgery such as a  section. If we stop short of removing the cervix it would be due to a safety concern such as excessive bleeding.     PLAN REGARDING OVARIES: The right ovary is already surgically absent.  She wants to keep the left ovary intact unless something abnormal is seen on it.    We had a discussion about whether to keep the ovaries or remove them with surgery.  She knows that by keeping the ovaries, there is a 5% chance of needing another surgery in the future to remove one or both if a cyst or cancer develops.  If the ovaries are taken, then menopause ensues.  We discussed the symptoms and risks of this, including risks of taking hormone replacement.    Bladder symptoms:   There are no   symptoms of stress urinary incontinence.      PLAN REGARDING FALLOPIAN TUBES: Her fallopian tubes have already been surgically removed.    RISKS DISCUSSION:         She was given several  detailed pamphlets  describing the surgery and the more common risks involved.  We discussed the risks, which include but are not limited to bleeding, infection, possible injury to the organs, including the bowel, bladder, ureters and other internal organs, possible need for cystoscopy, possible vesicovaginal fistula or rectovaginal fistula formation, possible nerve paresthesias due to retractor placement, possible changes in sexual function and changes in urination and anesthesia risks.   After answering her questions, I handed her a consent form and allowed her to review it thoroughly.  She was offered a second opinion and declined.   She understands that if a bladder injury is discovered it will be addressed but she may need to go home with a Saldana catheter in her bladder for one or more weeks to allow the bladder or other tissues  to heal.  There is the option of simply starting with an open surgery which might lower her risk of bladder injury but she does want a laparoscopy even if it means slightly higher risk of bladder injury.The risk of bladder injury with open surgery is generally 1-2% and the risk with laparoscopy can be anywhere from 2-5%. We usually plan this surgery for a day when our urologist is present but there are infrequent occasions when the urologist has changed plans and is not available that day. I will likely perform a cystoscopy to look into the bladder after the surgery to inspect the ureter openings for urine flow and also to make sure the bladder has no lacerations or sutures present. Sometimes that is difficult to tell even with cystoscopy.She signed the form witnessed by my nurse.  I signed it as well.     HOME SUPPORT:  She understands that she will need some help around the home for the first several weeks after surgery.    EXPECTATIONS:  I told her that while most surgeries go smoothly, a perfectly-smooth course should not be assumed.  She should expect some form of inconvenience along the way.  It might be a minor wound infection or a slow return of bowel function or perhaps a more serious concern, such as pneumonia or blood clot.  At any rate, I and my partners will follow her through these unanticipated obstacles.  She knows to expect 3 or 4 days in the hospital if open laparotomy is done and 6 weeks at home recuperating and 3-4 months until full recovery occurs. If laparoscopy is successful she may be discharged to home as same-day surgery. She will need to limit lifting to 20 pounds for 6-12 weeks after surgery depending upon what is done.  I advised her to limit visitors on the first hospital day to allow her pain medications to work more effectively.  She may  undergo a preoperative bowel prep and clear-liquid diet 1 day prior to surgery.   She knows to be n.p.o. 8 hours before the surgery.  She will have  "preoperative laboratory work.  If it is abnormal, I may choose to postpone her surgery.    Her preop exam will be done by Greg Ford      After discussing these issues today and on previous occasions, I feel that she has a very-thorough understanding of the indications for surgery and the possible risks associated with it.        Because of her previous abdominal surgery she knows that she is under greater than average surgical risk due to anticipated adhesions from prior surgery.          A total of 25 minutes were spent face-to-face with this patient during today's consultation, with more than 50% of that time devoted to conversation and counseling about the management decisions.          Thank you for this consultation.     SHARAN Ford MD        Note: The information listed above was given to the patient in several hysterectomy information booklets and also in a separate information sheet detailing the specific as well as the general risks pertaining to this surgery that we discussed today before she signed the consent.       Here are the contents of the information sheet I gave her( it mirrors the information listed in my note above):        \"PLEASE NOTE:  THE FOLLOWING PARAGRAPHS WILL BE PLACED INTO YOUR MEDICAL RECORD DESCRIBING WHAT WE HAVE DISCUSSED BEFORE YOU SIGNED YOUR CONSENT FOR SURGERY. PLEASE READ THEM OVER CAREFULLY AND TAKE SOME NOTES TO ASK ME ABOUT IF YOU HAVE ANY CONCERNS OR QUESTIONS. WE WILL DISCUSS THIS INFORMATION AT THE TIME WHEN YOU COME IN TO SIGN YOUR CONSENT.      NOTE TO BE PLACED IN YOUR CHART:            RISKS DISCUSSION:       She was given several  detailed pamphlets  describing the surgery and the more common risks involved.  We discussed the risks, which include but are not limited to bleeding, infection, possible injury to the organs, including the bowel, bladder, ureters and other internal organs, possible need for cystoscopy, possible vesicovaginal fistula or " rectovaginal fistula formation, possible nerve paresthesias due to retractor placement, possible changes in sexual function and changes in urination and anesthesia risks.   After answering her questions, I handed her a consent form and allowed her to review it thoroughly.  She was offered a second opinion and declined.  She understands that if a bladder injury is discovered it will be addressed but she may need to go home with a Saldana catheter in her bladder for one or more weeks to allow the bladder or other tissues to heal.  There is the option of simply starting with an open surgery which might lower her risk of bladder injury but she does want a laparoscopy even if it means slightly higher risk of bladder injury.The risk of bladder injury with open surgery is generally 1-2% and the risk with laparoscopy can be anywhere from 2-5%. We usually plan this surgery for a day when our urologist is present but there are infrequent occasions when the urologist has changed plans and is not available that day. I will likely perform a cystoscopy to look into the bladder after the surgery to inspect the ureter openings for urine flow and also to make sure the bladder has no lacerations or sutures present. Sometimes that is difficult to tell even with cystoscopy.She signed the form witnessed by my nurse.  I signed it as well.       HOME SUPPORT:  She understands that she will need some help around the home for the first several weeks after surgery.    EXPECTATIONS:  I told her that while most surgeries go smoothly, a perfectly-smooth course should not be assumed.  She should expect some form of inconvenience along the way.  It might be a minor wound infection or a slow return of bowel function or perhaps a more serious concern, such as pneumonia or blood clot.  At any rate, I and my partners will follow her through these unanticipated obstacles.  She knows to expect 3 or 4 days in the hospital if open laparotomy is done and 6  "weeks at home recuperating and 3-4 months until full recovery occurs. If laparoscopy is successful she will likely  be discharged to home as same-day surgery. She will need to limit lifting to 20 pounds for 6-12 weeks after surgery depending upon what is done.  I advised her to limit visitors on the first hospital day to allow her pain medications to work more effectively.  She may  undergo a preoperative bowel prep and clear-liquid diet 1 day prior to surgery. She knows to be n.p.o. 8 hours before the surgery.  She will have preoperative laboratory work.  If it is abnormal, I may choose to postpone her surgery.    \"        A total of 25 minutes were spent face-to-face with this patient (before the preop exam) during today's consultation, with all of that  of that time devoted to conversation and counseling about the management decisions.    Note:  AFTER  The consenting was done, i proceeded with the preop exam, which added an additional 15 minutes to the exam time. See results below:    Preoperative History and Physical    Chief complaint/indicated for surgery/planned surgery:    Fallon is planning to undergo laparoscopic assisted vaginal hysterectomy by Dr Ford.    Past Medical History:   Diagnosis Date     Abnormal Pap smear 2006, 2007,     ASC-H, ASCUS + HPV 45     Calculus of kidney 2002     Cervical high risk HPV (human papillomavirus) test positive     + HPV 45 (high risk)     History of colposcopy with cervical biopsy 2/9/06, 3/15/07    koilocytosis 2007     Current Outpatient Medications   Medication Sig Dispense Refill     acetaminophen (TYLENOL) 500 MG tablet Take 1,000 mg by mouth every 6 hours as needed for mild pain       blood glucose (CONTOUR NEXT TEST) test strip 1 strip by In Vitro route 4 times daily 150 each 3     blood glucose monitoring (NO BRAND SPECIFIED) meter device kit Use to test blood sugar 2 times daily or as directed. 1 kit 0     cyclobenzaprine (FLEXERIL) 5 MG tablet Take 1 " tablet (5 mg) by mouth 3 times daily as needed for muscle spasms 90 tablet 0     ibuprofen (ADVIL) 200 MG tablet Take 200 mg by mouth every 4 hours as needed for mild pain       metFORMIN (GLUCOPHAGE) 500 MG tablet 1 tablet BID 60 tablet 11     Omega-3 Fatty Acids (FISH OIL) 1200 MG capsule Take 1,200 mg by mouth daily       omeprazole (PRILOSEC) 20 MG DR capsule TAKE ONE CAPSULE BY MOUTH ONCE DAILY 30 TO 60 MINUTES BEFORE A MEAL 30 capsule 11       There has been no recent use of OTC or herbal medications.   The patient denies any recent ASA or NSAID use.   The patient denies any recent steroid use within the last 6 months.   The patient denies any alcohol or drug use.     Allergies   Allergen Reactions     Amoxicillin Hives     Anti-Nausea      Anxiety, agitation,severe paranoia. Zofran, Reglan are some of them.  DRAMAMINE WITHOUT ISSUES       Demerol Hcl [Meperidine Hcl] Nausea     Indomethacin Nausea and Vomiting     Macrobid [Nitrofuran Derivatives] Hives     Reglan [Metoclopramide] Other (See Comments)     Acute paranoia, severe     Zofran [Ondansetron] Other (See Comments)     Severe anxiety, agitation     History   Smoking Status     Former Smoker     Packs/day: 0.00     Years: 12.00     Types: Cigarettes     Quit date: 6/2/2019   Smokeless Tobacco     Never Used     Comment: As of 10 /2014, pt has had a few cigs here and there     Past Surgical History:   Procedure Laterality Date     C REMOVAL OF KIDNEY STONE      two surgeries, 2002,2003     CHOLECYSTECTOMY, LAPOROSCOPIC  5/11/2007    Cholecystectomy, Laparoscopic     COLONOSCOPY  05/17/10     COLONOSCOPY N/A 8/27/2019    Procedure: COLONOSCOPY;  Surgeon: Paramjit Kingston DO;  Location:  GI     CONIZATION  7/1/2011    Procedure:CONIZATION; cold knife and punch biopsy of mole on back; Surgeon:SYDNEY GRAFF; Location: OR     DILATION AND CURETTAGE, HYSTEROSCOPY, LAPAROSCOPY, COMBINED Right 9/24/2015    Procedure: COMBINED DILATION  AND CURETTAGE, HYSTEROSCOPY, LAPAROSCOPY;  Surgeon: Sydney Ford MD;  Location: [facility]     DISCECTOMY LUMBAR MINIMALLY INVASIVE ONE LEVEL Left 1/23/2019    Procedure: Minimally Invasive Surgery Left Lumbar 5-Sacral 1 microdiscectomy;  Surgeon: Mason Roe MD;  Location: [facility] OR     ESOPHAGOSCOPY, GASTROSCOPY, DUODENOSCOPY (EGD), COMBINED  5/21/2014    Procedure: COMBINED ESOPHAGOSCOPY, GASTROSCOPY, DUODENOSCOPY (EGD), BIOPSY SINGLE OR MULTIPLE;  Surgeon: Earl Lane MD;  Location: [facility]     EXCISE LESION TRUNK  7/1/2011    Procedure:EXCISE LESION TRUNK; Surgeon:SYDNEY FORD; Location:[facility] OR     HC FRAGMENTING OF KIDNEY STONE  11/30/2005     HC RX ECTOP PREG BY SCOPE,RMV TUBE/OVRY  12/20/2007    Right sided salpingectomy and removal of ruptured ectopic pregnancy. D&C.     HC UGI ENDOSCOPY, SIMPLE EXAM  09/01/09     LAPAROSCOPIC APPENDECTOMY N/A 9/24/2015    Procedure: LAPAROSCOPIC APPENDECTOMY;  Surgeon: James Cuellar MD;  Location: [facility]     LAPAROSCOPIC CYSTECTOMY OVARIAN (BENIGN) N/A 9/24/2015    Procedure: LAPAROSCOPIC CYSTECTOMY OVARIAN (BENIGN);  Surgeon: Sydney Ford MD;  Location[facility]     LAPAROSCOPIC OOPHORECTOMY Right 9/24/2015    Procedure: LAPAROSCOPIC OOPHORECTOMY;  Surgeon: Sydney Ford MD;  Location: [facility]     LITHOTRIPSY  01/17/2007     PELVIS LAPAROSCOPY,DX  10/16/2000    Diagnostic laparoscopy, Endometrial biopsy.     TUBAL/ECTOPIC PREGNANCY  01/23/2007    Lap removal of left ruptured ectopic pregnancy & salpingectomy, D&C     Social History     Socioeconomic History     Marital status:      Spouse name: Joseph     Number of children: 1     Years of education: 9     Highest education level: Not on file   Occupational History     Employer: NONE   Social Needs     Financial resource strain: Not on file     Food insecurity:     Worry: Not on file     Inability: Not on file     Transportation needs:     Medical: Not on  file     Non-medical: Not on file   Tobacco Use     Smoking status: Former Smoker     Packs/day: 0.00     Years: 12.00     Pack years: 0.00     Types: Cigarettes     Last attempt to quit: 2019     Years since quittin.7     Smokeless tobacco: Never Used     Tobacco comment: As of 10 /2014, pt has had a few cigs here and there   Substance and Sexual Activity     Alcohol use: Yes     Alcohol/week: 0.0 standard drinks     Comment: every few months     Drug use: No     Sexual activity: Yes     Partners: Male     Birth control/protection: None   Lifestyle     Physical activity:     Days per week: Not on file     Minutes per session: Not on file     Stress: Not on file   Relationships     Social connections:     Talks on phone: Not on file     Gets together: Not on file     Attends Jainism service: Not on file     Active member of club or organization: Not on file     Attends meetings of clubs or organizations: Not on file     Relationship status: Not on file     Intimate partner violence:     Fear of current or ex partner: Not on file     Emotionally abused: Not on file     Physically abused: Not on file     Forced sexual activity: Not on file   Other Topics Concern      Service No     Blood Transfusions No     Caffeine Concern Yes     Comment: Reports 1 can soda/week  Advised not more than 16 ounces caffeien/day.     Occupational Exposure No     Hobby Hazards No     Sleep Concern No     Stress Concern Yes     Comment: will discuss with      Weight Concern Yes     Comment: Eating disorder in childhood.  Hx. gestational diab.     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     Exercise No     Comment: Advised walking 30 min/day.     Bike Helmet No     Seat Belt Yes     Self-Exams Not Asked     Parent/sibling w/ CABG, MI or angioplasty before 65F 55M? Not Asked   Social History Narrative     and lives at home with her  and daughter.      Family History   Problem Relation Age of Onset     Diabetes Maternal Grandmother         adult onset     Anesthesia Reaction Maternal Grandmother         can't tolerate Novacaine.     Respiratory Maternal Grandmother         COPD and emphysema.     Thyroid Disease Maternal Grandmother      Heart Disease Maternal Grandmother         CHF     Diabetes Paternal Grandfather         adult o nset     Hypertension Paternal Grandfather      Cerebrovascular Disease Paternal Grandfather      Heart Disease Paternal Grandfather         MI, replaced valve,angioplasty     Thyroid Disease Paternal Grandfather      Cancer Maternal Grandfather         skin cancer     Heart Disease Maternal Grandfather         MI     Obesity Mother         on thyroid medication     Thyroid Disease Mother      Diabetes Mother      Rheumatologic Disease Mother      Heart Disease Father      Hypertension Father         and TIA     Lipids Father      Breast Cancer Paternal Grandmother      Arrhythmia Other      Cancer Maternal Aunt         breast/brain cancer     Depression Paternal Aunt      Cerebrovascular Disease Paternal Uncle      Cerebrovascular Disease Paternal Aunt        There is no family history of anesthesia reactions or malignant hyperthermia or psevdocholinestergse problem or porphyria.    Review Of Systems  Skin: negative  Eyes: negative  Ears/Nose/Throat: negative  Respiratory: No shortness of breath, dyspnea on exertion, cough, or hemoptysis  Cardiovascular: negative  Gastrointestinal: negative  Genitourinary: negative  Musculoskeletal: negative  Neurologic: negative  Psychiatric: negative  Hematologic/Lymphatic/Immunologic: negative  Endocrine: negative    Physical Exam:LMP  (LMP Unknown)  Blood pressure 98/72, pulse 74, temperature 97.8  F (36.6  C), temperature source Temporal, resp. rate 12, weight 98.4 kg (217 lb), SpO2 98 %, not currently breastfeeding.        HEENT:    Sclerae and conjunctiva are normal.   Ear canals and TMs  look normal.  Nasal mucosa is pink  - no polyps or masses seen.  Throat is unremarkable . Mucous membranes are moist.     Neck is supple, mobile, no adenopathy or masses palpable. The thyroid feels normal.   Normal range of motion noted.    Chest is clear to auscultation.  No wheezes, rales or rhonchi heard.  Cardiac exam is normal with s1, s2, no murmurs or adventitious sounds.Normal rate and rhythm is heard.     Abdomen is soft,  nondistended, No masses felt.No HSM. No guarding or rigidity or rebound   noted. Palpation reveals  no    tenderness   Normal bowel sounds heard.  She has healed scars from laparoscopic cholecystectomy as well as multiple laparoscopies.  She is morbidly obese.    Extremities are pink and there is no cyanosis and there is no edema. Pulses are physiologic.    Motor and sensory exams are grossly normal. Cranial nerves 2-12 are intact. Cerebellar exam is normal.       Other:  Labs: CBC is normal.  Basic panel normal except for elevated blood sugar.  a1c : 7.8- she will increase metformin     Assessment/Impression:  1.  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    2.Preoperative  Examination: The patient is at LOW to MODERATE  risk for general anesthesia for the proposed surgery.  Branden has had multiple surgical procedures in recent years and has tolerated general anesthesia without difficulty.  Ever she does have type 2 diabetes mellitus and is morbidly obese.  This will add a modicum of risk to her anesthesia administration and intubation.    3.  Type 2 diabetes mellitus- control is adequate but not optimal.  I have asked her to increase the metformin prior to surgery in 2 weeks-hopefully this will improve her control    4.  Morbid obesity- may add some mild risk to the procedure and anesthesia    Recommendations:  Approval given for general anesthesia.    Medications are to be stopped before surgery.  I have advised her to hold the metformin on the night before surgery.   This is because she will not be eating on the morning of surgery.  Discontinue ASA and NSAIDS 5 days prior to surgery to reduce bleeding risk.    Addendum: She has clindamycin  and gentamicin ordered as preop antibiotics.  The clindamycin flagged as possible sensitivity to that medication but I questioned her thoroughly about this and she told me that she has tolerated it in the past.  The only 2 antibiotics that she has truly had hives from were amoxicillin and Macrobid.  Otherwise she has tolerated other antibiotics except for occasional nausea.  She thinks that she will tolerate the clindamycin without any difficulty and I will approve clindamycin and gentamicin as her preoperative antibiotics.      SHARAN Ford MD

## 2020-03-09 ENCOUNTER — TELEPHONE (OUTPATIENT)
Dept: FAMILY MEDICINE | Facility: CLINIC | Age: 40
End: 2020-03-09

## 2020-03-09 NOTE — TELEPHONE ENCOUNTER
Per Dr. Ford patient does not need to be seen today. Patient advised and states understanding. Appointment canceled.  Triny Hinkle CMA

## 2020-03-09 NOTE — TELEPHONE ENCOUNTER
Reason for Call:  Other appointment    Detailed comments: patient calls asking if she was suppose to keep her appointment for today with Dr Ford. She seen him last week.     Phone Number Patient can be reached at: Cell number on file:    Telephone Information:   Mobile [phone]       Best Time: any time    Can we leave a detailed message on this number? YES    Call taken on 3/9/2020 at 10:04 AM by Trish Gamboa

## 2020-03-12 ENCOUNTER — TELEPHONE (OUTPATIENT)
Dept: FAMILY MEDICINE | Facility: CLINIC | Age: 40
End: 2020-03-12

## 2020-03-12 NOTE — TELEPHONE ENCOUNTER
"Reason for Call:  Other     Detailed comments: Questioning the order for a \"Block\".  What kind of block or is this incorrect.    Phone Number Patient can be reached at: [phone]    Best Time: any    Can we leave a detailed message on this number? YES    Call taken on 3/12/2020 at 3:13 PM by Lauren Delvalle      "

## 2020-03-13 ENCOUNTER — TELEPHONE (OUTPATIENT)
Dept: FAMILY MEDICINE | Facility: CLINIC | Age: 40
End: 2020-03-13

## 2020-03-16 DIAGNOSIS — K21.9 GASTROESOPHAGEAL REFLUX DISEASE WITHOUT ESOPHAGITIS: ICD-10-CM

## 2020-03-16 DIAGNOSIS — E11.9 TYPE 2 DIABETES MELLITUS WITHOUT COMPLICATION, WITHOUT LONG-TERM CURRENT USE OF INSULIN (H): ICD-10-CM

## 2020-03-16 NOTE — TELEPHONE ENCOUNTER
Patient states that they cancelled her surgery.  She needs a renewal on her diabetic medication because she had upped it prior to surgery.  Please send refills to [facility].

## 2020-03-17 ENCOUNTER — MYC MEDICAL ADVICE (OUTPATIENT)
Dept: FAMILY MEDICINE | Facility: CLINIC | Age: 40
End: 2020-03-17

## 2020-03-17 DIAGNOSIS — E11.9 TYPE 2 DIABETES MELLITUS WITHOUT COMPLICATION, WITHOUT LONG-TERM CURRENT USE OF INSULIN (H): Primary | ICD-10-CM

## 2020-03-17 NOTE — TELEPHONE ENCOUNTER
Routing refill request to provider for review/approval because:  Drug interaction/ allergy warning    Tammy Liang RN on 3/17/2020 at 11:44 AM

## 2020-03-17 NOTE — TELEPHONE ENCOUNTER
Requested Prescriptions   Pending Prescriptions Disp Refills     metFORMIN (GLUCOPHAGE) 500 MG tablet 60 tablet 11     Si tablet BID   Last Written Prescription Date:  19  Last Fill Quantity: 60,  # refills: 11   Last office visit: 3/5/2020 with prescribing provider:  Liliana   Future Office Visit:   Next 5 appointments (look out 90 days)    Mar 19, 2020 11:10 AM CDT  [facility] [address] [place] [id number] [phone]             Biguanide Agents Failed - 3/16/2020  8:39 AM        Failed - Patient has documented LDL within the past 12 mos.     Recent Labs   Lab Test 19  0840   *             Passed - Blood pressure less than 140/90 in past 6 months     BP Readings from Last 3 Encounters:   20 98/72   20 114/88   20 124/84                 Passed - Patient has had a Microalbumin in the past 15 mos.     Recent Labs   Lab Test 19  0840   MICROL 21   UMALCR 15.04             Passed - Patient is age 10 or older        Passed - Patient has documented A1c within the specified period of time.     If HgbA1C is 8 or greater, it needs to be on file within the past 3 months.  If less than 8, must be on file within the past 6 months.     Recent Labs   Lab Test 20  1017   A1C 7.8*             Passed - Patient's CR is NOT>1.4 OR Patient's EGFR is NOT<45 within past 12 mos.     Recent Labs   Lab Test 20  1017   GFRESTIMATED >90   GFRESTBLACK >90       Recent Labs   Lab Test 20  1017   CR 0.76             Passed - Patient does NOT have a diagnosis of CHF.        Passed - Medication is active on med list        Passed - Patient is not pregnant        Passed - Patient has not had a positive pregnancy test within the past 12 mos.         Passed - Recent (6 mo) or future (30 days) visit within the authorizing provider's specialty     Patient had office visit in the  "last 6 months or has a visit in the next 30 days with authorizing provider or within the authorizing provider's specialty.  See \"Patient Info\" tab in inbasket, or \"Choose Columns\" in Meds & Orders section of the refill encounter.                 "

## 2020-04-07 NOTE — ED AVS SNAPSHOT
[facility]    [address] [place] [address]    Phone:  [phone]    Fax:  [phone]                                       Fallon Rivera   MRN: [id number]    Department:  [facility] Department   Date of Visit:  9/8/2018           Patient Information     Date Of Birth          1980        Your diagnoses for this visit were:     Acute bilateral low back pain without sciatica     Urinary incontinence, unspecified type x2 episodes    Lumbar radiculopathy        You were seen by Jose Meraz MD and Farida Munroe MD.      Follow-up Information     Follow up with Greg Ford MD.    Specialties:  Family Practice, OB/Gyn    Contact information:    [address] [place] [address] [phone]          Follow up with Mason Roe MD.    Specialty:  Neurosurgery    Contact information:    [address]  [facility] [address] [phone]          Discharge Instructions       Continue your usual regimen for pain and see your chiropractor also.    Medrol Dosepak as prescribed.  Take with food or milk.    See Dr. Roe for continued or worsening symptoms.    I also would consider seeing Dr. Barber for any further loss of urine.    I hope you feel better quickly!    Your next 10 appointments already scheduled     Oct 16, 2018  7:30 AM CDT   Office Visit with Greg Ford MD   [facility]    [address] [place] [id number] [phone]           Bring a current list of meds and any records pertaining to this visit. For Physicals, please bring immunization records and any forms needing to be filled out. Please arrive 10 minutes early to complete paperwork.              24 Hour Appointment Hotline       To make an appointment at any [facility], call [phone] ([phone]). If you don't have a family doctor or  Patient is a 59y old  Female who presents with a chief complaint of Fever, body ache (06 Apr 2020 23:05)    PATIENT IS SEEN AND EXAMINED IN MEDICAL FLOOR.    ALLERGIES:  codeine (Urticaria)    VITALS:    Vital Signs Last 24 Hrs  T(C): 36.2 (07 Apr 2020 09:47), Max: 36.9 (07 Apr 2020 06:09)  T(F): 97.2 (07 Apr 2020 09:47), Max: 98.5 (07 Apr 2020 06:09)  HR: 65 (07 Apr 2020 09:47) (65 - 76)  BP: 135/80 (07 Apr 2020 09:47) (110/70 - 135/80)  BP(mean): --  RR: 20 (07 Apr 2020 09:47) (20 - 20)  SpO2: 94% (07 Apr 2020 09:47) (91% - 97%)    LABS:    CBC Full  -  ( 07 Apr 2020 13:10 )  WBC Count : 5.18 K/uL  RBC Count : 3.17 M/uL  Hemoglobin : 9.3 g/dL  Hematocrit : 30.8 %  Platelet Count - Automated : 342 K/uL  Mean Cell Volume : 97.2 fl  Mean Cell Hemoglobin : 29.3 pg  Mean Cell Hemoglobin Concentration : 30.2 gm/dL  Auto Neutrophil # : x  Auto Lymphocyte # : x  Auto Monocyte # : x  Auto Eosinophil # : x  Auto Basophil # : x  Auto Neutrophil % : x  Auto Lymphocyte % : x  Auto Monocyte % : x  Auto Eosinophil % : x  Auto Basophil % : x    PT/INR - ( 06 Apr 2020 15:23 )   PT: 11.9 sec;   INR: 1.05 ratio           04-07    137  |  105  |  111<H>  ----------------------------<  117<H>  5.3   |  25  |  4.60<H>    Ca    8.0<L>      07 Apr 2020 13:10  Phos  6.5     04-07    TPro  7.2  /  Alb  2.7<L>  /  TBili  0.3  /  DBili  x   /  AST  18  /  ALT  15  /  AlkPhos  51  04-07    CAPILLARY BLOOD GLUCOSE      POCT Blood Glucose.: 111 mg/dL (07 Apr 2020 17:01)  POCT Blood Glucose.: 134 mg/dL (07 Apr 2020 11:50)  POCT Blood Glucose.: 112 mg/dL (07 Apr 2020 08:50)  POCT Blood Glucose.: 149 mg/dL (06 Apr 2020 22:08)  POCT Blood Glucose.: 97 mg/dL (06 Apr 2020 17:38)        LIVER FUNCTIONS - ( 07 Apr 2020 13:10 )  Alb: 2.7 g/dL / Pro: 7.2 g/dL / ALK PHOS: 51 U/L / ALT: 15 U/L DA / AST: 18 U/L / GGT: x           Creatinine Trend: 4.60<--, 4.67<--, 4.80<--, 4.29<--, 3.77<--  I&O's Summary    MEDICATIONS:    MEDICATIONS  (STANDING):  aspirin  chewable 81 milliGRAM(s) Oral daily  atorvastatin 40 milliGRAM(s) Oral at bedtime  carvedilol 6.25 milliGRAM(s) Oral every 12 hours  dextrose 5%. 1000 milliLiter(s) (50 mL/Hr) IV Continuous <Continuous>  dextrose 50% Injectable 12.5 Gram(s) IV Push once  dextrose 50% Injectable 25 Gram(s) IV Push once  dextrose 50% Injectable 25 Gram(s) IV Push once  ferrous    sulfate 325 milliGRAM(s) Oral daily  heparin  Injectable 5000 Unit(s) SubCutaneous every 12 hours  insulin glargine Injectable (LANTUS) 10 Unit(s) SubCutaneous two times a day  insulin lispro (HumaLOG) corrective regimen sliding scale   SubCutaneous three times a day before meals  pantoprazole    Tablet 40 milliGRAM(s) Oral before breakfast  sevelamer carbonate 1600 milliGRAM(s) Oral three times a day with meals  sodium zirconium cyclosilicate 10 Gram(s) Oral three times a day      MEDICATIONS  (PRN):  acetaminophen   Tablet .. 650 milliGRAM(s) Oral every 6 hours PRN Temp greater or equal to 38C (100.4F)  ALBUTerol    90 MICROgram(s) HFA Inhaler 2 Puff(s) Inhalation every 6 hours PRN Shortness of Breath and/or Wheezing  dextrose 40% Gel 15 Gram(s) Oral once PRN Blood Glucose LESS THAN 70 milliGRAM(s)/deciliter  glucagon  Injectable 1 milliGRAM(s) IntraMuscular once PRN Glucose LESS THAN 70 milligrams/deciliter  guaiFENesin   Syrup  (Sugar-Free) 100 milliGRAM(s) Oral every 6 hours PRN Cough  melatonin 3 milliGRAM(s) Oral at bedtime PRN Sleep      REVIEW OF SYSTEMS:                           ALL ROS DONE [ X   ]    CONSTITUTIONAL:  LETHARGIC [   ], FEVER [   ], UNRESPONSIVE [   ]  CVS:  CP  [   ], SOB, [   ], PALPITATIONS [   ], DIZZYNESS [   ]  RS: COUGH [   ], SPUTUM [   ]  GI: ABDOMINAL PAIN [   ], NAUSEA [   ], VOMITINGS [   ], DIARRHEA [   ], CONSTIPATION [   ]  :  DYSURIA [   ], NOCTURIA [   ], INCREASED FREQUENCY [   ], DRIBLING [   ],  SKELETAL: PAINFUL JOINTS [   ], SWOLLEN JOINTS [   ], NECK ACHE [   ], LOW BACK ACHE [   ],  SKIN : ULCERS [   ], RASH [   ], ITCHING [   ]  CNS: HEAD ACHE [   ], DOUBLE VISION [   ], BLURRED VISION [   ], AMS / CONFUSION [   ], SEIZURES [   ], WEAKNESS [   ],TINGLING / NUMBNESS [   ]    PHYSICAL EXAMINATION:  GENERAL APPEARANCE: NO DISTRESS, MORBIDLY OBESE  HEENT:  NO PALLOR, NO  JVD,  NO   NODES, NECK SUPPLE  CVS: S1 +, S2 +,   RS: AEEB,  OCCASIONAL  RALES +,   MILD B/L RHONCHI +  ABD: SOFT, NT, NO, BS +  EXT: PE +  SKIN: WARM,          AICD+  SKELETAL:  ROM ACCEPTABLE  CNS:  AAO X  3 ,   DEFICITS    RADIOLOGY :  < from: Xray Chest 1 View- PORTABLE-Urgent (04.02.20 @ 18:58) >  IMPRESSION:  Right infiltrate.    < end of copied text >      ASSESSMENT :     Coronavirus infection  GERD (gastroesophageal reflux disease)  CHF (congestive heart failure)  CAD (coronary artery disease)  AICD (automatic cardioverter/defibrillator) present  COPD (chronic obstructive pulmonary disease)  RA (rheumatoid arthritis)  DM (diabetes mellitus)  HTN (hypertension)  ICD (implantable cardioverter-defibrillator) in place  History of cholecystectomy      PLAN:  HPI:  Pt is a 58 y/o F  from [facility] with PMH of Obesity, CHF HFrEF 25 %, s/p AICD, HTN, B/l LE edema, DM, Gout, CAD, HLD, COPD  who presented with cough since 5 days , shortness of breath since 2days and fever since 1 day. According to the pt, since 5 days she has developed dry cough. Since last two days she has developed mild shortness of breath which is worse on exertion. She has some chest tightness and fever with chills since today. She denied recent travel, sick contact, nausea, vomiting, abdominal pain and other complains. (02 Apr 2020 20:45)    PATIENT IS A 60YO F FROM [facility] WITH PMHX SIGNIFICANT FOR AMPARO/OHS ON CPAP, HFREF [25%], PVD, DM, CAD, HLD, COPD AND GOUT WHO WAS TRANSFERRED FOR EVALUATION OF COUGH/DYSPNEA/FEVER.    # ACUTE HYPOXIC RESPIRATORY FAILURE S/T COVID19 PNEUMONIA, SEPSIS AND SUSPECTED SECONDARY BACTERIAL PNEUMONIA - NOTED PROLONGED QTC, WILL HOLD PLAQUENIL  # MORBID OBESITY, RESTRICTIVE LUNG DISEASE, OBSTRUCTIVE SLEEP APNEA ON CPAP, COPD  # SYSTOLIC CHF S/P AICD, ON CHRONIC O2 SUPPLEMENT  # ANJU ON CKD4 - AVOID NEPHROTOXIC MEDICATION; NEPHROLOGY CONSULTED - SUSPECT ATN - DIURETICS HELD, STRICT IS AND OS  # HYPERKALEMIA S/T ANJU - RESOLVED ON LOKELMA  # NORMOCYTIC ANEMIA - IRON DEFICIENCY ANEMIA + ANEMIA OF CKD - PLACED ON FESO4  # MINERAL BONE DISEASE  # D/C TO [facility] WHEN MEDICALLY STABLE AS [facility] DOES NOT HAVE AN ISOLATION  # GI AND DVT PPX    LUANN NATARAJAN M.D. COVERING FOR DONTAE NATARAJAN M.D. clinic, we will help you find one. [facility] are conveniently located to serve the needs of you and your family.             Review of your medicines      START taking        Dose / Directions Last dose taken    methylPREDNISolone 4 MG tablet   Commonly known as:  MEDROL DOSEPAK   Quantity:  21 tablet        Follow package instructions   Refills:  0          Our records show that you are taking the medicines listed below. If these are incorrect, please call your family doctor or clinic.        Dose / Directions Last dose taken    ALEVE 220 MG tablet   Dose:  220 mg   Generic drug:  naproxen sodium        Take 220 mg by mouth 2 times daily (with meals)   Refills:  0        B COMPLETE Tabs        Take by mouth daily   Refills:  0        blood glucose monitoring lancets   Quantity:  1 Box        Use to check blood sugar 1-2 times daily   Refills:  12        CONTOUR NEXT TEST test strip   Quantity:  100 each   Generic drug:  blood glucose monitoring        USE TO TEST BLOOD SUGAR TWO TIMES A DAY OR AS DIRECTED   Refills:  3        cyclobenzaprine 5 MG tablet   Commonly known as:  FLEXERIL   Dose:  5 mg   Quantity:  90 tablet        Take 1 tablet (5 mg) by mouth 3 times daily as needed for muscle spasms   Refills:  0        metFORMIN 500 MG tablet   Commonly known as:  GLUCOPHAGE   Quantity:  30 tablet        TAKE ONE TABLET BY MOUTH EVERY DAY WITH DINNER   Refills:  11        MULTIPLE VITAMINS/WOMENS PO        Reported on 5/15/2017   Refills:  0        omeprazole 20 MG CR capsule   Commonly known as:  priLOSEC   Quantity:  30 capsule        TAKE 1 CAPSULE BY MOUTH DAILY, 30 TO 60 MINUTES BEFORE A MEAL.   Refills:  5        oxyCODONE-acetaminophen 5-325 MG per tablet   Commonly known as:  PERCOCET   Dose:  1-2 tablet   Quantity:  12 tablet        Take 1-2 tablets by mouth every 6 hours as needed for pain   Refills:  0        Vitamin D (Cholecalciferol) 1000 units Tabs        Take by mouth daily   Refills:  0                 Information about OPIOIDS     PRESCRIPTION OPIOIDS: WHAT YOU NEED TO KNOW   We gave you an opioid (narcotic) pain medicine. It is important to manage your pain, but opioids are not always the best choice. You should first try all the other options your care team gave you. Take this medicine for as short a time (and as few doses) as possible.    Some activities can increase your pain, such as bandage changes or therapy sessions. It may help to take your pain medicine 30 to 60 minutes before these activities. Reduce your stress by getting enough sleep, working on hobbies you enjoy and practicing relaxation or meditation. Talk to your care team about ways to manage your pain beyond prescription opioids.    These medicines have risks:    DO NOT drive when on new or higher doses of pain medicine. These medicines can affect your alertness and reaction times, and you could be arrested for driving under the influence (DUI). If you need to use opioids long-term, talk to your care team about driving.    DO NOT operate heavy machinery    DO NOT do any other dangerous activities while taking these medicines.    DO NOT drink any alcohol while taking these medicines.     If the opioid prescribed includes acetaminophen, DO NOT take with any other medicines that contain acetaminophen. Read all labels carefully. Look for the word  acetaminophen  or  Tylenol.  Ask your pharmacist if you have questions or are unsure.    You can get addicted to pain medicines, especially if you have a history of addiction (chemical, alcohol or substance dependence). Talk to your care team about ways to reduce this risk.    All opioids tend to cause constipation. Drink plenty of water and eat foods that have a lot of fiber, such as fruits, vegetables, prune juice, apple juice and high-fiber cereal. Take a laxative (Miralax, milk of magnesia, Colace, Senna) if you don t move your bowels at least every other day. Other side effects include  upset stomach, sleepiness, dizziness, throwing up, tolerance (needing more of the medicine to have the same effect), physical dependence and slowed breathing.    Store your pills in a secure place, locked if possible. We will not replace any lost or stolen medicine. If you don t finish your medicine, please throw away (dispose) as directed by your pharmacist. The [place] [employer] has more information about safe disposal: [email or web address]-[email or web address]        Prescriptions were sent or printed at these locations (3 Prescriptions)                   [facility] [place], [place] - [address]    [address] , [facility] [address]    Telephone:  [phone]   Fax:  [phone]   Hours:                  E-Prescribed (2 of 3)         cyclobenzaprine (FLEXERIL) 5 MG tablet               methylPREDNISolone (MEDROL DOSEPAK) 4 MG tablet                 Printed at Department/Unit printer (1 of 3)         oxyCODONE-acetaminophen (PERCOCET) 5-325 MG per tablet                Procedures and tests performed during your visit     Basic metabolic panel    CBC with platelets differential    CT Abdomen Pelvis w/o Contrast    Lumbar spine MRI w/o contrast    Peripheral IV catheter    Routine UA with microscopic      Orders Needing Specimen Collection     None      Pending Results     Date and Time Order Name Status Description    9/8/2018 0307 CT Abdomen Pelvis w/o Contrast Preliminary             Pending Culture Results     No orders found from 9/6/2018 to 9/9/2018.            Pending Results Instructions     If you had any lab results that were not finalized at the time of your Discharge, you can call the ED Lab Result RN at [phone]. You will be contacted by this team for any positive Lab results or changes in treatment. The nurses are available 7 days a week from 10A to 6:30P.  You can leave a message 24 hours per day and they will return your  call.        Thank you for choosing [facility]       Thank you for choosing [facility] for your care. Our goal is always to provide you with excellent care. Hearing back from our patients is one way we can continue to improve our services. Please take a few minutes to complete the written survey that you may receive in the mail after you visit with us. Thank you!        [employer] Information     [employer] gives you secure access to your electronic health record. If you see a primary care provider, you can also send messages to your care team and make appointments. If you have questions, please call your primary care clinic.  If you do not have a primary care provider, please call [phone] and they will assist you.        Care EveryWhere ID     This is your Care EveryWhere ID. This could be used by other organizations to access your [facility] medical records  [id number]        Equal Access to Services     MICHAEL BALDWIN : Margarita Gaming, shanna kaminski, jeannie mckeon. So [facility] [place] [facility] [phone].    ATENCIÓN: Si habla español, tiene a pierre disposición servicios gratuitos de asistencia lingüística. Llame al [phone].    We comply with applicable federal civil rights laws and [place] laws. We do not discriminate on the basis of race, color, national origin, age, disability, sex, sexual orientation, or gender identity.            After Visit Summary       This is your record. Keep this with you and show to your community pharmacist(s) and doctor(s) at your next visit.

## 2020-04-28 NOTE — RESULT ENCOUNTER NOTE
Assessment /Plan     For exam results, see Encounter Report.    Open angle with borderline findings of both eyes  -     dorzolamide (TRUSOPT) 2 % ophthalmic solution; Place 1 drop into both eyes 2 (two) times a day.  Dispense: 10 mL; Refill: 6      1.  Patient reports Latanoprost irritating right eye.  Switching to Dorzolamide.  Beta blocker contraindicated due to sinus bradycardia.                    Lynn Please inform Fallon/ or caretaker  that this result(s) is/are normal.  The Pap and HPV testing and the endocervical curettings are all normal.  She should just repeat in 1 year.  Thanks. SHARAN Ford MD

## 2020-06-13 ENCOUNTER — HOSPITAL ENCOUNTER (EMERGENCY)
Facility: CLINIC | Age: 40
Discharge: HOME | End: 2020-06-13
Attending: EMERGENCY MEDICINE | Admitting: EMERGENCY MEDICINE
Payer: COMMERCIAL

## 2020-06-13 ENCOUNTER — APPOINTMENT (OUTPATIENT)
Dept: CT IMAGING | Facility: CLINIC | Age: 40
End: 2020-06-13
Attending: EMERGENCY MEDICINE
Payer: COMMERCIAL

## 2020-06-13 ENCOUNTER — APPOINTMENT (OUTPATIENT)
Dept: ULTRASOUND IMAGING | Facility: CLINIC | Age: 40
End: 2020-06-13
Attending: EMERGENCY MEDICINE
Payer: COMMERCIAL

## 2020-06-13 ENCOUNTER — TELEPHONE (OUTPATIENT)
Dept: FAMILY MEDICINE | Facility: CLINIC | Age: 40
End: 2020-06-13

## 2020-06-13 VITALS
HEART RATE: 101 BPM | SYSTOLIC BLOOD PRESSURE: 109 MMHG | OXYGEN SATURATION: 97 % | DIASTOLIC BLOOD PRESSURE: 82 MMHG | RESPIRATION RATE: 20 BRPM | WEIGHT: 216 LBS | BODY MASS INDEX: 37.08 KG/M2 | TEMPERATURE: 98.1 F

## 2020-06-13 DIAGNOSIS — N83.292 COMPLEX CYST OF LEFT OVARY: ICD-10-CM

## 2020-06-13 DIAGNOSIS — N83.202 HEMORRHAGIC CYST OF LEFT OVARY: ICD-10-CM

## 2020-06-13 DIAGNOSIS — M54.50 ACUTE LEFT-SIDED LOW BACK PAIN WITHOUT SCIATICA: ICD-10-CM

## 2020-06-13 LAB
ALBUMIN SERPL-MCNC: 3.9 G/DL (ref 3.4–5)
ALBUMIN UR-MCNC: NEGATIVE MG/DL
ALP SERPL-CCNC: 80 U/L (ref 40–150)
ALT SERPL W P-5'-P-CCNC: 62 U/L (ref 0–50)
ANION GAP SERPL CALCULATED.3IONS-SCNC: 10 MMOL/L (ref 3–14)
APPEARANCE UR: CLEAR
AST SERPL W P-5'-P-CCNC: 34 U/L (ref 0–45)
BASOPHILS # BLD AUTO: 0 10E9/L (ref 0–0.2)
BASOPHILS NFR BLD AUTO: 0.4 %
BILIRUB SERPL-MCNC: 1.2 MG/DL (ref 0.2–1.3)
BILIRUB UR QL STRIP: NEGATIVE
BUN SERPL-MCNC: 10 MG/DL (ref 7–30)
CALCIUM SERPL-MCNC: 8.9 MG/DL (ref 8.5–10.1)
CHLORIDE SERPL-SCNC: 103 MMOL/L (ref 94–109)
CO2 SERPL-SCNC: 24 MMOL/L (ref 20–32)
COLOR UR AUTO: YELLOW
CREAT SERPL-MCNC: 0.79 MG/DL (ref 0.52–1.04)
DIFFERENTIAL METHOD BLD: NORMAL
EOSINOPHIL NFR BLD AUTO: 0.8 %
ERYTHROCYTE [DISTWIDTH] IN BLOOD BY AUTOMATED COUNT: 12.5 % (ref 10–15)
GFR SERPL CREATININE-BSD FRML MDRD: >90 ML/MIN/{1.73_M2}
GLUCOSE SERPL-MCNC: 188 MG/DL (ref 70–99)
GLUCOSE UR STRIP-MCNC: NEGATIVE MG/DL
HCT VFR BLD AUTO: 44.9 % (ref 35–47)
HGB BLD-MCNC: 15.6 G/DL (ref 11.7–15.7)
HGB UR QL STRIP: NEGATIVE
IMM GRANULOCYTES # BLD: 0 10E9/L (ref 0–0.4)
IMM GRANULOCYTES NFR BLD: 0.4 %
KETONES UR STRIP-MCNC: NEGATIVE MG/DL
LEUKOCYTE ESTERASE UR QL STRIP: NEGATIVE
LYMPHOCYTES # BLD AUTO: 1.7 10E9/L (ref 0.8–5.3)
LYMPHOCYTES NFR BLD AUTO: 23.4 %
MCH RBC QN AUTO: 30 PG (ref 26.5–33)
MCHC RBC AUTO-ENTMCNC: 34.7 G/DL (ref 31.5–36.5)
MCV RBC AUTO: 86 FL (ref 78–100)
MONOCYTES # BLD AUTO: 0.3 10E9/L (ref 0–1.3)
MONOCYTES NFR BLD AUTO: 4.3 %
NEUTROPHILS # BLD AUTO: 5.3 10E9/L (ref 1.6–8.3)
NEUTROPHILS NFR BLD AUTO: 70.7 %
NITRATE UR QL: NEGATIVE
NRBC # BLD AUTO: 0 10*3/UL
NRBC BLD AUTO-RTO: 0 /100
PH UR STRIP: 5 PH (ref 5–7)
PLATELET # BLD AUTO: 212 10E9/L (ref 150–450)
POTASSIUM SERPL-SCNC: 3.5 MMOL/L (ref 3.4–5.3)
PROT SERPL-MCNC: 7.8 G/DL (ref 6.8–8.8)
RBC # BLD AUTO: 5.2 10E12/L (ref 3.8–5.2)
SODIUM SERPL-SCNC: 137 MMOL/L (ref 133–144)
SOURCE: NORMAL
SP GR UR STRIP: 1.01 (ref 1–1.03)
UROBILINOGEN UR STRIP-MCNC: 0 MG/DL (ref 0–2)
WBC # BLD AUTO: 7.5 10E9/L (ref 4–11)

## 2020-06-13 PROCEDURE — 25000128 H RX IP 250 OP 636: Performed by: EMERGENCY MEDICINE

## 2020-06-13 PROCEDURE — 96374 THER/PROPH/DIAG INJ IV PUSH: CPT | Performed by: EMERGENCY MEDICINE

## 2020-06-13 PROCEDURE — 99285 EMERGENCY DEPT VISIT HI MDM: CPT | Mod: Z6 | Performed by: EMERGENCY MEDICINE

## 2020-06-13 PROCEDURE — 74176 CT ABD & PELVIS W/O CONTRAST: CPT

## 2020-06-13 PROCEDURE — 80053 COMPREHEN METABOLIC PANEL: CPT | Performed by: EMERGENCY MEDICINE

## 2020-06-13 PROCEDURE — 76830 TRANSVAGINAL US NON-OB: CPT

## 2020-06-13 PROCEDURE — 99285 EMERGENCY DEPT VISIT HI MDM: CPT | Mod: 25 | Performed by: EMERGENCY MEDICINE

## 2020-06-13 PROCEDURE — 85025 COMPLETE CBC W/AUTO DIFF WBC: CPT | Performed by: EMERGENCY MEDICINE

## 2020-06-13 PROCEDURE — 96361 HYDRATE IV INFUSION ADD-ON: CPT | Performed by: EMERGENCY MEDICINE

## 2020-06-13 PROCEDURE — 96376 TX/PRO/DX INJ SAME DRUG ADON: CPT | Performed by: EMERGENCY MEDICINE

## 2020-06-13 PROCEDURE — 25800030 ZZH RX IP 258 OP 636: Performed by: EMERGENCY MEDICINE

## 2020-06-13 PROCEDURE — 96375 TX/PRO/DX INJ NEW DRUG ADDON: CPT | Performed by: EMERGENCY MEDICINE

## 2020-06-13 PROCEDURE — 81003 URINALYSIS AUTO W/O SCOPE: CPT | Performed by: EMERGENCY MEDICINE

## 2020-06-13 RX ORDER — HYDROMORPHONE HYDROCHLORIDE 1 MG/ML
0.5 INJECTION, SOLUTION INTRAMUSCULAR; INTRAVENOUS; SUBCUTANEOUS
Status: DISCONTINUED | OUTPATIENT
Start: 2020-06-13 | End: 2020-06-13 | Stop reason: HOSPADM

## 2020-06-13 RX ORDER — LORAZEPAM 2 MG/ML
0.5 INJECTION INTRAMUSCULAR ONCE
Status: COMPLETED | OUTPATIENT
Start: 2020-06-13 | End: 2020-06-13

## 2020-06-13 RX ORDER — SODIUM CHLORIDE 9 MG/ML
1000 INJECTION, SOLUTION INTRAVENOUS CONTINUOUS
Status: DISCONTINUED | OUTPATIENT
Start: 2020-06-13 | End: 2020-06-13 | Stop reason: HOSPADM

## 2020-06-13 RX ORDER — HYDROMORPHONE HYDROCHLORIDE 1 MG/ML
0.5 INJECTION, SOLUTION INTRAMUSCULAR; INTRAVENOUS; SUBCUTANEOUS
Status: COMPLETED | OUTPATIENT
Start: 2020-06-13 | End: 2020-06-13

## 2020-06-13 RX ORDER — HYDROCODONE BITARTRATE AND ACETAMINOPHEN 5; 325 MG/1; MG/1
1 TABLET ORAL EVERY 6 HOURS PRN
Qty: 12 TABLET | Refills: 0 | Status: SHIPPED | OUTPATIENT
Start: 2020-06-13 | End: 2020-08-04

## 2020-06-13 RX ORDER — LORAZEPAM 0.5 MG/1
0.5 TABLET ORAL EVERY 6 HOURS PRN
Qty: 8 TABLET | Refills: 0 | Status: SHIPPED | OUTPATIENT
Start: 2020-06-13 | End: 2020-07-01

## 2020-06-13 RX ADMIN — LORAZEPAM 0.5 MG: 2 INJECTION INTRAMUSCULAR; INTRAVENOUS at 14:01

## 2020-06-13 RX ADMIN — SODIUM CHLORIDE 1000 ML: 9 INJECTION, SOLUTION INTRAVENOUS at 13:54

## 2020-06-13 RX ADMIN — HYDROMORPHONE HYDROCHLORIDE 0.5 MG: 1 INJECTION, SOLUTION INTRAMUSCULAR; INTRAVENOUS; SUBCUTANEOUS at 15:32

## 2020-06-13 RX ADMIN — HYDROMORPHONE HYDROCHLORIDE 0.5 MG: 1 INJECTION, SOLUTION INTRAMUSCULAR; INTRAVENOUS; SUBCUTANEOUS at 14:34

## 2020-06-13 RX ADMIN — HYDROMORPHONE HYDROCHLORIDE 0.5 MG: 1 INJECTION, SOLUTION INTRAMUSCULAR; INTRAVENOUS; SUBCUTANEOUS at 17:32

## 2020-06-13 ASSESSMENT — ENCOUNTER SYMPTOMS
FLANK PAIN: 1
CONSTIPATION: 0
COUGH: 0
FEVER: 0
NAUSEA: 0
SHORTNESS OF BREATH: 0
VOMITING: 0
DIARRHEA: 0
DYSURIA: 0
CHILLS: 1
ABDOMINAL PAIN: 0

## 2020-06-13 NOTE — ED PROVIDER NOTES
History     Chief Complaint   Patient presents with     Pelvic Pain     HPI  Fallon Rivera is a 40 year old female who presents to the ER with pelvic pain and low back pain.  She states that she has had some pelvic pressure for the last few days, which is not uncommon due to her diagnoses of PCOS.  Also has not had any menstrual flow for the last 2 months, but she states that her periods are often irregular.  She says that she started having left-sided lower back pain earlier today which is what prompted her to come in and be evaluated for both complaints.  Pain is sharp and severe, comes intermittently, but nothing seems to make it better or worse.  It does not radiate.  She says that symptoms remind her of when she has previously had kidney stones.  Was scheduled to have a hysterectomy done March 13 for diagnosis of endometrial intraepithelial neoplasia, but was canceled due to the ongoing coronavirus pandemic.  She is previously had bilateral salpingectomies secondary to ectopic pregnancy and a right oophorectomy.  Additional abdominal surgery degrees include cholecystectomy and appendectomy.  She does not recall any injury that could have caused her acute back pain.  She is tried taking ibuprofen for the pain and also tried to take half of a Flexeril tablets which was left over from a previous prescription which did not improve her pain.    She denies any fever, chest pain, shortness of breath, vomiting, diarrhea or constipation, dysuria    Allergies:  Allergies   Allergen Reactions     Amoxicillin Hives     Anti-Nausea      Anxiety, agitation,severe paranoia. Zofran, Reglan are some of them.  DRAMAMINE WITHOUT ISSUES       Demerol Hcl [Meperidine Hcl] Nausea     Indomethacin Nausea and Vomiting     Macrobid [Nitrofuran Derivatives] Hives     Reglan [Metoclopramide] Other (See Comments)     Acute paranoia, severe     Zofran [Ondansetron] Other (See Comments)     Severe anxiety, agitation     Vancomycin  Rash       Problem List:    Patient Active Problem List    Diagnosis Date Noted     Endometrial intraepithelial neoplasia (EIN) 03/02/2020     Priority: Medium     Obesity (BMI 35.0-39.9) with comorbidity (H) 10/16/2018     Priority: Medium     Non morbid obesity due to excess calories 07/05/2016     Priority: Medium     Type 2 diabetes mellitus without complication (H) 04/29/2016     Priority: Medium     Glucosuria 04/27/2016     Priority: Medium     Right ovarian cyst 09/15/2015     Priority: Medium     Hyperlipidemia LDL goal <130 07/18/2012     Priority: Medium     Mild major depression (H) 07/18/2012     Priority: Medium     GERD (gastroesophageal reflux disease) 07/03/2012     Priority: Medium     CARDIOVASCULAR SCREENING; LDL GOAL LESS THAN 160 10/31/2010     Priority: Medium     Dyspnea 11/24/2009     Priority: Medium     Kidney stones 02/17/2009     Priority: Medium     S/P conization of cervix- KAYLA 2/3 in 2011 06/27/2007     Priority: Medium     1/24/06 pap: ASC-H  2/9/06 colposcopy/bx (negative)/ECC (negative) pap- ASC-H  5/24/06 pap- ASC-H  9/6/07 pap NIL  1/23/07 pap ASCUS + HPV 45 (high risk)  3/15/07 pap ASCUS + HPV 45 (high risk) colposcopy- bx (negative)/ECC (koilocytosis)  6/19/07 ECC- koilocytosis.  Pap- AGS  10/23/07 pap- ASC-H, ECC- negative  2/14/08 pap NIL- return to yearly paps  2/17/09 pap NIL- repeat 1 year  3/8/10 pap NIL  3/3/11 pap ASCUS + HPV 45 (high risk)- colposcopy recommended  5/16/11 colpo- bx (KAYLA 2/3/HSIL) ECC (ASCUS)  pap (ASC-H)  6/2/11 recommend: [facility]  7/1/11 [facility]: path: KAYLA 2/3 with possible involvement of the endocervical margin.  ECC- neg.  Endometrium- neg.  7/14/11 plan: HIPOLITO in approx 2 months  10/10/11- hysterectomy planned.  (cancelled)  11/7/11 pap-- NIL. Plan--repeat pap in 6 months. (5/7/12)  5/7/12 pap NIL. Plan-- pap in 1 year (due 5/7/13)   5/8/13 pap NIL. Plan-pap in 1 year  5/8/14 NIL pap, neg HR HPV.  Plan- repeat pap/HPV in 1 year (due 5/8/15)  10/14/14 NIL  pap, neg HR HPV. Endometrial biopsy- WNL   1/6/16  NIL pap, neg HR HPV.  Plan: paps yearly, per Dr. Ford.  1/30/17 NIL pap, neg HR HPV. Plan: pap in 1 year.   3/12/18 NIL pap, neg HR HPV. Plan: cotest annually, per Dr. Ford.  3/11/19 ECC: negative. NIL pap, neg HR HPV. Plan: cotest in 1 yr  2/11/20 NIL Pap, Neg HPV. EMB: intraepithelial neoplasia. Plan: Hysterectomy.            Papanicolaou smear of cervix with atypical squamous cells cannot exclude high grade squamous intraepithelial lesion (ASC-H) 09/07/2006     Priority: Medium     Female infertility 07/24/2006     Priority: Medium     Problem list name updated by automated process. Provider to review       Hemorrhage of rectum and anus 12/02/2004     Priority: Medium        Past Medical History:    Past Medical History:   Diagnosis Date     Abnormal Pap smear 2006, 2007,      Calculus of kidney 2002     Cervical high risk HPV (human papillomavirus) test positive      History of colposcopy with cervical biopsy 2/9/06, 3/15/07       Past Surgical History:    Past Surgical History:   Procedure Laterality Date     C REMOVAL OF KIDNEY STONE      two surgeries, 2002,2003     CHOLECYSTECTOMY, LAPOROSCOPIC  5/11/2007    Cholecystectomy, Laparoscopic     COLONOSCOPY  05/17/10     COLONOSCOPY N/A 8/27/2019    Procedure: COLONOSCOPY;  Surgeon: Paramjit Kingston DO;  Location:  GI     CONIZATION  7/1/2011    Procedure:CONIZATION; cold knife and punch biopsy of mole on back; Surgeon:GREG FORD; Location:[facility]     DILATION AND CURETTAGE, HYSTEROSCOPY, LAPAROSCOPY, COMBINED Right 9/24/2015    Procedure: COMBINED DILATION AND CURETTAGE, HYSTEROSCOPY, LAPAROSCOPY;  Surgeon: Greg Ford MD;  Location: [facility]     DISCECTOMY LUMBAR MINIMALLY INVASIVE ONE LEVEL Left 1/23/2019    Procedure: Minimally Invasive Surgery Left Lumbar 5-Sacral 1 microdiscectomy;  Surgeon: Mason Roe MD;  Location: [facility]     ESOPHAGOSCOPY, GASTROSCOPY,  DUODENOSCOPY (EGD), COMBINED  5/21/2014    Procedure: COMBINED ESOPHAGOSCOPY, GASTROSCOPY, DUODENOSCOPY (EGD), BIOPSY SINGLE OR MULTIPLE;  Surgeon: Earl Laen MD;  Location: [facility]     EXCISE LESION TRUNK  7/1/2011    Procedure:EXCISE LESION TRUNK; Surgeon:GREG GRAFF; Location:[facility]     HC FRAGMENTING OF KIDNEY STONE  11/30/2005     HC RX ECTOP PREG BY SCOPE,RMV TUBE/OVRY  12/20/2007    Right sided salpingectomy and removal of ruptured ectopic pregnancy. D&C.     HC UGI ENDOSCOPY, SIMPLE EXAM  09/01/09     LAPAROSCOPIC APPENDECTOMY N/A 9/24/2015    Procedure: LAPAROSCOPIC APPENDECTOMY;  Surgeon: James Cuellar MD;  Location: [facility]     LAPAROSCOPIC CYSTECTOMY OVARIAN (BENIGN) N/A 9/24/2015    Procedure: LAPAROSCOPIC CYSTECTOMY OVARIAN (BENIGN);  Surgeon: Greg Graff MD;  [facility]: [facility]     LAPAROSCOPIC OOPHORECTOMY Right 9/24/2015    Procedure: LAPAROSCOPIC OOPHORECTOMY;  Surgeon: Greg Graff MD;  Location: [facility]     LITHOTRIPSY  01/17/2007     PELVIS LAPAROSCOPY,DX  10/16/2000    Diagnostic laparoscopy, Endometrial biopsy.     TUBAL/ECTOPIC PREGNANCY  01/23/2007    Lap removal of left ruptured ectopic pregnancy & salpingectomy, D&C       Family History:    Family History   Problem Relation Age of Onset     Diabetes Maternal Grandmother         adult onset     Anesthesia Reaction Maternal Grandmother         can't tolerate Novacaine.     Respiratory Maternal Grandmother         COPD and emphysema.     Thyroid Disease Maternal Grandmother      Heart Disease Maternal Grandmother         CHF     Diabetes Paternal Grandfather         adult o nset     Hypertension Paternal Grandfather      Cerebrovascular Disease Paternal Grandfather      Heart Disease Paternal Grandfather         MI, replaced valve,angioplasty     Thyroid Disease Paternal Grandfather      Cancer Maternal Grandfather         skin cancer     Heart Disease Maternal Grandfather         MI      Obesity Mother         on thyroid medication     Thyroid Disease Mother      Diabetes Mother      Rheumatologic Disease Mother      Heart Disease Father      Hypertension Father         and TIA     Lipids Father      Breast Cancer Paternal Grandmother      Arrhythmia Other      Cancer Maternal Aunt         breast/brain cancer     Depression Paternal Aunt      Cerebrovascular Disease Paternal Uncle      Cerebrovascular Disease Paternal Aunt        Social History:  Marital Status:   [2]  Social History     Tobacco Use     Smoking status: Former Smoker     Packs/day: 0.00     Years: 12.00     Pack years: 0.00     Types: Cigarettes     Last attempt to quit: 2019     Years since quittin.0     Smokeless tobacco: Never Used     Tobacco comment: As of 10 /2014, pt has had a few cigs here and there   Substance Use Topics     Alcohol use: Yes     Alcohol/week: 0.0 standard drinks     Comment: every few months     Drug use: No        Medications:    HYDROcodone-acetaminophen (NORCO) 5-325 MG tablet  LORazepam (ATIVAN) 0.5 MG tablet  acetaminophen (TYLENOL) 500 MG tablet  blood glucose (CONTOUR NEXT TEST) test strip  blood glucose monitoring (NO BRAND SPECIFIED) meter device kit  cyclobenzaprine (FLEXERIL) 5 MG tablet  ibuprofen (ADVIL) 200 MG tablet  metFORMIN (GLUCOPHAGE) 1000 MG tablet  metFORMIN (GLUCOPHAGE) 500 MG tablet  Omega-3 Fatty Acids (FISH OIL) 1200 MG capsule  omeprazole (PRILOSEC) 20 MG DR capsule          Review of Systems   Constitutional: Positive for chills. Negative for fever.   Respiratory: Negative for cough and shortness of breath.    Gastrointestinal: Negative for abdominal pain, constipation, diarrhea, nausea and vomiting.   Genitourinary: Positive for flank pain and pelvic pain. Negative for dysuria and vaginal bleeding.   All other systems reviewed and are negative.      Physical Exam   BP: (!) 142/104  Pulse: 123  Temp: 98.1  F (36.7  C)  Resp: 20  Weight: 98 kg (216 lb)  SpO2: 100  %      Physical Exam  Vitals signs and nursing note reviewed.   Constitutional:       General: She is in acute distress.      Appearance: She is obese.   HENT:      Head: Normocephalic and atraumatic.   Cardiovascular:      Rate and Rhythm: Regular rhythm. Tachycardia present.   Pulmonary:      Effort: Pulmonary effort is normal.      Breath sounds: Normal breath sounds.   Abdominal:      General: Bowel sounds are normal.      Palpations: Abdomen is soft.      Tenderness: There is no abdominal tenderness. There is left CVA tenderness. There is no guarding or rebound.   Musculoskeletal: Normal range of motion.   Skin:     General: Skin is warm and dry.      Findings: No lesion or rash.   Neurological:      Mental Status: She is alert.   Psychiatric:         Mood and Affect: Mood is anxious. Affect is tearful.         ED Course        Procedures               Critical Care time:  none               Results for orders placed or performed during the hospital encounter of 06/13/20 (from the past 24 hour(s))   CBC with platelets differential   Result Value Ref Range    WBC 7.5 4.0 - 11.0 10e9/L    RBC Count 5.20 3.8 - 5.2 10e12/L    Hemoglobin 15.6 11.7 - 15.7 g/dL    Hematocrit 44.9 35.0 - 47.0 %    MCV 86 78 - 100 fl    MCH 30.0 26.5 - 33.0 pg    MCHC 34.7 31.5 - 36.5 g/dL    RDW 12.5 10.0 - 15.0 %    Platelet Count 212 150 - 450 10e9/L    Diff Method Automated Method     % Neutrophils 70.7 %    % Lymphocytes 23.4 %    % Monocytes 4.3 %    % Eosinophils 0.8 %    % Basophils 0.4 %    % Immature Granulocytes 0.4 %    Nucleated RBCs 0 0 /100    Absolute Neutrophil 5.3 1.6 - 8.3 10e9/L    Absolute Lymphocytes 1.7 0.8 - 5.3 10e9/L    Absolute Monocytes 0.3 0.0 - 1.3 10e9/L    Absolute Basophils 0.0 0.0 - 0.2 10e9/L    Abs Immature Granulocytes 0.0 0 - 0.4 10e9/L    Absolute Nucleated RBC 0.0    Comprehensive metabolic panel   Result Value Ref Range    Sodium 137 133 - 144 mmol/L    Potassium 3.5 3.4 - 5.3 mmol/L    Chloride  103 94 - 109 mmol/L    Carbon Dioxide 24 20 - 32 mmol/L    Anion Gap 10 3 - 14 mmol/L    Glucose 188 (H) 70 - 99 mg/dL    Urea Nitrogen 10 7 - 30 mg/dL    Creatinine 0.79 0.52 - 1.04 mg/dL    GFR Estimate >90 >60 mL/min/[1.73_m2]    GFR Estimate If Black >90 >60 mL/min/[1.73_m2]    Calcium 8.9 8.5 - 10.1 mg/dL    Bilirubin Total 1.2 0.2 - 1.3 mg/dL    Albumin 3.9 3.4 - 5.0 g/dL    Protein Total 7.8 6.8 - 8.8 g/dL    Alkaline Phosphatase 80 40 - 150 U/L    ALT 62 (H) 0 - 50 U/L    AST 34 0 - 45 U/L   UA reflex to Microscopic and Culture    Specimen: Midstream Urine   Result Value Ref Range    Color Urine Yellow     Appearance Urine Clear     Glucose Urine Negative NEG^Negative mg/dL    Bilirubin Urine Negative NEG^Negative    Ketones Urine Negative NEG^Negative mg/dL    Specific Gravity Urine 1.008 1.003 - 1.035    Blood Urine Negative NEG^Negative    pH Urine 5.0 5.0 - 7.0 pH    Protein Albumin Urine Negative NEG^Negative mg/dL    Urobilinogen mg/dL 0.0 0.0 - 2.0 mg/dL    Nitrite Urine Negative NEG^Negative    Leukocyte Esterase Urine Negative NEG^Negative    Source Midstream Urine    Abd/pelvis CT - no contrast - Stone Protocol    Narrative    CT ABDOMEN PELVIS W/O CONTRAST 6/13/2020 2:43 PM    CLINICAL HISTORY: Flank pain, stone disease suspected - left  TECHNIQUE: CT scan of the abdomen and pelvis was performed without IV  contrast. Multiplanar reformats were obtained. Dose reduction  techniques were used.  CONTRAST: None.    COMPARISON: Pelvis ultrasound 2/11/2020 and CT abdomen pelvis 9/5/2019    FINDINGS:   LOWER CHEST: Normal.    HEPATOBILIARY: Diffuse hepatic steatosis. Cholecystectomy.    PANCREAS: No acute peripancreatic inflammatory changes.    SPLEEN: Normal size.    ADRENAL GLANDS: Normal.    KIDNEYS/BLADDER: Small cyst in the upper pole of the left kidney does  not require further imaging evaluation. Otherwise, normal noncontrast  appearance of the kidneys. No intrarenal calculi. No  hydronephrosis.  The bladder is negative.    BOWEL: The distal esophagus and stomach are normal in appearance. No  small bowel obstruction. Appendectomy. Normal appearance to the colon.    LYMPH NODES: Normal.    VASCULATURE: Unremarkable.    PELVIC ORGANS: 3.6 and meter left ovarian lesion (series 3 image 174)  appears to have some peripheral hyperdensity and this could represent  a hemorrhagic cyst.    OTHER: No significant free fluid in the pelvis.    MUSCULOSKELETAL: Normal.      Impression    IMPRESSION:   1.  No urinary calculi.  2.  Probable hemorrhagic left ovarian cyst.    COLLETTE OBRIEN MD   US Pelvis Cmplt w Transvag & Doppler LmtPel Duplex Limited    Narrative    US PELVIS COMPLETE WITH TRANSVAGINAL AND DOPPLER LIMITED 6/13/2020  5:35 PM    CLINICAL HISTORY: Pelvic pain and pressure, hemorrhagic left ovarian  cyst. History of bilateral salpingectomy and right oophorectomy.    TECHNIQUE: Transabdominal scans were performed. Endovaginal ultrasound  was performed to better visualize the adnexa.    COMPARISON: None.    FINDINGS:    UTERUS: 8.7 x 4.3 x 5.1 cm. Normal in size and position with no  masses.    ENDOMETRIUM: 7 mm. Normal smooth endometrium.    RIGHT OVARY: Surgically absent.    LEFT OVARY: 4.4 x 3.3 x 3.3 cm. Mildly complex cyst in the left ovary  measures 2.6 x 2.3 x 1.9 cm. Arterial and venous waveforms are  demonstrated in the left ovary.    No significant free fluid.      Impression    IMPRESSION:  1.  Mildly complex left ovarian cyst may be related to hemorrhagic  components. No evidence of torsion.      STANISLAV MEDRANO MD       Medications   0.9% sodium chloride BOLUS (0 mLs Intravenous Stopped 6/13/20 1603)     Followed by   sodium chloride 0.9% infusion (has no administration in time range)   HYDROmorphone (PF) (DILAUDID) injection 0.5 mg (has no administration in time range)   HYDROmorphone (PF) (DILAUDID) injection 0.5 mg (0.5 mg Intravenous Given 6/13/20 1732)   LORazepam (ATIVAN)  injection 0.5 mg (0.5 mg Intravenous Given 6/13/20 1401)       Assessments & Plan (with Medical Decision Making)  Fallon is a 40-year-old female with past medical history of PCOS, status post bilateral salpingectomy, right oophorectomy, and diagnosis of EIN, who presents to the ER with pelvic pressure and left-sided low back pain.  She has had pelvic pressure for the last few days, which she was trying to manage at home since it is not uncommon for her to have the symptoms, but today she has developed left-sided flank pain and back pain which is much more severe.  She says it seems similar to previous episodes of pain she had with kidney stones.  Was supposed to have a hysterectomy in March, but this was canceled due to novel coronavirus pandemic and has not yet been rescheduled.  See history and physical exam as above  She is very anxious and tearful about being seen in the ER and not having her  able to be with her.  She was initially tachycardic, but after receiving IV normal saline bolus, IV Dilaudid, and IV Ativan her pulse is now in the normal range.  Otherwise her vital signs are within normal limits and she is afebrile.  She has left-sided CVA tenderness, and very mild tenderness to palpation of her abdomen but no rebound or guarding and no peritoneal signs.  Lab work as above, CBC and UA are normal, and CMP shows mild elevation of LFTs which patient says has been noted in the past due to her diagnosis of fatty liver.  CT results as above.  No evidence of ureteral stone or hydronephrosis.  She does have probable hemorrhagic left ovarian cyst.  Informed the patient of these results and she still is very anxious.  Discussed the possibility of doing an ultrasound to further assess her remaining left ovary and this hemorrhagic cyst.  She is very enthusiastic about this idea and ultrasound was ordered.  Ultrasound resulted, uterus appears normal, no evidence of polyp that was previously mentioned on  ultrasound imaging.  Left ovary does not have any evidence of torsion, and there is a complex cyst 2.6 x 2.3 x 1.9 cm.  She is much more calm and vital signs are stable.  She is comfortable with the plan to discharge home with some pain medication and follow-up with Dr. Ford on Monday.  She says she has been meaning to follow-up with him since she is needing to reschedule the hysterectomy.  We will also give her a few tablets of Ativan to help with the nausea she usually experiences when taking narcotic pain medication since she has intolerance to many other antiemetics.  Discussed dosing and side effects of these medications.  Discussed reasons to return to the ER.  She understands and agrees, and is calling her  to pick her up since she received narcotic pain medication in the ER tonight.  Charged from the ED in stable condition, no acute distress     I have reviewed the nursing notes.    I have reviewed the findings, diagnosis, plan and need for follow up with the patient.       New Prescriptions    HYDROCODONE-ACETAMINOPHEN (NORCO) 5-325 MG TABLET    Take 1 tablet by mouth every 6 hours as needed for pain    LORAZEPAM (ATIVAN) 0.5 MG TABLET    Take 1 tablet (0.5 mg) by mouth every 6 hours as needed for nausea or vomiting       Final diagnoses:   Hemorrhagic cyst of left ovary   Complex cyst of left ovary   Acute left-sided low back pain without sciatica       6/13/2020   [facility] EMERGENCY DEPARTMENT     Felisha Talley DO  06/13/20 2812

## 2020-06-13 NOTE — ED AVS SNAPSHOT
[facility] Emergency Department  [address] [place] [address]  Phone:  [phone]  Fax:  [phone]                                    Fallon Rivera   MRN: [id number]    Department:  [facility] Emergency Department   Date of Visit:  6/13/2020           After Visit Summary Signature Page    I have received my discharge instructions, and my questions have been answered. I have discussed any challenges I see with this plan with the nurse or doctor.    ..........................................................................................................................................  Patient/Patient Representative Signature      ..........................................................................................................................................  Patient Representative Print Name and Relationship to Patient    ..................................................               ................................................  Date                                   Time    ..........................................................................................................................................  Reviewed by Signature/Title    ...................................................              ..............................................  Date                                               Time          22EPIC Rev 08/18

## 2020-06-13 NOTE — DISCHARGE INSTRUCTIONS
Take a tablet of Norco every 6 hours as needed for pain    The Ativan is a medication that may help with nausea since you have bad reactions to other antinausea medicines.  May try taking half a tablet for nausea or vomiting    Follow-up with Dr. Ford to discuss your previously scheduled hysterectomy and if there is any further work-up or monitoring that needs to be done for this ovarian cyst    Return to the ER if you have any new or worsening symptoms, or if your symptoms are not controlled with the medication that was prescribed

## 2020-06-15 NOTE — TELEPHONE ENCOUNTER
Would you like a video visit with this patient or have her be seen by a face to face provider.  Route back to the team when done.

## 2020-06-16 ENCOUNTER — VIRTUAL VISIT (OUTPATIENT)
Dept: FAMILY MEDICINE | Facility: CLINIC | Age: 40
End: 2020-06-16
Payer: COMMERCIAL

## 2020-06-16 DIAGNOSIS — R10.2 PELVIC PAIN IN FEMALE: Primary | ICD-10-CM

## 2020-06-16 DIAGNOSIS — N85.00 ENDOMETRIAL HYPERPLASIA, UNSPECIFIED: ICD-10-CM

## 2020-06-16 PROCEDURE — 99213 OFFICE O/P EST LOW 20 MIN: CPT | Mod: 95 | Performed by: OBSTETRICS & GYNECOLOGY

## 2020-06-16 NOTE — PROGRESS NOTES
".Fallon Rivera is a 40 year old female who is being evaluated via a billable telephone visit.      The patient has been notified of following:     \"This telephone visit will be conducted via a call between you and your physician/provider. We have found that certain health care needs can be provided without the need for a physical exam.  This service lets us provide the care you need with a short phone conversation.  If a prescription is necessary we can send it directly to your pharmacy.  If lab work is needed we can place an order for that and you can then stop by our lab to have the test done at a later time.    Telephone visits are billed at different rates depending on your insurance coverage. During this emergency period, for some insurers they may be billed the same as an in-person visit.  Please reach out to your insurance provider with any questions.    If during the course of the call the physician/provider feels a telephone visit is not appropriate, you will not be charged for this service.\"    Patient has given verbal consent for Telephone visit?  Yes    What phone number would you like to be contacted at?     How would you like to obtain your AVS? MyChart    Subjective     Subjective: Fallon requested a phone consultation today because of concerns regarding  Ovarian cyst. Due to the current covid-19 coronavirus epidemic we are managing much of our patients' concerns remotely when possible.    She was in [place] recently with ovarian cyst pain- US showed small cyst- no torsion. Her pain is muich improved. She wants to discuss hysterectomy plans.    The past medical history and medications and allergies have been reviewed today by me.  .  Past Medical History:   Diagnosis Date     Abnormal Pap smear 2006, 2007,     ASC-H, ASCUS + HPV 45     Calculus of kidney 2002     Cervical high risk HPV (human papillomavirus) test positive     + HPV 45 (high risk)     History of colposcopy with cervical biopsy " 2/9/06, 3/15/07    koilocytosis 2007     Allergies   Allergen Reactions     Amoxicillin Hives     Anti-Nausea      Anxiety, agitation,severe paranoia. Zofran, Reglan are some of them.  DRAMAMINE WITHOUT ISSUES       Demerol Hcl [Meperidine Hcl] Nausea     Indomethacin Nausea and Vomiting     Macrobid [Nitrofuran Derivatives] Hives     Reglan [Metoclopramide] Other (See Comments)     Acute paranoia, severe     Zofran [Ondansetron] Other (See Comments)     Severe anxiety, agitation     Vancomycin Rash     Current Outpatient Medications   Medication Sig Dispense Refill     blood glucose (CONTOUR NEXT TEST) test strip 1 strip by In Vitro route 4 times daily 150 each 3     blood glucose monitoring (NO BRAND SPECIFIED) meter device kit Use to test blood sugar 2 times daily or as directed. 1 kit 0     HYDROcodone-acetaminophen (NORCO) 5-325 MG tablet Take 1 tablet by mouth every 6 hours as needed for pain 12 tablet 0     metFORMIN (GLUCOPHAGE) 1000 MG tablet Take 1 tablet in the a.m. 1/2 tablet at night 90 tablet 1     omeprazole (PRILOSEC) 20 MG DR capsule TAKE ONE CAPSULE BY MOUTH ONCE DAILY 30 TO 60 MINUTES BEFORE A MEAL 30 capsule 11     acetaminophen (TYLENOL) 500 MG tablet Take 1,000 mg by mouth every 6 hours as needed for mild pain       cyclobenzaprine (FLEXERIL) 5 MG tablet Take 1 tablet (5 mg) by mouth 3 times daily as needed for muscle spasms (Patient not taking: Reported on 6/16/2020) 90 tablet 0     ibuprofen (ADVIL) 200 MG tablet Take 200 mg by mouth every 4 hours as needed for mild pain       LORazepam (ATIVAN) 0.5 MG tablet Take 1 tablet (0.5 mg) by mouth every 6 hours as needed for nausea or vomiting (Patient not taking: Reported on 6/16/2020) 8 tablet 0     metFORMIN (GLUCOPHAGE) 500 MG tablet 1 tablet BID (Patient not taking: Reported on 6/16/2020) 60 tablet 3       Assessment/Plan:  Pelvic pain- ovarian cyst- improving pain.  She has hysterectomy planned for this summer- we are working out details. See  other notes. Will set up appt for preop soon.      Phone call duration was   5  minutes.     SHARAN Ford MD

## 2020-06-17 ENCOUNTER — PREP FOR PROCEDURE (OUTPATIENT)
Dept: FAMILY MEDICINE | Facility: CLINIC | Age: 40
End: 2020-06-17

## 2020-06-17 NOTE — PROGRESS NOTES
The procedure did not transfer to the [facility] [facility] .  I had to do a case modification to include the Procedure.   Rehana ORLANDO

## 2020-06-18 DIAGNOSIS — Z11.59 ENCOUNTER FOR SCREENING FOR OTHER VIRAL DISEASES: Primary | ICD-10-CM

## 2020-06-18 DIAGNOSIS — N85.00 ENDOMETRIAL HYPERPLASIA, UNSPECIFIED: Primary | ICD-10-CM

## 2020-06-18 PROBLEM — R10.2 PELVIC PAIN IN FEMALE: Status: ACTIVE | Noted: 2020-06-18

## 2020-06-19 ENCOUNTER — PREP FOR PROCEDURE (OUTPATIENT)
Dept: FAMILY MEDICINE | Facility: CLINIC | Age: 40
End: 2020-06-19

## 2020-07-01 ENCOUNTER — OFFICE VISIT (OUTPATIENT)
Dept: FAMILY MEDICINE | Facility: CLINIC | Age: 40
End: 2020-07-01
Payer: COMMERCIAL

## 2020-07-01 VITALS
BODY MASS INDEX: 35.89 KG/M2 | HEIGHT: 65 IN | SYSTOLIC BLOOD PRESSURE: 104 MMHG | HEART RATE: 92 BPM | RESPIRATION RATE: 12 BRPM | OXYGEN SATURATION: 98 % | TEMPERATURE: 98 F | WEIGHT: 215.4 LBS | DIASTOLIC BLOOD PRESSURE: 82 MMHG

## 2020-07-01 DIAGNOSIS — N85.02 ENDOMETRIAL HYPERPLASIA WITH ATYPIA: Primary | ICD-10-CM

## 2020-07-01 DIAGNOSIS — E11.9 TYPE 2 DIABETES MELLITUS WITHOUT COMPLICATION, WITHOUT LONG-TERM CURRENT USE OF INSULIN (H): ICD-10-CM

## 2020-07-01 PROCEDURE — 99215 OFFICE O/P EST HI 40 MIN: CPT | Performed by: OBSTETRICS & GYNECOLOGY

## 2020-07-01 ASSESSMENT — MIFFLIN-ST. JEOR: SCORE: 1656.34

## 2020-07-01 NOTE — H&P (VIEW-ONLY)
Proposed Surgery/ Procedure: hysterectomy total  Date of Surgery/ Procedure: 7/28/2020  Time of Surgery/ Procedure: 9:30am  Hospital/Surgical Facility: [facility]  Primary Physician: Greg Ford  Type of Anesthesia Anticipated: General    Patient has a Health Care Directive or Living Will:  NO    1. NO - Do you have a history of heart attack, stroke, stent, bypass or surgery on an artery in the head, neck, heart or legs?  2. NO - Do you ever have any pain or discomfort in your chest?  3. NO - Do you have a history of  Heart Failure?  4. NO - Are you troubled by shortness of breath when: walking on the level, up a slight hill or at night?  5. NO - Do you currently have a cold, bronchitis or other respiratory infection?  6. NO - Do you have a cough, shortness of breath or wheezing?  7. NO - Do you sometimes get pains in the calves of your legs when you walk?  8. YES - DO YOU OR ANYONE IN YOUR FAMILY HAVE PREVIOUS HISTORY OF BLOOD CLOTS? Aunt  9. NO - Do you or does anyone in your family have a serious bleeding problem such as prolonged bleeding following surgeries or cuts?  10. NO - Have you ever had problems with anemia or been told to take iron pills?  11. YES - HAVE YOU HAD ANY ABNORMAL BLOOD LOSS SUCH AS BLACK, TARRY OR BLOODY STOOLS, OR ABNORMAL VAGINAL BLEEDING? Vaginal bleeding  12. NO - Have you ever had a blood transfusion?  13. YES - HAVE YOU OR ANY OF YOUR RELATIVES EVER HAD PROBLEMS WITH ANESTHESIA? grandma  14. NO - Do you have sleep apnea, excessive snoring or daytime drowsiness?  15. NO - Do you have any prosthetic heart valves?  16. NO - Do you have prosthetic joints?  17. NO - Is there any chance that you may be pregnant?      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 Zofran, Reglan are some of them.  DRAMAMINE WITHOUT ISSUES       Demerol Hcl [Meperidine Hcl] Nausea     Indomethacin Nausea and Vomiting     Macrobid [Nitrofuran Derivatives] Hives     Reglan [Metoclopramide] Other (See Comments)     Acute paranoia, severe     Zofran [Ondansetron] Other (See Comments)     Severe anxiety, agitation     Vancomycin Rash       ULTRASOUND FINDINGS:  Uterus is 8.9x4.3x5.6 cm    Past Medical History:   Diagnosis Date     Abnormal Pap smear 2006, 2007,     ASC-H, ASCUS + HPV 45     Calculus of kidney 2002     Cervical high risk HPV (human papillomavirus) test positive     + HPV 45 (high risk)     History of colposcopy with cervical biopsy 2/9/06, 3/15/07    koilocytosis 2007     PLANNED SURGERY:   laparoscopic assisted vaginal hysterectomy      PLAN FOR INCISION:  Probable pfannenstiel incision, if incision is necessary. Final determination will be made at the time of surgery.      I did inform the patient that the final decision about incision will be based on my examination under anesthesia.  I will use clinical judgment regarding how much surgical exposure is necessary and then determine if a midline or a transverse incision is best.    PLAN REGARDING HER CERVIX:  Remove if possible    The patient did read over an information sheet regarding whether to remove the cervix with the uterus or keep it intact.  I explained that there are some controversies surrounding whether the cervix is involved in sexual orgasm.  Therefore, some women choose to keep it.  Others feel that keeping the cervix contributes to the pelvic support of the vaginal apex and cuff.  However, she understands that if the cervix is kept, a Pap should be done at regular intervals to rule out cervical dysplasia.  Keeping  the cervix is not generally advised for women with previously-abnormal Paps.  Also, retaining the cervix might lead to minor spotting at the time of menses because there is uterine tissue in the cervix.  If she decides to have the cervix removed at a future date, it will be more challenging surgery because the bladder may adhere to the back side of it, requiring surgical expertise at another institution to perform a trachelectomy.  She was advised to read more about this on line.  I explained that sometimes it is quite difficult to remove the cervix especially if there has been previous surgery such as a  section. If we stop short of removing the cervix it would be due to a safety concern such as excessive bleeding.     PLAN REGARDING OVARIES:  Keep remaining ovary if possible  The right one has been surgically removed.  She did say that if the left ovary looks questionable she wants me to remove it and she will take HRT- but I told her I will leave it intact if it looks normal as I expect it will be.    We had a discussion about whether to keep the ovaries or remove them with surgery.  She knows that by keeping the ovaries, there is a 5% chance of needing another surgery in the future to remove one or both if a cyst or cancer develops.  If the ovaries are taken, then menopause ensues.  We discussed the symptoms and risks of this, including risks of taking hormone replacement.    Bladder symptoms:   There are no   symptoms of stress urinary incontinence.      PLAN REGARDING FALLOPIAN TUBES: they have both been removed previously          RISKS DISCUSSION:         She was given several  detailed pamphlets  describing the surgery and the more common risks involved.  We discussed the risks, which include but are not limited to bleeding, infection, possible injury to the organs, including the bowel, bladder, ureters and other internal organs, possible need for cystoscopy, possible vesicovaginal fistula or  rectovaginal fistula formation, possible nerve paresthesias due to retractor placement, possible changes in sexual function and changes in urination and anesthesia risks.   After answering her questions, I handed her a consent form and allowed her to review it thoroughly.  She was offered a second opinion and declined.   She understands that if a bladder injury is discovered it will be addressed but she may need to go home with a Saldana catheter in her bladder for one or more weeks to allow the bladder or other tissues to heal.  There is the option of simply starting with an open surgery which might lower her risk of bladder injury but she does want a laparoscopy even if it means slightly higher risk of bladder injury.The risk of bladder injury with open surgery is generally 1-2% and the risk with laparoscopy can be anywhere from 2-5%. We usually plan this surgery for a day when our urologist is present but there are infrequent occasions when the urologist has changed plans and is not available that day. I will likely perform a cystoscopy to look into the bladder after the surgery to inspect the ureter openings for urine flow and also to make sure the bladder has no lacerations or sutures present. Sometimes that is difficult to tell even with cystoscopy.She signed the form witnessed by my nurse.  I signed it as well.     HOME SUPPORT:  She understands that she will need some help around the home for the first several weeks after surgery.    EXPECTATIONS:  I told her that while most surgeries go smoothly, a perfectly-smooth course should not be assumed.  She should expect some form of inconvenience along the way.  It might be a minor wound infection or a slow return of bowel function or perhaps a more serious concern, such as pneumonia or blood clot.  At any rate, I and my partners will follow her through these unanticipated obstacles.  She knows to expect 3 or 4 days in the hospital if open laparotomy is done and 6  "weeks at home recuperating and 3-4 months until full recovery occurs. If laparoscopy is successful she may be discharged to home as same-day surgery. She will need to limit lifting to 20 pounds for 6-12 weeks after surgery depending upon what is done.  I advised her to limit visitors on the first hospital day to allow her pain medications to work more effectively.  She may  undergo a preoperative bowel prep and clear-liquid diet 1 day prior to surgery.   She knows to be n.p.o. 8 hours before the surgery.  She will have preoperative laboratory work.  If it is abnormal, I may choose to postpone her surgery.    Her preop exam will be done by Greg Ford      After discussing these issues today and on previous occasions, I feel that she has a very-thorough understanding of the indications for surgery and the possible risks associated with it.         Because of her previous abdominal surgery she knows that she is under greater than average surgical risk due to anticipated adhesions from prior surgery.                Note: The information listed above was given to the patient in several hysterectomy information booklets and also in a separate information sheet detailing the specific as well as the general risks pertaining to this surgery that we discussed today before she signed the consent.       Here are the contents of the information sheet I gave her( it mirrors the information listed in my note above):        \"PLEASE NOTE:  THE FOLLOWING PARAGRAPHS WILL BE PLACED INTO YOUR MEDICAL RECORD DESCRIBING WHAT WE HAVE DISCUSSED BEFORE YOU SIGNED YOUR CONSENT FOR SURGERY. PLEASE READ THEM OVER CAREFULLY AND TAKE SOME NOTES TO ASK ME ABOUT IF YOU HAVE ANY CONCERNS OR QUESTIONS. WE WILL DISCUSS THIS INFORMATION AT THE TIME WHEN YOU COME IN TO SIGN YOUR CONSENT.      NOTE TO BE PLACED IN YOUR CHART:            RISKS DISCUSSION:       She was given several  detailed pamphlets  describing the surgery and the more " common risks involved.  We discussed the risks, which include but are not limited to bleeding, infection, possible injury to the organs, including the bowel, bladder, ureters and other internal organs, possible need for cystoscopy, possible vesicovaginal fistula or rectovaginal fistula formation, possible nerve paresthesias due to retractor placement, possible changes in sexual function and changes in urination and anesthesia risks.   After answering her questions, I handed her a consent form and allowed her to review it thoroughly.  She was offered a second opinion and declined.  She understands that if a bladder injury is discovered it will be addressed but she may need to go home with a Saldana catheter in her bladder for one or more weeks to allow the bladder or other tissues to heal.  There is the option of simply starting with an open surgery which might lower her risk of bladder injury but she does want a laparoscopy even if it means slightly higher risk of bladder injury.The risk of bladder injury with open surgery is generally 1-2% and the risk with laparoscopy can be anywhere from 2-5%. We usually plan this surgery for a day when our urologist is present but there are infrequent occasions when the urologist has changed plans and is not available that day. I will likely perform a cystoscopy to look into the bladder after the surgery to inspect the ureter openings for urine flow and also to make sure the bladder has no lacerations or sutures present. Sometimes that is difficult to tell even with cystoscopy.She signed the form witnessed by my nurse.  I signed it as well.       HOME SUPPORT:  She understands that she will need some help around the home for the first several weeks after surgery.    EXPECTATIONS:  I told her that while most surgeries go smoothly, a perfectly-smooth course should not be assumed.  She should expect some form of inconvenience along the way.  It might be a minor wound infection or a  "slow return of bowel function or perhaps a more serious concern, such as pneumonia or blood clot.  At any rate, I and my partners will follow her through these unanticipated obstacles.  She knows to expect 3 or 4 days in the hospital if open laparotomy is done and 6 weeks at home recuperating and 3-4 months until full recovery occurs. If laparoscopy is successful she will likely  be discharged to home as same-day surgery. She will need to limit lifting to 20 pounds for 6-12 weeks after surgery depending upon what is done.  I advised her to limit visitors on the first hospital day to allow her pain medications to work more effectively.  She may  undergo a preoperative bowel prep and clear-liquid diet 1 day prior to surgery. She knows to be n.p.o. 8 hours before the surgery.  She will have preoperative laboratory work.  If it is abnormal, I may choose to postpone her surgery.    \"        A total of 25 minutes were spent face-to-face with this patient (before the preop exam) during today's consultation, with all of that  of that time devoted to conversation and counseling about the management decisions.          Note:  AFTER  The consenting was done, i proceeded with the preop exam, which added an additional 15 minutes to the exam time. See results below:              Preoperative History and Physical    Chief complaint/indicated for surgery/planned surgery:    Fallon is planning to undergo  laparoscopic assisted vaginal hysterectomy   by Dr Ford.    Past Medical History:   Diagnosis Date     Abnormal Pap smear 2006, 2007,     ASC-H, ASCUS + HPV 45     Calculus of kidney 2002     Cervical high risk HPV (human papillomavirus) test positive     + HPV 45 (high risk)     History of colposcopy with cervical biopsy 2/9/06, 3/15/07    koilocytosis 2007     Current Outpatient Medications   Medication Sig Dispense Refill     acetaminophen (TYLENOL) 500 MG tablet Take 1,000 mg by mouth every 6 hours as needed for mild " pain       blood glucose (CONTOUR NEXT TEST) test strip 1 strip by In Vitro route 4 times daily 150 each 3     blood glucose monitoring (NO BRAND SPECIFIED) meter device kit Use to test blood sugar 2 times daily or as directed. 1 kit 0     HYDROcodone-acetaminophen (NORCO) 5-325 MG tablet Take 1 tablet by mouth every 6 hours as needed for pain 12 tablet 0     ibuprofen (ADVIL) 200 MG tablet Take 200 mg by mouth every 4 hours as needed for mild pain       metFORMIN (GLUCOPHAGE) 1000 MG tablet Take 1 tablet in the a.m. 1/2 tablet at night 90 tablet 1     omeprazole (PRILOSEC) 20 MG DR capsule TAKE ONE CAPSULE BY MOUTH ONCE DAILY 30 TO 60 MINUTES BEFORE A MEAL 30 capsule 11     cyclobenzaprine (FLEXERIL) 5 MG tablet Take 1 tablet (5 mg) by mouth 3 times daily as needed for muscle spasms (Patient not taking: Reported on 6/16/2020) 90 tablet 0       There has been no recent use of OTC or herbal medications.   The patient denies any recent ASA or NSAID use.   The patient denies any recent steroid use within the last 6 months.   The patient denies any alcohol or drug use.     Allergies   Allergen Reactions     Amoxicillin Hives     Anti-Nausea      Anxiety, agitation,severe paranoia. Zofran, Reglan are some of them.  DRAMAMINE WITHOUT ISSUES       Demerol Hcl [Meperidine Hcl] Nausea     Indomethacin Nausea and Vomiting     Macrobid [Nitrofuran Derivatives] Hives     Reglan [Metoclopramide] Other (See Comments)     Acute paranoia, severe     Zofran [Ondansetron] Other (See Comments)     Severe anxiety, agitation     Vancomycin Rash     History   Smoking Status     Former Smoker     Packs/day: 0.00     Years: 12.00     Types: Cigarettes     Quit date: 6/2/2019   Smokeless Tobacco     Never Used     Comment: As of 10 /2014, pt has had a few cigs here and there     Past Surgical History:   Procedure Laterality Date     C REMOVAL OF KIDNEY STONE      two surgeries, 2002,2003     CHOLECYSTECTOMY, LAPOROSCOPIC  5/11/2007     Cholecystectomy, Laparoscopic     COLONOSCOPY  05/17/10     COLONOSCOPY N/A 8/27/2019    Procedure: COLONOSCOPY;  Surgeon: Paramjit Kingston DO;  Location: [facility]     CONIZATION  7/1/2011    Procedure:CONIZATION; cold knife and punch biopsy of mole on back; Surgeon:SYDNEY FORD; Location:[facility]     DILATION AND CURETTAGE, HYSTEROSCOPY, LAPAROSCOPY, COMBINED Right 9/24/2015    Procedure: COMBINED DILATION AND CURETTAGE, HYSTEROSCOPY, LAPAROSCOPY;  Surgeon: Sydney Ford MD;  Location: [facility]     DISCECTOMY LUMBAR MINIMALLY INVASIVE ONE LEVEL Left 1/23/2019    Procedure: Minimally Invasive Surgery Left Lumbar 5-Sacral 1 microdiscectomy;  Surgeon: Mason Roe MD;  Location: [facility]     ESOPHAGOSCOPY, GASTROSCOPY, DUODENOSCOPY (EGD), COMBINED  5/21/2014    Procedure: COMBINED ESOPHAGOSCOPY, GASTROSCOPY, DUODENOSCOPY (EGD), BIOPSY SINGLE OR MULTIPLE;  Surgeon: Earl Lane MD;  Location: [facility]     EXCISE LESION TRUNK  7/1/2011    Procedure:EXCISE LESION TRUNK; Surgeon:SYDNEY FORD; Location:[facility]     HC FRAGMENTING OF KIDNEY STONE  11/30/2005     HC RX ECTOP PREG BY SCOPE,RMV TUBE/OVRY  12/20/2007    Right sided salpingectomy and removal of ruptured ectopic pregnancy. D&C.     HC UGI ENDOSCOPY, SIMPLE EXAM  09/01/09     LAPAROSCOPIC APPENDECTOMY N/A 9/24/2015    Procedure: LAPAROSCOPIC APPENDECTOMY;  Surgeon: James Cuellar MD;  [facility]: [facility]     LAPAROSCOPIC CYSTECTOMY OVARIAN (BENIGN) N/A 9/24/2015    Procedure: LAPAROSCOPIC CYSTECTOMY OVARIAN (BENIGN);  Surgeon: Sydney Ford MD;  [facility]     LAPAROSCOPIC OOPHORECTOMY Right 9/24/2015    Procedure: LAPAROSCOPIC OOPHORECTOMY;  Surgeon: Sydney Ford MD;  Location: [facility]     LITHOTRIPSY  01/17/2007     PELVIS LAPAROSCOPY,DX  10/16/2000    Diagnostic laparoscopy, Endometrial biopsy.     TUBAL/ECTOPIC PREGNANCY  01/23/2007    Lap removal of left ruptured ectopic pregnancy  & salpingectomy, D&C     Social History     Socioeconomic History     Marital status:      Spouse name: Joseph     Number of children: 1     Years of education: 9     Highest education level: Not on file   Occupational History     Employer: NONE   Social Needs     Financial resource strain: Not on file     Food insecurity     Worry: Not on file     Inability: Not on file     Transportation needs     Medical: Not on file     Non-medical: Not on file   Tobacco Use     Smoking status: Former Smoker     Packs/day: 0.00     Years: 12.00     Pack years: 0.00     Types: Cigarettes     Last attempt to quit: 2019     Years since quittin.0     Smokeless tobacco: Never Used     Tobacco comment: As of 10 /2014, pt has had a few cigs here and there   Substance and Sexual Activity     Alcohol use: Yes     Alcohol/week: 0.0 standard drinks     Comment: every few months     Drug use: No     Sexual activity: Yes     Partners: Male     Birth control/protection: None   Lifestyle     Physical activity     Days per week: Not on file     Minutes per session: Not on file     Stress: Not on file   Relationships     Social connections     Talks on phone: Not on file     Gets together: Not on file     Attends Confucianist service: Not on file     Active member of club or organization: Not on file     Attends meetings of clubs or organizations: Not on file     Relationship status: Not on file     Intimate partner violence     Fear of current or ex partner: Not on file     Emotionally abused: Not on file     Physically abused: Not on file     Forced sexual activity: Not on file   Other Topics Concern      Service No     Blood Transfusions No     Caffeine Concern Yes     Comment: Reports 1 can soda/week  Advised not more than 16 ounces caffeien/day.     Occupational Exposure No     Hobby Hazards No     Sleep Concern No     Stress Concern Yes     Comment: will discuss with      Weight Concern Yes     Comment: Eating disorder  in childhood.  Hx. gestational diab.     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     Exercise No     Comment: Advised walking 30 min/day.     Bike Helmet No     Seat Belt Yes     Self-Exams Not Asked     Parent/sibling w/ CABG, MI or angioplasty before 65F 55M? Not Asked   Social History Narrative     and lives at home with her  and daughter.     Family History   Problem Relation Age of Onset     Diabetes Maternal Grandmother         adult onset     Anesthesia Reaction Maternal Grandmother         can't tolerate Novacaine.     Respiratory Maternal Grandmother         COPD and emphysema.     Thyroid Disease Maternal Grandmother      Heart Disease Maternal Grandmother         CHF     Diabetes Paternal Grandfather         adult o nset     Hypertension Paternal Grandfather      Cerebrovascular Disease Paternal Grandfather      Heart Disease Paternal Grandfather         MI, replaced valve,angioplasty     Thyroid Disease Paternal Grandfather      Cancer Maternal Grandfather         skin cancer     Heart Disease Maternal Grandfather         MI     Obesity Mother         on thyroid medication     Thyroid Disease Mother      Diabetes Mother      Rheumatologic Disease Mother      Heart Disease Father      Hypertension Father         and TIA     Lipids Father      Breast Cancer Paternal Grandmother      Arrhythmia Other      Cancer Maternal Aunt         breast/brain cancer     Depression Paternal Aunt      Cerebrovascular Disease Paternal Uncle      Cerebrovascular Disease Paternal Aunt        There is no family history of anesthesia reactions or malignant hyperthermia or psevdocholinestergse problem or porphyria.    Review Of Systems  Skin: negative  Eyes: negative  Ears/Nose/Throat: negative  Respiratory: No shortness of breath, dyspnea on exertion, cough, or hemoptysis  Cardiovascular: negative  Gastrointestinal: negative  Genitourinary:  "negative  Musculoskeletal: negative  Neurologic: negative  Psychiatric: negative  Hematologic/Lymphatic/Immunologic: negative  Endocrine: negative    Physical Exam:Blood pressure 104/82, pulse 92, temperature 98  F (36.7  C), temperature source Temporal, resp. rate 12, height 1.648 m (5' 4.9\"), weight 97.7 kg (215 lb 6.4 oz), SpO2 98 %, not currently breastfeeding.        HEENT:    Sclerae and conjunctiva are normal.   Ear canals and TMs look normal.  Nasal mucosa is pink  - no polyps or masses seen.  Throat is unremarkable . Mucous membranes are moist.     Neck is supple, mobile, no adenopathy or masses palpable. The thyroid feels normal.   Normal range of motion noted.    Chest is clear to auscultation.  No wheezes, rales or rhonchi heard.  Cardiac exam is normal with s1, s2, no murmurs or adventitious sounds.Normal rate and rhythm is heard.     Abdomen is soft,  nondistended, No masses felt.No HSM. No guarding or rigidity or rebound   noted. Palpation reveals  no    tenderness   Normal bowel sounds heard.     Extremities are pink and there is no cyanosis and there is no edema. Pulses are physiologic.    Motor and sensory exams are grossly normal. Cranial nerves 2-12 are intact. Cerebellar exam is normal.             Other:  Labs: to have CBC and A1C done along with creat and type and screen on the day before surgery.  Also COVID has been scheduled for 2 days before surgery.    Assessment/Impression:  1. 40 year old female with endometrial neoplasia- plans to have laparoscopic assisted vaginal hysterectomy      2.Preoperative  Examination: The patient is at LOW   risk for general anesthesia for the proposed surgery.  Also TAP block is requested postoperatively.    3. Type 2 diabetes mellitus. Her recent A1C was  7.8 and she is monitoring sugars multiple times daily. She takes metformin.She will take the am dose on the morning before surgery but hold the pm dose and none on the day of " surgery.    Recommendations:  Approval given for general anesthesia.    Medications are to be stopped before surgery.   Discontinue ASA and NSAIDS 5 days prior to surgery to reduce bleeding risk.            SHARAN Ford MD

## 2020-07-01 NOTE — PROGRESS NOTES
Proposed Surgery/ Procedure: hysterectomy total  Date of Surgery/ Procedure: 7/28/2020  Time of Surgery/ Procedure: 9:30am  Hospital/Surgical Facility: [facility]  Primary Physician: Greg Ford  Type of Anesthesia Anticipated: General    Patient has a Health Care Directive or Living Will:  NO    1. NO - Do you have a history of heart attack, stroke, stent, bypass or surgery on an artery in the head, neck, heart or legs?  2. NO - Do you ever have any pain or discomfort in your chest?  3. NO - Do you have a history of  Heart Failure?  4. NO - Are you troubled by shortness of breath when: walking on the level, up a slight hill or at night?  5. NO - Do you currently have a cold, bronchitis or other respiratory infection?  6. NO - Do you have a cough, shortness of breath or wheezing?  7. NO - Do you sometimes get pains in the calves of your legs when you walk?  8. YES - DO YOU OR ANYONE IN YOUR FAMILY HAVE PREVIOUS HISTORY OF BLOOD CLOTS? Aunt  9. NO - Do you or does anyone in your family have a serious bleeding problem such as prolonged bleeding following surgeries or cuts?  10. NO - Have you ever had problems with anemia or been told to take iron pills?  11. YES - HAVE YOU HAD ANY ABNORMAL BLOOD LOSS SUCH AS BLACK, TARRY OR BLOODY STOOLS, OR ABNORMAL VAGINAL BLEEDING? Vaginal bleeding  12. NO - Have you ever had a blood transfusion?  13. YES - HAVE YOU OR ANY OF YOUR RELATIVES EVER HAD PROBLEMS WITH ANESTHESIA? grandma  14. NO - Do you have sleep apnea, excessive snoring or daytime drowsiness?  15. NO - Do you have any prosthetic heart valves?  16. NO - Do you have prosthetic joints?  17. NO - Is there any chance that you may be pregnant?      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 Zofran, Reglan are some of them.  DRAMAMINE WITHOUT ISSUES       Demerol Hcl [Meperidine Hcl] Nausea     Indomethacin Nausea and Vomiting     Macrobid [Nitrofuran Derivatives] Hives     Reglan [Metoclopramide] Other (See Comments)     Acute paranoia, severe     Zofran [Ondansetron] Other (See Comments)     Severe anxiety, agitation     Vancomycin Rash       ULTRASOUND FINDINGS:  Uterus is 8.9x4.3x5.6 cm    Past Medical History:   Diagnosis Date     Abnormal Pap smear 2006, 2007,     ASC-H, ASCUS + HPV 45     Calculus of kidney 2002     Cervical high risk HPV (human papillomavirus) test positive     + HPV 45 (high risk)     History of colposcopy with cervical biopsy 2/9/06, 3/15/07    koilocytosis 2007     PLANNED SURGERY:   laparoscopic assisted vaginal hysterectomy      PLAN FOR INCISION:  Probable pfannenstiel incision, if incision is necessary. Final determination will be made at the time of surgery.      I did inform the patient that the final decision about incision will be based on my examination under anesthesia.  I will use clinical judgment regarding how much surgical exposure is necessary and then determine if a midline or a transverse incision is best.    PLAN REGARDING HER CERVIX:  Remove if possible    The patient did read over an information sheet regarding whether to remove the cervix with the uterus or keep it intact.  I explained that there are some controversies surrounding whether the cervix is involved in sexual orgasm.  Therefore, some women choose to keep it.  Others feel that keeping the cervix contributes to the pelvic support of the vaginal apex and cuff.  However, she understands that if the cervix is kept, a Pap should be done at regular intervals to rule out cervical dysplasia.  Keeping  the cervix is not generally advised for women with previously-abnormal Paps.  Also, retaining the cervix might lead to minor spotting at the time of menses because there is uterine tissue in the cervix.  If she decides to have the cervix removed at a future date, it will be more challenging surgery because the bladder may adhere to the back side of it, requiring surgical expertise at another institution to perform a trachelectomy.  She was advised to read more about this on line.  I explained that sometimes it is quite difficult to remove the cervix especially if there has been previous surgery such as a  section. If we stop short of removing the cervix it would be due to a safety concern such as excessive bleeding.     PLAN REGARDING OVARIES:  Keep remaining ovary if possible  The right one has been surgically removed.  She did say that if the left ovary looks questionable she wants me to remove it and she will take HRT- but I told her I will leave it intact if it looks normal as I expect it will be.    We had a discussion about whether to keep the ovaries or remove them with surgery.  She knows that by keeping the ovaries, there is a 5% chance of needing another surgery in the future to remove one or both if a cyst or cancer develops.  If the ovaries are taken, then menopause ensues.  We discussed the symptoms and risks of this, including risks of taking hormone replacement.    Bladder symptoms:   There are no   symptoms of stress urinary incontinence.      PLAN REGARDING FALLOPIAN TUBES: they have both been removed previously          RISKS DISCUSSION:         She was given several  detailed pamphlets  describing the surgery and the more common risks involved.  We discussed the risks, which include but are not limited to bleeding, infection, possible injury to the organs, including the bowel, bladder, ureters and other internal organs, possible need for cystoscopy, possible vesicovaginal fistula or  rectovaginal fistula formation, possible nerve paresthesias due to retractor placement, possible changes in sexual function and changes in urination and anesthesia risks.   After answering her questions, I handed her a consent form and allowed her to review it thoroughly.  She was offered a second opinion and declined.   She understands that if a bladder injury is discovered it will be addressed but she may need to go home with a Saldana catheter in her bladder for one or more weeks to allow the bladder or other tissues to heal.  There is the option of simply starting with an open surgery which might lower her risk of bladder injury but she does want a laparoscopy even if it means slightly higher risk of bladder injury.The risk of bladder injury with open surgery is generally 1-2% and the risk with laparoscopy can be anywhere from 2-5%. We usually plan this surgery for a day when our urologist is present but there are infrequent occasions when the urologist has changed plans and is not available that day. I will likely perform a cystoscopy to look into the bladder after the surgery to inspect the ureter openings for urine flow and also to make sure the bladder has no lacerations or sutures present. Sometimes that is difficult to tell even with cystoscopy.She signed the form witnessed by my nurse.  I signed it as well.     HOME SUPPORT:  She understands that she will need some help around the home for the first several weeks after surgery.    EXPECTATIONS:  I told her that while most surgeries go smoothly, a perfectly-smooth course should not be assumed.  She should expect some form of inconvenience along the way.  It might be a minor wound infection or a slow return of bowel function or perhaps a more serious concern, such as pneumonia or blood clot.  At any rate, I and my partners will follow her through these unanticipated obstacles.  She knows to expect 3 or 4 days in the hospital if open laparotomy is done and 6  "weeks at home recuperating and 3-4 months until full recovery occurs. If laparoscopy is successful she may be discharged to home as same-day surgery. She will need to limit lifting to 20 pounds for 6-12 weeks after surgery depending upon what is done.  I advised her to limit visitors on the first hospital day to allow her pain medications to work more effectively.  She may  undergo a preoperative bowel prep and clear-liquid diet 1 day prior to surgery.   She knows to be n.p.o. 8 hours before the surgery.  She will have preoperative laboratory work.  If it is abnormal, I may choose to postpone her surgery.    Her preop exam will be done by Greg Ford      After discussing these issues today and on previous occasions, I feel that she has a very-thorough understanding of the indications for surgery and the possible risks associated with it.         Because of her previous abdominal surgery she knows that she is under greater than average surgical risk due to anticipated adhesions from prior surgery.                Note: The information listed above was given to the patient in several hysterectomy information booklets and also in a separate information sheet detailing the specific as well as the general risks pertaining to this surgery that we discussed today before she signed the consent.       Here are the contents of the information sheet I gave her( it mirrors the information listed in my note above):        \"PLEASE NOTE:  THE FOLLOWING PARAGRAPHS WILL BE PLACED INTO YOUR MEDICAL RECORD DESCRIBING WHAT WE HAVE DISCUSSED BEFORE YOU SIGNED YOUR CONSENT FOR SURGERY. PLEASE READ THEM OVER CAREFULLY AND TAKE SOME NOTES TO ASK ME ABOUT IF YOU HAVE ANY CONCERNS OR QUESTIONS. WE WILL DISCUSS THIS INFORMATION AT THE TIME WHEN YOU COME IN TO SIGN YOUR CONSENT.      NOTE TO BE PLACED IN YOUR CHART:            RISKS DISCUSSION:       She was given several  detailed pamphlets  describing the surgery and the more " common risks involved.  We discussed the risks, which include but are not limited to bleeding, infection, possible injury to the organs, including the bowel, bladder, ureters and other internal organs, possible need for cystoscopy, possible vesicovaginal fistula or rectovaginal fistula formation, possible nerve paresthesias due to retractor placement, possible changes in sexual function and changes in urination and anesthesia risks.   After answering her questions, I handed her a consent form and allowed her to review it thoroughly.  She was offered a second opinion and declined.  She understands that if a bladder injury is discovered it will be addressed but she may need to go home with a Saldana catheter in her bladder for one or more weeks to allow the bladder or other tissues to heal.  There is the option of simply starting with an open surgery which might lower her risk of bladder injury but she does want a laparoscopy even if it means slightly higher risk of bladder injury.The risk of bladder injury with open surgery is generally 1-2% and the risk with laparoscopy can be anywhere from 2-5%. We usually plan this surgery for a day when our urologist is present but there are infrequent occasions when the urologist has changed plans and is not available that day. I will likely perform a cystoscopy to look into the bladder after the surgery to inspect the ureter openings for urine flow and also to make sure the bladder has no lacerations or sutures present. Sometimes that is difficult to tell even with cystoscopy.She signed the form witnessed by my nurse.  I signed it as well.       HOME SUPPORT:  She understands that she will need some help around the home for the first several weeks after surgery.    EXPECTATIONS:  I told her that while most surgeries go smoothly, a perfectly-smooth course should not be assumed.  She should expect some form of inconvenience along the way.  It might be a minor wound infection or a  "slow return of bowel function or perhaps a more serious concern, such as pneumonia or blood clot.  At any rate, I and my partners will follow her through these unanticipated obstacles.  She knows to expect 3 or 4 days in the hospital if open laparotomy is done and 6 weeks at home recuperating and 3-4 months until full recovery occurs. If laparoscopy is successful she will likely  be discharged to home as same-day surgery. She will need to limit lifting to 20 pounds for 6-12 weeks after surgery depending upon what is done.  I advised her to limit visitors on the first hospital day to allow her pain medications to work more effectively.  She may  undergo a preoperative bowel prep and clear-liquid diet 1 day prior to surgery. She knows to be n.p.o. 8 hours before the surgery.  She will have preoperative laboratory work.  If it is abnormal, I may choose to postpone her surgery.    \"        A total of 25 minutes were spent face-to-face with this patient (before the preop exam) during today's consultation, with all of that  of that time devoted to conversation and counseling about the management decisions.          Note:  AFTER  The consenting was done, i proceeded with the preop exam, which added an additional 15 minutes to the exam time. See results below:              Preoperative History and Physical    Chief complaint/indicated for surgery/planned surgery:    Fallon is planning to undergo  laparoscopic assisted vaginal hysterectomy   by Dr Ford.    Past Medical History:   Diagnosis Date     Abnormal Pap smear 2006, 2007,     ASC-H, ASCUS + HPV 45     Calculus of kidney 2002     Cervical high risk HPV (human papillomavirus) test positive     + HPV 45 (high risk)     History of colposcopy with cervical biopsy 2/9/06, 3/15/07    koilocytosis 2007     Current Outpatient Medications   Medication Sig Dispense Refill     acetaminophen (TYLENOL) 500 MG tablet Take 1,000 mg by mouth every 6 hours as needed for mild " pain       blood glucose (CONTOUR NEXT TEST) test strip 1 strip by In Vitro route 4 times daily 150 each 3     blood glucose monitoring (NO BRAND SPECIFIED) meter device kit Use to test blood sugar 2 times daily or as directed. 1 kit 0     HYDROcodone-acetaminophen (NORCO) 5-325 MG tablet Take 1 tablet by mouth every 6 hours as needed for pain 12 tablet 0     ibuprofen (ADVIL) 200 MG tablet Take 200 mg by mouth every 4 hours as needed for mild pain       metFORMIN (GLUCOPHAGE) 1000 MG tablet Take 1 tablet in the a.m. 1/2 tablet at night 90 tablet 1     omeprazole (PRILOSEC) 20 MG DR capsule TAKE ONE CAPSULE BY MOUTH ONCE DAILY 30 TO 60 MINUTES BEFORE A MEAL 30 capsule 11     cyclobenzaprine (FLEXERIL) 5 MG tablet Take 1 tablet (5 mg) by mouth 3 times daily as needed for muscle spasms (Patient not taking: Reported on 6/16/2020) 90 tablet 0       There has been no recent use of OTC or herbal medications.   The patient denies any recent ASA or NSAID use.   The patient denies any recent steroid use within the last 6 months.   The patient denies any alcohol or drug use.     Allergies   Allergen Reactions     Amoxicillin Hives     Anti-Nausea      Anxiety, agitation,severe paranoia. Zofran, Reglan are some of them.  DRAMAMINE WITHOUT ISSUES       Demerol Hcl [Meperidine Hcl] Nausea     Indomethacin Nausea and Vomiting     Macrobid [Nitrofuran Derivatives] Hives     Reglan [Metoclopramide] Other (See Comments)     Acute paranoia, severe     Zofran [Ondansetron] Other (See Comments)     Severe anxiety, agitation     Vancomycin Rash     History   Smoking Status     Former Smoker     Packs/day: 0.00     Years: 12.00     Types: Cigarettes     Quit date: 6/2/2019   Smokeless Tobacco     Never Used     Comment: As of 10 /2014, pt has had a few cigs here and there     Past Surgical History:   Procedure Laterality Date     C REMOVAL OF KIDNEY STONE      two surgeries, 2002,2003     CHOLECYSTECTOMY, LAPOROSCOPIC  5/11/2007     Cholecystectomy, Laparoscopic     COLONOSCOPY  05/17/10     COLONOSCOPY N/A 8/27/2019    Procedure: COLONOSCOPY;  Surgeon: Paramjit Kingston DO;  Location: [facility]     CONIZATION  7/1/2011    Procedure:CONIZATION; cold knife and punch biopsy of mole on back; Surgeon:SYDNEY FORD; Location:[facility]     DILATION AND CURETTAGE, HYSTEROSCOPY, LAPAROSCOPY, COMBINED Right 9/24/2015    Procedure: COMBINED DILATION AND CURETTAGE, HYSTEROSCOPY, LAPAROSCOPY;  Surgeon: Sydney Ford MD;  Location: [facility]     DISCECTOMY LUMBAR MINIMALLY INVASIVE ONE LEVEL Left 1/23/2019    Procedure: Minimally Invasive Surgery Left Lumbar 5-Sacral 1 microdiscectomy;  Surgeon: Mason Roe MD;  Location: [facility]     ESOPHAGOSCOPY, GASTROSCOPY, DUODENOSCOPY (EGD), COMBINED  5/21/2014    Procedure: COMBINED ESOPHAGOSCOPY, GASTROSCOPY, DUODENOSCOPY (EGD), BIOPSY SINGLE OR MULTIPLE;  Surgeon: Earl Lane MD;  Location: [facility]     EXCISE LESION TRUNK  7/1/2011    Procedure:EXCISE LESION TRUNK; Surgeon:SYDNEY FORD; Location:[facility]     HC FRAGMENTING OF KIDNEY STONE  11/30/2005     HC RX ECTOP PREG BY SCOPE,RMV TUBE/OVRY  12/20/2007    Right sided salpingectomy and removal of ruptured ectopic pregnancy. D&C.     HC UGI ENDOSCOPY, SIMPLE EXAM  09/01/09     LAPAROSCOPIC APPENDECTOMY N/A 9/24/2015    Procedure: LAPAROSCOPIC APPENDECTOMY;  Surgeon: James Cuellar MD;  [facility]     LAPAROSCOPIC CYSTECTOMY OVARIAN (BENIGN) N/A 9/24/2015    Procedure: LAPAROSCOPIC CYSTECTOMY OVARIAN (BENIGN);  Surgeon: Sydney Ford MD;  [facility]     LAPAROSCOPIC OOPHORECTOMY Right 9/24/2015    Procedure: LAPAROSCOPIC OOPHORECTOMY;  Surgeon: Sydney Ford MD;  Location: [facility]     LITHOTRIPSY  01/17/2007     PELVIS LAPAROSCOPY,DX  10/16/2000    Diagnostic laparoscopy, Endometrial biopsy.     TUBAL/ECTOPIC PREGNANCY  01/23/2007    Lap removal of left ruptured ectopic pregnancy  & salpingectomy, D&C     Social History     Socioeconomic History     Marital status:      Spouse name: Joseph     Number of children: 1     Years of education: 9     Highest education level: Not on file   Occupational History     Employer: NONE   Social Needs     Financial resource strain: Not on file     Food insecurity     Worry: Not on file     Inability: Not on file     Transportation needs     Medical: Not on file     Non-medical: Not on file   Tobacco Use     Smoking status: Former Smoker     Packs/day: 0.00     Years: 12.00     Pack years: 0.00     Types: Cigarettes     Last attempt to quit: 2019     Years since quittin.0     Smokeless tobacco: Never Used     Tobacco comment: As of 10 /2014, pt has had a few cigs here and there   Substance and Sexual Activity     Alcohol use: Yes     Alcohol/week: 0.0 standard drinks     Comment: every few months     Drug use: No     Sexual activity: Yes     Partners: Male     Birth control/protection: None   Lifestyle     Physical activity     Days per week: Not on file     Minutes per session: Not on file     Stress: Not on file   Relationships     Social connections     Talks on phone: Not on file     Gets together: Not on file     Attends Oriental orthodox service: Not on file     Active member of club or organization: Not on file     Attends meetings of clubs or organizations: Not on file     Relationship status: Not on file     Intimate partner violence     Fear of current or ex partner: Not on file     Emotionally abused: Not on file     Physically abused: Not on file     Forced sexual activity: Not on file   Other Topics Concern      Service No     Blood Transfusions No     Caffeine Concern Yes     Comment: Reports 1 can soda/week  Advised not more than 16 ounces caffeien/day.     Occupational Exposure No     Hobby Hazards No     Sleep Concern No     Stress Concern Yes     Comment: will discuss with      Weight Concern Yes     Comment: Eating disorder  in childhood.  Hx. gestational diab.     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     Exercise No     Comment: Advised walking 30 min/day.     Bike Helmet No     Seat Belt Yes     Self-Exams Not Asked     Parent/sibling w/ CABG, MI or angioplasty before 65F 55M? Not Asked   Social History Narrative     and lives at home with her  and daughter.     Family History   Problem Relation Age of Onset     Diabetes Maternal Grandmother         adult onset     Anesthesia Reaction Maternal Grandmother         can't tolerate Novacaine.     Respiratory Maternal Grandmother         COPD and emphysema.     Thyroid Disease Maternal Grandmother      Heart Disease Maternal Grandmother         CHF     Diabetes Paternal Grandfather         adult o nset     Hypertension Paternal Grandfather      Cerebrovascular Disease Paternal Grandfather      Heart Disease Paternal Grandfather         MI, replaced valve,angioplasty     Thyroid Disease Paternal Grandfather      Cancer Maternal Grandfather         skin cancer     Heart Disease Maternal Grandfather         MI     Obesity Mother         on thyroid medication     Thyroid Disease Mother      Diabetes Mother      Rheumatologic Disease Mother      Heart Disease Father      Hypertension Father         and TIA     Lipids Father      Breast Cancer Paternal Grandmother      Arrhythmia Other      Cancer Maternal Aunt         breast/brain cancer     Depression Paternal Aunt      Cerebrovascular Disease Paternal Uncle      Cerebrovascular Disease Paternal Aunt        There is no family history of anesthesia reactions or malignant hyperthermia or psevdocholinestergse problem or porphyria.    Review Of Systems  Skin: negative  Eyes: negative  Ears/Nose/Throat: negative  Respiratory: No shortness of breath, dyspnea on exertion, cough, or hemoptysis  Cardiovascular: negative  Gastrointestinal: negative  Genitourinary:  "negative  Musculoskeletal: negative  Neurologic: negative  Psychiatric: negative  Hematologic/Lymphatic/Immunologic: negative  Endocrine: negative    Physical Exam:Blood pressure 104/82, pulse 92, temperature 98  F (36.7  C), temperature source Temporal, resp. rate 12, height 1.648 m (5' 4.9\"), weight 97.7 kg (215 lb 6.4 oz), SpO2 98 %, not currently breastfeeding.        HEENT:    Sclerae and conjunctiva are normal.   Ear canals and TMs look normal.  Nasal mucosa is pink  - no polyps or masses seen.  Throat is unremarkable . Mucous membranes are moist.     Neck is supple, mobile, no adenopathy or masses palpable. The thyroid feels normal.   Normal range of motion noted.    Chest is clear to auscultation.  No wheezes, rales or rhonchi heard.  Cardiac exam is normal with s1, s2, no murmurs or adventitious sounds.Normal rate and rhythm is heard.     Abdomen is soft,  nondistended, No masses felt.No HSM. No guarding or rigidity or rebound   noted. Palpation reveals  no    tenderness   Normal bowel sounds heard.     Extremities are pink and there is no cyanosis and there is no edema. Pulses are physiologic.    Motor and sensory exams are grossly normal. Cranial nerves 2-12 are intact. Cerebellar exam is normal.             Other:  Labs: to have CBC and A1C done along with creat and type and screen on the day before surgery.  Also COVID has been scheduled for 2 days before surgery.    Assessment/Impression:  1. 40 year old female with endometrial neoplasia- plans to have laparoscopic assisted vaginal hysterectomy      2.Preoperative  Examination: The patient is at LOW   risk for general anesthesia for the proposed surgery.  Also TAP block is requested postoperatively.    3. Type 2 diabetes mellitus. Her recent A1C was  7.8 and she is monitoring sugars multiple times daily. She takes metformin.She will take the am dose on the morning before surgery but hold the pm dose and none on the day of " surgery.    Recommendations:  Approval given for general anesthesia.    Medications are to be stopped before surgery.   Discontinue ASA and NSAIDS 5 days prior to surgery to reduce bleeding risk.            SHARAN Ford MD

## 2020-07-22 ENCOUNTER — TELEPHONE (OUTPATIENT)
Dept: FAMILY MEDICINE | Facility: CLINIC | Age: 40
End: 2020-07-22

## 2020-07-25 DIAGNOSIS — Z11.59 ENCOUNTER FOR SCREENING FOR OTHER VIRAL DISEASES: ICD-10-CM

## 2020-07-25 PROCEDURE — U0003 INFECTIOUS AGENT DETECTION BY NUCLEIC ACID (DNA OR RNA); SEVERE ACUTE RESPIRATORY SYNDROME CORONAVIRUS 2 (SARS-COV-2) (CORONAVIRUS DISEASE [COVID-19]), AMPLIFIED PROBE TECHNIQUE, MAKING USE OF HIGH THROUGHPUT TECHNOLOGIES AS DESCRIBED BY CMS-2020-01-R: HCPCS | Performed by: OBSTETRICS & GYNECOLOGY

## 2020-07-26 ENCOUNTER — MYC MEDICAL ADVICE (OUTPATIENT)
Dept: FAMILY MEDICINE | Facility: CLINIC | Age: 40
End: 2020-07-26

## 2020-07-26 LAB
LABORATORY COMMENT REPORT: NORMAL
SARS-COV-2 RNA SPEC QL NAA+PROBE: NEGATIVE
SARS-COV-2 RNA SPEC QL NAA+PROBE: NORMAL
SPECIMEN SOURCE: NORMAL
SPECIMEN SOURCE: NORMAL

## 2020-07-27 ENCOUNTER — ANESTHESIA EVENT (OUTPATIENT)
Dept: SURGERY | Facility: CLINIC | Age: 40
End: 2020-07-27
Payer: COMMERCIAL

## 2020-07-27 DIAGNOSIS — N85.02 ENDOMETRIAL HYPERPLASIA WITH ATYPIA: ICD-10-CM

## 2020-07-27 LAB
ABO + RH BLD: NORMAL
ABO + RH BLD: NORMAL
BLD GP AB SCN SERPL QL: NORMAL
BLOOD BANK CMNT PATIENT-IMP: NORMAL
CREAT SERPL-MCNC: 0.76 MG/DL (ref 0.52–1.04)
ERYTHROCYTE [DISTWIDTH] IN BLOOD BY AUTOMATED COUNT: 12.8 % (ref 10–15)
GFR SERPL CREATININE-BSD FRML MDRD: >90 ML/MIN/{1.73_M2}
HBA1C MFR BLD: 8 % (ref 0–5.6)
HCT VFR BLD AUTO: 43.4 % (ref 35–47)
HGB BLD-MCNC: 15.3 G/DL (ref 11.7–15.7)
MCH RBC QN AUTO: 30.7 PG (ref 26.5–33)
MCHC RBC AUTO-ENTMCNC: 35.3 G/DL (ref 31.5–36.5)
MCV RBC AUTO: 87 FL (ref 78–100)
PLATELET # BLD AUTO: 189 10E9/L (ref 150–450)
RBC # BLD AUTO: 4.98 10E12/L (ref 3.8–5.2)
SPECIMEN EXP DATE BLD: NORMAL
WBC # BLD AUTO: 7.6 10E9/L (ref 4–11)

## 2020-07-27 PROCEDURE — 86901 BLOOD TYPING SEROLOGIC RH(D): CPT | Performed by: OBSTETRICS & GYNECOLOGY

## 2020-07-27 PROCEDURE — 85027 COMPLETE CBC AUTOMATED: CPT | Performed by: OBSTETRICS & GYNECOLOGY

## 2020-07-27 PROCEDURE — 86900 BLOOD TYPING SEROLOGIC ABO: CPT | Performed by: OBSTETRICS & GYNECOLOGY

## 2020-07-27 PROCEDURE — 82565 ASSAY OF CREATININE: CPT | Performed by: OBSTETRICS & GYNECOLOGY

## 2020-07-27 PROCEDURE — 83036 HEMOGLOBIN GLYCOSYLATED A1C: CPT | Performed by: OBSTETRICS & GYNECOLOGY

## 2020-07-27 PROCEDURE — 36415 COLL VENOUS BLD VENIPUNCTURE: CPT | Performed by: OBSTETRICS & GYNECOLOGY

## 2020-07-27 PROCEDURE — 86850 RBC ANTIBODY SCREEN: CPT | Performed by: OBSTETRICS & GYNECOLOGY

## 2020-07-27 NOTE — TELEPHONE ENCOUNTER
Forms completed and faxed to employer. Per patient request copy sent in the mail to home address  Lynn Burton CMA    
Patient notified that provider will follow up and check on forms on Saturday when he is in town. Socorro Woo LPN    
Reason for Call:  Form, our goal is to have forms completed with 72 hours, however, some forms may require a visit or additional information.    Type of letter, form or note:  [facility]    Who is the form from?: Patient - pts spouses employer     Where did the form come from: Patient or family brought in       What clinic location was the form placed at?: [facility]    Where the form was placed: n/a  Box/Folder    What number is listed as a contact on the form?: pts home #      Additional comments: Pt brought in [place] paperwork for spouses employer.   She gave it to the nurse at your pre op.  Pt states his employer has not received it yet.   Please call to advise     Call taken on 7/22/2020 at 4:26 PM by Rehana Gould      
8

## 2020-07-28 ENCOUNTER — HOSPITAL ENCOUNTER (OUTPATIENT)
Facility: CLINIC | Age: 40
Discharge: HOME | End: 2020-07-28
Attending: OBSTETRICS & GYNECOLOGY | Admitting: OBSTETRICS & GYNECOLOGY
Payer: COMMERCIAL

## 2020-07-28 ENCOUNTER — SURGERY (OUTPATIENT)
Age: 40
End: 2020-07-28
Payer: COMMERCIAL

## 2020-07-28 ENCOUNTER — ANESTHESIA (OUTPATIENT)
Dept: SURGERY | Facility: CLINIC | Age: 40
End: 2020-07-28
Payer: COMMERCIAL

## 2020-07-28 VITALS
DIASTOLIC BLOOD PRESSURE: 62 MMHG | HEIGHT: 61 IN | WEIGHT: 214 LBS | OXYGEN SATURATION: 96 % | SYSTOLIC BLOOD PRESSURE: 108 MMHG | BODY MASS INDEX: 40.4 KG/M2 | TEMPERATURE: 98 F | RESPIRATION RATE: 16 BRPM | HEART RATE: 87 BPM

## 2020-07-28 DIAGNOSIS — R10.2 PELVIC PAIN IN FEMALE: ICD-10-CM

## 2020-07-28 DIAGNOSIS — Z90.710 S/P LAPAROSCOPIC HYSTERECTOMY: ICD-10-CM

## 2020-07-28 DIAGNOSIS — Z01.818 PREOPERATIVE EVALUATION FOR TUBAL LIGATION: ICD-10-CM

## 2020-07-28 DIAGNOSIS — N85.00 ENDOMETRIAL HYPERPLASIA, UNSPECIFIED: Primary | ICD-10-CM

## 2020-07-28 LAB
GLUCOSE BLDC GLUCOMTR-MCNC: 173 MG/DL (ref 70–99)
GLUCOSE BLDC GLUCOMTR-MCNC: 195 MG/DL (ref 70–99)
HCG UR QL: NEGATIVE

## 2020-07-28 PROCEDURE — 88309 TISSUE EXAM BY PATHOLOGIST: CPT | Mod: 26 | Performed by: OBSTETRICS & GYNECOLOGY

## 2020-07-28 PROCEDURE — 82962 GLUCOSE BLOOD TEST: CPT

## 2020-07-28 PROCEDURE — 36000063 ZZH SURGERY LEVEL 4 EA 15 ADDTL MIN: Performed by: OBSTETRICS & GYNECOLOGY

## 2020-07-28 PROCEDURE — 37000009 ZZH ANESTHESIA TECHNICAL FEE, EACH ADDTL 15 MIN: Performed by: OBSTETRICS & GYNECOLOGY

## 2020-07-28 PROCEDURE — 25000132 ZZH RX MED GY IP 250 OP 250 PS 637: Performed by: OBSTETRICS & GYNECOLOGY

## 2020-07-28 PROCEDURE — 58550 LAPARO-ASST VAG HYSTERECTOMY: CPT | Mod: 80 | Performed by: OBSTETRICS & GYNECOLOGY

## 2020-07-28 PROCEDURE — 88309 TISSUE EXAM BY PATHOLOGIST: CPT | Mod: 26 | Performed by: PATHOLOGY

## 2020-07-28 PROCEDURE — 25000566 ZZH SEVOFLURANE, EA 15 MIN: Performed by: OBSTETRICS & GYNECOLOGY

## 2020-07-28 PROCEDURE — 25800030 ZZH RX IP 258 OP 636: Performed by: NURSE ANESTHETIST, CERTIFIED REGISTERED

## 2020-07-28 PROCEDURE — 25800030 ZZH RX IP 258 OP 636: Performed by: OBSTETRICS & GYNECOLOGY

## 2020-07-28 PROCEDURE — 36000093 ZZH SURGERY LEVEL 4 1ST 30 MIN: Performed by: OBSTETRICS & GYNECOLOGY

## 2020-07-28 PROCEDURE — 25000125 ZZHC RX 250: Performed by: NURSE ANESTHETIST, CERTIFIED REGISTERED

## 2020-07-28 PROCEDURE — 27210794 ZZH OR GENERAL SUPPLY STERILE: Performed by: OBSTETRICS & GYNECOLOGY

## 2020-07-28 PROCEDURE — 40000306 ZZH STATISTIC PRE PROC ASSESS II: Performed by: OBSTETRICS & GYNECOLOGY

## 2020-07-28 PROCEDURE — 71000027 ZZH RECOVERY PHASE 2 EACH 15 MINS: Performed by: OBSTETRICS & GYNECOLOGY

## 2020-07-28 PROCEDURE — 25000125 ZZHC RX 250: Performed by: OBSTETRICS & GYNECOLOGY

## 2020-07-28 PROCEDURE — 88309 TISSUE EXAM BY PATHOLOGIST: CPT | Performed by: OBSTETRICS & GYNECOLOGY

## 2020-07-28 PROCEDURE — 71000014 ZZH RECOVERY PHASE 1 LEVEL 2 FIRST HR: Performed by: OBSTETRICS & GYNECOLOGY

## 2020-07-28 PROCEDURE — 25000128 H RX IP 250 OP 636: Performed by: OBSTETRICS & GYNECOLOGY

## 2020-07-28 PROCEDURE — 37000008 ZZH ANESTHESIA TECHNICAL FEE, 1ST 30 MIN: Performed by: OBSTETRICS & GYNECOLOGY

## 2020-07-28 PROCEDURE — 25000132 ZZH RX MED GY IP 250 OP 250 PS 637: Performed by: NURSE ANESTHETIST, CERTIFIED REGISTERED

## 2020-07-28 PROCEDURE — 25000128 H RX IP 250 OP 636: Performed by: NURSE ANESTHETIST, CERTIFIED REGISTERED

## 2020-07-28 PROCEDURE — 27210995 ZZH RX 272: Performed by: OBSTETRICS & GYNECOLOGY

## 2020-07-28 PROCEDURE — 81025 URINE PREGNANCY TEST: CPT | Performed by: NURSE ANESTHETIST, CERTIFIED REGISTERED

## 2020-07-28 PROCEDURE — 71000015 ZZH RECOVERY PHASE 1 LEVEL 2 EA ADDTL HR: Performed by: OBSTETRICS & GYNECOLOGY

## 2020-07-28 PROCEDURE — 58550 LAPARO-ASST VAG HYSTERECTOMY: CPT | Performed by: OBSTETRICS & GYNECOLOGY

## 2020-07-28 RX ORDER — IBUPROFEN 600 MG/1
600 TABLET, FILM COATED ORAL EVERY 6 HOURS PRN
Qty: 60 TABLET | Refills: 1 | Status: SHIPPED | OUTPATIENT
Start: 2020-07-28 | End: 2020-09-14

## 2020-07-28 RX ORDER — HYDROCODONE BITARTRATE AND ACETAMINOPHEN 5; 325 MG/1; MG/1
1-2 TABLET ORAL EVERY 6 HOURS PRN
Qty: 20 TABLET | Refills: 0 | Status: SHIPPED | OUTPATIENT
Start: 2020-07-28 | End: 2020-08-11

## 2020-07-28 RX ORDER — FENTANYL CITRATE 50 UG/ML
25-50 INJECTION, SOLUTION INTRAMUSCULAR; INTRAVENOUS
Status: DISCONTINUED | OUTPATIENT
Start: 2020-07-28 | End: 2020-07-28 | Stop reason: HOSPADM

## 2020-07-28 RX ORDER — HYDROCODONE BITARTRATE AND ACETAMINOPHEN 5; 325 MG/1; MG/1
1-2 TABLET ORAL EVERY 4 HOURS PRN
Status: DISCONTINUED | OUTPATIENT
Start: 2020-07-28 | End: 2020-07-28 | Stop reason: HOSPADM

## 2020-07-28 RX ORDER — SCOLOPAMINE TRANSDERMAL SYSTEM 1 MG/1
1 PATCH, EXTENDED RELEASE TRANSDERMAL ONCE
Status: DISCONTINUED | OUTPATIENT
Start: 2020-07-28 | End: 2020-07-28 | Stop reason: HOSPADM

## 2020-07-28 RX ORDER — KETAMINE HYDROCHLORIDE 10 MG/ML
INJECTION, SOLUTION INTRAMUSCULAR; INTRAVENOUS PRN
Status: DISCONTINUED | OUTPATIENT
Start: 2020-07-28 | End: 2020-07-28

## 2020-07-28 RX ORDER — SODIUM CHLORIDE, SODIUM LACTATE, POTASSIUM CHLORIDE, CALCIUM CHLORIDE 600; 310; 30; 20 MG/100ML; MG/100ML; MG/100ML; MG/100ML
INJECTION, SOLUTION INTRAVENOUS CONTINUOUS
Status: DISCONTINUED | OUTPATIENT
Start: 2020-07-28 | End: 2020-07-28 | Stop reason: HOSPADM

## 2020-07-28 RX ORDER — KETOROLAC TROMETHAMINE 30 MG/ML
INJECTION, SOLUTION INTRAMUSCULAR; INTRAVENOUS PRN
Status: DISCONTINUED | OUTPATIENT
Start: 2020-07-28 | End: 2020-07-28

## 2020-07-28 RX ORDER — LIDOCAINE 40 MG/G
CREAM TOPICAL
Status: DISCONTINUED | OUTPATIENT
Start: 2020-07-28 | End: 2020-07-28 | Stop reason: HOSPADM

## 2020-07-28 RX ORDER — AMOXICILLIN 250 MG
1-2 CAPSULE ORAL 2 TIMES DAILY
Qty: 30 TABLET | Refills: 0 | Status: SHIPPED | OUTPATIENT
Start: 2020-07-28 | End: 2020-09-14

## 2020-07-28 RX ORDER — HYDROMORPHONE HYDROCHLORIDE 1 MG/ML
.3-.5 INJECTION, SOLUTION INTRAMUSCULAR; INTRAVENOUS; SUBCUTANEOUS EVERY 5 MIN PRN
Status: DISCONTINUED | OUTPATIENT
Start: 2020-07-28 | End: 2020-07-28 | Stop reason: HOSPADM

## 2020-07-28 RX ORDER — DEXAMETHASONE SODIUM PHOSPHATE 4 MG/ML
INJECTION, SOLUTION INTRA-ARTICULAR; INTRALESIONAL; INTRAMUSCULAR; INTRAVENOUS; SOFT TISSUE PRN
Status: DISCONTINUED | OUTPATIENT
Start: 2020-07-28 | End: 2020-07-28

## 2020-07-28 RX ORDER — CLINDAMYCIN PHOSPHATE 900 MG/50ML
900 INJECTION, SOLUTION INTRAVENOUS SEE ADMIN INSTRUCTIONS
Status: DISCONTINUED | OUTPATIENT
Start: 2020-07-28 | End: 2020-07-28 | Stop reason: HOSPADM

## 2020-07-28 RX ORDER — DIMENHYDRINATE 50 MG/ML
INJECTION, SOLUTION INTRAMUSCULAR; INTRAVENOUS PRN
Status: DISCONTINUED | OUTPATIENT
Start: 2020-07-28 | End: 2020-07-28

## 2020-07-28 RX ORDER — PHENAZOPYRIDINE HYDROCHLORIDE 200 MG/1
200 TABLET, FILM COATED ORAL 3 TIMES DAILY PRN
Qty: 6 TABLET | Refills: 0 | Status: SHIPPED | OUTPATIENT
Start: 2020-07-28 | End: 2020-08-04

## 2020-07-28 RX ORDER — BUPIVACAINE HYDROCHLORIDE AND EPINEPHRINE 5; 5 MG/ML; UG/ML
INJECTION, SOLUTION PERINEURAL PRN
Status: DISCONTINUED | OUTPATIENT
Start: 2020-07-28 | End: 2020-07-28 | Stop reason: HOSPADM

## 2020-07-28 RX ORDER — FENTANYL CITRATE 50 UG/ML
INJECTION, SOLUTION INTRAMUSCULAR; INTRAVENOUS PRN
Status: DISCONTINUED | OUTPATIENT
Start: 2020-07-28 | End: 2020-07-28

## 2020-07-28 RX ORDER — CLINDAMYCIN PHOSPHATE 900 MG/50ML
900 INJECTION, SOLUTION INTRAVENOUS
Status: COMPLETED | OUTPATIENT
Start: 2020-07-28 | End: 2020-07-28

## 2020-07-28 RX ORDER — HYDROXYZINE HYDROCHLORIDE 25 MG/1
25 TABLET, FILM COATED ORAL EVERY 6 HOURS PRN
Status: DISCONTINUED | OUTPATIENT
Start: 2020-07-28 | End: 2020-07-28 | Stop reason: HOSPADM

## 2020-07-28 RX ORDER — NALOXONE HYDROCHLORIDE 0.4 MG/ML
.1-.4 INJECTION, SOLUTION INTRAMUSCULAR; INTRAVENOUS; SUBCUTANEOUS
Status: DISCONTINUED | OUTPATIENT
Start: 2020-07-28 | End: 2020-07-28 | Stop reason: HOSPADM

## 2020-07-28 RX ORDER — HYDROXYZINE HYDROCHLORIDE 50 MG/1
50 TABLET, FILM COATED ORAL EVERY 6 HOURS PRN
Status: DISCONTINUED | OUTPATIENT
Start: 2020-07-28 | End: 2020-07-28 | Stop reason: HOSPADM

## 2020-07-28 RX ORDER — DEXTROSE, SODIUM CHLORIDE, SODIUM LACTATE, POTASSIUM CHLORIDE, AND CALCIUM CHLORIDE 5; .6; .31; .03; .02 G/100ML; G/100ML; G/100ML; G/100ML; G/100ML
INJECTION, SOLUTION INTRAVENOUS CONTINUOUS
Status: DISCONTINUED | OUTPATIENT
Start: 2020-07-28 | End: 2020-07-28 | Stop reason: HOSPADM

## 2020-07-28 RX ORDER — PROPOFOL 10 MG/ML
INJECTION, EMULSION INTRAVENOUS PRN
Status: DISCONTINUED | OUTPATIENT
Start: 2020-07-28 | End: 2020-07-28

## 2020-07-28 RX ADMIN — FENTANYL CITRATE 100 MCG: 50 INJECTION, SOLUTION INTRAMUSCULAR; INTRAVENOUS at 09:46

## 2020-07-28 RX ADMIN — HYDROXYZINE HYDROCHLORIDE 50 MG: 50 TABLET, FILM COATED ORAL at 12:20

## 2020-07-28 RX ADMIN — FENTANYL CITRATE 50 MCG: 50 INJECTION, SOLUTION INTRAMUSCULAR; INTRAVENOUS at 12:23

## 2020-07-28 RX ADMIN — HYDROCODONE BITARTRATE AND ACETAMINOPHEN 1 TABLET: 5; 325 TABLET ORAL at 14:13

## 2020-07-28 RX ADMIN — SODIUM CHLORIDE, POTASSIUM CHLORIDE, SODIUM LACTATE AND CALCIUM CHLORIDE: 600; 310; 30; 20 INJECTION, SOLUTION INTRAVENOUS at 08:56

## 2020-07-28 RX ADMIN — MIDAZOLAM 2 MG: 1 INJECTION INTRAMUSCULAR; INTRAVENOUS at 09:46

## 2020-07-28 RX ADMIN — ROCURONIUM BROMIDE 20 MG: 10 INJECTION INTRAVENOUS at 10:41

## 2020-07-28 RX ADMIN — ROCURONIUM BROMIDE 50 MG: 10 INJECTION INTRAVENOUS at 09:52

## 2020-07-28 RX ADMIN — PROPOFOL 200 MG: 10 INJECTION, EMULSION INTRAVENOUS at 09:52

## 2020-07-28 RX ADMIN — SODIUM CHLORIDE, POTASSIUM CHLORIDE, SODIUM LACTATE AND CALCIUM CHLORIDE: 600; 310; 30; 20 INJECTION, SOLUTION INTRAVENOUS at 11:16

## 2020-07-28 RX ADMIN — DEXAMETHASONE SODIUM PHOSPHATE 8 MG: 4 INJECTION, SOLUTION INTRA-ARTICULAR; INTRALESIONAL; INTRAMUSCULAR; INTRAVENOUS; SOFT TISSUE at 09:52

## 2020-07-28 RX ADMIN — CLINDAMYCIN PHOSPHATE 900 MG: 900 INJECTION, SOLUTION INTRAVENOUS at 10:04

## 2020-07-28 RX ADMIN — HYDROMORPHONE HYDROCHLORIDE 0.5 MG: 1 INJECTION, SOLUTION INTRAMUSCULAR; INTRAVENOUS; SUBCUTANEOUS at 11:15

## 2020-07-28 RX ADMIN — LIDOCAINE HYDROCHLORIDE 50 MG: 10 INJECTION, SOLUTION EPIDURAL; INFILTRATION; INTRACAUDAL; PERINEURAL at 09:51

## 2020-07-28 RX ADMIN — KETAMINE HYDROCHLORIDE 50 MG: 10 INJECTION INTRAMUSCULAR; INTRAVENOUS at 09:52

## 2020-07-28 RX ADMIN — GENTAMICIN SULFATE 200 MG: 40 INJECTION, SOLUTION INTRAMUSCULAR; INTRAVENOUS at 08:57

## 2020-07-28 RX ADMIN — LIDOCAINE HYDROCHLORIDE 0.2 ML: 10 INJECTION, SOLUTION EPIDURAL; INFILTRATION; INTRACAUDAL; PERINEURAL at 08:56

## 2020-07-28 RX ADMIN — SUGAMMADEX 200 MG: 100 INJECTION, SOLUTION INTRAVENOUS at 11:08

## 2020-07-28 RX ADMIN — DEXMEDETOMIDINE HCL 40 MCG: 100 INJECTION INTRAVENOUS at 09:52

## 2020-07-28 RX ADMIN — SCOPALAMINE 1 PATCH: 1 PATCH, EXTENDED RELEASE TRANSDERMAL at 08:28

## 2020-07-28 RX ADMIN — HYDROCODONE BITARTRATE AND ACETAMINOPHEN 1 TABLET: 5; 325 TABLET ORAL at 16:06

## 2020-07-28 RX ADMIN — DIMENHYDRINATE 25 MG: 50 INJECTION, SOLUTION INTRAMUSCULAR; INTRAVENOUS at 09:52

## 2020-07-28 RX ADMIN — HYDROMORPHONE HYDROCHLORIDE 0.5 MG: 1 INJECTION, SOLUTION INTRAMUSCULAR; INTRAVENOUS; SUBCUTANEOUS at 13:03

## 2020-07-28 SDOH — HEALTH STABILITY: MENTAL HEALTH: CURRENT SMOKER: 0

## 2020-07-28 ASSESSMENT — MIFFLIN-ST. JEOR: SCORE: 1586.49

## 2020-07-28 ASSESSMENT — LIFESTYLE VARIABLES: TOBACCO_USE: 1

## 2020-07-28 NOTE — OP NOTE
Brief Operative Note:  Preoperative Diagnosis:endometrial hyperplasia    Postoperative Diagnosis: same      Procedure: 1. laparoscopic assisted vaginal hysterectomy  2. cystoscopy    Anesthesia: General endotracheal anesthesia (GETA)        Surgeon: Greg Ford MD    Assistant: Joanie Dixon M.D.    Assistant: Jairo Dela Cruz PAC    Findings: see dictation      Estimated blood loss: 50cc    Fluids: 1000 cc crystalloid      Complications: none      See complete operative note-dictated separately.  SHARAN Ford MD

## 2020-07-28 NOTE — ANESTHESIA POSTPROCEDURE EVALUATION
Patient: Fallon Rivera    Procedure(s):  laparoscpic assisted vaginal hysterectomy, cystoscopy    Diagnosis:Pelvic pain in female [R10.2]  Endometrial hyperplasia, unspecified [N85.00]  Diagnosis Additional Information: No value filed.    Anesthesia Type:  General    Note:  Anesthesia Post Evaluation    Patient location during evaluation: PACU  Patient participation: Able to fully participate in evaluation  Level of consciousness: responsive to verbal stimuli  Pain management: satisfactory to patient  Airway patency: patent  Cardiovascular status: acceptable  Respiratory status: acceptable  Hydration status: euvolemic  PONV: controlled     Anesthetic complications: None          Last vitals:  Vitals:    07/28/20 1145 07/28/20 1150 07/28/20 1155   BP: (!) 85/54 102/58 96/58   Pulse: 69 76 73   Resp: 14 11 14   Temp:      SpO2: 99% 99% 99%         Electronically Signed By: SERENE Mckeon CRNA  July 28, 2020  12:11 PM

## 2020-07-28 NOTE — OP NOTE
Procedure Date: 07/28/2020      PREOPERATIVE DIAGNOSIS:  Endometrial hyperplasia.      POSTOPERATIVE DIAGNOSIS:  Endometrial hyperplasia.      PROCEDURES:   1.  Laparoscopic-assisted vaginal hysterectomy.   2.  Cystoscopy.      ANESTHESIA:  General endotracheal anesthesia.      SURGEON:  Greg Ford MD      FIRST ASSISTANT:  Joanie Dixon MD (The assistance of this surgeon was required due to the need for additional skilled surgical hands).        SECOND ASSISTANT:  Jairo Brush PA-C (His assistance was required due to the need for additional skilled surgical hands).      PATIENT PROFILE:  The patient is a 40-year-old female with abnormal pathology on her uterine biopsy.  Her hysterectomy was initially postponed due to the COVID-19 prioritizeing of surgical cases.      OPERATIVE FINDINGS:  The left ovary looked normal, so it was retained as per her wishes.  The fallopian tubes were both surgically absent except for the proximal ends attached to the uterus and they were removed with the uterus and cervix.  Cystoscopy that was performed after surgery revealed that there was good urine flowing through both ureteral orifices and the bladder was intact.  There were no sutures or lacerations in the bladder.  See operative description for other details.      OPERATIVE DESCRIPTION:  After the establishment of satisfactory general endotracheal anesthesia, the patient was prepped and draped in the usual fashion for laparoscopy and vaginal surgery.  A Saldana catheter was inserted in the urinary bladder.  Dr. Dixon inserted an acorn cannula in the cervix to use as a uterine manipulator.  I made a 5 mm incision just above the umbilicus and inserted the Veress needle and insufflated the abdomen with CO2 gas.  I inserted a 5 mm bladed trocar and introduced a laparoscope to verify correct placement.  Dr. Dixon made a 5 mm incision on the right lateral lower abdomen and inserted a nonbladed trocar under direct  visualization.  I made a 5 mm incision on the left lateral lower abdomen and inserted a 5 mm bladed trocar under direct visualization.  Dr. Dixon went across the utero-ovarian ligament with the LigaSure device and then across the broad ligament and round ligament and isolated the uterine artery on the right lower uterine segment with the LigaSure device.  She created a bladder flap on the right side and peeled the peritoneum off the lower uterine segment.  I proceeded with the LigaSure device across the left uteroovarian ligament and across the broad ligament and round ligament and down to the lower uterine segment and I skeletonized and cauterized the left uterine artery.  I created a bladder flap on the left side which met the dissection that Dr. Dixon had done on the right.  We peeled the bladder peritoneum off the lower uterine segment.        At that point, we directed attention to the vaginal portion of surgery.  I grasped the cervix anteriorly and posteriorly with Gucci clamps and injected circumferentially with 0.25% Marcaine with dilute epinephrine, a total of 10 mL was used.  I then incised the distal cervix circumferentially with the scalpel and mobilized the vaginal mucosa off the lower uterine segment and cervix.  I entered the peritoneal cavity anteriorly with Metzenbaum scissors and entered the peritoneal cavity posteriorly with Metzenbaum scissors.  The retractor retractors were replaced with longer retractors and I then used the LigaSure device across the uterosacral ligaments uterosacral ligaments bilaterally.  I used the LigaSure device across the cardinal ligaments and the lower uterine segment up to the level of the uterine arteries bilaterally.  I delivered the uterus and cervix with the proximal fallopian tubes attached.  I repaired the vaginal cuff with interrupted figure-of-eight 0 Vicryl sutures in each corner of the cuff.  I used a running locked 0 Vicryl suture across half the cuff  and then another running locked 0 Vicryl suture across the other half.        We then performed laparoscopy and evaluated the cuff carefully.  There was a small amount of oozing, which was cauterized with the LigaSure device and then we applied some FloSeal to that area.  We watched for an additional 5 minutes and it was hemostatic.  We inspected all the pedicles and they were all hemostatic.  I performed cystoscopy and noted that there were no sutures or lacerations in the bladder and there was good flow of urine from both ureteral orifices.        At that point, we expressed the CO2 gas from the abdomen and removed the trocars and Dr. Dixon repaired each of the trocar sites with 4-0 Vicryl subcuticular stitch.  The estimated blood loss was 50 mL  She received 1000 mL of crystalloid during the surgery.  At this point, she is stable, and we will be proceeding to the postoperative phase of recovery soon.  There were no complications.         SYDNEY GRAFF MD             D: 2020   T: 2020   MT: EDE      Name:     TRENA ACHARYA   MRN:      [id number]        Account:        [id number]   :      1980           Procedure Date: 2020      Document: [id number]

## 2020-07-28 NOTE — DISCHARGE INSTRUCTIONS
Same Day Surgery Discharge Instructions  Special Precautions After Surgery - Adult    1. It is not unusual to feel lightheaded or faint, up to 24 hours after surgery or while taking pain medication.  If you have these symptoms; sit for a few minutes before standing and have someone assist you when getting up.  2. You should rest and relax for the next 24 hours and must have someone stay with you for at least 24 hours after your discharge.  3. DO NOT DRIVE any vehicle or operate mechanical equipment for 24 hours following the end of your surgery.  DO NOT DRIVE while taking narcotic pain medications that have been prescribed by your physician.  If you had a limb operated on, you must be able to use it fully to drive.  4. DO NOT drink alcoholic beverages for 24 hours following surgery or while taking prescription pain medication.  5. Drink clear liquids (apple juice, ginger ale, broth, 7-Up, etc.).  Progress to your regular diet as you feel able.  6. Any questions call your physician and do not make important decisions for 24 hours.  7. Call for signs of infection such as fever, increasing pain or drainage from incisions or foul smelling vaginal drainage.  8. Call for bleeding that soaks through your pad quickly.  9. To avoid nausea take pain pills with a small amount of food.  10. Dr Ford will call patient tomorrow to give her further discharge instruction. He has made you an appointment for your check up. He has ordered medicine for you to take at home if needed. You can pick it up here.  __________________________________________________________________________________________________________________________________  IMPORTANT NUMBERS:    [facility] Main Number:  [phone], [phone]  Pharmacy:  [phone]  Same Day Surgery:  [phone], Monday - Friday until 8:30 p.m.  Urgent Care:  [phone]  Emergency Room:  [phone]      [facility]:  [phone]                                                                              [facility]:  [phone] option 1  [facility]:  [phone]     [facility]:  [phone]   [facility]:  [phone]   Home Medical Equipment: [phone]  [facility] [place] [facility]:  [phone]

## 2020-07-28 NOTE — ANESTHESIA CARE TRANSFER NOTE
Patient: Fallon Rivera    Procedure(s):  laparoscpic assisted vaginal hysterectomy, cystoscopy    Diagnosis: Pelvic pain in female [R10.2]  Endometrial hyperplasia, unspecified [N85.00]  Diagnosis Additional Information: No value filed.    Anesthesia Type:   General     Note:  Airway :Face Mask  Patient transferred to:PACU  Comments: VSS Handoff Report: Identifed the Patient, Identified the Reponsible Provider, Reviewed the pertinent medical history, Discussed the surgical course, Reviewed Intra-OP anesthesia mangement and issues during anesthesia, Set expectations for post-procedure period and Allowed opportunity for questions and acknowledgement of understanding      Vitals: (Last set prior to Anesthesia Care Transfer)    CRNA VITALS  7/28/2020 1058 - 7/28/2020 1158      7/28/2020             Pulse:  72    SpO2:  99 %    Resp Rate (observed):  (!) 3                Electronically Signed By: SERENE Mckeon CRNA  July 28, 2020  12:10 PM

## 2020-07-28 NOTE — ANESTHESIA PREPROCEDURE EVALUATION
Anesthesia Pre-Procedure Evaluation    Patient: Fallon Rivera   MRN: [id number] : 1980          Preoperative Diagnosis: Pelvic pain in female [R10.2]  Endometrial hyperplasia, unspecified [N85.00]    Procedure(s):  HYSTERECTOMY, TOTAL, LAPAROSCOPIC, WITH BILATERAL SALPINGECTOMY    Past Medical History:   Diagnosis Date     Abnormal Pap smear , ,     ASC-H, ASCUS + HPV 45     Calculus of kidney      Cervical high risk HPV (human papillomavirus) test positive     + HPV 45 (high risk)     History of colposcopy with cervical biopsy 06, 3/15/07    koilocytosis      Past Surgical History:   Procedure Laterality Date     C REMOVAL OF KIDNEY STONE      two surgeries, ,     CHOLECYSTECTOMY, LAPOROSCOPIC  2007    Cholecystectomy, Laparoscopic     COLONOSCOPY  05/17/10     COLONOSCOPY N/A 2019    Procedure: COLONOSCOPY;  Surgeon: Paramjit Kingston DO;  Location: [facility]     CONIZATION  2011    Procedure:CONIZATION; cold knife and punch biopsy of mole on back; Surgeon:SYDNEY FORD; Location:[facility]     DILATION AND CURETTAGE, HYSTEROSCOPY, LAPAROSCOPY, COMBINED Right 2015    Procedure: COMBINED DILATION AND CURETTAGE, HYSTEROSCOPY, LAPAROSCOPY;  Surgeon: Sydney Ford MD;  Location: [facility]     DISCECTOMY LUMBAR MINIMALLY INVASIVE ONE LEVEL Left 2019    Procedure: Minimally Invasive Surgery Left Lumbar 5-Sacral 1 microdiscectomy;  Surgeon: Mason Roe MD;  Location: [facility]     ESOPHAGOSCOPY, GASTROSCOPY, DUODENOSCOPY (EGD), COMBINED  2014    Procedure: COMBINED ESOPHAGOSCOPY, GASTROSCOPY, DUODENOSCOPY (EGD), BIOPSY SINGLE OR MULTIPLE;  Surgeon: Earl Lane MD;  Location: [facility]     EXCISE LESION TRUNK  2011    Procedure:EXCISE LESION TRUNK; Surgeon:SYDNEY FORD; Location:[facility]     HC FRAGMENTING OF KIDNEY STONE  2005     HC RX ECTOP PREG BY SCOPE,RMV TUBE/OVRY  2007    Right sided  salpingectomy and removal of ruptured ectopic pregnancy. D&C.      UGI ENDOSCOPY, SIMPLE EXAM  09/01/09     LAPAROSCOPIC APPENDECTOMY N/A 9/24/2015    Procedure: LAPAROSCOPIC APPENDECTOMY;  Surgeon: James Cuellar MD;  [facility]     LAPAROSCOPIC CYSTECTOMY OVARIAN (BENIGN) N/A 9/24/2015    Procedure: LAPAROSCOPIC CYSTECTOMY OVARIAN (BENIGN);  Surgeon: Greg Ford MD;  [facility]     LAPAROSCOPIC OOPHORECTOMY Right 9/24/2015    Procedure: LAPAROSCOPIC OOPHORECTOMY;  Surgeon: Greg Ford MD;  [facility]     LITHOTRIPSY  01/17/2007     PELVIS LAPAROSCOPY,DX  10/16/2000    Diagnostic laparoscopy, Endometrial biopsy.     TUBAL/ECTOPIC PREGNANCY  01/23/2007    Lap removal of left ruptured ectopic pregnancy & salpingectomy, D&C       Anesthesia Evaluation     . Pt has had prior anesthetic. Type: General and MAC           ROS/MED HX    ENT/Pulmonary:     (+)ELIZABETH risk factors snores loudly, obese, tobacco use, Past use , . .    Neurologic:  - neg neurologic ROS     Cardiovascular:     (+) ----. : . . ZHANG, . :. .       METS/Exercise Tolerance:  >4 METS   Hematologic:  - neg hematologic  ROS       Musculoskeletal:  - neg musculoskeletal ROS       GI/Hepatic:     (+) GERD Asymptomatic on medication,       Renal/Genitourinary:  - ROS Renal section negative   (+) Other Renal/ Genitourinary, kidney stones, + HPV, right ovarian cyst       Endo:     (+) type II DM Obesity, .      Psychiatric:  - neg psychiatric ROS       Infectious Disease:  - neg infectious disease ROS       Malignancy:      - no malignancy   Other:    - neg other ROS                      Physical Exam  Normal systems: cardiovascular and pulmonary    Airway   Mallampati: I  TM distance: >3 FB  Neck ROM: limited    Dental   (+) missing    Cardiovascular       Pulmonary     Other findings: High anxiety preop        Lab Results   Component Value Date    WBC 7.6 07/27/2020    HGB 15.3 07/27/2020    HCT 43.4  "07/27/2020     07/27/2020    CRP 3.0 10/28/2017    SED 10 09/22/2017     06/13/2020    POTASSIUM 3.5 06/13/2020    CHLORIDE 103 06/13/2020    CO2 24 06/13/2020    BUN 10 06/13/2020    CR 0.76 07/27/2020     (H) 06/13/2020    SHADE 8.9 06/13/2020    ALBUMIN 3.9 06/13/2020    PROTTOTAL 7.8 06/13/2020    ALT 62 (H) 06/13/2020    AST 34 06/13/2020    ALKPHOS 80 06/13/2020    BILITOTAL 1.2 06/13/2020    LIPASE 103 05/08/2014    AMYLASE 66 05/08/2014    PTT 31 07/11/2011    INR 0.99 07/11/2011    TSH 2.43 07/27/2015    HCG Negative 07/28/2020       Preop Vitals  BP Readings from Last 3 Encounters:   07/28/20 (!) 136/92   07/01/20 104/82   06/13/20 109/82    Pulse Readings from Last 3 Encounters:   07/28/20 99   07/01/20 92   06/13/20 101      Resp Readings from Last 3 Encounters:   07/28/20 18   07/01/20 12   06/13/20 20    SpO2 Readings from Last 3 Encounters:   07/28/20 100%   07/01/20 98%   06/13/20 97%      Temp Readings from Last 1 Encounters:   07/28/20 36.7  C (98  F) (Oral)    Ht Readings from Last 1 Encounters:   07/28/20 1.547 m (5' 0.9\")      Wt Readings from Last 1 Encounters:   07/28/20 97.1 kg (214 lb)    Estimated body mass index is 40.57 kg/m  as calculated from the following:    Height as of this encounter: 1.547 m (5' 0.9\").    Weight as of this encounter: 97.1 kg (214 lb).       Anesthesia Plan      History & Physical Review  History and physical reviewed and following examination; no interval change.    ASA Status:  3 .    NPO Status:  > 6 hours    Plan for General with Intravenous and Propofol induction. Maintenance will be Inhalation.        The patient is not a current smoker  and patient did not smoke on day of surgery     Postoperative Care  Postoperative pain management:  IV analgesics and Multi-modal analgesia.      Consents  Anesthetic plan, risks, benefits and alternatives discussed with:  Patient..                 SERENE Mckeon CRNA  "

## 2020-07-29 ENCOUNTER — TELEPHONE (OUTPATIENT)
Dept: FAMILY MEDICINE | Facility: CLINIC | Age: 40
End: 2020-07-29

## 2020-07-29 DIAGNOSIS — R10.2 PELVIC PAIN IN FEMALE: Primary | ICD-10-CM

## 2020-07-29 RX ORDER — OXYCODONE AND ACETAMINOPHEN 5; 325 MG/1; MG/1
1 TABLET ORAL EVERY 6 HOURS PRN
Qty: 20 TABLET | Refills: 0 | Status: SHIPPED | OUTPATIENT
Start: 2020-07-29 | End: 2020-08-04

## 2020-07-29 NOTE — TELEPHONE ENCOUNTER
Reason for Call:  Medication or medication refill:    Do you use a [facility]?  Name of the pharmacy and phone number for the current request:  [facility] - [phone]    Name of the medication requested: pain medication     Other request: Fallon states that she is currently on Vicodin and that medication is not working for her pain. She is trying to avoid a trip in to the ED and wondering if she can get a new pain medication as soon as possible.    Can we leave a detailed message on this number? YES    Phone number patient can be reached at: Home number on file [phone] (home)    Best Time: any      Call taken on 7/29/2020 at 10:28 AM by Allegra Gould

## 2020-07-30 LAB — COPATH REPORT: NORMAL

## 2020-07-31 ENCOUNTER — HOSPITAL ENCOUNTER (EMERGENCY)
Facility: CLINIC | Age: 40
Discharge: HOME | End: 2020-07-31
Attending: FAMILY MEDICINE | Admitting: FAMILY MEDICINE
Payer: COMMERCIAL

## 2020-07-31 VITALS
SYSTOLIC BLOOD PRESSURE: 116 MMHG | OXYGEN SATURATION: 98 % | HEART RATE: 88 BPM | DIASTOLIC BLOOD PRESSURE: 76 MMHG | TEMPERATURE: 98.8 F | WEIGHT: 215 LBS | BODY MASS INDEX: 40.76 KG/M2 | RESPIRATION RATE: 16 BRPM

## 2020-07-31 DIAGNOSIS — G89.18 ACUTE POST-OPERATIVE PAIN: ICD-10-CM

## 2020-07-31 LAB
ALBUMIN UR-MCNC: NEGATIVE MG/DL
APPEARANCE UR: CLEAR
BILIRUB UR QL STRIP: NEGATIVE
COLOR UR AUTO: ABNORMAL
GLUCOSE UR STRIP-MCNC: 50 MG/DL
HGB UR QL STRIP: ABNORMAL
KETONES UR STRIP-MCNC: NEGATIVE MG/DL
LEUKOCYTE ESTERASE UR QL STRIP: NEGATIVE
MUCOUS THREADS #/AREA URNS LPF: PRESENT /LPF
NITRATE UR QL: NEGATIVE
PH UR STRIP: 5 PH (ref 5–7)
RBC #/AREA URNS AUTO: 2 /HPF (ref 0–2)
SOURCE: ABNORMAL
SP GR UR STRIP: 1.01 (ref 1–1.03)
SQUAMOUS #/AREA URNS AUTO: 2 /HPF (ref 0–1)
UROBILINOGEN UR STRIP-MCNC: 2 MG/DL (ref 0–2)
WBC #/AREA URNS AUTO: 1 /HPF (ref 0–5)

## 2020-07-31 PROCEDURE — 81001 URINALYSIS AUTO W/SCOPE: CPT | Performed by: FAMILY MEDICINE

## 2020-07-31 PROCEDURE — 25000128 H RX IP 250 OP 636: Performed by: FAMILY MEDICINE

## 2020-07-31 PROCEDURE — 99285 EMERGENCY DEPT VISIT HI MDM: CPT | Mod: 25 | Performed by: FAMILY MEDICINE

## 2020-07-31 PROCEDURE — 96374 THER/PROPH/DIAG INJ IV PUSH: CPT | Performed by: FAMILY MEDICINE

## 2020-07-31 PROCEDURE — 99285 EMERGENCY DEPT VISIT HI MDM: CPT | Mod: Z6 | Performed by: FAMILY MEDICINE

## 2020-07-31 RX ORDER — FENTANYL CITRATE 50 UG/ML
50 INJECTION, SOLUTION INTRAMUSCULAR; INTRAVENOUS
Status: DISCONTINUED | OUTPATIENT
Start: 2020-07-31 | End: 2020-07-31 | Stop reason: HOSPADM

## 2020-07-31 RX ORDER — OXYCODONE HYDROCHLORIDE 5 MG/1
5 TABLET ORAL
Qty: 15 TABLET | Refills: 0 | Status: SHIPPED | OUTPATIENT
Start: 2020-07-31 | End: 2020-08-04

## 2020-07-31 RX ADMIN — FENTANYL CITRATE 50 MCG: 50 INJECTION INTRAMUSCULAR; INTRAVENOUS at 02:58

## 2020-07-31 RX ADMIN — FENTANYL CITRATE 50 MCG: 50 INJECTION INTRAMUSCULAR; INTRAVENOUS at 02:34

## 2020-07-31 NOTE — ED PROVIDER NOTES
ED Provider Note     Fallon Rivera  [id number]  July 31, 2020      CC:     Chief Complaint   Patient presents with     Abdominal Pain          History is obtained from the patient.  She is accompanied by her .    HPI: Fallon Rivera is a 40 year old female presenting with postoperative pain that is out of control.  Patient had laparoscopic-assisted vaginal hysterectomy on Tuesday with Dr. Elke khanna.  Patient did well after the surgery, but her pain was not as well managed on Vicodin and her pain medication was switched to Percocet.  Pain seemed to be pretty well controlled at 4/10 the rest of the day Wednesday.  Thursday however the pain has increased and she has not been able to fall asleep.  She arrives in the emergency department shortly after 2 AM.  Pain level is currently 8/10.  She has increased pain with urination and bowel movement.  Patient has many allergies and intolerances.  She cannot take any of the antinausea medication, Demerol, vancomycin, indomethacin, Macrobid.  Patient and her  feels that she may have overdone activities earlier today.      PMH/Problem List:   Past Medical History:   Diagnosis Date     Abnormal Pap smear 2006, 2007,      Calculus of kidney 2002     Cervical high risk HPV (human papillomavirus) test positive      History of colposcopy with cervical biopsy 2/9/06, 3/15/07       PSH:   Past Surgical History:   Procedure Laterality Date     C REMOVAL OF KIDNEY STONE      two surgeries, 2002,2003     CHOLECYSTECTOMY, LAPOROSCOPIC  5/11/2007    Cholecystectomy, Laparoscopic     COLONOSCOPY  05/17/10     COLONOSCOPY N/A 8/27/2019    Procedure: COLONOSCOPY;  Surgeon: Paramjit Kingston DO;  Location:  GI     CONIZATION  7/1/2011    Procedure:CONIZATION; cold knife and punch biopsy of mole on back; Surgeon:SYDNEY GRAFF; Location: OR     DILATION AND CURETTAGE, HYSTEROSCOPY,  LAPAROSCOPY, COMBINED Right 9/24/2015    Procedure: COMBINED DILATION AND CURETTAGE, HYSTEROSCOPY, LAPAROSCOPY;  Surgeon: Sydney Ford MD;  Location: [facility]     DISCECTOMY LUMBAR MINIMALLY INVASIVE ONE LEVEL Left 1/23/2019    Procedure: Minimally Invasive Surgery Left Lumbar 5-Sacral 1 microdiscectomy;  Surgeon: Mason Roe MD;  Location: [facility]     ESOPHAGOSCOPY, GASTROSCOPY, DUODENOSCOPY (EGD), COMBINED  5/21/2014    Procedure: COMBINED ESOPHAGOSCOPY, GASTROSCOPY, DUODENOSCOPY (EGD), BIOPSY SINGLE OR MULTIPLE;  Surgeon: Earl Lane MD;  Location: [facility]     EXCISE LESION TRUNK  7/1/2011    Procedure:EXCISE LESION TRUNK; Surgeon:SYDNEY FORD; Location:[facility]     HC FRAGMENTING OF KIDNEY STONE  11/30/2005     HC RX ECTOP PREG BY SCOPE,RMV TUBE/OVRY  12/20/2007    Right sided salpingectomy and removal of ruptured ectopic pregnancy. D&C.     HC UGI ENDOSCOPY, SIMPLE EXAM  09/01/09     HYSTERECTOMY VAGINAL       LAPAROSCOPIC APPENDECTOMY N/A 9/24/2015    Procedure: LAPAROSCOPIC APPENDECTOMY;  Surgeon: James Cuellar MD;  Location: [facility]     LAPAROSCOPIC CYSTECTOMY OVARIAN (BENIGN) N/A 9/24/2015    Procedure: LAPAROSCOPIC CYSTECTOMY OVARIAN (BENIGN);  Surgeon: Sydney Ford MD;  Location: [facility]     LAPAROSCOPIC HYSTERECTOMY TOTAL, BILATERAL SALPINGO-OOPHORECTOMY, COMBINED N/A 7/28/2020    Procedure: laparoscpic assisted vaginal hysterectomy, cystoscopy;  Surgeon: Sydney Ford MD;  Location: [facility]     LAPAROSCOPIC OOPHORECTOMY Right 9/24/2015    Procedure: LAPAROSCOPIC OOPHORECTOMY;  Surgeon: Sydney Ford MD;  Location: [facility]     LITHOTRIPSY  01/17/2007     PELVIS LAPAROSCOPY,DX  10/16/2000    Diagnostic laparoscopy, Endometrial biopsy.     TUBAL/ECTOPIC PREGNANCY  01/23/2007    Lap removal of left ruptured ectopic pregnancy & salpingectomy, D&C       MEDS: No current facility-administered medications on file prior to encounter.    HYDROcodone-acetaminophen (NORCO) 5-325 MG tablet, Take 1-2 tablets by mouth every 6 hours as needed for moderate to severe pain  ibuprofen (ADVIL/MOTRIN) 600 MG tablet, Take 1 tablet (600 mg) by mouth every 6 hours as needed for moderate pain  metFORMIN (GLUCOPHAGE) 1000 MG tablet, Take 1 tablet in the a.m. 1/2 tablet at night  omeprazole (PRILOSEC) 20 MG DR capsule, TAKE ONE CAPSULE BY MOUTH ONCE DAILY 30 TO 60 MINUTES BEFORE A MEAL  oxyCODONE-acetaminophen (PERCOCET) 5-325 MG tablet, Take 1 tablet by mouth every 6 hours as needed for pain  phenazopyridine (PYRIDIUM) 200 MG tablet, Take 1 tablet (200 mg) by mouth 3 times daily as needed for irritation Expect your urine to appear bright orange  senna-docusate (SENOKOT-S/PERICOLACE) 8.6-50 MG tablet, Take 1-2 tablets by mouth 2 times daily  acetaminophen (TYLENOL) 500 MG tablet, Take 1,000 mg by mouth every 6 hours as needed for mild pain  blood glucose (CONTOUR NEXT TEST) test strip, 1 strip by In Vitro route 4 times daily  blood glucose monitoring (NO BRAND SPECIFIED) meter device kit, Use to test blood sugar 2 times daily or as directed.  cyclobenzaprine (FLEXERIL) 5 MG tablet, Take 1 tablet (5 mg) by mouth 3 times daily as needed for muscle spasms  [] HYDROcodone-acetaminophen (NORCO) 5-325 MG tablet, Take 1 tablet by mouth every 6 hours as needed for pain        Allergies: Amoxicillin; Anti-nausea; Demerol hcl [meperidine hcl]; Indomethacin; Macrobid [nitrofuran derivatives]; Reglan [metoclopramide]; Zofran [ondansetron]; and Vancomycin    Triage and nursing notes were reviewed.    ROS: All other systems were reviewed and are negative    Physical Exam:  Vitals:    20 0210 20 0220 20 0240 20 0300   BP: 134/87 123/83 121/81 121/87   Pulse: 107 105 100 101   Resp:       Temp:       TempSrc:       SpO2: 98% 99% 98% 97%   Weight:         GENERAL APPEARANCE: Alert, moderate to severe distress due to pain; mild hyperventilation  HEAD:  atraumatic  EYES: PER  HENT: Normal external exam  NECK: no adenopathy or masses, trachea is midline  RESP: lungs clear to auscultation - no rales, rhonchi or wheezes  CV: regular rate and rhythm, no significant murmurs or rubs  ABDOMEN: Suprapubic and lower abdominal tenderness, no masses with normal bowel sounds  EXT: Unremarkable  SKIN: no rash; as above  NEURO: mentation and speech normal; no focal deficits    Labs/Imaging Results:  Results for orders placed or performed during the hospital encounter of 07/31/20 (from the past 24 hour(s))   UA reflex to Microscopic   Result Value Ref Range    Color Urine Orange     Appearance Urine Clear     Glucose Urine 50 (A) NEG^Negative mg/dL    Bilirubin Urine Negative NEG^Negative    Ketones Urine Negative NEG^Negative mg/dL    Specific Gravity Urine 1.010 1.003 - 1.035    Blood Urine Moderate (A) NEG^Negative    pH Urine 5.0 5.0 - 7.0 pH    Protein Albumin Urine Negative NEG^Negative mg/dL    Urobilinogen mg/dL 2.0 0.0 - 2.0 mg/dL    Nitrite Urine Negative NEG^Negative    Leukocyte Esterase Urine Negative NEG^Negative    Source Midstream Urine     RBC Urine 2 0 - 2 /HPF    WBC Urine 1 0 - 5 /HPF    Squamous Epithelial /HPF Urine 2 (H) 0 - 1 /HPF    Mucous Urine Present (A) NEG^Negative /LPF             Impression:  Final diagnoses:   Acute post-operative pain         ED Course & Medical Decision Making (Plan):  Fallon Rivera is a 40 year old female postop day #3 with acute postoperative pain.  Patient had a laparoscopic-assisted vaginal hysterectomy on Tuesday.  She was told that she should expect to have pain following surgery, but presents with pain that is not well controlled with her current pain regimen of 1 Percocet every 6 hours.  She is due for her Percocet in about 45 minutes.  She took 1 Percocet tablet at 9 PM, and Tylenol at midnight, but has not been able to sleep.  She is here with her .  She does not have any concerns for complication  related to her surgery.  Pain however is worsened by urination and bowel movement.  Vital signs reveal a temp of 98.8, blood pressure 133/88.  Patient has lower abdominal tenderness.  Patient received IV fentanyl 50 mcg x 2 doses for pain control.  Her pain improved to 3/10, and she is stable for discharge home.  Continue Percocet 1 tablet every 6 hours and add oxycodone 5 mg every 3 hours only as needed for breakthrough pain.  Follow-up with Dr. Ford later today.  Try to limit her activities to avoid exacerbating her pain.    Written after-visit summary and instructions were given at the time of discharge.          Follow Up Plan:  Greg Ford MD  [address] [place] [id number] [phone]    Call today      [facility] Emergency Department  [address] [place] [address] [phone]    If symptoms worsen        Discharge Instructions:  Continue your Percocet every 6 hours.  Add oxycodone 5 mg every 3 hours as needed for breakthrough pain.  Contact Dr. Ford later today for further instructions.  Return to the emergency department if symptoms worsen.        Disclaimer: This note consists of words and symbols derived from keyboarding and dictation using voice recognition software.  As a result, there may be errors that have gone undetected.  Please consider this when interpreting information found in this note.         Corona Tellez MD  07/31/20 0322

## 2020-07-31 NOTE — ED TRIAGE NOTES
Pt reports low abdominal pain, had a hysterectomy on July 28th, she reports having to urinate frequently as well, is having bowel movements and does pass gas

## 2020-07-31 NOTE — ED AVS SNAPSHOT
[facility] Emergency Department  [address] [place] [address]  Phone:  [phone]  Fax:  [phone]                                    Fallon Rivera   MRN: [id number]    Department:  [facility] Emergency Department   Date of Visit:  7/31/2020           After Visit Summary Signature Page    I have received my discharge instructions, and my questions have been answered. I have discussed any challenges I see with this plan with the nurse or doctor.    ..........................................................................................................................................  Patient/Patient Representative Signature      ..........................................................................................................................................  Patient Representative Print Name and Relationship to Patient    ..................................................               ................................................  Date                                   Time    ..........................................................................................................................................  Reviewed by Signature/Title    ...................................................              ..............................................  Date                                               Time          22EPIC Rev 08/18

## 2020-08-04 ENCOUNTER — OFFICE VISIT (OUTPATIENT)
Dept: FAMILY MEDICINE | Facility: CLINIC | Age: 40
End: 2020-08-04
Payer: COMMERCIAL

## 2020-08-04 VITALS
BODY MASS INDEX: 40.42 KG/M2 | TEMPERATURE: 97.6 F | HEART RATE: 130 BPM | DIASTOLIC BLOOD PRESSURE: 82 MMHG | WEIGHT: 213.2 LBS | OXYGEN SATURATION: 98 % | SYSTOLIC BLOOD PRESSURE: 108 MMHG | RESPIRATION RATE: 16 BRPM

## 2020-08-04 DIAGNOSIS — Z09 POSTOPERATIVE EXAMINATION: Primary | ICD-10-CM

## 2020-08-04 LAB
ALBUMIN UR-MCNC: NEGATIVE MG/DL
APPEARANCE UR: CLEAR
BILIRUB UR QL STRIP: NEGATIVE
COLOR UR AUTO: YELLOW
GLUCOSE UR STRIP-MCNC: NEGATIVE MG/DL
HGB UR QL STRIP: ABNORMAL
KETONES UR STRIP-MCNC: NEGATIVE MG/DL
LEUKOCYTE ESTERASE UR QL STRIP: NEGATIVE
MUCOUS THREADS #/AREA URNS LPF: PRESENT /LPF
NITRATE UR QL: NEGATIVE
PH UR STRIP: 6 PH (ref 5–7)
RBC #/AREA URNS AUTO: <1 /HPF (ref 0–2)
SOURCE: ABNORMAL
SP GR UR STRIP: 1 (ref 1–1.03)
SQUAMOUS #/AREA URNS AUTO: <1 /HPF (ref 0–1)
UROBILINOGEN UR STRIP-MCNC: 0 MG/DL (ref 0–2)
WBC #/AREA URNS AUTO: 1 /HPF (ref 0–5)

## 2020-08-04 PROCEDURE — 81001 URINALYSIS AUTO W/SCOPE: CPT | Performed by: OBSTETRICS & GYNECOLOGY

## 2020-08-04 PROCEDURE — 99024 POSTOP FOLLOW-UP VISIT: CPT | Performed by: OBSTETRICS & GYNECOLOGY

## 2020-08-04 RX ORDER — SULFAMETHOXAZOLE/TRIMETHOPRIM 800-160 MG
1 TABLET ORAL 2 TIMES DAILY
Qty: 10 TABLET | Refills: 0 | Status: SHIPPED | OUTPATIENT
Start: 2020-08-04 | End: 2020-09-14

## 2020-08-04 ASSESSMENT — PAIN SCALES - GENERAL: PAINLEVEL: SEVERE PAIN (6)

## 2020-08-04 NOTE — PROGRESS NOTES
S: The patient is here for 1st postop check from laparoscopic vaginal hysterectomy. She reports normal bowel and bladder function and pain control is adequate. No fevers or other complaints. Except some mild hematuria- will check UA  O: VSS as shown in chart /82   Pulse 130   Temp 97.6  F (36.4  C) (Temporal)   Resp 16   Wt 96.7 kg (213 lb 3.2 oz)   LMP 07/25/2020   SpO2 98%   BMI 40.42 kg/m    Pulse was 88 after she rested a minute- wearing a mask, walking in from outdoors-       Chest is clear to auscultation and cardiac exam is normal. Abdomen is soft, nondistended, and the incisions are clean, dry, and intact, and healing well. Normal bowel sounds are heard.    A: stable post op check    P: laparoscopy findings discussed with patient.pathology discussed. It was benign.  She'll follow up in about 4  weeks and she is advised to recheck acutely if the incisions become red or discarge noted or any sx of infection or  nausea, vomiting, or pain.       SHARAN Ford MD     No

## 2020-08-06 ENCOUNTER — TELEPHONE (OUTPATIENT)
Dept: FAMILY MEDICINE | Facility: CLINIC | Age: 40
End: 2020-08-06

## 2020-08-06 NOTE — TELEPHONE ENCOUNTER
----- Message from Greg Ford MD sent at 8/6/2020 10:10 AM CDT -----  Please tell Fallon that her urine is normal and therefore she can stop the antibiotic. It is most likely not an infection. There was no blood seen so it may not be a stone either. Call me next week if the symptoms continue- thanks- rm

## 2020-08-11 ENCOUNTER — APPOINTMENT (OUTPATIENT)
Dept: ULTRASOUND IMAGING | Facility: CLINIC | Age: 40
End: 2020-08-11
Attending: EMERGENCY MEDICINE
Payer: COMMERCIAL

## 2020-08-11 ENCOUNTER — NURSE TRIAGE (OUTPATIENT)
Dept: FAMILY MEDICINE | Facility: CLINIC | Age: 40
End: 2020-08-11

## 2020-08-11 ENCOUNTER — HOSPITAL ENCOUNTER (EMERGENCY)
Facility: CLINIC | Age: 40
Discharge: HOME | End: 2020-08-11
Attending: EMERGENCY MEDICINE | Admitting: EMERGENCY MEDICINE
Payer: COMMERCIAL

## 2020-08-11 VITALS
BODY MASS INDEX: 40.42 KG/M2 | DIASTOLIC BLOOD PRESSURE: 89 MMHG | HEART RATE: 101 BPM | SYSTOLIC BLOOD PRESSURE: 118 MMHG | TEMPERATURE: 99 F | RESPIRATION RATE: 20 BRPM | WEIGHT: 213.2 LBS | OXYGEN SATURATION: 98 %

## 2020-08-11 DIAGNOSIS — N85.00 ENDOMETRIAL HYPERPLASIA, UNSPECIFIED: ICD-10-CM

## 2020-08-11 DIAGNOSIS — G89.18 ACUTE POST-OPERATIVE PAIN: ICD-10-CM

## 2020-08-11 DIAGNOSIS — Z90.710 S/P LAPAROSCOPIC HYSTERECTOMY: ICD-10-CM

## 2020-08-11 DIAGNOSIS — T81.49XA INCISIONAL INFECTION: ICD-10-CM

## 2020-08-11 LAB
ALBUMIN SERPL-MCNC: 3.8 G/DL (ref 3.4–5)
ALBUMIN UR-MCNC: NEGATIVE MG/DL
ALP SERPL-CCNC: 82 U/L (ref 40–150)
ALT SERPL W P-5'-P-CCNC: 40 U/L (ref 0–50)
ANION GAP SERPL CALCULATED.3IONS-SCNC: 9 MMOL/L (ref 3–14)
APPEARANCE UR: CLEAR
AST SERPL W P-5'-P-CCNC: 20 U/L (ref 0–45)
BASOPHILS # BLD AUTO: 0.1 10E9/L (ref 0–0.2)
BASOPHILS NFR BLD AUTO: 0.7 %
BILIRUB SERPL-MCNC: 0.7 MG/DL (ref 0.2–1.3)
BILIRUB UR QL STRIP: NEGATIVE
BUN SERPL-MCNC: 11 MG/DL (ref 7–30)
CALCIUM SERPL-MCNC: 9.5 MG/DL (ref 8.5–10.1)
CHLORIDE SERPL-SCNC: 104 MMOL/L (ref 94–109)
CO2 SERPL-SCNC: 24 MMOL/L (ref 20–32)
COLOR UR AUTO: YELLOW
CREAT SERPL-MCNC: 0.81 MG/DL (ref 0.52–1.04)
DIFFERENTIAL METHOD BLD: NORMAL
EOSINOPHIL NFR BLD AUTO: 2 %
ERYTHROCYTE [DISTWIDTH] IN BLOOD BY AUTOMATED COUNT: 11.9 % (ref 10–15)
GFR SERPL CREATININE-BSD FRML MDRD: >90 ML/MIN/{1.73_M2}
GLUCOSE SERPL-MCNC: 136 MG/DL (ref 70–99)
GLUCOSE UR STRIP-MCNC: NEGATIVE MG/DL
HCT VFR BLD AUTO: 40.6 % (ref 35–47)
HGB BLD-MCNC: 14.2 G/DL (ref 11.7–15.7)
HGB UR QL STRIP: ABNORMAL
IMM GRANULOCYTES # BLD: 0.1 10E9/L (ref 0–0.4)
IMM GRANULOCYTES NFR BLD: 0.5 %
KETONES UR STRIP-MCNC: NEGATIVE MG/DL
LEUKOCYTE ESTERASE UR QL STRIP: ABNORMAL
LYMPHOCYTES # BLD AUTO: 2.1 10E9/L (ref 0.8–5.3)
LYMPHOCYTES NFR BLD AUTO: 19.3 %
MCH RBC QN AUTO: 30.2 PG (ref 26.5–33)
MCHC RBC AUTO-ENTMCNC: 35 G/DL (ref 31.5–36.5)
MCV RBC AUTO: 86 FL (ref 78–100)
MONOCYTES # BLD AUTO: 0.4 10E9/L (ref 0–1.3)
MONOCYTES NFR BLD AUTO: 4.1 %
MUCOUS THREADS #/AREA URNS LPF: PRESENT /LPF
NEUTROPHILS # BLD AUTO: 7.8 10E9/L (ref 1.6–8.3)
NEUTROPHILS NFR BLD AUTO: 73.4 %
NITRATE UR QL: NEGATIVE
NRBC # BLD AUTO: 0 10*3/UL
NRBC BLD AUTO-RTO: 0 /100
PH UR STRIP: 5 PH (ref 5–7)
PLATELET # BLD AUTO: 270 10E9/L (ref 150–450)
POTASSIUM SERPL-SCNC: 3.8 MMOL/L (ref 3.4–5.3)
PROT SERPL-MCNC: 7.7 G/DL (ref 6.8–8.8)
RBC # BLD AUTO: 4.7 10E12/L (ref 3.8–5.2)
RBC #/AREA URNS AUTO: 1 /HPF (ref 0–2)
SODIUM SERPL-SCNC: 137 MMOL/L (ref 133–144)
SOURCE: ABNORMAL
SP GR UR STRIP: 1.01 (ref 1–1.03)
SQUAMOUS #/AREA URNS AUTO: 2 /HPF (ref 0–1)
UROBILINOGEN UR STRIP-MCNC: 0 MG/DL (ref 0–2)
WBC # BLD AUTO: 10.7 10E9/L (ref 4–11)
WBC #/AREA URNS AUTO: 3 /HPF (ref 0–5)

## 2020-08-11 PROCEDURE — 99285 EMERGENCY DEPT VISIT HI MDM: CPT | Mod: 25 | Performed by: EMERGENCY MEDICINE

## 2020-08-11 PROCEDURE — 96375 TX/PRO/DX INJ NEW DRUG ADDON: CPT | Performed by: EMERGENCY MEDICINE

## 2020-08-11 PROCEDURE — 87086 URINE CULTURE/COLONY COUNT: CPT | Performed by: EMERGENCY MEDICINE

## 2020-08-11 PROCEDURE — 25800030 ZZH RX IP 258 OP 636: Performed by: EMERGENCY MEDICINE

## 2020-08-11 PROCEDURE — 99285 EMERGENCY DEPT VISIT HI MDM: CPT | Mod: Z6 | Performed by: EMERGENCY MEDICINE

## 2020-08-11 PROCEDURE — 81001 URINALYSIS AUTO W/SCOPE: CPT | Performed by: EMERGENCY MEDICINE

## 2020-08-11 PROCEDURE — 96374 THER/PROPH/DIAG INJ IV PUSH: CPT | Performed by: EMERGENCY MEDICINE

## 2020-08-11 PROCEDURE — 85025 COMPLETE CBC W/AUTO DIFF WBC: CPT | Performed by: EMERGENCY MEDICINE

## 2020-08-11 PROCEDURE — 80053 COMPREHEN METABOLIC PANEL: CPT | Performed by: EMERGENCY MEDICINE

## 2020-08-11 PROCEDURE — 76856 US EXAM PELVIC COMPLETE: CPT

## 2020-08-11 PROCEDURE — 76770 US EXAM ABDO BACK WALL COMP: CPT

## 2020-08-11 PROCEDURE — 96361 HYDRATE IV INFUSION ADD-ON: CPT | Performed by: EMERGENCY MEDICINE

## 2020-08-11 PROCEDURE — 25000128 H RX IP 250 OP 636: Performed by: EMERGENCY MEDICINE

## 2020-08-11 RX ORDER — HYDROMORPHONE HYDROCHLORIDE 1 MG/ML
0.5 INJECTION, SOLUTION INTRAMUSCULAR; INTRAVENOUS; SUBCUTANEOUS
Status: DISCONTINUED | OUTPATIENT
Start: 2020-08-11 | End: 2020-08-11 | Stop reason: HOSPADM

## 2020-08-11 RX ORDER — CEPHALEXIN 500 MG/1
500 CAPSULE ORAL 3 TIMES DAILY
Qty: 28 CAPSULE | Refills: 0 | Status: SHIPPED | OUTPATIENT
Start: 2020-08-11 | End: 2020-08-18

## 2020-08-11 RX ORDER — MUPIROCIN 20 MG/G
OINTMENT TOPICAL 2 TIMES DAILY
Qty: 15 G | Refills: 0 | Status: SHIPPED | OUTPATIENT
Start: 2020-08-11 | End: 2020-09-14

## 2020-08-11 RX ORDER — KETOROLAC TROMETHAMINE 30 MG/ML
30 INJECTION, SOLUTION INTRAMUSCULAR; INTRAVENOUS ONCE
Status: COMPLETED | OUTPATIENT
Start: 2020-08-11 | End: 2020-08-11

## 2020-08-11 RX ORDER — HYDROCODONE BITARTRATE AND ACETAMINOPHEN 5; 325 MG/1; MG/1
1-2 TABLET ORAL EVERY 6 HOURS PRN
Qty: 20 TABLET | Refills: 0 | Status: SHIPPED | OUTPATIENT
Start: 2020-08-11 | End: 2020-09-14

## 2020-08-11 RX ADMIN — SODIUM CHLORIDE 1000 ML: 9 INJECTION, SOLUTION INTRAVENOUS at 14:21

## 2020-08-11 RX ADMIN — KETOROLAC TROMETHAMINE 30 MG: 30 INJECTION, SOLUTION INTRAMUSCULAR at 14:23

## 2020-08-11 RX ADMIN — HYDROMORPHONE HYDROCHLORIDE 0.5 MG: 1 INJECTION, SOLUTION INTRAMUSCULAR; INTRAVENOUS; SUBCUTANEOUS at 14:22

## 2020-08-11 NOTE — TELEPHONE ENCOUNTER
I called her to check on her- she said she is already on her way to ER- thanks- rm    Message text      Patient is currently in the ED.  Closing this encounter.  Sia Landin, NUZHATN, RN

## 2020-08-11 NOTE — ED TRIAGE NOTES
Started having lower left abdominal pain this morning around 0700, it has progressively gotten worse and is radiating toward the lower mid abdomin. Recent hysterectomy  Patient's airway, breathing, circulation, and disability/mental status (ABCDs) intact/WDL during triage.

## 2020-08-11 NOTE — ED AVS SNAPSHOT
[facility] Emergency Department  [address] [place] [address]  Phone:  [phone]  Fax:  [phone]                                    Fallon Rivera   MRN: [id number]    Department:  [facility] Emergency Department   Date of Visit:  8/11/2020           After Visit Summary Signature Page    I have received my discharge instructions, and my questions have been answered. I have discussed any challenges I see with this plan with the nurse or doctor.    ..........................................................................................................................................  Patient/Patient Representative Signature      ..........................................................................................................................................  Patient Representative Print Name and Relationship to Patient    ..................................................               ................................................  Date                                   Time    ..........................................................................................................................................  Reviewed by Signature/Title    ...................................................              ..............................................  Date                                               Time          22EPIC Rev 08/18

## 2020-08-11 NOTE — ED PROVIDER NOTES
History     Chief Complaint   Patient presents with     Abdominal Pain     The history is provided by the patient.     Fallon Rivera is a 40 year old female status post hysterectomy on 7/28/20 presenting to the emergency department for abdominal pain. Patient reports having lower left abdominal pain that started this morning around 0700. She states the pain is sharp and radiating toward the lower mid abdomen/pelvis. She states the pain is getting progressively worse throughout today. She has vomited twice today due to pain. She states she is having urinary frequency, however, attributes it to pushing more water and being a diabetic. She states her pain get better when she bears down like she is going to have a bowel movement. She denies any fever, chills or nausea. She notes having pinkish/reddish vaginal discharge last night, has not had any bleeding other than that. Patient's last bowel movement was this morning. Denies any bloody stool or black stools. She does have a history of hemorrhoids. She has a strong history of kidney stones. She had Ibuprofen today and her last Percocet was a couple of days ago. She denies any recent intercourse.    Allergies:  Allergies   Allergen Reactions     Amoxicillin Hives     Anti-Nausea      Anxiety, agitation,severe paranoia. Zofran, Reglan are some of them.  DRAMAMINE WITHOUT ISSUES       Demerol Hcl [Meperidine Hcl] Nausea     Indomethacin Nausea and Vomiting     Macrobid [Nitrofuran Derivatives] Hives     Reglan [Metoclopramide] Other (See Comments)     Acute paranoia, severe     Zofran [Ondansetron] Other (See Comments)     Severe anxiety, agitation     Vancomycin Other (See Comments)     maria del rosario syndrome       Problem List:    Patient Active Problem List    Diagnosis Date Noted     Endometrial hyperplasia, unspecified 06/18/2020     Priority: Medium     Added automatically from request for surgery [id number]       Pelvic pain in female 06/18/2020     Priority: Medium      Added automatically from request for surgery [id number]       Endometrial intraepithelial neoplasia (EIN) 03/02/2020     Priority: Medium     Obesity (BMI 35.0-39.9) with comorbidity (H) 10/16/2018     Priority: Medium     Non morbid obesity due to excess calories 07/05/2016     Priority: Medium     Type 2 diabetes mellitus without complication (H) 04/29/2016     Priority: Medium     Glucosuria 04/27/2016     Priority: Medium     Right ovarian cyst 09/15/2015     Priority: Medium     Hyperlipidemia LDL goal <130 07/18/2012     Priority: Medium     Mild major depression (H) 07/18/2012     Priority: Medium     GERD (gastroesophageal reflux disease) 07/03/2012     Priority: Medium     CARDIOVASCULAR SCREENING; LDL GOAL LESS THAN 160 10/31/2010     Priority: Medium     Dyspnea 11/24/2009     Priority: Medium     Kidney stones 02/17/2009     Priority: Medium     S/P conization of cervix- KAYLA 2/3 in 2011 06/27/2007     Priority: Medium     1/24/06 pap: ASC-H  2/9/06 colposcopy/bx (negative)/ECC (negative) pap- ASC-H  5/24/06 pap- ASC-H  9/6/07 pap NIL  1/23/07 pap ASCUS + HPV 45 (high risk)  3/15/07 pap ASCUS + HPV 45 (high risk) colposcopy- bx (negative)/ECC (koilocytosis)  6/19/07 ECC- koilocytosis.  Pap- AGS  10/23/07 pap- ASC-H, ECC- negative  2/14/08 pap NIL- return to yearly paps  2/17/09 pap NIL- repeat 1 year  3/8/10 pap NIL  3/3/11 pap ASCUS + HPV 45 (high risk)- colposcopy recommended  5/16/11 colpo- bx (KAYLA 2/3/HSIL) ECC (ASCUS)  pap (ASC-H)  6/2/11 recommend: [facility]  7/1/11 [facility]: path: KAYLA 2/3 with possible involvement of the endocervical margin.  ECC- neg.  Endometrium- neg.  7/14/11 plan: HIPOLITO in approx 2 months  10/10/11- hysterectomy planned.  (cancelled)  11/7/11 pap-- NIL. Plan--repeat pap in 6 months. (5/7/12)  5/7/12 pap NIL. Plan-- pap in 1 year (due 5/7/13)   5/8/13 pap NIL. Plan-pap in 1 year  5/8/14 NIL pap, neg HR HPV.  Plan- repeat pap/HPV in 1 year (due 5/8/15)  10/14/14 NIL pap, neg HR HPV.  Endometrial biopsy- WNL   1/6/16  NIL pap, neg HR HPV.  Plan: paps yearly, per Dr. Ford.  1/30/17 NIL pap, neg HR HPV. Plan: pap in 1 year.   3/12/18 NIL pap, neg HR HPV. Plan: cotest annually, per Dr. Ford.  3/11/19 ECC: negative. NIL pap, neg HR HPV. Plan: cotest in 1 yr  2/11/20 NIL Pap, Neg HPV. EMB: intraepithelial neoplasia. Plan: Hysterectomy.            Papanicolaou smear of cervix with atypical squamous cells cannot exclude high grade squamous intraepithelial lesion (ASC-H) 09/07/2006     Priority: Medium     Female infertility 07/24/2006     Priority: Medium     Problem list name updated by automated process. Provider to review       Hemorrhage of rectum and anus 12/02/2004     Priority: Medium        Past Medical History:    Past Medical History:   Diagnosis Date     Abnormal Pap smear 2006, 2007,      Calculus of kidney 2002     Cervical high risk HPV (human papillomavirus) test positive      History of colposcopy with cervical biopsy 2/9/06, 3/15/07       Past Surgical History:    Past Surgical History:   Procedure Laterality Date     C REMOVAL OF KIDNEY STONE      two surgeries, 2002,2003     CHOLECYSTECTOMY, LAPOROSCOPIC  5/11/2007    Cholecystectomy, Laparoscopic     COLONOSCOPY  05/17/10     COLONOSCOPY N/A 8/27/2019    Procedure: COLONOSCOPY;  Surgeon: Paramjit Kingston DO;  Location: [facility]     CONIZATION  7/1/2011    Procedure:CONIZATION; cold knife and punch biopsy of mole on back; Surgeon:GREG FORD; Location:[facility]     DILATION AND CURETTAGE, HYSTEROSCOPY, LAPAROSCOPY, COMBINED Right 9/24/2015    Procedure: COMBINED DILATION AND CURETTAGE, HYSTEROSCOPY, LAPAROSCOPY;  Surgeon: Greg Ford MD;  Location: [facility]     DISCECTOMY LUMBAR MINIMALLY INVASIVE ONE LEVEL Left 1/23/2019    Procedure: Minimally Invasive Surgery Left Lumbar 5-Sacral 1 microdiscectomy;  Surgeon: Mason Roe MD;  Location: [facility]     ESOPHAGOSCOPY, GASTROSCOPY, DUODENOSCOPY  (EGD), COMBINED  5/21/2014    Procedure: COMBINED ESOPHAGOSCOPY, GASTROSCOPY, DUODENOSCOPY (EGD), BIOPSY SINGLE OR MULTIPLE;  Surgeon: Earl Lane MD;  Location: [facility]     EXCISE LESION TRUNK  7/1/2011    Procedure:EXCISE LESION TRUNK; Surgeon:GREG GRAFF; Location:[facility]     HC FRAGMENTING OF KIDNEY STONE  11/30/2005     HC RX ECTOP PREG BY SCOPE,RMV TUBE/OVRY  12/20/2007    Right sided salpingectomy and removal of ruptured ectopic pregnancy. D&C.     HC UGI ENDOSCOPY, SIMPLE EXAM  09/01/09     HYSTERECTOMY VAGINAL       LAPAROSCOPIC APPENDECTOMY N/A 9/24/2015    Procedure: LAPAROSCOPIC APPENDECTOMY;  Surgeon: James Cuellar MD;  [facility]     LAPAROSCOPIC CYSTECTOMY OVARIAN (BENIGN) N/A 9/24/2015    Procedure: LAPAROSCOPIC CYSTECTOMY OVARIAN (BENIGN);  Surgeon: Greg Graff MD;  [facility]     LAPAROSCOPIC HYSTERECTOMY TOTAL, BILATERAL SALPINGO-OOPHORECTOMY, COMBINED N/A 7/28/2020    Procedure: laparoscpic assisted vaginal hysterectomy, cystoscopy;  Surgeon: Greg Graff MD;  Location: [facility]     LAPAROSCOPIC OOPHORECTOMY Right 9/24/2015    Procedure: LAPAROSCOPIC OOPHORECTOMY;  Surgeon: Greg Graff MD;  [facility]     LITHOTRIPSY  01/17/2007     PELVIS LAPAROSCOPY,DX  10/16/2000    Diagnostic laparoscopy, Endometrial biopsy.     TUBAL/ECTOPIC PREGNANCY  01/23/2007    Lap removal of left ruptured ectopic pregnancy & salpingectomy, D&C       Family History:    Family History   Problem Relation Age of Onset     Diabetes Maternal Grandmother         adult onset     Anesthesia Reaction Maternal Grandmother         can't tolerate Novacaine.     Respiratory Maternal Grandmother         COPD and emphysema.     Thyroid Disease Maternal Grandmother      Heart Disease Maternal Grandmother         CHF     Diabetes Paternal Grandfather         adult o nset     Hypertension Paternal Grandfather      Cerebrovascular Disease Paternal  Grandfather      Heart Disease Paternal Grandfather         MI, replaced valve,angioplasty     Thyroid Disease Paternal Grandfather      Cancer Maternal Grandfather         skin cancer     Heart Disease Maternal Grandfather         MI     Obesity Mother         on thyroid medication     Thyroid Disease Mother      Diabetes Mother      Rheumatologic Disease Mother      Heart Disease Father      Hypertension Father         and TIA     Lipids Father      Breast Cancer Paternal Grandmother      Arrhythmia Other      Cancer Maternal Aunt         breast/brain cancer     Depression Paternal Aunt      Cerebrovascular Disease Paternal Uncle      Cerebrovascular Disease Paternal Aunt        Social History:  Marital Status:   [2]  Social History     Tobacco Use     Smoking status: Former Smoker     Packs/day: 0.00     Years: 12.00     Pack years: 0.00     Types: Cigarettes     Last attempt to quit: 2019     Years since quittin.1     Smokeless tobacco: Never Used     Tobacco comment: As of 10 /2014, pt has had a few cigs here and there   Substance Use Topics     Alcohol use: Yes     Alcohol/week: 0.0 standard drinks     Comment: every few months     Drug use: No        Medications:    cephALEXin (KEFLEX) 500 MG capsule  HYDROcodone-acetaminophen (NORCO) 5-325 MG tablet  mupirocin (BACTROBAN) 2 % external ointment  acetaminophen (TYLENOL) 500 MG tablet  blood glucose (CONTOUR NEXT TEST) test strip  blood glucose monitoring (NO BRAND SPECIFIED) meter device kit  cyclobenzaprine (FLEXERIL) 5 MG tablet  ibuprofen (ADVIL/MOTRIN) 600 MG tablet  metFORMIN (GLUCOPHAGE) 1000 MG tablet  omeprazole (PRILOSEC) 20 MG DR capsule  senna-docusate (SENOKOT-S/PERICOLACE) 8.6-50 MG tablet  sulfamethoxazole-trimethoprim (BACTRIM DS) 800-160 MG tablet          Review of Systems   All other ROS reviewed and are negative or non-contributory except as stated in HPI.     Physical Exam   BP: (!) 157/104  Pulse: 101  Heart Rate: 120  Temp:  "99  F (37.2  C)  Resp: 20  Weight: 96.7 kg (213 lb 3.2 oz)  SpO2: 99 %      Physical Exam  Vitals signs and nursing note reviewed.   Constitutional:       Appearance: She is well-developed.      Comments: Pleasant, obese, mildly anxious female sitting in the bed   HENT:      Head: Normocephalic.      Nose: Nose normal.   Eyes:      General: No scleral icterus.     Extraocular Movements: Extraocular movements intact.      Conjunctiva/sclera: Conjunctivae normal.   Neck:      Musculoskeletal: Normal range of motion and neck supple.   Cardiovascular:      Rate and Rhythm: Regular rhythm. Tachycardia present.      Heart sounds: Normal heart sounds.   Pulmonary:      Effort: Pulmonary effort is normal.      Breath sounds: Normal breath sounds.   Abdominal:      Palpations: Abdomen is soft.      Comments: Abdomen is soft.  She has some slight suprapubic tenderness.  She has 2 incision sites in bilateral lower abdomen that are mildly erythematous and open with no drainage.  She denies significant tenderness at the sites.  She apparently has been \"picking at them while she sleeps\".   Genitourinary:     Comments: Deferred for ultrasound  Musculoskeletal: Normal range of motion.   Skin:     General: Skin is warm and dry.      Coloration: Skin is not pale.   Neurological:      General: No focal deficit present.      Mental Status: She is alert and oriented to person, place, and time.      Motor: No abnormal muscle tone.   Psychiatric:         Behavior: Behavior normal.         ED Course (with Medical Decision Making)    Pt seen and examined by me.  RN and EPIC notes reviewed.       Patient with postoperative pain 2 weeks status post hysterectomy.  She is had a little bit of redness per vagina, no fevers or chills.  History of kidney stones.  This could be postoperative pain.  I do not suspect cuff dehiscence.  She has had an ovarian cyst in the past so it could be the cause.  Possible stone.  Plan ultrasound of the pelvis and " kidneys, pain medications, labs.    Patient is white count is normal.  Chemistry unremarkable.  Urine is clear.  I did send it for culture.  Ultrasound shows postop changes status post hysterectomy and right oophorectomy.  Her left ovary shows a small cyst.  It is smaller than was seen on 6/13/2020.  Renal ultrasound shows a cyst in the left kidney, very slightly enlarged.  No evidence for stone.    Patient had some improvement in her pain with Toradol.  I did speak with Dr Ford.  He believes patient is likely having postoperative pain and did not recommend any further intervention at this point.  I am going to refill her pain medications.  I am going to give her a little bit of Bactroban to use over the incision sites as they do seem to be possibly getting some early cellulitis and I will give her an Rx for Keflex to start if they have any worsening.  Patient feels comfortable with this plan.  She will follow-up with OB/GYN as recommended and return at anytime for worsening, changes or concerns.        Procedures      Results for orders placed or performed during the hospital encounter of 08/11/20 (from the past 24 hour(s))   CBC with platelets differential   Result Value Ref Range    WBC 10.7 4.0 - 11.0 10e9/L    RBC Count 4.70 3.8 - 5.2 10e12/L    Hemoglobin 14.2 11.7 - 15.7 g/dL    Hematocrit 40.6 35.0 - 47.0 %    MCV 86 78 - 100 fl    MCH 30.2 26.5 - 33.0 pg    MCHC 35.0 31.5 - 36.5 g/dL    RDW 11.9 10.0 - 15.0 %    Platelet Count 270 150 - 450 10e9/L    Diff Method Automated Method     % Neutrophils 73.4 %    % Lymphocytes 19.3 %    % Monocytes 4.1 %    % Eosinophils 2.0 %    % Basophils 0.7 %    % Immature Granulocytes 0.5 %    Nucleated RBCs 0 0 /100    Absolute Neutrophil 7.8 1.6 - 8.3 10e9/L    Absolute Lymphocytes 2.1 0.8 - 5.3 10e9/L    Absolute Monocytes 0.4 0.0 - 1.3 10e9/L    Absolute Basophils 0.1 0.0 - 0.2 10e9/L    Abs Immature Granulocytes 0.1 0 - 0.4 10e9/L    Absolute Nucleated RBC 0.0     Comprehensive metabolic panel   Result Value Ref Range    Sodium 137 133 - 144 mmol/L    Potassium 3.8 3.4 - 5.3 mmol/L    Chloride 104 94 - 109 mmol/L    Carbon Dioxide 24 20 - 32 mmol/L    Anion Gap 9 3 - 14 mmol/L    Glucose 136 (H) 70 - 99 mg/dL    Urea Nitrogen 11 7 - 30 mg/dL    Creatinine 0.81 0.52 - 1.04 mg/dL    GFR Estimate >90 >60 mL/min/[1.73_m2]    GFR Estimate If Black >90 >60 mL/min/[1.73_m2]    Calcium 9.5 8.5 - 10.1 mg/dL    Bilirubin Total 0.7 0.2 - 1.3 mg/dL    Albumin 3.8 3.4 - 5.0 g/dL    Protein Total 7.7 6.8 - 8.8 g/dL    Alkaline Phosphatase 82 40 - 150 U/L    ALT 40 0 - 50 U/L    AST 20 0 - 45 U/L   UA with Microscopic   Result Value Ref Range    Color Urine Yellow     Appearance Urine Clear     Glucose Urine Negative NEG^Negative mg/dL    Bilirubin Urine Negative NEG^Negative    Ketones Urine Negative NEG^Negative mg/dL    Specific Gravity Urine 1.009 1.003 - 1.035    Blood Urine Moderate (A) NEG^Negative    pH Urine 5.0 5.0 - 7.0 pH    Protein Albumin Urine Negative NEG^Negative mg/dL    Urobilinogen mg/dL 0.0 0.0 - 2.0 mg/dL    Nitrite Urine Negative NEG^Negative    Leukocyte Esterase Urine Small (A) NEG^Negative    Source Midstream Urine     WBC Urine 3 0 - 5 /HPF    RBC Urine 1 0 - 2 /HPF    Squamous Epithelial /HPF Urine 2 (H) 0 - 1 /HPF    Mucous Urine Present (A) NEG^Negative /LPF   Urine Culture Aerobic Bacterial    Specimen: Urine clean catch; Unspecified Urine   Result Value Ref Range    Specimen Description Unspecified Urine     Special Requests Specimen received in preservative     Culture Micro PENDING    Pelvis US, complete    Narrative    PELVIC ULTRASOUND  WITH TRANSABDOMINAL TRANSDUCER  8/11/2020 3:34 PM     HISTORY: Post-op 2 weeks lap hysterectomy. Pelvic pain. Hysterectomy  on July 28, 2020, right oophorectomy in 2015. Left lower abdominal  pain.    TECHNIQUE:  Transabdominal sonography was performed to evaluate the  uterus and ovaries.      COMPARISON: Pelvic  ultrasound dated 8/11/2020.    FINDINGS:  Uterus and right ovary are surgically absent. The left  ovary measures 4.3 x 2.8 x 3.1 cm and contains a complex appearing  avascular cystic type structure measuring approximately 2.4 x 2.0 x  1.9 cm. This is similar in size to the structure seen in the left  ovary dated 6/13/2020 where it measured 2.6 x 2.3 x 1.9 cm.    No abnormal free fluid collections are seen in the cul-de-sac or in  the adnexal regions. Color and Doppler spectral analysis demonstrate  venous waveforms in the left ovary.      Impression    IMPRESSION:  1. Postop changes status post hysterectomy and right nephrectomy.  2. Complex cystic structure left ovary measures up to 2.4 cm in  maximum dimension and could represent a hemorrhagic cyst. This is  slightly smaller than cystic structure seen in the left ovary on  6/13/2020 which measured up to 2.6 cm.  3. No other significant abnormalities identified.    IVETH FAN MD   US Renal Complete    Narrative    RENAL ULTRASOUND   8/11/2020 3:46 PM     HISTORY: Left abdominal to pelvic pain - history of many stones.    COMPARISON: Renal ultrasound dated 10/18/2016. CT abdomen and pelvis  dated 6/13/2020.    FINDINGS:  The kidneys are normal in size and cortical thickness. No  renal masses are seen. There is no hydronephrosis or renal calculus.  Cyst in the upper pole left kidney measures approximately 1.7 x 1.9 x  1.4 cm. It is mildly complex in appearance which could be due to the  depth of the kidney. This has increased in size from 1.4 x 0.9 x 0.9  cm on the prior ultrasound dated 10/18/2016.    The visualized portions of the urinary bladder appear normal. Prevoid  and postvoid urinary bladder volumes are 310 mL and 3 mL respectively.  Bilateral ureteral jets are present.        Impression    IMPRESSION:  1.  Probable cyst superior pole left kidney has increased in size  since 10/18/2016 from 1.4 x 0.9 x 0.9 cm on the prior study to 1.9 x  1.7 x 1.4 cm on  the current study.  2. No nephrolithiasis. No evidence for hydronephrosis to suggest  obstructive urinary system process.    IVETH FAN MD       Medications   0.9% sodium chloride BOLUS (0 mLs Intravenous Stopped 8/11/20 1531)   ketorolac (TORADOL) injection 30 mg (30 mg Intravenous Given 8/11/20 1423)       Assessments & Plan       I have reviewed the findings, diagnosis, plan and need for follow up with the patient.    Discharge Medication List as of 8/11/2020  5:01 PM      START taking these medications    Details   cephALEXin (KEFLEX) 500 MG capsule Take 1 capsule (500 mg) by mouth 3 times daily for 7 days, Disp-28 capsule,R-0, E-Prescribe      mupirocin (BACTROBAN) 2 % external ointment Apply topically 2 times dailyDisp-15 g,[id number]             Final diagnoses:   Acute post-operative pain   Incisional infection     Disposition: Patient discharged home in stable condition.  Plan as above.  Return for concerns.    This document serves as a record of services personally performed by Farida Munroe MD. It was created on their behalf by Heather Belcher, a trained medical scribe. The creation of this record is based on the provider's personal observations and the statements of the patient. This document has been checked and approved by the attending provider.    Note: Chart documentation done in part with Dragon Voice Recognition software. Although reviewed after completion, some word and grammatical errors may remain.    8/11/2020   [facility] EMERGENCY DEPARTMENT     Farida Munroe MD  08/11/20 6551

## 2020-08-11 NOTE — DISCHARGE INSTRUCTIONS
Be sure to stay well-hydrated, drink plenty of fluids.  Try not to overdo it with your exercise and no lifting.    Take ibuprofen and/or Norco as needed for pain.    Place small amount of Bactroban over your incision sites and cover with a Band-Aid twice daily until improved.  After you shower or bathe, be sure to let the wounds dry thoroughly before you use the ointment.      If you have increased redness around the incision sites or pain start the antibiotics as prescribed.    Follow-up with Dr. Ford check for continued or worsening symptoms and return at anytime for worsening, changes or concerns.    I hope that you heal quickly!!

## 2020-08-11 NOTE — TELEPHONE ENCOUNTER
If phone does not work, please leave a detailed message on VM.      Patient is 2 weeks post-op from her hysterectomy.  Last night she started to experience pain just below her belly button in the pelvic area that is currently at about an 8/10.  RN can hear patient wincing.  She states she ran out of her pain medication 1 day post-op.  She took a 600 mg Ibuprofen about 30 minutes ago and is hoping this will take the edge off of her pain.  She has vomited a couple of times today due to the pain she is experiencing.  She had a slight amount of pink discharge last night vaginally.    She thinks the pain is normal as she has spoken to multiple hysterectomy patients who have said from 2-3 weeks out is when the pain hits as you are doing more and have run out of pain medications.     Patient is informed if she cannot handle the pain, she should be seen in the ED.  She is informed a message will be sent to PCP regarding the pain.  She is hoping to get a small amount of pain medication to take for severe pain.  Pharmacy is pended.    Additional Information    SEVERE post-op pain (e.g., excruciating, pain scale 8-10) that is not controlled with pain medications    Negative: Caller has URGENT question and triager unable to answer question    Negative: Fever > 100.4 F (38.0 C)    Negative: Sounds like a life-threatening emergency to the triager    Negative: Chest pain    Negative: Difficulty breathing    Negative: Surgical incision symptoms and questions    Negative: Discomfort (pain, burning or stinging) when passing urine and male    Negative: Discomfort (pain, burning or stinging) when passing urine and female    Negative: Constipation    Negative: New or worsening leg (calf, thigh) pain    Negative: New or worsening leg swelling    Negative: Dizziness is severe, or persists > 24 hours after surgery    Negative: Symptoms arising from use of a urinary catheter (Saldana or Coude)    Negative: Cast problems or questions     Negative: Bright red, wide-spread, sunburn-like rash    Negative: SEVERE headache and after spinal (epidural) anesthesia    Negative: Vomiting and persists > 4 hours    Negative: Vomiting and abdomen looks much more swollen than usual    Negative: Drinking very little and dehydration suspected (e.g., no urine > 12 hours, very dry mouth, very lightheaded)    Negative: Patient sounds very sick or weak to the triager    Negative: Sounds like a serious complication to the triager    Protocols used: POST-OP SYMPTOMS AND [id number]

## 2020-08-12 LAB
BACTERIA SPEC CULT: NORMAL
Lab: NORMAL
SPECIMEN SOURCE: NORMAL

## 2020-08-12 NOTE — RESULT ENCOUNTER NOTE
Emergency Dept/Urgent Care discharge antibiotic (if prescribed): Cephalexin (Keflex) 500 mg capsule, 1 capsule (500 mg) by mouth 3 times daily for 7 days.  Date of Rx (if applicable):  8/11/20  No changes in treatment per Urine culture protocol.

## 2020-08-13 NOTE — RESULT ENCOUNTER NOTE
Final urine culture report is NEGATIVE per [facility] Result protocol.    If NEGATIVE result, no change in treatment, per [facility] Lab Result protocol.

## 2020-09-09 ENCOUNTER — APPOINTMENT (OUTPATIENT)
Dept: CT IMAGING | Facility: CLINIC | Age: 40
End: 2020-09-09
Attending: EMERGENCY MEDICINE
Payer: COMMERCIAL

## 2020-09-09 ENCOUNTER — HOSPITAL ENCOUNTER (EMERGENCY)
Facility: CLINIC | Age: 40
Discharge: HOME | End: 2020-09-09
Attending: EMERGENCY MEDICINE | Admitting: EMERGENCY MEDICINE
Payer: COMMERCIAL

## 2020-09-09 ENCOUNTER — NURSE TRIAGE (OUTPATIENT)
Dept: FAMILY MEDICINE | Facility: CLINIC | Age: 40
End: 2020-09-09

## 2020-09-09 VITALS
DIASTOLIC BLOOD PRESSURE: 103 MMHG | TEMPERATURE: 97.2 F | RESPIRATION RATE: 11 BRPM | HEART RATE: 96 BPM | SYSTOLIC BLOOD PRESSURE: 129 MMHG | OXYGEN SATURATION: 97 %

## 2020-09-09 DIAGNOSIS — K62.5 BRBPR (BRIGHT RED BLOOD PER RECTUM): ICD-10-CM

## 2020-09-09 DIAGNOSIS — K64.8 INTERNAL HEMORRHOIDS: ICD-10-CM

## 2020-09-09 DIAGNOSIS — Z90.710 STATUS POST LAPAROSCOPIC ASSISTED VAGINAL HYSTERECTOMY: ICD-10-CM

## 2020-09-09 LAB
ALBUMIN SERPL-MCNC: 3.8 G/DL (ref 3.4–5)
ALBUMIN UR-MCNC: 30 MG/DL
ALP SERPL-CCNC: 80 U/L (ref 40–150)
ALT SERPL W P-5'-P-CCNC: 59 U/L (ref 0–50)
ANION GAP SERPL CALCULATED.3IONS-SCNC: 7 MMOL/L (ref 3–14)
APPEARANCE UR: ABNORMAL
AST SERPL W P-5'-P-CCNC: 29 U/L (ref 0–45)
BACTERIA #/AREA URNS HPF: ABNORMAL /HPF
BASOPHILS # BLD AUTO: 0 10E9/L (ref 0–0.2)
BASOPHILS NFR BLD AUTO: 0.6 %
BILIRUB SERPL-MCNC: 1.4 MG/DL (ref 0.2–1.3)
BILIRUB UR QL STRIP: NEGATIVE
BUN SERPL-MCNC: 12 MG/DL (ref 7–30)
CALCIUM SERPL-MCNC: 9 MG/DL (ref 8.5–10.1)
CHLORIDE SERPL-SCNC: 101 MMOL/L (ref 94–109)
CO2 SERPL-SCNC: 27 MMOL/L (ref 20–32)
COLOR UR AUTO: YELLOW
CREAT SERPL-MCNC: 0.8 MG/DL (ref 0.52–1.04)
DIFFERENTIAL METHOD BLD: NORMAL
EOSINOPHIL NFR BLD AUTO: 1.7 %
ERYTHROCYTE [DISTWIDTH] IN BLOOD BY AUTOMATED COUNT: 12.5 % (ref 10–15)
GFR SERPL CREATININE-BSD FRML MDRD: >90 ML/MIN/{1.73_M2}
GLUCOSE SERPL-MCNC: 183 MG/DL (ref 70–99)
GLUCOSE UR STRIP-MCNC: NEGATIVE MG/DL
HCT VFR BLD AUTO: 43.4 % (ref 35–47)
HGB BLD-MCNC: 15 G/DL (ref 11.7–15.7)
HGB UR QL STRIP: ABNORMAL
IMM GRANULOCYTES # BLD: 0 10E9/L (ref 0–0.4)
IMM GRANULOCYTES NFR BLD: 0.1 %
KETONES UR STRIP-MCNC: 5 MG/DL
LEUKOCYTE ESTERASE UR QL STRIP: NEGATIVE
LYMPHOCYTES # BLD AUTO: 1.7 10E9/L (ref 0.8–5.3)
LYMPHOCYTES NFR BLD AUTO: 23.4 %
MCH RBC QN AUTO: 30 PG (ref 26.5–33)
MCHC RBC AUTO-ENTMCNC: 34.6 G/DL (ref 31.5–36.5)
MCV RBC AUTO: 87 FL (ref 78–100)
MONOCYTES # BLD AUTO: 0.3 10E9/L (ref 0–1.3)
MONOCYTES NFR BLD AUTO: 4.6 %
MUCOUS THREADS #/AREA URNS LPF: PRESENT /LPF
NEUTROPHILS # BLD AUTO: 5.1 10E9/L (ref 1.6–8.3)
NEUTROPHILS NFR BLD AUTO: 69.6 %
NITRATE UR QL: NEGATIVE
NRBC # BLD AUTO: 0 10*3/UL
NRBC BLD AUTO-RTO: 0 /100
PH UR STRIP: 5 PH (ref 5–7)
PLATELET # BLD AUTO: 215 10E9/L (ref 150–450)
POTASSIUM SERPL-SCNC: 3.7 MMOL/L (ref 3.4–5.3)
PROT SERPL-MCNC: 7.9 G/DL (ref 6.8–8.8)
RBC # BLD AUTO: 5 10E12/L (ref 3.8–5.2)
RBC #/AREA URNS AUTO: 0 /HPF (ref 0–2)
SODIUM SERPL-SCNC: 135 MMOL/L (ref 133–144)
SOURCE: ABNORMAL
SP GR UR STRIP: 1.02 (ref 1–1.03)
SQUAMOUS #/AREA URNS AUTO: 1 /HPF (ref 0–1)
UROBILINOGEN UR STRIP-MCNC: 0 MG/DL (ref 0–2)
WBC # BLD AUTO: 7.3 10E9/L (ref 4–11)
WBC #/AREA URNS AUTO: 1 /HPF (ref 0–5)

## 2020-09-09 PROCEDURE — 96374 THER/PROPH/DIAG INJ IV PUSH: CPT | Mod: 59 | Performed by: EMERGENCY MEDICINE

## 2020-09-09 PROCEDURE — 25000128 H RX IP 250 OP 636: Performed by: EMERGENCY MEDICINE

## 2020-09-09 PROCEDURE — 80053 COMPREHEN METABOLIC PANEL: CPT | Performed by: EMERGENCY MEDICINE

## 2020-09-09 PROCEDURE — 96361 HYDRATE IV INFUSION ADD-ON: CPT | Performed by: EMERGENCY MEDICINE

## 2020-09-09 PROCEDURE — 85025 COMPLETE CBC W/AUTO DIFF WBC: CPT | Performed by: EMERGENCY MEDICINE

## 2020-09-09 PROCEDURE — 99285 EMERGENCY DEPT VISIT HI MDM: CPT | Mod: Z6 | Performed by: EMERGENCY MEDICINE

## 2020-09-09 PROCEDURE — 74177 CT ABD & PELVIS W/CONTRAST: CPT

## 2020-09-09 PROCEDURE — 81001 URINALYSIS AUTO W/SCOPE: CPT | Performed by: EMERGENCY MEDICINE

## 2020-09-09 PROCEDURE — 25800030 ZZH RX IP 258 OP 636: Performed by: EMERGENCY MEDICINE

## 2020-09-09 PROCEDURE — 99285 EMERGENCY DEPT VISIT HI MDM: CPT | Mod: 25 | Performed by: EMERGENCY MEDICINE

## 2020-09-09 RX ORDER — SODIUM CHLORIDE 9 MG/ML
INJECTION, SOLUTION INTRAVENOUS CONTINUOUS
Status: DISCONTINUED | OUTPATIENT
Start: 2020-09-09 | End: 2020-09-09 | Stop reason: HOSPADM

## 2020-09-09 RX ORDER — HYDROMORPHONE HYDROCHLORIDE 1 MG/ML
0.5 INJECTION, SOLUTION INTRAMUSCULAR; INTRAVENOUS; SUBCUTANEOUS
Status: COMPLETED | OUTPATIENT
Start: 2020-09-09 | End: 2020-09-09

## 2020-09-09 RX ORDER — IOPAMIDOL 755 MG/ML
500 INJECTION, SOLUTION INTRAVASCULAR ONCE
Status: COMPLETED | OUTPATIENT
Start: 2020-09-09 | End: 2020-09-09

## 2020-09-09 RX ADMIN — SODIUM CHLORIDE 1000 ML: 9 INJECTION, SOLUTION INTRAVENOUS at 09:52

## 2020-09-09 RX ADMIN — IOPAMIDOL 100 ML: 755 INJECTION, SOLUTION INTRAVENOUS at 10:17

## 2020-09-09 RX ADMIN — HYDROMORPHONE HYDROCHLORIDE 0.5 MG: 1 INJECTION, SOLUTION INTRAMUSCULAR; INTRAVENOUS; SUBCUTANEOUS at 09:59

## 2020-09-09 ASSESSMENT — ENCOUNTER SYMPTOMS
ABDOMINAL PAIN: 1
RESPIRATORY NEGATIVE: 1
NEUROLOGICAL NEGATIVE: 1
FATIGUE: 0
ACTIVITY CHANGE: 0
DYSURIA: 0
HEMATOLOGIC/LYMPHATIC NEGATIVE: 1
PSYCHIATRIC NEGATIVE: 1
FEVER: 0
FREQUENCY: 0
APPETITE CHANGE: 0
CHILLS: 0
EYES NEGATIVE: 1
FLANK PAIN: 0
CARDIOVASCULAR NEGATIVE: 1
ENDOCRINE NEGATIVE: 1
DIARRHEA: 1
MUSCULOSKELETAL NEGATIVE: 1
RECTAL PAIN: 0

## 2020-09-09 NOTE — ED PROVIDER NOTES
History     Chief Complaint   Patient presents with     Abdominal Pain     HPI  Fallon Rivera is a 40 year old female who presents with abdominal pain and blood per rectum.    6 weeks postoperative from laparoscopic assisted vaginal total abdominal hysterectomy.  Procedure was uncomplicated.  Has had 2 post operative ED assessments.  The first was a few days after surgery for enhanced pain management.  The second was in the second week of August for concern of possible early incision infection and pain management.      Patient reports she was feeling well.  Admits she may be doing more housework lifting and pushing of items more than what she was told to do postoperatively.  In the last 24 hours is developed severe abdominal pain affecting the right and left lower abdomen.  She has some referred pain to bilateral CVA areas.  No vaginal discharge or bleeding.  Rates pain 8/10 intensity.  Has been having 4-6 soft stools in the last few days.  This morning she thought she had to pass stool and on the toilet passed a large amount of flatulence followed by blood.  Noted blood when wiping the rectum and noted blood in the toilet bowl.  There was no stool with passage of blood per rectum.  Still having severe abdominal cramping.  No hemorrhoidal irritation.    Colonoscopy completed August 2019 was normal with exception of external/internal hemorrhoids.    Reports no fever, chills, nausea, vomiting.    Discomfort is different when she has had renal colic from stone.  She has had no dysuria or recognized hematuria.      Allergies:  Allergies   Allergen Reactions     Amoxicillin Hives     Anti-Nausea      Anxiety, agitation,severe paranoia. Zofran, Reglan are some of them.  DRAMAMINE WITHOUT ISSUES       Demerol Hcl [Meperidine Hcl] Nausea     Indomethacin Nausea and Vomiting     Macrobid [Nitrofuran Derivatives] Hives     Reglan [Metoclopramide] Other (See Comments)     Acute paranoia, severe     Zofran  [Ondansetron] Other (See Comments)     Severe anxiety, agitation     Vancomycin Other (See Comments)     maria del rosario syndrome       Problem List:    Patient Active Problem List    Diagnosis Date Noted     Endometrial hyperplasia, unspecified 06/18/2020     Priority: Medium     Added automatically from request for surgery [id number]       Pelvic pain in female 06/18/2020     Priority: Medium     Added automatically from request for surgery [id number]       Endometrial intraepithelial neoplasia (EIN) 03/02/2020     Priority: Medium     Obesity (BMI 35.0-39.9) with comorbidity (H) 10/16/2018     Priority: Medium     Non morbid obesity due to excess calories 07/05/2016     Priority: Medium     Type 2 diabetes mellitus without complication (H) 04/29/2016     Priority: Medium     Glucosuria 04/27/2016     Priority: Medium     Right ovarian cyst 09/15/2015     Priority: Medium     Hyperlipidemia LDL goal <130 07/18/2012     Priority: Medium     Mild major depression (H) 07/18/2012     Priority: Medium     GERD (gastroesophageal reflux disease) 07/03/2012     Priority: Medium     CARDIOVASCULAR SCREENING; LDL GOAL LESS THAN 160 10/31/2010     Priority: Medium     Dyspnea 11/24/2009     Priority: Medium     Kidney stones 02/17/2009     Priority: Medium     S/P conization of cervix- KAYLA 2/3 in 2011 06/27/2007     Priority: Medium     1/24/06 pap: ASC-H  2/9/06 colposcopy/bx (negative)/ECC (negative) pap- ASC-H  5/24/06 pap- ASC-H  9/6/07 pap NIL  1/23/07 pap ASCUS + HPV 45 (high risk)  3/15/07 pap ASCUS + HPV 45 (high risk) colposcopy- bx (negative)/ECC (koilocytosis)  6/19/07 ECC- koilocytosis.  Pap- AGS  10/23/07 pap- ASC-H, ECC- negative  2/14/08 pap NIL- return to yearly paps  2/17/09 pap NIL- repeat 1 year  3/8/10 pap NIL  3/3/11 pap ASCUS + HPV 45 (high risk)- colposcopy recommended  5/16/11 colpo- bx (KAYLA 2/3/HSIL) ECC (ASCUS)  pap (ASC-H)  6/2/11 recommend: [facility]  7/1/11 [facility]: path: KAYLA 2/3 with possible involvement of the  endocervical margin.  ECC- neg.  Endometrium- neg.  7/14/11 plan: HIPOLITO in approx 2 months  10/10/11- hysterectomy planned.  (cancelled)  11/7/11 pap-- NIL. Plan--repeat pap in 6 months. (5/7/12)  5/7/12 pap NIL. Plan-- pap in 1 year (due 5/7/13)   5/8/13 pap NIL. Plan-pap in 1 year  5/8/14 NIL pap, neg HR HPV.  Plan- repeat pap/HPV in 1 year (due 5/8/15)  10/14/14 NIL pap, neg HR HPV. Endometrial biopsy- WNL   1/6/16  NIL pap, neg HR HPV.  Plan: paps yearly, per Dr. Ford.  1/30/17 NIL pap, neg HR HPV. Plan: pap in 1 year.   3/12/18 NIL pap, neg HR HPV. Plan: cotest annually, per Dr. Ford.  3/11/19 ECC: negative. NIL pap, neg HR HPV. Plan: cotest in 1 yr  2/11/20 NIL Pap, Neg HPV. EMB: intraepithelial neoplasia. Plan: Hysterectomy.            Papanicolaou smear of cervix with atypical squamous cells cannot exclude high grade squamous intraepithelial lesion (ASC-H) 09/07/2006     Priority: Medium     Female infertility 07/24/2006     Priority: Medium     Problem list name updated by automated process. Provider to review       Hemorrhage of rectum and anus 12/02/2004     Priority: Medium        Past Medical History:    Past Medical History:   Diagnosis Date     Abnormal Pap smear 2006, 2007,      Calculus of kidney 2002     Cervical high risk HPV (human papillomavirus) test positive      History of colposcopy with cervical biopsy 2/9/06, 3/15/07       Past Surgical History:    Past Surgical History:   Procedure Laterality Date     C REMOVAL OF KIDNEY STONE      two surgeries, 2002,2003     CHOLECYSTECTOMY, LAPOROSCOPIC  5/11/2007    Cholecystectomy, Laparoscopic     COLONOSCOPY  05/17/10     COLONOSCOPY N/A 8/27/2019    Procedure: COLONOSCOPY;  Surgeon: Paramjit Kingston DO;  Location:  GI     CONIZATION  7/1/2011    Procedure:CONIZATION; cold knife and punch biopsy of mole on back; Surgeon:SYDNEY FORD; Location: OR     DILATION AND CURETTAGE, HYSTEROSCOPY, LAPAROSCOPY, COMBINED Right  9/24/2015    Procedure: COMBINED DILATION AND CURETTAGE, HYSTEROSCOPY, LAPAROSCOPY;  Surgeon: Sydney Ford MD;  Location: [facility]     DISCECTOMY LUMBAR MINIMALLY INVASIVE ONE LEVEL Left 1/23/2019    Procedure: Minimally Invasive Surgery Left Lumbar 5-Sacral 1 microdiscectomy;  Surgeon: Mason Roe MD;  Location: [facility]     ESOPHAGOSCOPY, GASTROSCOPY, DUODENOSCOPY (EGD), COMBINED  5/21/2014    Procedure: COMBINED ESOPHAGOSCOPY, GASTROSCOPY, DUODENOSCOPY (EGD), BIOPSY SINGLE OR MULTIPLE;  Surgeon: Earl Lane MD;  Location: [facility]     EXCISE LESION TRUNK  7/1/2011    Procedure:EXCISE LESION TRUNK; Surgeon:SYDNEY FORD; Location:[facility]     HC FRAGMENTING OF KIDNEY STONE  11/30/2005     HC RX ECTOP PREG BY SCOPE,RMV TUBE/OVRY  12/20/2007    Right sided salpingectomy and removal of ruptured ectopic pregnancy. D&C.     HC UGI ENDOSCOPY, SIMPLE EXAM  09/01/09     HYSTERECTOMY VAGINAL       LAPAROSCOPIC APPENDECTOMY N/A 9/24/2015    Procedure: LAPAROSCOPIC APPENDECTOMY;  Surgeon: James Cuellar MD;  Location: [facility]     LAPAROSCOPIC CYSTECTOMY OVARIAN (BENIGN) N/A 9/24/2015    Procedure: LAPAROSCOPIC CYSTECTOMY OVARIAN (BENIGN);  Surgeon: Sydney Ford MD;  Location: [facility]     LAPAROSCOPIC HYSTERECTOMY TOTAL, BILATERAL SALPINGO-OOPHORECTOMY, COMBINED N/A 7/28/2020    Procedure: laparoscpic assisted vaginal hysterectomy, cystoscopy;  Surgeon: Sydney Ford MD;  Location: [facility]     LAPAROSCOPIC OOPHORECTOMY Right 9/24/2015    Procedure: LAPAROSCOPIC OOPHORECTOMY;  Surgeon: Sydney Ford MD;  Location: [facility]     LITHOTRIPSY  01/17/2007     PELVIS LAPAROSCOPY,DX  10/16/2000    Diagnostic laparoscopy, Endometrial biopsy.     TUBAL/ECTOPIC PREGNANCY  01/23/2007    Lap removal of left ruptured ectopic pregnancy & salpingectomy, D&C       Family History:    Family History   Problem Relation Age of Onset     Diabetes Maternal Grandmother          adult onset     Anesthesia Reaction Maternal Grandmother         can't tolerate Novacaine.     Respiratory Maternal Grandmother         COPD and emphysema.     Thyroid Disease Maternal Grandmother      Heart Disease Maternal Grandmother         CHF     Diabetes Paternal Grandfather         adult o nset     Hypertension Paternal Grandfather      Cerebrovascular Disease Paternal Grandfather      Heart Disease Paternal Grandfather         MI, replaced valve,angioplasty     Thyroid Disease Paternal Grandfather      Cancer Maternal Grandfather         skin cancer     Heart Disease Maternal Grandfather         MI     Obesity Mother         on thyroid medication     Thyroid Disease Mother      Diabetes Mother      Rheumatologic Disease Mother      Heart Disease Father      Hypertension Father         and TIA     Lipids Father      Breast Cancer Paternal Grandmother      Arrhythmia Other      Cancer Maternal Aunt         breast/brain cancer     Depression Paternal Aunt      Cerebrovascular Disease Paternal Uncle      Cerebrovascular Disease Paternal Aunt        Social History:  Marital Status:   [2]  Social History     Tobacco Use     Smoking status: Former Smoker     Packs/day: 0.00     Years: 12.00     Pack years: 0.00     Types: Cigarettes     Last attempt to quit: 2019     Years since quittin.2     Smokeless tobacco: Never Used     Tobacco comment: As of 10 /2014, pt has had a few cigs here and there   Substance Use Topics     Alcohol use: Yes     Alcohol/week: 0.0 standard drinks     Comment: every few months     Drug use: No        Medications:    acetaminophen (TYLENOL) 500 MG tablet  blood glucose (CONTOUR NEXT TEST) test strip  blood glucose monitoring (NO BRAND SPECIFIED) meter device kit  cyclobenzaprine (FLEXERIL) 5 MG tablet  HYDROcodone-acetaminophen (NORCO) 5-325 MG tablet  ibuprofen (ADVIL/MOTRIN) 600 MG tablet  metFORMIN (GLUCOPHAGE) 1000 MG tablet  mupirocin (BACTROBAN) 2 % external  ointment  omeprazole (PRILOSEC) 20 MG DR capsule  senna-docusate (SENOKOT-S/PERICOLACE) 8.6-50 MG tablet  sulfamethoxazole-trimethoprim (BACTRIM DS) 800-160 MG tablet          Review of Systems   Constitutional: Negative for activity change, appetite change, chills, fatigue and fever.   HENT: Negative.    Eyes: Negative.    Respiratory: Negative.    Cardiovascular: Negative.    Gastrointestinal: Positive for abdominal pain and diarrhea. Negative for rectal pain.        Blood per rectum   Endocrine: Negative.    Genitourinary: Negative for dysuria, flank pain, frequency, vaginal bleeding and vaginal discharge.   Musculoskeletal: Negative.    Skin: Negative.    Neurological: Negative.    Hematological: Negative.    Psychiatric/Behavioral: Negative.    All other systems reviewed and are negative.      Physical Exam   BP: (!) 146/98  Pulse: 125  Temp: 97.2  F (36.2  C)  Resp: 12  SpO2: 98 %      Physical Exam  Vitals signs and nursing note reviewed.   Constitutional:       Appearance: She is obese. She is not ill-appearing.      Comments: Noted to be tachycardic.  Heart rate supine =107 bpm.  Heart rate standing 125-132 bpm.  (Orthostatic positive).  BP slight drop   HENT:      Mouth/Throat:      Mouth: Mucous membranes are moist.   Eyes:      General: No scleral icterus.  Cardiovascular:      Rate and Rhythm: Regular rhythm. Tachycardia present.      Heart sounds: Normal heart sounds. No murmur.   Pulmonary:      Effort: Pulmonary effort is normal.      Breath sounds: Normal breath sounds.   Abdominal:      Hernia: No hernia is present.      Comments: Obese   No distention  Bowel sounds present  Laparoscopic incisions healing well with no sign for infection  Pain on palpation present in both the right and left lower quadrant.  Also experiencing suprapubic pain.  Essentially pain is spread across the hypogastrium.  No pain in the left or right upper quadrant.  No identified hepatosplenomegaly.  Patient did have mild  guarding with deep palpation.  Displayed no rebound.  No reproducible CVA tenderness bilateral.     Genitourinary:     Comments: External genitalia appear normal.  no signs for blood in vagina.  Surgical cuff shows no dehiscence.  There is no discharge.    External anus appears normal.  Very small nonthrombosed external hemorrhoid that shows no signs for bleeding.  Anoscope exam was difficult due to patient's pain so was not fully inserted but was visualized noted to have a few internal hemorrhoids.  Possible bleeding from internal hemorrhoids noted could not absolutely confirm  Skin:     General: Skin is warm.      Capillary Refill: Capillary refill takes less than 2 seconds.   Neurological:      General: No focal deficit present.      Mental Status: She is alert.   Psychiatric:         Mood and Affect: Mood normal.         ED Course        Procedures               Results for orders placed or performed during the hospital encounter of 09/09/20 (from the past 24 hour(s))   CBC with platelets differential   Result Value Ref Range    WBC 7.3 4.0 - 11.0 10e9/L    RBC Count 5.00 3.8 - 5.2 10e12/L    Hemoglobin 15.0 11.7 - 15.7 g/dL    Hematocrit 43.4 35.0 - 47.0 %    MCV 87 78 - 100 fl    MCH 30.0 26.5 - 33.0 pg    MCHC 34.6 31.5 - 36.5 g/dL    RDW 12.5 10.0 - 15.0 %    Platelet Count 215 150 - 450 10e9/L    Diff Method Automated Method     % Neutrophils 69.6 %    % Lymphocytes 23.4 %    % Monocytes 4.6 %    % Eosinophils 1.7 %    % Basophils 0.6 %    % Immature Granulocytes 0.1 %    Nucleated RBCs 0 0 /100    Absolute Neutrophil 5.1 1.6 - 8.3 10e9/L    Absolute Lymphocytes 1.7 0.8 - 5.3 10e9/L    Absolute Monocytes 0.3 0.0 - 1.3 10e9/L    Absolute Basophils 0.0 0.0 - 0.2 10e9/L    Abs Immature Granulocytes 0.0 0 - 0.4 10e9/L    Absolute Nucleated RBC 0.0    Comprehensive metabolic panel   Result Value Ref Range    Sodium 135 133 - 144 mmol/L    Potassium 3.7 3.4 - 5.3 mmol/L    Chloride 101 94 - 109 mmol/L    Carbon  Dioxide 27 20 - 32 mmol/L    Anion Gap 7 3 - 14 mmol/L    Glucose 183 (H) 70 - 99 mg/dL    Urea Nitrogen 12 7 - 30 mg/dL    Creatinine 0.80 0.52 - 1.04 mg/dL    GFR Estimate >90 >60 mL/min/[1.73_m2]    GFR Estimate If Black >90 >60 mL/min/[1.73_m2]    Calcium 9.0 8.5 - 10.1 mg/dL    Bilirubin Total 1.4 (H) 0.2 - 1.3 mg/dL    Albumin 3.8 3.4 - 5.0 g/dL    Protein Total 7.9 6.8 - 8.8 g/dL    Alkaline Phosphatase 80 40 - 150 U/L    ALT 59 (H) 0 - 50 U/L    AST 29 0 - 45 U/L   UA reflex to Microscopic and Culture    Specimen: Midstream Urine   Result Value Ref Range    Color Urine Yellow     Appearance Urine Slightly Cloudy     Glucose Urine Negative NEG^Negative mg/dL    Bilirubin Urine Negative NEG^Negative    Ketones Urine 5 (A) NEG^Negative mg/dL    Specific Gravity Urine 1.019 1.003 - 1.035    Blood Urine Small (A) NEG^Negative    pH Urine 5.0 5.0 - 7.0 pH    Protein Albumin Urine 30 (A) NEG^Negative mg/dL    Urobilinogen mg/dL 0.0 0.0 - 2.0 mg/dL    Nitrite Urine Negative NEG^Negative    Leukocyte Esterase Urine Negative NEG^Negative    Source Midstream Urine     RBC Urine 0 0 - 2 /HPF    WBC Urine 1 0 - 5 /HPF    Bacteria Urine Few (A) NEG^Negative /HPF    Squamous Epithelial /HPF Urine 1 0 - 1 /HPF    Mucous Urine Present (A) NEG^Negative /LPF   CT Abdomen Pelvis w Contrast    Narrative    CT ABDOMEN AND PELVIS WITH CONTRAST 9/9/2020 10:21 AM    CLINICAL HISTORY: Severe pain, bleeding per rectum 6 weeks status post  HIPOLITO.    TECHNIQUE: CT scan of the abdomen and pelvis was performed following  injection of IV contrast. Multiplanar reformats were obtained. Dose  reduction techniques were used.  CONTRAST: Isovue-370,100 mL    COMPARISON: Pelvic ultrasound dated 8/11/2020 and CT abdomen and  pelvis dated 6/13/2020.    FINDINGS:   LOWER CHEST: Normal.    HEPATOBILIARY: There is diffuse fatty infiltration of the liver.  Gallbladder is surgical absent. Liver otherwise enhances normally.    PANCREAS: Normal.    SPLEEN:  Normal.    ADRENAL GLANDS: Normal.    KIDNEYS/BLADDER: 1.4 cm hypoattenuating lesion superior pole left  kidney has an average density of 11 Hounsfield units on this  postcontrast study and likely represents a simple cyst. This  corresponds with a similarly sized lesion on the noncontrast CT  abdomen and pelvis dated 6/13/2020. The kidneys are otherwise normal  in appearance. No hydronephrosis, nephrolithiasis, hydroureter, or  ureteral calculus is identified. Urinary bladder is mostly  decompressed but otherwise grossly unremarkable.    BOWEL: There is thickening of the wall of the distal rectum likely due  to incomplete distention. Focal areas of thickening sigmoid colon are  likely due to contraction. Postop changes around the cecum are likely  from prior appendectomy. Mild thickening of the wall of the proximal  small bowel measures up to 1.1 cm in thickness (image 50 series 2 and  image 48 series 4). This could be due to prominent small bowel folds  although inflammation of the small bowel, such as can be seen with  celiac sprue, is not definitely excluded. Recommend clinical  correlation. Small bowel is otherwise of grossly normal caliber and  appearance. Stomach is distended by a large amount of fluid and a  small amount of air. Stomach is otherwise unremarkable.    PELVIC ORGANS: Uterus and right ovary are surgically absent. Complex  structure in the left adnexal region measures 4.4 x 3.6 x 4.4 cm and  likely represents the left ovary with multiple cysts. The largest  measures up to 2.9 cm and likely represents a dominant follicle. This  should be closely followed with ultrasound. Pelvic ultrasound dated  8/11/2020 demonstrates a complex cystic structure in the left ovary  measuring up to 2.4 cm. Left ovary otherwise measured up to 4.3 cm in  maximum dimension at that time.    ADDITIONAL FINDINGS: Aorta is normal in appearance. No lymphadenopathy  is identified. No abnormal free fluid or free air is seen in  the  peritoneal cavity. Mild stranding is seen in the lower pelvis adjacent  to the vaginal cuff.    MUSCULOSKELETAL: No aggressive osseous lesions or acute osseous  fractures.      Impression    IMPRESSION:   1.  Thickening of the distal rectal wall is likely due to incomplete  distention.  2.  Postop changes of the pelvis, include hysterectomy and right  oophorectomy, are new since the prior CT dated 6/13/2020. Complex  structure in the left adnexa likely represents the left ovary with  multiple cysts with the largest measuring up to 2.4 cm. Follow-up  pelvic ultrasound in 6 or 10 weeks, at a different stage of patient's  cycle is recommended to ensure resolution of these findings.  3.  Postoperative changes status post cholecystectomy and  appendectomy.  4.  Diffuse fatty infiltration of the liver.  5.  Mild proximal small bowel wall thickening could represent a normal  variant in this patient but could also represent inflammatory change  of small bowel folds of the proximal small bowel as can be seen with  celiac sprue. Recommend clinical correlation.  6.  Etiology for patient's symptoms is otherwise not definitely seen.  No evidence for bowel obstruction is identified.       Medications   0.9% sodium chloride BOLUS (has no administration in time range)     Followed by   sodium chloride 0.9% infusion (has no administration in time range)   HYDROmorphone (PF) (DILAUDID) injection 0.5 mg (has no administration in time range)       Assessments & Plan (with Medical Decision Making)  Fallon is 40 years of age.  Presents with bright red blood per rectum.  She went to the toilet to pass what she thought was a bowel movement or flatulence and after passing a large amount of gas had bright red blood on tissue paper and in the toilet.  She reports that she has 4-6 soft loose stools per day since she had her cholecystectomy.  She has known internal/external hemorrhoids.  She is 6 weeks status post laparoscopic assisted  vaginal hysterectomy and has previously undergone right nephrectomy.  She has retained left ovary which in August was noted to have multiple small cysts.  Does not have any known history for polycystic ovarian syndrome.   Patient arrived anxious.  /103.  Afebrile.  Pulse 115.  Respirations 18.  O2 sats 90% room air.  She had no orthostatic symptoms but heart rate did increase to 130s with a stable blood pressure.  Abdomen is tender in the hypogastric area without guarding or rebound.  No palpable mass incisions from previous surgery 6 weeks ago are healing well without sign for infection.  Vaginal examination noted no bleeding in the cervical cuff was intact both on visual inspection as well as palpation.  Rectal examination revealed some bright red blood per rectum.  She had a nonthrombosed small external hemorrhoid that was not the source for bleeding.  Anal canal on anoscopic examination did reveal internal hemorrhoids and there was blood and bleeding in the rectal column make it difficult to certainly know if this was definitely coming from internal hemorrhoids.  Medical work-up included CBC with hemoglobin 15.  Has returned to her normal baseline since her hysterectomy 6 weeks ago.  CT abdomen and pelvis identified no complications were recent surgery and no identified source specific to pain in the abdomen with bleeding per rectum.  There was some inflammation within the rectal column and they did note this was simply tissue fold surgery could be related to inflammation and hemorrhoids.  She also on CT had some proximal small bowel inflammation bleeding and they thought this might be a normal variant.  She does not have any known history for celiac/gluten intolerance or peptic ulcer disease.   Plan: Monitor for any further blood per rectum.  If she continues having bleeding per rectum she will need to have her hemoglobin rechecked next 24 to 36 hours.  She can get into see her primary care provider would  have to come back to the ED.  She starts having any chest discomfort, shortness of breath, lightheadedness when changing position to operate status(orthostatic symptoms) patient needs to return to ED for reassessment of ongoing blood loss.  In that instance would have to consider repeating colonoscopy.  With the inflammation in her proximal small bowel possibly present on the CT she asked if she should consider stopping gluten for 2 weeks.  She is questioning if this could be causing her abdominal cramping and her loose stools which she always has associated with dumping syndrome after cholecystectomy.  I told her if she is discontinued gluten exposure for 2 weeks she would be able to differentiate this.  She is willing to do that.     I have reviewed the nursing notes.    I have reviewed the findings, diagnosis, plan and need for follow up with the patient.      New Prescriptions    No medications on file       Final diagnoses:   BRBPR (bright red blood per rectum)   Internal hemorrhoids   Status post laparoscopic assisted vaginal hysterectomy       9/9/2020   [facility] EMERGENCY DEPARTMENT     Inocencio Gould,   09/09/20 [id number]

## 2020-09-09 NOTE — TELEPHONE ENCOUNTER
"Patient calling. She is 6 weeks post op from a hysterectomy. She said yesterday she developed some abdominal pain around her incision site/belly button. Patient said the pain radiates down towards her pelvic area and also radiates to her lower back. Patient said as the day went on yesterday, the pain continued to get worse. Had a normal BM yesterday morning and then had diarrhea all day yesterday. Patient also has severe nausea. She is holding back from puking the entire conversation. Patient said she has been laying in bed the last 12 hours and has a hard time even standing. Bowels are gurgling. Patient sat on the toilet this morning to pass some gas. She said she went to wipe and there was blood on the tissue from her rectum. Patient said the toilet was also full of blood. No stool this morning. Patient rates her pain a 8/10 and sounds in distress.     RN advised with severe abdominal pain, severe nausea, rectal bleeding and 6 weeks post op surgery, patient needs to be seen in the ED. Patient agrees. She will call a friend or family member to drive her to the ED.     AISHA Ware, RN  [facility]      Reason for Disposition    SEVERE abdominal pain (e.g., excruciating)    Additional Information    Negative: Passed out (i.e., fainted, collapsed and was not responding)    Negative: Shock suspected (e.g., cold/pale/clammy skin, too weak to stand, low BP, rapid pulse)    Negative: Sounds like a life-threatening emergency to the triager    Negative: Chest pain    Negative: Pain is mainly in upper abdomen (if needed ask: 'is it mainly above the belly button?')    Negative: Abdominal pain and pregnant > 20 weeks    Negative: Abdominal pain and pregnant < 20 weeks    Answer Assessment - Initial Assessment Questions  1. LOCATION: \"Where does it hurt?\"       Incision/belly button and down  2. RADIATION: \"Does the pain shoot anywhere else?\" (e.g., chest, back)      Lower back pain  3. ONSET: " "\"When did the pain begin?\" (e.g., minutes, hours or days ago)       Started yesterday  4. SUDDEN: \"Gradual or sudden onset?\"      Gradual, getting worse  5. PATTERN \"Does the pain come and go, or is it constant?\"     - If constant: \"Is it getting better, staying the same, or worsening?\"       (Note: Constant means the pain never goes away completely; most serious pain is constant and it progresses)      - If intermittent: \"How long does it last?\" \"Do you have pain now?\"      (Note: Intermittent means the pain goes away completely between bouts)      Constant, was able to find a comfortable position to sleep  6. SEVERITY: \"How bad is the pain?\"  (e.g., Scale 1-10; mild, moderate, or severe)    - MILD (1-3): doesn't interfere with normal activities, abdomen soft and not tender to touch     - MODERATE (4-7): interferes with normal activities or awakens from sleep, tender to touch     - SEVERE (8-10): excruciating pain, doubled over, unable to do any normal activities       Up and moving 8/10, laying down 6/10  7. RECURRENT SYMPTOM: \"Have you ever had this type of abdominal pain before?\" If so, ask: \"When was the last time?\" and \"What happened that time?\"       No  8. CAUSE: \"What do you think is causing the abdominal pain?\"      unknown  9. RELIEVING/AGGRAVATING FACTORS: \"What makes it better or worse?\" (e.g., movement, antacids, bowel movement)      Laying down makes it better, walking makes it worse  10. OTHER SYMPTOMS: \"Has there been any vomiting, diarrhea, constipation, or urine problems?\"        Diarrhea yesterday, nausea, had some rectal bleeding this morning  11. PREGNANCY: \"Is there any chance you are pregnant?\" \"When was your last menstrual period?\"        No    Protocols used: ABDOMINAL PAIN - FEMALE-A-OH    "

## 2020-09-09 NOTE — DISCHARGE INSTRUCTIONS
Continue to monitor for blood per rectum.  If noted you need to return to recheck your hemoglobin/blood count.  He can do this through clinic visit or return to the ER.  Also monitor for feeling short of breath or lightheaded with positional change.  If you are feeling dizzy or lightheaded when you change position to upright standing position that can be further signs for blood loss.    I with frequent loose stools and wondering if this could be solely related to move your gallbladder versus gluten intolerance recommend discontinuation of all gluten-free diet for 2 weeks on a trial basis.

## 2020-09-09 NOTE — ED AVS SNAPSHOT
[facility]  [address] [place] [address]  Phone:  [phone]  Fax:  [phone]                                    Fallon Rivera   MRN: [id number]    Department:  [facility]   Date of Visit:  9/9/2020           After Visit Summary Signature Page    I have received my discharge instructions, and my questions have been answered. I have discussed any challenges I see with this plan with the nurse or doctor.    ..........................................................................................................................................  Patient/Patient Representative Signature      ..........................................................................................................................................  Patient Representative Print Name and Relationship to Patient    ..................................................               ................................................  Date                                   Time    ..........................................................................................................................................  Reviewed by Signature/Title    ...................................................              ..............................................  Date                                               Time          22EPIC Rev 08/18

## 2020-09-14 ENCOUNTER — OFFICE VISIT (OUTPATIENT)
Dept: FAMILY MEDICINE | Facility: CLINIC | Age: 40
End: 2020-09-14
Payer: COMMERCIAL

## 2020-09-14 VITALS
WEIGHT: 213.8 LBS | OXYGEN SATURATION: 98 % | DIASTOLIC BLOOD PRESSURE: 84 MMHG | HEART RATE: 94 BPM | SYSTOLIC BLOOD PRESSURE: 112 MMHG | BODY MASS INDEX: 40.53 KG/M2 | RESPIRATION RATE: 14 BRPM | TEMPERATURE: 98.4 F

## 2020-09-14 DIAGNOSIS — K62.5 RECTAL BLEEDING: Primary | ICD-10-CM

## 2020-09-14 PROCEDURE — 99213 OFFICE O/P EST LOW 20 MIN: CPT | Performed by: OBSTETRICS & GYNECOLOGY

## 2020-09-14 NOTE — PROGRESS NOTES
Subjective: she had recent emergency room visit for rectal bleeding.  The hemoglobin was 15 and platelets were normal.  CT scan of the abdomen showed some mild thickening of the small bowel wall which might be consistent with some mild inflammatory change but otherwise the CT scan was unremarkable and did not show an obvious etiology for the rectal bleeding.  She does have known hemorrhoids.    Since being discharged from the emergency room she has not had any further bleeding like she did which prompted her visit there.  She has occasionally noted some red streaks when she wipes but she has known hemorrhoids.  There is no bloody stool like she was seeing just prior to going to ER.  Overall she feels markedly improved although she does have frequent bowel movements and this has happened ever since her cholecystectomy.    The past medical history, social history, past surgical history and family history as shown below have been reviewed by me today.    Past Medical History:   Diagnosis Date     Abnormal Pap smear 2006, 2007,     ASC-H, ASCUS + HPV 45     Calculus of kidney 2002     Cervical high risk HPV (human papillomavirus) test positive     + HPV 45 (high risk)     History of colposcopy with cervical biopsy 2/9/06, 3/15/07    koilocytosis 2007        Allergies   Allergen Reactions     Amoxicillin Hives     Anti-Nausea      Anxiety, agitation,severe paranoia. Zofran, Reglan are some of them.  DRAMAMINE WITHOUT ISSUES       Demerol Hcl [Meperidine Hcl] Nausea     Indomethacin Nausea and Vomiting     Macrobid [Nitrofuran Derivatives] Hives     Reglan [Metoclopramide] Other (See Comments)     Acute paranoia, severe     Zofran [Ondansetron] Other (See Comments)     Severe anxiety, agitation     Vancomycin Other (See Comments)     maria del rosario syndrome     Current Outpatient Medications   Medication Sig Dispense Refill     acetaminophen (TYLENOL) 500 MG tablet Take 1,000 mg by mouth every 6 hours as needed for mild pain        blood glucose (CONTOUR NEXT TEST) test strip 1 strip by In Vitro route 4 times daily 150 each 3     blood glucose monitoring (NO BRAND SPECIFIED) meter device kit Use to test blood sugar 2 times daily or as directed. 1 kit 0     metFORMIN (GLUCOPHAGE) 1000 MG tablet Take 1 tablet in the a.m. 1/2 tablet at night 90 tablet 3     omeprazole (PRILOSEC) 20 MG DR capsule TAKE ONE CAPSULE BY MOUTH ONCE DAILY 30 TO 60 MINUTES BEFORE A MEAL 30 capsule 11     Past Surgical History:   Procedure Laterality Date     C REMOVAL OF KIDNEY STONE      two surgeries, 2002,2003     CHOLECYSTECTOMY, LAPOROSCOPIC  5/11/2007    Cholecystectomy, Laparoscopic     COLONOSCOPY  05/17/10     COLONOSCOPY N/A 8/27/2019    Procedure: COLONOSCOPY;  Surgeon: Paramjit Kingston DO;  Location: [facility]     CONIZATION  7/1/2011    Procedure:CONIZATION; cold knife and punch biopsy of mole on back; Surgeon:SYDNEY FORD; Location:[facility]     DILATION AND CURETTAGE, HYSTEROSCOPY, LAPAROSCOPY, COMBINED Right 9/24/2015    Procedure: COMBINED DILATION AND CURETTAGE, HYSTEROSCOPY, LAPAROSCOPY;  Surgeon: Sydney Ford MD;  Location: [facility]     DISCECTOMY LUMBAR MINIMALLY INVASIVE ONE LEVEL Left 1/23/2019    Procedure: Minimally Invasive Surgery Left Lumbar 5-Sacral 1 microdiscectomy;  Surgeon: Mason Roe MD;  Location: [facility]     ESOPHAGOSCOPY, GASTROSCOPY, DUODENOSCOPY (EGD), COMBINED  5/21/2014    Procedure: COMBINED ESOPHAGOSCOPY, GASTROSCOPY, DUODENOSCOPY (EGD), BIOPSY SINGLE OR MULTIPLE;  Surgeon: Earl Lane MD;  Location: [facility]     EXCISE LESION TRUNK  7/1/2011    Procedure:EXCISE LESION TRUNK; Surgeon:SYDNEY FORD; Location:[facility]     HC FRAGMENTING OF KIDNEY STONE  11/30/2005     HC RX ECTOP PREG BY SCOPE,RMV TUBE/OVRY  12/20/2007    Right sided salpingectomy and removal of ruptured ectopic pregnancy. D&C.     HC UGI ENDOSCOPY, SIMPLE EXAM  09/01/09     HYSTERECTOMY VAGINAL       LAPAROSCOPIC  APPENDECTOMY N/A 2015    Procedure: LAPAROSCOPIC APPENDECTOMY;  Surgeon: James Cuellar MD;  [facility]     LAPAROSCOPIC CYSTECTOMY OVARIAN (BENIGN) N/A 2015    Procedure: LAPAROSCOPIC CYSTECTOMY OVARIAN (BENIGN);  Surgeon: Greg Ford MD;  [facility]     LAPAROSCOPIC HYSTERECTOMY TOTAL, BILATERAL SALPINGO-OOPHORECTOMY, COMBINED N/A 2020    Procedure: laparoscpic assisted vaginal hysterectomy, cystoscopy;  Surgeon: Greg Ford MD;  Location: [facility]     LAPAROSCOPIC OOPHORECTOMY Right 2015    Procedure: LAPAROSCOPIC OOPHORECTOMY;  Surgeon: Greg Ford MD;  Location: [facility]     LITHOTRIPSY  2007     PELVIS LAPAROSCOPY,DX  10/16/2000    Diagnostic laparoscopy, Endometrial biopsy.     TUBAL/ECTOPIC PREGNANCY  2007    Lap removal of left ruptured ectopic pregnancy & salpingectomy, D&C     Social History     Socioeconomic History     Marital status:      Spouse name: Joseph     Number of children: 1     Years of education: 9     Highest education level: None   Occupational History     Employer: NONE   Social Needs     Financial resource strain: None     Food insecurity     Worry: None     Inability: None     Transportation needs     Medical: None     Non-medical: None   Tobacco Use     Smoking status: Former Smoker     Packs/day: 0.00     Years: 12.00     Pack years: 0.00     Types: Cigarettes     Last attempt to quit: 2019     Years since quittin.2     Smokeless tobacco: Never Used     Tobacco comment: As of 10 /2014, pt has had a few cigs here and there   Substance and Sexual Activity     Alcohol use: Yes     Alcohol/week: 0.0 standard drinks     Comment: every few months     Drug use: No     Sexual activity: Yes     Partners: Male     Birth control/protection: None   Lifestyle     Physical activity     Days per week: None     Minutes per session: None     Stress: None   Relationships     Social connections      Talks on phone: None     Gets together: None     Attends Orthodoxy service: None     Active member of club or organization: None     Attends meetings of clubs or organizations: None     Relationship status: None     Intimate partner violence     Fear of current or ex partner: None     Emotionally abused: None     Physically abused: None     Forced sexual activity: None   Other Topics Concern      Service No     Blood Transfusions No     Caffeine Concern Yes     Comment: Reports 1 can soda/week  Advised not more than 16 ounces caffeien/day.     Occupational Exposure No     Hobby Hazards No     Sleep Concern No     Stress Concern Yes     Comment: will discuss with      Weight Concern Yes     Comment: Eating disorder in childhood.  Hx. gestational diab.     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     Exercise No     Comment: Advised walking 30 min/day.     Bike Helmet No     Seat Belt Yes     Self-Exams Not Asked     Parent/sibling w/ CABG, MI or angioplasty before 65F 55M? Not Asked   Social History Narrative     and lives at home with her  and daughter.     Family History   Problem Relation Age of Onset     Diabetes Maternal Grandmother         adult onset     Anesthesia Reaction Maternal Grandmother         can't tolerate Novacaine.     Respiratory Maternal Grandmother         COPD and emphysema.     Thyroid Disease Maternal Grandmother      Heart Disease Maternal Grandmother         CHF     Diabetes Paternal Grandfather         adult o nset     Hypertension Paternal Grandfather      Cerebrovascular Disease Paternal Grandfather      Heart Disease Paternal Grandfather         MI, replaced valve,angioplasty     Thyroid Disease Paternal Grandfather      Cancer Maternal Grandfather         skin cancer     Heart Disease Maternal Grandfather         MI     Obesity Mother         on thyroid medication     Thyroid Disease Mother       Diabetes Mother      Rheumatologic Disease Mother      Heart Disease Father      Hypertension Father         and TIA     Lipids Father      Breast Cancer Paternal Grandmother      Arrhythmia Other      Cancer Maternal Aunt         breast/brain cancer     Depression Paternal Aunt      Cerebrovascular Disease Paternal Uncle      Cerebrovascular Disease Paternal Aunt        ROS: A 12 point review of systems was done. Except for what is listed above in the HPI, the systems review is negative .      Objective: Vital signs: Blood pressure 112/84, pulse 94, temperature 98.4  F (36.9  C), temperature source Temporal, resp. rate 14, weight 97 kg (213 lb 12.8 oz), last menstrual period 07/25/2020, SpO2 98 %, not currently breastfeeding.    Chest is clear to auscultation and cardiac exam is normal.  Abdomen is soft and she has normal bowel sounds.  Perhaps slightly hyperactive.  The abdomen is not distended.  There is no evidence of an acute abdomen.  Because she is not currently rectal bleeding I did not repeat a rectal exam today.      Assessment/Plan:      1.  History of rectal bleeding.  May be related to hemorrhoids but also she recently quit smoking so it is possible that she has peptic ulcer disease.  I advised her to have upper and lower endoscopy and a GI consult.  We discussed doing another CBC today but she declined.  She has not noted any further episodes of bleeding but if she does develop some then she will let me know and we will do more blood testing.    2.  She has changed to a gluten-free diet and thinks that this may help her.  I think quitting smoking will help more than anything.  She will let us know if she develops any further issues before the GI consult.         The above information was dictating using Dragon voice software and as a result there may be some irregularities that were not detected in my review of this note.    SHARAN Ford MD

## 2020-09-29 ENCOUNTER — VIRTUAL VISIT (OUTPATIENT)
Dept: GASTROENTEROLOGY | Facility: CLINIC | Age: 40
End: 2020-09-29
Payer: COMMERCIAL

## 2020-09-29 DIAGNOSIS — K52.9 CHRONIC DIARRHEA: Primary | ICD-10-CM

## 2020-09-29 DIAGNOSIS — R93.3 ABNORMAL CT SCAN, SMALL BOWEL: ICD-10-CM

## 2020-09-29 DIAGNOSIS — K62.5 RECTAL BLEEDING: ICD-10-CM

## 2020-09-29 PROCEDURE — 99203 OFFICE O/P NEW LOW 30 MIN: CPT | Mod: 24 | Performed by: INTERNAL MEDICINE

## 2020-09-29 NOTE — PROGRESS NOTES
"Visit Date:   09/29/2020      REQUESTING PROVIDER:  Dr. Ford      REASON FOR CONSULTATION:  Rectal bleeding.      HISTORY OF PRESENT ILLNESS:  The patient is a 40-year-old woman who states that she has had chronic diarrhea dating back to 2007 immediately following a laparoscopic cholecystectomy for symptomatic gallstones.  She states her diarrhea can range up to 15 urgent loose stools per day.  Some days, it is a bit less.  She has been told that this was \"normal\" after a cholecystectomy and will eventually go away, though it has been 13 years now and the diarrhea continues.  I have been asked to see the patient today, however, regarding rectal bleeding, which occurred about 3 weeks ago.  She states it was bright red blood per rectum and occurred about 6 weeks after a laparoscopic-assisted vaginal hysterectomy.  She indicates the blood was bright red and seems to think that it was a fair amount of blood.  There were no clots.  The bleeding occurred on just that one day and then seemed to spontaneously resolve, though she has noticed some blood on the toilet paper when wiping since then.  The patient does have known internal hemorrhoids, having had a colonoscopy in 2019 by Dr. Kingston.  The patient's colonoscopy was done to evaluate chronic diarrhea.  Biopsies were not taken.  The terminal ileum was not intubated.  Internal hemorrhoids were seen, however.  It has been recommended that the patient's colonoscopy be repeated now, in conjunction with an upper GI endoscopy.      Of interest, the patient had a CT scan of the abdomen and pelvis on that emergency room visit.  There was nonspecific thickening of the distal rectum, but interestingly, there was also thickening of the proximal small bowel mucosa, which the radiologist thought could suggest the possibility of celiac disease.  With that information, the patient has decided to go gluten-free and interestingly, her diarrhea has improved.  She states she is " now having 5 stools per day, which are more formed and less urgent.  Apparently 2 other relatives have gone gluten-free due to a gluten intolerance rather than actual celiac disease, also with improvement in their symptoms.  The patient reports some vague epigastric abdominal pain.  This discomfort and the diarrhea do seem to be worse with eating.  She denies nausea, vomiting, anorexia or weight loss.  She denies acholic stools or oily stools.  There is no history of jaundice or abnormal liver tests.      To evaluate her bleeding, she did have a CBC, which was normal.  Liver enzymes, BUN, creatinine and electrolytes were also unremarkable.      PAST MEDICAL HISTORY:  Obesity, gastroesophageal reflux disease, endometrial intraepithelial neoplasia, which prompted a transvaginal hysterectomy and bilateral salpingo-oophorectomy in 07/2020.  Degenerative disk disease of the lumbosacral spine, status post microdiskectomy, status post laparoscopic appendectomy, conization for abnormal Pap smear, status post laparoscopic cholecystectomy, kidney stones, type 2 diabetes mellitus, hyperlipidemia.      CURRENT MEDICATIONS:  Metformin, Prilosec, Tylenol.      MEDICATION ALLERGIES:  AMOXICILLIN CAUSES HIVES, DEMEROL CAUSES NAUSEA, REGLAN CAUSES PARANOIA, VANCOMYCIN CAUSES RED MAN SYNDROME; ZOFRAN CAUSES ANXIETY.      FAMILY HISTORY:  Both parents are living in their 60s.  Mother has diabetes.  Father has heart disease.      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  Does not smoke.  Rarely drinks.      No exam was done today as this was a virtual visit done during the COVID pandemic.      ASSESSMENT:     1.  Rectal bleeding, isolated episode on 09/09/2020.  This likely represented hemorrhoidal bleeding, as she is known to have internal hemorrhoids from previous colonoscopy documentation in 2019.  The colon was otherwise normal.   2.  Chronic diarrhea since laparoscopic cholecystectomy.  This suggests bile  malabsorption diarrhea.  However, interestingly, it has improved by going gluten-free.  This might suggest the presence of celiac disease.   3.  Abnormal CT scan of the small intestine, suggesting the possibility of some sort of enteritis including celiac disease.  This could be factitious, however.     PLAN:   1.  I discussed diagnostic considerations with the patient.   2.  Recommend proceeding with upper endoscopy and small bowel biopsy plus colonoscopy with biopsy.  Discussed the technique indications, risks, complications, etc.  She is not looking forward to the bowel prep, as it tends to provoke nausea, but is willing to proceed.  This will be scheduled accordingly.   3.  If the endoscopic biopsies are otherwise unrevealing, a trial of a bile salt sequestrant will be attempted to minimize her ongoing issues with diarrhea following cholecystectomy.   4.  Further recommendations will follow.         TIMO VILCHIS MD             D: 2020   T: 2020   MT: BERE      Name:     TRENA ACHARYA   MRN:      [id number]        Account:      [id number]   :      1980           Visit Date:   2020      Document: [id number]

## 2020-09-29 NOTE — PROGRESS NOTES
"Fallon Rivera is a 40 year old female who is being evaluated via a billable telephone visit.      The patient has been notified of following:     \"This telephone visit will be conducted via a call between you and your physician/provider. We have found that certain health care needs can be provided without the need for a physical exam.  This service lets us provide the care you need with a short phone conversation.  If a prescription is necessary we can send it directly to your pharmacy.  If lab work is needed we can place an order for that and you can then stop by our lab to have the test done at a later time.    Telephone visits are billed at different rates depending on your insurance coverage. During this emergency period, for some insurers they may be billed the same as an in-person visit.  Please reach out to your insurance provider with any questions.    If during the course of the call the physician/provider feels a telephone visit is not appropriate, you will not be charged for this service.\"    Patient has given verbal consent for Telephone visit?  Yes    What phone number would you like to be contacted at? Try first [phone]   Back up  : [phone]    How [facility] you [facility] to [facility]    Phone call duration: 30 minutes    Ba Dickinson MD      "

## 2020-09-30 ENCOUNTER — TELEPHONE (OUTPATIENT)
Dept: GASTROENTEROLOGY | Facility: CLINIC | Age: 40
End: 2020-09-30

## 2020-09-30 DIAGNOSIS — Z11.59 ENCOUNTER FOR SCREENING FOR OTHER VIRAL DISEASES: Primary | ICD-10-CM

## 2020-09-30 PROBLEM — R93.3 ABNORMAL CT SCAN, SMALL BOWEL: Status: ACTIVE | Noted: 2020-09-30

## 2020-09-30 PROBLEM — K62.5 RECTAL BLEEDING: Status: ACTIVE | Noted: 2020-09-30

## 2020-09-30 PROBLEM — K52.9 CHRONIC DIARRHEA: Status: ACTIVE | Noted: 2020-09-30

## 2020-09-30 NOTE — TELEPHONE ENCOUNTER
Left message for patient to return call to schedule EGD/colonoscopy. If Alaina or Mikayla are not available, please transfer to same day surgery

## 2020-09-30 NOTE — LETTER

## 2020-09-30 NOTE — LETTER
[facility]  [address]  [address]LO[address]  [place] [address]  [phone]        September 30, 2020    Fallon Rivera  [address] [place] [id number]

## 2020-09-30 NOTE — TELEPHONE ENCOUNTER
Date of colonoscopy/EGD: 10/23/20  Surgeon: Dr. Dickinson  Prep:Miralax  Packet:Colonoscopy/EGD instructions mailed to patient's home address.   Date: 9/30/2020      Surgery Scheduler

## 2020-10-02 DIAGNOSIS — E11.9 TYPE 2 DIABETES MELLITUS WITHOUT COMPLICATION, WITHOUT LONG-TERM CURRENT USE OF INSULIN (H): ICD-10-CM

## 2020-10-13 ENCOUNTER — TRANSFERRED RECORDS (OUTPATIENT)
Dept: HEALTH INFORMATION MANAGEMENT | Facility: CLINIC | Age: 40
End: 2020-10-13

## 2020-10-13 LAB — RETINOPATHY: NEGATIVE

## 2020-10-20 DIAGNOSIS — Z11.59 ENCOUNTER FOR SCREENING FOR OTHER VIRAL DISEASES: ICD-10-CM

## 2020-10-20 PROCEDURE — U0003 INFECTIOUS AGENT DETECTION BY NUCLEIC ACID (DNA OR RNA); SEVERE ACUTE RESPIRATORY SYNDROME CORONAVIRUS 2 (SARS-COV-2) (CORONAVIRUS DISEASE [COVID-19]), AMPLIFIED PROBE TECHNIQUE, MAKING USE OF HIGH THROUGHPUT TECHNOLOGIES AS DESCRIBED BY CMS-2020-01-R: HCPCS | Performed by: INTERNAL MEDICINE

## 2020-10-21 LAB
SARS-COV-2 RNA SPEC QL NAA+PROBE: NOT DETECTED
SPECIMEN SOURCE: NORMAL

## 2020-10-23 ENCOUNTER — SURGERY (OUTPATIENT)
Age: 40
End: 2020-10-23
Payer: COMMERCIAL

## 2020-10-23 ENCOUNTER — HOSPITAL ENCOUNTER (OUTPATIENT)
Facility: CLINIC | Age: 40
Discharge: HOME | End: 2020-10-23
Attending: INTERNAL MEDICINE | Admitting: INTERNAL MEDICINE
Payer: COMMERCIAL

## 2020-10-23 VITALS
HEART RATE: 96 BPM | SYSTOLIC BLOOD PRESSURE: 108 MMHG | OXYGEN SATURATION: 97 % | TEMPERATURE: 97.9 F | DIASTOLIC BLOOD PRESSURE: 79 MMHG | RESPIRATION RATE: 16 BRPM

## 2020-10-23 DIAGNOSIS — K52.9 CHRONIC DIARRHEA: ICD-10-CM

## 2020-10-23 DIAGNOSIS — R93.3 ABNORMAL CT SCAN, SMALL BOWEL: ICD-10-CM

## 2020-10-23 DIAGNOSIS — K62.5 RECTAL BLEEDING: ICD-10-CM

## 2020-10-23 LAB
COLONOSCOPY: NORMAL
GLUCOSE BLDC GLUCOMTR-MCNC: 142 MG/DL (ref 70–99)
UPPER GI ENDOSCOPY: NORMAL

## 2020-10-23 PROCEDURE — 250N000009 HC RX 250: Performed by: INTERNAL MEDICINE

## 2020-10-23 PROCEDURE — 88305 TISSUE EXAM BY PATHOLOGIST: CPT | Mod: TC | Performed by: INTERNAL MEDICINE

## 2020-10-23 PROCEDURE — 99153 MOD SED SAME PHYS/QHP EA: CPT | Performed by: INTERNAL MEDICINE

## 2020-10-23 PROCEDURE — G0500 MOD SEDAT ENDO SERVICE >5YRS: HCPCS | Performed by: INTERNAL MEDICINE

## 2020-10-23 PROCEDURE — 999N001017 HC STATISTIC GLUCOSE BY METER IP

## 2020-10-23 PROCEDURE — 88305 TISSUE EXAM BY PATHOLOGIST: CPT | Mod: 26 | Performed by: PATHOLOGY

## 2020-10-23 PROCEDURE — 43235 EGD DIAGNOSTIC BRUSH WASH: CPT | Performed by: INTERNAL MEDICINE

## 2020-10-23 PROCEDURE — 45380 COLONOSCOPY AND BIOPSY: CPT | Performed by: INTERNAL MEDICINE

## 2020-10-23 PROCEDURE — 43239 EGD BIOPSY SINGLE/MULTIPLE: CPT | Mod: 51 | Performed by: INTERNAL MEDICINE

## 2020-10-23 PROCEDURE — 250N000011 HC RX IP 250 OP 636: Performed by: INTERNAL MEDICINE

## 2020-10-23 RX ORDER — NALOXONE HYDROCHLORIDE 0.4 MG/ML
.1-.4 INJECTION, SOLUTION INTRAMUSCULAR; INTRAVENOUS; SUBCUTANEOUS
Status: CANCELLED | OUTPATIENT
Start: 2020-10-23 | End: 2020-10-24

## 2020-10-23 RX ORDER — LIDOCAINE 40 MG/G
CREAM TOPICAL
Status: DISCONTINUED | OUTPATIENT
Start: 2020-10-23 | End: 2020-10-23 | Stop reason: HOSPADM

## 2020-10-23 RX ORDER — FLUMAZENIL 0.1 MG/ML
0.2 INJECTION, SOLUTION INTRAVENOUS
Status: CANCELLED | OUTPATIENT
Start: 2020-10-23 | End: 2020-10-23

## 2020-10-23 RX ORDER — LIDOCAINE HYDROCHLORIDE 20 MG/ML
SOLUTION OROPHARYNGEAL PRN
Status: DISCONTINUED | OUTPATIENT
Start: 2020-10-23 | End: 2020-10-23 | Stop reason: HOSPADM

## 2020-10-23 RX ORDER — ONDANSETRON 2 MG/ML
4 INJECTION INTRAMUSCULAR; INTRAVENOUS
Status: DISCONTINUED | OUTPATIENT
Start: 2020-10-23 | End: 2020-10-23 | Stop reason: HOSPADM

## 2020-10-23 RX ORDER — ONDANSETRON 2 MG/ML
4 INJECTION INTRAMUSCULAR; INTRAVENOUS EVERY 6 HOURS PRN
Status: CANCELLED | OUTPATIENT
Start: 2020-10-23

## 2020-10-23 RX ORDER — PROCHLORPERAZINE MALEATE 5 MG
10 TABLET ORAL EVERY 6 HOURS PRN
Status: CANCELLED | OUTPATIENT
Start: 2020-10-23

## 2020-10-23 RX ORDER — FENTANYL CITRATE 50 UG/ML
INJECTION, SOLUTION INTRAMUSCULAR; INTRAVENOUS PRN
Status: DISCONTINUED | OUTPATIENT
Start: 2020-10-23 | End: 2020-10-23 | Stop reason: HOSPADM

## 2020-10-23 RX ORDER — ONDANSETRON 4 MG/1
4 TABLET, ORALLY DISINTEGRATING ORAL EVERY 6 HOURS PRN
Status: CANCELLED | OUTPATIENT
Start: 2020-10-23

## 2020-10-23 RX ADMIN — FENTANYL CITRATE 50 MCG: 50 INJECTION, SOLUTION INTRAMUSCULAR; INTRAVENOUS at 09:53

## 2020-10-23 RX ADMIN — LIDOCAINE HYDROCHLORIDE 5 ML: 20 SOLUTION ORAL; TOPICAL at 09:44

## 2020-10-23 RX ADMIN — MIDAZOLAM 1 MG: 1 INJECTION INTRAMUSCULAR; INTRAVENOUS at 10:00

## 2020-10-23 RX ADMIN — FENTANYL CITRATE 25 MCG: 50 INJECTION, SOLUTION INTRAMUSCULAR; INTRAVENOUS at 10:08

## 2020-10-23 RX ADMIN — FENTANYL CITRATE 25 MCG: 50 INJECTION, SOLUTION INTRAMUSCULAR; INTRAVENOUS at 10:02

## 2020-10-23 RX ADMIN — MIDAZOLAM 2 MG: 1 INJECTION INTRAMUSCULAR; INTRAVENOUS at 09:46

## 2020-10-23 RX ADMIN — MIDAZOLAM 2 MG: 1 INJECTION INTRAMUSCULAR; INTRAVENOUS at 09:44

## 2020-10-23 RX ADMIN — FENTANYL CITRATE 50 MCG: 50 INJECTION, SOLUTION INTRAMUSCULAR; INTRAVENOUS at 09:44

## 2020-10-23 RX ADMIN — MIDAZOLAM 1 MG: 1 INJECTION INTRAMUSCULAR; INTRAVENOUS at 09:58

## 2020-10-23 RX ADMIN — MIDAZOLAM 1 MG: 1 INJECTION INTRAMUSCULAR; INTRAVENOUS at 09:45

## 2020-10-23 RX ADMIN — MIDAZOLAM 2 MG: 1 INJECTION INTRAMUSCULAR; INTRAVENOUS at 09:48

## 2020-10-23 RX ADMIN — MIDAZOLAM 1 MG: 1 INJECTION INTRAMUSCULAR; INTRAVENOUS at 09:55

## 2020-10-23 NOTE — LETTER
"October 27, 2020      Fallon Acharya  [address] [place] [id number]        Dear ,    We are writing to inform you of your test results.    {results letter list:990644}    Resulted Orders   Surgical pathology exam   Result Value Ref Range    Copath Report       Patient Name: FALLON ACHARYA  MR#: [id number]  Specimen #: [id number]  Collected: 10/23/2020  Received: 10/26/2020  Reported: 10/27/2020 08:57  Ordering Phy(s): TIMO VILCHIS    For improved result formatting, select 'View Enhanced Report Format' under   Linked Documents section.    SPECIMEN(S):  A: Duodenal biopsy  B: Colon biopsy    FINAL DIAGNOSIS:  A. Duodenum, biopsy:  - Duodenal mucosa with no diagnostic alterations.    B. Colon, biopsy:  - Focal and minimal active inflammation - see comment    COMMENT:  The presence of patchy active inflammation in the absence of histologic   evidence of chronicity or specific  features is a nonspecific finding.  In patients with diarrhea or colitis   symptoms these changes may reflect an  NSAID-related colitis, mild acute self-limited colitis of infectious   etiology or rarely aphthous lesions of  Crohn disease.  In the absence of diarrhea, colitic symptoms or endoscopic   findings the changes are usually of  no clinical significance.     Electronically signed out by:    Geovanni Story M.D., PhD    CLINICAL HISTORY:  40 year old female.  Diarrhea.  No visible lesions on endoscopy.    GROSS:  A: The specimen is received in formalin with proper patient identification   labeled \"duodenum biopsy\".  The  specimen consists of four tan tissue fragments ranging from 0.2 cm up to   0.3 cm.  The specimen is entirely  submitted in one cassette.    B: The specimen is received in formalin with proper patient identification   labeled \"random colon biopsy\".  The  specimen consists of six tan tissue fragments ranging from 0.2 cm up to   0.3 cm.  The specimen is entirely  submitted in one cassette. " (Dictated by: Harjinder Larios 10/26/2020 09:55   AM)    MICROSCOPIC:  Microscopic examination is performed.    The technical component of this testing was completed at the [facility], with the professional component performed   at the [facility], [address] [place] [phone] ([phone])    CPT Codes:  A: 63146-SO3  B: 08931-HD3    COLLECTION SITE:  Client: [facility]  Location: [facility] (P)         If you have any questions or concerns, please call the clinic at the number listed above.       Sincerely,        No name on file.

## 2020-10-23 NOTE — LETTER
[address] [address]           Tel [phone] [address] [place] [address]  [place] [id number]    October 27, 2020    Dear Fallon,     This letter is to inform you of the results of your pathology report on your biopsies from the upper intestine and colon.    The upper intestine biopsies were normal and did not show any sign of gluten sensitivity.    The colon biopsies showed mild patchy nonspecific irritation which the pathologist thought could be due to use of a medication like ibuprofen or naproxen. If you are using that type of medication, you may want to stop.    As I recall, your diarrhea seemed to be slowly improving.  But, if you want to try cholestyramine for the diarrhea that follows gallbladder surgery, we can provide a prescription.  Let me know how you want to proceed.    If you have any questions or concerns please do not hesitate to call my office at [phone].    Sincerely,         Ba Dickinson MD  Gastroenterology

## 2020-10-23 NOTE — DISCHARGE INSTRUCTIONS
[facility]    Home Care Following Endoscopy          Activity:    You have just undergone an endoscopic procedure usually performed with conscious sedation.  Do not work or operate machinery (including a car) for at least 12 hours.      I encourage you to walk and attempt to pass this air as soon as possible.    Diet:    Return to the diet you were on before your procedure but eat lightly for the first 12-24 hours.    Drink plenty of water.    Resume any regular medications unless otherwise advised by your physician.  Please begin any new medication prescribed as a result of your procedure as directed by your physician.     If you had any biopsy or polyp removed please refrain from aspirin or aspirin products for 2 days.  If on Coumadin please restart as instructed by your physician.   Pain:    You may take Tylenol as needed for pain.  Expected Recovery:    You can expect some mild abdominal fullness and/or discomfort due to the air used to inflate your intestinal tract. It is also normal to have a mild sore throat after upper endoscopy.    Call Your Physician if You Have:    After Upper Endoscopy:  o Shoulder, back or chest pain.  o Difficulty breathing or swallowing.  o Vomiting blood.    After Colonoscopy:  o Worsening persisting abdominal pain which is worse with activity.  o Fevers (>101 degrees F), chills or shakes.  o Passage of continued blood with bowel movements.   Any questions or concerns about your recovery, please call [phone] or after hours [phone] Nurse Advice Line.    Follow-up Care:    You should receive a call or letter with your results within 1 week. Please call if you have not received a notification of your results.  If asked to return to clinic please make an appointment 1 week after your procedure.  Call [phone].

## 2020-10-23 NOTE — CONSULTS
[facility] GI Pre-Procedure Physical Assessment    Fallon Rivera MRN# [id number]   Age: 40 year old YOB: 1980      Date of Surgery: 10/23/2020  Location [facility]      Date of Exam 10/23/2020 Facility (Same day)       Home clinic: [facility]  Primary care provider: Greg Ford         Active problem list:   Patient Active Problem List   Diagnosis     Hemorrhage of rectum and anus     Female infertility     Papanicolaou smear of cervix with atypical squamous cells cannot exclude high grade squamous intraepithelial lesion (ASC-H)     S/P conization of cervix- KAYLA 2/3 in 2011     Kidney stones     Dyspnea     CARDIOVASCULAR SCREENING; LDL GOAL LESS THAN 160     GERD (gastroesophageal reflux disease)     Hyperlipidemia LDL goal <130     Mild major depression (H)     Right ovarian cyst     Glucosuria     Type 2 diabetes mellitus without complication (H)     Non morbid obesity due to excess calories     Obesity (BMI 35.0-39.9) with comorbidity (H)     Endometrial intraepithelial neoplasia (EIN)     Endometrial hyperplasia, unspecified     Pelvic pain in female     Rectal bleeding     Chronic diarrhea     Abnormal CT scan, small bowel            Medications (include herbals and vitamins):   Any Plavix use in the last 7 days?  No     Current Facility-Administered Medications   Medication     lidocaine (LMX4) kit     lidocaine 1 % 0.1-1 mL     ondansetron (ZOFRAN) injection 4 mg     sodium chloride (PF) 0.9% PF flush 3 mL     sodium chloride (PF) 0.9% PF flush 3 mL             Allergies:      Allergies   Allergen Reactions     Amoxicillin Hives     Anti-Nausea      Anxiety, agitation,severe paranoia. Zofran, Reglan are some of them.  DRAMAMINE WITHOUT ISSUES       Demerol Hcl [Meperidine Hcl] Nausea     Indomethacin Nausea and Vomiting     Macrobid [Nitrofuran Derivatives] Hives     Reglan [Metoclopramide] Other (See Comments)     Acute paranoia,  severe     Zofran [Ondansetron] Other (See Comments)     Severe anxiety, agitation     Vancomycin Other (See Comments)     maria del rosario syndrome     Allergy to Latex?  No  Allergy to tape?    No          Social History:     Social History     Tobacco Use     Smoking status: Former Smoker     Packs/day: 0.00     Years: 12.00     Pack years: 0.00     Types: Cigarettes     Quit date: 2019     Years since quittin.3     Smokeless tobacco: Never Used     Tobacco comment: As of 10 /2014, pt has had a few cigs here and there   Substance Use Topics     Alcohol use: Yes     Alcohol/week: 0.0 standard drinks     Comment: every few months            Physical Exam:   All vitals have been reviewed  Blood pressure (!) 121/96, pulse 121, temperature 97.9  F (36.6  C), temperature source Oral, resp. rate 18, last menstrual period 2020, SpO2 98 %, not currently breastfeeding.  Airway assessment:   Patient is able to open mouth wide  Patient is able to stick out tongue      Lungs:   No increased work of breathing, good air exchange, clear to auscultation bilaterally, no crackles or wheezing      Cardiovascular:   Normal apical impulse, regular rate and rhythm, normal S1 and S2, no S3 or S4, and no murmur noted           Lab / Radiology Results:   All laboratory data reviewed          Assessment:   Appropriately NPO  Chief complaint or anatomic assessment of involved area: diarrhea, rectal bleeding         Plan:   Moderate (conscious) sedation     Patient's active problems diagnostically and therapeutically optimized for the planned procedure  Risks, benefits, alternatives to sedation and blood explained and consent obtained  Risks, benefits, alternatives to procedure explained and consent obtained  Orders and progress notes are in the chart  Discharge from Phase 1 and / or Phase 2 recovery when patient meets criteria    I have reviewed the history and physical, lab finding(s), diagnostic data, medicaitons, and the plan for  sedation.  I have determined this patient to be an appropriate candidate for the planned sedation / procedure and have reassessed the patient immediately prior to sedation / procedure.    I have personally and medically directed the administration of medications used.    Ba Dickinson MD        Extubated, stable

## 2020-10-27 LAB — COPATH REPORT: NORMAL

## 2020-11-30 ENCOUNTER — HOSPITAL ENCOUNTER (OUTPATIENT)
Dept: ULTRASOUND IMAGING | Facility: CLINIC | Age: 40
Discharge: HOME | End: 2020-11-30
Attending: OBSTETRICS & GYNECOLOGY | Admitting: OBSTETRICS & GYNECOLOGY
Payer: COMMERCIAL

## 2020-11-30 ENCOUNTER — VIRTUAL VISIT (OUTPATIENT)
Dept: FAMILY MEDICINE | Facility: CLINIC | Age: 40
End: 2020-11-30
Payer: COMMERCIAL

## 2020-11-30 VITALS — WEIGHT: 209 LBS | BODY MASS INDEX: 39.62 KG/M2

## 2020-11-30 DIAGNOSIS — R10.2 PELVIC PAIN IN FEMALE: ICD-10-CM

## 2020-11-30 DIAGNOSIS — N93.9 VAGINAL BLEEDING: Primary | ICD-10-CM

## 2020-11-30 DIAGNOSIS — R10.2 PELVIC PAIN IN FEMALE: Primary | ICD-10-CM

## 2020-11-30 PROCEDURE — 99213 OFFICE O/P EST LOW 20 MIN: CPT | Mod: 95 | Performed by: OBSTETRICS & GYNECOLOGY

## 2020-11-30 PROCEDURE — 76830 TRANSVAGINAL US NON-OB: CPT

## 2020-11-30 ASSESSMENT — PATIENT HEALTH QUESTIONNAIRE - PHQ9: SUM OF ALL RESPONSES TO PHQ QUESTIONS 1-9: 5

## 2020-11-30 NOTE — PROGRESS NOTES
"Fallon Rivera is a 40 year old female who is being evaluated via a billable telephone visit.      The patient has been notified of following:     \"This telephone visit will be conducted via a call between you and your physician/provider. We have found that certain health care needs can be provided without the need for a physical exam.  This service lets us provide the care you need with a short phone conversation.  If a prescription is necessary we can send it directly to your pharmacy.  If lab work is needed we can place an order for that and you can then stop by our lab to have the test done at a later time.    Telephone visits are billed at different rates depending on your insurance coverage. During this emergency period, for some insurers they may be billed the same as an in-person visit.  Please reach out to your insurance provider with any questions.    If during the course of the call the physician/provider feels a telephone visit is not appropriate, you will not be charged for this service.\"    Patient has given verbal consent for Telephone visit?  Yes    What phone number would you like to be contacted at? [phone]    How would you like to obtain your AVS? MyChart    Subjective     Fallon Rivera is a 40 year old female who presents via phone visit today for the following health issues:  Subjective: Fallon requested a phone consultation today because of concerns regarding  Vaginal bleeding. Due to the current covid-19 coronavirus epidemic we are managing much of our patients' concerns remotely when possible.    She had LAVH by me 4 months ago at [facility] and has done quite well. Recovered uneventfully. Last night had intercoruse and then developed bright red bleeding per vagina this morning. Now it is light pink spotting. She had considered going to ER but now it is ok and she declined ER visit. She has noticed that intercourse has been uncomfortable over the past 2 weeks. She has " lost a few pounds since the surgery due to feeling better and doing some exercise trying to get into better shape. No other concerns.    The past medical history and medications and allergies have been reviewed today by me.  .  Past Medical History:   Diagnosis Date     Abnormal Pap smear 2006, 2007,     ASC-H, ASCUS + HPV 45     Calculus of kidney 2002     Cervical high risk HPV (human papillomavirus) test positive     + HPV 45 (high risk)     History of colposcopy with cervical biopsy 2/9/06, 3/15/07    koilocytosis 2007     Allergies   Allergen Reactions     Amoxicillin Hives     Anti-Nausea      Anxiety, agitation,severe paranoia. Zofran, Reglan are some of them.  DRAMAMINE WITHOUT ISSUES       Demerol Hcl [Meperidine Hcl] Nausea     Indomethacin Nausea and Vomiting     Macrobid [Nitrofuran Derivatives] Hives     Reglan [Metoclopramide] Other (See Comments)     Acute paranoia, severe     Zofran [Ondansetron] Other (See Comments)     Severe anxiety, agitation     Vancomycin Other (See Comments)     maria del rosario syndrome     Current Outpatient Medications   Medication Sig Dispense Refill     acetaminophen (TYLENOL) 500 MG tablet Take 1,000 mg by mouth every 6 hours as needed for mild pain       blood glucose monitoring (NO BRAND SPECIFIED) meter device kit Use to test blood sugar 2 times daily or as directed. 1 kit 0     CONTOUR NEXT TEST test strip USE TO TEST BLOOD GLUCOSE FOUR TIMES A  each 3     metFORMIN (GLUCOPHAGE) 1000 MG tablet Take 1 tablet in the a.m. 1/2 tablet at night 90 tablet 3     omeprazole (PRILOSEC) 20 MG DR capsule TAKE ONE CAPSULE BY MOUTH ONCE DAILY 30 TO 60 MINUTES BEFORE A MEAL 30 capsule 11       Assessment/Plan:   vaginal bleeding- 4 months after LAVH. I will try to get her in for an exam sometime in the next 2-3 days- if the bleeding becomes acute again, she is advised to go to ER- she agreed.       Phone call duration was   8  minutes.     SHARAN Ford MD

## 2020-11-30 NOTE — Clinical Note
Dr. Liliana Marmolejo is very concerned about this patient and new bleeding post hysterectomy in July. Can you see her today or tomorrow for a possible cyst rupture?    Triny Hinkle, CMA

## 2020-12-01 ENCOUNTER — OFFICE VISIT (OUTPATIENT)
Dept: OBGYN | Facility: CLINIC | Age: 40
End: 2020-12-01
Payer: COMMERCIAL

## 2020-12-01 VITALS
HEART RATE: 128 BPM | WEIGHT: 212.3 LBS | BODY MASS INDEX: 40.25 KG/M2 | SYSTOLIC BLOOD PRESSURE: 118 MMHG | DIASTOLIC BLOOD PRESSURE: 94 MMHG | TEMPERATURE: 97.6 F

## 2020-12-01 DIAGNOSIS — Z11.3 SCREEN FOR STD (SEXUALLY TRANSMITTED DISEASE): ICD-10-CM

## 2020-12-01 DIAGNOSIS — N83.202 CYST OF LEFT OVARY: Primary | ICD-10-CM

## 2020-12-01 DIAGNOSIS — N94.10 DYSPAREUNIA, FEMALE: ICD-10-CM

## 2020-12-01 DIAGNOSIS — N93.9 ABNORMAL VAGINAL BLEEDING: ICD-10-CM

## 2020-12-01 PROCEDURE — 87591 N.GONORRHOEAE DNA AMP PROB: CPT | Performed by: OBSTETRICS & GYNECOLOGY

## 2020-12-01 PROCEDURE — 99214 OFFICE O/P EST MOD 30 MIN: CPT | Performed by: OBSTETRICS & GYNECOLOGY

## 2020-12-01 PROCEDURE — 87491 CHLMYD TRACH DNA AMP PROBE: CPT | Performed by: OBSTETRICS & GYNECOLOGY

## 2020-12-01 NOTE — PROGRESS NOTES
Pelvic    SUBJECTIVE:       HPI: Fallon Rivera is a 40 year old   who presents today for vaginal pain and bleeding.      Vaginal pain has been present for the past two weeks. She first experienced the pain after sex and has been constant ever since. Patient notes that she and her partner do tend to have rougher sex, and up until this point, sex has not triggered any unusual pain or bleeding. They do not use any foreign objects or toys. She noticed some vaginal bleeding which she described as a brown color over the past couple of days. She had an ultrasound yesterday, and noted that there was some pink discoloration on the probe, correlating with vaginal blood.      Of note, she is S/P hysteretomy (22); she had no pain or vaginal bleeding after her initial post-op period until two weeks ago. Prior to hystectomy had years of dyspareunia, vaginal pain and irregular vaginal bleeding.    She denies fevers/chills, nausea/vomiting, abdominal pain or bloating. Denies dysuria, hematuria, constipation or diarrhea.     Ob Hx:        Gyn Hx: Patient's last menstrual period was 2020 (exact date).    Patient is sexually active.  male partner   STI history: None   Using hysterectomy for contraception.   STD testing offered?  Declined  Menarche 12 years old; prior to hysterectomy Menses every 2 weeks; 2-7 days; with heavy flow   Last Mammogram: 9/21/15 BIRAD - 2   Colon Screening: colonoscopy 10/23/20, normal   DEXA Screening: none   Family history of gyn-related malignancies: None          reports that she has been smoking cigarettes. She has been smoking about 0.00 packs per day for the past 12.00 years. She has never used smokeless tobacco.  Tobacco Cessation Action Plan: Information offered: Patient not interested at this time    Today's PHQ-2 Score:   PHQ-2 (  [employer]) 3/11/2019   Q1: Little interest or pleasure in doing things 1   Q2: Feeling down, depressed or hopeless 1   PHQ-2 Score 2    Q1: Little interest or pleasure in doing things Several days   Q2: Feeling down, depressed or hopeless Several days   PHQ-2 Score 2     Today's PHQ-9 Score:   PHQ-9 SCORE 11/30/2020   PHQ-9 Total Score -   PHQ-9 Total Score MyChart -   PHQ-9 Total Score 5     Today's TRUDY-7 Score: No flowsheet data found.    Problem list and histories reviewed & adjusted, as indicated.  Additional history: as documented.    Patient Active Problem List   Diagnosis     Hemorrhage of rectum and anus     Female infertility     Papanicolaou smear of cervix with atypical squamous cells cannot exclude high grade squamous intraepithelial lesion (ASC-H)     S/P conization of cervix- KAYLA 2/3 in 2011     Kidney stones     Dyspnea     CARDIOVASCULAR SCREENING; LDL GOAL LESS THAN 160     GERD (gastroesophageal reflux disease)     Hyperlipidemia LDL goal <130     Mild major depression (H)     Right ovarian cyst     Glucosuria     Type 2 diabetes mellitus without complication (H)     Non morbid obesity due to excess calories     Obesity (BMI 35.0-39.9) with comorbidity (H)     Endometrial intraepithelial neoplasia (EIN)     Endometrial hyperplasia, unspecified     Pelvic pain in female     Rectal bleeding     Chronic diarrhea     Abnormal CT scan, small bowel     Dyspareunia, female     Past Surgical History:   Procedure Laterality Date     C REMOVAL OF KIDNEY STONE      two surgeries, 2002,2003     CHOLECYSTECTOMY, LAPOROSCOPIC  5/11/2007    Cholecystectomy, Laparoscopic     COLONOSCOPY  05/17/10     COLONOSCOPY N/A 8/27/2019    Procedure: COLONOSCOPY;  Surgeon: Paramjit Kingston DO;  Location:  GI     COLONOSCOPY N/A 10/23/2020    Procedure: COLONOSCOPY, WITH  BIOPSY;  Surgeon: Ba Dickinson MD;  Location:  GI     CONIZATION  7/1/2011    Procedure:CONIZATION; cold knife and punch biopsy of mole on back; Surgeon:SYDNEY GRAFF; Location: OR     DILATION AND CURETTAGE, HYSTEROSCOPY, LAPAROSCOPY, COMBINED Right  9/24/2015    Procedure: COMBINED DILATION AND CURETTAGE, HYSTEROSCOPY, LAPAROSCOPY;  Surgeon: Sydney Ford MD;  [facility]     DISCECTOMY LUMBAR MINIMALLY INVASIVE ONE LEVEL Left 1/23/2019    Procedure: Minimally Invasive Surgery Left Lumbar 5-Sacral 1 microdiscectomy;  Surgeon: Mason Roe MD;  Location: [facility]     ESOPHAGOSCOPY, GASTROSCOPY, DUODENOSCOPY (EGD), COMBINED  5/21/2014    Procedure: COMBINED ESOPHAGOSCOPY, GASTROSCOPY, DUODENOSCOPY (EGD), BIOPSY SINGLE OR MULTIPLE;  Surgeon: Earl Lane MD;  Location: [facility]     ESOPHAGOSCOPY, GASTROSCOPY, DUODENOSCOPY (EGD), COMBINED N/A 10/23/2020    Procedure: Esophagogastroduodenoscopy with  biopsy;  Surgeon: Ba Dickinson MD;  Location: [facility]     EXCISE LESION TRUNK  7/1/2011    Procedure:EXCISE LESION TRUNK; Surgeon:SYDNEY FORD; Location:[facility]     HC FRAGMENTING OF KIDNEY STONE  11/30/2005     HC RX ECTOP PREG BY SCOPE,RMV TUBE/OVRY  12/20/2007    Right sided salpingectomy and removal of ruptured ectopic pregnancy. D&C.     HC UGI ENDOSCOPY, SIMPLE EXAM  09/01/09     HYSTERECTOMY VAGINAL       LAPAROSCOPIC APPENDECTOMY N/A 9/24/2015    Procedure: LAPAROSCOPIC APPENDECTOMY;  Surgeon: James Cuellar MD;  [facility]     LAPAROSCOPIC CYSTECTOMY OVARIAN (BENIGN) N/A 9/24/2015    Procedure: LAPAROSCOPIC CYSTECTOMY OVARIAN (BENIGN);  Surgeon: Sydney Ford MD;  [facility]: [facility]     LAPAROSCOPIC HYSTERECTOMY TOTAL, BILATERAL SALPINGO-OOPHORECTOMY, COMBINED N/A 7/28/2020    Procedure: laparoscpic assisted vaginal hysterectomy, cystoscopy;  Surgeon: Sydney Ford MD;  Location: [facility]     LAPAROSCOPIC OOPHORECTOMY Right 9/24/2015    Procedure: LAPAROSCOPIC OOPHORECTOMY;  Surgeon: Sydney Ford MD;  Location[facility]     LITHOTRIPSY  01/17/2007     PELVIS LAPAROSCOPY,DX  10/16/2000    Diagnostic laparoscopy, Endometrial biopsy.     TUBAL/ECTOPIC PREGNANCY  01/23/2007     Lap removal of left ruptured ectopic pregnancy & salpingectomy, D&C      Social History     Tobacco Use     Smoking status: Current Every Day Smoker     Packs/day: 0.00     Years: 12.00     Pack years: 0.00     Types: Cigarettes     Smokeless tobacco: Never Used     Tobacco comment: As of 10 /2014, pt has had a few cigs here and there   Substance Use Topics     Alcohol use: Yes     Alcohol/week: 0.0 standard drinks     Comment: every few months      Problem (# of Occurrences) Relation (Name,Age of Onset)    Anesthesia Reaction (1) Maternal Grandmother: can't tolerate Novacaine.    Arrhythmia (1) Other (m uncle)    Breast Cancer (1) Paternal Grandmother    Cancer (2) Maternal Grandfather: skin cancer, Maternal Aunt: breast/brain cancer    Cerebrovascular Disease (3) Paternal Grandfather, Paternal Uncle, Paternal Aunt    Depression (1) Paternal Aunt    Diabetes (3) Maternal Grandmother: adult onset, Paternal Grandfather: adult o nset, Mother    Heart Disease (4) Maternal Grandmother: CHF, Paternal Grandfather: MI, replaced valve,angioplasty, Maternal Grandfather: MI, Father    Hypertension (2) Paternal Grandfather, Father: and TIA    Lipids (1) Father    Obesity (1) Mother: on thyroid medication    Respiratory (1) Maternal Grandmother: COPD and emphysema.    Rheumatologic Disease (1) Mother    Thyroid Disease (3) Maternal Grandmother, Paternal Grandfather, Mother                 acetaminophen (TYLENOL) 500 MG tablet, Take 1,000 mg by mouth every 6 hours as needed for mild pain       blood glucose monitoring (NO BRAND SPECIFIED) meter device kit, Use to test blood sugar 2 times daily or as directed.       CONTOUR NEXT TEST test strip, USE TO TEST BLOOD GLUCOSE FOUR TIMES A DAY       metFORMIN (GLUCOPHAGE) 1000 MG tablet, Take 1 tablet in the a.m. 1/2 tablet at night       omeprazole (PRILOSEC) 20 MG DR capsule, TAKE ONE CAPSULE BY MOUTH ONCE DAILY 30 TO 60 MINUTES BEFORE A MEAL    No current facility-administered  medications on file prior to visit.     Allergies   Allergen Reactions     Amoxicillin Hives     Anti-Nausea      Anxiety, agitation,severe paranoia. Zofran, Reglan are some of them.  DRAMAMINE WITHOUT ISSUES       Demerol Hcl [Meperidine Hcl] Nausea     Indomethacin Nausea and Vomiting     Macrobid [Nitrofuran Derivatives] Hives     Reglan [Metoclopramide] Other (See Comments)     Acute paranoia, severe     Zofran [Ondansetron] Other (See Comments)     Severe anxiety, agitation     Vancomycin Other (See Comments)     maria del rosario syndrome       ROS:  10 Point review of systems negative other noted above in HPI    OBJECTIVE:     BP (!) 118/94   Pulse 128   Temp 97.6  F (36.4  C) (Temporal)   Wt 96.3 kg (212 lb 4.8 oz)   LMP 07/25/2020 (Exact Date)   BMI 40.25 kg/m    Body mass index is 40.25 kg/m .    Gen: Alert, oriented, appropriately interactive, NAD  Neck: soft, no cervical adenopathy, no masses  CV: RRR, no murmurs, no extra heart sounds, 2+ peripheral pulses  Resp: CTAB, good effort without distress   Breasts: no axillary adenopathy, no dominant masses, no skin changes, no nipple discharge, nontender (performed per patient request)  Abdomen: soft, non tender, obese, non distended, no masses, no hernias. No inguinal lymphadenopathy.   External genitalia: no lesions; normal appearing external genitalia, bartholins glands, urethra, skenes glands  Vagina: Scant dried dark brown blood, focally at the right apex of the vaginal cuff with correlating tenderness to palpation   Cervix: surgically absent  Bimanual exam:  exam limited by body habitus   Nontender pelvic floor muscles  Urethra: nontender   Bladder: nontender and without massess, well supported   Uterus: surgically absent  Adnexa:  right adnexa not surgically present; mild left adnexa fullness, no discrete masses present, nontender  MSK: normal gait, symmetric movements UE & LE  Lower extremities: non-tender, no edema    In-Clinic Test Results:  No results  found for this or any previous visit (from the past 24 hour(s)).   Results for orders placed or performed during the hospital encounter of 11/30/20   US Pelvis Cmplt w Transvag & Doppler LmtPel Duplex Limited    Narrative    PELVIC ULTRASOUND  WITH ENDOVAGINAL TRANSDUCER IMAGING 11/30/2020 3:18  PM     HISTORY: Rule out ovarian cyst, status post hysterectomy. Pelvic pain  in female.    TECHNIQUE:  Endovaginal sonography was added to the transabdominal  scans.      COMPARISON: CT abdomen/pelvis dated 9/9/2020. Pelvic ultrasound dated  8/11/2020 and 2/11/2020.    FINDINGS:  Uterus and right ovary are surgically absent per history.  No abnormality is noted in the right adnexa.    The left ovary measures 5.9 x 6.5 x 6.1 cm and contains an irregularly  mostly simple-appearing cystic structure measuring 6.2 x 5.8 x 3.4 cm.  Another cyst in the left ovary is mildly complex, measuring up to 2.7  x 1.6 x 2.4 cm. Irregular cystic structure measures 6.2 x 5.8 x 3.4  cm. Solid portion of the left ovary measures approximately 2.5 x 3.9 x  2.7 cm. Color and Doppler spectral analysis demonstrate arterial and  venous waveforms in the left ovary.    No abnormal free fluid collections are seen.      Impression    IMPRESSION:  1.  Complicated cystic structures adjacent to or in the left ovary  appear to enlarge the left ovary. The largest measures up to 6.2 x 5.8  x 3.4 cm and could be within the left ovary or adjacent to the left  ovary. This could also represent partially loculated free collection  in the left adnexa. The complex cystic structure in the left adnexa  has increased in size since the prior study dated 8/11/2020 where it  measured up to 4.3 cm in maximum dimension. Left ovary was not  abnormally enlarged on the prior ultrasound pelvis dated 2/11/2020.  Ovarian torsion is not excluded in appropriate setting although both  arterial and venous waveforms are documented in the ovary. Neoplastic  infiltration, though  considered less likely, is not excluded.  Recommend close follow-up ultrasound (1-1.5 months) versus MRI to  ensure resolution or decreased size of the finding in the left adnexa.    IVETH FAN MD          ASSESSMENT/PLAN:                                                      Fallon Rivera is a 40 year old  who presents today for vaginal pain and vaginal bleeding after intercourse s/p hysterectomy      ICD-10-CM    1. Cyst of left ovary  N83.202 US Pelvic Complete with Transvaginal   2. Screen for STD (sexually transmitted disease)  Z11.3 Neisseria gonorrhoeae PCR     Chlamydia trachomatis PCR   3. Abnormal vaginal bleeding  N93.9 Wet prep     CANCELED: Wet prep   4. Dyspareunia, female  N94.10      Discussed that her recent vaginal discharge/bleeding and dyspareunia are most likely due to deep, rough penetration into the vagina during intercourse. She most likely experienced a minor abrasion, that is now irritated. No concern for abscess or hematoma based on symptoms, exam and recent imaging. Patient should refrain from sexual intercourse until resolution of pain and spotting. If pain persists, she should return to clinic for another exam, and biopsy if indicated. All of patient's questions were answered.     History of ovarian cysts, with recurrent cyst on imaging. Not recommending surgical intervention at this time, but will repeat imaging as recommended in 4-8 weeks. If concerning at that time, may consider MRI. Concerning s/s reviewed, including when to go to the ER. Patient was understanding of the plan and all of her questions were answered.         25 minutes was spent face to face with the patient today discussing her history, diagnosis, and follow-up plan as noted above.  Over 50% of the visit was spent in counseling and coordination of care, not including time spent for procedure/exam.    This document serves as a record of the services and decisions personally performed and made by   DO Mao.  It was created on 12/1/20 behalf by Vania Littlejohn, a trained medical student.  The creation of this record is based on the provider's personal observations and the statements of the patient.     Vania Littlejohn, MS3  December 1, 2020    Physician Attestation:  I was present with the student who participated in the service and in the documentation of the note. I have verified the history and personally performed the physical exam and medical decision making. I agree with the assessment and plan of care as documented in the note, and have edited to reflect my findings.    Hillary Cavanaugh DO  [facility]

## 2020-12-02 LAB
C TRACH DNA SPEC QL NAA+PROBE: NEGATIVE
N GONORRHOEA DNA SPEC QL NAA+PROBE: NEGATIVE
SPECIMEN SOURCE: NORMAL
SPECIMEN SOURCE: NORMAL

## 2020-12-04 ENCOUNTER — HOSPITAL ENCOUNTER (EMERGENCY)
Facility: CLINIC | Age: 40
Discharge: HOME | End: 2020-12-04
Attending: EMERGENCY MEDICINE | Admitting: EMERGENCY MEDICINE
Payer: COMMERCIAL

## 2020-12-04 ENCOUNTER — APPOINTMENT (OUTPATIENT)
Dept: ULTRASOUND IMAGING | Facility: CLINIC | Age: 40
End: 2020-12-04
Attending: EMERGENCY MEDICINE
Payer: COMMERCIAL

## 2020-12-04 VITALS
DIASTOLIC BLOOD PRESSURE: 85 MMHG | WEIGHT: 212 LBS | TEMPERATURE: 99.3 F | OXYGEN SATURATION: 97 % | RESPIRATION RATE: 16 BRPM | SYSTOLIC BLOOD PRESSURE: 123 MMHG | HEART RATE: 135 BPM | BODY MASS INDEX: 40.19 KG/M2

## 2020-12-04 DIAGNOSIS — N83.202 CYST OF LEFT OVARY: ICD-10-CM

## 2020-12-04 LAB
BASOPHILS # BLD AUTO: 0 10E9/L (ref 0–0.2)
BASOPHILS NFR BLD AUTO: 0.3 %
DIFFERENTIAL METHOD BLD: ABNORMAL
EOSINOPHIL NFR BLD AUTO: 0.7 %
ERYTHROCYTE [DISTWIDTH] IN BLOOD BY AUTOMATED COUNT: 12.7 % (ref 10–15)
HCT VFR BLD AUTO: 46.9 % (ref 35–47)
HGB BLD-MCNC: 16.5 G/DL (ref 11.7–15.7)
IMM GRANULOCYTES # BLD: 0 10E9/L (ref 0–0.4)
IMM GRANULOCYTES NFR BLD: 0.3 %
LYMPHOCYTES # BLD AUTO: 2.1 10E9/L (ref 0.8–5.3)
LYMPHOCYTES NFR BLD AUTO: 18.1 %
MCH RBC QN AUTO: 31.1 PG (ref 26.5–33)
MCHC RBC AUTO-ENTMCNC: 35.2 G/DL (ref 31.5–36.5)
MCV RBC AUTO: 88 FL (ref 78–100)
MONOCYTES # BLD AUTO: 0.5 10E9/L (ref 0–1.3)
MONOCYTES NFR BLD AUTO: 4.4 %
NEUTROPHILS # BLD AUTO: 8.8 10E9/L (ref 1.6–8.3)
NEUTROPHILS NFR BLD AUTO: 76.2 %
NRBC # BLD AUTO: 0 10*3/UL
NRBC BLD AUTO-RTO: 0 /100
PLATELET # BLD AUTO: 228 10E9/L (ref 150–450)
RBC # BLD AUTO: 5.31 10E12/L (ref 3.8–5.2)
WBC # BLD AUTO: 11.5 10E9/L (ref 4–11)

## 2020-12-04 PROCEDURE — 258N000003 HC RX IP 258 OP 636: Performed by: EMERGENCY MEDICINE

## 2020-12-04 PROCEDURE — 85025 COMPLETE CBC W/AUTO DIFF WBC: CPT | Performed by: EMERGENCY MEDICINE

## 2020-12-04 PROCEDURE — 99285 EMERGENCY DEPT VISIT HI MDM: CPT | Performed by: EMERGENCY MEDICINE

## 2020-12-04 PROCEDURE — 93976 VASCULAR STUDY: CPT

## 2020-12-04 PROCEDURE — 96376 TX/PRO/DX INJ SAME DRUG ADON: CPT | Performed by: EMERGENCY MEDICINE

## 2020-12-04 PROCEDURE — 96361 HYDRATE IV INFUSION ADD-ON: CPT | Performed by: EMERGENCY MEDICINE

## 2020-12-04 PROCEDURE — 250N000011 HC RX IP 250 OP 636: Performed by: EMERGENCY MEDICINE

## 2020-12-04 PROCEDURE — 99285 EMERGENCY DEPT VISIT HI MDM: CPT | Mod: 25 | Performed by: EMERGENCY MEDICINE

## 2020-12-04 PROCEDURE — 96374 THER/PROPH/DIAG INJ IV PUSH: CPT | Performed by: EMERGENCY MEDICINE

## 2020-12-04 PROCEDURE — 96375 TX/PRO/DX INJ NEW DRUG ADDON: CPT | Performed by: EMERGENCY MEDICINE

## 2020-12-04 RX ORDER — HYDROCODONE BITARTRATE AND ACETAMINOPHEN 5; 325 MG/1; MG/1
1 TABLET ORAL EVERY 6 HOURS PRN
Qty: 10 TABLET | Refills: 0 | Status: SHIPPED | OUTPATIENT
Start: 2020-12-04 | End: 2020-12-07

## 2020-12-04 RX ORDER — HYDROMORPHONE HYDROCHLORIDE 1 MG/ML
0.5 INJECTION, SOLUTION INTRAMUSCULAR; INTRAVENOUS; SUBCUTANEOUS
Status: DISCONTINUED | OUTPATIENT
Start: 2020-12-04 | End: 2020-12-04 | Stop reason: HOSPADM

## 2020-12-04 RX ORDER — KETOROLAC TROMETHAMINE 30 MG/ML
30 INJECTION, SOLUTION INTRAMUSCULAR; INTRAVENOUS ONCE
Status: COMPLETED | OUTPATIENT
Start: 2020-12-04 | End: 2020-12-04

## 2020-12-04 RX ADMIN — SODIUM CHLORIDE 1000 ML: 9 INJECTION, SOLUTION INTRAVENOUS at 15:34

## 2020-12-04 RX ADMIN — KETOROLAC TROMETHAMINE 30 MG: 30 INJECTION, SOLUTION INTRAMUSCULAR at 15:34

## 2020-12-04 RX ADMIN — HYDROMORPHONE HYDROCHLORIDE 0.5 MG: 1 INJECTION, SOLUTION INTRAMUSCULAR; INTRAVENOUS; SUBCUTANEOUS at 16:05

## 2020-12-04 RX ADMIN — HYDROMORPHONE HYDROCHLORIDE 0.5 MG: 1 INJECTION, SOLUTION INTRAMUSCULAR; INTRAVENOUS; SUBCUTANEOUS at 15:34

## 2020-12-04 NOTE — ED PROVIDER NOTES
History     Chief Complaint   Patient presents with     Abdominal Pain     HPI  Fallon Rivera is a 40 year old female who presents with left lower quadrant abdominal pain.  She has a known left ovarian complex cyst that has been present over the last 4 months.  She was seen in OB/GYN clinic 3 days ago by Dr. Mack.  An ultrasound at that time showed good blood flow to the ovary.  She has been trying Tylenol without relief.  She states she still has 3 Vicodin left over at home from a hysterectomy in July but has not tried these for the pain.  She is had no fever.  No vomiting.  She did have some vaginal injury noted 3 days ago from some rougher intercourse.  She states this area seems to be healing okay and is not the source of her pain.  She denies any vaginal discharge or bleeding at this point.  Pain is currently a 6 out of 10.  At its maximum it is an 8 out of 10, is intermittent and takes her breath away.  At its lowest point it is a 2 out of 10.    Allergies:  Allergies   Allergen Reactions     Amoxicillin Hives     Anti-Nausea      Anxiety, agitation,severe paranoia. Zofran, Reglan are some of them.  DRAMAMINE WITHOUT ISSUES       Demerol Hcl [Meperidine Hcl] Nausea     Indomethacin Nausea and Vomiting     Macrobid [Nitrofuran Derivatives] Hives     Reglan [Metoclopramide] Other (See Comments)     Acute paranoia, severe     Zofran [Ondansetron] Other (See Comments)     Severe anxiety, agitation     Vancomycin Other (See Comments)     maria del rosario syndrome       Problem List:    Patient Active Problem List    Diagnosis Date Noted     Dyspareunia, female 12/01/2020     Priority: Medium     Rectal bleeding 09/30/2020     Priority: Medium     Added automatically from request for surgery [id number]       Chronic diarrhea 09/30/2020     Priority: Medium     Added automatically from request for surgery [id number]       Abnormal CT scan, small bowel 09/30/2020     Priority: Medium     Added automatically from request  for surgery [id number]       Endometrial hyperplasia, unspecified 06/18/2020     Priority: Medium     Added automatically from request for surgery [id number]       Pelvic pain in female 06/18/2020     Priority: Medium     Added automatically from request for surgery [id number]       Endometrial intraepithelial neoplasia (EIN) 03/02/2020     Priority: Medium     Obesity (BMI 35.0-39.9) with comorbidity (H) 10/16/2018     Priority: Medium     Non morbid obesity due to excess calories 07/05/2016     Priority: Medium     Type 2 diabetes mellitus without complication (H) 04/29/2016     Priority: Medium     Glucosuria 04/27/2016     Priority: Medium     Right ovarian cyst 09/15/2015     Priority: Medium     Hyperlipidemia LDL goal <130 07/18/2012     Priority: Medium     Mild major depression (H) 07/18/2012     Priority: Medium     GERD (gastroesophageal reflux disease) 07/03/2012     Priority: Medium     CARDIOVASCULAR SCREENING; LDL GOAL LESS THAN 160 10/31/2010     Priority: Medium     Dyspnea 11/24/2009     Priority: Medium     Kidney stones 02/17/2009     Priority: Medium     S/P conization of cervix- KAYLA 2/3 in 2011 06/27/2007     Priority: Medium     1/24/06 pap: ASC-H  2/9/06 colposcopy/bx (negative)/ECC (negative) pap- ASC-H  5/24/06 pap- ASC-H  9/6/07 pap NIL  1/23/07 pap ASCUS + HPV 45 (high risk)  3/15/07 pap ASCUS + HPV 45 (high risk) colposcopy- bx (negative)/ECC (koilocytosis)  6/19/07 ECC- koilocytosis.  Pap- AGS  10/23/07 pap- ASC-H, ECC- negative  2/14/08 pap NIL- return to yearly paps  2/17/09 pap NIL- repeat 1 year  3/8/10 pap NIL  3/3/11 pap ASCUS + HPV 45 (high risk)- colposcopy recommended  5/16/11 colpo- bx (KAYLA 2/3/HSIL) ECC (ASCUS)  pap (ASC-H)  6/2/11 recommend: [facility]  7/1/11 [facility]: path: KAYLA 2/3 with possible involvement of the endocervical margin.  ECC- neg.  Endometrium- neg.  7/14/11 plan: HIPOLITO in approx 2 months  10/10/11- hysterectomy planned.  (cancelled)  11/7/11 pap-- NIL. Plan--repeat pap in 6  months. (5/7/12)  5/7/12 pap NIL. Plan-- pap in 1 year (due 5/7/13)   5/8/13 pap NIL. Plan-pap in 1 year  5/8/14 NIL pap, neg HR HPV.  Plan- repeat pap/HPV in 1 year (due 5/8/15)  10/14/14 NIL pap, neg HR HPV. Endometrial biopsy- WNL   1/6/16  NIL pap, neg HR HPV.  Plan: paps yearly, per Dr. Ford.  1/30/17 NIL pap, neg HR HPV. Plan: pap in 1 year.   3/12/18 NIL pap, neg HR HPV. Plan: cotest annually, per Dr. Ford.  3/11/19 ECC: negative. NIL pap, neg HR HPV. Plan: cotest in 1 yr  2/11/20 NIL Pap, Neg HPV. EMB: intraepithelial neoplasia. Plan: Hysterectomy.            Papanicolaou smear of cervix with atypical squamous cells cannot exclude high grade squamous intraepithelial lesion (ASC-H) 09/07/2006     Priority: Medium     Female infertility 07/24/2006     Priority: Medium     Problem list name updated by automated process. Provider to review       Hemorrhage of rectum and anus 12/02/2004     Priority: Medium        Past Medical History:    Past Medical History:   Diagnosis Date     Abnormal Pap smear 2006, 2007,      Calculus of kidney 2002     Cervical high risk HPV (human papillomavirus) test positive      History of colposcopy with cervical biopsy 2/9/06, 3/15/07       Past Surgical History:    Past Surgical History:   Procedure Laterality Date     C REMOVAL OF KIDNEY STONE      two surgeries, 2002,2003     CHOLECYSTECTOMY, LAPOROSCOPIC  5/11/2007    Cholecystectomy, Laparoscopic     COLONOSCOPY  05/17/10     COLONOSCOPY N/A 8/27/2019    Procedure: COLONOSCOPY;  Surgeon: Paramjit Kingston DO;  Location:  GI     COLONOSCOPY N/A 10/23/2020    Procedure: COLONOSCOPY, WITH  BIOPSY;  Surgeon: Ba Dickinson MD;  Location:  GI     CONIZATION  7/1/2011    Procedure:CONIZATION; cold knife and punch biopsy of mole on back; Surgeon:SYDNEY FORD; Location: OR     DILATION AND CURETTAGE, HYSTEROSCOPY, LAPAROSCOPY, COMBINED Right 9/24/2015    Procedure: COMBINED DILATION AND  CURETTAGE, HYSTEROSCOPY, LAPAROSCOPY;  Surgeon: Sydney Ford MD;  Location: [facility]     DISCECTOMY LUMBAR MINIMALLY INVASIVE ONE LEVEL Left 1/23/2019    Procedure: Minimally Invasive Surgery Left Lumbar 5-Sacral 1 microdiscectomy;  Surgeon: Mason Roe MD;  Location: [facility]     ESOPHAGOSCOPY, GASTROSCOPY, DUODENOSCOPY (EGD), COMBINED  5/21/2014    Procedure: COMBINED ESOPHAGOSCOPY, GASTROSCOPY, DUODENOSCOPY (EGD), BIOPSY SINGLE OR MULTIPLE;  Surgeon: Earl Lane MD;  Location: [facility]     ESOPHAGOSCOPY, GASTROSCOPY, DUODENOSCOPY (EGD), COMBINED N/A 10/23/2020    Procedure: Esophagogastroduodenoscopy with  biopsy;  Surgeon: Ba Dickinson MD;  Location: [facility]     EXCISE LESION TRUNK  7/1/2011    Procedure:EXCISE LESION TRUNK; Surgeon:SYDNEY FORD; Location:[facility]     HC FRAGMENTING OF KIDNEY STONE  11/30/2005     HC RX ECTOP PREG BY SCOPE,RMV TUBE/OVRY  12/20/2007    Right sided salpingectomy and removal of ruptured ectopic pregnancy. D&C.     HC UGI ENDOSCOPY, SIMPLE EXAM  09/01/09     HYSTERECTOMY VAGINAL       LAPAROSCOPIC APPENDECTOMY N/A 9/24/2015    Procedure: LAPAROSCOPIC APPENDECTOMY;  Surgeon: James Cuellar MD;  Location[facility]     LAPAROSCOPIC CYSTECTOMY OVARIAN (BENIGN) N/A 9/24/2015    Procedure: LAPAROSCOPIC CYSTECTOMY OVARIAN (BENIGN);  Surgeon: Sydney Ford MD;  [facility]: [facility]     LAPAROSCOPIC HYSTERECTOMY TOTAL, BILATERAL SALPINGO-OOPHORECTOMY, COMBINED N/A 7/28/2020    Procedure: laparoscpic assisted vaginal hysterectomy, cystoscopy;  Surgeon: Sydney Ford MD;  Location: [facility]     LAPAROSCOPIC OOPHORECTOMY Right 9/24/2015    Procedure: LAPAROSCOPIC OOPHORECTOMY;  Surgeon: Sydney Ford MD;  Location: [facility]     LITHOTRIPSY  01/17/2007     PELVIS LAPAROSCOPY,DX  10/16/2000    Diagnostic laparoscopy, Endometrial biopsy.     TUBAL/ECTOPIC PREGNANCY  01/23/2007    Lap removal of left ruptured ectopic pregnancy &  salpingectomy, D&C       Family History:    Family History   Problem Relation Age of Onset     Diabetes Maternal Grandmother         adult onset     Anesthesia Reaction Maternal Grandmother         can't tolerate Novacaine.     Respiratory Maternal Grandmother         COPD and emphysema.     Thyroid Disease Maternal Grandmother      Heart Disease Maternal Grandmother         CHF     Diabetes Paternal Grandfather         adult o nset     Hypertension Paternal Grandfather      Cerebrovascular Disease Paternal Grandfather      Heart Disease Paternal Grandfather         MI, replaced valve,angioplasty     Thyroid Disease Paternal Grandfather      Cancer Maternal Grandfather         skin cancer     Heart Disease Maternal Grandfather         MI     Obesity Mother         on thyroid medication     Thyroid Disease Mother      Diabetes Mother      Rheumatologic Disease Mother      Heart Disease Father      Hypertension Father         and TIA     Lipids Father      Breast Cancer Paternal Grandmother      Arrhythmia Other      Cancer Maternal Aunt         breast/brain cancer     Depression Paternal Aunt      Cerebrovascular Disease Paternal Uncle      Cerebrovascular Disease Paternal Aunt        Social History:  Marital Status:   [2]  Social History     Tobacco Use     Smoking status: Current Every Day Smoker     Packs/day: 0.00     Years: 12.00     Pack years: 0.00     Types: Cigarettes     Smokeless tobacco: Never Used     Tobacco comment: As of 10 /2014, pt has had a few cigs here and there   Substance Use Topics     Alcohol use: Yes     Alcohol/week: 0.0 standard drinks     Comment: every few months     Drug use: No        Medications:         HYDROcodone-acetaminophen (NORCO) 5-325 MG tablet       acetaminophen (TYLENOL) 500 MG tablet       blood glucose monitoring (NO BRAND SPECIFIED) meter device kit       CONTOUR NEXT TEST test strip       metFORMIN (GLUCOPHAGE) 1000 MG tablet       omeprazole (PRILOSEC) 20 MG   capsule          Review of Systems  All other systems are reviewed and are negative    Physical Exam   BP: (!) 139/104  Pulse: 135  Temp: 99.3  F (37.4  C)  Resp: 18  Weight: 96.2 kg (212 lb)  SpO2: 100 %      Physical Exam  Vitals signs and nursing note reviewed.   Constitutional:       General: She is not in acute distress.     Appearance: She is well-developed. She is not diaphoretic.   HENT:      Head: Normocephalic and atraumatic.   Eyes:      General: No scleral icterus.     Pupils: Pupils are equal, round, and reactive to light.   Neck:      Musculoskeletal: Normal range of motion and neck supple.   Cardiovascular:      Rate and Rhythm: Normal rate and regular rhythm.      Heart sounds: Normal heart sounds. No murmur.   Pulmonary:      Effort: No respiratory distress.      Breath sounds: No stridor. No wheezing or rales.   Abdominal:      Palpations: Abdomen is soft.      Tenderness: There is abdominal tenderness in the left lower quadrant. There is no guarding or rebound.   Musculoskeletal:         General: No tenderness.   Skin:     General: Skin is warm and dry.      Coloration: Skin is not pale.      Findings: No erythema or rash.   Neurological:      Mental Status: She is alert.         ED Course        Procedures               Critical Care time:  none               Results for orders placed or performed during the hospital encounter of 12/04/20 (from the past 24 hour(s))   CBC with platelets differential   Result Value Ref Range    WBC 11.5 (H) 4.0 - 11.0 10e9/L    RBC Count 5.31 (H) 3.8 - 5.2 10e12/L    Hemoglobin 16.5 (H) 11.7 - 15.7 g/dL    Hematocrit 46.9 35.0 - 47.0 %    MCV 88 78 - 100 fl    MCH 31.1 26.5 - 33.0 pg    MCHC 35.2 31.5 - 36.5 g/dL    RDW 12.7 10.0 - 15.0 %    Platelet Count 228 150 - 450 10e9/L    Diff Method Automated Method     % Neutrophils 76.2 %    % Lymphocytes 18.1 %    % Monocytes 4.4 %    % Eosinophils 0.7 %    % Basophils 0.3 %    % Immature Granulocytes 0.3 %    Nucleated  RBCs 0 0 /100    Absolute Neutrophil 8.8 (H) 1.6 - 8.3 10e9/L    Absolute Lymphocytes 2.1 0.8 - 5.3 10e9/L    Absolute Monocytes 0.5 0.0 - 1.3 10e9/L    Absolute Basophils 0.0 0.0 - 0.2 10e9/L    Abs Immature Granulocytes 0.0 0 - 0.4 10e9/L    Absolute Nucleated RBC 0.0    US Pelvis Cmplt w Transvag & Doppler LmtPel Duplex Limited    Narrative    US PELVIS COMPLETE W TRANSVAGINAL AND DOPPLER LIMITED 12/4/2020 4:01  PM     HISTORY: Increasing pain in known left ovarian cyst     COMPARISON: Pelvic ultrasound 11/30/2020.    FINDINGS:  Transvaginal images were performed to better evaluate the  patient's uterus, ovaries and endometrial stripe.    The uterus is surgically absent. Right ovary is also surgically  absent. The left ovary measures 3.5 x 5.0 x 3.6 cm and contains a  dominant follicle or cyst measuring 3.2 x 1.9 x 2.7 cm. This  previously measured 2.4 x 2.7 x 1.6 cm. Adjacent area of minimally  complex fluid is noted in the left adnexa possibly free fluid or  collapsing ovarian cyst. This currently measures approximately 7.8 x  5.0 x 5.4 cm, previously 6.2 x 5.8 x 3.4 cm. Color Doppler waveform  analysis demonstrates normal arterial and venous waveforms in the left  ovary.      Impression    IMPRESSION:   1. Uterus and right ovary are surgically absent. No ultrasound  findings to suggest left ovarian torsion.  2. Left ovarian cystic lesion or dominant follicle may be minimally  larger when compared to the prior ultrasound.  3. Mildly complex cystic lesion or collection of fluid adjacent to the  left ovary has likely increased in volume since prior exam. A  collapsing cyst is possible.    LAURA AMATO MD       Medications   HYDROmorphone (PF) (DILAUDID) injection 0.5 mg (0.5 mg Intravenous Given 12/4/20 1605)   0.9% sodium chloride BOLUS (1,000 mLs Intravenous New Bag 12/4/20 5744)   ketorolac (TORADOL) injection 30 mg (30 mg Intravenous Given 12/4/20 5714)       Assessments & Plan (with Medical Decision  Making)  40-year-old female with known left ovarian cyst by ultrasound 3 days ago.  Presents with some intermittent increased pain.  Ultrasound as above does not show any evidence for torsion.  Minimally changed.  Hemoglobin stable.  She did have some abnormalities on vaginal exam 3 days ago in clinic.  She felt like the symptoms were improving and declined further vaginal exam today.  She is prescribed hydrocodone as needed for the ovarian cyst.  She is referred back to OBGYN if not improving in 4 days.  Return anytime sooner over the weekend if condition worsens or any other concern.     I have reviewed the nursing notes.    I have reviewed the findings, diagnosis, plan and need for follow up with the patient.       New Prescriptions    HYDROCODONE-ACETAMINOPHEN (NORCO) 5-325 MG TABLET    Take 1 tablet by mouth every 6 hours as needed for severe pain       Final diagnoses:   Cyst of left ovary       12/4/2020   [facility] EMERGENCY DEPT     Richard Mittal MD  12/04/20 7720

## 2020-12-04 NOTE — ED AVS SNAPSHOT
[facility] Emergency Dept  [address] [place] [address]  Phone: [phone]  Fax: [phone]                                    Fallon Rivera   MRN: [id number]    Department: [facility] Emergency Dept   Date of Visit: 12/4/2020           After Visit Summary Signature Page    I have received my discharge instructions, and my questions have been answered. I have discussed any challenges I see with this plan with the nurse or doctor.    ..........................................................................................................................................  Patient/Patient Representative Signature      ..........................................................................................................................................  Patient Representative Print Name and Relationship to Patient    ..................................................               ................................................  Date                                   Time    ..........................................................................................................................................  Reviewed by Signature/Title    ...................................................              ..............................................  Date                                               Time          22EPIC Rev 08/18

## 2020-12-28 ENCOUNTER — HOSPITAL ENCOUNTER (OUTPATIENT)
Dept: ULTRASOUND IMAGING | Facility: CLINIC | Age: 40
Discharge: HOME | End: 2020-12-28
Attending: OBSTETRICS & GYNECOLOGY | Admitting: OBSTETRICS & GYNECOLOGY
Payer: COMMERCIAL

## 2020-12-28 DIAGNOSIS — N83.202 CYST OF LEFT OVARY: ICD-10-CM

## 2020-12-28 PROCEDURE — 76830 TRANSVAGINAL US NON-OB: CPT

## 2021-01-09 ENCOUNTER — HEALTH MAINTENANCE LETTER (OUTPATIENT)
Age: 41
End: 2021-01-09

## 2021-01-26 ENCOUNTER — OFFICE VISIT (OUTPATIENT)
Dept: FAMILY MEDICINE | Facility: CLINIC | Age: 41
End: 2021-01-26
Payer: COMMERCIAL

## 2021-01-26 VITALS
DIASTOLIC BLOOD PRESSURE: 76 MMHG | OXYGEN SATURATION: 97 % | SYSTOLIC BLOOD PRESSURE: 119 MMHG | HEART RATE: 87 BPM | TEMPERATURE: 97.3 F | WEIGHT: 210.6 LBS | RESPIRATION RATE: 16 BRPM | BODY MASS INDEX: 39.92 KG/M2

## 2021-01-26 DIAGNOSIS — R55 SYNCOPE, UNSPECIFIED SYNCOPE TYPE: Primary | ICD-10-CM

## 2021-01-26 DIAGNOSIS — E66.01 MORBID (SEVERE) OBESITY DUE TO EXCESS CALORIES (H): ICD-10-CM

## 2021-01-26 DIAGNOSIS — E11.9 TYPE 2 DIABETES MELLITUS WITHOUT COMPLICATION, WITHOUT LONG-TERM CURRENT USE OF INSULIN (H): ICD-10-CM

## 2021-01-26 LAB
ANION GAP SERPL CALCULATED.3IONS-SCNC: 5 MMOL/L (ref 3–14)
BUN SERPL-MCNC: 10 MG/DL (ref 7–30)
CALCIUM SERPL-MCNC: 9.1 MG/DL (ref 8.5–10.1)
CHLORIDE SERPL-SCNC: 108 MMOL/L (ref 94–109)
CHOLEST SERPL-MCNC: 206 MG/DL
CO2 SERPL-SCNC: 27 MMOL/L (ref 20–32)
CREAT SERPL-MCNC: 0.7 MG/DL (ref 0.52–1.04)
ERYTHROCYTE [DISTWIDTH] IN BLOOD BY AUTOMATED COUNT: 12.3 % (ref 10–15)
GFR SERPL CREATININE-BSD FRML MDRD: >90 ML/MIN/{1.73_M2}
GLUCOSE SERPL-MCNC: 154 MG/DL (ref 70–99)
HBA1C MFR BLD: 7.2 % (ref 0–5.6)
HCT VFR BLD AUTO: 43.6 % (ref 35–47)
HDLC SERPL-MCNC: 55 MG/DL
HGB BLD-MCNC: 15.3 G/DL (ref 11.7–15.7)
LDLC SERPL CALC-MCNC: 124 MG/DL
MCH RBC QN AUTO: 30.5 PG (ref 26.5–33)
MCHC RBC AUTO-ENTMCNC: 35.1 G/DL (ref 31.5–36.5)
MCV RBC AUTO: 87 FL (ref 78–100)
NONHDLC SERPL-MCNC: 151 MG/DL
PLATELET # BLD AUTO: 232 10E9/L (ref 150–450)
POTASSIUM SERPL-SCNC: 3.9 MMOL/L (ref 3.4–5.3)
RBC # BLD AUTO: 5.01 10E12/L (ref 3.8–5.2)
SODIUM SERPL-SCNC: 140 MMOL/L (ref 133–144)
TRIGL SERPL-MCNC: 137 MG/DL
TSH SERPL DL<=0.005 MIU/L-ACNC: 1.93 MU/L (ref 0.4–4)
WBC # BLD AUTO: 6.8 10E9/L (ref 4–11)

## 2021-01-26 PROCEDURE — 83036 HEMOGLOBIN GLYCOSYLATED A1C: CPT | Performed by: OBSTETRICS & GYNECOLOGY

## 2021-01-26 PROCEDURE — 80061 LIPID PANEL: CPT | Performed by: OBSTETRICS & GYNECOLOGY

## 2021-01-26 PROCEDURE — 36415 COLL VENOUS BLD VENIPUNCTURE: CPT | Performed by: OBSTETRICS & GYNECOLOGY

## 2021-01-26 PROCEDURE — 80048 BASIC METABOLIC PNL TOTAL CA: CPT | Performed by: OBSTETRICS & GYNECOLOGY

## 2021-01-26 PROCEDURE — 99215 OFFICE O/P EST HI 40 MIN: CPT | Performed by: OBSTETRICS & GYNECOLOGY

## 2021-01-26 PROCEDURE — 84443 ASSAY THYROID STIM HORMONE: CPT | Performed by: OBSTETRICS & GYNECOLOGY

## 2021-01-26 PROCEDURE — 85027 COMPLETE CBC AUTOMATED: CPT | Performed by: OBSTETRICS & GYNECOLOGY

## 2021-01-26 ASSESSMENT — PAIN SCALES - GENERAL: PAINLEVEL: NO PAIN (0)

## 2021-01-26 NOTE — PROGRESS NOTES
Subjective: She has felt lightheaded over the past month or so.  A little dizzy or fatigued.  Tired a lot.  She wants some blood testing.  She has been checking her blood sugars and they are in reasonably good control.  There is a family history of thyroid disease and she would also like a thyroid check.  Also due for an A1c.  She had a hysterectomy 7 months ago and has had some vaginal spotting in an area where she had a vaginal tear after intercourse.  She had been evaluated by Dr. Mack for this.  sHe would like me to do a pelvic exam to just check out how she is healing from that vaginal tear.      The past medical history, social history, past surgical history and family history as shown below have been reviewed by me today.    Past Medical History:   Diagnosis Date     Abnormal Pap smear 2006, 2007,     ASC-H, ASCUS + HPV 45     Calculus of kidney 2002     Cervical high risk HPV (human papillomavirus) test positive     + HPV 45 (high risk)     History of colposcopy with cervical biopsy 2/9/06, 3/15/07    koilocytosis 2007        Allergies   Allergen Reactions     Amoxicillin Hives     Anti-Nausea      Anxiety, agitation,severe paranoia. Zofran, Reglan are some of them.  DRAMAMINE WITHOUT ISSUES       Demerol Hcl [Meperidine Hcl] Nausea     Indomethacin Nausea and Vomiting     Macrobid [Nitrofuran Derivatives] Hives     Reglan [Metoclopramide] Other (See Comments)     Acute paranoia, severe     Zofran [Ondansetron] Other (See Comments)     Severe anxiety, agitation     Vancomycin Other (See Comments)     maria del rosario syndrome     Current Outpatient Medications   Medication Sig Dispense Refill     acetaminophen (TYLENOL) 500 MG tablet Take 1,000 mg by mouth every 6 hours as needed for mild pain       blood glucose monitoring (NO BRAND SPECIFIED) meter device kit Use to test blood sugar 2 times daily or as directed. 1 kit 0     CONTOUR NEXT TEST test strip USE TO TEST BLOOD GLUCOSE FOUR TIMES A  each 3      metFORMIN (GLUCOPHAGE) 1000 MG tablet Take 1 tablet in the a.m. 1/2 tablet at night 90 tablet 3     omeprazole (PRILOSEC) 20 MG DR capsule TAKE ONE CAPSULE BY MOUTH ONCE DAILY 30 TO 60 MINUTES BEFORE A MEAL 30 capsule 11     Past Surgical History:   Procedure Laterality Date     C REMOVAL OF KIDNEY STONE      two surgeries, 2002,2003     CHOLECYSTECTOMY, LAPOROSCOPIC  5/11/2007    Cholecystectomy, Laparoscopic     COLONOSCOPY  05/17/10     COLONOSCOPY N/A 8/27/2019    Procedure: COLONOSCOPY;  Surgeon: Paramjit Kingston DO;  [facility]: [facility]     COLONOSCOPY N/A 10/23/2020    Procedure: COLONOSCOPY, WITH  BIOPSY;  Surgeon: Ba Dickinson MD;  [facility]     CONIZATION  7/1/2011    Procedure:CONIZATION; cold knife and punch biopsy of mole on back; Surgeon:SYDNEY FORD; Location:[facility]     DILATION AND CURETTAGE, HYSTEROSCOPY, LAPAROSCOPY, COMBINED Right 9/24/2015    Procedure: COMBINED DILATION AND CURETTAGE, HYSTEROSCOPY, LAPAROSCOPY;  Surgeon: Sydney Ford MD;  [facility]     DISCECTOMY LUMBAR MINIMALLY INVASIVE ONE LEVEL Left 1/23/2019    Procedure: Minimally Invasive Surgery Left Lumbar 5-Sacral 1 microdiscectomy;  Surgeon: Mason Roe MD;  [facility]     ESOPHAGOSCOPY, GASTROSCOPY, DUODENOSCOPY (EGD), COMBINED  5/21/2014    Procedure: COMBINED ESOPHAGOSCOPY, GASTROSCOPY, DUODENOSCOPY (EGD), BIOPSY SINGLE OR MULTIPLE;  Surgeon: Earl Lane MD;  [facility]     ESOPHAGOSCOPY, GASTROSCOPY, DUODENOSCOPY (EGD), COMBINED N/A 10/23/2020    Procedure: Esophagogastroduodenoscopy with  biopsy;  Surgeon: Ba Dickinson MD;  [facility]     EXCISE LESION TRUNK  7/1/2011    Procedure:EXCISE LESION TRUNK; Surgeon:SYDNEY FORD; [facility]     HC FRAGMENTING OF KIDNEY STONE  11/30/2005     HC RX ECTOP PREG BY SCOPE,RMV TUBE/OVRY  12/20/2007    Right sided salpingectomy and removal of ruptured ectopic pregnancy. D&C.     HYSTERECTOMY  VAGINAL       LAPAROSCOPIC APPENDECTOMY N/A 9/24/2015    Procedure: LAPAROSCOPIC APPENDECTOMY;  Surgeon: James Cuellar MD;  [facility]     LAPAROSCOPIC CYSTECTOMY OVARIAN (BENIGN) N/A 9/24/2015    Procedure: LAPAROSCOPIC CYSTECTOMY OVARIAN (BENIGN);  Surgeon: Greg Ford MD;  [facility]     LAPAROSCOPIC HYSTERECTOMY TOTAL, BILATERAL SALPINGO-OOPHORECTOMY, COMBINED N/A 7/28/2020    Procedure: laparoscpic assisted vaginal hysterectomy, cystoscopy;  Surgeon: Greg Ford MD;  Location: [facility]     LAPAROSCOPIC OOPHORECTOMY Right 9/24/2015    Procedure: LAPAROSCOPIC OOPHORECTOMY;  Surgeon: Greg Ford MD;  [facility]     LITHOTRIPSY  01/17/2007     PELVIS LAPAROSCOPY,DX  10/16/2000    Diagnostic laparoscopy, Endometrial biopsy.     TUBAL/ECTOPIC PREGNANCY  01/23/2007    Lap removal of left ruptured ectopic pregnancy & salpingectomy, D&C     [facility]HC UGI ENDOSCOPY, SIMPLE EXAM  09/01/09     Social History     Socioeconomic History     Marital status:      Spouse name: Joseph     Number of children: 1     Years of education: 9     Highest education level: None   Occupational History     Employer: NONE   Social Needs     Financial resource strain: None     Food insecurity     Worry: None     Inability: None     Transportation needs     Medical: None     Non-medical: None   Tobacco Use     Smoking status: Current Every Day Smoker     Packs/day: 0.00     Years: 12.00     Pack years: 0.00     Types: Cigarettes     Smokeless tobacco: Never Used     Tobacco comment: As of 10 /2014, pt has had a few cigs here and there   Substance and Sexual Activity     Alcohol use: Yes     Alcohol/week: 0.0 standard drinks     Comment: every few months     Drug use: No     Sexual activity: Yes     Partners: Male     Birth control/protection: Female Surgical   Lifestyle     Physical activity     Days per week: None     Minutes per session: None     Stress: None   Relationships      Social connections     Talks on phone: None     Gets together: None     Attends Confucianist service: None     Active member of club or organization: None     Attends meetings of clubs or organizations: None     Relationship status: None     Intimate partner violence     Fear of current or ex partner: None     Emotionally abused: None     Physically abused: None     Forced sexual activity: None   Other Topics Concern      Service No     Blood Transfusions No     Caffeine Concern Yes     Comment: Reports 1 can soda/week  Advised not more than 16 ounces caffeien/day.     Occupational Exposure No     Hobby Hazards No     Sleep Concern No     Stress Concern Yes     Comment: will discuss with      Weight Concern Yes     Comment: Eating disorder in childhood.  Hx. gestational diab.     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     Exercise No     Comment: Advised walking 30 min/day.     Bike Helmet No     Seat Belt Yes     Self-Exams Not Asked     Parent/sibling w/ CABG, MI or angioplasty before 65F 55M? Not Asked   Social History Narrative     and lives at home with her  and daughter.     Family History   Problem Relation Age of Onset     Diabetes Maternal Grandmother         adult onset     Anesthesia Reaction Maternal Grandmother         can't tolerate Novacaine.     Respiratory Maternal Grandmother         COPD and emphysema.     Thyroid Disease Maternal Grandmother      Heart Disease Maternal Grandmother         CHF     Diabetes Paternal Grandfather         adult o nset     Hypertension Paternal Grandfather      Cerebrovascular Disease Paternal Grandfather      Heart Disease Paternal Grandfather         MI, replaced valve,angioplasty     Thyroid Disease Paternal Grandfather      Cancer Maternal Grandfather         skin cancer     Heart Disease Maternal Grandfather         MI     Obesity Mother         on thyroid medication      Thyroid Disease Mother      Diabetes Mother      Rheumatologic Disease Mother      Heart Disease Father      Hypertension Father         and TIA     Lipids Father      Breast Cancer Paternal Grandmother      Arrhythmia Other      Cancer Maternal Aunt         breast/brain cancer     Depression Paternal Aunt      Cerebrovascular Disease Paternal Uncle      Cerebrovascular Disease Paternal Aunt        ROS: A 12 point review of systems was done. Except for what is listed above in the HPI, the systems review is negative .      Objective: Vital signs: Blood pressure 119/76, pulse 87, temperature 97.3  F (36.3  C), temperature source Temporal, resp. rate 16, weight 95.5 kg (210 lb 9.6 oz), last menstrual period 07/25/2020, SpO2 97 %, not currently breastfeeding.    HEENT normal  Neck is supple, mobile, no adenopathy or masses palpable. The thyroid feels normal.   Normal range of motion noted.  Chest is clear to auscultation.  No wheezes, rales or rhonchi heard.  Cardiac exam is normal with s1, s2, no murmurs or adventitious sounds.Normal rate and rhythm is heard.   Abdomen is soft,  nondistended, No masses felt.No HSM. No guarding or rigidity or rebound   noted. Palpation reveals  no    tenderness   Normal bowel sounds heard.  Her laparoscopy incisions from hysterectomy are well-healed  Pelvic exam done with my nurse Triny present.  The external genitalia look normal.  The vaginal vault is without any tears or abrasions and the vaginal cuff is well-healed.  There is no bleeding noted.  Digital pelvic exam reveals no masses in the pelvis.  She is somewhat tender to palpation on the right side of the vaginal vault and her vaginal musculature is quite firm so I suspect she is guarding somewhat when she has intercourse and this may be causing the discomfort.      Assessment/Plan:      1.  Syncope and fatigue- will check some labs: CBC and basic panel.  Also thyroid.  We will call with results.    2.  Type 2 diabetes-we will  check A1c today.    3. Vaginal tear-   this appears to have healed.  I advised her to try to mentally relax during intercourse to see if she can make these vaginal muscles more loose which may help ease her pain.    4. Morbid obesity  -she is working on weight loss.    A total of 45 minutes were spent in today's visit with the patient in addition to the time spent charting on this patient today.       The above information was dictating using Dragon voice software and as a result there may be some irregularities that were not detected in my review of this note.    SHARAN Ford MD

## 2021-03-02 DIAGNOSIS — K21.9 GASTROESOPHAGEAL REFLUX DISEASE WITHOUT ESOPHAGITIS: ICD-10-CM

## 2021-03-02 NOTE — TELEPHONE ENCOUNTER
"  Requested Prescriptions   Pending Prescriptions Disp Refills     omeprazole (PRILOSEC) 20 MG DR capsule [Pharmacy Med Name: OMEPRAZOLE 20MG CPDR] 30 capsule 11     Sig: TAKE 1 CAPSULE BY MOUTH DAILY, 30 TO 60 MINUTES BEFORE A MEAL.   1/26/2021    PPI Protocol Passed - 3/2/2021  9:38 AM        Passed - Not on Clopidogrel (unless Pantoprazole ordered)        Passed - No diagnosis of osteoporosis on record        Passed - Recent (12 mo) or future (30 days) visit within the authorizing provider's specialty     Patient has had an office visit with the authorizing provider or a provider within the authorizing providers department within the previous 12 mos or has a future within next 30 days. See \"Patient Info\" tab in inbasket, or \"Choose Columns\" in Meds & Orders section of the refill encounter.            Passed - Medication is active on med list        Passed - Patient is age 18 or older        Passed - No active pregnacy on record        Passed - No positive pregnancy test in past 12 months         Prescription approved per [facility] Refill Protocol.  Ananya Samuel RN'  " No

## 2021-03-03 ENCOUNTER — PATIENT OUTREACH (OUTPATIENT)
Dept: FAMILY MEDICINE | Facility: CLINIC | Age: 41
End: 2021-03-03

## 2021-03-03 NOTE — TELEPHONE ENCOUNTER
Rodolfo Ford,   Please review and advise if you agree with plan.     41 yo with available pap/gyn hx listed below. History of KAYLA 2/3 on CKC in 2011. Hysterectomy with removal of cervix in July of 2020. Per updated 2019 ASCCP guidelines, patients with hx of KAYLA 2 or greater, should follow up with long term surveillance of cotest every 3 years X 25 years. Ok to recommend cotest of vaginal cuff every 3 years until 2036?     Thank you,  NUZHAT SevillaN, RN-BC         1/24/06 pap: ASC-H  2/9/06 colposcopy/bx (negative)/ECC (negative) pap- ASC-H  5/24/06 pap- ASC-H  9/6/07 pap NIL  1/23/07 pap ASCUS + HPV 45 (high risk)  3/15/07 pap ASCUS + HPV 45 (high risk) colposcopy- bx (negative)/ECC (koilocytosis)  6/19/07 ECC- koilocytosis.  Pap- AGS  10/23/07 pap- ASC-H, ECC- negative  2/14/08 pap NIL- return to yearly paps  2/17/09 pap NIL- repeat 1 year  3/8/10 pap NIL  3/3/11 pap ASCUS + HPV 45 (high risk)- colposcopy recommended  5/16/11 colpo- bx (KAYLA 2/3/HSIL) ECC (ASCUS)  pap (ASC-H)  6/2/11 recommend: CKC  7/1/11 CKC: path: KAYLA 2/3 with possible involvement of the endocervical margin.  ECC- neg.  Endometrium- neg.  7/14/11 plan: HIPOLITO in approx 2 months  10/10/11- hysterectomy planned.  (cancelled)  11/7/11 pap-- NIL. Plan--repeat pap in 6 months. (5/7/12)  5/7/12 pap NIL. Plan-- pap in 1 year (due 5/7/13)   5/8/13 pap NIL. Plan-pap in 1 year  5/8/14 NIL pap, neg HR HPV.  Plan- repeat pap/HPV in 1 year (due 5/8/15)  10/14/14 NIL pap, neg HR HPV. Endometrial biopsy- WNL   1/6/16  NIL pap, neg HR HPV.  Plan: paps yearly, per Dr. Ford.  1/30/17 NIL pap, neg HR HPV. Plan: pap in 1 year.   3/12/18 NIL pap, neg HR HPV. Plan: cotest annually, per Dr. Ford.  3/11/19 ECC: negative. NIL pap, neg HR HPV. Plan: cotest in 1 yr  2/11/20 NIL Pap, Neg HPV. EMB: intraepithelial neoplasia. Plan: Hysterectomy.   7/28/20 Hysterectomy with cervix removed. Hx of KAYLA 2 in 2011 Plan: Cotest Q3y until 2036.

## 2021-04-03 ENCOUNTER — NURSE TRIAGE (OUTPATIENT)
Dept: NURSING | Facility: CLINIC | Age: 41
End: 2021-04-03

## 2021-04-03 NOTE — TELEPHONE ENCOUNTER
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 Today child's tested positive for Covid. Pt and rest of household all had close contact. No symptoms. Asks how long to wait to be tested and how to go about it. Advised wait at least 5-7 days after exposure before testing. Disc'd options for testing. Pt would like to be tested at , will make appt for Virtual Visit next week w/ pcp. Will call back if sx.     Reason for Disposition    [1] CLOSE CONTACT COVID-19 EXPOSURE within last 14 days AND [2] NO symptoms    Additional Information    Negative: COVID-19 lab test positive    Negative: [1] Lives with someone known to have influenza (flu test positive) AND [2] flu-like symptoms (e.g., cough, runny nose, sore throat, SOB; with or without fever)    Negative: [1] Symptoms of COVID-19 (e.g., cough, fever, SOB, or others) AND [2] HCP diagnosed COVID-19 based on symptoms    Negative: [1] Symptoms of COVID-19 (e.g., cough, fever, SOB, or others) AND [2] lives in an area with community spread    Negative: [1] Symptoms of COVID-19 (e.g., cough, fever, SOB, or others) AND [2] within 14 days of EXPOSURE (close contact) with diagnosed or suspected COVID-19 patient    Negative: [1] Symptoms of COVID-19 (e.g., cough, fever, SOB, or others) AND [2] within 14 days of travel from high-risk area for COVID-19 community spread (identified by CDC)    Negative: [1] Difficulty breathing (shortness of breath) occurs AND [2] onset > 14 days after COVID-19 EXPOSURE (Close Contact)    Negative: [1] Dry cough occurs AND [2] onset > 14 days after COVID-19 EXPOSURE    Negative: [1] Wet cough (i.e., white-yellow, yellow, green, or dwayne colored sputum) AND [2] onset > 14 days after COVID-19 EXPOSURE    Negative: [1] Common cold symptoms AND [2] onset > 14 days after COVID-19 EXPOSURE    Negative: COVID-19 vaccine reaction suspected (e.g., fever, headache, muscle aches) occurring during days 1-3 after getting vaccine    Negative: COVID-19 vaccine,  questions about    Negative: [1] CLOSE CONTACT COVID-19 EXPOSURE within last 14 days AND [2] needs COVID-19 lab test to return to work AND [3] NO symptoms    Negative: [1] CLOSE CONTACT COVID-19 EXPOSURE within last 14 days AND [2] exposed person is a  (e.g., police or paramedic) AND [3] NO symptoms    Negative: [1] CLOSE CONTACT COVID-19 EXPOSURE within last 14 days AND [2] exposed person is a healthcare worker who was NOT using all recommended personal protective equipment (i.e., a respirator-N95 mask, eye protection, gloves, and gown) AND [3] NO symptoms    Negative: [1] Living or working in a correctional facility, long-term care facility, or shelter (i.e., congregate setting; densely populated) AND [2] where an outbreak has occurred AND [3] NO symptoms    Protocols used: CORONAVIRUS (COVID-19) EXPOSURE-A-AH 1.3

## 2021-04-05 ENCOUNTER — ALLIED HEALTH/NURSE VISIT (OUTPATIENT)
Dept: FAMILY MEDICINE | Facility: CLINIC | Age: 41
End: 2021-04-05
Payer: COMMERCIAL

## 2021-04-05 ENCOUNTER — VIRTUAL VISIT (OUTPATIENT)
Dept: FAMILY MEDICINE | Facility: CLINIC | Age: 41
End: 2021-04-05
Payer: COMMERCIAL

## 2021-04-05 DIAGNOSIS — Z20.822 SUSPECTED COVID-19 VIRUS INFECTION: ICD-10-CM

## 2021-04-05 DIAGNOSIS — Z20.822 COVID-19 RULED OUT: ICD-10-CM

## 2021-04-05 DIAGNOSIS — Z20.822 SUSPECTED COVID-19 VIRUS INFECTION: Primary | ICD-10-CM

## 2021-04-05 PROCEDURE — 99207 PR NO CHARGE NURSE ONLY: CPT

## 2021-04-05 PROCEDURE — U0005 INFEC AGEN DETEC AMPLI PROBE: HCPCS | Performed by: FAMILY MEDICINE

## 2021-04-05 PROCEDURE — 99213 OFFICE O/P EST LOW 20 MIN: CPT | Mod: 95 | Performed by: FAMILY MEDICINE

## 2021-04-05 PROCEDURE — U0003 INFECTIOUS AGENT DETECTION BY NUCLEIC ACID (DNA OR RNA); SEVERE ACUTE RESPIRATORY SYNDROME CORONAVIRUS 2 (SARS-COV-2) (CORONAVIRUS DISEASE [COVID-19]), AMPLIFIED PROBE TECHNIQUE, MAKING USE OF HIGH THROUGHPUT TECHNOLOGIES AS DESCRIBED BY CMS-2020-01-R: HCPCS | Performed by: FAMILY MEDICINE

## 2021-04-05 NOTE — PROGRESS NOTES
"Fallon is a 40 year old who is being evaluated via a billable telephone visit.      What phone number would you like to be contacted at? [phone]  How would you like to obtain your AVS? MyChart    Assessment & Plan     Suspected COVID-19 virus infection  Was the exposed about 3 4 days ago.  Now is symptomatic.  See below.  We will have her come in for Covid testing and quarantine for now.  - Symptomatic COVID-19 Virus (Coronavirus) by PCR; Future    COVID-19 ruled out  See above.  - Symptomatic COVID-19 Virus (Coronavirus) by PCR; Future             BMI:   Estimated body mass index is 39.92 kg/m  as calculated from the following:    Height as of 7/28/20: 1.547 m (5' 0.9\").    Weight as of 1/26/21: 95.5 kg (210 lb 9.6 oz).           No follow-ups on file.    Ba Gould MD  [facility]    Subjective   Fallon is a 40 year old who presents for the following health issues : Situation as below.  Her  is no had some symptoms and was also exposed.  Spoke with him during this visit and will get them both tested.    HPI     Acute Illness  Acute illness concerns: sore throat, diarrhea, headache, exposure to covid  Onset/Duration: x1 day  Symptoms:  Fever: no  Chills/Sweats: YES- chills no fever  Headache (location?): YES located in the front of head down towards her sinuses  Sinus Pressure: no  Conjunctivitis:  no  Ear Pain: no  Rhinorrhea: no  Congestion: no  Sore Throat: YES  Cough: YES-non-productive  Wheeze: no  Decreased Appetite: no  Nausea: no  Vomiting: no  Diarrhea: YES  Dysuria/Freq.: no  Dysuria or Hematuria: no  Fatigue/Achiness: YES  Sick/Strep Exposure: YES- exposed to covid  Therapies tried and outcome: None      Review of Systems   Constitutional, HEENT, cardiovascular, pulmonary, gi and gu systems are negative, except as otherwise noted.      Objective    Vitals - Patient Reported  Weight (Patient Reported): 94.8 kg (209 lb)  Temperature (Patient Reported): 97.7 "  F (36.5  C)(oral)  Pain Score: Mild Pain (2)  Pain Loc: Head        Physical Exam   healthy, alert and no distress  PSYCH: Alert and oriented times 3; coherent speech, normal   rate and volume, able to articulate logical thoughts, able   to abstract reason, no tangential thoughts, no hallucinations   or delusions  Her affect is normal and pleasant  RESP: No cough, no audible wheezing, able to talk in full sentences  Remainder of exam unable to be completed due to telephone visits                Phone call duration: 6 minutes

## 2021-04-05 NOTE — NURSING NOTE
Health Maintenance Due   Topic Date Due     DIABETIC FOOT EXAM  Never done     ADVANCE CARE PLANNING  Never done     COVID-19 Vaccine (1) Never done     HEPATITIS B IMMUNIZATION (1 of 3 - Risk 3-dose series) Never done     PREVENTIVE CARE VISIT  03/11/2020     MICROALBUMIN  03/16/2020     INFLUENZA VACCINE (1) 09/01/2020     PAP FOLLOW-UP  07/28/2023     HPV FOLLOW-UP  07/28/2023     Vipul Wood, [facility]

## 2021-04-06 LAB
SARS-COV-2 RNA RESP QL NAA+PROBE: ABNORMAL
SPECIMEN SOURCE: ABNORMAL

## 2021-04-07 ENCOUNTER — TELEPHONE (OUTPATIENT)
Dept: FAMILY MEDICINE | Facility: CLINIC | Age: 41
End: 2021-04-07

## 2021-04-07 NOTE — TELEPHONE ENCOUNTER
Patient stating she has been diagnosed with Covid. Wanting to update provider that her blood sugars are running high 200-300. Questioning any changes to medication? Also she is wondering what she can use for body aches and fever since she can not take tylenol or Ibuprofen? Please advise. Socorro Woo LPN

## 2021-04-07 NOTE — TELEPHONE ENCOUNTER
I called her.  She has tested positive for Covid.  She is checking her sugars daily and they have consistently been 200 or above.  I advised her to increase the Metformin to 1000 mg in the evening dose as well.  She may use Tylenol up to once daily 650 mg and also ibuprofen 600 mg once daily.  He is going to quarantine her children for 14 days.  So her  has tested negative at this point.  She will have him retested if he develops symptoms.  She will keep me in the loop about how she is doing.SHARAN Ford MD

## 2021-04-20 ENCOUNTER — TELEPHONE (OUTPATIENT)
Dept: FAMILY MEDICINE | Facility: CLINIC | Age: 41
End: 2021-04-20

## 2021-04-20 NOTE — TELEPHONE ENCOUNTER
Please advise her that she should take a full metformin tablet in the morning and also a full Metformin tablet in the evening and let me know if the sugars do not come down by doing this.  Thank you.  RM

## 2021-04-20 NOTE — TELEPHONE ENCOUNTER
Pt wants Dr. Ford to know that her sugars are still high and running in the 300's still since getting covid. She wants to know what she should do. She said you can my chart her with the answers.  Sia Velazquez MA

## 2021-05-18 ENCOUNTER — TELEPHONE (OUTPATIENT)
Dept: FAMILY MEDICINE | Facility: CLINIC | Age: 41
End: 2021-05-18

## 2021-05-18 ENCOUNTER — APPOINTMENT (OUTPATIENT)
Dept: CT IMAGING | Facility: CLINIC | Age: 41
End: 2021-05-18
Attending: FAMILY MEDICINE
Payer: COMMERCIAL

## 2021-05-18 ENCOUNTER — HOSPITAL ENCOUNTER (EMERGENCY)
Facility: CLINIC | Age: 41
Discharge: HOME | End: 2021-05-18
Attending: FAMILY MEDICINE | Admitting: FAMILY MEDICINE
Payer: COMMERCIAL

## 2021-05-18 VITALS
DIASTOLIC BLOOD PRESSURE: 84 MMHG | RESPIRATION RATE: 20 BRPM | TEMPERATURE: 96.7 F | SYSTOLIC BLOOD PRESSURE: 138 MMHG | HEART RATE: 104 BPM | OXYGEN SATURATION: 99 %

## 2021-05-18 DIAGNOSIS — N83.202 LEFT OVARIAN CYST: ICD-10-CM

## 2021-05-18 DIAGNOSIS — R10.2 PELVIC PAIN IN FEMALE: ICD-10-CM

## 2021-05-18 LAB
ALBUMIN UR-MCNC: NEGATIVE MG/DL
APPEARANCE UR: CLEAR
BACTERIA #/AREA URNS HPF: ABNORMAL /HPF
BILIRUB UR QL STRIP: NEGATIVE
COLOR UR AUTO: ABNORMAL
GLUCOSE UR STRIP-MCNC: NEGATIVE MG/DL
HGB UR QL STRIP: NEGATIVE
KETONES UR STRIP-MCNC: NEGATIVE MG/DL
LEUKOCYTE ESTERASE UR QL STRIP: NEGATIVE
NITRATE UR QL: NEGATIVE
PH UR STRIP: 6 PH (ref 5–7)
RBC #/AREA URNS AUTO: 0 /HPF (ref 0–2)
SOURCE: ABNORMAL
SP GR UR STRIP: 1 (ref 1–1.03)
SQUAMOUS #/AREA URNS AUTO: 1 /HPF (ref 0–1)
UROBILINOGEN UR STRIP-MCNC: 0 MG/DL (ref 0–2)
WBC #/AREA URNS AUTO: 1 /HPF (ref 0–5)

## 2021-05-18 PROCEDURE — 81001 URINALYSIS AUTO W/SCOPE: CPT | Performed by: EMERGENCY MEDICINE

## 2021-05-18 PROCEDURE — 99284 EMERGENCY DEPT VISIT MOD MDM: CPT | Performed by: FAMILY MEDICINE

## 2021-05-18 PROCEDURE — 87086 URINE CULTURE/COLONY COUNT: CPT | Performed by: FAMILY MEDICINE

## 2021-05-18 PROCEDURE — 99284 EMERGENCY DEPT VISIT MOD MDM: CPT | Mod: 25 | Performed by: FAMILY MEDICINE

## 2021-05-18 PROCEDURE — 74176 CT ABD & PELVIS W/O CONTRAST: CPT

## 2021-05-18 RX ORDER — IBUPROFEN 200 MG
800 TABLET ORAL
Status: ON HOLD | COMMUNITY
End: 2023-04-05

## 2021-05-18 RX ORDER — OXYCODONE HYDROCHLORIDE 5 MG/1
5 TABLET ORAL EVERY 6 HOURS PRN
Qty: 12 TABLET | Refills: 0 | Status: SHIPPED | OUTPATIENT
Start: 2021-05-18 | End: 2021-05-25

## 2021-05-18 NOTE — ED PROVIDER NOTES
History     Chief Complaint   Patient presents with     Dysuria     HPI  Fallon Rivera is a 41 year old female with history of kidney stones, ovarian cysts, pelvic pain, chronic diarrhea, type 2 diabetes, status post hysterectomy, status post right oophorectomy, status post appendectomy, status post cholecystectomy who presents to the emergency department with urinary pressure and urge to urinate.  She has pain with urination but no dysuria.  No blood in her urine.  There is a pressure sensation on her bladder and to her left lower quadrant.  She has had a colonoscopy which showed no evidence of diverticulosis.  She has no history of diverticulitis.  She had a very large right ovarian cyst which was removed.  She is still has her left ovary.  The pressure sensation feels similar to that.  She has no malodor to her urine.  Her blood sugars have been a little high but okay.    Allergies:  Allergies   Allergen Reactions     Amoxicillin Hives     Anti-Nausea      Anxiety, agitation,severe paranoia. Zofran, Reglan are some of them.  DRAMAMINE WITHOUT ISSUES       Demerol Hcl [Meperidine Hcl] Nausea     Indomethacin Nausea and Vomiting     Macrobid [Nitrofuran Derivatives] Hives     Reglan [Metoclopramide] Other (See Comments)     Acute paranoia, severe     Zofran [Ondansetron] Other (See Comments)     Severe anxiety, agitation     Vancomycin Other (See Comments)     maria del rosario syndrome       Problem List:    Patient Active Problem List    Diagnosis Date Noted     Dyspareunia, female 12/01/2020     Priority: Medium     Rectal bleeding 09/30/2020     Priority: Medium     Added automatically from request for surgery [id number]       Chronic diarrhea 09/30/2020     Priority: Medium     Added automatically from request for surgery [id number]       Abnormal CT scan, small bowel 09/30/2020     Priority: Medium     Added automatically from request for surgery [id number]       Endometrial hyperplasia, unspecified 06/18/2020      Priority: Medium     Added automatically from request for surgery [id number]       Pelvic pain in female 06/18/2020     Priority: Medium     Added automatically from request for surgery [id number]       Endometrial intraepithelial neoplasia (EIN) 03/02/2020     Priority: Medium     Obesity (BMI 35.0-39.9) with comorbidity (H) 10/16/2018     Priority: Medium     Non morbid obesity due to excess calories 07/05/2016     Priority: Medium     Type 2 diabetes mellitus without complication (H) 04/29/2016     Priority: Medium     Glucosuria 04/27/2016     Priority: Medium     Right ovarian cyst 09/15/2015     Priority: Medium     Hyperlipidemia LDL goal <130 07/18/2012     Priority: Medium     Mild major depression (H) 07/18/2012     Priority: Medium     GERD (gastroesophageal reflux disease) 07/03/2012     Priority: Medium     CARDIOVASCULAR SCREENING; LDL GOAL LESS THAN 160 10/31/2010     Priority: Medium     Dyspnea 11/24/2009     Priority: Medium     Kidney stones 02/17/2009     Priority: Medium     S/P conization of cervix- KAYLA 2/3 in 2011 06/27/2007     Priority: Medium     1/24/06 pap: ASC-H  2/9/06 colposcopy/bx (negative)/ECC (negative) pap- ASC-H  5/24/06 pap- ASC-H  9/6/07 pap NIL  1/23/07 pap ASCUS + HPV 45 (high risk)  3/15/07 pap ASCUS + HPV 45 (high risk) colposcopy- bx (negative)/ECC (koilocytosis)  6/19/07 ECC- koilocytosis.  Pap- AGS  10/23/07 pap- ASC-H, ECC- negative  2/14/08 pap NIL- return to yearly paps  2/17/09 pap NIL- repeat 1 year  3/8/10 pap NIL  3/3/11 pap ASCUS + HPV 45 (high risk)- colposcopy recommended  5/16/11 colpo- bx (KAYLA 2/3/HSIL) ECC (ASCUS)  pap (ASC-H)  6/2/11 recommend: [facility]  7/1/11 [facility]: path: KAYLA 2/3 with possible involvement of the endocervical margin.  ECC- neg.  Endometrium- neg.  7/14/11 plan: HIPOLITO in approx 2 months  10/10/11- hysterectomy planned.  (cancelled)  11/7/11 pap-- NIL. Plan--repeat pap in 6 months. (5/7/12)  5/7/12 pap NIL. Plan-- pap in 1 year (due 5/7/13)   5/8/13 pap  NIL. Plan-pap in 1 year  5/8/14 NIL pap, neg HR HPV.  Plan- repeat pap/HPV in 1 year (due 5/8/15)  10/14/14 NIL pap, neg HR HPV. Endometrial biopsy- WNL   1/6/16  NIL pap, neg HR HPV.  Plan: paps yearly, per Dr. Ford.  1/30/17 NIL pap, neg HR HPV. Plan: pap in 1 year.   3/12/18 NIL pap, neg HR HPV. Plan: cotest annually, per Dr. Ford.  3/11/19 ECC: negative. NIL pap, neg HR HPV. Plan: cotest in 1 yr  2/11/20 NIL Pap, Neg HPV. EMB: intraepithelial neoplasia. Plan: Hysterectomy.   7/28/20 Hysterectomy with cervix removed. Hx of KAYLA 2 in 2011 Plan: Cotest Q3y until 2036.           Papanicolaou smear of cervix with atypical squamous cells cannot exclude high grade squamous intraepithelial lesion (ASC-H) 09/07/2006     Priority: Medium     Female infertility 07/24/2006     Priority: Medium     Problem list name updated by automated process. Provider to review       Hemorrhage of rectum and anus 12/02/2004     Priority: Medium        Past Medical History:    Past Medical History:   Diagnosis Date     Abnormal Pap smear 2006, 2007,      Calculus of kidney 2002     Cervical high risk HPV (human papillomavirus) test positive      History of colposcopy with cervical biopsy 2/9/06, 3/15/07       Past Surgical History:    Past Surgical History:   Procedure Laterality Date     C REMOVAL OF KIDNEY STONE      two surgeries, 2002,2003     CHOLECYSTECTOMY, LAPOROSCOPIC  5/11/2007    Cholecystectomy, Laparoscopic     COLONOSCOPY  05/17/10     COLONOSCOPY N/A 8/27/2019    Procedure: COLONOSCOPY;  Surgeon: Paramjit Kingston DO;  Location:  GI     COLONOSCOPY N/A 10/23/2020    Procedure: COLONOSCOPY, WITH  BIOPSY;  Surgeon: Ba Dickinson MD;  Location:  GI     CONIZATION  7/1/2011    Procedure:CONIZATION; cold knife and punch biopsy of mole on back; Surgeon:SYDNEY FORD; Location: OR     DILATION AND CURETTAGE, HYSTEROSCOPY, LAPAROSCOPY, COMBINED Right 9/24/2015    Procedure: COMBINED DILATION  AND CURETTAGE, HYSTEROSCOPY, LAPAROSCOPY;  Surgeon: Sydney Ford MD;  Location: [facility]     DISCECTOMY LUMBAR MINIMALLY INVASIVE ONE LEVEL Left 1/23/2019    Procedure: Minimally Invasive Surgery Left Lumbar 5-Sacral 1 microdiscectomy;  Surgeon: Mason Roe MD;  Location: [facility]     ESOPHAGOSCOPY, GASTROSCOPY, DUODENOSCOPY (EGD), COMBINED  5/21/2014    Procedure: COMBINED ESOPHAGOSCOPY, GASTROSCOPY, DUODENOSCOPY (EGD), BIOPSY SINGLE OR MULTIPLE;  Surgeon: Earl Lane MD;  Location: [facility]     ESOPHAGOSCOPY, GASTROSCOPY, DUODENOSCOPY (EGD), COMBINED N/A 10/23/2020    Procedure: Esophagogastroduodenoscopy with  biopsy;  Surgeon: Ba Dickinson MD;  Location: [facility]     EXCISE LESION TRUNK  7/1/2011    Procedure:EXCISE LESION TRUNK; Surgeon:SYDNEY FORD; Location:[facility]     HC FRAGMENTING OF KIDNEY STONE  11/30/2005     HC RX ECTOP PREG BY SCOPE,RMV TUBE/OVRY  12/20/2007    Right sided salpingectomy and removal of ruptured ectopic pregnancy. D&C.     HYSTERECTOMY VAGINAL       LAPAROSCOPIC APPENDECTOMY N/A 9/24/2015    Procedure: LAPAROSCOPIC APPENDECTOMY;  Surgeon: James Cuellar MD;  Location: [facility]     LAPAROSCOPIC CYSTECTOMY OVARIAN (BENIGN) N/A 9/24/2015    Procedure: LAPAROSCOPIC CYSTECTOMY OVARIAN (BENIGN);  Surgeon: Sydney Ford MD;  Location: [facility]     LAPAROSCOPIC HYSTERECTOMY TOTAL, BILATERAL SALPINGO-OOPHORECTOMY, COMBINED N/A 7/28/2020    Procedure: laparoscpic assisted vaginal hysterectomy, cystoscopy;  Surgeon: Sydney Ford MD;  Location: [facility]     LAPAROSCOPIC OOPHORECTOMY Right 9/24/2015    Procedure: LAPAROSCOPIC OOPHORECTOMY;  Surgeon: Sydney Ford MD;  Location: [facility]     LITHOTRIPSY  01/17/2007     PELVIS LAPAROSCOPY,DX  10/16/2000    Diagnostic laparoscopy, Endometrial biopsy.     TUBAL/ECTOPIC PREGNANCY  01/23/2007    Lap removal of left ruptured ectopic pregnancy & salpingectomy, D&C     [facility]  ENDOS[facility], SIMPLE EXAM  09/01/09       Family History:    Family History   Problem Relation Age of Onset     Diabetes Maternal Grandmother         adult onset     Anesthesia Reaction Maternal Grandmother         can't tolerate Novacaine.     Respiratory Maternal Grandmother         COPD and emphysema.     Thyroid Disease Maternal Grandmother      Heart Disease Maternal Grandmother         CHF     Diabetes Paternal Grandfather         adult o nset     Hypertension Paternal Grandfather      Cerebrovascular Disease Paternal Grandfather      Heart Disease Paternal Grandfather         MI, replaced valve,angioplasty     Thyroid Disease Paternal Grandfather      Cancer Maternal Grandfather         skin cancer     Heart Disease Maternal Grandfather         MI     Obesity Mother         on thyroid medication     Thyroid Disease Mother      Diabetes Mother      Rheumatologic Disease Mother      Heart Disease Father      Hypertension Father         and TIA     Lipids Father      Breast Cancer Paternal Grandmother      Arrhythmia Other      Cancer Maternal Aunt         breast/brain cancer     Depression Paternal Aunt      Cerebrovascular Disease Paternal Uncle      Cerebrovascular Disease Paternal Aunt        Social History:  Marital Status:   [2]  Social History     Tobacco Use     Smoking status: Current Every Day Smoker     Packs/day: 0.00     Years: 12.00     Pack years: 0.00     Types: Cigarettes     Smokeless tobacco: Never Used     Tobacco comment: As of 10 /2014, pt has had a few cigs here and there   Substance Use Topics     Alcohol use: Yes     Alcohol/week: 0.0 standard drinks     Comment: every few months     Drug use: No        Medications:    blood glucose monitoring (NO BRAND SPECIFIED) meter device kit  CONTOUR NEXT TEST test strip  ibuprofen (ADVIL/MOTRIN) 200 MG tablet  metFORMIN (GLUCOPHAGE) 1000 MG tablet  metFORMIN (GLUCOPHAGE) 1000 MG tablet  omeprazole (PRILOSEC) 20 MG DR capsule  oxyCODONE  (ROXICODONE) 5 MG tablet  acetaminophen (TYLENOL) 500 MG tablet          Review of Systems   All other systems reviewed and are negative.      Physical Exam   BP: (!) 137/111  Pulse: 119  Temp: 96.7  F (35.9  C)  Resp: 20  SpO2: 99 %      Physical Exam  Vitals signs and nursing note reviewed.   Constitutional:       Appearance: Normal appearance. She is obese. She is not ill-appearing or toxic-appearing.   HENT:      Head: Normocephalic and atraumatic.      Mouth/Throat:      Mouth: Mucous membranes are moist.   Eyes:      Conjunctiva/sclera: Conjunctivae normal.   Neck:      Musculoskeletal: Normal range of motion.   Cardiovascular:      Rate and Rhythm: Normal rate.   Pulmonary:      Effort: Pulmonary effort is normal.   Abdominal:      Comments: Her abdomen is soft.  Benign.  Normal active bowel sounds.  She has little discomfort with deep palpation suprapubic and left lower quadrant.  No peritoneal signs of rebound or guarding   Musculoskeletal: Normal range of motion.   Skin:     General: Skin is warm.      Capillary Refill: Capillary refill takes less than 2 seconds.   Neurological:      General: No focal deficit present.      Mental Status: She is alert.   Psychiatric:         Mood and Affect: Mood normal.         Behavior: Behavior normal.         ED Course        Procedures               Critical Care time:  none               Results for orders placed or performed during the hospital encounter of 05/18/21 (from the past 24 hour(s))   Routine UA with microscopic   Result Value Ref Range    Color Urine Straw     Appearance Urine Clear     Glucose Urine Negative NEG^Negative mg/dL    Bilirubin Urine Negative NEG^Negative    Ketones Urine Negative NEG^Negative mg/dL    Specific Gravity Urine 1.004 1.003 - 1.035    Blood Urine Negative NEG^Negative    pH Urine 6.0 5.0 - 7.0 pH    Protein Albumin Urine Negative NEG^Negative mg/dL    Urobilinogen mg/dL 0.0 0.0 - 2.0 mg/dL    Nitrite Urine Negative NEG^Negative     Leukocyte Esterase Urine Negative NEG^Negative    Source Midstream Urine     WBC Urine 1 0 - 5 /HPF    RBC Urine 0 0 - 2 /HPF    Bacteria Urine Few (A) NEG^Negative /HPF    Squamous Epithelial /HPF Urine 1 0 - 1 /HPF   CT Abdomen Pelvis w/o Contrast    Narrative    CT ABDOMEN AND PELVIS WITHOUT CONTRAST   5/18/2021 2:49 PM     HISTORY: Left lower quadrant abdominal pain.    TECHNIQUE: Noncontrast CT abdomen and pelvis was performed. Radiation  dose for this scan was reduced using automated exposure control,  adjustment of the mA and/or kV according to patient size, or iterative  reconstruction technique.    COMPARISON: Pelvic ultrasound on 12/28/2020 and CT abdomen and pelvis  on 9/9/2020.    FINDINGS:  Lower chest: Lung bases are clear.    Right kidney: No radiodense kidney/ureteral stones or hydronephrosis.    Left kidney: No radiodense kidney/ureteral stones or hydronephrosis.  1.7 cm left upper pole renal cyst.    Urinary bladder: Unremarkable.    Remainder of the abdomen and pelvis: Limited evaluation of the  abdominal organs due to lack of IV contrast. Right upper quadrant post  cholecystectomy clips. Diffuse hypoattenuation of the liver, likely  due to underlying hepatic stenosis. The unenhanced pancreas, spleen  and adrenal glands are grossly unremarkable. No abnormally dilated  bowel loops. Postsurgical changes of appendectomy. No significant  abdominopelvic lymphadenopathy. No significant free fluid in the  abdomen or pelvis. No free peritoneal or portal venous gas. 6.0 x 4.6  cm left adnexal cyst (series 3 image 171).    Bones and soft tissue: Multilevel degenerative changes of the spine.  No suspicious osseous lesion. Small fat-containing  umbilical/periumbilical hernia.       Impression    IMPRESSION:   1. No radiodense kidney/ureteral stones or hydronephrosis in either  kidney.  2. Hepatic steatosis.  3. 6.0 cm left adnexal cyst, indeterminate, could be ovarian, can be  further evaluated with pelvic  ultrasound.    BITA JENSEN MD       Medications - No data to display    Assessments & Plan (with Medical Decision Making)   MDM--41-year-old female with history of ovarian cyst status post right oophorectomy and hysterectomy who presents the emergency room with pelvic pain and pressure and urge to urinate.  She has a large 6 cm left adnexal mass consistent with a large ovarian cyst.  Her urine is otherwise clear.  Her abdominal exam is benign.  I do not feel labs were necessary as there is no sign of infection, benign abdominal exam, no fever and CT findings consistent with suspicion for recurrent pelvic pain secondary to large ovarian cyst.  She has a history of kidney stones but no kidney stones noted on urinalysis or CT scan.  Discussed findings with the patient.  She cannot take NSAIDs or Tylenol.  She was given 12 tablets of oxycodone 5 mg 1 p.o. every 6 hours as needed severe pain.  She should follow-up with Dr. Fried who did her previous surgery.  They had already discussed removing her other ovary previously.  I sent the urine for culture.  She is comfortable with this treatment plan and all questions answered to her satisfaction and she is discharged in stable condition.  I have reviewed the nursing notes.    I have reviewed the findings, diagnosis, plan and need for follow up with the patient.       New Prescriptions    OXYCODONE (ROXICODONE) 5 MG TABLET    Take 1 tablet (5 mg) by mouth every 6 hours as needed for pain       Final diagnoses:   Left ovarian cyst   Pelvic pain in female       5/18/2021   [facility] EMERGENCY DEPT     Janice, Jairo LAFLEUR MD  05/18/21 [id number]

## 2021-05-18 NOTE — DISCHARGE INSTRUCTIONS
Follow-up with Dr. Barton.  Take the oxycodone for severe pain only.  Your urine has been sent for culture and we will call you if it is positive.  [facility]

## 2021-05-18 NOTE — TELEPHONE ENCOUNTER
Reason for Call:  ER Follow UP    Detailed comments: Pt was released from ER 5/18/21 for Enlarged Ovarian Cyst that is causing pelvic pain. Has an appt with Dr. Ford on 5/25 but wants to know if there is something she should schedule instead or should wait longer than a week to be seen.     Phone Number Patient can be reached at: [phone]    Best Time: Anytime    Can we leave a detailed message on this number? Yes    Call taken on 5/18/2021 at 4:49 PM by Devon Lay

## 2021-05-19 DIAGNOSIS — N83.202 CYST OF LEFT OVARY: Primary | ICD-10-CM

## 2021-05-19 LAB
BACTERIA SPEC CULT: NORMAL
Lab: NORMAL
SPECIMEN SOURCE: NORMAL

## 2021-05-19 NOTE — TELEPHONE ENCOUNTER
Please call her to make sure she got her pelvic US scheduled- I placed the order earlier today- thanks- rm

## 2021-05-20 NOTE — RESULT ENCOUNTER NOTE
Final urine culture report is negative.  Adult Negative Urine culture parameters per protocol: Any # Urogenital single or mixed organism, <10,000 col/ml single organism (cath specimen), and <50,000 col/ml single organism (midstream or cath specimen).  [facility] Emergency Dept discharge antibiotic prescribed (If applicable): None  Treatment recommendations per [facility] ED Lab Result Urine Culture protocol.

## 2021-05-21 ENCOUNTER — HOSPITAL ENCOUNTER (OUTPATIENT)
Dept: ULTRASOUND IMAGING | Facility: CLINIC | Age: 41
Discharge: HOME | End: 2021-05-21
Attending: OBSTETRICS & GYNECOLOGY | Admitting: OBSTETRICS & GYNECOLOGY
Payer: COMMERCIAL

## 2021-05-21 DIAGNOSIS — N83.202 CYST OF LEFT OVARY: ICD-10-CM

## 2021-05-21 PROCEDURE — 76830 TRANSVAGINAL US NON-OB: CPT

## 2021-05-21 NOTE — TELEPHONE ENCOUNTER
Upon reviewing the chart the patient was scheduled and this was competed today 05/21/2021   Rehana ORLANDO

## 2021-05-25 ENCOUNTER — OFFICE VISIT (OUTPATIENT)
Dept: FAMILY MEDICINE | Facility: CLINIC | Age: 41
End: 2021-05-25
Payer: COMMERCIAL

## 2021-05-25 ENCOUNTER — HOSPITAL ENCOUNTER (OUTPATIENT)
Facility: CLINIC | Age: 41
End: 2021-05-25
Attending: OBSTETRICS & GYNECOLOGY | Admitting: OBSTETRICS & GYNECOLOGY
Payer: COMMERCIAL

## 2021-05-25 VITALS
HEART RATE: 84 BPM | WEIGHT: 213 LBS | DIASTOLIC BLOOD PRESSURE: 64 MMHG | RESPIRATION RATE: 16 BRPM | BODY MASS INDEX: 40.38 KG/M2 | TEMPERATURE: 97 F | SYSTOLIC BLOOD PRESSURE: 102 MMHG | OXYGEN SATURATION: 98 %

## 2021-05-25 DIAGNOSIS — N83.202 LEFT OVARIAN CYST: Primary | ICD-10-CM

## 2021-05-25 DIAGNOSIS — N83.202 LEFT OVARIAN CYST: ICD-10-CM

## 2021-05-25 PROCEDURE — 99213 OFFICE O/P EST LOW 20 MIN: CPT | Performed by: OBSTETRICS & GYNECOLOGY

## 2021-05-25 RX ORDER — OXYCODONE HYDROCHLORIDE 5 MG/1
5 TABLET ORAL EVERY 6 HOURS PRN
Qty: 12 TABLET | Refills: 0 | Status: SHIPPED | OUTPATIENT
Start: 2021-05-25 | End: 2021-09-09

## 2021-05-25 ASSESSMENT — PAIN SCALES - GENERAL: PAINLEVEL: MODERATE PAIN (4)

## 2021-05-25 NOTE — PROGRESS NOTES
Subjective: she is here for follow-up of recent emergency room visit on 5/18 for pelvic pain.  She continues to have pain.  She is wondering about having the ovary removed.       recent US showed:    FINDINGS:     UTERUS: Surgically absent.     RIGHT OVARY: Surgically absent.     LEFT OVARY: 6.7 x 4.6 x 6.2 cm. There is a large simple appearing cyst  in the left ovary measuring 5.5 x 5.1 x 4.1 cm corresponding with the  CT findings. This has significantly increased in size since the prior  ultrasound dated 12/28/2020 and could represent a different cyst than  on the prior study. Color and Doppler spectral analysis demonstrates  arterial and venous waveforms in the left ovary.     No significant free fluid.                                                                      IMPRESSION:  Simple appearing, large, left, ovarian cyst is noted. I recommend  follow-up ultrasound in 6 or 10 weeks at a different stage of  patient's cycle to ensure resolution of this finding.        IVETH FAN MD    The past medical history, social history, past surgical history and family history as shown below have been reviewed by me today.    Past Medical History:   Diagnosis Date     Abnormal Pap smear 2006, 2007,     ASC-H, ASCUS + HPV 45     Calculus of kidney 2002     Cervical high risk HPV (human papillomavirus) test positive     + HPV 45 (high risk)     History of colposcopy with cervical biopsy 2/9/06, 3/15/07    koilocytosis 2007        Allergies   Allergen Reactions     Amoxicillin Hives     Anti-Nausea      Anxiety, agitation,severe paranoia. Zofran, Reglan are some of them.  DRAMAMINE WITHOUT ISSUES       Demerol Hcl [Meperidine Hcl] Nausea     Indomethacin Nausea and Vomiting     Macrobid [Nitrofuran Derivatives] Hives     Reglan [Metoclopramide] Other (See Comments)     Acute paranoia, severe     Zofran [Ondansetron] Other (See Comments)     Severe anxiety, agitation     Vancomycin Other (See Comments)     maria del rosario syndrome      Current Outpatient Medications   Medication Sig Dispense Refill     acetaminophen (TYLENOL) 500 MG tablet Take 1,000 mg by mouth every 6 hours as needed for mild pain       blood glucose monitoring (NO BRAND SPECIFIED) meter device kit Use to test blood sugar 2 times daily or as directed. (Patient taking differently: Use to test blood sugar as directed.) 1 kit 0     CONTOUR NEXT TEST test strip USE TO TEST BLOOD GLUCOSE FOUR TIMES A DAY (Patient taking differently: 1 strip by In Vitro route 3 times daily ) 150 each 3     ibuprofen (ADVIL/MOTRIN) 200 MG tablet Take 800 mg by mouth nightly as needed (sleep)       metFORMIN (GLUCOPHAGE) 1000 MG tablet Take 500 mg by mouth daily (with dinner) And 1000 mg every morning       metFORMIN (GLUCOPHAGE) 1000 MG tablet Take 1 tablet in the a.m. 1/2 tablet at night (Patient taking differently: Take 1,000 mg by mouth daily (with breakfast) 500 mg with supper) 90 tablet 3     omeprazole (PRILOSEC) 20 MG DR capsule TAKE 1 CAPSULE BY MOUTH DAILY, 30 TO 60 MINUTES BEFORE A MEAL. (Patient taking differently: Take 20 mg by mouth daily 30 TO 60 MINUTES BEFORE A MEAL.) 30 capsule 8     oxyCODONE (ROXICODONE) 5 MG tablet Take 1 tablet (5 mg) by mouth every 6 hours as needed for pain 12 tablet 0     Past Surgical History:   Procedure Laterality Date     C REMOVAL OF KIDNEY STONE      two surgeries, 2002,2003     CHOLECYSTECTOMY, LAPOROSCOPIC  5/11/2007    Cholecystectomy, Laparoscopic     COLONOSCOPY  05/17/10     COLONOSCOPY N/A 8/27/2019    Procedure: COLONOSCOPY;  Surgeon: Paramjit Kingston DO;  Location:  GI     COLONOSCOPY N/A 10/23/2020    Procedure: COLONOSCOPY, WITH  BIOPSY;  Surgeon: Ba Dickinson MD;  Location:  GI     CONIZATION  7/1/2011    Procedure:CONIZATION; cold knife and punch biopsy of mole on back; Surgeon:SYDNEY GRAFF; Location: OR     DILATION AND CURETTAGE, HYSTEROSCOPY, LAPAROSCOPY, COMBINED Right 9/24/2015    Procedure: COMBINED  DILATION AND CURETTAGE, HYSTEROSCOPY, LAPAROSCOPY;  Surgeon: Sydney Ford MD;  Location: [facility]     DISCECTOMY LUMBAR MINIMALLY INVASIVE ONE LEVEL Left 1/23/2019    Procedure: Minimally Invasive Surgery Left Lumbar 5-Sacral 1 microdiscectomy;  Surgeon: Mason Roe MD;  Location: [facility]     ESOPHAGOSCOPY, GASTROSCOPY, DUODENOSCOPY (EGD), COMBINED  5/21/2014    Procedure: COMBINED ESOPHAGOSCOPY, GASTROSCOPY, DUODENOSCOPY (EGD), BIOPSY SINGLE OR MULTIPLE;  Surgeon: Earl Lane MD;  Location: [facility]     ESOPHAGOSCOPY, GASTROSCOPY, DUODENOSCOPY (EGD), COMBINED N/A 10/23/2020    Procedure: Esophagogastroduodenoscopy with  biopsy;  Surgeon: Ba Dickinson MD;  Location: [facility]     EXCISE LESION TRUNK  7/1/2011    Procedure:EXCISE LESION TRUNK; Surgeon:SYDNEY FORD; Location:[facility]     HC FRAGMENTING OF KIDNEY STONE  11/30/2005     HC RX ECTOP PREG BY SCOPE,RMV TUBE/OVRY  12/20/2007    Right sided salpingectomy and removal of ruptured ectopic pregnancy. D&C.     HYSTERECTOMY VAGINAL       LAPAROSCOPIC APPENDECTOMY N/A 9/24/2015    Procedure: LAPAROSCOPIC APPENDECTOMY;  Surgeon: James Cuellar MD;  [facility]     LAPAROSCOPIC CYSTECTOMY OVARIAN (BENIGN) N/A 9/24/2015    Procedure: LAPAROSCOPIC CYSTECTOMY OVARIAN (BENIGN);  Surgeon: Sydney Ford MD;  [facility]     LAPAROSCOPIC HYSTERECTOMY TOTAL, BILATERAL SALPINGO-OOPHORECTOMY, COMBINED N/A 7/28/2020    Procedure: laparoscpic assisted vaginal hysterectomy, cystoscopy;  Surgeon: Sydney Ford MD;  Location: [facility]     LAPAROSCOPIC OOPHORECTOMY Right 9/24/2015    Procedure: LAPAROSCOPIC OOPHORECTOMY;  Surgeon: Sydney Ford MD;  [facility]     LITHOTRIPSY  01/17/2007     PELVIS LAPAROSCOPY,DX  10/16/2000    Diagnostic laparoscopy, Endometrial biopsy.     TUBAL/ECTOPIC PREGNANCY  01/23/2007    Lap removal of left ruptured ectopic pregnancy & salpingectomy, D&C     [facility]  UGI ENDOSCOPY, SIMPLE EXAM  09/01/09     Social History     Socioeconomic History     Marital status:      Spouse name: Joseph     Number of children: 1     Years of education: 9     Highest education level: Not on file   Occupational History     Employer: NONE   Social Needs     Financial resource strain: Not on file     Food insecurity     Worry: Not on file     Inability: Not on file     Transportation needs     Medical: Not on file     Non-medical: Not on file   Tobacco Use     Smoking status: Current Every Day Smoker     Packs/day: 0.00     Years: 12.00     Pack years: 0.00     Types: Cigarettes     Smokeless tobacco: Never Used     Tobacco comment: As of 10 /2014, pt has had a few cigs here and there   Substance and Sexual Activity     Alcohol use: Yes     Alcohol/week: 0.0 standard drinks     Comment: every few months     Drug use: No     Sexual activity: Yes     Partners: Male     Birth control/protection: Female Surgical   Lifestyle     Physical activity     Days per week: Not on file     Minutes per session: Not on file     Stress: Not on file   Relationships     Social connections     Talks on phone: Not on file     Gets together: Not on file     Attends [employer]ism service: Not on file     Active member of club or organization: Not on file     Attends meetings of clubs or organizations: Not on file     Relationship status: Not on file     Intimate partner violence     Fear of current or ex partner: Not on file     Emotionally abused: Not on file     Physically abused: Not on file     Forced sexual activity: Not on file   Other Topics Concern      Service No     Blood Transfusions No     Caffeine Concern Yes     Comment: Reports 1 can soda/week  Advised not more than 16 ounces caffeien/day.     Occupational Exposure No     Hobby Hazards No     Sleep Concern No     Stress Concern Yes     Comment: will discuss with      Weight Concern Yes     Comment: Eating disorder in childhood.  Hx.  gestational diab.     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     Exercise No     Comment: Advised walking 30 min/day.     Bike Helmet No     Seat Belt Yes     Self-Exams Not Asked     Parent/sibling w/ CABG, MI or angioplasty before 65F 55M? Not Asked   Social History Narrative     and lives at home with her  and daughter.     Family History   Problem Relation Age of Onset     Diabetes Maternal Grandmother         adult onset     Anesthesia Reaction Maternal Grandmother         can't tolerate Novacaine.     Respiratory Maternal Grandmother         COPD and emphysema.     Thyroid Disease Maternal Grandmother      Heart Disease Maternal Grandmother         CHF     Diabetes Paternal Grandfather         adult o nset     Hypertension Paternal Grandfather      Cerebrovascular Disease Paternal Grandfather      Heart Disease Paternal Grandfather         MI, replaced valve,angioplasty     Thyroid Disease Paternal Grandfather      Cancer Maternal Grandfather         skin cancer     Heart Disease Maternal Grandfather         MI     Obesity Mother         on thyroid medication     Thyroid Disease Mother      Diabetes Mother      Rheumatologic Disease Mother      Heart Disease Father      Hypertension Father         and TIA     Lipids Father      Breast Cancer Paternal Grandmother      Arrhythmia Other      Cancer Maternal Aunt         breast/brain cancer     Depression Paternal Aunt      Cerebrovascular Disease Paternal Uncle      Cerebrovascular Disease Paternal Aunt        ROS: A 12 point review of systems was done. Except for what is listed above in the HPI, the systems review is negative .      Objective: Vital signs: Blood pressure 102/64, pulse 84, temperature 97  F (36.1  C), temperature source Temporal, resp. rate 16, weight 96.6 kg (213 lb), last menstrual period 07/25/2020, SpO2 98 %, not currently breastfeeding.       no exam is repeated  today-she declined-      Assessment/Plan:     1.  5.5 cm simple left ovarian cyst-we discussed options for treatment.  She is strongly considering having the ovary removed.  She says this is her third cyst since her hysterectomy.  She is tired of coming into the emergency room with pain.  I told her that taking estrogen replacement is somewhat contraindicated in diabetics.  There is an increased risk of DVT.  Therefore we will have to carefully consider this.  I am going to get an input from some of my OB/GYN partners about the advisability of her taking hormone replacement with estrogen.  She will plan to come back to see me in Becky 10 to discuss this in great detail and then make plans for surgery on June 14 if she still wants to go ahead.  We would plan to do this laparoscopically but there is a small chance of an open procedure.        A total of 25 minutes were spent in today's visit including the time spent with  the patient in addition to the time spent just prior to the visit reviewing the chart  and then charting afterwards on this patient today.         The above information was dictating using Dragon voice software and as a result there may be some irregularities that were not detected in my review of this note.    SHARAN Ford MD

## 2021-06-01 ENCOUNTER — TELEPHONE (OUTPATIENT)
Dept: FAMILY MEDICINE | Facility: CLINIC | Age: 41
End: 2021-06-01

## 2021-06-01 DIAGNOSIS — N83.209 CYST OF OVARY, UNSPECIFIED LATERALITY: Primary | ICD-10-CM

## 2021-06-01 NOTE — TELEPHONE ENCOUNTER
Reason for Call:  Other upcoming surgery    Detailed comments: has more questions and considering canceling surgery, would like to talk to Dr Ford to discuss furthe.    Phone Number Patient can be reached at: Cell number on file:    Telephone Information:   Mobile [phone]       Best Time:     Can we leave a detailed message on this number? YES    Call taken on 6/1/2021 at 11:08 AM by Lola Negrete

## 2021-06-01 NOTE — PROGRESS NOTES
I called her to discuss her upcoming surgery.  She had left a message  that she wants to cancel the surgery.  She told me that she is not sure about hormone replacement and therefore she does not want to remove her ovary.  Therefore we have plan to repeat an ultrasound in 4 weeks to recheck the ovarian cyst.  If her pain should intensify in the meantime she will let me know and then we will reconsider the surgery.SHARAN Ford MD

## 2021-07-01 ENCOUNTER — HOSPITAL ENCOUNTER (OUTPATIENT)
Dept: ULTRASOUND IMAGING | Facility: CLINIC | Age: 41
Discharge: HOME | End: 2021-07-01
Attending: OBSTETRICS & GYNECOLOGY | Admitting: OBSTETRICS & GYNECOLOGY
Payer: COMMERCIAL

## 2021-07-01 DIAGNOSIS — N83.209 CYST OF OVARY, UNSPECIFIED LATERALITY: ICD-10-CM

## 2021-07-01 PROCEDURE — 76830 TRANSVAGINAL US NON-OB: CPT

## 2021-07-02 ENCOUNTER — TELEPHONE (OUTPATIENT)
Dept: FAMILY MEDICINE | Facility: CLINIC | Age: 41
End: 2021-07-02

## 2021-07-02 DIAGNOSIS — N83.202 LEFT OVARIAN CYST: Primary | ICD-10-CM

## 2021-07-02 NOTE — TELEPHONE ENCOUNTER
----- Message from Greg Ford MD sent at 7/2/2021  8:56 AM CDT -----  Triny/team,Please inform Fallon/ or caretaker  that this result(s) is/are improved - the overall size of the left ovary is smaller and the cysts are smaller-   It would be a good idea to repeat the ultrasound in 4 months.Thanks. SHARAN Ford MD

## 2021-07-02 NOTE — TELEPHONE ENCOUNTER
Spoke with patient and informed of results below. Patient understood. Please sign orders for future US. Okay to close encounter once signed. Patient will call back when it gets closer to schedule.   Dulce Maria Kang MA

## 2021-07-02 NOTE — RESULT ENCOUNTER NOTE
Triny/team,Please inform Fallon/ or caretaker  that this result(s) is/are improved - the overall size of the left ovary is smaller and the cysts are smaller-   It would be a good idea to repeat the ultrasound in 4 months.Thanks. SHARAN Ford MD

## 2021-07-20 NOTE — TELEPHONE ENCOUNTER
Other Plan: [place] by secondary intention Reason for Call:  Other call back    Detailed comments: Yris Calling stating patient is scheduled for a block surgery and they are wondering what type of block they should be planning for. Aware Liliana is out of clinic today and returning on Monday. Requesting this to be advised as soon as possible. Please advise     Phone Number Patient can be reached at: Other phone number:   [phone] option 3    Best Time:      Can we leave a detailed message on this number? NO    Call taken on 3/13/2020 at 11:56 AM by Dilcia Gould     Other Plan: [place] by secondary intention

## 2021-07-22 ENCOUNTER — MYC MEDICAL ADVICE (OUTPATIENT)
Dept: FAMILY MEDICINE | Facility: CLINIC | Age: 41
End: 2021-07-22

## 2021-07-22 DIAGNOSIS — E11.9 TYPE 2 DIABETES MELLITUS WITHOUT COMPLICATION, WITHOUT LONG-TERM CURRENT USE OF INSULIN (H): ICD-10-CM

## 2021-07-22 NOTE — TELEPHONE ENCOUNTER
Prescription approved per [facility] Refill Protocol.    Cece Kelley RN

## 2021-08-09 ENCOUNTER — HOSPITAL ENCOUNTER (EMERGENCY)
Facility: CLINIC | Age: 41
Discharge: HOME | End: 2021-08-10
Attending: NURSE PRACTITIONER | Admitting: NURSE PRACTITIONER
Payer: COMMERCIAL

## 2021-08-09 VITALS
DIASTOLIC BLOOD PRESSURE: 96 MMHG | HEIGHT: 64 IN | WEIGHT: 213 LBS | RESPIRATION RATE: 20 BRPM | OXYGEN SATURATION: 99 % | HEART RATE: 104 BPM | BODY MASS INDEX: 36.37 KG/M2 | TEMPERATURE: 98.2 F | SYSTOLIC BLOOD PRESSURE: 129 MMHG

## 2021-08-09 DIAGNOSIS — T16.2XXA ACUTE FOREIGN BODY OF LEFT EAR CANAL, INITIAL ENCOUNTER: ICD-10-CM

## 2021-08-09 PROCEDURE — 99283 EMERGENCY DEPT VISIT LOW MDM: CPT | Mod: 25 | Performed by: NURSE PRACTITIONER

## 2021-08-09 PROCEDURE — 99283 EMERGENCY DEPT VISIT LOW MDM: CPT | Performed by: NURSE PRACTITIONER

## 2021-08-09 ASSESSMENT — MIFFLIN-ST. JEOR: SCORE: 1626.16

## 2021-08-10 NOTE — ED PROVIDER NOTES
History     Chief Complaint   Patient presents with     Otalgia     HPI  Fallon Rivera is a 41 year old female who presents to the emergency department for evaluation of foreign body in left ear canal.  Patient states she felt a insect go into her ear and continues to feel buzzing in her ear.  She had attempted to remove the insect with her fingernail but was unsuccessful.    Allergies:  Allergies   Allergen Reactions     Amoxicillin Hives     Anti-Nausea      Anxiety, agitation,severe paranoia. Zofran, Reglan are some of them.  DRAMAMINE WITHOUT ISSUES       Demerol Hcl [Meperidine Hcl] Nausea     Indomethacin Nausea and Vomiting     Macrobid [Nitrofuran Derivatives] Hives     Reglan [Metoclopramide] Other (See Comments)     Acute paranoia, severe     Zofran [Ondansetron] Other (See Comments)     Severe anxiety, agitation     Vancomycin Other (See Comments)     maria del rosario syndrome       Problem List:    Patient Active Problem List    Diagnosis Date Noted     Left ovarian cyst 05/25/2021     Priority: Medium     Added automatically from request for surgery [id number]       Dyspareunia, female 12/01/2020     Priority: Medium     Rectal bleeding 09/30/2020     Priority: Medium     Added automatically from request for surgery [id number]       Chronic diarrhea 09/30/2020     Priority: Medium     Added automatically from request for surgery [id number]       Abnormal CT scan, small bowel 09/30/2020     Priority: Medium     Added automatically from request for surgery [id number]       Endometrial hyperplasia, unspecified 06/18/2020     Priority: Medium     Added automatically from request for surgery [id number]       Pelvic pain in female 06/18/2020     Priority: Medium     Added automatically from request for surgery [id number]       Endometrial intraepithelial neoplasia (EIN) 03/02/2020     Priority: Medium     Obesity (BMI 35.0-39.9) with comorbidity (H) 10/16/2018     Priority: Medium     Non morbid obesity due to excess calories  07/05/2016     Priority: Medium     Type 2 diabetes mellitus without complication (H) 04/29/2016     Priority: Medium     Glucosuria 04/27/2016     Priority: Medium     Right ovarian cyst 09/15/2015     Priority: Medium     Hyperlipidemia LDL goal <130 07/18/2012     Priority: Medium     Mild major depression (H) 07/18/2012     Priority: Medium     GERD (gastroesophageal reflux disease) 07/03/2012     Priority: Medium     CARDIOVASCULAR SCREENING; LDL GOAL LESS THAN 160 10/31/2010     Priority: Medium     Dyspnea 11/24/2009     Priority: Medium     Kidney stones 02/17/2009     Priority: Medium     S/P conization of cervix- KAYLA 2/3 in 2011 06/27/2007     Priority: Medium     1/24/06 pap: ASC-H  2/9/06 colposcopy/bx (negative)/ECC (negative) pap- ASC-H  5/24/06 pap- ASC-H  9/6/07 pap NIL  1/23/07 pap ASCUS + HPV 45 (high risk)  3/15/07 pap ASCUS + HPV 45 (high risk) colposcopy- bx (negative)/ECC (koilocytosis)  6/19/07 ECC- koilocytosis.  Pap- AGS  10/23/07 pap- ASC-H, ECC- negative  2/14/08 pap NIL- return to yearly paps  2/17/09 pap NIL- repeat 1 year  3/8/10 pap NIL  3/3/11 pap ASCUS + HPV 45 (high risk)- colposcopy recommended  5/16/11 colpo- bx (KAYLA 2/3/HSIL) ECC (ASCUS)  pap (ASC-H)  6/2/11 recommend: [facility]  7/1/11 [facility]: path: KAYLA 2/3 with possible involvement of the endocervical margin.  ECC- neg.  Endometrium- neg.  7/14/11 plan: HIPOLITO in approx 2 months  10/10/11- hysterectomy planned.  (cancelled)  11/7/11 pap-- NIL. Plan--repeat pap in 6 months. (5/7/12)  5/7/12 pap NIL. Plan-- pap in 1 year (due 5/7/13)   5/8/13 pap NIL. Plan-pap in 1 year  5/8/14 NIL pap, neg HR HPV.  Plan- repeat pap/HPV in 1 year (due 5/8/15)  10/14/14 NIL pap, neg HR HPV. Endometrial biopsy- WNL   1/6/16  NIL pap, neg HR HPV.  Plan: paps yearly, per Dr. Ford.  1/30/17 NIL pap, neg HR HPV. Plan: pap in 1 year.   3/12/18 NIL pap, neg HR HPV. Plan: cotest annually, per Dr. Ford.  3/11/19 ECC: negative. NIL pap, neg HR HPV. Plan:  cotest in 1 yr  2/11/20 NIL Pap, Neg HPV. EMB: intraepithelial neoplasia. Plan: Hysterectomy.   7/28/20 Hysterectomy with cervix removed. Hx of KAYLA 2 in 2011 Plan: Cotest Q3y until 2036.           Papanicolaou smear of cervix with atypical squamous cells cannot exclude high grade squamous intraepithelial lesion (ASC-H) 09/07/2006     Priority: Medium     Female infertility 07/24/2006     Priority: Medium     Problem list name updated by automated process. Provider to review       Hemorrhage of rectum and anus 12/02/2004     Priority: Medium        Past Medical History:    Past Medical History:   Diagnosis Date     Abnormal Pap smear 2006, 2007,      Calculus of kidney 2002     Cervical high risk HPV (human papillomavirus) test positive      History of colposcopy with cervical biopsy 2/9/06, 3/15/07       Past Surgical History:    Past Surgical History:   Procedure Laterality Date     C REMOVAL OF KIDNEY STONE      two surgeries, 2002,2003     CHOLECYSTECTOMY, LAPOROSCOPIC  5/11/2007    Cholecystectomy, Laparoscopic     COLONOSCOPY  05/17/10     COLONOSCOPY N/A 8/27/2019    Procedure: COLONOSCOPY;  Surgeon: Paramjit Kingston DO;  Location: [facility]     COLONOSCOPY N/A 10/23/2020    Procedure: COLONOSCOPY, WITH  BIOPSY;  Surgeon: Ba Dickinson MD;  Location:  GI     CONIZATION  7/1/2011    Procedure:CONIZATION; cold knife and punch biopsy of mole on back; Surgeon:SYDNEY FORD; Location:[facility]     DILATION AND CURETTAGE, HYSTEROSCOPY, LAPAROSCOPY, COMBINED Right 9/24/2015    Procedure: COMBINED DILATION AND CURETTAGE, HYSTEROSCOPY, LAPAROSCOPY;  Surgeon: Sydney Ford MD;  Location: [facility]     DISCECTOMY LUMBAR MINIMALLY INVASIVE ONE LEVEL Left 1/23/2019    Procedure: Minimally Invasive Surgery Left Lumbar 5-Sacral 1 microdiscectomy;  Surgeon: Mason Roe MD;  Location: [facility]     ESOPHAGOSCOPY, GASTROSCOPY, DUODENOSCOPY (EGD), COMBINED  5/21/2014    Procedure: COMBINED  ESOPHAGOSCOPY, GASTROSCOPY, DUODENOSCOPY (EGD), BIOPSY SINGLE OR MULTIPLE;  Surgeon: Earl Lane MD;  Location: [facility]     ESOPHAGOSCOPY, GASTROSCOPY, DUODENOSCOPY (EGD), COMBINED N/A 10/23/2020    Procedure: Esophagogastroduodenoscopy with  biopsy;  Surgeon: Ba Dickinson MD;  Location: [facility]     EXCISE LESION TRUNK  7/1/2011    Procedure:EXCISE LESION TRUNK; Surgeon:SYDNEY FORD; Location:[facility]     HC FRAGMENTING OF KIDNEY STONE  11/30/2005     HC RX ECTOP PREG BY SCOPE,RMV TUBE/OVRY  12/20/2007    Right sided salpingectomy and removal of ruptured ectopic pregnancy. D&C.     HYSTERECTOMY VAGINAL       LAPAROSCOPIC APPENDECTOMY N/A 9/24/2015    Procedure: LAPAROSCOPIC APPENDECTOMY;  Surgeon: James Cuellar MD;  [facility]: [facility]     LAPAROSCOPIC CYSTECTOMY OVARIAN (BENIGN) N/A 9/24/2015    Procedure: LAPAROSCOPIC CYSTECTOMY OVARIAN (BENIGN);  Surgeon: Sydney Ford MD;  [facility]: [facility]     LAPAROSCOPIC HYSTERECTOMY TOTAL, BILATERAL SALPINGO-OOPHORECTOMY, COMBINED N/A 7/28/2020    Procedure: laparoscpic assisted vaginal hysterectomy, cystoscopy;  Surgeon: Sydney Ford MD;  Location: [facility]     LAPAROSCOPIC OOPHORECTOMY Right 9/24/2015    Procedure: LAPAROSCOPIC OOPHORECTOMY;  Surgeon: Sydney Ford MD;  Location: [facility]     LITHOTRIPSY  01/17/2007     PELVIS LAPAROSCOPY,DX  10/16/2000    Diagnostic laparoscopy, Endometrial biopsy.     TUBAL/ECTOPIC PREGNANCY  01/23/2007    Lap removal of left ruptured ectopic pregnancy & salpingectomy, D&C     [facility] UGI ENDOSCOPY, SIMPLE EXAM  09/01/09       Family History:    Family History   Problem Relation Age of Onset     Diabetes Maternal Grandmother         adult onset     Anesthesia Reaction Maternal Grandmother         can't tolerate Novacaine.     Respiratory Maternal Grandmother         COPD and emphysema.     Thyroid Disease Maternal Grandmother      Heart Disease Maternal Grandmother         CHF      "Diabetes Paternal Grandfather         adult o nset     Hypertension Paternal Grandfather      Cerebrovascular Disease Paternal Grandfather      Heart Disease Paternal Grandfather         MI, replaced valve,angioplasty     Thyroid Disease Paternal Grandfather      Cancer Maternal Grandfather         skin cancer     Heart Disease Maternal Grandfather         MI     Obesity Mother         on thyroid medication     Thyroid Disease Mother      Diabetes Mother      Rheumatologic Disease Mother      Heart Disease Father      Hypertension Father         and TIA     Lipids Father      Breast Cancer Paternal Grandmother      Arrhythmia Other      Cancer Maternal Aunt         breast/brain cancer     Depression Paternal Aunt      Cerebrovascular Disease Paternal Uncle      Cerebrovascular Disease Paternal Aunt        Social History:  Marital Status:   [2]  Social History     Tobacco Use     Smoking status: Current Every Day Smoker     Packs/day: 0.00     Years: 12.00     Pack years: 0.00     Types: Cigarettes     Smokeless tobacco: Never Used     Tobacco comment: As of 10 /2014, pt has had a few cigs here and there   Substance Use Topics     Alcohol use: Yes     Alcohol/week: 0.0 standard drinks     Comment: every few months     Drug use: No        Medications:    acetaminophen (TYLENOL) 500 MG tablet  blood glucose monitoring (NO BRAND SPECIFIED) meter device kit  CONTOUR NEXT TEST test strip  ibuprofen (ADVIL/MOTRIN) 200 MG tablet  metFORMIN (GLUCOPHAGE) 1000 MG tablet  metFORMIN (GLUCOPHAGE) 1000 MG tablet  omeprazole (PRILOSEC) 20 MG DR capsule  oxyCODONE (ROXICODONE) 5 MG tablet          Review of Systems  As mentioned above in the history present illness. All other systems were reviewed and are negative.    Physical Exam   BP: (!) 129/96  Pulse: 104  Temp: 98.2  F (36.8  C)  Resp: 20  Height: 162.6 cm (5' 4\")  Weight: 96.6 kg (213 lb)  SpO2: 99 %      Physical Exam    GENERAL APPEARANCE: alert and oriented. NAD. "   EYES: conjunctiva clear  HENT:   Nurse irrigated the left ear canal- small insect removed, (gnat).  Right ear canals clear, intact, and without inflammation. Right TM middle ear effusion.  Left  ear canals clear, intact, and without inflammation. Left TM middle ear effusion.  Nose normal.    Oropharynx without ulcers, erythema or lesions  RESP: lungs clear to auscultation - no rales, rhonchi or wheezes  CV: regular rates and rhythm, normal S1 S2, no murmur noted    ED Course        Procedures            No results found for this or any previous visit (from the past 24 hour(s)).    Medications - No data to display    Assessments & Plan (with Medical Decision Making)   41 year old female with a gnat that went into her left ear canal this evening. This was irrigated out of the canal here. Patient has bilateral middle ear effusions likely due to seasonal allergies. Otherwise no other foreign body.    I have reviewed the nursing notes.    I have reviewed the findings, diagnosis, plan and need for follow up with the patient.      Discharge Medication List as of 8/10/2021 12:29 AM          Final diagnoses:   Acute foreign body of left ear canal, initial encounter       8/9/2021   [facility] EMERGENCY DEPT     Carlos, SERENE Winters CNP  08/10/21 0043

## 2021-08-28 ENCOUNTER — HEALTH MAINTENANCE LETTER (OUTPATIENT)
Age: 41
End: 2021-08-28

## 2021-09-09 ENCOUNTER — HOSPITAL ENCOUNTER (EMERGENCY)
Facility: CLINIC | Age: 41
Discharge: HOME | End: 2021-09-09
Attending: EMERGENCY MEDICINE | Admitting: EMERGENCY MEDICINE
Payer: COMMERCIAL

## 2021-09-09 ENCOUNTER — APPOINTMENT (OUTPATIENT)
Dept: ULTRASOUND IMAGING | Facility: CLINIC | Age: 41
End: 2021-09-09
Attending: EMERGENCY MEDICINE
Payer: COMMERCIAL

## 2021-09-09 VITALS
WEIGHT: 214 LBS | BODY MASS INDEX: 36.73 KG/M2 | OXYGEN SATURATION: 94 % | SYSTOLIC BLOOD PRESSURE: 131 MMHG | RESPIRATION RATE: 20 BRPM | TEMPERATURE: 99.9 F | DIASTOLIC BLOOD PRESSURE: 87 MMHG | HEART RATE: 90 BPM

## 2021-09-09 DIAGNOSIS — R10.2 PELVIC PAIN IN FEMALE: ICD-10-CM

## 2021-09-09 DIAGNOSIS — N83.202 OVARIAN CYST, LEFT: ICD-10-CM

## 2021-09-09 LAB
ALBUMIN UR-MCNC: NEGATIVE MG/DL
APPEARANCE UR: CLEAR
BACTERIA #/AREA URNS HPF: ABNORMAL /HPF
BILIRUB UR QL STRIP: NEGATIVE
COLOR UR AUTO: YELLOW
GLUCOSE UR STRIP-MCNC: 150 MG/DL
HGB UR QL STRIP: NEGATIVE
KETONES UR STRIP-MCNC: NEGATIVE MG/DL
LEUKOCYTE ESTERASE UR QL STRIP: NEGATIVE
MUCOUS THREADS #/AREA URNS LPF: PRESENT /LPF
NITRATE UR QL: NEGATIVE
PH UR STRIP: 6 [PH] (ref 5–7)
RBC URINE: 0 /HPF
SP GR UR STRIP: 1.01 (ref 1–1.03)
SQUAMOUS EPITHELIAL: 2 /HPF
UROBILINOGEN UR STRIP-MCNC: NORMAL MG/DL
WBC URINE: 1 /HPF

## 2021-09-09 PROCEDURE — 81003 URINALYSIS AUTO W/O SCOPE: CPT | Performed by: EMERGENCY MEDICINE

## 2021-09-09 PROCEDURE — 96372 THER/PROPH/DIAG INJ SC/IM: CPT | Performed by: EMERGENCY MEDICINE

## 2021-09-09 PROCEDURE — 99284 EMERGENCY DEPT VISIT MOD MDM: CPT | Performed by: EMERGENCY MEDICINE

## 2021-09-09 PROCEDURE — 76830 TRANSVAGINAL US NON-OB: CPT

## 2021-09-09 PROCEDURE — 250N000011 HC RX IP 250 OP 636: Performed by: EMERGENCY MEDICINE

## 2021-09-09 PROCEDURE — 99284 EMERGENCY DEPT VISIT MOD MDM: CPT | Mod: 25

## 2021-09-09 RX ORDER — MULTIPLE VITAMINS W/ MINERALS TAB 9MG-400MCG
1 TAB ORAL DAILY
COMMUNITY
End: 2024-04-11

## 2021-09-09 RX ORDER — OXYCODONE HYDROCHLORIDE 5 MG/1
5 TABLET ORAL EVERY 6 HOURS PRN
Qty: 12 TABLET | Refills: 0 | Status: SHIPPED | OUTPATIENT
Start: 2021-09-09 | End: 2022-08-19

## 2021-09-09 RX ADMIN — HYDROMORPHONE HYDROCHLORIDE 1 MG: 1 INJECTION, SOLUTION INTRAMUSCULAR; INTRAVENOUS; SUBCUTANEOUS at 13:50

## 2021-09-09 NOTE — ED PROVIDER NOTES
"  History     Chief Complaint   Patient presents with     Dysuria     The history is provided by the patient.     Fallon Rivera is a 41 year old female who has an history of ovarian cysts, kidney stones, and pyelonephritis. Patient came into ER with painful urination, pain in the lower back and low pelvic pain, persisting for the last 4 days. No fever, but has chills. Nausea is intermittent. Normals bowels, last bowel movement was today. No abnormal vaginal discharge. Patient was seen in May 2021 and diagnosed with an ovarian cyst on her left side. She returned in July, \"daughter cyst\" was noted. Patient has had a partial hysterectomy, she has right ovary removed, uterus removed, and both tubes out. Left ovary still in body to regulate hormones - she is unable to take supplemental hormones due to diabetes. Physician advised against use to Advil and Tylenol due to liver enzymes elevated. Patient has required lithotripsy, and had to strain her urine in the past.  No chest pain, shortness of breath, cold or cough symptoms, other systemic complaints.    Allergies:  Allergies   Allergen Reactions     Amoxicillin Hives     Anti-Nausea      Anxiety, agitation,severe paranoia. Zofran, Reglan are some of them.  DRAMAMINE WITHOUT ISSUES       Demerol Hcl [Meperidine Hcl] Nausea     Indomethacin Nausea and Vomiting     Macrobid [Nitrofuran Derivatives] Hives     Reglan [Metoclopramide] Other (See Comments)     Acute paranoia, severe     Zofran [Ondansetron] Other (See Comments)     Severe anxiety, agitation     Vancomycin Other (See Comments)     maria del rosario syndrome       Problem List:    Patient Active Problem List    Diagnosis Date Noted     Left ovarian cyst 05/25/2021     Priority: Medium     Added automatically from request for surgery [id number]       Dyspareunia, female 12/01/2020     Priority: Medium     Rectal bleeding 09/30/2020     Priority: Medium     Added automatically from request for surgery [id number]       " Chronic diarrhea 09/30/2020     Priority: Medium     Added automatically from request for surgery [id number]       Abnormal CT scan, small bowel 09/30/2020     Priority: Medium     Added automatically from request for surgery [id number]       Endometrial hyperplasia, unspecified 06/18/2020     Priority: Medium     Added automatically from request for surgery [id number]       Pelvic pain in female 06/18/2020     Priority: Medium     Added automatically from request for surgery [id number]       Endometrial intraepithelial neoplasia (EIN) 03/02/2020     Priority: Medium     Obesity (BMI 35.0-39.9) with comorbidity (H) 10/16/2018     Priority: Medium     Non morbid obesity due to excess calories 07/05/2016     Priority: Medium     Type 2 diabetes mellitus without complication (H) 04/29/2016     Priority: Medium     Glucosuria 04/27/2016     Priority: Medium     Right ovarian cyst 09/15/2015     Priority: Medium     Hyperlipidemia LDL goal <130 07/18/2012     Priority: Medium     Mild major depression (H) 07/18/2012     Priority: Medium     GERD (gastroesophageal reflux disease) 07/03/2012     Priority: Medium     CARDIOVASCULAR SCREENING; LDL GOAL LESS THAN 160 10/31/2010     Priority: Medium     Dyspnea 11/24/2009     Priority: Medium     Kidney stones 02/17/2009     Priority: Medium     S/P conization of cervix- KAYLA 2/3 in 2011 06/27/2007     Priority: Medium     1/24/06 pap: ASC-H  2/9/06 colposcopy/bx (negative)/ECC (negative) pap- ASC-H  5/24/06 pap- ASC-H  9/6/07 pap NIL  1/23/07 pap ASCUS + HPV 45 (high risk)  3/15/07 pap ASCUS + HPV 45 (high risk) colposcopy- bx (negative)/ECC (koilocytosis)  6/19/07 ECC- koilocytosis.  Pap- AGS  10/23/07 pap- ASC-H, ECC- negative  2/14/08 pap NIL- return to yearly paps  2/17/09 pap NIL- repeat 1 year  3/8/10 pap NIL  3/3/11 pap ASCUS + HPV 45 (high risk)- colposcopy recommended  5/16/11 colpo- bx (KAYLA 2/3/HSIL) ECC (ASCUS)  pap (ASC-H)  6/2/11 recommend: [facility]  7/1/11 [facility]: path: KAYLA 2/3 with  possible involvement of the endocervical margin.  ECC- neg.  Endometrium- neg.  7/14/11 plan: HIPOLITO in approx 2 months  10/10/11- hysterectomy planned.  (cancelled)  11/7/11 pap-- NIL. Plan--repeat pap in 6 months. (5/7/12)  5/7/12 pap NIL. Plan-- pap in 1 year (due 5/7/13)   5/8/13 pap NIL. Plan-pap in 1 year  5/8/14 NIL pap, neg HR HPV.  Plan- repeat pap/HPV in 1 year (due 5/8/15)  10/14/14 NIL pap, neg HR HPV. Endometrial biopsy- WNL   1/6/16  NIL pap, neg HR HPV.  Plan: paps yearly, per Dr. Ford.  1/30/17 NIL pap, neg HR HPV. Plan: pap in 1 year.   3/12/18 NIL pap, neg HR HPV. Plan: cotest annually, per Dr. Ford.  3/11/19 ECC: negative. NIL pap, neg HR HPV. Plan: cotest in 1 yr  2/11/20 NIL Pap, Neg HPV. EMB: intraepithelial neoplasia. Plan: Hysterectomy.   7/28/20 Hysterectomy with cervix removed. Hx of KAYLA 2 in 2011 Plan: Cotest Q3y until 2036.           Papanicolaou smear of cervix with atypical squamous cells cannot exclude high grade squamous intraepithelial lesion (ASC-H) 09/07/2006     Priority: Medium     Female infertility 07/24/2006     Priority: Medium     Problem list name updated by automated process. Provider to review       Hemorrhage of rectum and anus 12/02/2004     Priority: Medium        Past Medical History:    Past Medical History:   Diagnosis Date     Abnormal Pap smear 2006, 2007,      Calculus of kidney 2002     Cervical high risk HPV (human papillomavirus) test positive      History of colposcopy with cervical biopsy 2/9/06, 3/15/07       Past Surgical History:    Past Surgical History:   Procedure Laterality Date     C REMOVAL OF KIDNEY STONE      two surgeries, 2002,2003     CHOLECYSTECTOMY, LAPOROSCOPIC  5/11/2007    Cholecystectomy, Laparoscopic     COLONOSCOPY  05/17/10     COLONOSCOPY N/A 8/27/2019    Procedure: COLONOSCOPY;  Surgeon: Paramjit Kingston DO;  Location:  GI     COLONOSCOPY N/A 10/23/2020    Procedure: COLONOSCOPY, WITH  BIOPSY;  Surgeon: Teena  MD Ba;  Location: [facility]     CONIZATION  7/1/2011    Procedure:CONIZATION; cold knife and punch biopsy of mole on back; Surgeon:SYDNEY FORD; Location:[facility]     DILATION AND CURETTAGE, HYSTEROSCOPY, LAPAROSCOPY, COMBINED Right 9/24/2015    Procedure: COMBINED DILATION AND CURETTAGE, HYSTEROSCOPY, LAPAROSCOPY;  Surgeon: Sydney Ford MD;  Location: [facility]     DISCECTOMY LUMBAR MINIMALLY INVASIVE ONE LEVEL Left 1/23/2019    Procedure: Minimally Invasive Surgery Left Lumbar 5-Sacral 1 microdiscectomy;  Surgeon: Mason Roe MD;  Location: [facility]     ESOPHAGOSCOPY, GASTROSCOPY, DUODENOSCOPY (EGD), COMBINED  5/21/2014    Procedure: COMBINED ESOPHAGOSCOPY, GASTROSCOPY, DUODENOSCOPY (EGD), BIOPSY SINGLE OR MULTIPLE;  Surgeon: Earl Lane MD;  Location: [facility]     ESOPHAGOSCOPY, GASTROSCOPY, DUODENOSCOPY (EGD), COMBINED N/A 10/23/2020    Procedure: Esophagogastroduodenoscopy with  biopsy;  Surgeon: Ba Dickinson MD;  Location: [facility]     EXCISE LESION TRUNK  7/1/2011    Procedure:EXCISE LESION TRUNK; Surgeon:SYDNEY FORD; Location:[facility]     HC FRAGMENTING OF KIDNEY STONE  11/30/2005     HC RX ECTOP PREG BY SCOPE,RMV TUBE/OVRY  12/20/2007    Right sided salpingectomy and removal of ruptured ectopic pregnancy. D&C.     HYSTERECTOMY VAGINAL       LAPAROSCOPIC APPENDECTOMY N/A 9/24/2015    Procedure: LAPAROSCOPIC APPENDECTOMY;  Surgeon: James Cuellar MD;  [facility]: [facility]     LAPAROSCOPIC CYSTECTOMY OVARIAN (BENIGN) N/A 9/24/2015    Procedure: LAPAROSCOPIC CYSTECTOMY OVARIAN (BENIGN);  Surgeon: Sydney Ford MD;  [facility]     LAPAROSCOPIC HYSTERECTOMY TOTAL, BILATERAL SALPINGO-OOPHORECTOMY, COMBINED N/A 7/28/2020    Procedure: laparoscpic assisted vaginal hysterectomy, cystoscopy;  Surgeon: Sydney Ford MD;  Location: [facility]     LAPAROSCOPIC OOPHORECTOMY Right 9/24/2015    Procedure: LAPAROSCOPIC OOPHORECTOMY;  Surgeon:  Greg Ford MD;  Location: [facility]     LITHOTRIPSY  01/17/2007     PELVIS LAPAROSCOPY,DX  10/16/2000    Diagnostic laparoscopy, Endometrial biopsy.     TUBAL/ECTOPIC PREGNANCY  01/23/2007    Lap removal of left ruptured ectopic pregnancy & salpingectomy, D&C     [facility] UGI ENDOSCOPY, SIMPLE EXAM  09/01/09       Family History:    Family History   Problem Relation Age of Onset     Diabetes Maternal Grandmother         adult onset     Anesthesia Reaction Maternal Grandmother         can't tolerate Novacaine.     Respiratory Maternal Grandmother         COPD and emphysema.     Thyroid Disease Maternal Grandmother      Heart Disease Maternal Grandmother         CHF     Diabetes Paternal Grandfather         adult o nset     Hypertension Paternal Grandfather      Cerebrovascular Disease Paternal Grandfather      Heart Disease Paternal Grandfather         MI, replaced valve,angioplasty     Thyroid Disease Paternal Grandfather      Cancer Maternal Grandfather         skin cancer     Heart Disease Maternal Grandfather         MI     Obesity Mother         on thyroid medication     Thyroid Disease Mother      Diabetes Mother      Rheumatologic Disease Mother      Heart Disease Father      Hypertension Father         and TIA     Lipids Father      Breast Cancer Paternal Grandmother      Arrhythmia Other      Cancer Maternal Aunt         breast/brain cancer     Depression Paternal Aunt      Cerebrovascular Disease Paternal Uncle      Cerebrovascular Disease Paternal Aunt        Social History:  Marital Status:   [2]  Social History     Tobacco Use     Smoking status: Current Every Day Smoker     Packs/day: 0.00     Years: 12.00     Pack years: 0.00     Types: Cigarettes     Smokeless tobacco: Never Used     Tobacco comment: As of 10 /2014, pt has had a few cigs here and there   Substance Use Topics     Alcohol use: Yes     Alcohol/week: 0.0 standard drinks     Comment: every few months     Drug use: No         Medications:    acetaminophen (TYLENOL) 500 MG tablet  CONTOUR NEXT TEST test strip  ibuprofen (ADVIL/MOTRIN) 200 MG tablet  metFORMIN (GLUCOPHAGE) 1000 MG tablet  metFORMIN (GLUCOPHAGE) 1000 MG tablet  multivitamin w/minerals (THERA-VIT-M) tablet  omeprazole (PRILOSEC) 20 MG DR capsule  oxyCODONE (ROXICODONE) 5 MG tablet  blood glucose monitoring (NO BRAND SPECIFIED) meter device kit          Review of Systems    All other ROS reviewed and are negative or non-contributory except as stated in HPI.    Physical Exam   BP: (!) 137/101  Pulse: (!) 127  Temp: 99.9  F (37.7  C)  Resp: 20  Weight: 97.1 kg (214 lb)  SpO2: 100 %      Physical Exam  Vitals and nursing note reviewed.   Constitutional:       Appearance: She is obese.   HENT:      Head: Normocephalic.      Mouth/Throat:      Mouth: Mucous membranes are moist.      Pharynx: Oropharynx is clear.   Eyes:      General: No scleral icterus.     Extraocular Movements: Extraocular movements intact.      Conjunctiva/sclera: Conjunctivae normal.   Cardiovascular:      Rate and Rhythm: Regular rhythm. Tachycardia present.      Pulses: Normal pulses.      Heart sounds: Normal heart sounds.   Pulmonary:      Effort: Pulmonary effort is normal.      Breath sounds: Normal breath sounds.   Abdominal:      General: There is no distension.      Palpations: Abdomen is soft.      Tenderness: There is abdominal tenderness (Suprapubic).   Musculoskeletal:         General: Normal range of motion.      Cervical back: Normal range of motion and neck supple.   Skin:     General: Skin is warm and dry.      Coloration: Skin is not pale.      Findings: No rash.   Neurological:      General: No focal deficit present.      Mental Status: She is alert and oriented to person, place, and time.   Psychiatric:         Behavior: Behavior normal.      Comments: Mildly anxious         ED Course (with Medical Decision Making)    Pt seen and examined by me.  RN and EPIC notes reviewed.        Patient with history of pelvic pain secondary to ovarian cysts, status post near total hysterectomy with residual left ovary, kidney stones presenting with abdominal/pelvic pain, some dysuria symptoms.    Patient has had numerous CT scans and studies in the past.  She did have an ultrasound on 7/1/2021 with a ovarian cyst noted to be decreasing in size.    I am going to give the patient a dose of Dilaudid IM for pain.  Check urine.  Ultrasound.    Urine shows glucose, no evidence for blood or infection.  Ultrasound shows an enlarged left ovary with a simple appearing cyst, smaller than previous studies.    Results discussed with the patient.  She is going to follow-up with her OB/GYN provider and discuss options.  She can use over-the-counter medications as able.  She was given a small amount of oxycodone to use if needed for severe pain.  Return for significant worsening, changes or concerns.        Procedures      Results for orders placed or performed during the hospital encounter of 09/09/21 (from the past 24 hour(s))   UA with Microscopic reflex to Culture    Specimen: Urine, Clean Catch   Result Value Ref Range    Color Urine Yellow Colorless, Straw, Light Yellow, Yellow    Appearance Urine Clear Clear    Glucose Urine 150  (A) Negative mg/dL    Bilirubin Urine Negative Negative    Ketones Urine Negative Negative mg/dL    Specific Gravity Urine 1.011 1.003 - 1.035    Blood Urine Negative Negative    pH Urine 6.0 5.0 - 7.0    Protein Albumin Urine Negative Negative mg/dL    Urobilinogen Urine Normal Normal, 2.0 mg/dL    Nitrite Urine Negative Negative    Leukocyte Esterase Urine Negative Negative    Bacteria Urine Few (A) None Seen /HPF    Mucus Urine Present (A) None Seen /LPF    RBC Urine 0 <=2 /HPF    WBC Urine 1 <=5 /HPF    Squamous Epithelials Urine 2 (H) <=1 /HPF    Narrative    Urine Culture not indicated   US Pelvis Cmplt w Transvag & Doppler LmtPel Duplex Limited    Narrative    PELVIC ULTRASOUND  WITH ENDOVAGINAL TRANSDUCER  9/9/2021 1:50 PM     HISTORY: Pelvic pain.  History of left ovarian cyst.  Status post  hysterectomy, right oophorectomy.    TECHNIQUE:  Pelvic ultrasound dated 7/1/2021, 5/21/2021, 12/28/2020  and 12/4/2020. CT abdomen and pelvis dated 5/18/2021.      COMPARISON: None.    FINDINGS:  Uterus and right ovary are surgically absent. The left  ovary is enlarged measuring 4.6 x 3.9 x 2.9 cm containing a  simple-appearing cystic structure measuring 3.2 x 2.2 x 3.1 cm. Color  and Doppler spectral analysis demonstrate arterial and venous  waveforms in the left ovary. Left ovary is smaller than on the prior  exams from 2020 and 2021. Patient does experience discomfort when  scanned over the ovary and not elsewhere during the exam.      Impression    IMPRESSION: Enlarged left ovary with simple-appearing cyst appears to  correspond with the patient's symptoms. The ovary and the cystic  structure in the ovary are smaller than on the prior studies. There  are arterial and venous waveforms in the ovary. This is not likely  torsion, although the cyst could be causing pain.    I discussed the mildly enlarged left ovary, simple-appearing left  ovarian cyst, arterial and venous waveforms in the left ovary, and  pain symptoms being associated with the ovary with Dr. Munroe on  9/9/2021 at approximately 2:02 PM.    IVETH FAN MD         SYSTEM ID:  [id number]       Medications   HYDROmorphone (DILAUDID) injection 1 mg (1 mg Intramuscular Given 9/9/21 1350)       Assessments & Plan     I have reviewed the findings, diagnosis, plan and need for follow up with the patient.    Discharge Medication List as of 9/9/2021  2:28 PM          Final diagnoses:   Pelvic pain in female   Ovarian cyst, left     Disposition: Patient discharged home in stable condition.  Plan as above.  Return for concerns.      This document serves as a record of services personally performed by Farida Munroe MD*. It was  created on their behalf by Jodee Caraballo, a trained medical scribe. The creation of this record is based on the provider's personal observations and the statements of the patient. This document has been checked and approved by the attending provider.    Note: Chart documentation done in part with Dragon Voice Recognition software. Although reviewed after completion, some word and grammatical errors may remain.    9/9/2021   [facility] EMERGENCY DEPT     Farida Munroe MD  09/09/21 [id number]

## 2021-09-21 DIAGNOSIS — R04.2 HEMOPTYSIS: Primary | ICD-10-CM

## 2021-09-21 DIAGNOSIS — E11.9 TYPE 2 DIABETES MELLITUS WITHOUT COMPLICATION, WITHOUT LONG-TERM CURRENT USE OF INSULIN (H): ICD-10-CM

## 2021-09-21 DIAGNOSIS — N95.1 SYMPTOMATIC MENOPAUSAL OR FEMALE CLIMACTERIC STATES: ICD-10-CM

## 2021-09-21 NOTE — PROGRESS NOTES
I called her to touch base about her recent emergency room visits.  She told me that she is concerned that she has recurrent left-sided pelvic pain and she does have a small left ovarian cyst.  She is debating whether or not to have this ovary removed.  She has previously had a hysterectomy and right-sided salpingo-oophorectomy.  She would like to have her hormone levels checked before making that decision.  Also she needs a recheck of her diabetes.  She is asked me to place some lab orders today.  Also she has noted that on occasion she has had some very slight hemoptysis.  Also a morning cough.  She quit smoking several years ago but she did smoke consistently for 15 years.  For she is worried about this.  She would like a chest x-ray if possible.  We did discuss potentially doing a CAT scan of the chest but she has had multiple CAT scans of the abdomen and so she is worried about the extra radiation.  We will start with a chest x-ray and then determine whether or not to go any further with screening depending upon my exam when I see her soon.  I will set up an appointment to see her in October and we will go over her lab results and then make a decision about whether to remove her ovary and also whether to evaluate any further for the hemoptysis.    Also she wants a repeat urinalysis because she has had problems with kidney stones and bladder infections recently.    SHARAN Ford MD

## 2021-09-28 ENCOUNTER — HOSPITAL ENCOUNTER (OUTPATIENT)
Dept: GENERAL RADIOLOGY | Facility: CLINIC | Age: 41
Discharge: HOME | End: 2021-09-28
Attending: OBSTETRICS & GYNECOLOGY | Admitting: OBSTETRICS & GYNECOLOGY
Payer: COMMERCIAL

## 2021-09-28 ENCOUNTER — LAB (OUTPATIENT)
Dept: LAB | Facility: CLINIC | Age: 41
End: 2021-09-28
Payer: COMMERCIAL

## 2021-09-28 DIAGNOSIS — R04.2 HEMOPTYSIS: ICD-10-CM

## 2021-09-28 DIAGNOSIS — E11.9 TYPE 2 DIABETES MELLITUS WITHOUT COMPLICATION, WITHOUT LONG-TERM CURRENT USE OF INSULIN (H): ICD-10-CM

## 2021-09-28 DIAGNOSIS — N95.1 SYMPTOMATIC MENOPAUSAL OR FEMALE CLIMACTERIC STATES: ICD-10-CM

## 2021-09-28 LAB
ALBUMIN UR-MCNC: NEGATIVE MG/DL
ANION GAP SERPL CALCULATED.3IONS-SCNC: 5 MMOL/L (ref 3–14)
APPEARANCE UR: CLEAR
BACTERIA #/AREA URNS HPF: ABNORMAL /HPF
BILIRUB UR QL STRIP: NEGATIVE
BUN SERPL-MCNC: 9 MG/DL (ref 7–30)
CALCIUM SERPL-MCNC: 8.9 MG/DL (ref 8.5–10.1)
CHLORIDE BLD-SCNC: 108 MMOL/L (ref 94–109)
CO2 SERPL-SCNC: 26 MMOL/L (ref 20–32)
COLOR UR AUTO: YELLOW
CREAT SERPL-MCNC: 0.82 MG/DL (ref 0.52–1.04)
ESTRADIOL SERPL-MCNC: 30 PG/ML
FSH SERPL-ACNC: 9.6 IU/L
GFR SERPL CREATININE-BSD FRML MDRD: 90 ML/MIN/1.73M2
GLUCOSE BLD-MCNC: 205 MG/DL (ref 70–99)
GLUCOSE UR STRIP-MCNC: 50 MG/DL
HBA1C MFR BLD: 7.7 % (ref 0–5.6)
HGB UR QL STRIP: NEGATIVE
KETONES UR STRIP-MCNC: NEGATIVE MG/DL
LEUKOCYTE ESTERASE UR QL STRIP: NEGATIVE
MUCOUS THREADS #/AREA URNS LPF: PRESENT /LPF
NITRATE UR QL: NEGATIVE
PH UR STRIP: 6 [PH] (ref 5–7)
POTASSIUM BLD-SCNC: 4.2 MMOL/L (ref 3.4–5.3)
RBC URINE: <1 /HPF
SODIUM SERPL-SCNC: 139 MMOL/L (ref 133–144)
SP GR UR STRIP: 1.02 (ref 1–1.03)
SQUAMOUS EPITHELIAL: 1 /HPF
UROBILINOGEN UR STRIP-MCNC: 2 MG/DL
WBC URINE: <1 /HPF

## 2021-09-28 PROCEDURE — 36415 COLL VENOUS BLD VENIPUNCTURE: CPT

## 2021-09-28 PROCEDURE — 83036 HEMOGLOBIN GLYCOSYLATED A1C: CPT

## 2021-09-28 PROCEDURE — 83001 ASSAY OF GONADOTROPIN (FSH): CPT

## 2021-09-28 PROCEDURE — 71046 X-RAY EXAM CHEST 2 VIEWS: CPT

## 2021-09-28 PROCEDURE — 80048 BASIC METABOLIC PNL TOTAL CA: CPT

## 2021-09-28 PROCEDURE — 81001 URINALYSIS AUTO W/SCOPE: CPT

## 2021-09-28 PROCEDURE — 82670 ASSAY OF TOTAL ESTRADIOL: CPT

## 2021-10-23 ENCOUNTER — HEALTH MAINTENANCE LETTER (OUTPATIENT)
Age: 41
End: 2021-10-23

## 2021-10-25 ENCOUNTER — OFFICE VISIT (OUTPATIENT)
Dept: FAMILY MEDICINE | Facility: CLINIC | Age: 41
End: 2021-10-25
Payer: COMMERCIAL

## 2021-10-25 VITALS
SYSTOLIC BLOOD PRESSURE: 120 MMHG | BODY MASS INDEX: 36.9 KG/M2 | TEMPERATURE: 97 F | WEIGHT: 215 LBS | RESPIRATION RATE: 20 BRPM | OXYGEN SATURATION: 99 % | DIASTOLIC BLOOD PRESSURE: 86 MMHG

## 2021-10-25 DIAGNOSIS — R10.2 PELVIC PAIN IN FEMALE: ICD-10-CM

## 2021-10-25 DIAGNOSIS — R04.2 HEMOPTYSIS: Primary | ICD-10-CM

## 2021-10-25 PROCEDURE — 99213 OFFICE O/P EST LOW 20 MIN: CPT | Performed by: OBSTETRICS & GYNECOLOGY

## 2021-10-25 ASSESSMENT — PAIN SCALES - GENERAL: PAINLEVEL: NO PAIN (0)

## 2021-10-25 NOTE — NURSING NOTE
Chief Complaint   Patient presents with     Results     Labs      OTHER     Talk about Surgery

## 2021-10-25 NOTE — PROGRESS NOTES
Subjective:    Multiple concerns.  Wants to discuss her recent FSH and estradiol levels and chest x-ray results.  She had occasional hemoptysis when she developed Covid this spring.  Because she is a former smoker she worried about this and wanted a chest x-ray done.  The chest x-ray was unremarkable.  She is not having hemoptysis currently but it does come and go occasionally.  Overall improved from the Covid infection.    She does get occasional pain over the area where she has one remaining ovary but also has a history of kidney stones and the pain is difficult to nail down whether it is coming from a kidney stone or an intermittent torsion of an ovary.  I did explain that her estradiol level is still in the premenopausal range and the FSH is low enough that I think the ovary is still functioning so it is best to keep it intact if possible.      The past medical history, social history, past surgical history and family history as shown below have been reviewed by me today.    Past Medical History:   Diagnosis Date     Abnormal Pap smear 2006, 2007,     ASC-H, ASCUS + HPV 45     Calculus of kidney 2002     Cervical high risk HPV (human papillomavirus) test positive     + HPV 45 (high risk)     History of colposcopy with cervical biopsy 2/9/06, 3/15/07    koilocytosis 2007        Allergies   Allergen Reactions     Amoxicillin Hives     Anti-Nausea      Anxiety, agitation,severe paranoia. Zofran, Reglan are some of them.  DRAMAMINE WITHOUT ISSUES       Demerol Hcl [Meperidine Hcl] Nausea     Indomethacin Nausea and Vomiting     Macrobid [Nitrofuran Derivatives] Hives     Reglan [Metoclopramide] Other (See Comments)     Acute paranoia, severe     Zofran [Ondansetron] Other (See Comments)     Severe anxiety, agitation     Vancomycin Other (See Comments)     maria del rosario syndrome     Current Outpatient Medications   Medication Sig Dispense Refill     acetaminophen (TYLENOL) 500 MG tablet Take 1,000 mg by mouth every 6 hours  as needed for mild pain       blood glucose monitoring (NO BRAND SPECIFIED) meter device kit Use to test blood sugar 2 times daily or as directed. (Patient taking differently: Use to test blood sugar as directed.) 1 kit 0     CONTOUR NEXT TEST test strip USE TO TEST BLOOD GLUCOSE FOUR TIMES A DAY (Patient taking differently: 1 strip by In Vitro route 3 times daily ) 150 each 3     ibuprofen (ADVIL/MOTRIN) 200 MG tablet Take 800 mg by mouth nightly as needed (sleep)       metFORMIN (GLUCOPHAGE) 1000 MG tablet Take 1 tablet in the a.m. 1/2 tablet at night 90 tablet 3     metFORMIN (GLUCOPHAGE) 1000 MG tablet Take 500 mg by mouth daily (with dinner) And 1000 mg every morning       multivitamin w/minerals (THERA-VIT-M) tablet Take 1 tablet by mouth daily       omeprazole (PRILOSEC) 20 MG DR capsule TAKE 1 CAPSULE BY MOUTH DAILY, 30 TO 60 MINUTES BEFORE A MEAL. (Patient taking differently: Take 20 mg by mouth daily 30 TO 60 MINUTES BEFORE A MEAL.) 30 capsule 8     oxyCODONE (ROXICODONE) 5 MG tablet Take 1 tablet (5 mg) by mouth every 6 hours as needed for pain 12 tablet 0     Past Surgical History:   Procedure Laterality Date     C REMOVAL OF KIDNEY STONE      two surgeries, 2002,2003     CHOLECYSTECTOMY, LAPOROSCOPIC  5/11/2007    Cholecystectomy, Laparoscopic     COLONOSCOPY  05/17/10     COLONOSCOPY N/A 8/27/2019    Procedure: COLONOSCOPY;  Surgeon: Paramjit Kingston DO;  Location:  GI     COLONOSCOPY N/A 10/23/2020    Procedure: COLONOSCOPY, WITH  BIOPSY;  Surgeon: Ba Dickinson MD;  Location:  GI     CONIZATION  7/1/2011    Procedure:CONIZATION; cold knife and punch biopsy of mole on back; Surgeon:GREG FORD; Location:[facility]     DILATION AND CURETTAGE, HYSTEROSCOPY, LAPAROSCOPY, COMBINED Right 9/24/2015    Procedure: COMBINED DILATION AND CURETTAGE, HYSTEROSCOPY, LAPAROSCOPY;  Surgeon: Greg oFrd MD;  Location: [facility]     DISCECTOMY LUMBAR MINIMALLY INVASIVE ONE LEVEL  Left 1/23/2019    Procedure: Minimally Invasive Surgery Left Lumbar 5-Sacral 1 microdiscectomy;  Surgeon: Mason Roe MD;  Location: [facility]     ESOPHAGOSCOPY, GASTROSCOPY, DUODENOSCOPY (EGD), COMBINED  5/21/2014    Procedure: COMBINED ESOPHAGOSCOPY, GASTROSCOPY, DUODENOSCOPY (EGD), BIOPSY SINGLE OR MULTIPLE;  Surgeon: Earl Lane MD;  Location: [facility]     ESOPHAGOSCOPY, GASTROSCOPY, DUODENOSCOPY (EGD), COMBINED N/A 10/23/2020    Procedure: Esophagogastroduodenoscopy with  biopsy;  Surgeon: Ba Dickinson MD;  [facility]: [facility]     EXCISE LESION TRUNK  7/1/2011    Procedure:EXCISE LESION TRUNK; Surgeon:SYDNEY FORD; Location:[facility]     HC FRAGMENTING OF KIDNEY STONE  11/30/2005     HC RX ECTOP PREG BY SCOPE,RMV TUBE/OVRY  12/20/2007    Right sided salpingectomy and removal of ruptured ectopic pregnancy. D&C.     HYSTERECTOMY VAGINAL       LAPAROSCOPIC APPENDECTOMY N/A 9/24/2015    Procedure: LAPAROSCOPIC APPENDECTOMY;  Surgeon: James Cuellar MD;  [facility]     LAPAROSCOPIC CYSTECTOMY OVARIAN (BENIGN) N/A 9/24/2015    Procedure: LAPAROSCOPIC CYSTECTOMY OVARIAN (BENIGN);  Surgeon: Sydney Ford MD;  [facility]     LAPAROSCOPIC HYSTERECTOMY TOTAL, BILATERAL SALPINGO-OOPHORECTOMY, COMBINED N/A 7/28/2020    Procedure: laparoscpic assisted vaginal hysterectomy, cystoscopy;  Surgeon: Sydney Ford MD;  Location: [facility]     LAPAROSCOPIC OOPHORECTOMY Right 9/24/2015    Procedure: LAPAROSCOPIC OOPHORECTOMY;  Surgeon: Sydney Ford MD;  [facility]     LITHOTRIPSY  01/17/2007     PELVIS LAPAROSCOPY,DX  10/16/2000    Diagnostic laparoscopy, Endometrial biopsy.     TUBAL/ECTOPIC PREGNANCY  01/23/2007    Lap removal of left ruptured ectopic pregnancy & salpingectomy, D&C     [facility] UGI ENDOSCOPY, SIMPLE EXAM  09/01/09     Social History     Socioeconomic History     Marital status:      Spouse name: Joseph     Number of children: 1      Years of education: 9     Highest education level: None   Occupational History     Employer: NONE   Tobacco Use     Smoking status: Current Every Day Smoker     Packs/day: 0.00     Years: 12.00     Pack years: 0.00     Types: Cigarettes     Smokeless tobacco: Never Used     Tobacco comment: As of 10 /2014, pt has had a few cigs here and there   Substance and Sexual Activity     Alcohol use: Yes     Alcohol/week: 0.0 standard drinks     Comment: every few months     Drug use: No     Sexual activity: Yes     Partners: Male     Birth control/protection: Female Surgical   Other Topics Concern      Service No     Blood Transfusions No     Caffeine Concern Yes     Comment: Reports 1 can soda/week  Advised not more than 16 ounces caffeien/day.     Occupational Exposure No     Hobby Hazards No     Sleep Concern No     Stress Concern Yes     Comment: will discuss with      Weight Concern Yes     Comment: Eating disorder in childhood.  Hx. gestational diab.     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     Exercise No     Comment: Advised walking 30 min/day.     Bike Helmet No     Seat Belt Yes     Self-Exams Not Asked     Parent/sibling w/ CABG, MI or angioplasty before 65F 55M? Not Asked   Social History Narrative     and lives at home with her  and daughter.     Social Determinants of Health     Financial Resource Strain:      Difficulty of Paying Living Expenses:    Food Insecurity:      Worried About Running Out of Food in the Last Year:      Ran Out of Food in the Last Year:    Transportation Needs:      Lack of Transportation (Medical):      Lack of Transportation (Non-Medical):    Physical Activity:      Days of Exercise per Week:      Minutes of Exercise per Session:    Stress:      Feeling of Stress :    Social Connections:      Frequency of Communication with Friends and Family:      Frequency of Social Gatherings with Friends and  Family:      Attends Sikh Services:      Active Member of Clubs or Organizations:      Attends Club or Organization Meetings:      Marital Status:    Intimate Partner Violence:      Fear of Current or Ex-Partner:      Emotionally Abused:      Physically Abused:      Sexually Abused:      Family History   Problem Relation Age of Onset     Diabetes Maternal Grandmother         adult onset     Anesthesia Reaction Maternal Grandmother         can't tolerate Novacaine.     Respiratory Maternal Grandmother         COPD and emphysema.     Thyroid Disease Maternal Grandmother      Heart Disease Maternal Grandmother         CHF     Diabetes Paternal Grandfather         adult o nset     Hypertension Paternal Grandfather      Cerebrovascular Disease Paternal Grandfather      Heart Disease Paternal Grandfather         MI, replaced valve,angioplasty     Thyroid Disease Paternal Grandfather      Cancer Maternal Grandfather         skin cancer     Heart Disease Maternal Grandfather         MI     Obesity Mother         on thyroid medication     Thyroid Disease Mother      Diabetes Mother      Rheumatologic Disease Mother      Heart Disease Father      Hypertension Father         and TIA     Lipids Father      Breast Cancer Paternal Grandmother      Arrhythmia Other      Cancer Maternal Aunt         breast/brain cancer     Depression Paternal Aunt      Cerebrovascular Disease Paternal Uncle      Cerebrovascular Disease Paternal Aunt        ROS: A 12 point review of systems was done. Except for what is listed above in the HPI, the systems review is negative .      Objective: Vital signs: Blood pressure 120/86, temperature 97  F (36.1  C), temperature source Temporal, resp. rate 20, weight 97.5 kg (215 lb), last menstrual period 07/25/2020, SpO2 99 %, not currently breastfeeding.    Chest is clear to auscultation.  No wheezes, rales or rhonchi heard.          Assessment/Plan:     1.  Intermittent left-sided pelvic and lower  abdominal pain where she has an ovary which may be coursing from time to time but also she may be developing recurrent ureteral stones.  Encouraged her to increase fluid intake when she gets these pains.  Encouraged her to postpone taking out the ovary at this point because it is still making estrogen for her and she does not want to use hormones when she does become menopausal because of the diabetes and other risks with the estrogen.    2.  Intermittent hemoptysis-we discussed the option of CAT scan of the chest but she has had multiple CT scans already and wants to avoid this for now.  She will let me know if she develops any persistent recurrent hemoptysis.    A total of 21 minutes were spent in today's visit including the time spent with  the patient in addition to the time spent just prior to the visit reviewing the chart  and then charting afterwards on this patient today.         The above information was dictating using Dragon voice software and as a result there may be some irregularities that were not detected in my review of this note.    SHARAN Ford MD

## 2021-11-10 ENCOUNTER — VIRTUAL VISIT (OUTPATIENT)
Dept: INTERNAL MEDICINE | Facility: CLINIC | Age: 41
End: 2021-11-10
Payer: COMMERCIAL

## 2021-11-10 DIAGNOSIS — J01.90 ACUTE SINUSITIS WITH SYMPTOMS > 10 DAYS: Primary | ICD-10-CM

## 2021-11-10 PROCEDURE — 99214 OFFICE O/P EST MOD 30 MIN: CPT | Mod: 95 | Performed by: INTERNAL MEDICINE

## 2021-11-10 RX ORDER — AZITHROMYCIN 250 MG/1
TABLET, FILM COATED ORAL
Qty: 6 TABLET | Refills: 0 | Status: SHIPPED | OUTPATIENT
Start: 2021-11-10 | End: 2021-11-15

## 2021-11-10 NOTE — PROGRESS NOTES
Fallon is a 41 year old who is being evaluated via a billable telephone visit.      What phone number would you like to be contacted at? [phone]  How would you like to obtain your AVS? MyChart    1. Acute sinusitis with symptoms > 10 days  This is a 41-year-old woman with upper respiratory symptoms.  She is requesting a Z-Justino as this happens fairly frequently and she developed some significant bronchitis.  This seems reasonable and is prescribed.  She had a negative home Covid test.  She is at risk for Covid especially since she is not vaccinated.  I did put in a Covid PCR which she can obtain if she would like.  I also had a discussion with her about Covid vaccination.  She is hesitant to get the vaccination because of some issues she had with Carticel when it was a new vaccine.  Additionally she has some history of blood clotting.  We did not necessarily get into the details of this, but I did discuss with her that the mRNA vaccines do not appear to increase risk of blood clots.  I recommended vaccination for her we discussed recent [facility] study that shows significant antibody response in patients who received vaccination who have had prior infection.  - azithromycin (ZITHROMAX) 250 MG tablet; Take 2 tablets (500 mg) by mouth daily for 1 day, THEN 1 tablet (250 mg) daily for 4 days.  Dispense: 6 tablet; Refill: 0  - Symptomatic COVID-19 Virus (Coronavirus) by PCR; Future    Subjective   Fallon is a 41 year old who presents for the following health issues this 41-year-old woman is seen telephonically for evaluation of sinus symptoms.  Is been going on for a little while and it is worsening rather than improving.  She has inflammatory grayish-green drainage.  She has a cough.  Low-grade fever.  She has a headache and sinus pressure.  She took a home Covid test which was negative.  She had Covid in April.  She has not been vaccinated.  He has underlying diabetes.    HPI           Review of  Systems         Objective           Vitals:  No vitals were obtained today due to virtual visit.    Physical Exam   healthy, alert and no distress  PSYCH: Alert and oriented times 3; coherent speech, normal   rate and volume, able to articulate logical thoughts, able   to abstract reason, no tangential thoughts, no hallucinations   or delusions  Her affect is normal  RESP: No cough, no audible wheezing, able to talk in full sentences  Remainder of exam unable to be completed due to telephone visits            Phone call duration: 21 minutes

## 2021-11-12 ENCOUNTER — LAB (OUTPATIENT)
Dept: FAMILY MEDICINE | Facility: CLINIC | Age: 41
End: 2021-11-12
Payer: COMMERCIAL

## 2021-11-12 DIAGNOSIS — J01.90 ACUTE SINUSITIS WITH SYMPTOMS > 10 DAYS: ICD-10-CM

## 2021-11-12 PROCEDURE — U0003 INFECTIOUS AGENT DETECTION BY NUCLEIC ACID (DNA OR RNA); SEVERE ACUTE RESPIRATORY SYNDROME CORONAVIRUS 2 (SARS-COV-2) (CORONAVIRUS DISEASE [COVID-19]), AMPLIFIED PROBE TECHNIQUE, MAKING USE OF HIGH THROUGHPUT TECHNOLOGIES AS DESCRIBED BY CMS-2020-01-R: HCPCS

## 2021-11-12 PROCEDURE — U0005 INFEC AGEN DETEC AMPLI PROBE: HCPCS

## 2021-11-14 LAB — SARS-COV-2 RNA RESP QL NAA+PROBE: NEGATIVE

## 2021-11-29 DIAGNOSIS — E11.9 TYPE 2 DIABETES MELLITUS WITHOUT COMPLICATION (H): ICD-10-CM

## 2021-11-29 DIAGNOSIS — K21.9 GASTROESOPHAGEAL REFLUX DISEASE WITHOUT ESOPHAGITIS: ICD-10-CM

## 2021-11-29 NOTE — TELEPHONE ENCOUNTER
Pt's glucose monitor stopped working over the weekend, she needs a replacement.     Also would like to request omeprazole to be on a 2 month at a time refill so it matches with the fills on her metformin.

## 2021-11-30 DIAGNOSIS — K21.9 GASTROESOPHAGEAL REFLUX DISEASE WITHOUT ESOPHAGITIS: ICD-10-CM

## 2021-12-01 NOTE — TELEPHONE ENCOUNTER
prilosec  Prescription approved per [facility] Refill Protocol.    Blood glucose meter device  Prescription approved per [facility] Refill Protocol.

## 2022-01-23 ENCOUNTER — HOSPITAL ENCOUNTER (EMERGENCY)
Facility: CLINIC | Age: 42
End: 2022-01-23
Payer: COMMERCIAL

## 2022-01-24 ENCOUNTER — HOSPITAL ENCOUNTER (EMERGENCY)
Facility: CLINIC | Age: 42
Discharge: HOME | End: 2022-01-24
Attending: EMERGENCY MEDICINE | Admitting: EMERGENCY MEDICINE
Payer: COMMERCIAL

## 2022-01-24 ENCOUNTER — APPOINTMENT (OUTPATIENT)
Dept: CT IMAGING | Facility: CLINIC | Age: 42
End: 2022-01-24
Attending: EMERGENCY MEDICINE
Payer: COMMERCIAL

## 2022-01-24 ENCOUNTER — NURSE TRIAGE (OUTPATIENT)
Dept: FAMILY MEDICINE | Facility: CLINIC | Age: 42
End: 2022-01-24
Payer: COMMERCIAL

## 2022-01-24 VITALS
RESPIRATION RATE: 14 BRPM | OXYGEN SATURATION: 98 % | HEART RATE: 121 BPM | DIASTOLIC BLOOD PRESSURE: 101 MMHG | SYSTOLIC BLOOD PRESSURE: 153 MMHG | TEMPERATURE: 98.9 F

## 2022-01-24 DIAGNOSIS — S16.1XXA STRAIN OF NECK MUSCLE, INITIAL ENCOUNTER: ICD-10-CM

## 2022-01-24 PROCEDURE — 99284 EMERGENCY DEPT VISIT MOD MDM: CPT | Mod: 25 | Performed by: EMERGENCY MEDICINE

## 2022-01-24 PROCEDURE — 72125 CT NECK SPINE W/O DYE: CPT

## 2022-01-24 PROCEDURE — 99284 EMERGENCY DEPT VISIT MOD MDM: CPT | Performed by: EMERGENCY MEDICINE

## 2022-01-24 RX ORDER — CYCLOBENZAPRINE HCL 5 MG
5-10 TABLET ORAL 3 TIMES DAILY PRN
Qty: 25 TABLET | Refills: 0 | Status: SHIPPED | OUTPATIENT
Start: 2022-01-24 | End: 2022-01-30

## 2022-01-24 RX ORDER — OXYCODONE HYDROCHLORIDE 5 MG/1
5 TABLET ORAL EVERY 6 HOURS PRN
Qty: 12 TABLET | Refills: 0 | Status: SHIPPED | OUTPATIENT
Start: 2022-01-24 | End: 2022-01-27

## 2022-01-24 RX ORDER — VIT C/VIT D3/E/ZINC/ELDERBERRY 65 MG-3.15
1 TABLET,CHEWABLE ORAL DAILY
COMMUNITY
End: 2022-08-19

## 2022-01-24 NOTE — ED TRIAGE NOTES
Here with neck pain, headache, numbness to pinkie fingers. States she fell last week on the steps. Denies LOC but concerned with her symptoms   conjunctiva clear detailed exam

## 2022-01-24 NOTE — DISCHARGE INSTRUCTIONS
May use Salonpas patches as needed.  May continue with ibuprofen up to 600 mg 4 times a day also.  In addition may use Tylenol 2000 mg 4 times a day.

## 2022-01-24 NOTE — TELEPHONE ENCOUNTER
Huddled with Dr. Liliana TURNER who stated he has been in surgery all morning and will not be able to work patient into his schedule today.  Phoned patient, patient answered the phone and stated she was actually driving on her way to the ER at this time.      Ananya Samuel RN

## 2022-01-24 NOTE — TELEPHONE ENCOUNTER
Nurse Triage SBAR  Is this a 2nd Level Triage? YES, LICENSED PRACTITIONER REVIEW IS REQUIRED    SITUATION:                                                    (Clearly and briefly define the situation)  Fallon Rivera is a 41 year old female     Fell approximately a week ago. Worsening symptoms.    BACKGROUND:                                                    (Provide clear, relevant background information that relates to the situation)  Current Medication: See med list  Hx: She slipped on the steps. She hit the back of her neck. Since then, she has been having pain at the top of shoulders/bottom of the neck. Pain in neck started 1/20/22. Starting 1/22/22 pt has had numbness and tingling down both arms. The right is worse than the left. Pt states that if she lays flat and has pillows around her she has no pain but being up and moving she has pain. Rates pain at 7/10. Pt states that this is now affecting her ability to complete ADLs. Pt has tried taking advil and that is not helping. She is able to move her head but states that it is painful. Pt states that she normally has some dizziness and lightheadedness due to her diabetes so she is unable to say if it is worse than usual. No vision changes. Eating and drinking per her usual. No personality changes. No vomiting.  Last seen: 10/25/21    NURSE ASSESSMENT:                                                    (A statement of your professional conclusion)  Per protocol, recommended ER/UCC. Pt states that she is trying to avoid the ER and would rather see PCP.        (See information below for more triage details.)  RECOMMENDATION(S) and PLAN:                                                    (What do you need from this individual?)  Protocol Recommended Disposition: To ED/UC for evaluation  Will comply with recommendation: no - Would like to see if she is able to get an appointment with PCP.    Pt was advised to go to ER if pain worsens and she is in agreement  with this.      Routing to provider for recommendation on are you able to fit pt into your schedule? Or, what would you recommend?    Encourage to return call clinic triage nurse if further questions/concerns that may come up or if symptoms do not improve, worsen, or new symptoms develop.    NOTES: Disposition was determined by the first positive assessment question, therefore all previous assessment questions were negative.  Guideline used:Epic  NUZHAT JamesN, RN, PHN  Registered Nurse-Clinic Triage  [facility]  1/24/2022 at 9:41 AM        Reason for Disposition    SEVERE neck pain (e.g., excruciating)    Additional Information    Negative: Dangerous mechanism of injury (e.g., MVA, contact sports, diving, fall on trampoline, fall > 10 feet or 3 meters) (Exception: neck pain began > 1 hour after injury)    Negative: Weakness of arms or legs    Negative: Numbness, tingling, or burning of arms, upper back/chest or legs (Exception: brief, now gone)    Negative: Major bleeding (actively dripping or spurting) that can't be stopped    Negative: Bullet, knife or other serious penetrating wound    Negative: Difficulty breathing (e.g., choking or stridor)    Negative: Knocked out (unconscious) > 1 minute    Negative: Direct blow to front of neck and cough, hoarseness, abnormal voice, or can't talk    Negative: Sounds like a serious injury to the triager    Negative: Sounds like a life-threatening emergency to the triager    Negative: Dangerous mechanism of injury (e.g., MVA, contact sports, diving, fall on trampoline, fall > 10 feet or 3 meters) and neck pain or stiffness began > 1 hour after injury    Negative: Numbness, tingling, or burning of arms, upper back/chest or legs and not present now (i.e., completely resolved)    Negative: Coughing up blood    Negative: Difficulty swallowing or drooling    Negative: Skin is split open or gaping  (length > 1/2 inch or 12 mm)    Negative:  Puncture wound of neck    Negative: Bleeding won't stop after 10 minutes of direct pressure (using correct technique)    Negative: Large swelling or bruise (> 2 inches or 5 cm)    Negative: Neck pain not from an injury    Protocols used: NECK INJURY-A-[place]

## 2022-01-24 NOTE — ED PROVIDER NOTES
History     Chief Complaint   Patient presents with     Neck Pain     Headache     HPI  Fallon Rivera is a 41 year old female who presents with neck pain.  She fell down approximately 5 stairs 1 week ago.  She persists with lower neck pain.  Pain is moderate to severe and worse with range of motion.  Also with some numbness in bilateral pinky fingers.  No weakness.  No history of neck trouble.    Allergies:  Allergies   Allergen Reactions     Amoxicillin Hives     Anti-Nausea      Anxiety, agitation,severe paranoia. Zofran, Reglan are some of them.  DRAMAMINE WITHOUT ISSUES       Demerol Hcl [Meperidine Hcl] Nausea     Indomethacin Nausea and Vomiting     Macrobid [Nitrofuran Derivatives] Hives     Reglan [Metoclopramide] Other (See Comments)     Acute paranoia, severe     Zofran [Ondansetron] Other (See Comments)     Severe anxiety, agitation     Vancomycin Other (See Comments)     maria del rosario syndrome       Problem List:    Patient Active Problem List    Diagnosis Date Noted     Left ovarian cyst 05/25/2021     Priority: Medium     Added automatically from request for surgery [id number]       Dyspareunia, female 12/01/2020     Priority: Medium     Rectal bleeding 09/30/2020     Priority: Medium     Added automatically from request for surgery [id number]       Chronic diarrhea 09/30/2020     Priority: Medium     Added automatically from request for surgery [id number]       Abnormal CT scan, small bowel 09/30/2020     Priority: Medium     Added automatically from request for surgery [id number]       Endometrial hyperplasia, unspecified 06/18/2020     Priority: Medium     Added automatically from request for surgery [id number]       Pelvic pain in female 06/18/2020     Priority: Medium     Added automatically from request for surgery [id number]       Endometrial intraepithelial neoplasia (EIN) 03/02/2020     Priority: Medium     Obesity (BMI 35.0-39.9) with comorbidity (H) 10/16/2018     Priority: Medium     Non morbid obesity  due to excess calories 07/05/2016     Priority: Medium     Type 2 diabetes mellitus without complication (H) 04/29/2016     Priority: Medium     Glucosuria 04/27/2016     Priority: Medium     Right ovarian cyst 09/15/2015     Priority: Medium     Hyperlipidemia LDL goal <130 07/18/2012     Priority: Medium     Mild major depression (H) 07/18/2012     Priority: Medium     GERD (gastroesophageal reflux disease) 07/03/2012     Priority: Medium     CARDIOVASCULAR SCREENING; LDL GOAL LESS THAN 160 10/31/2010     Priority: Medium     Dyspnea 11/24/2009     Priority: Medium     Kidney stones 02/17/2009     Priority: Medium     S/P conization of cervix- KAYLA 2/3 in 2011 06/27/2007     Priority: Medium     1/24/06 pap: ASC-H  2/9/06 colposcopy/bx (negative)/ECC (negative) pap- ASC-H  5/24/06 pap- ASC-H  9/6/07 pap NIL  1/23/07 pap ASCUS + HPV 45 (high risk)  3/15/07 pap ASCUS + HPV 45 (high risk) colposcopy- bx (negative)/ECC (koilocytosis)  6/19/07 ECC- koilocytosis.  Pap- AGS  10/23/07 pap- ASC-H, ECC- negative  2/14/08 pap NIL- return to yearly paps  2/17/09 pap NIL- repeat 1 year  3/8/10 pap NIL  3/3/11 pap ASCUS + HPV 45 (high risk)- colposcopy recommended  5/16/11 colpo- bx (KAYLA 2/3/HSIL) ECC (ASCUS)  pap (ASC-H)  6/2/11 recommend: [facility]  7/1/11 [facility]: path: KAYLA 2/3 with possible involvement of the endocervical margin.  ECC- neg.  Endometrium- neg.  7/14/11 plan: HIPOLITO in approx 2 months  10/10/11- hysterectomy planned.  (cancelled)  11/7/11 pap-- NIL. Plan--repeat pap in 6 months. (5/7/12)  5/7/12 pap NIL. Plan-- pap in 1 year (due 5/7/13)   5/8/13 pap NIL. Plan-pap in 1 year  5/8/14 NIL pap, neg HR HPV.  Plan- repeat pap/HPV in 1 year (due 5/8/15)  10/14/14 NIL pap, neg HR HPV. Endometrial biopsy- WNL   1/6/16  NIL pap, neg HR HPV.  Plan: paps yearly, per Dr. Ford.  1/30/17 NIL pap, neg HR HPV. Plan: pap in 1 year.   3/12/18 NIL pap, neg HR HPV. Plan: cotest annually, per Dr. Ford.  3/11/19 ECC: negative. NIL pap,  neg HR HPV. Plan: cotest in 1 yr  2/11/20 NIL Pap, Neg HPV. EMB: intraepithelial neoplasia. Plan: Hysterectomy.   7/28/20 Hysterectomy with cervix removed. Hx of KAYLA 2 in 2011 Plan: Cotest Q3y until 2036.           Papanicolaou smear of cervix with atypical squamous cells cannot exclude high grade squamous intraepithelial lesion (ASC-H) 09/07/2006     Priority: Medium     Female infertility 07/24/2006     Priority: Medium     Problem list name updated by automated process. Provider to review       Hemorrhage of rectum and anus 12/02/2004     Priority: Medium        Past Medical History:    Past Medical History:   Diagnosis Date     Abnormal Pap smear 2006, 2007,      Calculus of kidney 2002     Cervical high risk HPV (human papillomavirus) test positive      History of colposcopy with cervical biopsy 2/9/06, 3/15/07       Past Surgical History:    Past Surgical History:   Procedure Laterality Date     CHOLECYSTECTOMY, LAPOROSCOPIC  5/11/2007    Cholecystectomy, Laparoscopic     COLONOSCOPY  05/17/10     COLONOSCOPY N/A 8/27/2019    Procedure: COLONOSCOPY;  Surgeon: Paramjit Kingston DO;  [facility]     COLONOSCOPY N/A 10/23/2020    Procedure: COLONOSCOPY, WITH  BIOPSY;  Surgeon: Ba Dickinson MD;  Location:  GI     CONIZATION  7/1/2011    Procedure:CONIZATION; cold knife and punch biopsy of mole on back; Surgeon:SYDNEY FORD; Location:[facility]     DILATION AND CURETTAGE, HYSTEROSCOPY, LAPAROSCOPY, COMBINED Right 9/24/2015    Procedure: COMBINED DILATION AND CURETTAGE, HYSTEROSCOPY, LAPAROSCOPY;  Surgeon: Sydney Ford MD;  [facility]     DISCECTOMY LUMBAR MINIMALLY INVASIVE ONE LEVEL Left 1/23/2019    Procedure: Minimally Invasive Surgery Left Lumbar 5-Sacral 1 microdiscectomy;  Surgeon: Mason Roe MD;  [facility]     ESOPHAGOSCOPY, GASTROSCOPY, DUODENOSCOPY (EGD), COMBINED  5/21/2014    Procedure: COMBINED ESOPHAGOSCOPY, GASTROSCOPY, DUODENOSCOPY (EGD),  BIOPSY SINGLE OR MULTIPLE;  Surgeon: Earl Lane MD;  [facility]     ESOPHAGOSCOPY, GASTROSCOPY, DUODENOSCOPY (EGD), COMBINED N/A 10/23/2020    Procedure: Esophagogastroduodenoscopy with  biopsy;  Surgeon: Ba Dickinson MD;  [facility]     EXCISE LESION TRUNK  7/1/2011    Procedure:EXCISE LESION TRUNK; Surgeon:SYDNEY FORD; [facility]     HC FRAGMENTING OF KIDNEY STONE  11/30/2005     HC RX ECTOP PREG BY SCOPE,RMV TUBE/OVRY  12/20/2007    Right sided salpingectomy and removal of ruptured ectopic pregnancy. D&C.     HYSTERECTOMY VAGINAL       LAPAROSCOPIC APPENDECTOMY N/A 9/24/2015    Procedure: LAPAROSCOPIC APPENDECTOMY;  Surgeon: James Cuellar MD;  [facility]     LAPAROSCOPIC CYSTECTOMY OVARIAN (BENIGN) N/A 9/24/2015    Procedure: LAPAROSCOPIC CYSTECTOMY OVARIAN (BENIGN);  Surgeon: Sydney Ford MD;  [facility]     LAPAROSCOPIC HYSTERECTOMY TOTAL, BILATERAL SALPINGO-OOPHORECTOMY, COMBINED N/A 7/28/2020    Procedure: laparoscpic assisted vaginal hysterectomy, cystoscopy;  Surgeon: Sydney Ford MD;  [facility]     LAPAROSCOPIC OOPHORECTOMY Right 9/24/2015    Procedure: LAPAROSCOPIC OOPHORECTOMY;  Surgeon: Sydney Ford MD;  [facility]     LITHOTRIPSY  01/17/2007     PELVIS LAPAROSCOPY,DX  10/16/2000    Diagnostic laparoscopy, Endometrial biopsy.     TUBAL/ECTOPIC PREGNANCY  01/23/2007    Lap removal of left ruptured ectopic pregnancy & salpingectomy, D&C     [facility] REMOVAL OF KIDNEY STONE      two surgeries, 2002,2003     [facility] UGI ENDOSCOPY, SIMPLE EXAM  09/01/09       Family History:    Family History   Problem Relation Age of Onset     Diabetes Maternal Grandmother         adult onset     Anesthesia Reaction Maternal Grandmother         can't tolerate Novacaine.     Respiratory Maternal Grandmother         COPD and emphysema.     Thyroid Disease Maternal Grandmother      Heart Disease Maternal Grandmother          CHF     Diabetes Paternal Grandfather         adult o nset     Hypertension Paternal Grandfather      Cerebrovascular Disease Paternal Grandfather      Heart Disease Paternal Grandfather         MI, replaced valve,angioplasty     Thyroid Disease Paternal Grandfather      Cancer Maternal Grandfather         skin cancer     Heart Disease Maternal Grandfather         MI     Obesity Mother         on thyroid medication     Thyroid Disease Mother      Diabetes Mother      Rheumatologic Disease Mother      Heart Disease Father      Hypertension Father         and TIA     Lipids Father      Breast Cancer Paternal Grandmother      Arrhythmia Other      Cancer Maternal Aunt         breast/brain cancer     Depression Paternal Aunt      Cerebrovascular Disease Paternal Uncle      Cerebrovascular Disease Paternal Aunt        Social History:  Marital Status:   [2]  Social History     Tobacco Use     Smoking status: Former Smoker     Packs/day: 0.00     Years: 12.00     Pack years: 0.00     Types: Cigarettes     Smokeless tobacco: Never Used     Tobacco comment: As of 10 /2014, pt has had a few cigs here and there   Substance Use Topics     Alcohol use: Yes     Alcohol/week: 0.0 standard drinks     Comment: every few months     Drug use: No        Medications:    acetaminophen (TYLENOL) 500 MG tablet  CONTOUR NEXT TEST test strip  cyclobenzaprine (FLEXERIL) 5 MG tablet  ibuprofen (ADVIL/MOTRIN) 200 MG tablet  metFORMIN (GLUCOPHAGE) 1000 MG tablet  Misc Natural Products (AIRBORNE ELDERBERRY) CHEW  multivitamin w/minerals (THERA-VIT-M) tablet  omeprazole (PRILOSEC) 20 MG DR capsule  oxyCODONE (ROXICODONE) 5 MG tablet  blood glucose monitoring (NO BRAND SPECIFIED) meter device kit  metFORMIN (GLUCOPHAGE) 1000 MG tablet  oxyCODONE (ROXICODONE) 5 MG tablet          Review of Systems  All other systems are reviewed and are negative    Physical Exam   BP: (!) 153/101  Pulse: (!) 121  Temp: 98.9  F (37.2  C)  Resp:  14  SpO2: 98 %      Physical Exam  Vitals and nursing note reviewed.   Constitutional:       General: She is not in acute distress.     Appearance: She is well-developed. She is not diaphoretic.   HENT:      Head: Normocephalic and atraumatic.   Eyes:      General: No scleral icterus.     Pupils: Pupils are equal, round, and reactive to light.   Neck:      Comments: Tenderness in the lower cervical spine.  Decreased range of motion.  Full strength in upper and lower extremities  Cardiovascular:      Rate and Rhythm: Normal rate and regular rhythm.      Heart sounds: Normal heart sounds. No murmur heard.      Pulmonary:      Effort: No respiratory distress.      Breath sounds: No stridor. No wheezing or rales.   Abdominal:      Palpations: Abdomen is soft.      Tenderness: There is no abdominal tenderness.   Musculoskeletal:         General: No tenderness.   Skin:     General: Skin is warm and dry.      Coloration: Skin is not pale.      Findings: No erythema or rash.   Neurological:      Mental Status: She is alert.         ED Course                 Procedures              Critical Care time:  none               Results for orders placed or performed during the hospital encounter of 01/24/22 (from the past 24 hour(s))   CT Cervical Spine w/o Contrast    Narrative    CT CERVICAL SPINE WITHOUT CONTRAST   1/24/2022 4:46 PM     HISTORY: Neck trauma, focal neurologic deficit or paresthesia (Age  16-64y).     TECHNIQUE: Axial images of the cervical spine were obtained without  intravenous contrast. Multiplanar reformations were performed.   Radiation dose for this scan was reduced using automated exposure  control, adjustment of the mA and/or kV according to patient size, or  iterative reconstruction technique.    COMPARISON: None.    FINDINGS: No evidence of acute fracture or posttraumatic subluxation.  Reversal of normal cervical lordosis. Mild degenerative change. No  high-grade stenoses. Mildly enlarged level 2 lymph  nodes, presumably  reactive.      Impression    IMPRESSION:   1. No evidence of acute fracture or subluxation in the cervical spine.  2. Reversal of the normal cervical lordosis.    RACHELLE WOODRUFF MD         SYSTEM ID:  [id number]       Medications - No data to display    Assessments & Plan (with Medical Decision Making)  41-year-old female with cervical strain.  CT without evidence for fracture.  No weakness acutely.  Medication as below.  Follow-up with PMD if not improved in 1 week       I have reviewed the nursing notes.    I have reviewed the findings, diagnosis, plan and need for follow up with the patient.       New Prescriptions    CYCLOBENZAPRINE (FLEXERIL) 5 MG TABLET    Take 1-2 tablets (5-10 mg) by mouth 3 times daily as needed for muscle spasms    OXYCODONE (ROXICODONE) 5 MG TABLET    Take 1 tablet (5 mg) by mouth every 6 hours as needed for pain       Final diagnoses:   Strain of neck muscle, initial encounter       1/24/2022   [facility] EMERGENCY DEPT     Richard Mittal MD  01/24/22 [id number]

## 2022-02-08 ENCOUNTER — OFFICE VISIT (OUTPATIENT)
Dept: FAMILY MEDICINE | Facility: CLINIC | Age: 42
End: 2022-02-08
Payer: COMMERCIAL

## 2022-02-08 VITALS
SYSTOLIC BLOOD PRESSURE: 124 MMHG | WEIGHT: 219 LBS | BODY MASS INDEX: 37.59 KG/M2 | DIASTOLIC BLOOD PRESSURE: 82 MMHG | RESPIRATION RATE: 18 BRPM | HEART RATE: 139 BPM | TEMPERATURE: 97.6 F | OXYGEN SATURATION: 98 %

## 2022-02-08 DIAGNOSIS — M25.50 ARTHRALGIA, UNSPECIFIED JOINT: Primary | ICD-10-CM

## 2022-02-08 DIAGNOSIS — S16.1XXA STRAIN OF NECK MUSCLE, INITIAL ENCOUNTER: ICD-10-CM

## 2022-02-08 LAB
CK SERPL-CCNC: 48 U/L (ref 30–225)
CRP SERPL-MCNC: 4.4 MG/L (ref 0–8)
ERYTHROCYTE [SEDIMENTATION RATE] IN BLOOD BY WESTERGREN METHOD: 7 MM/HR (ref 0–20)

## 2022-02-08 PROCEDURE — 86140 C-REACTIVE PROTEIN: CPT | Performed by: OBSTETRICS & GYNECOLOGY

## 2022-02-08 PROCEDURE — 99213 OFFICE O/P EST LOW 20 MIN: CPT | Performed by: OBSTETRICS & GYNECOLOGY

## 2022-02-08 PROCEDURE — 82550 ASSAY OF CK (CPK): CPT | Performed by: OBSTETRICS & GYNECOLOGY

## 2022-02-08 PROCEDURE — 86431 RHEUMATOID FACTOR QUANT: CPT | Performed by: OBSTETRICS & GYNECOLOGY

## 2022-02-08 PROCEDURE — 85652 RBC SED RATE AUTOMATED: CPT | Performed by: OBSTETRICS & GYNECOLOGY

## 2022-02-08 PROCEDURE — 86038 ANTINUCLEAR ANTIBODIES: CPT | Performed by: OBSTETRICS & GYNECOLOGY

## 2022-02-08 PROCEDURE — 86039 ANTINUCLEAR ANTIBODIES (ANA): CPT | Performed by: OBSTETRICS & GYNECOLOGY

## 2022-02-08 PROCEDURE — 36415 COLL VENOUS BLD VENIPUNCTURE: CPT | Performed by: OBSTETRICS & GYNECOLOGY

## 2022-02-08 RX ORDER — CYCLOBENZAPRINE HCL 5 MG
5 TABLET ORAL DAILY PRN
Qty: 30 TABLET | Refills: 1 | Status: SHIPPED | OUTPATIENT
Start: 2022-02-08 | End: 2022-07-26

## 2022-02-08 ASSESSMENT — PAIN SCALES - GENERAL: PAINLEVEL: MODERATE PAIN (4)

## 2022-02-08 NOTE — PROGRESS NOTES
Subjective:    She was seen in the emergency room 2 weeks ago with a 1 week history of neck pain after falling down some stairs.  She was started on Flexeril for the neck muscle spasms and is requesting a refill of that today.  Also she has had problems with multiple joint aches and she is wondering if she could have an autoimmune concerns such as rheumatoid arthritis or lupus or some other arthralgia condition.  A relative who is much older has polymyalgia rheumatica.  She would like some testing because of her multiple joint aches which come and go.      The past medical history, social history, past surgical history and family history as shown below have been reviewed by me today.    Past Medical History:   Diagnosis Date     Abnormal Pap smear 2006, 2007,     ASC-H, ASCUS + HPV 45     Calculus of kidney 2002     Cervical high risk HPV (human papillomavirus) test positive     + HPV 45 (high risk)     History of colposcopy with cervical biopsy 2/9/06, 3/15/07    koilocytosis 2007        Allergies   Allergen Reactions     Amoxicillin Hives     Anti-Nausea      Anxiety, agitation,severe paranoia. Zofran, Reglan are some of them.  DRAMAMINE WITHOUT ISSUES       Demerol Hcl [Meperidine Hcl] Nausea     Indomethacin Nausea and Vomiting     Macrobid [Nitrofuran Derivatives] Hives     Reglan [Metoclopramide] Other (See Comments)     Acute paranoia, severe     Zofran [Ondansetron] Other (See Comments)     Severe anxiety, agitation     Vancomycin Other (See Comments)     maria del rosario syndrome     Current Outpatient Medications   Medication Sig Dispense Refill     acetaminophen (TYLENOL) 500 MG tablet Take 1,000 mg by mouth every 6 hours as needed for mild pain       blood glucose monitoring (NO BRAND SPECIFIED) meter device kit Use to test blood sugar 2 times daily or as directed. 1 kit 0     CONTOUR NEXT TEST test strip USE TO TEST BLOOD GLUCOSE FOUR TIMES A DAY (Patient taking differently: 1 strip by In Vitro route 3 times  daily ) 150 each 3     ibuprofen (ADVIL/MOTRIN) 200 MG tablet Take 800 mg by mouth nightly as needed (sleep)       metFORMIN (GLUCOPHAGE) 1000 MG tablet Take 1 tablet in the a.m. 1/2 tablet at night 90 tablet 3     Misc Natural Products (AIRBORNE ELDERBERRY) CHEW Take 1 chew tab by mouth daily       multivitamin w/minerals (THERA-VIT-M) tablet Take 1 tablet by mouth daily       omeprazole (PRILOSEC) 20 MG DR capsule TAKE 1 CAPSULE BY MOUTH DAILY, 30 TO 60 MINUTES BEFORE A MEAL. 30 capsule 5     oxyCODONE (ROXICODONE) 5 MG tablet Take 1 tablet (5 mg) by mouth every 6 hours as needed for pain 12 tablet 0     metFORMIN (GLUCOPHAGE) 1000 MG tablet Take 500 mg by mouth daily (with dinner) And 1000 mg every morning       Past Surgical History:   Procedure Laterality Date     CHOLECYSTECTOMY, LAPOROSCOPIC  5/11/2007    Cholecystectomy, Laparoscopic     COLONOSCOPY  05/17/10     COLONOSCOPY N/A 8/27/2019    Procedure: COLONOSCOPY;  Surgeon: Paramjit Kingston DO;  Location:  GI     COLONOSCOPY N/A 10/23/2020    Procedure: COLONOSCOPY, WITH  BIOPSY;  Surgeon: Ba Dickinson MD;  Location:  GI     CONIZATION  7/1/2011    Procedure:CONIZATION; cold knife and punch biopsy of mole on back; Surgeon:SYDNEY FORD; [facility]     DILATION AND CURETTAGE, HYSTEROSCOPY, LAPAROSCOPY, COMBINED Right 9/24/2015    Procedure: COMBINED DILATION AND CURETTAGE, HYSTEROSCOPY, LAPAROSCOPY;  Surgeon: Sydney Ford MD;  [facility]     DISCECTOMY LUMBAR MINIMALLY INVASIVE ONE LEVEL Left 1/23/2019    Procedure: Minimally Invasive Surgery Left Lumbar 5-Sacral 1 microdiscectomy;  Surgeon: Mason Roe MD;  [facility]     ESOPHAGOSCOPY, GASTROSCOPY, DUODENOSCOPY (EGD), COMBINED  5/21/2014    Procedure: COMBINED ESOPHAGOSCOPY, GASTROSCOPY, DUODENOSCOPY (EGD), BIOPSY SINGLE OR MULTIPLE;  Surgeon: Earl Lane MD;  [facility]     ESOPHAGOSCOPY, GASTROSCOPY, DUODENOSCOPY (EGD),  COMBINED N/A 10/23/2020    Procedure: Esophagogastroduodenoscopy with  biopsy;  Surgeon: Ba Dickinson MD;  [facility]     EXCISE LESION TRUNK  7/1/2011    Procedure:EXCISE LESION TRUNK; Surgeon:SYDNEY GRAFF; [facility]     HC FRAGMENTING OF KIDNEY STONE  11/30/2005     HC RX ECTOP PREG BY SCOPE,RMV TUBE/OVRY  12/20/2007    Right sided salpingectomy and removal of ruptured ectopic pregnancy. D&C.     HYSTERECTOMY VAGINAL       LAPAROSCOPIC APPENDECTOMY N/A 9/24/2015    Procedure: LAPAROSCOPIC APPENDECTOMY;  Surgeon: James Cuellar MD;  [facility]     LAPAROSCOPIC CYSTECTOMY OVARIAN (BENIGN) N/A 9/24/2015    Procedure: LAPAROSCOPIC CYSTECTOMY OVARIAN (BENIGN);  Surgeon: Sydney Graff MD;  [facility]     LAPAROSCOPIC HYSTERECTOMY TOTAL, BILATERAL SALPINGO-OOPHORECTOMY, COMBINED N/A 7/28/2020    Procedure: laparoscpic assisted vaginal hysterectomy, cystoscopy;  Surgeon: Syndey Graff MD;  Location: [facility]     LAPAROSCOPIC OOPHORECTOMY Right 9/24/2015    Procedure: LAPAROSCOPIC OOPHORECTOMY;  Surgeon: Sydney Graff MD;  [facility]     LITHOTRIPSY  01/17/2007     PELVIS LAPAROSCOPY,DX  10/16/2000    Diagnostic laparoscopy, Endometrial biopsy.     TUBAL/ECTOPIC PREGNANCY  01/23/2007    Lap removal of left ruptured ectopic pregnancy & salpingectomy, D&C     [facility] REMOVAL OF KIDNEY STONE      two surgeries, 2002,2003     [facility] UGI ENDOSCOPY, SIMPLE EXAM  09/01/09     Social History     Socioeconomic History     Marital status:      Spouse name: Joseph     Number of children: 1     Years of education: 9     Highest education level: Not on file   Occupational History     Employer: NONE   Tobacco Use     Smoking status: Former Smoker     Packs/day: 0.00     Years: 12.00     Pack years: 0.00     Types: Cigarettes     Smokeless tobacco: Never Used     Tobacco comment: As of 10 /2014, pt has had a few cigs here and there   Substance and  Sexual Activity     Alcohol use: Yes     Alcohol/week: 0.0 standard drinks     Comment: every few months     Drug use: No     Sexual activity: Yes     Partners: Male     Birth control/protection: Female Surgical   Other Topics Concern      Service No     Blood Transfusions No     Caffeine Concern Yes     Comment: Reports 1 can soda/week  Advised not more than 16 ounces caffeien/day.     Occupational Exposure No     Hobby Hazards No     Sleep Concern No     Stress Concern Yes     Comment: will discuss with      Weight Concern Yes     Comment: Eating disorder in childhood.  Hx. gestational diab.     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     Exercise No     Comment: Advised walking 30 min/day.     Bike Helmet No     Seat Belt Yes     Self-Exams Not Asked     Parent/sibling w/ CABG, MI or angioplasty before 65F 55M? Not Asked   Social History Narrative     and lives at home with her  and daughter.     Social Determinants of Health     Financial Resource Strain: Not on file   Food Insecurity: Not on file   Transportation Needs: Not on file   Physical Activity: Not on file   Stress: Not on file   Social Connections: Not on file   Intimate Partner Violence: Not on file   Housing Stability: Not on file     Family History   Problem Relation Age of Onset     Diabetes Maternal Grandmother         adult onset     Anesthesia Reaction Maternal Grandmother         can't tolerate Novacaine.     Respiratory Maternal Grandmother         COPD and emphysema.     Thyroid Disease Maternal Grandmother      Heart Disease Maternal Grandmother         CHF     Diabetes Paternal Grandfather         adult o nset     Hypertension Paternal Grandfather      Cerebrovascular Disease Paternal Grandfather      Heart Disease Paternal Grandfather         MI, replaced valve,angioplasty     Thyroid Disease Paternal Grandfather      Cancer Maternal Grandfather          skin cancer     Heart Disease Maternal Grandfather         MI     Obesity Mother         on thyroid medication     Thyroid Disease Mother      Diabetes Mother      Rheumatologic Disease Mother      Heart Disease Father      Hypertension Father         and TIA     Lipids Father      Breast Cancer Paternal Grandmother      Arrhythmia Other      Cancer Maternal Aunt         breast/brain cancer     Depression Paternal Aunt      Cerebrovascular Disease Paternal Uncle      Cerebrovascular Disease Paternal Aunt        ROS: A 12 point review of systems was done. Except for what is listed above in the HPI, the systems review is negative .      Objective: Vital signs: Blood pressure 124/82, pulse (!) 139, temperature 97.6  F (36.4  C), temperature source Temporal, resp. rate 18, weight 99.3 kg (219 lb), last menstrual period 07/25/2020, SpO2 98 %, not currently breastfeeding.    Chest is clear to auscultation.  No wheezes, rales or rhonchi heard.  Cardiac exam is normal with s1, s2, no murmurs or adventitious sounds.Normal rate and rhythm is heard.   Pulse by auscultation is 88.  I did examine her hands and feet and she has normal circulation with normal capillary refill in the fingertips and the toes and normal palpable wrist pulses as well as pedal pulses.  Normal sensation in the hands and in the feet.  No skin ulcers noted.      Assessment/Plan:     1.  Recent neck strain which seems to be improving quite a bit.  Currently she is relatively asymptomatic and on exam today I noted that she can move her neck from side to side and rotation is full.  Flexion and extension is normal.  She would like a refill of the cyclobenzaprine which has worked well for the muscle spasms.  I have done that.    2.  Multiple arthralgias-I will do some testing to rule out lupus rheumatoid arthritis etc.  I will call her with results.    A total of 25 minutes were spent in today's visit including the time spent with  the patient in addition to  the time spent just prior to the visit reviewing the chart  and then charting afterwards on this patient today.         The above information was dictating using Dragon voice software and as a result there may be some irregularities that were not detected in my review of this note.    SHARAN Ford MD      Addendum: A recent CT scan of her neck showed 2 prominent lymph nodes but I examined her neck thoroughly today and I did not feel any lymphadenopathy whatsoever.  I reassured her about this.    SHARAN Ford MD

## 2022-02-09 LAB
ANA PAT SER IF-IMP: ABNORMAL
ANA SER QL IF: ABNORMAL
ANA TITR SER IF: ABNORMAL {TITER}
RHEUMATOID FACT SER NEPH-ACNC: 11 IU/ML

## 2022-02-12 ENCOUNTER — HEALTH MAINTENANCE LETTER (OUTPATIENT)
Age: 42
End: 2022-02-12

## 2022-02-15 DIAGNOSIS — R89.9 ABNORMAL LABORATORY TEST RESULT: Primary | ICD-10-CM

## 2022-03-24 ENCOUNTER — MYC MEDICAL ADVICE (OUTPATIENT)
Dept: FAMILY MEDICINE | Facility: CLINIC | Age: 42
End: 2022-03-24
Payer: COMMERCIAL

## 2022-03-31 ENCOUNTER — LAB (OUTPATIENT)
Dept: LAB | Facility: CLINIC | Age: 42
End: 2022-03-31

## 2022-03-31 ENCOUNTER — OFFICE VISIT (OUTPATIENT)
Dept: RHEUMATOLOGY | Facility: CLINIC | Age: 42
End: 2022-03-31
Attending: OBSTETRICS & GYNECOLOGY
Payer: COMMERCIAL

## 2022-03-31 VITALS
WEIGHT: 214.6 LBS | BODY MASS INDEX: 36.84 KG/M2 | DIASTOLIC BLOOD PRESSURE: 90 MMHG | HEART RATE: 98 BPM | SYSTOLIC BLOOD PRESSURE: 130 MMHG

## 2022-03-31 DIAGNOSIS — R74.01 ALT (SGPT) LEVEL RAISED: ICD-10-CM

## 2022-03-31 DIAGNOSIS — R76.8 ANA POSITIVE: ICD-10-CM

## 2022-03-31 DIAGNOSIS — M25.50 POLYARTHRALGIA: Primary | ICD-10-CM

## 2022-03-31 DIAGNOSIS — M25.50 POLYARTHRALGIA: ICD-10-CM

## 2022-03-31 DIAGNOSIS — R89.9 ABNORMAL LABORATORY TEST RESULT: ICD-10-CM

## 2022-03-31 LAB
ALBUMIN SERPL-MCNC: 4 G/DL (ref 3.5–5)
ALT SERPL W P-5'-P-CCNC: 66 U/L (ref 0–45)
BASOPHILS # BLD AUTO: 0 10E3/UL (ref 0–0.2)
BASOPHILS NFR BLD AUTO: 1 %
C3 SERPL-MCNC: 176 MG/DL (ref 83–177)
C4 SERPL-MCNC: 27 MG/DL (ref 19–59)
CCP AB SER IA-ACNC: 0.9 U/ML
CREAT SERPL-MCNC: 0.92 MG/DL (ref 0.6–1.1)
EOSINOPHIL # BLD AUTO: 0.1 10E3/UL (ref 0–0.7)
EOSINOPHIL NFR BLD AUTO: 1 %
ERYTHROCYTE [DISTWIDTH] IN BLOOD BY AUTOMATED COUNT: 11.9 % (ref 10–15)
GFR SERPL CREATININE-BSD FRML MDRD: 81 ML/MIN/1.73M2
HCT VFR BLD AUTO: 43.9 % (ref 35–47)
HGB BLD-MCNC: 15.6 G/DL (ref 11.7–15.7)
IMM GRANULOCYTES # BLD: 0 10E3/UL
IMM GRANULOCYTES NFR BLD: 0 %
LYMPHOCYTES # BLD AUTO: 1.9 10E3/UL (ref 0.8–5.3)
LYMPHOCYTES NFR BLD AUTO: 25 %
MCH RBC QN AUTO: 30.4 PG (ref 26.5–33)
MCHC RBC AUTO-ENTMCNC: 35.5 G/DL (ref 31.5–36.5)
MCV RBC AUTO: 85 FL (ref 78–100)
MONOCYTES # BLD AUTO: 0.3 10E3/UL (ref 0–1.3)
MONOCYTES NFR BLD AUTO: 4 %
NEUTROPHILS # BLD AUTO: 5.2 10E3/UL (ref 1.6–8.3)
NEUTROPHILS NFR BLD AUTO: 69 %
PLATELET # BLD AUTO: 244 10E3/UL (ref 150–450)
RBC # BLD AUTO: 5.14 10E6/UL (ref 3.8–5.2)
WBC # BLD AUTO: 7.5 10E3/UL (ref 4–11)

## 2022-03-31 PROCEDURE — 84460 ALANINE AMINO (ALT) (SGPT): CPT

## 2022-03-31 PROCEDURE — 85025 COMPLETE CBC W/AUTO DIFF WBC: CPT

## 2022-03-31 PROCEDURE — 86160 COMPLEMENT ANTIGEN: CPT

## 2022-03-31 PROCEDURE — 99204 OFFICE O/P NEW MOD 45 MIN: CPT | Performed by: INTERNAL MEDICINE

## 2022-03-31 PROCEDURE — 86200 CCP ANTIBODY: CPT

## 2022-03-31 PROCEDURE — 86225 DNA ANTIBODY NATIVE: CPT

## 2022-03-31 PROCEDURE — 82565 ASSAY OF CREATININE: CPT

## 2022-03-31 PROCEDURE — 86803 HEPATITIS C AB TEST: CPT

## 2022-03-31 PROCEDURE — 36415 COLL VENOUS BLD VENIPUNCTURE: CPT

## 2022-03-31 PROCEDURE — 82040 ASSAY OF SERUM ALBUMIN: CPT

## 2022-03-31 PROCEDURE — 86235 NUCLEAR ANTIGEN ANTIBODY: CPT

## 2022-03-31 NOTE — PROGRESS NOTES
This document was created using a software with less than 100% fidelity, at times resulting in unintended, even erroneous syntax and grammar.  The reader is advised to keep this under consideration while reviewing, interpreting this note.      Rheumatology Consult Note      Fallon Rivera     YOB: 1980 Age: 41 year old    Date of visit: 3/31/22    PCP: Greg Ford    Chief Complaint   Patient presents with:  Consult      Assessment and Plan     Fallon was seen today for consult.    Diagnoses and all orders for this visit:    Polyarthralgia  -     Complement C3; Future  -     Complement C4; Future  -     Cyclic Citrullinated Peptide Antibody IgG; Future  -     DNA double stranded antibodies; Future  -     REBECCA antibody panel; Future  -     Hepatitis C antibody; Future  -     Albumin level; Future  -     ALT; Future  -     CBC with Platelets & Differential; Future  -     Creatinine; Future    Abnormal laboratory test result  -     Adult Rheumatology  Referral    BRENNEN positive  -     Complement C3; Future  -     Complement C4; Future  -     Cyclic Citrullinated Peptide Antibody IgG; Future  -     DNA double stranded antibodies; Future  -     REBECCA antibody panel; Future  -     Hepatitis C antibody; Future  -     Albumin level; Future  -     ALT; Future  -     CBC with Platelets & Differential; Future  -     Creatinine; Future    ALT (SGPT) level raised  -     Albumin level; Future  -     ALT; Future  -     CBC with Platelets & Differential; Future  -     Creatinine; Future           This patient is here for evaluation of positive BRENNEN, this is low titer at 1: 40, while she has a variety of symptoms including polyarthralgias, today's evaluation reveals no clear indication that she has a connective tissue disease.  This is discussed with her.  Further work-up as noted.  We will meet here in the next 2 to 3 months.      HPI   Fallon Rivera is a 41 year old female  is  "seen today for evaluation of a positive BRENNEN, borderline, 1: 40.  This was done as a work-up for variety of diverse symptoms.  She has noted joint pains.  She points to her fingers, PIPs DIPs and wrists knees, which intermittently cause trouble for her, the only time she has seen some swelling is in the knee areas.  She has had swelling in the distal lower extremity by the end of the day when the socks leave the marks.  She has noted stiffness in the morning as she wakes up with often she is not refreshed of the sleep, at one point because of her snoring sleep apnea evaluation was done and thought not to be the case.  She rates the pain as moderately severe.  Her stiffness can last as long as 2 to 3 hours.  Despite this she is able to do most of her day-to-day activities.  There are some days where she finds it very hard to get out of the bed.  She has had rash on her face.  No specific diagnosis been arrived at that.  She does not take any alcohol she is not a smoker.  She has noted dizziness, to the point that once she fell and was seen in the emergency room and started on muscle relaxers.  She has history of low back pain for which she ended up having surgery this was 4 to 5 years ago, at that point it was thought that it might help her with numbness and the weakness of her lower extremities.  She has had only partial response.  She has noted generalized fatigue.  She has noted blurry vision or ringing in the ears.  She has had flank pain went to the emergency room a few times.  She used to have sores in the mouth infrequently but now she gets them quite more frequently.  She has had chest pains went to the emergency room and her kids were younger she was told that she may have \"pleurisy\".  She has had nausea heartburn.  She is noted to have history of anxiety, depression.  She has type 2 diabetes.  She is on Metformin for that.  She is a homemaker.  She had 2 normal pregnancies, 2 ectopics.  As per as she knows " "there is no history of DVT PE or seizure disorder.  She does not have features suggestive of Raynaud's.  She is on omeprazole for heartburn.  She used to take ibuprofen for her joint symptoms but then developed \"GI issues\" she went out to have colonoscopy and was told that she should stop taking Advil because of its deleterious effects on her colonic mucosa.  She stairs as much as she can from Tylenol given the history of elevated ALT..           Active Problem List     Patient Active Problem List   Diagnosis     Hemorrhage of rectum and anus     Female infertility     Papanicolaou smear of cervix with atypical squamous cells cannot exclude high grade squamous intraepithelial lesion (ASC-H)     S/P conization of cervix- KAYLA 2/3 in 2011     Kidney stones     Dyspnea     CARDIOVASCULAR SCREENING; LDL GOAL LESS THAN 160     GERD (gastroesophageal reflux disease)     Hyperlipidemia LDL goal <130     Mild major depression (H)     Right ovarian cyst     Glucosuria     Type 2 diabetes mellitus without complication (H)     Non morbid obesity due to excess calories     Obesity (BMI 35.0-39.9) with comorbidity (H)     Endometrial intraepithelial neoplasia (EIN)     Endometrial hyperplasia, unspecified     Pelvic pain in female     Rectal bleeding     Chronic diarrhea     Abnormal CT scan, small bowel     Dyspareunia, female     Left ovarian cyst     Past Medical History     Past Medical History:   Diagnosis Date     Abnormal Pap smear 2006, 2007,     ASC-H, ASCUS + HPV 45     Calculus of kidney 2002     Cervical high risk HPV (human papillomavirus) test positive     + HPV 45 (high risk)     History of colposcopy with cervical biopsy 2/9/06, 3/15/07    koilocytosis 2007     Past Surgical History     Past Surgical History:   Procedure Laterality Date     CHOLECYSTECTOMY, LAPOROSCOPIC  5/11/2007    Cholecystectomy, Laparoscopic     COLONOSCOPY  05/17/10     COLONOSCOPY N/A 8/27/2019    Procedure: COLONOSCOPY;  Surgeon: " Paramjit Kingston, DO;  Location: [facility]     COLONOSCOPY N/A 10/23/2020    Procedure: COLONOSCOPY, WITH  BIOPSY;  Surgeon: Ba Dickinson MD;  Location: [facility]     CONIZATION  7/1/2011    Procedure:CONIZATION; cold knife and punch biopsy of mole on back; Surgeon:SYDNEY FORD; Location:[facility]     DILATION AND CURETTAGE, HYSTEROSCOPY, LAPAROSCOPY, COMBINED Right 9/24/2015    Procedure: COMBINED DILATION AND CURETTAGE, HYSTEROSCOPY, LAPAROSCOPY;  Surgeon: Sydney Ford MD;  Location: [facility]     DISCECTOMY LUMBAR MINIMALLY INVASIVE ONE LEVEL Left 1/23/2019    Procedure: Minimally Invasive Surgery Left Lumbar 5-Sacral 1 microdiscectomy;  Surgeon: Mason Roe MD;  Location: [facility]     ESOPHAGOSCOPY, GASTROSCOPY, DUODENOSCOPY (EGD), COMBINED  5/21/2014    Procedure: COMBINED ESOPHAGOSCOPY, GASTROSCOPY, DUODENOSCOPY (EGD), BIOPSY SINGLE OR MULTIPLE;  Surgeon: Earl Lane MD;  [facility]     ESOPHAGOSCOPY, GASTROSCOPY, DUODENOSCOPY (EGD), COMBINED N/A 10/23/2020    Procedure: Esophagogastroduodenoscopy with  biopsy;  Surgeon: Ba Dickinson MD;  [facility]     EXCISE LESION TRUNK  7/1/2011    Procedure:EXCISE LESION TRUNK; Surgeon:SYDNEY FORD; Location:[facility]     HC FRAGMENTING OF KIDNEY STONE  11/30/2005     HC RX ECTOP PREG BY SCOPE,RMV TUBE/OVRY  12/20/2007    Right sided salpingectomy and removal of ruptured ectopic pregnancy. D&C.     HYSTERECTOMY VAGINAL       LAPAROSCOPIC APPENDECTOMY N/A 9/24/2015    Procedure: LAPAROSCOPIC APPENDECTOMY;  Surgeon: James Cuellar MD;  [facility]     LAPAROSCOPIC CYSTECTOMY OVARIAN (BENIGN) N/A 9/24/2015    Procedure: LAPAROSCOPIC CYSTECTOMY OVARIAN (BENIGN);  Surgeon: Sydney Ford MD;  [facility]     LAPAROSCOPIC HYSTERECTOMY TOTAL, BILATERAL SALPINGO-OOPHORECTOMY, COMBINED N/A 7/28/2020    Procedure: laparoscpic assisted vaginal hysterectomy, cystoscopy;  Surgeon: Liliana  Greg Peralta MD;  Location: [facility]     LAPAROSCOPIC OOPHORECTOMY Right 9/24/2015    Procedure: LAPAROSCOPIC OOPHORECTOMY;  Surgeon: Greg Ford MD;  [facility]     LITHOTRIPSY  01/17/2007     PELVIS LAPAROSCOPY,DX  10/16/2000    Diagnostic laparoscopy, Endometrial biopsy.     TUBAL/ECTOPIC PREGNANCY  01/23/2007    Lap removal of left ruptured ectopic pregnancy & salpingectomy, D&C     ZZ REMOVAL OF KIDNEY STONE      two surgeries, 2002,2003     [facility]GI ENDOSCOPY, SIMPLE EXAM  09/01/09     Allergy     Allergies   Allergen Reactions     Amoxicillin Hives     Anti-Nausea      Anxiety, agitation,severe paranoia. Zofran, Reglan are some of them.  DRAMAMINE WITHOUT ISSUES       Demerol Hcl [Meperidine Hcl] Nausea     Indomethacin Nausea and Vomiting     Macrobid [Nitrofuran Derivatives] Hives     Reglan [Metoclopramide] Other (See Comments)     Acute paranoia, severe     Zofran [Ondansetron] Other (See Comments)     Severe anxiety, agitation     Vancomycin Other (See Comments)     maria del rosario syndrome     Current Medication List     Current Outpatient Medications   Medication Sig     acetaminophen (TYLENOL) 500 MG tablet Take 1,000 mg by mouth every 6 hours as needed for mild pain     blood glucose monitoring (NO BRAND SPECIFIED) meter device kit Use to test blood sugar 2 times daily or as directed.     CONTOUR NEXT TEST test strip USE TO TEST BLOOD GLUCOSE FOUR TIMES A DAY (Patient taking differently: 1 strip by In Vitro route 3 times daily )     cyclobenzaprine (FLEXERIL) 5 MG tablet Take 1 tablet (5 mg) by mouth daily as needed for muscle spasms     ibuprofen (ADVIL/MOTRIN) 200 MG tablet Take 800 mg by mouth nightly as needed (sleep)     metFORMIN (GLUCOPHAGE) 1000 MG tablet Take 1 tablet in the a.m. 1/2 tablet at night     Misc Natural Products (AIRBORNE ELDERBERRY) CHEW Take 1 chew tab by mouth daily     multivitamin w/minerals (THERA-VIT-M) tablet Take 1 tablet by mouth daily     omeprazole  (PRILOSEC) 20 MG DR capsule TAKE 1 CAPSULE BY MOUTH DAILY, 30 TO 60 MINUTES BEFORE A MEAL.     oxyCODONE (ROXICODONE) 5 MG tablet Take 1 tablet (5 mg) by mouth every 6 hours as needed for pain     metFORMIN (GLUCOPHAGE) 1000 MG tablet Take 500 mg by mouth daily (with dinner) And 1000 mg every morning     No current facility-administered medications for this visit.            Family History     Family History   Problem Relation Age of Onset     Diabetes Maternal Grandmother         adult onset     Anesthesia Reaction Maternal Grandmother         can't tolerate Novacaine.     Respiratory Maternal Grandmother         COPD and emphysema.     Thyroid Disease Maternal Grandmother      Heart Disease Maternal Grandmother         CHF     Diabetes Paternal Grandfather         adult o nset     Hypertension Paternal Grandfather      Cerebrovascular Disease Paternal Grandfather      Heart Disease Paternal Grandfather         MI, replaced valve,angioplasty     Thyroid Disease Paternal Grandfather      Cancer Maternal Grandfather         skin cancer     Heart Disease Maternal Grandfather         MI     Obesity Mother         on thyroid medication     Thyroid Disease Mother      Diabetes Mother      Rheumatologic Disease Mother      Heart Disease Father      Hypertension Father         and TIA     Lipids Father      Breast Cancer Paternal Grandmother      Arrhythmia Other      Cancer Maternal Aunt         breast/brain cancer     Depression Paternal Aunt      Cerebrovascular Disease Paternal Uncle      Cerebrovascular Disease Paternal Aunt          Physical Exam     COMPREHENSIVE EXAMINATION:  Vitals:    03/31/22 1245   BP: (!) 130/90   Pulse: 98   Weight: 97.3 kg (214 lb 9.6 oz)     A well appearing alert oriented female. Vital data as noted above. Her eyes examined for inflammation/scleromalacia. ENT examined for oral mucositis, moisture, thrush, nasal deformity, external ear redness, deformity. Her neck is examined for suppleness  and lymphadenopathy.  Cardiopulmonary examination without dyspnea at rest, use of accessory muscles of breathing, wheezing, edema, peripheral or central cyanosis.  Abdomen examined for softness, tenderness, obvious organomegaly.  Skin examined for heliotrope, malar area eruption, lupus pernio, periungual erythema, sclerodactyly, papules, erythema nodosum, purpura, nail pitting, onycholysis, and obvious psoriasis lesion. Neurological examination done for alertness, speech, facial symmetry,  tone and power in upper and lower extremities, and gait. The joint examination is performed for swelling, tenderness, warmth, erythema, and range of motion in the following joints: DIPs, PIPs, MCPs, wrists, first CMC's, elbows, shoulders, hips, knees, ankles, feet; spine for range of motion and paraspinal muscles for tenderness. The salient normal / abnormal findings are appended.  There is no stomatitis.  She has Malar area hyperemia as well as on the chin commensurate with rosacea.  She does not have synovitis in any other palpable joints there is no sclerodactyly periungual erythema. She has no tenderness of the trochanteric region.    Labs / Imaging (select studies)     Rheumatoid Factor   Date Value Ref Range Status   02/08/2022 11 <12 IU/mL Final      Hemoglobin   Date Value Ref Range Status   01/26/2021 15.3 11.7 - 15.7 g/dL Final   12/04/2020 16.5 (H) 11.7 - 15.7 g/dL Final   09/09/2020 15.0 11.7 - 15.7 g/dL Final     Urea Nitrogen   Date Value Ref Range Status   09/28/2021 9 7 - 30 mg/dL Final   01/26/2021 10 7 - 30 mg/dL Final   09/09/2020 12 7 - 30 mg/dL Final   08/11/2020 11 7 - 30 mg/dL Final     Sed Rate   Date Value Ref Range Status   09/22/2017 10 0 - 20 mm/h Final   10/01/2009 10 0 - 20 mm/h Final   12/19/2007 13 0 - 20 mm/h Final     Erythrocyte Sedimentation Rate   Date Value Ref Range Status   02/08/2022 7 0 - 20 mm/hr Final     CRP Inflammation   Date Value Ref Range Status   02/08/2022 4.4 0.0 - 8.0 mg/L  Final   10/28/2017 3.0 0.0 - 8.0 mg/L Final   09/22/2017 3.9 0.0 - 8.0 mg/L Final   01/30/2016 <2.9 0.0 - 8.0 mg/L Final     AST   Date Value Ref Range Status   09/09/2020 29 0 - 45 U/L Final   08/11/2020 20 0 - 45 U/L Final   06/13/2020 34 0 - 45 U/L Final     Albumin   Date Value Ref Range Status   09/09/2020 3.8 3.4 - 5.0 g/dL Final   08/11/2020 3.8 3.4 - 5.0 g/dL Final   06/13/2020 3.9 3.4 - 5.0 g/dL Final     Alkaline Phosphatase   Date Value Ref Range Status   09/09/2020 80 40 - 150 U/L Final   08/11/2020 82 40 - 150 U/L Final   06/13/2020 80 40 - 150 U/L Final     ALT   Date Value Ref Range Status   09/09/2020 59 (H) 0 - 50 U/L Final   08/11/2020 40 0 - 50 U/L Final   06/13/2020 62 (H) 0 - 50 U/L Final     Rheumatoid Factor   Date Value Ref Range Status   02/08/2022 11 <12 IU/mL Final          Immunization History     Immunization History   Administered Date(s) Administered     HPV 07/06/2007, 09/07/2007, 02/14/2008     HPV Quadrivalent 07/06/2007, 09/07/2007, 02/14/2008     Influenza (IIV3) PF 10/12/2005, 11/02/2006, 01/02/2009, 10/05/2010     Influenza Vaccine IM > 6 months Valent IIV4 (Alfuria,Fluzone) 01/02/2014, 12/22/2014, 01/06/2016, 01/30/2017, 12/26/2017, 10/16/2018, 11/06/2019     Pneumococcal 23 valent 12/26/2017     TD (ADULT, 7+) 02/17/2002     TDAP Vaccine (Adacel) 05/07/2012

## 2022-04-01 DIAGNOSIS — E11.9 TYPE 2 DIABETES MELLITUS WITHOUT COMPLICATION, WITHOUT LONG-TERM CURRENT USE OF INSULIN (H): Primary | ICD-10-CM

## 2022-04-01 LAB — HCV AB SERPL QL IA: NEGATIVE

## 2022-04-01 NOTE — TELEPHONE ENCOUNTER
Routing refill request to provider for review/approval because:  Labs not current:  A1C      Colleen Hoffmann RN

## 2022-04-04 LAB
DSDNA AB SER-ACNC: 2.6 IU/ML
ENA SM IGG SER IA-ACNC: 1.8 U/ML
ENA SM IGG SER IA-ACNC: NEGATIVE
ENA SS-A AB SER IA-ACNC: 8.6 U/ML
ENA SS-A AB SER IA-ACNC: ABNORMAL
ENA SS-B IGG SER IA-ACNC: <0.6 U/ML
ENA SS-B IGG SER IA-ACNC: NEGATIVE
U1 SNRNP IGG SER IA-ACNC: 1.3 U/ML
U1 SNRNP IGG SER IA-ACNC: NEGATIVE

## 2022-04-09 ENCOUNTER — HEALTH MAINTENANCE LETTER (OUTPATIENT)
Age: 42
End: 2022-04-09

## 2022-04-09 DIAGNOSIS — E11.9 TYPE 2 DIABETES MELLITUS WITHOUT COMPLICATION, WITHOUT LONG-TERM CURRENT USE OF INSULIN (H): ICD-10-CM

## 2022-04-11 NOTE — TELEPHONE ENCOUNTER
Prescription approved per [facility] Refill Protocol.    Colleen Hoffmann RN

## 2022-04-27 ENCOUNTER — MYC MEDICAL ADVICE (OUTPATIENT)
Dept: FAMILY MEDICINE | Facility: CLINIC | Age: 42
End: 2022-04-27
Payer: COMMERCIAL

## 2022-05-18 ASSESSMENT — PATIENT HEALTH QUESTIONNAIRE - PHQ9
10. IF YOU CHECKED OFF ANY PROBLEMS, HOW DIFFICULT HAVE THESE PROBLEMS MADE IT FOR YOU TO DO YOUR WORK, TAKE CARE OF THINGS AT HOME, OR GET ALONG WITH OTHER PEOPLE: SOMEWHAT DIFFICULT
SUM OF ALL RESPONSES TO PHQ QUESTIONS 1-9: 9
SUM OF ALL RESPONSES TO PHQ QUESTIONS 1-9: 9

## 2022-05-23 ENCOUNTER — HOSPITAL ENCOUNTER (OUTPATIENT)
Dept: MAMMOGRAPHY | Facility: CLINIC | Age: 42
Discharge: HOME | End: 2022-05-23
Attending: OBSTETRICS & GYNECOLOGY | Admitting: OBSTETRICS & GYNECOLOGY
Payer: COMMERCIAL

## 2022-05-23 DIAGNOSIS — Z12.31 VISIT FOR SCREENING MAMMOGRAM: ICD-10-CM

## 2022-05-23 PROCEDURE — 77067 SCR MAMMO BI INCL CAD: CPT

## 2022-05-25 ENCOUNTER — OFFICE VISIT (OUTPATIENT)
Dept: FAMILY MEDICINE | Facility: CLINIC | Age: 42
End: 2022-05-25
Payer: COMMERCIAL

## 2022-05-25 VITALS
WEIGHT: 210.6 LBS | BODY MASS INDEX: 36.15 KG/M2 | OXYGEN SATURATION: 98 % | RESPIRATION RATE: 16 BRPM | HEART RATE: 127 BPM | TEMPERATURE: 97.1 F | SYSTOLIC BLOOD PRESSURE: 102 MMHG | DIASTOLIC BLOOD PRESSURE: 64 MMHG

## 2022-05-25 DIAGNOSIS — E80.6 CONJUGATED HYPERBILIRUBINEMIA: ICD-10-CM

## 2022-05-25 DIAGNOSIS — E78.2 MIXED HYPERLIPIDEMIA: ICD-10-CM

## 2022-05-25 DIAGNOSIS — M25.50 ARTHRALGIA, UNSPECIFIED JOINT: ICD-10-CM

## 2022-05-25 DIAGNOSIS — E11.9 TYPE 2 DIABETES MELLITUS WITHOUT COMPLICATION, WITHOUT LONG-TERM CURRENT USE OF INSULIN (H): Primary | ICD-10-CM

## 2022-05-25 DIAGNOSIS — K21.9 GASTROESOPHAGEAL REFLUX DISEASE WITHOUT ESOPHAGITIS: ICD-10-CM

## 2022-05-25 DIAGNOSIS — L71.9 ROSACEA: ICD-10-CM

## 2022-05-25 LAB
ALBUMIN SERPL-MCNC: 3.9 G/DL (ref 3.4–5)
ALP SERPL-CCNC: 71 U/L (ref 40–150)
ALT SERPL W P-5'-P-CCNC: 58 U/L (ref 0–50)
ANION GAP SERPL CALCULATED.3IONS-SCNC: 7 MMOL/L (ref 3–14)
AST SERPL W P-5'-P-CCNC: 27 U/L (ref 0–45)
B BURGDOR IGG+IGM SER QL: 0.11
BILIRUB SERPL-MCNC: 1.6 MG/DL (ref 0.2–1.3)
BUN SERPL-MCNC: 10 MG/DL (ref 7–30)
CALCIUM SERPL-MCNC: 9 MG/DL (ref 8.5–10.1)
CHLORIDE BLD-SCNC: 106 MMOL/L (ref 94–109)
CHOLEST SERPL-MCNC: 215 MG/DL
CO2 SERPL-SCNC: 25 MMOL/L (ref 20–32)
CREAT SERPL-MCNC: 0.74 MG/DL (ref 0.52–1.04)
FASTING STATUS PATIENT QL REPORTED: YES
GFR SERPL CREATININE-BSD FRML MDRD: >90 ML/MIN/1.73M2
GLUCOSE BLD-MCNC: 188 MG/DL (ref 70–99)
HBA1C MFR BLD: 8.7 % (ref 0–5.6)
HDLC SERPL-MCNC: 57 MG/DL
LDLC SERPL CALC-MCNC: 125 MG/DL
NONHDLC SERPL-MCNC: 158 MG/DL
POTASSIUM BLD-SCNC: 4 MMOL/L (ref 3.4–5.3)
PROT SERPL-MCNC: 7.9 G/DL (ref 6.8–8.8)
SODIUM SERPL-SCNC: 138 MMOL/L (ref 133–144)
TRIGL SERPL-MCNC: 163 MG/DL

## 2022-05-25 PROCEDURE — 99214 OFFICE O/P EST MOD 30 MIN: CPT | Performed by: OBSTETRICS & GYNECOLOGY

## 2022-05-25 PROCEDURE — 83036 HEMOGLOBIN GLYCOSYLATED A1C: CPT | Performed by: OBSTETRICS & GYNECOLOGY

## 2022-05-25 PROCEDURE — 80053 COMPREHEN METABOLIC PANEL: CPT | Performed by: OBSTETRICS & GYNECOLOGY

## 2022-05-25 PROCEDURE — 80061 LIPID PANEL: CPT | Performed by: OBSTETRICS & GYNECOLOGY

## 2022-05-25 PROCEDURE — 86618 LYME DISEASE ANTIBODY: CPT | Performed by: OBSTETRICS & GYNECOLOGY

## 2022-05-25 PROCEDURE — 36415 COLL VENOUS BLD VENIPUNCTURE: CPT | Performed by: OBSTETRICS & GYNECOLOGY

## 2022-05-25 RX ORDER — ATORVASTATIN CALCIUM 10 MG/1
10 TABLET, FILM COATED ORAL DAILY
Qty: 90 TABLET | Refills: 3 | Status: SHIPPED | OUTPATIENT
Start: 2022-05-25 | End: 2022-08-19

## 2022-05-25 NOTE — PROGRESS NOTES
Subjective: Here for diabetic followup exam.   Has been monitoring sugars 2 times daily. Glucose values have been in the  140-190 range fasting, and 165-300 range postprandial.  There have been  changes in vision- she states that she needs to see her ophthalmologist in the near future-  No changes/ complaints   of neuropathy symptoms.   No other concerns  - main concern is that lack of lower blood sugar control      The past medical history, social history, past surgical history and family history as shown below have been reviewed by me today.  Past Medical History:   Diagnosis Date     Abnormal Pap smear 2006, 2007,     ASC-H, ASCUS + HPV 45     Calculus of kidney 2002     Cervical high risk HPV (human papillomavirus) test positive     + HPV 45 (high risk)     History of colposcopy with cervical biopsy 2/9/06, 3/15/07    koilocytosis 2007        Allergies   Allergen Reactions     Amoxicillin Hives     Anti-Nausea      Anxiety, agitation,severe paranoia. Zofran, Reglan are some of them.  DRAMAMINE WITHOUT ISSUES       Demerol Hcl [Meperidine Hcl] Nausea     Indomethacin Nausea and Vomiting     Macrobid [Nitrofuran Derivatives] Hives     Reglan [Metoclopramide] Other (See Comments)     Acute paranoia, severe     Zofran [Ondansetron] Other (See Comments)     Severe anxiety, agitation     Vancomycin Other (See Comments)     maria del rosario syndrome     Current Outpatient Medications   Medication Sig Dispense Refill     acetaminophen (TYLENOL) 500 MG tablet Take 1,000 mg by mouth every 6 hours as needed for mild pain       blood glucose (CONTOUR NEXT TEST) test strip USE TO TEST BLOOD GLUCOSE FOUR TIMES A  strip 3     blood glucose monitoring (NO BRAND SPECIFIED) meter device kit Use to test blood sugar 2 times daily or as directed. 1 kit 0     cyclobenzaprine (FLEXERIL) 5 MG tablet Take 1 tablet (5 mg) by mouth daily as needed for muscle spasms 30 tablet 1     ibuprofen (ADVIL/MOTRIN) 200 MG tablet Take 800 mg by mouth  nightly as needed (sleep)       metFORMIN (GLUCOPHAGE) 1000 MG tablet TAKE 1 TABLET BY MOUTH IN THE MORNING AND TAKE 1/2 TABLET AT NIGHT 90 tablet 3     metFORMIN (GLUCOPHAGE) 1000 MG tablet Take 1 tablet in the a.m. 1/2 tablet at night 90 tablet 3     Misc Natural Products (AIRBORNE ELDERBERRY) CHEW Take 1 chew tab by mouth daily       multivitamin w/minerals (THERA-VIT-M) tablet Take 1 tablet by mouth daily       omeprazole (PRILOSEC) 20 MG DR capsule TAKE 1 CAPSULE BY MOUTH DAILY, 30 TO 60 MINUTES BEFORE A MEAL. 30 capsule 5     oxyCODONE (ROXICODONE) 5 MG tablet Take 1 tablet (5 mg) by mouth every 6 hours as needed for pain 12 tablet 0     metFORMIN (GLUCOPHAGE) 1000 MG tablet Take 500 mg by mouth daily (with dinner) And 1000 mg every morning       Past Surgical History:   Procedure Laterality Date     CHOLECYSTECTOMY, LAPOROSCOPIC  5/11/2007    Cholecystectomy, Laparoscopic     COLONOSCOPY  05/17/10     COLONOSCOPY N/A 8/27/2019    Procedure: COLONOSCOPY;  Surgeon: Paramjit Kingston DO;  Location: [facility]     COLONOSCOPY N/A 10/23/2020    Procedure: COLONOSCOPY, WITH  BIOPSY;  Surgeon: Ba Dickinson MD;  Location:  GI     CONIZATION  7/1/2011    Procedure:CONIZATION; cold knife and punch biopsy of mole on back; Surgeon:SYDNEY FORD; Location:[facility]     DILATION AND CURETTAGE, HYSTEROSCOPY, LAPAROSCOPY, COMBINED Right 9/24/2015    Procedure: COMBINED DILATION AND CURETTAGE, HYSTEROSCOPY, LAPAROSCOPY;  Surgeon: Sydney Ford MD;  Location: [facility]     DISCECTOMY LUMBAR MINIMALLY INVASIVE ONE LEVEL Left 1/23/2019    Procedure: Minimally Invasive Surgery Left Lumbar 5-Sacral 1 microdiscectomy;  Surgeon: Mason Roe MD;  Location: [facility]     ESOPHAGOSCOPY, GASTROSCOPY, DUODENOSCOPY (EGD), COMBINED  5/21/2014    Procedure: COMBINED ESOPHAGOSCOPY, GASTROSCOPY, DUODENOSCOPY (EGD), BIOPSY SINGLE OR MULTIPLE;  Surgeon: Earl Lane MD;  Location: [facility]     ESOPHAGOSCOPY,  GASTROSCOPY, DUODENOSCOPY (EGD), COMBINED N/A 10/23/2020    Procedure: Esophagogastroduodenoscopy with  biopsy;  Surgeon: Ba Dickinson MD;  Location: [facility]     EXCISE LESION TRUNK  7/1/2011    Procedure:EXCISE LESION TRUNK; Surgeon:SYDNEY GRAFF; Location:[facility]     HC FRAGMENTING OF KIDNEY STONE  11/30/2005     HC RX ECTOP PREG BY SCOPE,RMV TUBE/OVRY  12/20/2007    Right sided salpingectomy and removal of ruptured ectopic pregnancy. D&C.     HYSTERECTOMY VAGINAL       LAPAROSCOPIC APPENDECTOMY N/A 9/24/2015    Procedure: LAPAROSCOPIC APPENDECTOMY;  Surgeon: James Cuellar MD;  [facility]     LAPAROSCOPIC CYSTECTOMY OVARIAN (BENIGN) N/A 9/24/2015    Procedure: LAPAROSCOPIC CYSTECTOMY OVARIAN (BENIGN);  Surgeon: Sydney Graff MD;  [facility]     LAPAROSCOPIC HYSTERECTOMY TOTAL, BILATERAL SALPINGO-OOPHORECTOMY, COMBINED N/A 7/28/2020    Procedure: laparoscpic assisted vaginal hysterectomy, cystoscopy;  Surgeon: Sydney Graff MD;  Location: [facility]     LAPAROSCOPIC OOPHORECTOMY Right 9/24/2015    Procedure: LAPAROSCOPIC OOPHORECTOMY;  Surgeon: Sydney Graff MD;  [facility]     LITHOTRIPSY  01/17/2007     PELVIS LAPAROSCOPY,DX  10/16/2000    Diagnostic laparoscopy, Endometrial biopsy.     TUBAL/ECTOPIC PREGNANCY  01/23/2007    Lap removal of left ruptured ectopic pregnancy & salpingectomy, D&C     ZZC REMOVAL OF KIDNEY STONE      two surgeries, 2002,2003     ZZ UGI ENDOSCOPY, SIMPLE EXAM  09/01/09     Social History     Socioeconomic History     Marital status:      Spouse name: Joseph     Number of children: 1     Years of education: 9     Highest education level: None   Occupational History     Employer: NONE   Tobacco Use     Smoking status: Former Smoker     Packs/day: 0.00     Years: 12.00     Pack years: 0.00     Types: Cigarettes     Smokeless tobacco: Never Used     Tobacco comment: As of 10 /2014, pt has had a few cigs here and  there   Vaping Use     Vaping Use: Never used   Substance and Sexual Activity     Alcohol use: Yes     Alcohol/week: 0.0 standard drinks     Comment: every few months     Drug use: No     Sexual activity: Yes     Partners: Male     Birth control/protection: Female Surgical   Other Topics Concern      Service No     Blood Transfusions No     Caffeine Concern Yes     Comment: Reports 1 can soda/week  Advised not more than 16 ounces caffeien/day.     Occupational Exposure No     Hobby Hazards No     Sleep Concern No     Stress Concern Yes     Comment: will discuss with      Weight Concern Yes     Comment: Eating disorder in childhood.  Hx. gestational diab.     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     Exercise No     Comment: Advised walking 30 min/day.     Bike Helmet No     Seat Belt Yes   Social History Narrative     and lives at home with her  and daughter.     Family History   Problem Relation Age of Onset     Diabetes Maternal Grandmother         adult onset     Anesthesia Reaction Maternal Grandmother         can't tolerate Novacaine.     Respiratory Maternal Grandmother         COPD and emphysema.     Thyroid Disease Maternal Grandmother      Heart Disease Maternal Grandmother         CHF     Diabetes Paternal Grandfather         adult o nset     Hypertension Paternal Grandfather      Cerebrovascular Disease Paternal Grandfather      Heart Disease Paternal Grandfather         MI, replaced valve,angioplasty     Thyroid Disease Paternal Grandfather      Cancer Maternal Grandfather         skin cancer     Heart Disease Maternal Grandfather         MI     Obesity Mother         on thyroid medication     Thyroid Disease Mother      Diabetes Mother      Rheumatologic Disease Mother      Heart Disease Father      Hypertension Father         and TIA     Lipids Father      Breast Cancer Paternal Grandmother      Arrhythmia Other       Cancer Maternal Aunt         breast/brain cancer     Depression Paternal Aunt      Cerebrovascular Disease Paternal Uncle      Cerebrovascular Disease Paternal Aunt        ROS: A 12 point review of systems was done. Except for what is listed above in the HPI, the systems review is negative .      Objective: Vital signs: Blood pressure 102/64, pulse (!) 127, temperature 97.1  F (36.2  C), resp. rate 16, weight 95.5 kg (210 lb 9.6 oz), last menstrual period 07/25/2020, SpO2 98 %, not currently breastfeeding.    Recheck pulse is 96  HEENT:  Sclerae and conjunctiva are normal.   Ear canals and TMs look normal.  Nasal mucosa is pink  - no polyps or masses seen.  sinuses are non tender to palpation.  Throat is unremarkable . Mucous membranes are moist.   Fundi are benign- disc margins are sharp. No papilledema noted.    Neck is supple, mobile, no adenoapthy or masses palpable. Normal range of motion noted.  Chest is clear to auscultation. No wheezes, rales or rhonchi heard.  cardiac exam is normal with s1, s2, no murmurs or adventitious sounds.Normal rate and rhythm is heard.  Exam of the feet is negative for paresthesias.  Has normal sensation and color to both feet, and normal pulses and no trophic skin changes or ulcers.       Assessment/Plan: 1. Type 2   Diabetes is not  well-controlled.    . Ace inhibitor therapy:     I have just increased her metformin so I do not want to add lisinopril at this time but I will consider adding lisinopril in a few months-we have discussed this in the past and she has been reluctant to take lisinopril because when we discussed it I told her that she would not be able to ever stop it and that bothered her.  Today I told her that she needs to realize that lisinopril is protective of the kidneys and the diabetes can damage the kidneys.  She will be willing to consider adding the lisinopril after she has had a consultation with Dr. Mcintyre.    Labs today: A1C      Urine for microprotein     Lipids    also a comprehensive metabolic panel because her ALT was high when previously checked a couple months ago.  Also I may need to consider treating her lipids.  She also wants a Lyme test because her rheumatologist suggested it due to her arthralgia.     Baby aspirin 65 or 81 mg advised daily as prophylaxis- watch for GI upset or tinnitus or bruising/signs of bleeding- stop if these occur.    Advised to recheck in  3 months.  This may be contingent upon today's lab results.  I have also advised her to see Dr. Mcintyre for consultation regarding adding more medication.  This appointment has been set up for July.  I did suggest that she start insulin in addition to the metformin but she is adamant that she will not take insulin at this time.  She is worried about developing hypoglycemia -a sudden drop in blood sugar.  I told her I am not quite familiar with all of the other oral agents that are compatible with metformin and so I will defer that to Dr. Mcintyre.  She also knows that I will likely be retiring next summer and that would be a good time for her to transition to Dr. Mcintyre for ongoing care.    Continue daily glucose monitoring. Recheck acutely if concerns.   Advised to have yearly ophthalmology exam, regular dental visits and keep up to date on flu vaccine and TDAP.           SHARAN Ford MD

## 2022-05-25 NOTE — RESULT ENCOUNTER NOTE
I have called her with these results.  The bilirubin is up slightly but this has been up before when she has been evaluated by the rheumatologist.  I suggested that we check it again in several months right before she sees Dr. Mcintyre.  Also she will repeat the A1c at that time since she is planning to increase metformin now.  She will review the next A1c results with Dr. Mcintyre and then they can decide if they need to add more oral medication.  At this point she is against using insulin.  I also advised her to start atorvastatin 10 mg daily based on today's cholesterol results.  I did caution her to stop it if she develops significant muscle aches.SHARAN Ford MD

## 2022-05-31 ENCOUNTER — VIRTUAL VISIT (OUTPATIENT)
Dept: RHEUMATOLOGY | Facility: CLINIC | Age: 42
End: 2022-05-31
Payer: COMMERCIAL

## 2022-05-31 DIAGNOSIS — R21 RASH OF FACE: ICD-10-CM

## 2022-05-31 DIAGNOSIS — R76.8 ANA POSITIVE: Primary | ICD-10-CM

## 2022-05-31 DIAGNOSIS — M25.50 POLYARTHRALGIA: ICD-10-CM

## 2022-05-31 PROCEDURE — 99214 OFFICE O/P EST MOD 30 MIN: CPT | Mod: 95 | Performed by: INTERNAL MEDICINE

## 2022-05-31 NOTE — PROGRESS NOTES
Fallon is a 42 year old who is being evaluated via a billable video visit.      How would you like to obtain your AVS? MyChart  If the video visit is dropped, the invitation should be resent by: [phone]  Will anyone else be joining your video visit? No         Video-Visit Details    Type of service:  Video Visit    Start: 05/31/2022 11:57 am  Stop: 05/31/2022 12:05 pm    Originating Location (pt. Location): Home    [facility]     Platform used for Video Visit: [employer]     This document was created using a software with less than 100% fidelity, at times resulting in unintended, even erroneous syntax and grammar.  The reader is advised to keep this under consideration while reviewing, interpreting this note.           ASSESSMENT AND PLAN:    Diagnoses and all orders for this visit:  BRENNEN positive  Polyarthralgia  Rash of face      This patient with borderline positive BRENNEN, 1: 40, facial rash more likely rosacea, arthralgias without evidence of inflammatory joint disease, is doing well with over-the-counter measures for now.  We talked about sicca symptoms.  She does not want to consider any over-the-counter measures there either.  She will follow-up here on an as-needed basis.      HISTORY OF PRESENTING ILLNESS:  Fallon A Rivera 42 year old is evaluated here via video/audio link. evaluation of a positive BRENNEN, borderline, 1: 40.  This was done as a work-up for variety of diverse symptoms.  She has noted joint pains.  She points to her fingers, PIPs DIPs and wrists knees, which intermittently cause trouble for her, the only time she has seen some swelling is in the knee areas.  She has noted that with the recent change in weather her symptoms have improved significantly.  She finds ibuprofen helpful.  She has not tried Tylenol sustained-release and is prepared to try that.  Her SS UA came back positive.  She has mild dry symptoms.  She does not  "want anything over-the-counter such as XyliMelts and is fine for now with drinking water when she needs to.  We discussed the rash on her face but had features of rosacea she is due to be seen in dermatology unless this suggests otherwise she will continue to follow-up with them.     Following is the excerpt from a previous note:   She has had swelling in the distal lower extremity by the end of the day when the socks leave the marks.  She has noted stiffness in the morning as she wakes up with often she is not refreshed of the sleep, at one point because of her snoring sleep apnea evaluation was done and thought not to be the case.  She rates the pain as moderately severe.  Her stiffness can last as long as 2 to 3 hours.  Despite this she is able to do most of her day-to-day activities.  There are some days where she finds it very hard to get out of the bed.  She has had rash on her face.  No specific diagnosis been arrived at that.  She does not take any alcohol she is not a smoker.  She has noted dizziness, to the point that once she fell and was seen in the emergency room and started on muscle relaxers.  She has history of low back pain for which she ended up having surgery this was 4 to 5 years ago, at that point it was thought that it might help her with numbness and the weakness of her lower extremities.  She has had only partial response.  She has noted generalized fatigue.  She has noted blurry vision or ringing in the ears.  She has had flank pain went to the emergency room a few times.  She used to have sores in the mouth infrequently but now she gets them quite more frequently.  She has had chest pains went to the emergency room and her kids were younger she was told that she may have \"pleurisy\".  She has had nausea heartburn.  She is noted to have history of anxiety, depression.  She has type 2 diabetes.  She is on Metformin for that.  She is a homemaker.  She had 2 normal pregnancies, 2 ectopics.  As " "per as she knows there is no history of DVT PE or seizure disorder.  She does not have features suggestive of Raynaud's.  She is on omeprazole for heartburn.  She used to take ibuprofen for her joint symptoms but then developed \"GI issues\" she went out to have colonoscopy and was told that she should stop taking Advil because of its deleterious effects on her colonic mucosa.  She stairs as much as she can from Tylenol given the history of elevated ALT..         ROS enquiry held for fever, ocular symptoms, rash, headache,  GI issues.  Today we also discussed the issues related to the current pandemic, the pros and cons of the current treatment plan, the CDC guidelines such as social distancing washing the hands covering the cough.  ALLERGIES:Amoxicillin, Anti-nausea, Demerol hcl [meperidine hcl], Indomethacin, Macrobid [nitrofuran derivatives], Reglan [metoclopramide], Zofran [ondansetron], and Vancomycin    PAST MEDICAL/ACTIVE PROBLEMS/MEDICATION/SOCIAL DATA  Past Medical History:   Diagnosis Date     Abnormal Pap smear 2006, 2007,     ASC-H, ASCUS + HPV 45     Calculus of kidney 2002     Cervical high risk HPV (human papillomavirus) test positive     + HPV 45 (high risk)     History of colposcopy with cervical biopsy 2/9/06, 3/15/07    koilocytosis 2007     History   Smoking Status     Former Smoker     Packs/day: 0.00     Years: 12.00     Types: Cigarettes   Smokeless Tobacco     Never Used     Comment: As of 10 /2014, pt has had a few cigs here and there     Patient Active Problem List   Diagnosis     Hemorrhage of rectum and anus     Female infertility     Papanicolaou smear of cervix with atypical squamous cells cannot exclude high grade squamous intraepithelial lesion (ASC-H)     S/P conization of cervix- KAYLA 2/3 in 2011     Kidney stones     Dyspnea     CARDIOVASCULAR SCREENING; LDL GOAL LESS THAN 160     GERD (gastroesophageal reflux disease)     Hyperlipidemia LDL goal <130     Mild major depression (H)     " Right ovarian cyst     Glucosuria     Type 2 diabetes mellitus without complication (H)     Non morbid obesity due to excess calories     Obesity (BMI 35.0-39.9) with comorbidity (H)     Endometrial intraepithelial neoplasia (EIN)     Endometrial hyperplasia, unspecified     Pelvic pain in female     Rectal bleeding     Chronic diarrhea     Abnormal CT scan, small bowel     Dyspareunia, female     Left ovarian cyst     Current Outpatient Medications   Medication Sig Dispense Refill     acetaminophen (TYLENOL) 500 MG tablet Take 1,000 mg by mouth every 6 hours as needed for mild pain       atorvastatin (LIPITOR) 10 MG tablet Take 1 tablet (10 mg) by mouth daily 90 tablet 3     blood glucose (CONTOUR NEXT TEST) test strip USE TO TEST BLOOD GLUCOSE FOUR TIMES A  strip 3     blood glucose monitoring (NO BRAND SPECIFIED) meter device kit Use to test blood sugar 2 times daily or as directed. 1 kit 0     cyclobenzaprine (FLEXERIL) 5 MG tablet Take 1 tablet (5 mg) by mouth daily as needed for muscle spasms 30 tablet 1     ibuprofen (ADVIL/MOTRIN) 200 MG tablet Take 800 mg by mouth nightly as needed (sleep)       metFORMIN (GLUCOPHAGE) 1000 MG tablet Take 1 tablet in the a.m. and 1  tablet at night 180 tablet 3     metFORMIN (GLUCOPHAGE) 1000 MG tablet TAKE 1 TABLET BY MOUTH IN THE MORNING AND TAKE 1/2 TABLET AT NIGHT 90 tablet 3     Misc Natural Products (AIRBORNE ELDERBERRY) CHEW Take 1 chew tab by mouth daily       multivitamin w/minerals (THERA-VIT-M) tablet Take 1 tablet by mouth daily       omeprazole (PRILOSEC) 20 MG DR capsule TAKE 1 CAPSULE BY MOUTH DAILY, 30 TO 60 MINUTES BEFORE A MEAL. 30 capsule 5     oxyCODONE (ROXICODONE) 5 MG tablet Take 1 tablet (5 mg) by mouth every 6 hours as needed for pain 12 tablet 0     metFORMIN (GLUCOPHAGE) 1000 MG tablet Take 500 mg by mouth daily (with dinner) And 1000 mg every morning (Patient not taking: Reported on 5/31/2022)           EXAMINATION:    Using the audio and  video link as best as possible the constitutional, neck, neurologic, psych, skin, both upper extremities areas/organ system were evaluated during this assessment.  Some of the important findings: Alert, oriented, speech fluent.   Able to fully flex the digits, into fists bilaterally, wrist and elbow range of motion appear normal, abduction of the shoulder is normal.      LAB / IMAGING DATA:  ALT   Date Value Ref Range Status   05/25/2022 58 (H) 0 - 50 U/L Final   03/31/2022 66 (H) 0 - 45 U/L Final   09/09/2020 59 (H) 0 - 50 U/L Final   08/11/2020 40 0 - 50 U/L Final   06/13/2020 62 (H) 0 - 50 U/L Final     Albumin   Date Value Ref Range Status   05/25/2022 3.9 3.4 - 5.0 g/dL Final   03/31/2022 4.0 3.5 - 5.0 g/dL Final   09/09/2020 3.8 3.4 - 5.0 g/dL Final   08/11/2020 3.8 3.4 - 5.0 g/dL Final   06/13/2020 3.9 3.4 - 5.0 g/dL Final       WBC   Date Value Ref Range Status   01/26/2021 6.8 4.0 - 11.0 10e9/L Final   12/04/2020 11.5 (H) 4.0 - 11.0 10e9/L Final     WBC Count   Date Value Ref Range Status   03/31/2022 7.5 4.0 - 11.0 10e3/uL Final     Hemoglobin   Date Value Ref Range Status   03/31/2022 15.6 11.7 - 15.7 g/dL Final   01/26/2021 15.3 11.7 - 15.7 g/dL Final   12/04/2020 16.5 (H) 11.7 - 15.7 g/dL Final   09/09/2020 15.0 11.7 - 15.7 g/dL Final     Platelet Count   Date Value Ref Range Status   03/31/2022 244 150 - 450 10e3/uL Final   01/26/2021 232 150 - 450 10e9/L Final   12/04/2020 228 150 - 450 10e9/L Final   09/09/2020 215 150 - 450 10e9/L Final       No results found for: BRENNEN

## 2022-07-19 ENCOUNTER — LAB (OUTPATIENT)
Dept: LAB | Facility: CLINIC | Age: 42
End: 2022-07-19
Payer: COMMERCIAL

## 2022-07-19 DIAGNOSIS — E80.6 CONJUGATED HYPERBILIRUBINEMIA: ICD-10-CM

## 2022-07-19 DIAGNOSIS — E11.9 TYPE 2 DIABETES MELLITUS WITHOUT COMPLICATION, WITHOUT LONG-TERM CURRENT USE OF INSULIN (H): ICD-10-CM

## 2022-07-19 LAB
BILIRUB DIRECT SERPL-MCNC: 0.2 MG/DL (ref 0–0.2)
BILIRUB SERPL-MCNC: 1 MG/DL (ref 0.2–1.3)
HBA1C MFR BLD: 7.6 % (ref 0–5.6)

## 2022-07-19 PROCEDURE — 82247 BILIRUBIN TOTAL: CPT

## 2022-07-19 PROCEDURE — 36415 COLL VENOUS BLD VENIPUNCTURE: CPT

## 2022-07-19 PROCEDURE — 82248 BILIRUBIN DIRECT: CPT

## 2022-07-19 PROCEDURE — 83036 HEMOGLOBIN GLYCOSYLATED A1C: CPT

## 2022-07-26 ENCOUNTER — OFFICE VISIT (OUTPATIENT)
Dept: INTERNAL MEDICINE | Facility: CLINIC | Age: 42
End: 2022-07-26
Payer: COMMERCIAL

## 2022-07-26 VITALS
HEART RATE: 122 BPM | SYSTOLIC BLOOD PRESSURE: 118 MMHG | TEMPERATURE: 97.2 F | WEIGHT: 203 LBS | DIASTOLIC BLOOD PRESSURE: 82 MMHG | RESPIRATION RATE: 16 BRPM | BODY MASS INDEX: 34.84 KG/M2 | OXYGEN SATURATION: 100 %

## 2022-07-26 DIAGNOSIS — M25.50 ARTHRALGIA, UNSPECIFIED JOINT: ICD-10-CM

## 2022-07-26 DIAGNOSIS — E11.9 TYPE 2 DIABETES MELLITUS WITHOUT COMPLICATION, WITHOUT LONG-TERM CURRENT USE OF INSULIN (H): Primary | ICD-10-CM

## 2022-07-26 PROCEDURE — 99213 OFFICE O/P EST LOW 20 MIN: CPT | Performed by: INTERNAL MEDICINE

## 2022-07-26 RX ORDER — METFORMIN HCL 500 MG
1000 TABLET, EXTENDED RELEASE 24 HR ORAL 2 TIMES DAILY WITH MEALS
Qty: 360 TABLET | Refills: 3 | Status: SHIPPED | OUTPATIENT
Start: 2022-07-26 | End: 2023-07-14

## 2022-07-26 RX ORDER — CYCLOBENZAPRINE HCL 5 MG
5 TABLET ORAL DAILY PRN
Qty: 30 TABLET | Refills: 1 | Status: SHIPPED | OUTPATIENT
Start: 2022-07-26 | End: 2023-03-20

## 2022-07-26 ASSESSMENT — PATIENT HEALTH QUESTIONNAIRE - PHQ9
10. IF YOU CHECKED OFF ANY PROBLEMS, HOW DIFFICULT HAVE THESE PROBLEMS MADE IT FOR YOU TO DO YOUR WORK, TAKE CARE OF THINGS AT HOME, OR GET ALONG WITH OTHER PEOPLE: NOT DIFFICULT AT ALL
SUM OF ALL RESPONSES TO PHQ QUESTIONS 1-9: 8
SUM OF ALL RESPONSES TO PHQ QUESTIONS 1-9: 8

## 2022-07-26 NOTE — TELEPHONE ENCOUNTER
Requested Prescriptions   Pending Prescriptions Disp Refills     cyclobenzaprine (FLEXERIL) 5 MG tablet [Pharmacy Med Name: CYCLOBENZAPRINE HCL 5MG TABS] 30 tablet 1     Sig: TAKE 1 TABLET (5 MG) BY MOUTH DAILY AS NEEDED FOR MUSCLE SPASMS       There is no refill protocol information for this order        Last Written Prescription Date:  2/8/2022  Last Fill Quantity: 30,  # refills: 1   Last office visit: 5/25/2022 with prescribing provider:     Future Office Visit:

## 2022-07-26 NOTE — PROGRESS NOTES
"  Assessment & Plan   Problem List Items Addressed This Visit     Type 2 diabetes mellitus without complication (H) - Primary    Relevant Medications    empagliflozin (JARDIANCE) 10 MG TABS tablet    metFORMIN (GLUCOPHAGE XR) 500 MG 24 hr tablet    Other Relevant Orders    **A1C FUTURE 3mo         Patient with diabetes for 7 years.  She had diabetes with her pregnancies and was on insulin.  Her A1c is have not been great in the 7-8 range.  She was up to 8.6 and increased her metformin to thousand twice a day had some GI effects and her A1c is improved.  Sugars are still too high in the 1 40-1 80 range usually.  We will add Jardiance.  See if this helps with weight loss, continue to work on diet.  Recheck an A1c in 3 months.    She is warned about side effects of Jardiance.  Hopefully insurance will cover this and she is warned about the increased cost.    She does have some probable diabetic neuropathy offered her an EMG but will hold off at this time.    23 minutes spent on the date of the encounter doing chart review, history and exam, documentation and further activities per the note       BMI:   Estimated body mass index is 34.84 kg/m  as calculated from the following:    Height as of 8/9/21: 1.626 m (5' 4\").    Weight as of this encounter: 92.1 kg (203 lb).           No follow-ups on file.    Spencer Mcintyre MD  [facility]    Praful Lehman is a 42 year old, presenting for the following health issues:  Diabetes (Discuss diabetes per Dr. Ford)      History of Present Illness       Diabetes:   She presents for follow up of diabetes.  She is checking home blood glucose two times daily. She checks blood glucose before meals and at bedtime.  Blood glucose is sometimes over 200 and never under 70. When her blood glucose is low, the patient is asymptomatic for confusion, blurred vision, lethargy and reports not feeling dizzy, shaky, or weak.  She is concerned about other. She is " having numbness in feet and burning in feet. The patient has not had a diabetic eye exam in the last 12 months.         She eats 2-3 servings of fruits and vegetables daily.She consumes 0 sweetened beverage(s) daily.She exercises with enough effort to increase her heart rate 30 to 60 minutes per day.  She exercises with enough effort to increase her heart rate 4 days per week.   She is taking medications regularly.    Today's PHQ-9         PHQ-9 Total Score: 8    PHQ-9 Q9 Thoughts of better off dead/self-harm past 2 weeks :   Not at all    How difficult have these problems made it for you to do your work, take care of things at home, or get along with other people: Not difficult at all     Diabetes and hgba1c was up over 8, sugars were over 180.  Taking metformin 1000mg twice a day, some chronic GI issues.  Some side effects with increased dose, a1c improved.      Had pregnancy induced diabetes and was on insulin then.     Checks sugars each morning 140-160 and that is better for her.  Normally not under 150.     Some neuropathy in her feet burning.      Review of Systems         Objective    /82   Pulse (!) 122   Temp 97.2  F (36.2  C) (Temporal)   Resp 16   Wt 92.1 kg (203 lb)   LMP 07/25/2020 (Exact Date)   SpO2 100%   BMI 34.84 kg/m    Body mass index is 34.84 kg/m .  Physical Exam   NAD                    .  ..

## 2022-08-02 ENCOUNTER — OFFICE VISIT (OUTPATIENT)
Dept: DERMATOLOGY | Facility: CLINIC | Age: 42
End: 2022-08-02
Payer: COMMERCIAL

## 2022-08-02 DIAGNOSIS — L81.4 LENTIGO: Primary | ICD-10-CM

## 2022-08-02 DIAGNOSIS — D23.9 DERMAL NEVUS: ICD-10-CM

## 2022-08-02 DIAGNOSIS — L82.1 SEBORRHEIC KERATOSIS: ICD-10-CM

## 2022-08-02 DIAGNOSIS — L71.9 ROSACEA: ICD-10-CM

## 2022-08-02 DIAGNOSIS — D18.01 ANGIOMA OF SKIN: ICD-10-CM

## 2022-08-02 PROCEDURE — 99243 OFF/OP CNSLTJ NEW/EST LOW 30: CPT | Performed by: DERMATOLOGY

## 2022-08-02 ASSESSMENT — PAIN SCALES - GENERAL: PAINLEVEL: NO PAIN (0)

## 2022-08-02 NOTE — PROGRESS NOTES
Fallon Rivera , a 42 year old year old female patient, I was asked to see by Dr. Ford for rednesds and pimples on face.  Patient states this has been present for a while.  Patient reports the following symptoms:  pimples .  Patient reports the following previous treatments none.  Patient reports the following modifying factors none.  Associated symptoms: none.  Patient has no other skin complaints today.  Remainder of the HPI, Meds, PMH, Allergies, FH, and SH was reviewed in chart.      Past Medical History:   Diagnosis Date     Abnormal Pap smear 2006, 2007,     ASC-H, ASCUS + HPV 45     Calculus of kidney 01/01/2002     Cervical high risk HPV (human papillomavirus) test positive     + HPV 45 (high risk)     History of colposcopy with cervical biopsy 2/9/06, 3/15/07    koilocytosis 2007       Past Surgical History:   Procedure Laterality Date     CHOLECYSTECTOMY, LAPOROSCOPIC  5/11/2007    Cholecystectomy, Laparoscopic     COLONOSCOPY  05/17/10     COLONOSCOPY N/A 8/27/2019    Procedure: COLONOSCOPY;  Surgeon: Paramjit Kingston DO;  [facility]     COLONOSCOPY N/A 10/23/2020    Procedure: COLONOSCOPY, WITH  BIOPSY;  Surgeon: Ba Dickinson MD;  Location:  GI     CONIZATION  7/1/2011    Procedure:CONIZATION; cold knife and punch biopsy of mole on back; Surgeon:SYDNEY FORD; [facility]     DILATION AND CURETTAGE, HYSTEROSCOPY, LAPAROSCOPY, COMBINED Right 9/24/2015    Procedure: COMBINED DILATION AND CURETTAGE, HYSTEROSCOPY, LAPAROSCOPY;  Surgeon: Sydney Ford MD;  [facility]     DISCECTOMY LUMBAR MINIMALLY INVASIVE ONE LEVEL Left 1/23/2019    Procedure: Minimally Invasive Surgery Left Lumbar 5-Sacral 1 microdiscectomy;  Surgeon: Mason oRe MD;  [facility]     ESOPHAGOSCOPY, GASTROSCOPY, DUODENOSCOPY (EGD), COMBINED  5/21/2014    Procedure: COMBINED ESOPHAGOSCOPY, GASTROSCOPY, DUODENOSCOPY (EGD), BIOPSY SINGLE OR MULTIPLE;  Surgeon: Domingo  Earl Peralta MD;  [facility]     ESOPHAGOSCOPY, GASTROSCOPY, DUODENOSCOPY (EGD), COMBINED N/A 10/23/2020    Procedure: Esophagogastroduodenoscopy with  biopsy;  Surgeon: Ba Dickinson MD;  [facility]     EXCISE LESION TRUNK  7/1/2011    Procedure:EXCISE LESION TRUNK; Surgeon:SYDNEY FORD; [facility]     HC FRAGMENTING OF KIDNEY STONE  11/30/2005     HC RX ECTOP PREG BY SCOPE,RMV TUBE/OVRY  12/20/2007    Right sided salpingectomy and removal of ruptured ectopic pregnancy. D&C.     HYSTERECTOMY VAGINAL       LAPAROSCOPIC APPENDECTOMY N/A 9/24/2015    Procedure: LAPAROSCOPIC APPENDECTOMY;  Surgeon: James Cuellar MD;  [facility]     LAPAROSCOPIC CYSTECTOMY OVARIAN (BENIGN) N/A 9/24/2015    Procedure: LAPAROSCOPIC CYSTECTOMY OVARIAN (BENIGN);  Surgeon: Sydney Ford MD;  [facility]     LAPAROSCOPIC HYSTERECTOMY TOTAL, BILATERAL SALPINGO-OOPHORECTOMY, COMBINED N/A 7/28/2020    Procedure: laparoscpic assisted vaginal hysterectomy, cystoscopy;  Surgeon: Sydney Ford MD;  [facility]     LAPAROSCOPIC OOPHORECTOMY Right 9/24/2015    Procedure: LAPAROSCOPIC OOPHORECTOMY;  Surgeon: Sydney Ford MD;  [facility]     LITHOTRIPSY  01/17/2007     PELVIS LAPAROSCOPY,DX  10/16/2000    Diagnostic laparoscopy, Endometrial biopsy.     TUBAL/ECTOPIC PREGNANCY  01/23/2007    Lap removal of left ruptured ectopic pregnancy & salpingectomy, D&C     Z REMOVAL OF KIDNEY STONE      two surgeries, 2002,2003     ZZ UGI ENDOSCOPY, SIMPLE EXAM  09/01/09        Family History   Problem Relation Age of Onset     Diabetes Maternal Grandmother         adult onset     Anesthesia Reaction Maternal Grandmother         can't tolerate Novacaine.     Respiratory Maternal Grandmother         COPD and emphysema.     Thyroid Disease Maternal Grandmother      Heart Disease Maternal Grandmother         CHF     Diabetes Paternal Grandfather         adult o  nset     Hypertension Paternal Grandfather      Cerebrovascular Disease Paternal Grandfather      Heart Disease Paternal Grandfather         MI, replaced valve,angioplasty     Thyroid Disease Paternal Grandfather      Cancer Maternal Grandfather         skin cancer     Heart Disease Maternal Grandfather         MI     Obesity Mother         on thyroid medication     Thyroid Disease Mother      Diabetes Mother      Rheumatologic Disease Mother      Heart Disease Father      Hypertension Father         and TIA     Lipids Father      Breast Cancer Paternal Grandmother      Arrhythmia Other      Cancer Maternal Aunt         breast/brain cancer     Depression Paternal Aunt      Cerebrovascular Disease Paternal Uncle      Cerebrovascular Disease Paternal Aunt        Social History     Socioeconomic History     Marital status:      Spouse name: Joseph     Number of children: 1     Years of education: 9     Highest education level: Not on file   Occupational History     Employer: NONE   Tobacco Use     Smoking status: Former Smoker     Packs/day: 0.00     Years: 12.00     Pack years: 0.00     Types: Cigarettes     Smokeless tobacco: Never Used     Tobacco comment: As of 10 /2014, pt has had a few cigs here and there   Vaping Use     Vaping Use: Never used   Substance and Sexual Activity     Alcohol use: Yes     Alcohol/week: 0.0 standard drinks     Comment: every few months     Drug use: No     Sexual activity: Yes     Partners: Male     Birth control/protection: Female Surgical   Other Topics Concern      Service No     Blood Transfusions No     Caffeine Concern Yes     Comment: Reports 1 can soda/week  Advised not more than 16 ounces caffeien/day.     Occupational Exposure No     Hobby Hazards No     Sleep Concern No     Stress Concern Yes     Comment: will discuss with      Weight Concern Yes     Comment: Eating disorder in childhood.  Hx. gestational diab.     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     Exercise No     Comment: Advised walking 30 min/day.     Bike Helmet No     Seat Belt Yes     Self-Exams Not Asked     Parent/sibling w/ CABG, MI or angioplasty before 65F 55M? Not Asked   Social History Narrative     and lives at home with her  and daughter.     Social Determinants of Health     Financial Resource Strain: Not on file   Food Insecurity: Not on file   Transportation Needs: Not on file   Physical Activity: Not on file   Stress: Not on file   Social Connections: Not on file   Intimate Partner Violence: Not on file   Housing Stability: Not on file       Outpatient Encounter Medications as of 8/2/2022   Medication Sig Dispense Refill     blood glucose (CONTOUR NEXT TEST) test strip USE TO TEST BLOOD GLUCOSE FOUR TIMES A  strip 3     blood glucose monitoring (NO BRAND SPECIFIED) meter device kit Use to test blood sugar 2 times daily or as directed. 1 kit 0     cyclobenzaprine (FLEXERIL) 5 MG tablet TAKE 1 TABLET (5 MG) BY MOUTH DAILY AS NEEDED FOR MUSCLE SPASMS 30 tablet 1     empagliflozin (JARDIANCE) 10 MG TABS tablet Take 1 tablet (10 mg) by mouth daily 30 tablet 3     ibuprofen (ADVIL/MOTRIN) 200 MG tablet Take 800 mg by mouth nightly as needed (sleep)       metFORMIN (GLUCOPHAGE XR) 500 MG 24 hr tablet Take 2 tablets (1,000 mg) by mouth 2 times daily (with meals) 360 tablet 3     omeprazole (PRILOSEC) 20 MG DR capsule TAKE 1 CAPSULE BY MOUTH DAILY, 30 TO 60 MINUTES BEFORE A MEAL. 30 capsule 5     acetaminophen (TYLENOL) 500 MG tablet Take 1,000 mg by mouth every 6 hours as needed for mild pain (Patient not taking: No sig reported)       atorvastatin (LIPITOR) 10 MG tablet Take 1 tablet (10 mg) by mouth daily (Patient not taking: No sig reported) 90 tablet 3     metFORMIN (GLUCOPHAGE) 1000 MG tablet Take 1 tablet in the a.m. and 1  tablet at night 180 tablet 3     Misc Natural Products (AIRBORNE ELDERBERRY) CHEW Take 1  chew tab by mouth daily (Patient not taking: No sig reported)       multivitamin w/minerals (THERA-VIT-M) tablet Take 1 tablet by mouth daily (Patient not taking: No sig reported)       oxyCODONE (ROXICODONE) 5 MG tablet Take 1 tablet (5 mg) by mouth every 6 hours as needed for pain (Patient not taking: No sig reported) 12 tablet 0     No facility-administered encounter medications on file as of 8/2/2022.             Review Of Systems  Skin: As above  Eyes: negative  Ears/Nose/Throat: negative  Respiratory: No shortness of breath, dyspnea on exertion, cough, or hemoptysis  Cardiovascular: negative  Gastrointestinal: negative  Genitourinary: negative  Musculoskeletal: negative  Neurologic: negative  Psychiatric: negative  Hematologic/Lymphatic/Immunologic: negative  Endocrine: negative      O:   NAD, WDWN, Alert & Oriented, Mood & Affect wnl, Vitals stable   Here today with daughter    LMP 07/25/2020 (Exact Date)    General appearance josue ii   Vitals stable   Alert, oriented and in no acute distress     Erythema and inflammatory papules on nose   Stuck on papules and brown macules on trunk and ext    Red papules on trunk   Flesh colored papules on trunk      The remainder of the full exam was normal; the following areas were examined:  conjunctiva/lids, oral mucosa, neck, peripheral vascular system, abdomen, lymph nodes, digits/nails, eccrine and apocrine glands, scalp/hair, face, neck, chest, abdomen, buttocks, back, RUE, LUE, RLE, LLE       Eyes: Conjunctivae/lids:Normal     ENT: Lips, buccal mucosa, tongue: normal    MSK:Normal    Cardiovascular: peripheral edema none    Pulm: Breathing Normal    Neuro/Psych: Orientation:Normal; Mood/Affect:Normal      A/P:  1. Seborrheic keratosis, lentigo, angioma, dermal nevus  2. Rosacea  Pathophysiology discussed with pateint   Uv precuatiosn discussed with patient   metrocream twice daily  It was a pleasure speaking to Fallon Rivera today.  Previous clinic  notes  and pertinent laboratory tests were reviewed prior to Fallon Rivera's visit.    Nature of benign skin lesions dicussed with patient.  UV precautions reviewed with patient.  Return to clinic 12 months

## 2022-08-02 NOTE — LETTER
8/2/2022         RE: Fallon Rivera  [address] [facility] [address]        Dear Colleague,    Thank you for referring your patient, Fallon Rivera, to the [facility]. Please see a copy of my visit note below.    Fallon Rivera , a 42 year old year old female patient, I was asked to see by Dr. Ford for rednesds and pimples on face.  Patient states this has been present for a while.  Patient reports the following symptoms:  pimples .  Patient reports the following previous treatments none.  Patient reports the following modifying factors none.  Associated symptoms: none.  Patient has no other skin complaints today.  Remainder of the HPI, Meds, PMH, Allergies, FH, and SH was reviewed in chart.      Past Medical History:   Diagnosis Date     Abnormal Pap smear 2006, 2007,     ASC-H, ASCUS + HPV 45     Calculus of kidney 01/01/2002     Cervical high risk HPV (human papillomavirus) test positive     + HPV 45 (high risk)     History of colposcopy with cervical biopsy 2/9/06, 3/15/07    koilocytosis 2007       Past Surgical History:   Procedure Laterality Date     CHOLECYSTECTOMY, LAPOROSCOPIC  5/11/2007    Cholecystectomy, Laparoscopic     COLONOSCOPY  05/17/10     COLONOSCOPY N/A 8/27/2019    Procedure: COLONOSCOPY;  Surgeon: Paramjit Kingston DO;  Location:  GI     COLONOSCOPY N/A 10/23/2020    Procedure: COLONOSCOPY, WITH  BIOPSY;  Surgeon: Ba Dickinson MD;  Location:  GI     CONIZATION  7/1/2011    Procedure:CONIZATION; cold knife and punch biopsy of mole on back; Surgeon:SYDNEY OFRD; [facility]     DILATION AND CURETTAGE, HYSTEROSCOPY, LAPAROSCOPY, COMBINED Right 9/24/2015    Procedure: COMBINED DILATION AND CURETTAGE, HYSTEROSCOPY, LAPAROSCOPY;  Surgeon: Sydney Ford MD;  [facility]     DISCECTOMY LUMBAR MINIMALLY INVASIVE ONE LEVEL Left 1/23/2019    Procedure: Minimally Invasive Surgery Left Lumbar 5-Sacral 1  microdiscectomy;  Surgeon: Mason Roe MD;  [facility]     ESOPHAGOSCOPY, GASTROSCOPY, DUODENOSCOPY (EGD), COMBINED  5/21/2014    Procedure: COMBINED ESOPHAGOSCOPY, GASTROSCOPY, DUODENOSCOPY (EGD), BIOPSY SINGLE OR MULTIPLE;  Surgeon: Earl Lane MD;  [facility]     ESOPHAGOSCOPY, GASTROSCOPY, DUODENOSCOPY (EGD), COMBINED N/A 10/23/2020    Procedure: Esophagogastroduodenoscopy with  biopsy;  Surgeon: Ba Dickinson MD;  [facility]     EXCISE LESION TRUNK  7/1/2011    Procedure:EXCISE LESION TRUNK; Surgeon:SYDNEY FORD; [facility]     HC FRAGMENTING OF KIDNEY STONE  11/30/2005     HC RX ECTOP PREG BY SCOPE,RMV TUBE/OVRY  12/20/2007    Right sided salpingectomy and removal of ruptured ectopic pregnancy. D&C.     HYSTERECTOMY VAGINAL       LAPAROSCOPIC APPENDECTOMY N/A 9/24/2015    Procedure: LAPAROSCOPIC APPENDECTOMY;  Surgeon: James Cuellar MD;  [facility]     LAPAROSCOPIC CYSTECTOMY OVARIAN (BENIGN) N/A 9/24/2015    Procedure: LAPAROSCOPIC CYSTECTOMY OVARIAN (BENIGN);  Surgeon: Sydney Ford MD;  [facility]     LAPAROSCOPIC HYSTERECTOMY TOTAL, BILATERAL SALPINGO-OOPHORECTOMY, COMBINED N/A 7/28/2020    Procedure: laparoscpic assisted vaginal hysterectomy, cystoscopy;  Surgeon: Sydney Ford MD;  Location: [facility]     LAPAROSCOPIC OOPHORECTOMY Right 9/24/2015    Procedure: LAPAROSCOPIC OOPHORECTOMY;  Surgeon: Sydney Ford MD;  [facility]     LITHOTRIPSY  01/17/2007     PELVIS LAPAROSCOPY,DX  10/16/2000    Diagnostic laparoscopy, Endometrial biopsy.     TUBAL/ECTOPIC PREGNANCY  01/23/2007    Lap removal of left ruptured ectopic pregnancy & salpingectomy, D&C     ZZC REMOVAL OF KIDNEY STONE      two surgeries, 2002,2003     ZZ UGI ENDOSCOPY, SIMPLE EXAM  09/01/09        Family History   Problem Relation Age of Onset     Diabetes Maternal Grandmother         adult onset     Anesthesia Reaction Maternal  Grandmother         can't tolerate Novacaine.     Respiratory Maternal Grandmother         COPD and emphysema.     Thyroid Disease Maternal Grandmother      Heart Disease Maternal Grandmother         CHF     Diabetes Paternal Grandfather         adult o nset     Hypertension Paternal Grandfather      Cerebrovascular Disease Paternal Grandfather      Heart Disease Paternal Grandfather         MI, replaced valve,angioplasty     Thyroid Disease Paternal Grandfather      Cancer Maternal Grandfather         skin cancer     Heart Disease Maternal Grandfather         MI     Obesity Mother         on thyroid medication     Thyroid Disease Mother      Diabetes Mother      Rheumatologic Disease Mother      Heart Disease Father      Hypertension Father         and TIA     Lipids Father      Breast Cancer Paternal Grandmother      Arrhythmia Other      Cancer Maternal Aunt         breast/brain cancer     Depression Paternal Aunt      Cerebrovascular Disease Paternal Uncle      Cerebrovascular Disease Paternal Aunt        Social History     Socioeconomic History     Marital status:      Spouse name: Joseph     Number of children: 1     Years of education: 9     Highest education level: Not on file   Occupational History     Employer: NONE   Tobacco Use     Smoking status: Former Smoker     Packs/day: 0.00     Years: 12.00     Pack years: 0.00     Types: Cigarettes     Smokeless tobacco: Never Used     Tobacco comment: As of 10 /2014, pt has had a few cigs here and there   Vaping Use     Vaping Use: Never used   Substance and Sexual Activity     Alcohol use: Yes     Alcohol/week: 0.0 standard drinks     Comment: every few months     Drug use: No     Sexual activity: Yes     Partners: Male     Birth control/protection: Female Surgical   Other Topics Concern      Service No     Blood Transfusions No     Caffeine Concern Yes     Comment: Reports 1 can soda/week  Advised not more than 16 ounces caffeien/day.      Occupational Exposure No     Hobby Hazards No     Sleep Concern No     Stress Concern Yes     Comment: will discuss with      Weight Concern Yes     Comment: Eating disorder in childhood.  Hx. gestational diab.     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     Exercise No     Comment: Advised walking 30 min/day.     Bike Helmet No     Seat Belt Yes     Self-Exams Not Asked     Parent/sibling w/ CABG, MI or angioplasty before 65F 55M? Not Asked   Social History Narrative     and lives at home with her  and daughter.     Social Determinants of Health     Financial Resource Strain: Not on file   Food Insecurity: Not on file   Transportation Needs: Not on file   Physical Activity: Not on file   Stress: Not on file   Social Connections: Not on file   Intimate Partner Violence: Not on file   Housing Stability: Not on file       Outpatient Encounter Medications as of 8/2/2022   Medication Sig Dispense Refill     blood glucose (CONTOUR NEXT TEST) test strip USE TO TEST BLOOD GLUCOSE FOUR TIMES A  strip 3     blood glucose monitoring (NO BRAND SPECIFIED) meter device kit Use to test blood sugar 2 times daily or as directed. 1 kit 0     cyclobenzaprine (FLEXERIL) 5 MG tablet TAKE 1 TABLET (5 MG) BY MOUTH DAILY AS NEEDED FOR MUSCLE SPASMS 30 tablet 1     empagliflozin (JARDIANCE) 10 MG TABS tablet Take 1 tablet (10 mg) by mouth daily 30 tablet 3     ibuprofen (ADVIL/MOTRIN) 200 MG tablet Take 800 mg by mouth nightly as needed (sleep)       metFORMIN (GLUCOPHAGE XR) 500 MG 24 hr tablet Take 2 tablets (1,000 mg) by mouth 2 times daily (with meals) 360 tablet 3     omeprazole (PRILOSEC) 20 MG DR capsule TAKE 1 CAPSULE BY MOUTH DAILY, 30 TO 60 MINUTES BEFORE A MEAL. 30 capsule 5     acetaminophen (TYLENOL) 500 MG tablet Take 1,000 mg by mouth every 6 hours as needed for mild pain (Patient not taking: No sig reported)       atorvastatin (LIPITOR) 10  MG tablet Take 1 tablet (10 mg) by mouth daily (Patient not taking: No sig reported) 90 tablet 3     metFORMIN (GLUCOPHAGE) 1000 MG tablet Take 1 tablet in the a.m. and 1  tablet at night 180 tablet 3     Misc Natural Products (AIRBORNE ELDERBERRY) CHEW Take 1 chew tab by mouth daily (Patient not taking: No sig reported)       multivitamin w/minerals (THERA-VIT-M) tablet Take 1 tablet by mouth daily (Patient not taking: No sig reported)       oxyCODONE (ROXICODONE) 5 MG tablet Take 1 tablet (5 mg) by mouth every 6 hours as needed for pain (Patient not taking: No sig reported) 12 tablet 0     No facility-administered encounter medications on file as of 8/2/2022.             Review Of Systems  Skin: As above  Eyes: negative  Ears/Nose/Throat: negative  Respiratory: No shortness of breath, dyspnea on exertion, cough, or hemoptysis  Cardiovascular: negative  Gastrointestinal: negative  Genitourinary: negative  Musculoskeletal: negative  Neurologic: negative  Psychiatric: negative  Hematologic/Lymphatic/Immunologic: negative  Endocrine: negative      O:   NAD, WDWN, Alert & Oriented, Mood & Affect wnl, Vitals stable   Here today with daughter    LMP 07/25/2020 (Exact Date)    General appearance josue ii   Vitals stable   Alert, oriented and in no acute distress     Erythema and inflammatory papules on nose   Stuck on papules and brown macules on trunk and ext    Red papules on trunk   Flesh colored papules on trunk      The remainder of the full exam was normal; the following areas were examined:  conjunctiva/lids, oral mucosa, neck, peripheral vascular system, abdomen, lymph nodes, digits/nails, eccrine and apocrine glands, scalp/hair, face, neck, chest, abdomen, buttocks, back, RUE, LUE, RLE, LLE       Eyes: Conjunctivae/lids:Normal     ENT: Lips, buccal mucosa, tongue: normal    MSK:Normal    Cardiovascular: peripheral edema none    Pulm: Breathing Normal    Neuro/Psych: Orientation:Normal;  Mood/Affect:Normal      A/P:  1. Seborrheic keratosis, lentigo, angioma, dermal nevus  2. Rosacea  Pathophysiology discussed with pateint   Uv precuatiosn discussed with patient   metrocream twice daily  It was a pleasure speaking to Fallon Rivera today.  Previous clinic  notes and pertinent laboratory tests were reviewed prior to Fallon Rivera's visit.    Nature of benign skin lesions dicussed with patient.  UV precautions reviewed with patient.  Return to clinic 12 months        Again, thank you for allowing me to participate in the care of your patient.        Sincerely,        Km Benoit MD

## 2022-08-17 ENCOUNTER — OFFICE VISIT (OUTPATIENT)
Dept: FAMILY MEDICINE | Facility: CLINIC | Age: 42
End: 2022-08-17
Payer: COMMERCIAL

## 2022-08-17 VITALS
SYSTOLIC BLOOD PRESSURE: 120 MMHG | TEMPERATURE: 97.7 F | WEIGHT: 205 LBS | OXYGEN SATURATION: 97 % | DIASTOLIC BLOOD PRESSURE: 74 MMHG | BODY MASS INDEX: 35.19 KG/M2 | HEART RATE: 128 BPM

## 2022-08-17 DIAGNOSIS — E66.01 MORBID (SEVERE) OBESITY DUE TO EXCESS CALORIES (H): ICD-10-CM

## 2022-08-17 DIAGNOSIS — B37.2 CANDIDIASIS OF SKIN: ICD-10-CM

## 2022-08-17 DIAGNOSIS — L73.9 FOLLICULITIS: Primary | ICD-10-CM

## 2022-08-17 PROCEDURE — 99213 OFFICE O/P EST LOW 20 MIN: CPT | Performed by: PHYSICIAN ASSISTANT

## 2022-08-17 RX ORDER — CEPHALEXIN 500 MG/1
500 CAPSULE ORAL 2 TIMES DAILY
Qty: 20 CAPSULE | Refills: 0 | Status: SHIPPED | OUTPATIENT
Start: 2022-08-17 | End: 2022-08-27

## 2022-08-17 RX ORDER — NYSTATIN 100000 [USP'U]/G
POWDER TOPICAL 2 TIMES DAILY PRN
Qty: 60 G | Refills: 2 | Status: SHIPPED | OUTPATIENT
Start: 2022-08-17 | End: 2024-07-09

## 2022-08-17 ASSESSMENT — PAIN SCALES - GENERAL: PAINLEVEL: MODERATE PAIN (4)

## 2022-08-17 NOTE — PROGRESS NOTES
Praful Lehman is a 42 year old, presenting for the following health issues:  Mass      Mass    History of Present Illness       Reason for visit:  Pain in neck face and head  Symptom onset:  1-3 days ago  Symptoms include:  Pain  Symptom intensity:  Moderate  Symptom progression:  Worsening  Had these symptoms before:  No  What makes it worse:  Touch and movement  What makes it better:  Rest and over counter meds    She eats 2-3 servings of fruits and vegetables daily.She consumes 1 sweetened beverage(s) daily.She exercises with enough effort to increase her heart rate 30 to 60 minutes per day.  She exercises with enough effort to increase her heart rate 4 days per week.   She is taking medications regularly.    Patient presents today for evaluation of pain and swollen glands on the right side of her neck. She reports this just started in the last few days. She reports she has had lymph nodes swell in the past but usually due to a cold. She denies any cold symptoms. No fevers. No ill contacts. Did recently start Jardiance - unsure if related. She also notes that she has been dealing with irritation of her scalp due to using a new shampoo. Now has a red, raised, scabbing area over the right temple.     Would also like a refill on Nystatin for as needed use.     Review of Systems   Constitutional, HEENT, cardiovascular, pulmonary, gi and gu systems are negative, except as otherwise noted.      Objective    /74   Pulse (!) 128   Temp 97.7  F (36.5  C) (Temporal)   Wt 93 kg (205 lb)   LMP 07/25/2020 (Exact Date)   SpO2 97%   BMI 35.19 kg/m    Body mass index is 35.19 kg/m .  Physical Exam   GENERAL: healthy, alert and no distress  EYES: Eyes grossly normal to inspection, PERRL and conjunctivae and sclerae normal  HENT: ear canals and TM's normal, nose and mouth without ulcers or lesions  NECK: few mildly enlarged lymph nodes on right anterior cervical region, no asymmetry, masses, or scars and  thyroid normal to palpation  RESP: lungs clear to auscultation - no rales, rhonchi or wheezes  CV: regular rate and rhythm, normal S1 S2, no S3 or S4, no murmur, click or rub, no peripheral edema and peripheral pulses strong  ABDOMEN: soft, nontender, no hepatosplenomegaly, no masses and bowel sounds normal  SKIN: Area of erythema and scaling over hairline on right temple  PSYCH: mentation appears normal, affect normal/bright    Assessment & Plan     Folliculitis  Area on scalp appears likely related to folliculitis/impetigo. I do suspect this is causing her enlarged lymph nodes as well. Recommended starting antibiotics as below. Patient will otherwise keep area clean and dry. Discussed we can consider ultrasound if symptoms nodes not resolving.   - cephALEXin (KEFLEX) 500 MG capsule; Take 1 capsule (500 mg) by mouth 2 times daily for 10 days    Candidiasis of skin  Uses as needed.   - nystatin (MYCOSTATIN) 782789 UNIT/GM external powder; Apply topically 2 times daily as needed    Morbid (severe) obesity due to excess calories (H)  Encouraged ongoing diet and exercise modifications as able.     The patient indicates understanding of these issues and agrees with the plan.    CHARY Duncan [facility]      .  ..

## 2022-08-19 ENCOUNTER — HOSPITAL ENCOUNTER (EMERGENCY)
Facility: CLINIC | Age: 42
Discharge: HOME | End: 2022-08-19
Attending: EMERGENCY MEDICINE | Admitting: EMERGENCY MEDICINE
Payer: COMMERCIAL

## 2022-08-19 ENCOUNTER — NURSE TRIAGE (OUTPATIENT)
Dept: FAMILY MEDICINE | Facility: CLINIC | Age: 42
End: 2022-08-19

## 2022-08-19 VITALS
HEART RATE: 140 BPM | DIASTOLIC BLOOD PRESSURE: 98 MMHG | TEMPERATURE: 98.6 F | RESPIRATION RATE: 17 BRPM | OXYGEN SATURATION: 99 % | SYSTOLIC BLOOD PRESSURE: 143 MMHG

## 2022-08-19 DIAGNOSIS — B02.9 HERPES ZOSTER WITHOUT COMPLICATION: ICD-10-CM

## 2022-08-19 PROCEDURE — 99284 EMERGENCY DEPT VISIT MOD MDM: CPT | Performed by: EMERGENCY MEDICINE

## 2022-08-19 RX ORDER — VALACYCLOVIR HYDROCHLORIDE 1 G/1
1000 TABLET, FILM COATED ORAL 3 TIMES DAILY
Qty: 21 TABLET | Refills: 0 | Status: SHIPPED | OUTPATIENT
Start: 2022-08-19 | End: 2023-02-09

## 2022-08-19 RX ORDER — OXYCODONE HYDROCHLORIDE 5 MG/1
5 TABLET ORAL EVERY 6 HOURS PRN
Qty: 12 TABLET | Refills: 0 | Status: SHIPPED | OUTPATIENT
Start: 2022-08-19 | End: 2022-08-22

## 2022-08-19 RX ORDER — METHYLPREDNISOLONE 4 MG
TABLET, DOSE PACK ORAL
Qty: 21 TABLET | Refills: 0 | Status: SHIPPED | OUTPATIENT
Start: 2022-08-19 | End: 2023-03-20

## 2022-08-19 ASSESSMENT — ACTIVITIES OF DAILY LIVING (ADL): ADLS_ACUITY_SCORE: 33

## 2022-08-19 NOTE — TELEPHONE ENCOUNTER
"Patient stated she was seen on 8/17/2022 for a rash and itching.  She states she is now experiencing enlarged lymph nodes along the right side of her neck from her ear on down.  She stated she is having some pain with swallowing, ear pressure and increasing discomfort to the right side of her face.  Per protocol patient to be seen today in clinic.  No available visits.  Advised patient to seek urgent/emergent care for continued/worsening symptoms.  Patient stated understanding.  Patient stated she will go to ED due to feeling worse than when seen in office on 8/17/2022.    Reason for Disposition    Single large node and size > 1 inch (2.5 cm)    Additional Information    Negative: Sounds like a life-threatening emergency to the triager    Negative: Sore throat is main symptom and has swollen node in the neck that is < 1 inch (2.5 cm) in size    Negative: Node is in the neck and causes difficulty breathing    Negative: Patient sounds very sick or weak to the triager    Negative: Node is in the neck and can't swallow fluids    Negative: Fever > 103 F (39.4 C)    Negative: Lump or swelling in groin and pulsating (like heartbeat)    Answer Assessment - Initial Assessment Questions  1. LOCATION: \"Where is the swollen node located?\" \"Is the matching node on the other side of the body also swollen?\"       Right side neck lymph nodes swollen    2. SIZE: \"How big is the node?\" (e.g., inches or centimeters; or compared to common objects such as pea, bean, marble, golf ball)       Top of ear to neck - one solid cluster    3. ONSET: \"When did the swelling start?\"       Approximately 5 days ago - getting worse    4. NECK NODES: \"Is there a sore throat, runny nose or other symptoms of a cold?\"       Hurts to swallow    5. GROIN OR ARMPIT NODES: \"Is there a sore, scratch, cut or painful red area on that arm or leg?\"       No    6. FEVER: \"Do you have a fever?\" If Yes, ask: \"What is it, how was it measured, and when did it start?\" " "      No    7. CAUSE: \"What do you think is causing the swollen lymph nodes?\"      Unknown - shingles?    8. OTHER SYMPTOMS: \"Do you have any other symptoms?\"      Some clusters of clear bumps (has been happening during this time of year)    9. PREGNANCY: \"Is there any chance you are pregnant?\" \"When was your last menstrual period?\"      No    Protocols used: LYMPH NODES - [id number]    Ananya Samuel RN    "

## 2022-08-19 NOTE — DISCHARGE INSTRUCTIONS
-return to the er if new or worsening symptoms  -use antivirals and medrol dosepack and directed  -avoid contact with pregnant women and unvaccinated people  -watch your blood sugar closely and if running high all the time stop the steroid  -it was a pleasure to meet you

## 2022-08-19 NOTE — ED PROVIDER NOTES
History     Chief Complaint   Patient presents with     Facial Swelling     HPI  Fallon Rivera is a 42 year old female who presents to the emergency department secondary to right-sided facial rash that is painful and burning in nature.  This started a couple of days ago around her right cheek and she was seen in urgent care and it was thought to be most likely developing cellulitis and she was put on Keflex.  She has a history of allergy to penicillins.  She gets hives with penicillins.  Since then the rash has progressed instead of improved in the last couple of days.  She now has some lesions in her right hairline and her right chin and her right cheek.  She also feels some burning pain into her right forehead.  No disruption of her visual acuity and no pain in the eye.  It does not cross midline.she does not have a lesion on her nose.     Allergies:  Allergies   Allergen Reactions     Amoxicillin Hives     Anti-Nausea      Anxiety, agitation,severe paranoia. Zofran, Reglan are some of them.  DRAMAMINE WITHOUT ISSUES       Demerol Hcl [Meperidine Hcl] Nausea     Indomethacin Nausea and Vomiting     Macrobid [Nitrofuran Derivatives] Hives     Reglan [Metoclopramide] Other (See Comments)     Acute paranoia, severe     Zofran [Ondansetron] Other (See Comments)     Severe anxiety, agitation     Vancomycin Other (See Comments)     maria del rosario syndrome       Problem List:    Patient Active Problem List    Diagnosis Date Noted     Left ovarian cyst 05/25/2021     Priority: Medium     Added automatically from request for surgery [id number]       Dyspareunia, female 12/01/2020     Priority: Medium     Rectal bleeding 09/30/2020     Priority: Medium     Added automatically from request for surgery [id number]       Chronic diarrhea 09/30/2020     Priority: Medium     Added automatically from request for surgery [id number]       Abnormal CT scan, small bowel 09/30/2020     Priority: Medium     Added automatically from request for  surgery [id number]       Endometrial hyperplasia, unspecified 06/18/2020     Priority: Medium     Added automatically from request for surgery [id number]       Pelvic pain in female 06/18/2020     Priority: Medium     Added automatically from request for surgery [id number]       Endometrial intraepithelial neoplasia (EIN) 03/02/2020     Priority: Medium     Obesity (BMI 35.0-39.9) with comorbidity (H) 10/16/2018     Priority: Medium     Non morbid obesity due to excess calories 07/05/2016     Priority: Medium     Type 2 diabetes mellitus without complication (H) 04/29/2016     Priority: Medium     Glucosuria 04/27/2016     Priority: Medium     Right ovarian cyst 09/15/2015     Priority: Medium     Hyperlipidemia LDL goal <130 07/18/2012     Priority: Medium     Mild major depression (H) 07/18/2012     Priority: Medium     GERD (gastroesophageal reflux disease) 07/03/2012     Priority: Medium     CARDIOVASCULAR SCREENING; LDL GOAL LESS THAN 160 10/31/2010     Priority: Medium     Dyspnea 11/24/2009     Priority: Medium     Kidney stones 02/17/2009     Priority: Medium     S/P conization of cervix- KAYLA 2/3 in 2011 06/27/2007     Priority: Medium     1/24/06 pap: ASC-H  2/9/06 colposcopy/bx (negative)/ECC (negative) pap- ASC-H  5/24/06 pap- ASC-H  9/6/07 pap NIL  1/23/07 pap ASCUS + HPV 45 (high risk)  3/15/07 pap ASCUS + HPV 45 (high risk) colposcopy- bx (negative)/ECC (koilocytosis)  6/19/07 ECC- koilocytosis.  Pap- AGS  10/23/07 pap- ASC-H, ECC- negative  2/14/08 pap NIL- return to yearly paps  2/17/09 pap NIL- repeat 1 year  3/8/10 pap NIL  3/3/11 pap ASCUS + HPV 45 (high risk)- colposcopy recommended  5/16/11 colpo- bx (KAYLA 2/3/HSIL) ECC (ASCUS)  pap (ASC-H)  6/2/11 recommend: CKC  7/1/11 CKC: path: KAYLA 2/3 with possible involvement of the endocervical margin.  ECC- neg.  Endometrium- neg.  7/14/11 plan: HIPOLITO in approx 2 months  10/10/11- hysterectomy planned.  (cancelled)  11/7/11 pap-- NIL. Plan--repeat pap in 6 months.  (5/7/12)  5/7/12 pap NIL. Plan-- pap in 1 year (due 5/7/13)   5/8/13 pap NIL. Plan-pap in 1 year  5/8/14 NIL pap, neg HR HPV.  Plan- repeat pap/HPV in 1 year (due 5/8/15)  10/14/14 NIL pap, neg HR HPV. Endometrial biopsy- WNL   1/6/16  NIL pap, neg HR HPV.  Plan: paps yearly, per Dr. Ford.  1/30/17 NIL pap, neg HR HPV. Plan: pap in 1 year.   3/12/18 NIL pap, neg HR HPV. Plan: cotest annually, per Dr. Ford.  3/11/19 ECC: negative. NIL pap, neg HR HPV. Plan: cotest in 1 yr  2/11/20 NIL Pap, Neg HPV. EMB: intraepithelial neoplasia. Plan: Hysterectomy.   7/28/20 Hysterectomy with cervix removed. Hx of KAYLA 2 in 2011 Plan: Cotest Q3y until 2036.           Papanicolaou smear of cervix with atypical squamous cells cannot exclude high grade squamous intraepithelial lesion (ASC-H) 09/07/2006     Priority: Medium     Female infertility 07/24/2006     Priority: Medium     Problem list name updated by automated process. Provider to review       Hemorrhage of rectum and anus 12/02/2004     Priority: Medium        Past Medical History:    Past Medical History:   Diagnosis Date     Abnormal Pap smear 2006, 2007,      Calculus of kidney 01/01/2002     Cervical high risk HPV (human papillomavirus) test positive      History of colposcopy with cervical biopsy 2/9/06, 3/15/07       Past Surgical History:    Past Surgical History:   Procedure Laterality Date     CHOLECYSTECTOMY, LAPOROSCOPIC  5/11/2007    Cholecystectomy, Laparoscopic     COLONOSCOPY  05/17/10     COLONOSCOPY N/A 8/27/2019    Procedure: COLONOSCOPY;  Surgeon: Paramjit Kingston DO;  Location:  GI     COLONOSCOPY N/A 10/23/2020    Procedure: COLONOSCOPY, WITH  BIOPSY;  Surgeon: Ba Dickinson MD;  Location:  GI     CONIZATION  7/1/2011    Procedure:CONIZATION; cold knife and punch biopsy of mole on back; Surgeon:SYDNEY FORD; Location: OR     DILATION AND CURETTAGE, HYSTEROSCOPY, LAPAROSCOPY, COMBINED Right 9/24/2015    Procedure:  COMBINED DILATION AND CURETTAGE, HYSTEROSCOPY, LAPAROSCOPY;  Surgeon: Sydney Ford MD;  Location: [facility]     DISCECTOMY LUMBAR MINIMALLY INVASIVE ONE LEVEL Left 1/23/2019    Procedure: Minimally Invasive Surgery Left Lumbar 5-Sacral 1 microdiscectomy;  Surgeon: Mason Roe MD;  Location: [facility]     ESOPHAGOSCOPY, GASTROSCOPY, DUODENOSCOPY (EGD), COMBINED  5/21/2014    Procedure: COMBINED ESOPHAGOSCOPY, GASTROSCOPY, DUODENOSCOPY (EGD), BIOPSY SINGLE OR MULTIPLE;  Surgeon: Earl Lane MD;  Location: [facility]     ESOPHAGOSCOPY, GASTROSCOPY, DUODENOSCOPY (EGD), COMBINED N/A 10/23/2020    Procedure: Esophagogastroduodenoscopy with  biopsy;  Surgeon: Ba Dickinson MD;  Location: [facility]     EXCISE LESION TRUNK  7/1/2011    Procedure:EXCISE LESION TRUNK; Surgeon:SYDNEY FORD; Location:[facility]     HC FRAGMENTING OF KIDNEY STONE  11/30/2005     HC RX ECTOP PREG BY SCOPE,RMV TUBE/OVRY  12/20/2007    Right sided salpingectomy and removal of ruptured ectopic pregnancy. D&C.     HYSTERECTOMY VAGINAL       LAPAROSCOPIC APPENDECTOMY N/A 9/24/2015    Procedure: LAPAROSCOPIC APPENDECTOMY;  Surgeon: James Cuellar MD;  Location: [facility]     LAPAROSCOPIC CYSTECTOMY OVARIAN (BENIGN) N/A 9/24/2015    Procedure: LAPAROSCOPIC CYSTECTOMY OVARIAN (BENIGN);  Surgeon: Sydney Ford MD;  Location: [facility]     LAPAROSCOPIC HYSTERECTOMY TOTAL, BILATERAL SALPINGO-OOPHORECTOMY, COMBINED N/A 7/28/2020    Procedure: laparoscpic assisted vaginal hysterectomy, cystoscopy;  Surgeon: Sydney Ford MD;  Location: [facility]     LAPAROSCOPIC OOPHORECTOMY Right 9/24/2015    Procedure: LAPAROSCOPIC OOPHORECTOMY;  Surgeon: Sydney Ford MD;  Location: [facility]     LITHOTRIPSY  01/17/2007     PELVIS LAPAROSCOPY,DX  10/16/2000    Diagnostic laparoscopy, Endometrial biopsy.     TUBAL/ECTOPIC PREGNANCY  01/23/2007    Lap removal of left ruptured ectopic pregnancy & salpingectomy, D&C      ZZC REMOVAL OF KIDNEY STONE      two surgeries, 2002,2003     ZZHC UGI ENDOSCOPY, SIMPLE EXAM  09/01/09       Family History:    Family History   Problem Relation Age of Onset     Diabetes Maternal Grandmother         adult onset     Anesthesia Reaction Maternal Grandmother         can't tolerate Novacaine.     Respiratory Maternal Grandmother         COPD and emphysema.     Thyroid Disease Maternal Grandmother      Heart Disease Maternal Grandmother         CHF     Diabetes Paternal Grandfather         adult o nset     Hypertension Paternal Grandfather      Cerebrovascular Disease Paternal Grandfather      Heart Disease Paternal Grandfather         MI, replaced valve,angioplasty     Thyroid Disease Paternal Grandfather      Cancer Maternal Grandfather         skin cancer     Heart Disease Maternal Grandfather         MI     Obesity Mother         on thyroid medication     Thyroid Disease Mother      Diabetes Mother      Rheumatologic Disease Mother      Heart Disease Father      Hypertension Father         and TIA     Lipids Father      Breast Cancer Paternal Grandmother      Arrhythmia Other      Cancer Maternal Aunt         breast/brain cancer     Depression Paternal Aunt      Cerebrovascular Disease Paternal Uncle      Cerebrovascular Disease Paternal Aunt        Social History:  Marital Status:   [2]  Social History     Tobacco Use     Smoking status: Former Smoker     Packs/day: 0.00     Years: 12.00     Pack years: 0.00     Types: Cigarettes     Smokeless tobacco: Never Used     Tobacco comment: As of 10 /2014, pt has had a few cigs here and there   Vaping Use     Vaping Use: Never used   Substance Use Topics     Alcohol use: Yes     Alcohol/week: 0.0 standard drinks     Comment: every few months     Drug use: No        Medications:    cephALEXin (KEFLEX) 500 MG capsule  cyclobenzaprine (FLEXERIL) 5 MG tablet  empagliflozin (JARDIANCE) 10 MG TABS tablet  ibuprofen (ADVIL/MOTRIN) 200 MG  tablet  methylPREDNISolone (MEDROL DOSEPAK) 4 MG tablet therapy pack  multivitamin w/minerals (THERA-VIT-M) tablet  nystatin (MYCOSTATIN) 666851 UNIT/GM external powder  omeprazole (PRILOSEC) 20 MG DR capsule  oxyCODONE (ROXICODONE) 5 MG tablet  valACYclovir (VALTREX) 1000 mg tablet  blood glucose (CONTOUR NEXT TEST) test strip  blood glucose monitoring (NO BRAND SPECIFIED) meter device kit  metFORMIN (GLUCOPHAGE XR) 500 MG 24 hr tablet          Review of Systems   All other systems reviewed and are negative.      Physical Exam   BP: (!) 143/98  Pulse: (!) 140  Temp: 98.6  F (37  C)  Resp: 17  SpO2: 99 %      Physical Exam  Constitutional:       General: She is not in acute distress.     Appearance: Normal appearance. She is well-developed. She is not diaphoretic.   HENT:      Head: Normocephalic and atraumatic.      Nose: Nose normal.      Mouth/Throat:      Mouth: Mucous membranes are moist.      Pharynx: No oropharyngeal exudate or posterior oropharyngeal erythema.   Eyes:      General: No scleral icterus.     Extraocular Movements: Extraocular movements intact.      Pupils: Pupils are equal, round, and reactive to light.   Musculoskeletal:         General: Normal range of motion.      Cervical back: Normal range of motion and neck supple.      Right lower leg: No edema.      Left lower leg: No edema.   Skin:     General: Skin is warm and dry.      Coloration: Skin is not pale.      Findings: Rash present. No erythema.      Comments: Erythematous clustered tiny vesicles over the right temporal area and the hair on the scalp, right cheek, chin on the right side.  No lesions on the left side of her face.   Neurological:      General: No focal deficit present.      Mental Status: She is alert and oriented to person, place, and time.      Comments: No cranial nerve deficits.  Full range of motion of the face   Psychiatric:         Mood and Affect: Mood normal.         ED Course                 Procedures                   No results found for this or any previous visit (from the past 24 hour(s)).    Medications - No data to display    Assessments & Plan (with Medical Decision Making)  Right-sided facial rash consistent with shingles.  The patient will be started on Valtrex.  She is a diabetic and has had difficulty controlling her blood sugars on steroids in the past.  She states she did pretty well with Medrol Dosepak.  We will try that.  She will watch her sugars closely and discontinue the steroids if they become out of control.  I talked her about controlling her carb intake.  The patient understands and agrees.  I think most likely she can discontinue the Keflex at this point.  Return to ER precautions and fall precautions discussed.  All questions answered prior to discharge.     I have reviewed the nursing notes.    I have reviewed the findings, diagnosis, plan and need for follow up with the patient.      Discharge Medication List as of 8/19/2022  1:51 PM      START taking these medications    Details   methylPREDNISolone (MEDROL DOSEPAK) 4 MG tablet therapy pack Follow Package Directions, Disp-21 tablet, R-0, E-Prescribe      valACYclovir (VALTREX) 1000 mg tablet Take 1 tablet (1,000 mg) by mouth 3 times daily for 7 days, Disp-21 tablet, R-0, E-Prescribe             Final diagnoses:   Herpes zoster without complication       8/19/2022   [facility] EMERGENCY DEPT     Inocencio Atkinson MD  08/19/22 [id number]

## 2022-08-19 NOTE — ED TRIAGE NOTES
Pt Presents with worsening rash on right side of face. Blistering worsening. Pt reports no fevers, but persistent, moderate pain. Pt concerned for Abx reaction or shingles with progression of rash.

## 2022-10-09 ENCOUNTER — HEALTH MAINTENANCE LETTER (OUTPATIENT)
Age: 42
End: 2022-10-09

## 2022-10-09 ENCOUNTER — HOSPITAL ENCOUNTER (EMERGENCY)
Facility: CLINIC | Age: 42
Discharge: HOME | End: 2022-10-09
Attending: FAMILY MEDICINE | Admitting: FAMILY MEDICINE
Payer: COMMERCIAL

## 2022-10-09 VITALS
BODY MASS INDEX: 34.33 KG/M2 | TEMPERATURE: 98.2 F | HEART RATE: 109 BPM | WEIGHT: 200 LBS | DIASTOLIC BLOOD PRESSURE: 87 MMHG | SYSTOLIC BLOOD PRESSURE: 142 MMHG | OXYGEN SATURATION: 100 % | RESPIRATION RATE: 16 BRPM

## 2022-10-09 DIAGNOSIS — R11.2 NAUSEA AND VOMITING, UNSPECIFIED VOMITING TYPE: ICD-10-CM

## 2022-10-09 DIAGNOSIS — R07.89 ANTERIOR CHEST WALL PAIN: ICD-10-CM

## 2022-10-09 LAB
ALBUMIN SERPL-MCNC: 3.8 G/DL (ref 3.4–5)
ALP SERPL-CCNC: 76 U/L (ref 40–150)
ALT SERPL W P-5'-P-CCNC: 38 U/L (ref 0–50)
ANION GAP SERPL CALCULATED.3IONS-SCNC: 9 MMOL/L (ref 3–14)
AST SERPL W P-5'-P-CCNC: 21 U/L (ref 0–45)
BASOPHILS # BLD AUTO: 0.1 10E3/UL (ref 0–0.2)
BASOPHILS NFR BLD AUTO: 1 %
BILIRUB SERPL-MCNC: 1 MG/DL (ref 0.2–1.3)
BUN SERPL-MCNC: 11 MG/DL (ref 7–30)
CALCIUM SERPL-MCNC: 8.8 MG/DL (ref 8.5–10.1)
CHLORIDE BLD-SCNC: 111 MMOL/L (ref 94–109)
CO2 SERPL-SCNC: 19 MMOL/L (ref 20–32)
CREAT SERPL-MCNC: 0.72 MG/DL (ref 0.52–1.04)
D DIMER PPP FEU-MCNC: 0.36 UG/ML FEU (ref 0–0.5)
EOSINOPHIL # BLD AUTO: 0.2 10E3/UL (ref 0–0.7)
EOSINOPHIL NFR BLD AUTO: 3 %
ERYTHROCYTE [DISTWIDTH] IN BLOOD BY AUTOMATED COUNT: 12.9 % (ref 10–15)
GFR SERPL CREATININE-BSD FRML MDRD: >90 ML/MIN/1.73M2
GLUCOSE BLD-MCNC: 184 MG/DL (ref 70–99)
HCT VFR BLD AUTO: 44.6 % (ref 35–47)
HGB BLD-MCNC: 16.2 G/DL (ref 11.7–15.7)
HOLD SPECIMEN: NORMAL
IMM GRANULOCYTES # BLD: 0 10E3/UL
IMM GRANULOCYTES NFR BLD: 0 %
LIPASE SERPL-CCNC: 242 U/L (ref 73–393)
LYMPHOCYTES # BLD AUTO: 3.1 10E3/UL (ref 0.8–5.3)
LYMPHOCYTES NFR BLD AUTO: 35 %
MCH RBC QN AUTO: 31.5 PG (ref 26.5–33)
MCHC RBC AUTO-ENTMCNC: 36.3 G/DL (ref 31.5–36.5)
MCV RBC AUTO: 87 FL (ref 78–100)
MONOCYTES # BLD AUTO: 0.4 10E3/UL (ref 0–1.3)
MONOCYTES NFR BLD AUTO: 5 %
NEUTROPHILS # BLD AUTO: 5 10E3/UL (ref 1.6–8.3)
NEUTROPHILS NFR BLD AUTO: 56 %
NRBC # BLD AUTO: 0 10E3/UL
NRBC BLD AUTO-RTO: 0 /100
PLATELET # BLD AUTO: 260 10E3/UL (ref 150–450)
POTASSIUM BLD-SCNC: 3.6 MMOL/L (ref 3.4–5.3)
PROT SERPL-MCNC: 7.7 G/DL (ref 6.8–8.8)
RBC # BLD AUTO: 5.15 10E6/UL (ref 3.8–5.2)
SODIUM SERPL-SCNC: 139 MMOL/L (ref 133–144)
TROPONIN I SERPL HS-MCNC: 7 NG/L
WBC # BLD AUTO: 8.8 10E3/UL (ref 4–11)

## 2022-10-09 PROCEDURE — 85025 COMPLETE CBC W/AUTO DIFF WBC: CPT | Performed by: FAMILY MEDICINE

## 2022-10-09 PROCEDURE — 36415 COLL VENOUS BLD VENIPUNCTURE: CPT | Performed by: FAMILY MEDICINE

## 2022-10-09 PROCEDURE — 84484 ASSAY OF TROPONIN QUANT: CPT | Performed by: FAMILY MEDICINE

## 2022-10-09 PROCEDURE — 250N000009 HC RX 250: Performed by: FAMILY MEDICINE

## 2022-10-09 PROCEDURE — 83690 ASSAY OF LIPASE: CPT | Performed by: FAMILY MEDICINE

## 2022-10-09 PROCEDURE — 93005 ELECTROCARDIOGRAM TRACING: CPT | Performed by: FAMILY MEDICINE

## 2022-10-09 PROCEDURE — 85379 FIBRIN DEGRADATION QUANT: CPT | Performed by: FAMILY MEDICINE

## 2022-10-09 PROCEDURE — 82374 ASSAY BLOOD CARBON DIOXIDE: CPT | Performed by: FAMILY MEDICINE

## 2022-10-09 PROCEDURE — 250N000013 HC RX MED GY IP 250 OP 250 PS 637: Performed by: FAMILY MEDICINE

## 2022-10-09 PROCEDURE — 93010 ELECTROCARDIOGRAM REPORT: CPT | Performed by: FAMILY MEDICINE

## 2022-10-09 PROCEDURE — 99284 EMERGENCY DEPT VISIT MOD MDM: CPT | Performed by: FAMILY MEDICINE

## 2022-10-09 PROCEDURE — 99284 EMERGENCY DEPT VISIT MOD MDM: CPT | Mod: 25 | Performed by: FAMILY MEDICINE

## 2022-10-09 RX ADMIN — LIDOCAINE HYDROCHLORIDE 30 ML: 20 SOLUTION ORAL; TOPICAL at 21:42

## 2022-10-09 ASSESSMENT — ACTIVITIES OF DAILY LIVING (ADL): ADLS_ACUITY_SCORE: 33

## 2022-10-10 NOTE — ED TRIAGE NOTES
Patient c/o burning chest pain since yesterday with belching and emesis x1 . She also c/o heaviness/numbness feeling to both hands and both feet.     Triage Assessment     Row Name 10/09/22 2023       Triage Assessment (Adult)    Airway WDL WDL       Respiratory WDL    Respiratory WDL WDL       Skin Circulation/Temperature WDL    Skin Circulation/Temperature WDL WDL

## 2022-10-10 NOTE — DISCHARGE INSTRUCTIONS
Please read and follow the handout(s) instructions. Return, if needed, for increased or worsening symptoms and as directed by the handout(s).    Increase your fluid intake. Drinking small amounts often is the best way to take in more fluids when you are feeling nauseated.

## 2022-10-10 NOTE — ED PROVIDER NOTES
History     Chief Complaint   Patient presents with     Chest Pain     HPI  Fallon Rivera is a 42 year old female who presents to the ER with concerns about chest pain symptoms that started yesterday.  She states that she does have issues with esophageal reflux but is on Prilosec to control it.  She takes this daily.  Patient states that she had increased stomach upset today associated with some belching and had 1 emesis.  Emesis was nonbloody.  She states that she has a strong family history for heart attack issues and so came in to get checked.  She does admit to some mild mid upper abdominal tenderness.  She states that she has had multiple abdominal surgeries including a cholecystectomy, appendectomy, and hysterectomy.  She denies any significant cough or shortness of breath symptoms.  Denied any fall or injury that might have triggered her chest pain symptoms.  She states that she just has not felt well through the day today.  Her  reported that she had some heaviness and numbness in both her hands and feet but the patient states that she has had this for quite a while and does not believe it is related to her recent symptoms of the last 2 days.    I reviewed the following nurse triage note:  Patient c/o burning chest pain since yesterday with belching and emesis x1 . She also c/o heaviness/numbness feeling to both hands and both feet.    Allergies:  Allergies   Allergen Reactions     Amoxicillin Hives     Anti-Nausea      Anxiety, agitation,severe paranoia. Zofran, Reglan are some of them.  DRAMAMINE WITHOUT ISSUES       Demerol Hcl [Meperidine Hcl] Nausea     Indomethacin Nausea and Vomiting     Macrobid [Nitrofuran Derivatives] Hives     Reglan [Metoclopramide] Other (See Comments)     Acute paranoia, severe     Zofran [Ondansetron] Other (See Comments)     Severe anxiety, agitation     Vancomycin Other (See Comments)     maria del rosario syndrome       Problem List:    Patient Active Problem List     Diagnosis Date Noted     Left ovarian cyst 05/25/2021     Priority: Medium     Added automatically from request for surgery [id number]       Dyspareunia, female 12/01/2020     Priority: Medium     Rectal bleeding 09/30/2020     Priority: Medium     Added automatically from request for surgery [id number]       Chronic diarrhea 09/30/2020     Priority: Medium     Added automatically from request for surgery [id number]       Abnormal CT scan, small bowel 09/30/2020     Priority: Medium     Added automatically from request for surgery [id number]       Endometrial hyperplasia, unspecified 06/18/2020     Priority: Medium     Added automatically from request for surgery [id number]       Pelvic pain in female 06/18/2020     Priority: Medium     Added automatically from request for surgery [id number]       Endometrial intraepithelial neoplasia (EIN) 03/02/2020     Priority: Medium     Obesity (BMI 35.0-39.9) with comorbidity (H) 10/16/2018     Priority: Medium     Non morbid obesity due to excess calories 07/05/2016     Priority: Medium     Type 2 diabetes mellitus without complication (H) 04/29/2016     Priority: Medium     Glucosuria 04/27/2016     Priority: Medium     Right ovarian cyst 09/15/2015     Priority: Medium     Hyperlipidemia LDL goal <130 07/18/2012     Priority: Medium     Mild major depression (H) 07/18/2012     Priority: Medium     GERD (gastroesophageal reflux disease) 07/03/2012     Priority: Medium     CARDIOVASCULAR SCREENING; LDL GOAL LESS THAN 160 10/31/2010     Priority: Medium     Dyspnea 11/24/2009     Priority: Medium     Kidney stones 02/17/2009     Priority: Medium     S/P conization of cervix- KAYLA 2/3 in 2011 06/27/2007     Priority: Medium     1/24/06 pap: ASC-H  2/9/06 colposcopy/bx (negative)/ECC (negative) pap- ASC-H  5/24/06 pap- ASC-H  9/6/07 pap NIL  1/23/07 pap ASCUS + HPV 45 (high risk)  3/15/07 pap ASCUS + HPV 45 (high risk) colposcopy- bx (negative)/ECC (koilocytosis)  6/19/07 ECC- koilocytosis.  Pap-  AGS  10/23/07 pap- ASC-H, ECC- negative  2/14/08 pap NIL- return to yearly paps  2/17/09 pap NIL- repeat 1 year  3/8/10 pap NIL  3/3/11 pap ASCUS + HPV 45 (high risk)- colposcopy recommended  5/16/11 colpo- bx (KAYLA 2/3/HSIL) ECC (ASCUS)  pap (ASC-H)  6/2/11 recommend: [facility]  7/1/11 [facility]: path: KAYLA 2/3 with possible involvement of the endocervical margin.  ECC- neg.  Endometrium- neg.  7/14/11 plan: HIPOLITO in approx 2 months  10/10/11- hysterectomy planned.  (cancelled)  11/7/11 pap-- NIL. Plan--repeat pap in 6 months. (5/7/12)  5/7/12 pap NIL. Plan-- pap in 1 year (due 5/7/13)   5/8/13 pap NIL. Plan-pap in 1 year  5/8/14 NIL pap, neg HR HPV.  Plan- repeat pap/HPV in 1 year (due 5/8/15)  10/14/14 NIL pap, neg HR HPV. Endometrial biopsy- WNL   1/6/16  NIL pap, neg HR HPV.  Plan: paps yearly, per Dr. Ford.  1/30/17 NIL pap, neg HR HPV. Plan: pap in 1 year.   3/12/18 NIL pap, neg HR HPV. Plan: cotest annually, per Dr. Ford.  3/11/19 ECC: negative. NIL pap, neg HR HPV. Plan: cotest in 1 yr  2/11/20 NIL Pap, Neg HPV. EMB: intraepithelial neoplasia. Plan: Hysterectomy.   7/28/20 Hysterectomy with cervix removed. Hx of KAYLA 2 in 2011 Plan: Cotest Q3y until 2036.           Papanicolaou smear of cervix with atypical squamous cells cannot exclude high grade squamous intraepithelial lesion (ASC-H) 09/07/2006     Priority: Medium     Female infertility 07/24/2006     Priority: Medium     Problem list name updated by automated process. Provider to review       Hemorrhage of rectum and anus 12/02/2004     Priority: Medium        Past Medical History:    Past Medical History:   Diagnosis Date     Abnormal Pap smear 2006, 2007,      Calculus of kidney 01/01/2002     Cervical high risk HPV (human papillomavirus) test positive      History of colposcopy with cervical biopsy 2/9/06, 3/15/07       Past Surgical History:    Past Surgical History:   Procedure Laterality Date     CHOLECYSTECTOMY, LAPOROSCOPIC  5/11/2007     Cholecystectomy, Laparoscopic     COLONOSCOPY  05/17/10     COLONOSCOPY N/A 8/27/2019    Procedure: COLONOSCOPY;  Surgeon: Paramjit Kingston DO;  Location: [facility]     COLONOSCOPY N/A 10/23/2020    Procedure: COLONOSCOPY, WITH  BIOPSY;  Surgeon: Ba Dickinson MD;  Location: [facility]     CONIZATION  7/1/2011    Procedure:CONIZATION; cold knife and punch biopsy of mole on back; Surgeon:SYDNEY FORD; Location:[facility]     DILATION AND CURETTAGE, HYSTEROSCOPY, LAPAROSCOPY, COMBINED Right 9/24/2015    Procedure: COMBINED DILATION AND CURETTAGE, HYSTEROSCOPY, LAPAROSCOPY;  Surgeon: Sydney Ford MD;  [facility]     DISCECTOMY LUMBAR MINIMALLY INVASIVE ONE LEVEL Left 1/23/2019    Procedure: Minimally Invasive Surgery Left Lumbar 5-Sacral 1 microdiscectomy;  Surgeon: Mason Roe MD;  [facility]     ESOPHAGOSCOPY, GASTROSCOPY, DUODENOSCOPY (EGD), COMBINED  5/21/2014    Procedure: COMBINED ESOPHAGOSCOPY, GASTROSCOPY, DUODENOSCOPY (EGD), BIOPSY SINGLE OR MULTIPLE;  Surgeon: Earl Lane MD;  [facility]     ESOPHAGOSCOPY, GASTROSCOPY, DUODENOSCOPY (EGD), COMBINED N/A 10/23/2020    Procedure: Esophagogastroduodenoscopy with  biopsy;  Surgeon: Ba Dickinson MD;  [facility]     EXCISE LESION TRUNK  7/1/2011    Procedure:EXCISE LESION TRUNK; Surgeon:SYDNEY FORD; [facility]     HC FRAGMENTING OF KIDNEY STONE  11/30/2005     HC RX ECTOP PREG BY SCOPE,RMV TUBE/OVRY  12/20/2007    Right sided salpingectomy and removal of ruptured ectopic pregnancy. D&C.     HYSTERECTOMY VAGINAL       LAPAROSCOPIC APPENDECTOMY N/A 9/24/2015    Procedure: LAPAROSCOPIC APPENDECTOMY;  Surgeon: James Cuellar MD;  [facility]     LAPAROSCOPIC CYSTECTOMY OVARIAN (BENIGN) N/A 9/24/2015    Procedure: LAPAROSCOPIC CYSTECTOMY OVARIAN (BENIGN);  Surgeon: Sydney Ford MD;  [facility]     LAPAROSCOPIC HYSTERECTOMY TOTAL, BILATERAL  SALPINGO-OOPHORECTOMY, COMBINED N/A 7/28/2020    Procedure: laparoscpic assisted vaginal hysterectomy, cystoscopy;  Surgeon: Greg Ford MD;  Location: [facility]     LAPAROSCOPIC OOPHORECTOMY Right 9/24/2015    Procedure: LAPAROSCOPIC OOPHORECTOMY;  Surgeon: Greg Ford MD;  [facility]     LITHOTRIPSY  01/17/2007     PELVIS LAPAROSCOPY,DX  10/16/2000    Diagnostic laparoscopy, Endometrial biopsy.     TUBAL/ECTOPIC PREGNANCY  01/23/2007    Lap removal of left ruptured ectopic pregnancy & salpingectomy, D&C     ZZ REMOVAL OF KIDNEY STONE      two surgeries, 2002,2003     [facility]  [facility]       Family History:    Family History   Problem Relation Age of Onset     Diabetes Maternal Grandmother         adult onset     Anesthesia Reaction Maternal Grandmother         can't tolerate Novacaine.     Respiratory Maternal Grandmother         COPD and emphysema.     Thyroid Disease Maternal Grandmother      Heart Disease Maternal Grandmother         CHF     Diabetes Paternal Grandfather         adult o nset     Hypertension Paternal Grandfather      Cerebrovascular Disease Paternal Grandfather      Heart Disease Paternal Grandfather         MI, replaced valve,angioplasty     Thyroid Disease Paternal Grandfather      Cancer Maternal Grandfather         skin cancer     Heart Disease Maternal Grandfather         MI     Obesity Mother         on thyroid medication     Thyroid Disease Mother      Diabetes Mother      Rheumatologic Disease Mother      Heart Disease Father      Hypertension Father         and TIA     Lipids Father      Breast Cancer Paternal Grandmother      Arrhythmia Other      Cancer Maternal Aunt         breast/brain cancer     Depression Paternal Aunt      Cerebrovascular Disease Paternal Uncle      Cerebrovascular Disease Paternal Aunt        Social History:  Marital Status:   [2]  Social History     Tobacco Use     Smoking status: Former      Packs/day: 0.00     Years: 12.00     Pack years: 0.00     Types: Cigarettes     Smokeless tobacco: Never     Tobacco comments:     As of 10 /2014, pt has had a few cigs here and there   Vaping Use     Vaping Use: Never used   Substance Use Topics     Alcohol use: Yes     Alcohol/week: 0.0 standard drinks     Comment: every few months     Drug use: No        Medications:    blood glucose (CONTOUR NEXT TEST) test strip  blood glucose monitoring (NO BRAND SPECIFIED) meter device kit  cyclobenzaprine (FLEXERIL) 5 MG tablet  empagliflozin (JARDIANCE) 10 MG TABS tablet  ibuprofen (ADVIL/MOTRIN) 200 MG tablet  metFORMIN (GLUCOPHAGE XR) 500 MG 24 hr tablet  methylPREDNISolone (MEDROL DOSEPAK) 4 MG tablet therapy pack  multivitamin w/minerals (THERA-VIT-M) tablet  nystatin (MYCOSTATIN) 687892 UNIT/GM external powder  omeprazole (PRILOSEC) 20 MG DR capsule  valACYclovir (VALTREX) 1000 mg tablet      Review of Systems   All other systems reviewed and are negative.      Physical Exam   BP: (!) 150/97  Pulse: 109  Temp: 98.2  F (36.8  C)  Resp: 16  Weight: 90.7 kg (200 lb)  SpO2: 100 %      Physical Exam  Vitals and nursing note reviewed.   Constitutional:       General: She is not in acute distress.     Appearance: She is not ill-appearing or toxic-appearing.   HENT:      Head: Normocephalic and atraumatic.   Eyes:      Extraocular Movements: Extraocular movements intact.      Pupils: Pupils are equal, round, and reactive to light.   Neck:      Vascular: No JVD.   Cardiovascular:      Rate and Rhythm: Tachycardia present.      Heart sounds: Normal heart sounds.   Pulmonary:      Breath sounds: Normal breath sounds.   Chest:      Chest wall: Tenderness (Some tenderness to palpation of the anterior chest wall ) present. No crepitus or edema.   Abdominal:      Palpations: Abdomen is soft. There is no mass.      Tenderness: There is no guarding or rebound.   Musculoskeletal:      Cervical back: Normal range of motion and neck  supple.      Right lower leg: No edema.      Left lower leg: No edema.   Skin:     Capillary Refill: Capillary refill takes less than 2 seconds.   Neurological:      Mental Status: She is alert and oriented to person, place, and time.         ED Course     Mental Health Risk Assessment      PSS-3    Date and Time Over the past 2 weeks have you felt down, depressed, or hopeless? Over the past 2 weeks have you had thoughts of killing yourself? Have you ever attempted to kill yourself? When did this last happen? User   10/09/22 2024 no no yes more than 6 months ago [facility]                Item Assessment   Suicidal Ideation None            Procedures              EKG Interpretation:      Interpreted by Marco Marquez DO  Time reviewed: 20:30  Symptoms at time of EKG: Chest pain/burning sensation   Rhythm: normal sinus   Rate: 106  Axis: normal  Ectopy: none  Conduction: normal  ST Segments/ T Waves: No ST-T wave changes  Q Waves: none  Comparison to prior: Unchanged from 10/21/13    Clinical Impression: normal EKG, mild sinus tach      Critical Care time:  none               Results for orders placed or performed during the hospital encounter of 10/09/22 (from the past 24 hour(s))   CBC with platelets differential    Narrative    The following orders were created for panel order CBC with platelets differential.  Procedure                               Abnormality         Status                     ---------                               -----------         ------                     CBC with platelets and d...[[id number]]  Abnormal            Final result                 Please view results for these tests on the individual orders.   Troponin I   Result Value Ref Range    Troponin I High Sensitivity 7 <54 ng/L   Comprehensive metabolic panel   Result Value Ref Range    Sodium 139 133 - 144 mmol/L    Potassium 3.6 3.4 - 5.3 mmol/L    Chloride 111 (H) 94 - 109 mmol/L    Carbon Dioxide (CO2) 19 (L) 20 - 32 mmol/L     Anion Gap 9 3 - 14 mmol/L    Urea Nitrogen 11 7 - 30 mg/dL    Creatinine 0.72 0.52 - 1.04 mg/dL    Calcium 8.8 8.5 - 10.1 mg/dL    Glucose 184 (H) 70 - 99 mg/dL    Alkaline Phosphatase 76 40 - 150 U/L    AST 21 0 - 45 U/L    ALT 38 0 - 50 U/L    Protein Total 7.7 6.8 - 8.8 g/dL    Albumin 3.8 3.4 - 5.0 g/dL    Bilirubin Total 1.0 0.2 - 1.3 mg/dL    GFR Estimate >90 >60 mL/min/1.73m2   Lipase   Result Value Ref Range    Lipase 242 73 - 393 U/L   CBC with platelets and differential   Result Value Ref Range    WBC Count 8.8 4.0 - 11.0 10e3/uL    RBC Count 5.15 3.80 - 5.20 10e6/uL    Hemoglobin 16.2 (H) 11.7 - 15.7 g/dL    Hematocrit 44.6 35.0 - 47.0 %    MCV 87 78 - 100 fL    MCH 31.5 26.5 - 33.0 pg    MCHC 36.3 31.5 - 36.5 g/dL    RDW 12.9 10.0 - 15.0 %    Platelet Count 260 150 - 450 10e3/uL    % Neutrophils 56 %    % Lymphocytes 35 %    % Monocytes 5 %    % Eosinophils 3 %    % Basophils 1 %    % Immature Granulocytes 0 %    NRBCs per 100 WBC 0 <1 /100    Absolute Neutrophils 5.0 1.6 - 8.3 10e3/uL    Absolute Lymphocytes 3.1 0.8 - 5.3 10e3/uL    Absolute Monocytes 0.4 0.0 - 1.3 10e3/uL    Absolute Eosinophils 0.2 0.0 - 0.7 10e3/uL    Absolute Basophils 0.1 0.0 - 0.2 10e3/uL    Absolute Immature Granulocytes 0.0 <=0.4 10e3/uL    Absolute NRBCs 0.0 10e3/uL   [place] Draw    Narrative    The following orders were created for panel order [place] Draw.  Procedure                               Abnormality         Status                     ---------                               -----------         ------                     Extra Blue Top Tube[[id number]]                              Final result               Extra Green Top (Lithium...[[id number]]                      Final result               Extra Purple Top Tube[[id number]]                            Final result                 Please view results for these tests on the individual orders.   Extra Blue Top Tube   Result Value Ref Range    Hold Specimen JIC    Extra  Green Top (Lithium Heparin) Tube   Result Value Ref Range    Hold Specimen JIC    Extra Purple Top Tube   Result Value Ref Range    Hold Specimen JIC    D dimer quantitative   Result Value Ref Range    D-Dimer Quantitative 0.36 0.00 - 0.50 ug/mL FEU    Narrative    This D-dimer assay is intended for use in conjunction with a clinical pretest probability assessment model to exclude pulmonary embolism (PE) and deep venous thrombosis (DVT) in outpatients suspected of PE or DVT. The cut-off value is 0.50 ug/mL FEU.       Medications   lidocaine (viscous) (XYLOCAINE) 2 % 15 mL, alum & mag hydroxide-simethicone (MAALOX) 15 mL GI Cocktail (30 mLs Oral Given 10/9/22 2142)       Assessments & Plan (with Medical Decision Making)  42-year-old female to the ER secondary concerns of some anterior chest pain that was nonpleuritic.  Symptoms started yesterday.  Denies any cough or shortness of breath.  Patient does have tenderness with palpation to the anterior chest wall over the sternal area.  No skin rash or erythema noted.  Screening blood tests, and EKG testing were reassuring.  She had no improvement of her symptoms with the GI cocktail.  Patient felt much improved knowing her test results look normal.  Patient to be discharged to home in the care of her significant other.  I did give her handouts discussing chest pain issues as well as chest wall pain issues and have asked her to read and follow the instructions and return to the ER for any increase or worsening symptoms if needed.     I have reviewed the nursing notes.    I have reviewed the findings, diagnosis, plan and need for follow up with the patient.              I verbally discussed the findings of the evaluation today in the ER. I have verbally discussed with Fallon the suggested treatment(s) as described in the discharge instructions and handouts.     I have verbally suggested she follow-up in her clinic or return to the ER for increased symptoms. See the  follow-up recommendations documented  in the after visit summary in this visit's EPIC chart.      Disclaimer: This note consists of words and symbols derived from keyboarding and dictation using voice recognition software.  As a result, there may be errors that have gone undetected.  Please consider this when interpreting information found in this note.      Final diagnoses:   Nausea and vomiting, unspecified vomiting type   Anterior chest wall pain       10/9/2022   [facility] EMERGENCY DEPT     Marco Marquez,   10/10/22 0324

## 2022-10-24 ENCOUNTER — LAB (OUTPATIENT)
Dept: LAB | Facility: CLINIC | Age: 42
End: 2022-10-24
Payer: COMMERCIAL

## 2022-10-24 DIAGNOSIS — E11.9 TYPE 2 DIABETES MELLITUS WITHOUT COMPLICATION, WITHOUT LONG-TERM CURRENT USE OF INSULIN (H): ICD-10-CM

## 2022-10-24 LAB — HBA1C MFR BLD: 7.1 % (ref 0–5.6)

## 2022-10-24 PROCEDURE — 36415 COLL VENOUS BLD VENIPUNCTURE: CPT

## 2022-10-24 PROCEDURE — 83036 HEMOGLOBIN GLYCOSYLATED A1C: CPT

## 2022-11-08 ENCOUNTER — OFFICE VISIT (OUTPATIENT)
Dept: FAMILY MEDICINE | Facility: CLINIC | Age: 42
End: 2022-11-08
Payer: COMMERCIAL

## 2022-11-08 VITALS
RESPIRATION RATE: 18 BRPM | OXYGEN SATURATION: 99 % | TEMPERATURE: 97.3 F | WEIGHT: 201.9 LBS | HEIGHT: 64 IN | SYSTOLIC BLOOD PRESSURE: 130 MMHG | BODY MASS INDEX: 34.47 KG/M2 | HEART RATE: 105 BPM | DIASTOLIC BLOOD PRESSURE: 78 MMHG

## 2022-11-08 DIAGNOSIS — M62.838 CERVICAL PARASPINAL MUSCLE SPASM: ICD-10-CM

## 2022-11-08 DIAGNOSIS — E11.9 TYPE 2 DIABETES MELLITUS WITHOUT COMPLICATION, WITHOUT LONG-TERM CURRENT USE OF INSULIN (H): Primary | ICD-10-CM

## 2022-11-08 DIAGNOSIS — R29.6 RECURRENT FALLS: ICD-10-CM

## 2022-11-08 LAB
ANION GAP SERPL CALCULATED.3IONS-SCNC: 7 MMOL/L (ref 3–14)
BUN SERPL-MCNC: 13 MG/DL (ref 7–30)
CALCIUM SERPL-MCNC: 9.3 MG/DL (ref 8.5–10.1)
CHLORIDE BLD-SCNC: 105 MMOL/L (ref 94–109)
CO2 SERPL-SCNC: 26 MMOL/L (ref 20–32)
CREAT SERPL-MCNC: 0.65 MG/DL (ref 0.52–1.04)
CREAT UR-MCNC: 54 MG/DL
GFR SERPL CREATININE-BSD FRML MDRD: >90 ML/MIN/1.73M2
GLUCOSE BLD-MCNC: 141 MG/DL (ref 70–99)
POTASSIUM BLD-SCNC: 3.6 MMOL/L (ref 3.4–5.3)
PROT UR-MCNC: 0.13 G/L
PROT/CREAT 24H UR: 0.24 G/G CR (ref 0–0.2)
SODIUM SERPL-SCNC: 138 MMOL/L (ref 133–144)

## 2022-11-08 PROCEDURE — 80048 BASIC METABOLIC PNL TOTAL CA: CPT | Performed by: OBSTETRICS & GYNECOLOGY

## 2022-11-08 PROCEDURE — 99213 OFFICE O/P EST LOW 20 MIN: CPT | Performed by: OBSTETRICS & GYNECOLOGY

## 2022-11-08 PROCEDURE — 36415 COLL VENOUS BLD VENIPUNCTURE: CPT | Performed by: OBSTETRICS & GYNECOLOGY

## 2022-11-08 PROCEDURE — 84156 ASSAY OF PROTEIN URINE: CPT | Performed by: OBSTETRICS & GYNECOLOGY

## 2022-11-08 RX ORDER — CYCLOBENZAPRINE HCL 10 MG
10 TABLET ORAL
Qty: 30 TABLET | Refills: 4 | Status: SHIPPED | OUTPATIENT
Start: 2022-11-08 | End: 2023-05-19

## 2022-11-08 ASSESSMENT — PATIENT HEALTH QUESTIONNAIRE - PHQ9
SUM OF ALL RESPONSES TO PHQ QUESTIONS 1-9: 9
10. IF YOU CHECKED OFF ANY PROBLEMS, HOW DIFFICULT HAVE THESE PROBLEMS MADE IT FOR YOU TO DO YOUR WORK, TAKE CARE OF THINGS AT HOME, OR GET ALONG WITH OTHER PEOPLE: NOT DIFFICULT AT ALL
SUM OF ALL RESPONSES TO PHQ QUESTIONS 1-9: 9

## 2022-11-08 ASSESSMENT — PAIN SCALES - GENERAL: PAINLEVEL: MODERATE PAIN (4)

## 2022-11-08 NOTE — PROGRESS NOTES
ASSESSMENT/PLAN:                                                        1.  Dysesthesia of the left leg probably related to previous lumbar surgery.  This may be contributing to episodic spells where she gets some weakness in that leg and has had several falls.  She would like to discuss whether or not this could be related to her previous lumbar surgery by Dr. Roe so I will set up a referral.  I do not think there is something more consequential happening with her health to explain these falls.  They are intermittent and between times she has normal strength and motor function and normal sensation and today's exam testing is normal.  I reassured her about that.  In fact her diabetes has been in good control with her latest A1c being down to 7.1.  I will check a basic metabolic panel today so we can look at her kidney function.    2.  Mild cervical spasm with tension headaches-this responds well to Flexeril so I will repeat it for her now but I did caution her that Flexeril can cause some residual drowsiness and that is a potential risk factor for falls.  She will take it only at bedtime and remember to be careful on the mornings after she has taken it.                                                                         SUBJECTIVE:                                                      She is here today because she has had several episodes of sudden leg weakness or numbness and tingling and then she will fall.  Has not injured herself with the falls but this may happen once or twice a month.  It seems that her left leg will go a little numb or tingly and then she will fall down.  In between times she has normal function of both legs and normal sensation other than some residual paresthesia in the left lateral lower leg after her lumbar disc surgery a few years ago.    Also she gets significant muscle spasm and discomfort in the paraspinous muscles up in the neck where they attach on the occiput and gets occipital  headaches which she attributes to tension.  She would like a refill of the Flexeril.        Patient Active Problem List   Diagnosis     Hemorrhage of rectum and anus     Female infertility     Papanicolaou smear of cervix with atypical squamous cells cannot exclude high grade squamous intraepithelial lesion (ASC-H)     S/P conization of cervix- KAYLA 2/3 in 2011     Kidney stones     Dyspnea     CARDIOVASCULAR SCREENING; LDL GOAL LESS THAN 160     GERD (gastroesophageal reflux disease)     Hyperlipidemia LDL goal <130     Mild major depression (H)     Right ovarian cyst     Glucosuria     Type 2 diabetes mellitus without complication (H)     Non morbid obesity due to excess calories     Obesity (BMI 35.0-39.9) with comorbidity (H)     Endometrial intraepithelial neoplasia (EIN)     Endometrial hyperplasia, unspecified     Pelvic pain in female     Rectal bleeding     Chronic diarrhea     Abnormal CT scan, small bowel     Dyspareunia, female     Left ovarian cyst     Past Surgical History:   Procedure Laterality Date     CHOLECYSTECTOMY, LAPOROSCOPIC  5/11/2007    Cholecystectomy, Laparoscopic     COLONOSCOPY  05/17/10     COLONOSCOPY N/A 8/27/2019    Procedure: COLONOSCOPY;  Surgeon: Paramjit Kingston DO;  [facility]     COLONOSCOPY N/A 10/23/2020    Procedure: COLONOSCOPY, WITH  BIOPSY;  Surgeon: Ba Dickinson MD;  Location:  GI     CONIZATION  7/1/2011    Procedure:CONIZATION; cold knife and punch biopsy of mole on back; Surgeon:SYDNEY FORD; [facility]     DILATION AND CURETTAGE, HYSTEROSCOPY, LAPAROSCOPY, COMBINED Right 9/24/2015    Procedure: COMBINED DILATION AND CURETTAGE, HYSTEROSCOPY, LAPAROSCOPY;  Surgeon: Sydney Ford MD;  [facility]     DISCECTOMY LUMBAR MINIMALLY INVASIVE ONE LEVEL Left 1/23/2019    Procedure: Minimally Invasive Surgery Left Lumbar 5-Sacral 1 microdiscectomy;  Surgeon: Mason Roe MD;  [facility]     ESOPHAGOSCOPY,  GASTROSCOPY, DUODENOSCOPY (EGD), COMBINED  5/21/2014    Procedure: COMBINED ESOPHAGOSCOPY, GASTROSCOPY, DUODENOSCOPY (EGD), BIOPSY SINGLE OR MULTIPLE;  Surgeon: Earl Lane MD;  [facility]     ESOPHAGOSCOPY, GASTROSCOPY, DUODENOSCOPY (EGD), COMBINED N/A 10/23/2020    Procedure: Esophagogastroduodenoscopy with  biopsy;  Surgeon: Ba Dickinson MD;  [facility]     EXCISE LESION TRUNK  7/1/2011    Procedure:EXCISE LESION TRUNK; Surgeon:SYDNEY FORD; [facility]     HC FRAGMENTING OF KIDNEY STONE  11/30/2005     HC RX ECTOP PREG BY SCOPE,RMV TUBE/OVRY  12/20/2007    Right sided salpingectomy and removal of ruptured ectopic pregnancy. D&C.     HYSTERECTOMY VAGINAL       LAPAROSCOPIC APPENDECTOMY N/A 9/24/2015    Procedure: LAPAROSCOPIC APPENDECTOMY;  Surgeon: James Cuellar MD;  [facility]     LAPAROSCOPIC CYSTECTOMY OVARIAN (BENIGN) N/A 9/24/2015    Procedure: LAPAROSCOPIC CYSTECTOMY OVARIAN (BENIGN);  Surgeon: Sydney Ford MD;  [facility]     LAPAROSCOPIC HYSTERECTOMY TOTAL, BILATERAL SALPINGO-OOPHORECTOMY, COMBINED N/A 7/28/2020    Procedure: laparoscpic assisted vaginal hysterectomy, cystoscopy;  Surgeon: Sydney Ford MD;  [facility]     LAPAROSCOPIC OOPHORECTOMY Right 9/24/2015    Procedure: LAPAROSCOPIC OOPHORECTOMY;  Surgeon: Sydney Ford MD;  [facility]     LITHOTRIPSY  01/17/2007     PELVIS LAPAROSCOPY,DX  10/16/2000    Diagnostic laparoscopy, Endometrial biopsy.     TUBAL/ECTOPIC PREGNANCY  01/23/2007    Lap removal of left ruptured ectopic pregnancy & salpingectomy, D&C     [facility] REMOVAL OF KIDNEY STONE      two surgeries, 2002,2003     [facility] UGI ENDOSCOPY, SIMPLE EXAM  09/01/09       Social History     Tobacco Use     Smoking status: Some Days     Packs/day: 0.00     Years: 12.00     Pack years: 0.00     Types: Cigarettes     Smokeless tobacco: Never     Tobacco comments:     As of 10 /2014, pt has had  a few cigs here and there   Substance Use Topics     Alcohol use: Yes     Alcohol/week: 0.0 standard drinks     Comment: every few months     Family History   Problem Relation Age of Onset     Diabetes Maternal Grandmother         adult onset     Anesthesia Reaction Maternal Grandmother         can't tolerate Novacaine.     Respiratory Maternal Grandmother         COPD and emphysema.     Thyroid Disease Maternal Grandmother      Heart Disease Maternal Grandmother         CHF     Diabetes Paternal Grandfather         adult o nset     Hypertension Paternal Grandfather      Cerebrovascular Disease Paternal Grandfather      Heart Disease Paternal Grandfather         MI, replaced valve,angioplasty     Thyroid Disease Paternal Grandfather      Cancer Maternal Grandfather         skin cancer     Heart Disease Maternal Grandfather         MI     Obesity Mother         on thyroid medication     Thyroid Disease Mother      Diabetes Mother      Rheumatologic Disease Mother      Heart Disease Father      Hypertension Father         and TIA     Lipids Father      Breast Cancer Paternal Grandmother      Arrhythmia Other      Cancer Maternal Aunt         breast/brain cancer     Depression Paternal Aunt      Cerebrovascular Disease Paternal Uncle      Cerebrovascular Disease Paternal Aunt          Current Outpatient Medications   Medication Sig Dispense Refill     cyclobenzaprine (FLEXERIL) 5 MG tablet TAKE 1 TABLET (5 MG) BY MOUTH DAILY AS NEEDED FOR MUSCLE SPASMS 30 tablet 1     empagliflozin (JARDIANCE) 10 MG TABS tablet Take 1 tablet (10 mg) by mouth daily 30 tablet 3     ibuprofen (ADVIL/MOTRIN) 200 MG tablet Take 800 mg by mouth nightly as needed (sleep)       metFORMIN (GLUCOPHAGE XR) 500 MG 24 hr tablet Take 2 tablets (1,000 mg) by mouth 2 times daily (with meals) 360 tablet 3     multivitamin w/minerals (THERA-VIT-M) tablet Take 1 tablet by mouth daily       omeprazole (PRILOSEC) 20 MG DR capsule TAKE 1 CAPSULE BY MOUTH  "DAILY, 30 TO 60 MINUTES BEFORE A MEAL. 30 capsule 5     blood glucose (CONTOUR NEXT TEST) test strip USE TO TEST BLOOD GLUCOSE FOUR TIMES A  strip 3     blood glucose monitoring (NO BRAND SPECIFIED) meter device kit Use to test blood sugar 2 times daily or as directed. 1 kit 0     methylPREDNISolone (MEDROL DOSEPAK) 4 MG tablet therapy pack Follow Package Directions (Patient not taking: Reported on 11/8/2022) 21 tablet 0     nystatin (MYCOSTATIN) 734263 UNIT/GM external powder Apply topically 2 times daily as needed (Patient not taking: Reported on 11/8/2022) 60 g 2     valACYclovir (VALTREX) 1000 mg tablet Take 1 tablet (1,000 mg) by mouth 3 times daily for 7 days 21 tablet 0       ROS:  A 12 point systems review is negative except for what is listed above in the Subjective history.        Wt Readings from Last 3 Encounters:   11/08/22 91.6 kg (201 lb 14.4 oz)   10/09/22 90.7 kg (200 lb)   08/17/22 93 kg (205 lb)                     BP Readings from Last 6 Encounters:   11/08/22 130/78   10/09/22 (!) 142/87   08/19/22 (!) 143/98   08/17/22 120/74   07/26/22 118/82   05/25/22 102/64          OBJECTIVE:                                                    Vital signs: /78   Pulse 105   Temp 97.3  F (36.3  C)   Resp 18   Ht 1.626 m (5' 4\")   Wt 91.6 kg (201 lb 14.4 oz)   LMP 07/25/2020 (Exact Date)   SpO2 99%   BMI 34.66 kg/m         HEENT is unremarkable.    Neck is supple, mobile, no adenoapthy or masses palpable. Normal range of motion noted.  I palpated the occiput where the paraspinous muscles attach and that is where she gets her headaches.    Chest is clear to auscultation. No wheezes, rales or rhonchi heard.  cardiac exam is normal with s1, s2, no murmurs or adventitious sounds.Normal rate and rhythm is heard.     Exam of the legs reveals normal sensation except for some dysesthesia of the left lateral lower leg.  Sensation of both feet is normal and this is important because she does have " type 2 diabetes.  She has normal deep tendon reflexes in the knees and ankles and normal pulses in the feet.  No skin changes in the feet.        A total of 25 minutes were spent in today's visit including the time spent with  the patient in addition to the time spent just prior to the visit reviewing the chart  and then charting afterwards on this patient today.      Greg Ford MD  [facility]       The above information was dictating using Dragon voice software and as a result there may be some irregularities that were not detected in my review of this note.

## 2022-11-14 ENCOUNTER — OFFICE VISIT (OUTPATIENT)
Dept: NEUROSURGERY | Facility: CLINIC | Age: 42
End: 2022-11-14
Attending: OBSTETRICS & GYNECOLOGY
Payer: COMMERCIAL

## 2022-11-14 VITALS
BODY MASS INDEX: 34.49 KG/M2 | HEART RATE: 110 BPM | TEMPERATURE: 97.1 F | HEIGHT: 64 IN | DIASTOLIC BLOOD PRESSURE: 82 MMHG | OXYGEN SATURATION: 100 % | SYSTOLIC BLOOD PRESSURE: 124 MMHG | WEIGHT: 202 LBS | RESPIRATION RATE: 16 BRPM

## 2022-11-14 DIAGNOSIS — M54.16 LUMBAR RADICULOPATHY: Primary | ICD-10-CM

## 2022-11-14 DIAGNOSIS — R29.6 RECURRENT FALLS: ICD-10-CM

## 2022-11-14 PROCEDURE — 99243 OFF/OP CNSLTJ NEW/EST LOW 30: CPT | Performed by: PHYSICIAN ASSISTANT

## 2022-11-14 RX ORDER — DIAZEPAM 5 MG
5-10 TABLET ORAL ONCE
Qty: 2 TABLET | Refills: 0 | Status: SHIPPED | OUTPATIENT
Start: 2022-11-14 | End: 2022-11-14

## 2022-11-14 ASSESSMENT — PAIN SCALES - GENERAL: PAINLEVEL: MODERATE PAIN (4)

## 2022-11-14 NOTE — NURSING NOTE
"Fallon Rivera is a 42 year old female who presents for:  Chief Complaint   Patient presents with     Consult     Numbness in left leg since 2018 when she had back surgery. She has been falling a lot as well.          Initial Vitals:  /82   Pulse 110   Temp 97.1  F (36.2  C) (Temporal)   Resp 16   Ht 1.626 m (5' 4\")   Wt 91.6 kg (202 lb)   LMP 07/25/2020 (Exact Date)   SpO2 100%   BMI 34.67 kg/m   Estimated body mass index is 34.67 kg/m  as calculated from the following:    Height as of this encounter: 1.626 m (5' 4\").    Weight as of this encounter: 91.6 kg (202 lb).. Body surface area is 2.03 meters squared. BP completed using cuff size: regular  Moderate Pain (4)    Nursing Comments:     Marilu Gonsalez    "

## 2022-11-14 NOTE — PROGRESS NOTES
"Fallon Rivera is a 42 year old female who presents for:  Chief Complaint   Patient presents with     Consult     Numbness in left leg since 2018 when she had back surgery. She has been falling a lot as well.          Initial Vitals:  /82   Pulse 110   Temp 97.1  F (36.2  C) (Temporal)   Resp 16   Ht 5' 4\" (1.626 m)   Wt 202 lb (91.6 kg)   LMP 07/25/2020 (Exact Date)   SpO2 100%   BMI 34.67 kg/m   Estimated body mass index is 34.67 kg/m  as calculated from the following:    Height as of this encounter: 5' 4\" (1.626 m).    Weight as of this encounter: 202 lb (91.6 kg).. Body surface area is 2.03 meters squared. BP completed using cuff size: regular  Moderate Pain (4)    Nursing Comments:     Kina Foote    "

## 2022-11-14 NOTE — PROGRESS NOTES
Dr. Mason Roe  [facility]  Neurosurgery Clinic Visit      CC: back and left leg pain    Primary care Provider: Sydney Ford    Referring Provider:  Sydney Ford MD      Reason For Visit:   I was asked to consult on the patient for back and left leg pain.      HPI: Fallon Rivera is a 42 year old female who presents for evaluation  of her chief complaint of low back pain, as well as pain and numbness down her left leg.  She has had a history of a left-sided L5-S1 microdiscectomy in 2019.  Unfortunately, she continues to notice return of numbness in her left lower extremity, along with intermittent falls.  These do not seem to be mechanical, but rather it seems as though her leg wants to give out at times.  She has not had any recent treatment, although she has tried some different medications and is also taking Flexeril at present.  This is minimally helpful.  No bowel or bladder changes, or any problems with balance or coordination.     Past Medical History:   Diagnosis Date     Abnormal Pap smear 2006, 2007,     ASC-H, ASCUS + HPV 45     Calculus of kidney 01/01/2002     Cervical high risk HPV (human papillomavirus) test positive     + HPV 45 (high risk)     History of colposcopy with cervical biopsy 2/9/06, 3/15/07    koilocytosis 2007       Past Medical History reviewed with patient during visit.    Past Surgical History:   Procedure Laterality Date     CHOLECYSTECTOMY, LAPOROSCOPIC  5/11/2007    Cholecystectomy, Laparoscopic     COLONOSCOPY  05/17/10     COLONOSCOPY N/A 8/27/2019    Procedure: COLONOSCOPY;  Surgeon: Paramjit Kingston DO;  Location:  GI     COLONOSCOPY N/A 10/23/2020    Procedure: COLONOSCOPY, WITH  BIOPSY;  Surgeon: Ba Dickinson MD;  Location:  GI     CONIZATION  7/1/2011    Procedure:CONIZATION; cold knife and punch biopsy of mole on back; Surgeon:SYDNEY FORD; Location: OR     DILATION AND CURETTAGE,  HYSTEROSCOPY, LAPAROSCOPY, COMBINED Right 9/24/2015    Procedure: COMBINED DILATION AND CURETTAGE, HYSTEROSCOPY, LAPAROSCOPY;  Surgeon: Sydney Ford MD;  [facility]     DISCECTOMY LUMBAR MINIMALLY INVASIVE ONE LEVEL Left 1/23/2019    Procedure: Minimally Invasive Surgery Left Lumbar 5-Sacral 1 microdiscectomy;  Surgeon: Mason Roe MD;  [facility]     ESOPHAGOSCOPY, GASTROSCOPY, DUODENOSCOPY (EGD), COMBINED  5/21/2014    Procedure: COMBINED ESOPHAGOSCOPY, GASTROSCOPY, DUODENOSCOPY (EGD), BIOPSY SINGLE OR MULTIPLE;  Surgeon: Earl Lane MD;  [facility]     ESOPHAGOSCOPY, GASTROSCOPY, DUODENOSCOPY (EGD), COMBINED N/A 10/23/2020    Procedure: Esophagogastroduodenoscopy with  biopsy;  Surgeon: Ba Dickinson MD;  [facility]     EXCISE LESION TRUNK  7/1/2011    Procedure:EXCISE LESION TRUNK; Surgeon:SYDNEY FORD; [facility]     HC FRAGMENTING OF KIDNEY STONE  11/30/2005     HC RX ECTOP PREG BY SCOPE,RMV TUBE/OVRY  12/20/2007    Right sided salpingectomy and removal of ruptured ectopic pregnancy. D&C.     HYSTERECTOMY VAGINAL       LAPAROSCOPIC APPENDECTOMY N/A 9/24/2015    Procedure: LAPAROSCOPIC APPENDECTOMY;  Surgeon: James Cuellar MD;  [facility]     LAPAROSCOPIC CYSTECTOMY OVARIAN (BENIGN) N/A 9/24/2015    Procedure: LAPAROSCOPIC CYSTECTOMY OVARIAN (BENIGN);  Surgeon: Sydney Ford MD;  [facility]     LAPAROSCOPIC HYSTERECTOMY TOTAL, BILATERAL SALPINGO-OOPHORECTOMY, COMBINED N/A 7/28/2020    Procedure: laparoscpic assisted vaginal hysterectomy, cystoscopy;  Surgeon: Sydney Ford MD;  Location: [facility]     LAPAROSCOPIC OOPHORECTOMY Right 9/24/2015    Procedure: LAPAROSCOPIC OOPHORECTOMY;  Surgeon: Sydney Ford MD;  [facility]     LITHOTRIPSY  01/17/2007     PELVIS LAPAROSCOPY,DX  10/16/2000    Diagnostic laparoscopy, Endometrial biopsy.     TUBAL/ECTOPIC PREGNANCY  01/23/2007    Lap  removal of left ruptured ectopic pregnancy & salpingectomy, D&C     [facility] REMOVAL OF KIDNEY STONE      two surgeries, 2002,2003     [facility] UGI ENDOSCOPY, SIMPLE EXAM  09/01/09     Past Surgical History reviewed with patient during visit.    Current Outpatient Medications   Medication     blood glucose (CONTOUR NEXT TEST) test strip     blood glucose monitoring (NO BRAND SPECIFIED) meter device kit     cyclobenzaprine (FLEXERIL) 10 MG tablet     cyclobenzaprine (FLEXERIL) 5 MG tablet     empagliflozin (JARDIANCE) 10 MG TABS tablet     ibuprofen (ADVIL/MOTRIN) 200 MG tablet     metFORMIN (GLUCOPHAGE XR) 500 MG 24 hr tablet     methylPREDNISolone (MEDROL DOSEPAK) 4 MG tablet therapy pack     multivitamin w/minerals (THERA-VIT-M) tablet     nystatin (MYCOSTATIN) 821409 UNIT/GM external powder     omeprazole (PRILOSEC) 20 MG DR capsule     valACYclovir (VALTREX) 1000 mg tablet     No current facility-administered medications for this visit.       Allergies   Allergen Reactions     Amoxicillin Hives     Anti-Nausea      Anxiety, agitation,severe paranoia. Zofran, Reglan are some of them.  DRAMAMINE WITHOUT ISSUES       Demerol Hcl [Meperidine Hcl] Nausea     Indomethacin Nausea and Vomiting     Macrobid [Nitrofuran Derivatives] Hives     Reglan [Metoclopramide] Other (See Comments)     Acute paranoia, severe     Zofran [Ondansetron] Other (See Comments)     Severe anxiety, agitation     Vancomycin Other (See Comments)     maria del rosario syndrome       Social History     Socioeconomic History     Marital status:      Spouse name: Joseph     Number of children: 1     Years of education: 9     Highest education level: None   Occupational History     Employer: NONE   Tobacco Use     Smoking status: Some Days     Packs/day: 0.00     Years: 12.00     Pack years: 0.00     Types: Cigarettes     Smokeless tobacco: Never     Tobacco comments:     As of 10 /2014, pt has had a few cigs here and there   Vaping Use     Vaping Use: Never used    Substance and Sexual Activity     Alcohol use: Yes     Alcohol/week: 0.0 standard drinks     Comment: every few months     Drug use: No     Sexual activity: Yes     Partners: Male     Birth control/protection: Female Surgical   Other Topics Concern      Service No     Blood Transfusions No     Caffeine Concern Yes     Comment: Reports 1 can soda/week  Advised not more than 16 ounces caffeien/day.     Occupational Exposure No     Hobby Hazards No     Sleep Concern No     Stress Concern Yes     Comment: will discuss with      Weight Concern Yes     Comment: Eating disorder in childhood.  Hx. gestational diab.     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     Exercise No     Comment: Advised walking 30 min/day.     Bike Helmet No     Seat Belt Yes   Social History Narrative     and lives at home with her  and daughter.       Family History   Problem Relation Age of Onset     Diabetes Maternal Grandmother         adult onset     Anesthesia Reaction Maternal Grandmother         can't tolerate Novacaine.     Respiratory Maternal Grandmother         COPD and emphysema.     Thyroid Disease Maternal Grandmother      Heart Disease Maternal Grandmother         CHF     Diabetes Paternal Grandfather         adult o nset     Hypertension Paternal Grandfather      Cerebrovascular Disease Paternal Grandfather      Heart Disease Paternal Grandfather         MI, replaced valve,angioplasty     Thyroid Disease Paternal Grandfather      Cancer Maternal Grandfather         skin cancer     Heart Disease Maternal Grandfather         MI     Obesity Mother         on thyroid medication     Thyroid Disease Mother      Diabetes Mother      Rheumatologic Disease Mother      Heart Disease Father      Hypertension Father         and TIA     Lipids Father      Breast Cancer Paternal Grandmother      Arrhythmia Other      Cancer Maternal Aunt         breast/brain  "cancer     Depression Paternal Aunt      Cerebrovascular Disease Paternal Uncle      Cerebrovascular Disease Paternal Aunt           ROS: 10 point ROS neg other than the symptoms noted above in the HPI.    Vital Signs: /82   Pulse 110   Temp 97.1  F (36.2  C) (Temporal)   Resp 16   Ht 5' 4\" (1.626 m)   Wt 202 lb (91.6 kg)   LMP 07/25/2020 (Exact Date)   SpO2 100%   BMI 34.67 kg/m      Examination:  Constitutional:  Alert, well nourished, NAD.  HEENT: Normocephalic, atraumatic.   Pulmonary:  Without shortness of breath, normal effort.   Lymph: no lymphadenopathy to low back or LE.   Integumentary: Skin is free of rashes or lesions.   Cardiovascular:  No pitting edema of BLE.    Psych: Normal affect, no apparent distress    Neurological:  Awake  Alert  Oriented x 3  Speech clear  Cranial nerves II - XII grossly intact  Motor exam   Hip Flexor:                Right: 5/5  Left:  5/5  Hip Adductor:             Right:  5/5  Left:  5/5  Hip Abductor:             Right:  5/5  Left:  5/5  Gastroc Soleus:        Right:  5/5  Left:  5/5  Tib/Ant:                      Right:  5/5  Left:  5/5  EHL:                          Right:  5/5  Left:  5/5       Sensation normal to bilateral upper and lower extremities.    Reflexes are 2+ in the patellar and Achilles. There is no clonus. Downgoing Babinski.    Reflexes are 2+ in the brachial radialis and triceps. Negative Bam sign bilaterally.    Finger to Nose smooth  Pronator drift negative    Musculoskeletal:  Gait: Able to stand from a seated position. Normal non-antalgic, non-myelopathic gait.    Lumbar examination reveals no tenderness of the spine or paraspinous muscles.  Hip height is symmetrical. Negative SI joint, sciatic notch or greater trochanteric tenderness to palpation bilaterally.  Straight leg raise is negative bilaterally.      Imaging:   na    Assessment/Plan:     Lumbar radiculopathy  History of L5-S1 mocrodiscectomy    Fallon JARVIS Rivera is a 42 " year old female.  I did have a discussion the patient regarding her symptoms.  She has a history of a left L5-S1 microdiscectomy in 2019 with Dr. Roe.  Unfortunately, she is now noticing a return of some numbness in her left lower extremity.  She does have some residual numbness on the lateral aspect of her left foot and calf, that has been present even since before her original surgery.  She is now noticing increase in frequency of her falls, and feels like her left leg is giving out on her.  At this point, we would be concerned about a recurrent disc herniation, and we do recommend obtaining a lumbar spine MRI, with and without contrast.  She would like to be contacted through [employer] once this is completed.  She voiced agreement and understanding.          Thierno Sampson PA-C  [facility] [place] [address]    Tel [phone]  Pager [phone]      The use of Dragon/[employer] dictation services may have been used to construct the content in this note; any grammatical or spelling errors are non-intentional. Please contact the author of this note directly if you are in need of any clarification.

## 2022-11-14 NOTE — NURSING NOTE
Chief Complaint   Patient presents with     Consult     Numbness in left leg since 2018 when she had back surgery. She has been falling a lot as well.

## 2022-11-14 NOTE — LETTER
11/14/2022         RE: Fallon Rivera  [address] [facility] [address]        Dear Colleague,    Thank you for referring your patient, Fallon Rivera, to the [facility]UROS[facility] [place]. Please see a copy of my visit note below.    Dr. Mason Roe  [facility]  Neurosurgery Clinic Visit      CC: back and left leg pain    Primary care Provider: Greg Ford    Referring Provider:  Greg Ford MD      Reason For Visit:   I was asked to consult on the patient for back and left leg pain.      HPI: Fallon Rivera is a 42 year old female who presents for evaluation  of her chief complaint of low back pain, as well as pain and numbness down her left leg.  She has had a history of a left-sided L5-S1 microdiscectomy in 2019.  Unfortunately, she continues to notice return of numbness in her left lower extremity, along with intermittent falls.  These do not seem to be mechanical, but rather it seems as though her leg wants to give out at times.  She has not had any recent treatment, although she has tried some different medications and is also taking Flexeril at present.  This is minimally helpful.  No bowel or bladder changes, or any problems with balance or coordination.     Past Medical History:   Diagnosis Date     Abnormal Pap smear 2006, 2007,     ASC-H, ASCUS + HPV 45     Calculus of kidney 01/01/2002     Cervical high risk HPV (human papillomavirus) test positive     + HPV 45 (high risk)     History of colposcopy with cervical biopsy 2/9/06, 3/15/07    koilocytosis 2007       Past Medical History reviewed with patient during visit.    Past Surgical History:   Procedure Laterality Date     CHOLECYSTECTOMY, LAPOROSCOPIC  5/11/2007    Cholecystectomy, Laparoscopic     COLONOSCOPY  05/17/10     COLONOSCOPY N/A 8/27/2019    Procedure: COLONOSCOPY;  Surgeon: Paramjit Kingston DO;  Location:  GI     COLONOSCOPY N/A 10/23/2020     Procedure: COLONOSCOPY, WITH  BIOPSY;  Surgeon: Ba Dickinson MD;  Location: [facility]     CONIZATION  7/1/2011    Procedure:CONIZATION; cold knife and punch biopsy of mole on back; Surgeon:SYDNEY FORD; Location:[facility] OR     DILATION AND CURETTAGE, HYSTEROSCOPY, LAPAROSCOPY, COMBINED Right 9/24/2015    Procedure: COMBINED DILATION AND CURETTAGE, HYSTEROSCOPY, LAPAROSCOPY;  Surgeon: Sydney Ford MD;  Location: [facility] OR     DISCECTOMY LUMBAR MINIMALLY INVASIVE ONE LEVEL Left 1/23/2019    Procedure: Minimally Invasive Surgery Left Lumbar 5-Sacral 1 microdiscectomy;  Surgeon: Mason Roe MD;  Location: [facility] OR     ESOPHAGOSCOPY, GASTROSCOPY, DUODENOSCOPY (EGD), COMBINED  5/21/2014    Procedure: COMBINED ESOPHAGOSCOPY, GASTROSCOPY, DUODENOSCOPY (EGD), BIOPSY SINGLE OR MULTIPLE;  Surgeon: Earl Lane MD;  Location: [facility]     ESOPHAGOSCOPY, GASTROSCOPY, DUODENOSCOPY (EGD), COMBINED N/A 10/23/2020    Procedure: Esophagogastroduodenoscopy with  biopsy;  Surgeon: Ba Dickinson MD;  Location: [facility]     EXCISE LESION TRUNK  7/1/2011    Procedure:EXCISE LESION TRUNK; Surgeon:SYDNEY FORD; Location:[facility] OR     HC FRAGMENTING OF KIDNEY STONE  11/30/2005     HC RX ECTOP PREG BY SCOPE,RMV TUBE/OVRY  12/20/2007    Right sided salpingectomy and removal of ruptured ectopic pregnancy. D&C.     HYSTERECTOMY VAGINAL       LAPAROSCOPIC APPENDECTOMY N/A 9/24/2015    Procedure: LAPAROSCOPIC APPENDECTOMY;  Surgeon: James Cuellar MD;  Location: [facility]     LAPAROSCOPIC CYSTECTOMY OVARIAN (BENIGN) N/A 9/24/2015    Procedure: LAPAROSCOPIC CYSTECTOMY OVARIAN (BENIGN);  Surgeon: Sydney Ford MD;  Location: [facility]     LAPAROSCOPIC HYSTERECTOMY TOTAL, BILATERAL SALPINGO-OOPHORECTOMY, COMBINED N/A 7/28/2020    Procedure: laparoscpic assisted vaginal hysterectomy, cystoscopy;  Surgeon: Sydney Ford MD;  Location: [facility] OR     LAPAROSCOPIC OOPHORECTOMY Right  9/24/2015    Procedure: LAPAROSCOPIC OOPHORECTOMY;  Surgeon: Greg Ford MD;  Location: [facility]     LITHOTRIPSY  01/17/2007     PELVIS LAPAROSCOPY,DX  10/16/2000    Diagnostic laparoscopy, Endometrial biopsy.     TUBAL/ECTOPIC PREGNANCY  01/23/2007    Lap removal of left ruptured ectopic pregnancy & salpingectomy, D&C     [facility] REMOVAL OF KIDNEY STONE      two surgeries, 2002,2003     [facility] UGI ENDOSCOPY, SIMPLE EXAM  09/01/09     Past Surgical History reviewed with patient during visit.    Current Outpatient Medications   Medication     blood glucose (CONTOUR NEXT TEST) test strip     blood glucose monitoring (NO BRAND SPECIFIED) meter device kit     cyclobenzaprine (FLEXERIL) 10 MG tablet     cyclobenzaprine (FLEXERIL) 5 MG tablet     empagliflozin (JARDIANCE) 10 MG TABS tablet     ibuprofen (ADVIL/MOTRIN) 200 MG tablet     metFORMIN (GLUCOPHAGE XR) 500 MG 24 hr tablet     methylPREDNISolone (MEDROL DOSEPAK) 4 MG tablet therapy pack     multivitamin w/minerals (THERA-VIT-M) tablet     nystatin (MYCOSTATIN) 628111 UNIT/GM external powder     omeprazole (PRILOSEC) 20 MG DR capsule     valACYclovir (VALTREX) 1000 mg tablet     No current facility-administered medications for this visit.       Allergies   Allergen Reactions     Amoxicillin Hives     Anti-Nausea      Anxiety, agitation,severe paranoia. Zofran, Reglan are some of them.  DRAMAMINE WITHOUT ISSUES       Demerol Hcl [Meperidine Hcl] Nausea     Indomethacin Nausea and Vomiting     Macrobid [Nitrofuran Derivatives] Hives     Reglan [Metoclopramide] Other (See Comments)     Acute paranoia, severe     Zofran [Ondansetron] Other (See Comments)     Severe anxiety, agitation     Vancomycin Other (See Comments)     maria del rosario syndrome       Social History     Socioeconomic History     Marital status:      Spouse name: Joseph     Number of children: 1     Years of education: 9     Highest education level: None   Occupational History     Employer: NONE    Tobacco Use     Smoking status: Some Days     Packs/day: 0.00     Years: 12.00     Pack years: 0.00     Types: Cigarettes     Smokeless tobacco: Never     Tobacco comments:     As of 10 /2014, pt has had a few cigs here and there   Vaping Use     Vaping Use: Never used   Substance and Sexual Activity     Alcohol use: Yes     Alcohol/week: 0.0 standard drinks     Comment: every few months     Drug use: No     Sexual activity: Yes     Partners: Male     Birth control/protection: Female Surgical   Other Topics Concern      Service No     Blood Transfusions No     Caffeine Concern Yes     Comment: Reports 1 can soda/week  Advised not more than 16 ounces caffeien/day.     Occupational Exposure No     Hobby Hazards No     Sleep Concern No     Stress Concern Yes     Comment: will discuss with      Weight Concern Yes     Comment: Eating disorder in childhood.  Hx. gestational diab.     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     Exercise No     Comment: Advised walking 30 min/day.     Bike Helmet No     Seat Belt Yes   Social History Narrative     and lives at home with her  and daughter.       Family History   Problem Relation Age of Onset     Diabetes Maternal Grandmother         adult onset     Anesthesia Reaction Maternal Grandmother         can't tolerate Novacaine.     Respiratory Maternal Grandmother         COPD and emphysema.     Thyroid Disease Maternal Grandmother      Heart Disease Maternal Grandmother         CHF     Diabetes Paternal Grandfather         adult o nset     Hypertension Paternal Grandfather      Cerebrovascular Disease Paternal Grandfather      Heart Disease Paternal Grandfather         MI, replaced valve,angioplasty     Thyroid Disease Paternal Grandfather      Cancer Maternal Grandfather         skin cancer     Heart Disease Maternal Grandfather         MI     Obesity Mother         on thyroid medication      "Thyroid Disease Mother      Diabetes Mother      Rheumatologic Disease Mother      Heart Disease Father      Hypertension Father         and TIA     Lipids Father      Breast Cancer Paternal Grandmother      Arrhythmia Other      Cancer Maternal Aunt         breast/brain cancer     Depression Paternal Aunt      Cerebrovascular Disease Paternal Uncle      Cerebrovascular Disease Paternal Aunt           ROS: 10 point ROS neg other than the symptoms noted above in the HPI.    Vital Signs: /82   Pulse 110   Temp 97.1  F (36.2  C) (Temporal)   Resp 16   Ht 5' 4\" (1.626 m)   Wt 202 lb (91.6 kg)   LMP 07/25/2020 (Exact Date)   SpO2 100%   BMI 34.67 kg/m      Examination:  Constitutional:  Alert, well nourished, NAD.  HEENT: Normocephalic, atraumatic.   Pulmonary:  Without shortness of breath, normal effort.   Lymph: no lymphadenopathy to low back or LE.   Integumentary: Skin is free of rashes or lesions.   Cardiovascular:  No pitting edema of BLE.    Psych: Normal affect, no apparent distress    Neurological:  Awake  Alert  Oriented x 3  Speech clear  Cranial nerves II - XII grossly intact  Motor exam   Hip Flexor:                Right: 5/5  Left:  5/5  Hip Adductor:             Right:  5/5  Left:  5/5  Hip Abductor:             Right:  5/5  Left:  5/5  Gastroc Soleus:        Right:  5/5  Left:  5/5  Tib/Ant:                      Right:  5/5  Left:  5/5  EHL:                          Right:  5/5  Left:  5/5       Sensation normal to bilateral upper and lower extremities.    Reflexes are 2+ in the patellar and Achilles. There is no clonus. Downgoing Babinski.    Reflexes are 2+ in the brachial radialis and triceps. Negative Bam sign bilaterally.    Finger to Nose smooth  Pronator drift negative    Musculoskeletal:  Gait: Able to stand from a seated position. Normal non-antalgic, non-myelopathic gait.    Lumbar examination reveals no tenderness of the spine or paraspinous muscles.  Hip height is " symmetrical. Negative SI joint, sciatic notch or greater trochanteric tenderness to palpation bilaterally.  Straight leg raise is negative bilaterally.      Imaging:   na    Assessment/Plan:     Lumbar radiculopathy  History of L5-S1 mocrodiscectomy    Fallon Rivera is a 42 year old female.  I did have a discussion the patient regarding her symptoms.  She has a history of a left L5-S1 microdiscectomy in 2019 with Dr. Roe.  Unfortunately, she is now noticing a return of some numbness in her left lower extremity.  She does have some residual numbness on the lateral aspect of her left foot and calf, that has been present even since before her original surgery.  She is now noticing increase in frequency of her falls, and feels like her left leg is giving out on her.  At this point, we would be concerned about a recurrent disc herniation, and we do recommend obtaining a lumbar spine MRI, with and without contrast.  She would like to be contacted through [employer] once this is completed.  She voiced agreement and understanding.          Thierno Sampson PA-C  [facility] [place] [address]    Tel [phone]  Pager [phone]      The use of Dragon/Azuki (Vozero/Gengibre) dictation services may have been used to construct the content in this note; any grammatical or spelling errors are non-intentional. Please contact the author of this note directly if you are in need of any clarification.        Again, thank you for allowing me to participate in the care of your patient.        Sincerely,        Thierno Sampson PA-C

## 2022-11-21 ENCOUNTER — TRANSFERRED RECORDS (OUTPATIENT)
Dept: HEALTH INFORMATION MANAGEMENT | Facility: CLINIC | Age: 42
End: 2022-11-21

## 2022-11-21 LAB — RETINOPATHY: NEGATIVE

## 2022-11-29 DIAGNOSIS — E11.9 TYPE 2 DIABETES MELLITUS WITHOUT COMPLICATION, WITHOUT LONG-TERM CURRENT USE OF INSULIN (H): ICD-10-CM

## 2022-11-29 DIAGNOSIS — K21.9 GASTROESOPHAGEAL REFLUX DISEASE WITHOUT ESOPHAGITIS: ICD-10-CM

## 2022-12-02 RX ORDER — EMPAGLIFLOZIN 10 MG/1
TABLET, FILM COATED ORAL
Qty: 90 TABLET | Refills: 1 | Status: SHIPPED | OUTPATIENT
Start: 2022-12-02 | End: 2023-06-02

## 2022-12-12 ENCOUNTER — HOSPITAL ENCOUNTER (OUTPATIENT)
Dept: MRI IMAGING | Facility: CLINIC | Age: 42
Discharge: HOME | End: 2022-12-12
Attending: PHYSICIAN ASSISTANT | Admitting: PHYSICIAN ASSISTANT
Payer: COMMERCIAL

## 2022-12-12 DIAGNOSIS — M54.16 LUMBAR RADICULOPATHY: ICD-10-CM

## 2022-12-12 PROCEDURE — 72158 MRI LUMBAR SPINE W/O & W/DYE: CPT

## 2022-12-12 PROCEDURE — A9585 GADOBUTROL INJECTION: HCPCS | Performed by: PHYSICIAN ASSISTANT

## 2022-12-12 PROCEDURE — 255N000002 HC RX 255 OP 636: Performed by: PHYSICIAN ASSISTANT

## 2022-12-12 RX ORDER — GADOBUTROL 604.72 MG/ML
10 INJECTION INTRAVENOUS ONCE
Status: COMPLETED | OUTPATIENT
Start: 2022-12-12 | End: 2022-12-12

## 2022-12-12 RX ADMIN — GADOBUTROL 9 ML: 604.72 INJECTION INTRAVENOUS at 17:21

## 2022-12-15 ENCOUNTER — TELEPHONE (OUTPATIENT)
Dept: NEUROSURGERY | Facility: CLINIC | Age: 42
End: 2022-12-15

## 2022-12-15 DIAGNOSIS — M54.16 LUMBAR RADICULOPATHY: Primary | ICD-10-CM

## 2022-12-16 NOTE — TELEPHONE ENCOUNTER
Thierno Sampson PA-C called pt, no answer. Recommend re-initiate PT, and then follow-up with Dr. Roe after 4 sessions of PT, if still symptomatic. PT is ordered. She does have recurrent disc herniation at L5-S1

## 2022-12-28 ENCOUNTER — HOSPITAL ENCOUNTER (OUTPATIENT)
Dept: PHYSICAL THERAPY | Facility: CLINIC | Age: 42
Setting detail: THERAPIES SERIES
Discharge: HOME | End: 2022-12-28
Attending: PHYSICIAN ASSISTANT
Payer: COMMERCIAL

## 2022-12-28 DIAGNOSIS — M54.16 LUMBAR RADICULOPATHY: ICD-10-CM

## 2022-12-28 PROCEDURE — 97162 PT EVAL MOD COMPLEX 30 MIN: CPT | Mod: GP

## 2022-12-28 PROCEDURE — 97530 THERAPEUTIC ACTIVITIES: CPT | Mod: GP

## 2022-12-29 NOTE — PROGRESS NOTES
12/28/22 1300   General Information   Type of Visit Initial OP Ortho PT Evaluation   Start of Care Date 12/28/22   Referring Physician Thierno Sampson PA-C   Orders Evaluate and Treat   Orders Comment Acute Spine Care   Date of Order 12/15/22   Certification Required? No   Medical Diagnosis Lumbar Radiculopathy M54.16   Surgical/Medical history reviewed Yes   Precautions/Limitations no known precautions/limitations   Weight-Bearing Status - LUE full weight-bearing   Weight-Bearing Status - RUE full weight-bearing   Weight-Bearing Status - LLE full weight-bearing   Weight-Bearing Status - RLE full weight-bearing   Special Instructions try 4 sessions of PT and follow up with Dr. Roe if still symptomatic       Present No   Body Part(s)   Body Part(s) Lumbar Spine/SI   Presentation and Etiology   Pertinent history of current problem (include personal factors and/or comorbidities that impact the POC) Pt is a 42-year-old female seen today for OP PT evaluation in the setting of lumbar radiculopathy s/p microdiscectomy in 2018. Pt reports she has been falling down the stairs for the past 2 years, she feels like her legs give out. She reports numbness along the lateral calf and into half of her foot since before her first back surgery. She states there was one point after her last back surgery that she got a myrna horse in this area, but then never had feeling again. Pt reports she has type II diabetes and may have neuropathy, but has never had a formal diagnosis. Pt reports recent bladder and bowel incontinence, but that she has not lost all control of these functions. No additional concerns to report this session.   Impairments A. Pain;D. Decreased ROM;E. Decreased flexibility;F. Decreased strength and endurance;G. Impaired balance;H. Impaired gait;J. Burning;K. Numbness;L. Tingling;P. Bowel or bladder problems   Functional Limitations perform activities of daily living;perform desired leisure /  sports activities   Symptom Location central mid low back with radicular symptoms into both legs L>R   How/Where did it occur From insidious onset;With repetition/overuse   Onset date of current episode/exacerbation 11/15/22   Chronicity Recurrent   Pain rating (0-10 point scale) Best (/10);Worst (/10)   Best (/10) 2   Worst (/10) 8   Pain quality E. Shooting;C. Aching;D. Burning   Frequency of pain/symptoms C. With activity   Pain/symptoms are: Other   Pain symptoms comment depends on the day   Pain/symptoms exacerbated by K. Home tasks;L. Work tasks;I. Bending;A. Sitting;H. Overhead reach   Pain/symptoms eased by K. Other;G. Heat;H. Cold;I. OTC medication(s)  (licodaine path, flexerol (muscle relaxer))   Progression of symptoms since onset: Other   Pain progression comment improved, but slowly declining again   Current / Previous Interventions   Diagnostic Tests: MRI   MRI Results Results   MRI results IMPRESSION: Left L5-S1 foraminal disc protrusion with associated severe left neural foraminal stenosis. Probable impingement of the exiting L5 nerve root and partial effacement of the adjacent subarticular zone with dorsal displacement of the traversing S1 nerve root. Nonspecific patchy enhancement and edema along the medial margin of the left L5-S1 facet joint extending into the lateral epidural space. No drainable rim-enhancing fluid collection to suggest abscess formation. The facet joint itself demonstrates no significant effusion nor architectural destruction. The constellation of findings is suggestive of recurrent disc herniation and likely adjacent scar tissue status-post prior L5-S1 microdiscectomy.   Prior Level of Function   Prior Level of Function-Mobility independent   Prior Level of Function-ADLs independent   Current Level of Function   Current Community Support Family/friend caregiver   Patient role/employment history C. Homemaker   Living environment House/townhome   Home/community accessibility 20  stairs total in house, concerns with falling on steps   Current equipment-Gait/Locomotion None   Current equipment-ADL None   Fall Risk Screen   Fall screen completed by PT   Have you fallen 2 or more times in the past year? Yes   Have you fallen and had an injury in the past year? Yes   Timed Up and Go score (seconds) 11.76   Is patient a fall risk? Yes;Department fall risk interventions implemented   Fall screen comments pt is not at risk for falls based on TUG score; however, given fall history and intermittent symptoms of her legs giving out, pt is at risk for increased falls   Abuse Screen (yes response referral indicated)   Feels Unsafe at Home or Work/School no   Feels Threatened by Someone no   Does Anyone Try to Keep You From Having Contact with Others or Doing Things Outside Your Home? no   Physical Signs of Abuse Present no   System Outcome Measures   Outcome Measures Low Back Pain (see Oswestry and Carol)   Lumbar Spine/SI Objective Findings   Gait/Locomotion antalgic and stiff gait pattern noted; slow and cautious fariba   Balance/Proprioception (Single Leg Stance) impaired   Hamstring Flexibility limited   Hip Flexor Flexibility limited   Quadricep Flexibility limited   Obers Flexibility limited   Piriformis Flexibility limited   Flexion ROM WFL, limited by hamstring length   Extension ROM impaired, increased guarding noted; pt reports she is limited by fear to extend back any farther vs. primary symptoms   Right Side Bending ROM WFL + reprodction of primary symptoms   Left Side Bending ROM WFL   Repeated Extension-Standing ROM impaired; pt reports she is limited by fear to extend back any farther vs. primary symptoms   Repeated Flexion-Standing ROM impaired + reproduction of primary symptoms wtih rep 4/5   Pelvic Screen unremarkable   Hip Screen unremarkable; however, pt reports pain in right hip with sidelying compression test, but no reproduction of primray symptoms   Transversus Abdominus Strength  (Eddie Leg Lowering-deg) impaired   Hip Flexion (L2) Strength 4/5 B + pain   Hip Abduction Strength 4/5 B + pain   Hip Adduction Strength 5/5 B   Hip Extension Strength 3+/5 + pian   Knee Flexion Strength 5/5 B   Knee Extension (L3) Strength 5/5 B   Ankle Dorsiflexion (L4) Strength 5/5 B, pt able to walk >10 ft. on heels symmetrically   Great Toe Extension (L5) Strength 5/5 B   Ankle Plantar Flexion (S1) Strength 5/5 B, pt able to walk >10 ft. on toes symmetrically   SLR positive on L   Crossover SLR negative B   Spring Test negative   Segmental Mobility hypomobile throughout lumbar spine, muscle guarding noted   Palpation no tenderness noted about lumbar SPs and TPs   Slump Test Positive on L   Observation Pt sitting comfortably on therapy mat table. She does not appear to be in any apparent distress.  present.   Integumentary no concerns   Posture forward head, rounded shoulders   Sensation Testing numbness and tingling in LLE in areas of  L4, L5, and S1 dermatome testing   Planned Therapy Interventions   Planned Therapy Interventions joint mobilization;manual therapy;neuromuscular re-education;ROM;strengthening;stretching   Planned Therapy Interventions Comment To be used as see fit during future sessions based on pt presentation.   Planned Modality Interventions   Planned Modality Interventions Cryotherapy;Hot packs;TENS;Traction;Ultrasound   Planned Modality Interventions Comments To be used in future sessions based on pt presenation for pain and inflammation control.   Clinical Impression   Criteria for Skilled Therapeutic Interventions Met yes, treatment indicated   PT Diagnosis impaired activity tolerance in setting of lumbar radiculopathy   Influenced by the following impairments low back pain; impaired gait; increased fall risk   Functional limitations due to impairments inability to complete desired home tasks without additional pain; increased risk for falls   Clinical Presentation  "Evolving/Changing   Clinical Presentation Rationale Pt presents with >3 personal factors, comorbidities, or confounding variables that may affect a therapy POC.   Clinical Decision Making (Complexity) Moderate complexity   Therapy Frequency 1 time/week   Predicted Duration of Therapy Intervention (days/wks) 4-6 weeks   Risk & Benefits of therapy have been explained Yes   Patient, Family & other staff in agreement with plan of care Yes   Clinical Impression Comments Pt is a 42-year-old female seen today for OP PT evlaution in the setting of lumbar radiculopathy. Pt reports she is not very optimistic with conservative care at this time, but is willing to \"jump through hoops\" to talk with her surgeon. Upon examination, pt demonstrates impaired lumbar ROM with reproduction of primary sympoms and increased muscle guarding noted, a positive slump and SLR test on the left, and abnormal sensation testing in L4-S1 dermatomes on LLE consistent with diagnosis of recurrent disc herniation at levels L5-S1. Pt will benefit from skilled PT services to improve independent pain management skills, body mechanics, and increase QOL.   Education Assessment   Preferred Learning Style Listening;Reading;Demonstration;Pictures/video   Barriers to Learning No barriers   ORTHO GOALS   PT Ortho Eval Goals 1;2;3;4   Ortho Goal 1   Goal Identifier HEP   Goal Description Pt will demonstrate independence with at least 3 home exercises in order to promote optimal outcomes and carry over into home setting in preparation for discharge from PT.   Goal Progress new goal   Target Date 01/25/23   Ortho Goal 2   Goal Identifier MARILYNN   Goal Description Pt will subjectively report at least a 12.88% improvement in MARILYNN scores in order to demonstrate a clinically meaningful difference in percent disability and overall function.   Goal Progress new goal   Target Date 01/25/23   Ortho Goal 3   Goal Identifier Pain   Goal Description Pt will subjectively report no " greater than 4/10 pain in her low back during session or while participating in HEP for at least 2 consecutive weeks demonstrating improved pain management skills.   Goal Progress new goal   Target Date 01/25/23   Ortho Goal 4   Goal Identifier Frequency of Radicular Symptoms   Goal Description Pt will subjectively report a decrease in radicular symptoms from daily to no more than 5 days per week in order to demonstrate improved centralization of symptoms required to improve function and QOL.   Goal Progress new goal   Target Date 01/25/23   Total Evaluation Time   PT Linda, Moderate Complexity Minutes (13042) 45     Thank you for your referral.  Briana Schoenke, PT, DPT    [facility]  O: [phone]  E: [email or web address]

## 2023-01-06 ENCOUNTER — HOSPITAL ENCOUNTER (OUTPATIENT)
Dept: PHYSICAL THERAPY | Facility: CLINIC | Age: 43
Setting detail: THERAPIES SERIES
Discharge: HOME | End: 2023-01-06
Attending: PHYSICIAN ASSISTANT
Payer: COMMERCIAL

## 2023-01-06 PROCEDURE — 97110 THERAPEUTIC EXERCISES: CPT | Mod: GP

## 2023-01-11 ENCOUNTER — HOSPITAL ENCOUNTER (OUTPATIENT)
Dept: PHYSICAL THERAPY | Facility: CLINIC | Age: 43
Setting detail: THERAPIES SERIES
Discharge: HOME | End: 2023-01-11
Attending: PHYSICIAN ASSISTANT
Payer: COMMERCIAL

## 2023-01-11 PROCEDURE — 97110 THERAPEUTIC EXERCISES: CPT | Mod: GP

## 2023-01-18 ENCOUNTER — HOSPITAL ENCOUNTER (OUTPATIENT)
Dept: PHYSICAL THERAPY | Facility: CLINIC | Age: 43
Setting detail: THERAPIES SERIES
Discharge: HOME | End: 2023-01-18
Attending: PHYSICIAN ASSISTANT
Payer: COMMERCIAL

## 2023-01-18 PROCEDURE — 97530 THERAPEUTIC ACTIVITIES: CPT | Mod: GP

## 2023-01-18 PROCEDURE — 97110 THERAPEUTIC EXERCISES: CPT | Mod: GP

## 2023-01-18 NOTE — PROGRESS NOTES
[facility]    Outpatient Physical Therapy Discharge Note  Patient: Fallon Rivera  : 1980    Beginning/End Dates of Reporting Period:  2022 to 2023    Referring Provider: Thierno Sampson PA-C    Therapy Diagnosis: impaired activity tolerance in setting of lumbar radiculopathy     Client Self Report: Pt reports she moved all the furniture in her living room around independently. She states her back felt pretty good, except for getting bumped in the middle of the back at her son's swim meet. She reports she had no feeling in her left leg the next day, but it returned later that day. No additional updates to report this week.    Objective Measurements:  Objective Measure: Pain  Details: 3/10     Outcome Measures (most recent score):  Carol STarT Sub-Score (Q5-9): 2  Carol STarT Total Score (all 9): 4  Oswestry Score (%): 22 %    Goals:  Goal Identifier HEP   Goal Description Pt will demonstrate independence with at least 3 home exercises in order to promote optimal outcomes and carry over into home setting in preparation for discharge from PT.   Target Date 23   Date Met  23   Progress (detail required for progress note): Goal Met: Pt reports compliance with HEP weekly. Will complete this goal at this time.     Goal Identifier MARILYNN   Goal Description Pt will subjectively report at least a 12.88% improvement in MARILYNN scores in order to demonstrate a clinically meaningful difference in percent disability and overall function.   Target Date 23   Date Met      Progress (detail required for progress note): Goal Not Met: Pt subjectively reports 22% disability compared to 20% disability at evaluation demonstrating no improvement in overall function.     Goal Identifier Pain   Goal Description Pt will subjectively report no greater than 4/10 pain in her low back during session or  while participating in HEP for at least 2 consecutive weeks demonstrating improved pain management skills.   Target Date 01/25/23   Date Met  01/18/23   Progress (detail required for progress note): Goal Met: Pt reported a 4/10 pain on VAS scale last week and a 3/10 pain this week demonstrating consistency with pain management skills. Will complete this goal at this time.     Goal Identifier Frequency of Radicular Symptoms   Goal Description Pt will subjectively report a decrease in radicular symptoms from daily to no more than 5 days per week in order to demonstrate improved centralization of symptoms required to improve function and QOL.   Target Date 01/25/23   Date Met      Progress (detail required for progress note): Goal Not Met: Pt reports no difference in frequency of radicular symptoms with conservative PT management. Pt will benefit from exploring additional conservative or surgical options for managing her current symptoms.     Plan:  Discharge from therapy.    Discharge:    Reason for Discharge: No further expectation of progress.  Patient chooses to discontinue therapy. Patient attended 4 sessions of physical therapy with little to no change in regards to her primary low back pain symptoms. She will benefit from continued conservative or surgical management techniques for managing her low back pain as directed by her attending physician.    Equipment Issued: HEP    Discharge Plan: Patient to continue home program.    Thank you for your referral.  Briana Schoenke, PT, DPT    [facility]  O: [phone]  E: [email or web address]

## 2023-02-09 ENCOUNTER — OFFICE VISIT (OUTPATIENT)
Dept: NEUROSURGERY | Facility: CLINIC | Age: 43
End: 2023-02-09
Payer: COMMERCIAL

## 2023-02-09 VITALS
DIASTOLIC BLOOD PRESSURE: 70 MMHG | BODY MASS INDEX: 33.6 KG/M2 | HEART RATE: 72 BPM | WEIGHT: 196.8 LBS | HEIGHT: 64 IN | SYSTOLIC BLOOD PRESSURE: 114 MMHG

## 2023-02-09 DIAGNOSIS — M51.26 RECURRENT HERNIATION OF LUMBAR DISC: Primary | ICD-10-CM

## 2023-02-09 DIAGNOSIS — M54.16 LUMBAR RADICULOPATHY: Primary | ICD-10-CM

## 2023-02-09 PROCEDURE — 99214 OFFICE O/P EST MOD 30 MIN: CPT | Performed by: NEUROLOGICAL SURGERY

## 2023-02-09 ASSESSMENT — PAIN SCALES - GENERAL: PAINLEVEL: MODERATE PAIN (4)

## 2023-02-09 NOTE — PROGRESS NOTES
Reviewed pre- and post-operative instructions as outlined in the After Visit Summary/Patient Instructions with patient.   Surgery folder was given to patient    Patient Education Topic: Procedure with Dr. Roe     Learner(s): Patient    Knowledge Level: Basic    Readiness to Learn: Ready    Method:  Verbal explanation and Written material     Outcome: Able to verbalize instructions    Barriers to Learning: No barrier    Covid Testing: n/a    Scheduling Number: [phone]    NDI/MARILYNN: Confirmation of completion within last 6 months    Pain Clinic: N/A    Patient had the opportunity for questions to be answered. Advised Patient to call clinic with any questions/concerns. Gave patient antibacterial soap for pre-surgery skin preparation.     Sia Maxwell RN on 2/9/2023 at 12:45 PM

## 2023-02-09 NOTE — PROGRESS NOTES
"Fallon Rivera is a 42 year old female who presents for:  Chief Complaint   Patient presents with     Neurologic Problem     Follow up         Initial Vitals:  /70   Pulse 72   Ht 5' 4\" (1.626 m)   Wt 196 lb 12.8 oz (89.3 kg)   LMP 07/25/2020 (Exact Date)   BMI 33.78 kg/m   Estimated body mass index is 33.78 kg/m  as calculated from the following:    Height as of this encounter: 5' 4\" (1.626 m).    Weight as of this encounter: 196 lb 12.8 oz (89.3 kg).. Body surface area is 2.01 meters squared. BP completed using cuff size: regular  Moderate Pain (4)    Nursing Comments:     Kina Foote    "

## 2023-02-09 NOTE — LETTER
"    2/9/2023         RE: Fallon Rivera  [address] [facility] [address]        Dear Colleague,    Thank you for referring your patient, Fallon Rivera, to the [facility] [place]. Please see a copy of my visit note below.    Fallon Rivera is a 42 year old female who presents for:  Chief Complaint   Patient presents with     Neurologic Problem     Follow up         Initial Vitals:  /70   Pulse 72   Ht 5' 4\" (1.626 m)   Wt 196 lb 12.8 oz (89.3 kg)   LMP 07/25/2020 (Exact Date)   BMI 33.78 kg/m   Estimated body mass index is 33.78 kg/m  as calculated from the following:    Height as of this encounter: 5' 4\" (1.626 m).    Weight as of this encounter: 196 lb 12.8 oz (89.3 kg).. Body surface area is 2.01 meters squared. BP completed using cuff size: regular  Moderate Pain (4)    Nursing Comments:     Kina Foote      Reviewed pre- and post-operative instructions as outlined in the After Visit Summary/Patient Instructions with patient.   Surgery folder was given to patient    Patient Education Topic: Procedure with Dr. Roe     Learner(s): Patient    Knowledge Level: Basic    Readiness to Learn: Ready    Method:  Verbal explanation and Written material     Outcome: Able to verbalize instructions    Barriers to Learning: No barrier    Covid Testing: n/a    Scheduling Number: [phone]    NDI/MARILYNN: Confirmation of completion within last 6 months    Pain Clinic: N/A    Patient had the opportunity for questions to be answered. Advised Patient to call clinic with any questions/concerns. Gave patient antibacterial soap for pre-surgery skin preparation.     Sia Maxwell RN on 2/9/2023 at 12:45 PM      Fallon Rivera is a 42 year old female who presents for evaluation  of her chief complaint of low back pain, as well as pain and numbness down her left leg.  She has had a history of a left-sided L5-S1 microdiscectomy in 2019.  Unfortunately, she " continues to notice return of numbness in her left lower extremity, along with intermittent falls.  These do not seem to be mechanical, but rather it seems as though her leg wants to give out at times.  She has not had any recent treatment, although she has tried some different medications and is also taking Flexeril at present.  This is minimally helpful.  No bowel or bladder changes, or any problems with balance or coordination.     Returns for follow up.  Continued symptoms as above.  Did PT without improvement.  MR Lumbar, personally reviewed, with left L5-S1 recurrent disc herniation and foraminal stenosis.    Past Medical History:   Diagnosis Date     Abnormal Pap smear 2006, 2007,     ASC-H, ASCUS + HPV 45     Calculus of kidney 01/01/2002     Cervical high risk HPV (human papillomavirus) test positive     + HPV 45 (high risk)     History of colposcopy with cervical biopsy 2/9/06, 3/15/07    koilocytosis 2007       Past Medical History reviewed with patient during visit.    Past Surgical History:   Procedure Laterality Date     CHOLECYSTECTOMY, LAPOROSCOPIC  5/11/2007    Cholecystectomy, Laparoscopic     COLONOSCOPY  05/17/10     COLONOSCOPY N/A 8/27/2019    Procedure: COLONOSCOPY;  Surgeon: Paramjit Kingston DO;  Location:  GI     COLONOSCOPY N/A 10/23/2020    Procedure: COLONOSCOPY, WITH  BIOPSY;  Surgeon: Ba Dickinson MD;  Location:  GI     CONIZATION  7/1/2011    Procedure:CONIZATION; cold knife and punch biopsy of mole on back; Surgeon:SYDNEY FORD; Location:[facility]     DILATION AND CURETTAGE, HYSTEROSCOPY, LAPAROSCOPY, COMBINED Right 9/24/2015    Procedure: COMBINED DILATION AND CURETTAGE, HYSTEROSCOPY, LAPAROSCOPY;  Surgeon: Sydney Ford MD;  Location: [facility] OR     DISCECTOMY LUMBAR MINIMALLY INVASIVE ONE LEVEL Left 1/23/2019    Procedure: Minimally Invasive Surgery Left Lumbar 5-Sacral 1 microdiscectomy;  Surgeon: Mason Roe MD;  Location:  OR      ESOPHAGOSCOPY, GASTROSCOPY, DUODENOSCOPY (EGD), COMBINED  5/21/2014    Procedure: COMBINED ESOPHAGOSCOPY, GASTROSCOPY, DUODENOSCOPY (EGD), BIOPSY SINGLE OR MULTIPLE;  Surgeon: Earl Lane MD;  Location: [facility] GI     ESOPHAGOSCOPY, GASTROSCOPY, DUODENOSCOPY (EGD), COMBINED N/A 10/23/2020    Procedure: Esophagogastroduodenoscopy with  biopsy;  Surgeon: Ba Dickinson MD;  Location: [facility] GI     EXCISE LESION TRUNK  7/1/2011    Procedure:EXCISE LESION TRUNK; Surgeon:SYDNEY FORD; Location:[facility] OR     HC FRAGMENTING OF KIDNEY STONE  11/30/2005     HC RX ECTOP PREG BY SCOPE,RMV TUBE/OVRY  12/20/2007    Right sided salpingectomy and removal of ruptured ectopic pregnancy. D&C.     HYSTERECTOMY VAGINAL       LAPAROSCOPIC APPENDECTOMY N/A 9/24/2015    Procedure: LAPAROSCOPIC APPENDECTOMY;  Surgeon: James Cuellar MD;  Location: [facility]     LAPAROSCOPIC CYSTECTOMY OVARIAN (BENIGN) N/A 9/24/2015    Procedure: LAPAROSCOPIC CYSTECTOMY OVARIAN (BENIGN);  Surgeon: Sydney Ford MD;  Location: [facility]     LAPAROSCOPIC HYSTERECTOMY TOTAL, BILATERAL SALPINGO-OOPHORECTOMY, COMBINED N/A 7/28/2020    Procedure: laparoscpic assisted vaginal hysterectomy, cystoscopy;  Surgeon: Sydney Ford MD;  Location: [facility]     LAPAROSCOPIC OOPHORECTOMY Right 9/24/2015    Procedure: LAPAROSCOPIC OOPHORECTOMY;  Surgeon: Sydney Ford MD;  Location: [facility]     LITHOTRIPSY  01/17/2007     PELVIS LAPAROSCOPY,DX  10/16/2000    Diagnostic laparoscopy, Endometrial biopsy.     TUBAL/ECTOPIC PREGNANCY  01/23/2007    Lap removal of left ruptured ectopic pregnancy & salpingectomy, D&C     ZZ REMOVAL OF KIDNEY STONE      two surgeries, 2002,2003     [facility]     Past Surgical History reviewed with patient during visit.    Current Outpatient Medications   Medication     blood glucose (CONTOUR NEXT TEST) test strip     blood glucose monitoring (NO BRAND SPECIFIED) meter  device kit     cyclobenzaprine (FLEXERIL) 10 MG tablet     cyclobenzaprine (FLEXERIL) 5 MG tablet     ibuprofen (ADVIL/MOTRIN) 200 MG tablet     JARDIANCE 10 MG TABS tablet     metFORMIN (GLUCOPHAGE XR) 500 MG 24 hr tablet     methylPREDNISolone (MEDROL DOSEPAK) 4 MG tablet therapy pack     multivitamin w/minerals (THERA-VIT-M) tablet     nystatin (MYCOSTATIN) 956164 UNIT/GM external powder     omeprazole (PRILOSEC) 20 MG DR capsule     No current facility-administered medications for this visit.       Allergies   Allergen Reactions     Amoxicillin Hives     Anti-Nausea      Anxiety, agitation,severe paranoia. Zofran, Reglan are some of them.  DRAMAMINE WITHOUT ISSUES       Demerol Hcl [Meperidine Hcl] Nausea     Indomethacin Nausea and Vomiting     Macrobid [Nitrofuran Derivatives] Hives     Reglan [Metoclopramide] Other (See Comments)     Acute paranoia, severe     Zofran [Ondansetron] Other (See Comments)     Severe anxiety, agitation     Vancomycin Other (See Comments)     maria del rosario syndrome       Social History     Socioeconomic History     Marital status:      Spouse name: Joseph     Number of children: 1     Years of education: 9     Highest education level: None   Occupational History     Employer: NONE   Tobacco Use     Smoking status: Some Days     Packs/day: 0.00     Years: 12.00     Pack years: 0.00     Types: Cigarettes     Smokeless tobacco: Never     Tobacco comments:     As of 10 /2014, pt has had a few cigs here and there   Vaping Use     Vaping Use: Never used   Substance and Sexual Activity     Alcohol use: Yes     Alcohol/week: 0.0 standard drinks     Comment: every few months     Drug use: No     Sexual activity: Yes     Partners: Male     Birth control/protection: Female Surgical   Other Topics Concern      Service No     Blood Transfusions No     Caffeine Concern Yes     Comment: Reports 1 can soda/week  Advised not more than 16 ounces caffeien/day.     Occupational Exposure No      "Hobby Hazards No     Sleep Concern No     Stress Concern Yes     Comment: will discuss with      Weight Concern Yes     Comment: Eating disorder in childhood.  Hx. gestational diab.     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     Exercise No     Comment: Advised walking 30 min/day.     Bike Helmet No     Seat Belt Yes   Social History Narrative     and lives at home with her  and daughter.       Family History   Problem Relation Age of Onset     Diabetes Maternal Grandmother         adult onset     Anesthesia Reaction Maternal Grandmother         can't tolerate Novacaine.     Respiratory Maternal Grandmother         COPD and emphysema.     Thyroid Disease Maternal Grandmother      Heart Disease Maternal Grandmother         CHF     Diabetes Paternal Grandfather         adult o nset     Hypertension Paternal Grandfather      Cerebrovascular Disease Paternal Grandfather      Heart Disease Paternal Grandfather         MI, replaced valve,angioplasty     Thyroid Disease Paternal Grandfather      Cancer Maternal Grandfather         skin cancer     Heart Disease Maternal Grandfather         MI     Obesity Mother         on thyroid medication     Thyroid Disease Mother      Diabetes Mother      Rheumatologic Disease Mother      Heart Disease Father      Hypertension Father         and TIA     Lipids Father      Breast Cancer Paternal Grandmother      Arrhythmia Other      Cancer Maternal Aunt         breast/brain cancer     Depression Paternal Aunt      Cerebrovascular Disease Paternal Uncle      Cerebrovascular Disease Paternal Aunt           ROS: 10 point ROS neg other than the symptoms noted above in the HPI.    Vital Signs: /70   Pulse 72   Ht 1.626 m (5' 4\")   Wt 89.3 kg (196 lb 12.8 oz)   LMP 07/25/2020 (Exact Date)   BMI 33.78 kg/m      Examination:  Constitutional:  Alert, well nourished, NAD.  HEENT: Normocephalic, atraumatic. "   Pulmonary:  Without shortness of breath, normal effort.   Lymph: no lymphadenopathy to low back or LE.   Integumentary: Skin is free of rashes or lesions.   Cardiovascular:  No pitting edema of BLE.    Psych: Normal affect, no apparent distress    Neurological:  Awake  Alert  Oriented x 3  Speech clear  Cranial nerves II - XII grossly intact  Motor exam   Hip Flexor:                Right: 5/5  Left:  5/5  Hip Adductor:             Right:  5/5  Left:  5/5  Hip Abductor:             Right:  5/5  Left:  5/5  Gastroc Soleus:        Right:  5/5  Left:  5/5  Tib/Ant:                      Right:  5/5  Left:  5/5  EHL:                          Right:  5/5  Left:  5/5       Sensation normal to bilateral upper and lower extremities.    Reflexes are 2+ in the patellar and Achilles. There is no clonus. Downgoing Babinski.    Reflexes are 2+ in the brachial radialis and triceps. Negative Bam sign bilaterally.    Finger to Nose smooth  Pronator drift negative    Musculoskeletal:  Gait: Able to stand from a seated position. Normal non-antalgic, non-myelopathic gait.    Lumbar examination reveals no tenderness of the spine or paraspinous muscles.  Hip height is symmetrical. Negative SI joint, sciatic notch or greater trochanteric tenderness to palpation bilaterally.  Straight leg raise is negative bilaterally.      Imaging:   na    Assessment/Plan:     Lumbar radiculopathy  History of L5-S1 mocrodiscectomy    Will plan for MIS decompression  Risks and benefits discussed      Again, thank you for allowing me to participate in the care of your patient.        Sincerely,        Mason Roe MD

## 2023-02-09 NOTE — PROGRESS NOTES
Fallon Rivera is a 42 year old female who presents for evaluation  of her chief complaint of low back pain, as well as pain and numbness down her left leg.  She has had a history of a left-sided L5-S1 microdiscectomy in 2019.  Unfortunately, she continues to notice return of numbness in her left lower extremity, along with intermittent falls.  These do not seem to be mechanical, but rather it seems as though her leg wants to give out at times.  She has not had any recent treatment, although she has tried some different medications and is also taking Flexeril at present.  This is minimally helpful.  No bowel or bladder changes, or any problems with balance or coordination.     Returns for follow up.  Continued symptoms as above.  Did PT without improvement.  MR Lumbar, personally reviewed, with left L5-S1 recurrent disc herniation and foraminal stenosis.    Past Medical History:   Diagnosis Date     Abnormal Pap smear 2006, 2007,     ASC-H, ASCUS + HPV 45     Calculus of kidney 01/01/2002     Cervical high risk HPV (human papillomavirus) test positive     + HPV 45 (high risk)     History of colposcopy with cervical biopsy 2/9/06, 3/15/07    koilocytosis 2007       Past Medical History reviewed with patient during visit.    Past Surgical History:   Procedure Laterality Date     CHOLECYSTECTOMY, LAPOROSCOPIC  5/11/2007    Cholecystectomy, Laparoscopic     COLONOSCOPY  05/17/10     COLONOSCOPY N/A 8/27/2019    Procedure: COLONOSCOPY;  Surgeon: Paramjit Kingston DO;  Location:  GI     COLONOSCOPY N/A 10/23/2020    Procedure: COLONOSCOPY, WITH  BIOPSY;  Surgeon: Ba Dickinson MD;  Location:  GI     CONIZATION  7/1/2011    Procedure:CONIZATION; cold knife and punch biopsy of mole on back; Surgeon:SYDNEY GRAFF; Location: OR     DILATION AND CURETTAGE, HYSTEROSCOPY, LAPAROSCOPY, COMBINED Right 9/24/2015    Procedure: COMBINED DILATION AND CURETTAGE, HYSTEROSCOPY, LAPAROSCOPY;  Surgeon:  Sydney Ford MD;  Location: [facility]     DISCECTOMY LUMBAR MINIMALLY INVASIVE ONE LEVEL Left 1/23/2019    Procedure: Minimally Invasive Surgery Left Lumbar 5-Sacral 1 microdiscectomy;  Surgeon: Mason Roe MD;  Location: [facility]     ESOPHAGOSCOPY, GASTROSCOPY, DUODENOSCOPY (EGD), COMBINED  5/21/2014    Procedure: COMBINED ESOPHAGOSCOPY, GASTROSCOPY, DUODENOSCOPY (EGD), BIOPSY SINGLE OR MULTIPLE;  Surgeon: Earl Lane MD;  Location: [facility]     ESOPHAGOSCOPY, GASTROSCOPY, DUODENOSCOPY (EGD), COMBINED N/A 10/23/2020    Procedure: Esophagogastroduodenoscopy with  biopsy;  Surgeon: Ba Dickinson MD;  Location: [facility]     EXCISE LESION TRUNK  7/1/2011    Procedure:EXCISE LESION TRUNK; Surgeon:SYDNEY FORD; Location:[facility]     HC FRAGMENTING OF KIDNEY STONE  11/30/2005     HC RX ECTOP PREG BY SCOPE,RMV TUBE/OVRY  12/20/2007    Right sided salpingectomy and removal of ruptured ectopic pregnancy. D&C.     HYSTERECTOMY VAGINAL       LAPAROSCOPIC APPENDECTOMY N/A 9/24/2015    Procedure: LAPAROSCOPIC APPENDECTOMY;  Surgeon: James Cuellar MD;  Location[facility]     LAPAROSCOPIC CYSTECTOMY OVARIAN (BENIGN) N/A 9/24/2015    Procedure: LAPAROSCOPIC CYSTECTOMY OVARIAN (BENIGN);  Surgeon: Sydney Ford MD;  [facility]     LAPAROSCOPIC HYSTERECTOMY TOTAL, BILATERAL SALPINGO-OOPHORECTOMY, COMBINED N/A 7/28/2020    Procedure: laparoscpic assisted vaginal hysterectomy, cystoscopy;  Surgeon: Sydney Ford MD;  Location: [facility]     LAPAROSCOPIC OOPHORECTOMY Right 9/24/2015    Procedure: LAPAROSCOPIC OOPHORECTOMY;  Surgeon: Sydney Ford MD;  [facility]     LITHOTRIPSY  01/17/2007     PELVIS LAPAROSCOPY,DX  10/16/2000    Diagnostic laparoscopy, Endometrial biopsy.     TUBAL/ECTOPIC PREGNANCY  01/23/2007    Lap removal of left ruptured ectopic pregnancy & salpingectomy, D&C     ZZC REMOVAL OF KIDNEY STONE      two surgeries, 2002,2003     ZZHC  UGI ENDOSCOPY, SIMPLE EXAM  09/01/09     Past Surgical History reviewed with patient during visit.    Current Outpatient Medications   Medication     blood glucose (CONTOUR NEXT TEST) test strip     blood glucose monitoring (NO BRAND SPECIFIED) meter device kit     cyclobenzaprine (FLEXERIL) 10 MG tablet     cyclobenzaprine (FLEXERIL) 5 MG tablet     ibuprofen (ADVIL/MOTRIN) 200 MG tablet     JARDIANCE 10 MG TABS tablet     metFORMIN (GLUCOPHAGE XR) 500 MG 24 hr tablet     methylPREDNISolone (MEDROL DOSEPAK) 4 MG tablet therapy pack     multivitamin w/minerals (THERA-VIT-M) tablet     nystatin (MYCOSTATIN) 033326 UNIT/GM external powder     omeprazole (PRILOSEC) 20 MG DR capsule     No current facility-administered medications for this visit.       Allergies   Allergen Reactions     Amoxicillin Hives     Anti-Nausea      Anxiety, agitation,severe paranoia. Zofran, Reglan are some of them.  DRAMAMINE WITHOUT ISSUES       Demerol Hcl [Meperidine Hcl] Nausea     Indomethacin Nausea and Vomiting     Macrobid [Nitrofuran Derivatives] Hives     Reglan [Metoclopramide] Other (See Comments)     Acute paranoia, severe     Zofran [Ondansetron] Other (See Comments)     Severe anxiety, agitation     Vancomycin Other (See Comments)     maria del rosario syndrome       Social History     Socioeconomic History     Marital status:      Spouse name: Joseph     Number of children: 1     Years of education: 9     Highest education level: None   Occupational History     Employer: NONE   Tobacco Use     Smoking status: Some Days     Packs/day: 0.00     Years: 12.00     Pack years: 0.00     Types: Cigarettes     Smokeless tobacco: Never     Tobacco comments:     As of 10 /2014, pt has had a few cigs here and there   Vaping Use     Vaping Use: Never used   Substance and Sexual Activity     Alcohol use: Yes     Alcohol/week: 0.0 standard drinks     Comment: every few months     Drug use: No     Sexual activity: Yes     Partners: Male     Birth  control/protection: Female Surgical   Other Topics Concern      Service No     Blood Transfusions No     Caffeine Concern Yes     Comment: Reports 1 can soda/week  Advised not more than 16 ounces caffeien/day.     Occupational Exposure No     Hobby Hazards No     Sleep Concern No     Stress Concern Yes     Comment: will discuss with      Weight Concern Yes     Comment: Eating disorder in childhood.  Hx. gestational diab.     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     Exercise No     Comment: Advised walking 30 min/day.     Bike Helmet No     Seat Belt Yes   Social History Narrative     and lives at home with her  and daughter.       Family History   Problem Relation Age of Onset     Diabetes Maternal Grandmother         adult onset     Anesthesia Reaction Maternal Grandmother         can't tolerate Novacaine.     Respiratory Maternal Grandmother         COPD and emphysema.     Thyroid Disease Maternal Grandmother      Heart Disease Maternal Grandmother         CHF     Diabetes Paternal Grandfather         adult o nset     Hypertension Paternal Grandfather      Cerebrovascular Disease Paternal Grandfather      Heart Disease Paternal Grandfather         MI, replaced valve,angioplasty     Thyroid Disease Paternal Grandfather      Cancer Maternal Grandfather         skin cancer     Heart Disease Maternal Grandfather         MI     Obesity Mother         on thyroid medication     Thyroid Disease Mother      Diabetes Mother      Rheumatologic Disease Mother      Heart Disease Father      Hypertension Father         and TIA     Lipids Father      Breast Cancer Paternal Grandmother      Arrhythmia Other      Cancer Maternal Aunt         breast/brain cancer     Depression Paternal Aunt      Cerebrovascular Disease Paternal Uncle      Cerebrovascular Disease Paternal Aunt           ROS: 10 point ROS neg other than the symptoms noted above  "in the HPI.    Vital Signs: /70   Pulse 72   Ht 1.626 m (5' 4\")   Wt 89.3 kg (196 lb 12.8 oz)   LMP 07/25/2020 (Exact Date)   BMI 33.78 kg/m      Examination:  Constitutional:  Alert, well nourished, NAD.  HEENT: Normocephalic, atraumatic.   Pulmonary:  Without shortness of breath, normal effort.   Lymph: no lymphadenopathy to low back or LE.   Integumentary: Skin is free of rashes or lesions.   Cardiovascular:  No pitting edema of BLE.    Psych: Normal affect, no apparent distress    Neurological:  Awake  Alert  Oriented x 3  Speech clear  Cranial nerves II - XII grossly intact  Motor exam   Hip Flexor:                Right: 5/5  Left:  5/5  Hip Adductor:             Right:  5/5  Left:  5/5  Hip Abductor:             Right:  5/5  Left:  5/5  Gastroc Soleus:        Right:  5/5  Left:  5/5  Tib/Ant:                      Right:  5/5  Left:  5/5  EHL:                          Right:  5/5  Left:  5/5       Sensation normal to bilateral upper and lower extremities.    Reflexes are 2+ in the patellar and Achilles. There is no clonus. Downgoing Babinski.    Reflexes are 2+ in the brachial radialis and triceps. Negative Bam sign bilaterally.    Finger to Nose smooth  Pronator drift negative    Musculoskeletal:  Gait: Able to stand from a seated position. Normal non-antalgic, non-myelopathic gait.    Lumbar examination reveals no tenderness of the spine or paraspinous muscles.  Hip height is symmetrical. Negative SI joint, sciatic notch or greater trochanteric tenderness to palpation bilaterally.  Straight leg raise is negative bilaterally.      Imaging:   na    Assessment/Plan:     Lumbar radiculopathy  History of L5-S1 mocrodiscectomy    Will plan for MIS decompression  Risks and benefits discussed  "

## 2023-02-09 NOTE — PATIENT INSTRUCTIONS
Patient Instructions    Surgery scheduled at [facility] for MIS Left L5-S1 evacuation of recurrent disc herniation and foraminotomy  with Dr. Roe    Pre-Operative    Surgical risks: blood clots in the leg or lung, problems urinating, nerve damage, drainage from the incision, infection, stiffness    You will need a Pre-operative physical with primary care physician within 30 days prior to your surgical date.     This is a same day procedure with discharge home day of surgery.    Smoking Cessation  You are advised to quit smoking immediately through recovery to help with healing and reduce risk of complications. Printout given.     Shower procedure  You will need to shower the night before and morning of surgery using the antimicrobial soap (chlorhexidine) given to you. Please refer to showering instruction sheet in surgery education folder.    Eating/Drinking  Stop all solid foods 8 hours before surgery.  Keep drinking clear liquids until 4 hours before surgery  Clear liquids include water, clear juice, black coffee, or clear tea without milk, Gatorade, clear soda.     Medications  Discontinue Aspirin & NSAIDs (Advil/Ibuprofen, Indocin, Naproxen,Nuprin,Relafen/Nabumetone, Diclofenac,Meloxicam, Aleve, Celebrex) 7 days prior to surgical date. After surgery, do not begin taking these medications until given clearance as it may cause bleeding and interfere with healing.  It is ok to take Tylenol (Acetaminophen) for pain within the 7 days prior to surgery. Example: you could take 1000 mg 3 times per day. Do not exceed 3,000 mg per day.   If you are on chronic pain medication (oxycodone, Percocet, hydrocodone, Vicodin, Norco, Dilaudid, morphine, MS Contin, naltrexone, Suboxone, etc) or have a pain contract we will reach out to your pain clinic to gather your most recent records and recommendations for pain management post-op.  Please ask your provider who manages your chronic pain if they require  you to schedule an office visit prior to surgery. Continue obtaining your pain medications from your current provider until surgery. Our team will manage your acute post-op pain in the hospital and during the recovery period. Your pain team will continue to manage your chronic pain.   Any other medications prescribed, please discuss with your primary care provider at your pre-operative physical       Post-Operative    Incision Care  Look at your incision site every day. You  may need a mirror or family member to help you.   Watch for signs of infection  Redness, swelling, warmth, drainage (Green or yellow drainage (pus) from your incision or increased bloody drainage), and fever of 101 degrees or higher  [facility] [phone]  Remove dressing as instructed upon discharge  Do not apply lotions or ointments to incision  It is okay to shower, just pat the incision dry   No submerging incision in water such as pools, hot tubs, or baths for at least 8 weeks and until the incision is healed    Pain Management  Dealing with pain  As your body heals, you might feel a stabbing, burning, or aching pain. You may also have some numbness.  Everyone feels pain differently, we may ask you to rate your pain using a pain scale. This will let us know how much pain you feel.   Keep in mind that medicine won't take away all of your pain. It helps to try other ways to relax and ease pain.   Things to help with pain  After surgery, we will give you medicine for your pain. These medications work well, but they can make you drowsy, itchy, or sick to your stomach. If we give you narcotics for pain, try to take the pills with food.   For mild to moderate pain, you can take medication such as Tylenol. These can be used with narcotics, but make sure that your narcotic does not contain Tylenol.   Do NOT drive while taking narcotic pain medication  Do NOT drink alcohol while using any pain medication  You can utilize ice as needed (no  longer than 20 minutes at one time)  Refills of pain medication: please call the neurosurgery clinic to request 2-3 days before you run out  Aspirin & NSAIDS (ex. Ibuprofen, aleve, naproxen): Don't take NSAIDs until 2 weeks after surgery to reduce risk of bleeding and interference with bone healing     Bowel Care  Many people have constipation (hard stools) after surgery. The narcotic pain medication we often prescribed can contribute to constipation. To help prevent constipation: Drink plenty of fluid (8-10 glasses/day); Eat more fiber, such as whole grain bread, bran cereal, and fruits and vegetables; Stay active by walking; Over the counter stool softener may also help.      Activity Restrictions  For the first 6 weeks, no lifting > 10 pounds, limited bending, twisting, or overhead reaching.  Take stairs in moderation   Walking is the best way to start exercise after surgery. Take short frequent walks. You may gradually increase the distance as tolerated. If you feel pain, decrease your activity, but DO NOT stop walking. Walking will help you gain strength, prevent muscle soreness and spasms, and prevent blood clots.  Avoid bed rest and prolonged sitting for longer than 30 minutes (change positions frequently while awake)  No contact sports or high impact activities such as; running/jogging, snowmobile or 4 mart riding or any other recreational vehicles until after given clearance at one of your follow up visits    Contact clinic right away or go to the Emergency Department if you develop:   Infection (incisional redness, swelling, warmth, drainage, or fever (temp > 101 F))  New injury  Bladder or bowel changes or loss of control    Signs of blood clot:  Swelling and/or warmth in one or both legs  Pain or tenderness in your leg, ankle, foot, or arm   Red or discolored skin     Go to the Emergency Department   If sudden onset of severe headache, weakness, confusion, change in level of consciousness, pain, or  "loss of movement.  Chest pain  Trouble breathing     Post-Op Follow Up Appointments  2 week incision check & staple/suture removal with a Neurosurgery Nurse  6 week and 3 month post-op visit with Physican Assistant or Nurse Practitioner     Resources  If you are currently employed, you will need to be off work for 2-4 weeks for recovery and healing.  Please fax any FMLA/short term disability paperwork to [phone] prior to surgery  You may call our clinic when you'd like to return to work and we can provide a work letter  To allow staff adequate time to complete paperwork, please send as soon as possible     [facility]  [facility] - [address] [address]  Telephone:  [phone]       Fax:  [phone]  Ways to Quit Smoking     It's not too late to quit smoking.   Quitting smoking helps your circulation, your stamina, your skin, and your general health. Your risk for coronary heart disease, a common cause of death and disability, is halved after only a year without smoking. Quitting smoking also reduces the likelihood of your getting respiratory problems and lung cancer.   Studies have shown that your smoke affects others as well as yourself. Children of parents who smoke around the house are more prone to respiratory infections than children from nonsmoking homes.   Smoking is an addictive habit. Most former smokers make several attempts to quit before they are finally successful. So, never say, \"I can't.\" Just keep trying.   Set a quit date.   Set a date for when you will stop smoking. Don't buy cigarettes to carry you beyond your last day. Tell your family and friends you plan to quit, and ask for their support and encouragement. Ask them not to offer you cigarettes.   Throw your cigarettes away.   If you keep cigarettes around, sooner or later you'll break down and smoke one, then another, then another, and " "so on. Throw them away. Make it less easy to start again.   Try chewing gum is as a substitute for cigarettes.   Spend time with nonsmokers rather than with smokers.   Think of yourself and identify yourself as a nonsmoker (for example, in restaurants). Stay away from \"smokers' keith,\" such as bars. Avoid spending time with smokers. You can't tell others not to smoke, but you don't have to sit with them while they do. Old habits die hard and one of your old smoking buddies is sure to offer you a cigarette. Plan on walking away from cigarette smoke. Spend time with nonsmokers and sit in the nonsmoking section of restaurants.   Be prepared for relapse or difficult situations.   Most relapses occur within the first 3 months after quitting. Many people try 5 or more times before they successfully quit. Avoid drinking alcohol, because it lowers your chances of success. Don't be distracted by weight gain, which is usually less than 10 pounds. Learn new ways to improve your mood and overcome depression.   Start an exercise program.   As you become more fit, you will not want the nicotine effects in your body. Regular exercise will also help keep you from gaining weight when you quit smoking.   Keep your hands busy.   You may not know what to do with your hands for a while.  a book or a magazine. Try knitting, needlework, pottery, drawing, making a plastic model, or doing a jigsaw puzzle. Join special interest groups that keep you involved in your hobby.   Take on new activities.   Take on new activities that don't include smoking. Join an exercise group and work out regularly. Sign up for an evening class or a join a study group at your place of [employer]. Go on more outings with your family or friends. Learn ways to relax and manage stress.   Join quit-smoking programs if it helps.   Some people do better in groups, or with a set of instructions to follow. That's fine, too. Remember, the aim is to quit smoking. It " doesn't matter how you do it.   Consider using nicotine replacement therapy.   Nicotine is the drug that is in tobacco. You can use nicotine patches or gum, available without a prescription at your local pharmacy, to quit smoking. It is a two-step process. First you learn to live without smoking, but not without nicotine. Then, as you graduate to patches with less nicotine, or chew less of the nicotine gum, you wean yourself off the nicotine.   Your health care provider can prescribe nicotine substitutes that can almost double your chances of quitting for good. They are:   Zyban (bupropion HCL)   nicotine inhaler   nicotine lozenge   nicotine nasal spray   nicotine patch.   You may prefer to be involved in an organized quit-smoking program while you are using nicotine patches or gum or other medicine to help you quit. None of these treatments is a miracle cure. You still need to learn to live without cigarettes in your daily life.     Developed by Blanka Durant MD, for [employer].   Published by [employer].   This content is reviewed periodically and is subject to change as new health information becomes available. The information is intended to inform and educate and is not a replacement for medical evaluation, advice, diagnosis or treatment by a healthcare professional.   Adult Health Advisor 2003.2 Index  Adult Health Advisor 2003.2 Credits   Copyright   2003 [employer]. All rights reserved.     More information can also be obtained from the [employer] [place]:  Telephone: 8-261-9-CANCER (1-657.252.8961)   TTY: 1-709.239.3126   (for deaf or hard of hearing callers)

## 2023-02-18 ENCOUNTER — HEALTH MAINTENANCE LETTER (OUTPATIENT)
Age: 43
End: 2023-02-18

## 2023-03-20 ENCOUNTER — OFFICE VISIT (OUTPATIENT)
Dept: INTERNAL MEDICINE | Facility: CLINIC | Age: 43
End: 2023-03-20
Payer: COMMERCIAL

## 2023-03-20 ENCOUNTER — OFFICE VISIT (OUTPATIENT)
Dept: FAMILY MEDICINE | Facility: CLINIC | Age: 43
End: 2023-03-20
Payer: COMMERCIAL

## 2023-03-20 VITALS
BODY MASS INDEX: 33.05 KG/M2 | WEIGHT: 198.4 LBS | HEART RATE: 95 BPM | SYSTOLIC BLOOD PRESSURE: 118 MMHG | DIASTOLIC BLOOD PRESSURE: 72 MMHG | RESPIRATION RATE: 15 BRPM | HEIGHT: 65 IN | OXYGEN SATURATION: 99 %

## 2023-03-20 VITALS
WEIGHT: 200.6 LBS | HEIGHT: 65 IN | SYSTOLIC BLOOD PRESSURE: 116 MMHG | DIASTOLIC BLOOD PRESSURE: 80 MMHG | TEMPERATURE: 97.3 F | HEART RATE: 110 BPM | OXYGEN SATURATION: 99 % | RESPIRATION RATE: 18 BRPM | BODY MASS INDEX: 33.42 KG/M2

## 2023-03-20 DIAGNOSIS — E11.9 TYPE 2 DIABETES MELLITUS WITHOUT COMPLICATION, WITHOUT LONG-TERM CURRENT USE OF INSULIN (H): Primary | ICD-10-CM

## 2023-03-20 DIAGNOSIS — M51.26 RECURRENT HERNIATION OF LUMBAR DISC: ICD-10-CM

## 2023-03-20 DIAGNOSIS — Z01.818 PRE-OP EXAM: Primary | ICD-10-CM

## 2023-03-20 DIAGNOSIS — E66.01 MORBID (SEVERE) OBESITY DUE TO EXCESS CALORIES (H): ICD-10-CM

## 2023-03-20 DIAGNOSIS — E11.9 TYPE 2 DIABETES MELLITUS WITHOUT COMPLICATION, WITHOUT LONG-TERM CURRENT USE OF INSULIN (H): ICD-10-CM

## 2023-03-20 LAB
ALBUMIN SERPL BCG-MCNC: 4.1 G/DL (ref 3.5–5.2)
ALBUMIN UR-MCNC: NEGATIVE MG/DL
ALP SERPL-CCNC: 68 U/L (ref 35–104)
ALT SERPL W P-5'-P-CCNC: 29 U/L (ref 10–35)
ANION GAP SERPL CALCULATED.3IONS-SCNC: 10 MMOL/L (ref 7–15)
APPEARANCE UR: CLEAR
AST SERPL W P-5'-P-CCNC: 18 U/L (ref 10–35)
BILIRUB SERPL-MCNC: 1 MG/DL
BILIRUB UR QL STRIP: NEGATIVE
BUN SERPL-MCNC: 12.4 MG/DL (ref 6–20)
CALCIUM SERPL-MCNC: 8.9 MG/DL (ref 8.6–10)
CHLORIDE SERPL-SCNC: 103 MMOL/L (ref 98–107)
COLOR UR AUTO: YELLOW
CREAT SERPL-MCNC: 0.75 MG/DL (ref 0.51–0.95)
DEPRECATED HCO3 PLAS-SCNC: 25 MMOL/L (ref 22–29)
ERYTHROCYTE [DISTWIDTH] IN BLOOD BY AUTOMATED COUNT: 12.8 % (ref 10–15)
GFR SERPL CREATININE-BSD FRML MDRD: >90 ML/MIN/1.73M2
GLUCOSE SERPL-MCNC: 158 MG/DL (ref 70–99)
GLUCOSE UR STRIP-MCNC: >499 MG/DL
HBA1C MFR BLD: 7 %
HCT VFR BLD AUTO: 46.8 % (ref 35–47)
HGB BLD-MCNC: 15.9 G/DL (ref 11.7–15.7)
HGB UR QL STRIP: NEGATIVE
KETONES UR STRIP-MCNC: NEGATIVE MG/DL
LEUKOCYTE ESTERASE UR QL STRIP: NEGATIVE
MCH RBC QN AUTO: 30.1 PG (ref 26.5–33)
MCHC RBC AUTO-ENTMCNC: 34 G/DL (ref 31.5–36.5)
MCV RBC AUTO: 89 FL (ref 78–100)
NITRATE UR QL: NEGATIVE
PH UR STRIP: 5 [PH] (ref 5–7)
PLATELET # BLD AUTO: 214 10E3/UL (ref 150–450)
POTASSIUM SERPL-SCNC: 4.4 MMOL/L (ref 3.4–5.3)
PROT SERPL-MCNC: 7.1 G/DL (ref 6.4–8.3)
RBC # BLD AUTO: 5.28 10E6/UL (ref 3.8–5.2)
SODIUM SERPL-SCNC: 138 MMOL/L (ref 136–145)
SP GR UR STRIP: 1.03 (ref 1–1.03)
UROBILINOGEN UR STRIP-MCNC: NORMAL MG/DL
WBC # BLD AUTO: 8.2 10E3/UL (ref 4–11)

## 2023-03-20 PROCEDURE — 99214 OFFICE O/P EST MOD 30 MIN: CPT | Performed by: PHYSICIAN ASSISTANT

## 2023-03-20 PROCEDURE — 83036 HEMOGLOBIN GLYCOSYLATED A1C: CPT | Performed by: PHYSICIAN ASSISTANT

## 2023-03-20 PROCEDURE — 85027 COMPLETE CBC AUTOMATED: CPT | Performed by: PHYSICIAN ASSISTANT

## 2023-03-20 PROCEDURE — 99213 OFFICE O/P EST LOW 20 MIN: CPT | Performed by: INTERNAL MEDICINE

## 2023-03-20 PROCEDURE — 80053 COMPREHEN METABOLIC PANEL: CPT | Performed by: PHYSICIAN ASSISTANT

## 2023-03-20 PROCEDURE — 81003 URINALYSIS AUTO W/O SCOPE: CPT | Performed by: PHYSICIAN ASSISTANT

## 2023-03-20 PROCEDURE — 36415 COLL VENOUS BLD VENIPUNCTURE: CPT | Performed by: PHYSICIAN ASSISTANT

## 2023-03-20 ASSESSMENT — PAIN SCALES - GENERAL
PAINLEVEL: MODERATE PAIN (4)
PAINLEVEL: MODERATE PAIN (4)

## 2023-03-20 ASSESSMENT — PATIENT HEALTH QUESTIONNAIRE - PHQ9
10. IF YOU CHECKED OFF ANY PROBLEMS, HOW DIFFICULT HAVE THESE PROBLEMS MADE IT FOR YOU TO DO YOUR WORK, TAKE CARE OF THINGS AT HOME, OR GET ALONG WITH OTHER PEOPLE: NOT DIFFICULT AT ALL
SUM OF ALL RESPONSES TO PHQ QUESTIONS 1-9: 9
10. IF YOU CHECKED OFF ANY PROBLEMS, HOW DIFFICULT HAVE THESE PROBLEMS MADE IT FOR YOU TO DO YOUR WORK, TAKE CARE OF THINGS AT HOME, OR GET ALONG WITH OTHER PEOPLE: NOT DIFFICULT AT ALL

## 2023-03-20 NOTE — PROGRESS NOTES
"  Assessment & Plan     Type 2 diabetes mellitus without complication, without long-term current use of insulin (H)  Diabetes is doing well her A1c is 7.0.  She is on Jardiance and metformin.  Her morning sugars are still high.  Not clear if her meter is reading very well.  But she does have 2 meter, she is encouraged to change the batteries and check that with the test solution.  Needs to try to get more exercise but this is difficult with her back and disc injury she will be having surgery soon on that.  Hopefully if her sugars are high she will exercise more to bring them down.  - Hemoglobin A1c FUTURE 3mo; Future    Obesity, BMI of 34.  Needs to try to lose weight to help her insulin resistance and help her diabetes improved.               Nicotine/Tobacco Cessation:  She reports that she has been smoking cigarettes. She has never used smokeless tobacco.  Nicotine/Tobacco Cessation Plan:   Information offered: Patient not interested at this time      BMI:   Estimated body mass index is 33.9 kg/m  as calculated from the following:    Height as of this encounter: 1.638 m (5' 4.5\").    Weight as of this encounter: 91 kg (200 lb 9.6 oz).   Weight management plan: Discussed healthy diet and exercise guidelines        No follow-ups on file.    Spencer Mcintyre MD  [facility]    Praful Lehman is a 42 year old, presenting for the following health issues:  Diabetes      History of Present Illness       Diabetes:   She presents for follow up of diabetes.  She is checking home blood glucose three times daily. She checks blood glucose before meals, after meals and at bedtime.  Blood glucose is never over 200 and never under 70. When her blood glucose is low, the patient is asymptomatic for confusion, blurred vision, lethargy and reports not feeling dizzy, shaky, or weak.  She has no concerns regarding her diabetes at this time.  She is having numbness in feet and blurry vision.         She " "eats 0-1 servings of fruits and vegetables daily.She consumes 1 sweetened beverage(s) daily.She exercises with enough effort to increase her heart rate 60 or more minutes per day.  She exercises with enough effort to increase her heart rate 4 days per week.   She is taking medications regularly.    Today's PHQ-9         PHQ-9 Total Score: 9    PHQ-9 Q9 Thoughts of better off dead/self-harm past 2 weeks :   Not at all    How difficult have these problems made it for you to do your work, take care of things at home, or get along with other people: Not difficult at all     She had a pre op today for back surgery. Trying to avoid fusion     Hgba1c was good today at 7.0.  Taking jardiance, metformin     Weight has been going down, harder months lately but did lose.     todays sugar was 158, had some sugar last night, questions if forgot her metformin.     Checking sugars 3 times variability with her meters.    AM sugars are 130-140 range.            Review of Systems         Objective    /80   Pulse 110   Temp 97.3  F (36.3  C) (Temporal)   Resp 18   Ht 1.638 m (5' 4.5\")   Wt 91 kg (200 lb 9.6 oz)   LMP 07/25/2020 (Exact Date)   SpO2 99%   BMI 33.90 kg/m    Body mass index is 33.9 kg/m .  Physical Exam   NAD                      "

## 2023-03-20 NOTE — PROGRESS NOTES
[address] [address]  Phone: [phone]  Fax: [phone]  Primary Provider: Greg Ford  Pre-op Performing Provider: FLAVIA WORRELL    PREOPERATIVE EVALUATION:  Today's date: 3/20/2023    Fallon Rivera is a 42 year old female who presents for a preoperative evaluation.    Surgical Information:  Surgery/Procedure: lumbar disc repair  Surgery Location: [facility]  Surgeon: Dr. Roe  Surgery Date: 4/05/23  Time of Surgery: 9:15am  Where patient plans to recover: At home with family  Fax number for surgical facility: N/A    Type of Anesthesia Anticipated: General    Subjective     HPI related to upcoming procedure: herniated disc    Preop Questions 3/16/2023   1. Have you ever had a heart attack or stroke? No   2. Have you ever had surgery on your heart or blood vessels, such as a stent placement, a coronary artery bypass, or surgery on an artery in your head, neck, heart, or legs? No   3. Do you have chest pain with activity? No   4. Do you have a history of  heart failure? No   5. Do you currently have a cold, bronchitis or symptoms of other infection? No   6. Do you have a cough, shortness of breath, or wheezing? No   7. Do you or anyone in your family have previous history of blood clots? UNKNOWN - no personal clots, strokes in family history   8. Do you or does anyone in your family have a serious bleeding problem such as prolonged bleeding following surgeries or cuts? No   9. Have you ever had problems with anemia or been told to take iron pills? No   10. Have you had any abnormal blood loss such as black, tarry or bloody stools, or abnormal vaginal bleeding? No   11. Have you ever had a blood transfusion? No   12. Are you willing to have a blood transfusion if it is medically needed before, during, or after your surgery? Yes   13. Have you or any of your relatives ever had problems with anesthesia? YES - slow to wake/anxiety    14. Do you have sleep apnea, excessive snoring or daytime drowsiness? No   15. Do you have any artifical heart valves or other implanted medical devices like a pacemaker, defibrillator, or continuous glucose monitor? No   16. Do you have artificial joints? No   17. Are you allergic to latex? No   18. Is there any chance that you may be pregnant? No       Health Care Directive:  Patient does not have a Health Care Directive or Living Will: Discussed advance care planning with patient; information given to patient to review.    Preoperative Review of :   reviewed - no record of controlled substances prescribed.  \  Status of Chronic Conditions:  See problem list for active medical problems.  Problems all longstanding and stable, except as noted/documented.  See ROS for pertinent symptoms related to these conditions.    DIABETES - Patient has a longstanding history of DiabetesType Type II . Patient is being treated with oral agents and denies significant side effects. Control has been fair. Complicating factors include but are not limited to: morbid obesity .       Review of Systems  CONSTITUTIONAL: NEGATIVE for fever, chills, change in weight  INTEGUMENTARY/SKIN: NEGATIVE for worrisome rashes, moles or lesions  EYES: NEGATIVE for vision changes or irritation  ENT/MOUTH: NEGATIVE for ear, mouth and throat problems  RESP: NEGATIVE for significant cough or SOB  CV: NEGATIVE for chest pain, palpitations or peripheral edema  GI: NEGATIVE for nausea, abdominal pain, heartburn, or change in bowel habits  : NEGATIVE for frequency, dysuria, or hematuria  MUSCULOSKELETAL: NEGATIVE for significant arthralgias or myalgia  NEURO: NEGATIVE for weakness, dizziness or paresthesias  ENDOCRINE: NEGATIVE for temperature intolerance, skin/hair changes  HEME: NEGATIVE for bleeding problems  PSYCHIATRIC: NEGATIVE for changes in mood or affect    Patient Active Problem List    Diagnosis Date Noted     Left ovarian cyst 05/25/2021      Priority: Medium     Added automatically from request for surgery [id number]       Dyspareunia, female 12/01/2020     Priority: Medium     Rectal bleeding 09/30/2020     Priority: Medium     Added automatically from request for surgery [id number]       Chronic diarrhea 09/30/2020     Priority: Medium     Added automatically from request for surgery [id number]       Abnormal CT scan, small bowel 09/30/2020     Priority: Medium     Added automatically from request for surgery [id number]       Endometrial hyperplasia, unspecified 06/18/2020     Priority: Medium     Added automatically from request for surgery [id number]       Pelvic pain in female 06/18/2020     Priority: Medium     Added automatically from request for surgery [id number]       Endometrial intraepithelial neoplasia (EIN) 03/02/2020     Priority: Medium     Obesity (BMI 35.0-39.9) with comorbidity (H) 10/16/2018     Priority: Medium     Non morbid obesity due to excess calories 07/05/2016     Priority: Medium     Type 2 diabetes mellitus without complication (H) 04/29/2016     Priority: Medium     Glucosuria 04/27/2016     Priority: Medium     Right ovarian cyst 09/15/2015     Priority: Medium     Hyperlipidemia LDL goal <130 07/18/2012     Priority: Medium     Mild major depression (H) 07/18/2012     Priority: Medium     GERD (gastroesophageal reflux disease) 07/03/2012     Priority: Medium     CARDIOVASCULAR SCREENING; LDL GOAL LESS THAN 160 10/31/2010     Priority: Medium     Dyspnea 11/24/2009     Priority: Medium     Kidney stones 02/17/2009     Priority: Medium     S/P conization of cervix- KAYLA 2/3 in 2011 06/27/2007     Priority: Medium     1/24/06 pap: ASC-H  2/9/06 colposcopy/bx (negative)/ECC (negative) pap- ASC-H  5/24/06 pap- ASC-H  9/6/07 pap NIL  1/23/07 pap ASCUS + HPV 45 (high risk)  3/15/07 pap ASCUS + HPV 45 (high risk) colposcopy- bx (negative)/ECC (koilocytosis)  6/19/07 ECC- koilocytosis.  Pap- AGS  10/23/07 pap- ASC-H, ECC- negative  2/14/08 pap  NIL- return to yearly paps  2/17/09 pap NIL- repeat 1 year  3/8/10 pap NIL  3/3/11 pap ASCUS + HPV 45 (high risk)- colposcopy recommended  5/16/11 colpo- bx (KAYLA 2/3/HSIL) ECC (ASCUS)  pap (ASC-H)  6/2/11 recommend: [facility]  7/1/11 [facility]: path: KAYLA 2/3 with possible involvement of the endocervical margin.  ECC- neg.  Endometrium- neg.  7/14/11 plan: HIPOLITO in approx 2 months  10/10/11- hysterectomy planned.  (cancelled)  11/7/11 pap-- NIL. Plan--repeat pap in 6 months. (5/7/12)  5/7/12 pap NIL. Plan-- pap in 1 year (due 5/7/13)   5/8/13 pap NIL. Plan-pap in 1 year  5/8/14 NIL pap, neg HR HPV.  Plan- repeat pap/HPV in 1 year (due 5/8/15)  10/14/14 NIL pap, neg HR HPV. Endometrial biopsy- WNL   1/6/16  NIL pap, neg HR HPV.  Plan: paps yearly, per Dr. Ford.  1/30/17 NIL pap, neg HR HPV. Plan: pap in 1 year.   3/12/18 NIL pap, neg HR HPV. Plan: cotest annually, per Dr. Ford.  3/11/19 ECC: negative. NIL pap, neg HR HPV. Plan: cotest in 1 yr  2/11/20 NIL Pap, Neg HPV. EMB: intraepithelial neoplasia. Plan: Hysterectomy.   7/28/20 Hysterectomy with cervix removed. Hx of KAYLA 2 in 2011 Plan: Cotest Q3y until 2036.           Papanicolaou smear of cervix with atypical squamous cells cannot exclude high grade squamous intraepithelial lesion (ASC-H) 09/07/2006     Priority: Medium     Female infertility 07/24/2006     Priority: Medium     Problem list name updated by automated process. Provider to review       Hemorrhage of rectum and anus 12/02/2004     Priority: Medium      Past Medical History:   Diagnosis Date     Abnormal Pap smear 2006, 2007,     ASC-H, ASCUS + HPV 45     Calculus of kidney 01/01/2002     Cervical high risk HPV (human papillomavirus) test positive     + HPV 45 (high risk)     History of colposcopy with cervical biopsy 2/9/06, 3/15/07    koilocytosis 2007     Past Surgical History:   Procedure Laterality Date     CHOLECYSTECTOMY, LAPOROSCOPIC  5/11/2007    Cholecystectomy, Laparoscopic     COLONOSCOPY   05/17/10     COLONOSCOPY N/A 8/27/2019    Procedure: COLONOSCOPY;  Surgeon: Paramjit Kingston DO;  Location: [facility]     COLONOSCOPY N/A 10/23/2020    Procedure: COLONOSCOPY, WITH  BIOPSY;  Surgeon: Ba Dickinson MD;  Location: [facility]     CONIZATION  7/1/2011    Procedure:CONIZATION; cold knife and punch biopsy of mole on back; Surgeon:SYDNEY FORD; Location:[facility]     DILATION AND CURETTAGE, HYSTEROSCOPY, LAPAROSCOPY, COMBINED Right 9/24/2015    Procedure: COMBINED DILATION AND CURETTAGE, HYSTEROSCOPY, LAPAROSCOPY;  Surgeon: Sydney Ford MD;  [facility]     DISCECTOMY LUMBAR MINIMALLY INVASIVE ONE LEVEL Left 1/23/2019    Procedure: Minimally Invasive Surgery Left Lumbar 5-Sacral 1 microdiscectomy;  Surgeon: Mason Reo MD;  [facility]     ESOPHAGOSCOPY, GASTROSCOPY, DUODENOSCOPY (EGD), COMBINED  5/21/2014    Procedure: COMBINED ESOPHAGOSCOPY, GASTROSCOPY, DUODENOSCOPY (EGD), BIOPSY SINGLE OR MULTIPLE;  Surgeon: Earl Lane MD;  [facility]     ESOPHAGOSCOPY, GASTROSCOPY, DUODENOSCOPY (EGD), COMBINED N/A 10/23/2020    Procedure: Esophagogastroduodenoscopy with  biopsy;  Surgeon: Ba Dickinson MD;  [facility]     EXCISE LESION TRUNK  7/1/2011    Procedure:EXCISE LESION TRUNK; Surgeon:SYDNEY FORD; Location:[facility]     HC FRAGMENTING OF KIDNEY STONE  11/30/2005     HC RX ECTOP PREG BY SCOPE,RMV TUBE/OVRY  12/20/2007    Right sided salpingectomy and removal of ruptured ectopic pregnancy. D&C.     HYSTERECTOMY VAGINAL       LAPAROSCOPIC APPENDECTOMY N/A 9/24/2015    Procedure: LAPAROSCOPIC APPENDECTOMY;  Surgeon: James Cuellar MD;  [facility]     LAPAROSCOPIC CYSTECTOMY OVARIAN (BENIGN) N/A 9/24/2015    Procedure: LAPAROSCOPIC CYSTECTOMY OVARIAN (BENIGN);  Surgeon: Sydney Ford MD;  [facility]     LAPAROSCOPIC HYSTERECTOMY TOTAL, BILATERAL SALPINGO-OOPHORECTOMY, COMBINED N/A 7/28/2020    Procedure:  laparoscpic assisted vaginal hysterectomy, cystoscopy;  Surgeon: Greg Ford MD;  Location: [facility]     LAPAROSCOPIC OOPHORECTOMY Right 9/24/2015    Procedure: LAPAROSCOPIC OOPHORECTOMY;  Surgeon: Greg Ford MD;  [facility]     LITHOTRIPSY  01/17/2007     PELVIS LAPAROSCOPY,DX  10/16/2000    Diagnostic laparoscopy, Endometrial biopsy.     TUBAL/ECTOPIC PREGNANCY  01/23/2007    Lap removal of left ruptured ectopic pregnancy & salpingectomy, D&C     [facility] REMOVAL OF KIDNEY STONE      two surgeries, 2002,2003     [facility] UGI ENDOSCOPY, SIMPLE EXAM  09/01/09     Current Outpatient Medications   Medication Sig Dispense Refill     blood glucose (CONTOUR NEXT TEST) test strip USE TO TEST BLOOD GLUCOSE FOUR TIMES A  strip 3     blood glucose monitoring (NO BRAND SPECIFIED) meter device kit Use to test blood sugar 2 times daily or as directed. 1 kit 0     cyclobenzaprine (FLEXERIL) 10 MG tablet Take 1 tablet (10 mg) by mouth nightly as needed for muscle spasms 30 tablet 4     ibuprofen (ADVIL/MOTRIN) 200 MG tablet Take 800 mg by mouth nightly as needed (sleep)       JARDIANCE 10 MG TABS tablet TAKE 1 TABLET (10 MG) BY MOUTH DAILY 90 tablet 1     metFORMIN (GLUCOPHAGE XR) 500 MG 24 hr tablet Take 2 tablets (1,000 mg) by mouth 2 times daily (with meals) 360 tablet 3     multivitamin w/minerals (THERA-VIT-M) tablet Take 1 tablet by mouth daily       nystatin (MYCOSTATIN) 622803 UNIT/GM external powder Apply topically 2 times daily as needed 60 g 2     omeprazole (PRILOSEC) 20 MG DR capsule TAKE 1 CAPSULE BY MOUTH DAILY, 30 TO 60 MINUTES BEFORE A MEAL. 30 capsule 5       Allergies   Allergen Reactions     Amoxicillin Hives     Anti-Nausea      Anxiety, agitation,severe paranoia. Zofran, Reglan are some of them.  DRAMAMINE WITHOUT ISSUES       Demerol Hcl [Meperidine Hcl] Nausea     Indomethacin Nausea and Vomiting     Macrobid [Nitrofuran Derivatives] Hives     Reglan [Metoclopramide] Other  "(See Comments)     Acute paranoia, severe     Zofran [Ondansetron] Other (See Comments)     Severe anxiety, agitation     Vancomycin Other (See Comments)     maria del rosario syndrome        Social History     Tobacco Use     Smoking status: Some Days     Packs/day: 0.00     Years: 12.00     Pack years: 0.00     Types: Cigarettes     Smokeless tobacco: Never     Tobacco comments:     As of 10 /2014, pt has had a few cigs here and there   Substance Use Topics     Alcohol use: Yes     Alcohol/week: 0.0 standard drinks     Comment: every few months     Family History   Problem Relation Age of Onset     Diabetes Maternal Grandmother         adult onset     Anesthesia Reaction Maternal Grandmother         can't tolerate Novacaine.     Respiratory Maternal Grandmother         COPD and emphysema.     Thyroid Disease Maternal Grandmother      Heart Disease Maternal Grandmother         CHF     Diabetes Paternal Grandfather         adult o nset     Hypertension Paternal Grandfather      Cerebrovascular Disease Paternal Grandfather      Heart Disease Paternal Grandfather         MI, replaced valve,angioplasty     Thyroid Disease Paternal Grandfather      Cancer Maternal Grandfather         skin cancer     Heart Disease Maternal Grandfather         MI     Obesity Mother         on thyroid medication     Thyroid Disease Mother      Diabetes Mother      Rheumatologic Disease Mother      Heart Disease Father      Hypertension Father         and TIA     Lipids Father      Breast Cancer Paternal Grandmother      Arrhythmia Other      Cancer Maternal Aunt         breast/brain cancer     Depression Paternal Aunt      Cerebrovascular Disease Paternal Uncle      Cerebrovascular Disease Paternal Aunt      History   Drug Use No         Objective     /72   Pulse 95   Resp 15   Ht 1.64 m (5' 4.57\")   Wt 90 kg (198 lb 6.4 oz)   LMP 07/25/2020 (Exact Date)   SpO2 99%   BMI 33.46 kg/m      Physical Exam    GENERAL APPEARANCE: healthy, alert " and no distress     EYES: EOMI, PERRL     HENT: ear canals and TM's normal and nose and mouth without ulcers or lesions     NECK: no adenopathy, no asymmetry, masses, or scars and thyroid normal to palpation     RESP: lungs clear to auscultation - no rales, rhonchi or wheezes     CV: regular rates and rhythm, normal S1 S2, no S3 or S4 and no murmur, click or rub     ABDOMEN:  soft, nontender, no HSM or masses and bowel sounds normal     MS: extremities normal- no gross deformities noted, no evidence of inflammation in joints, FROM in all extremities.     SKIN: no suspicious lesions or rashes     NEURO: Normal strength and tone, sensory exam grossly normal, mentation intact and speech normal     PSYCH: mentation appears normal. and affect normal/bright     LYMPHATICS: No cervical adenopathy    Recent Labs   Lab Test 11/08/22  1429 10/24/22  0940 10/09/22  2026 07/19/22  0957 05/25/22  1000 03/31/22  1340   HGB  --   --  16.2*  --   --  15.6   PLT  --   --  260  --   --  244     --  139  --    < >  --    POTASSIUM 3.6  --  3.6  --    < >  --    CR 0.65  --  0.72  --    < > 0.92   A1C  --  7.1*  --  7.6*   < >  --     < > = values in this interval not displayed.        Diagnostics:  Recent Results (from the past 24 hour(s))   Hemoglobin A1c    Collection Time: 03/20/23  9:42 AM   Result Value Ref Range    Hemoglobin A1C 7.0 (H) <5.7 %   Comprehensive metabolic panel (BMP + Alb, Alk Phos, ALT, AST, Total. Bili, TP)    Collection Time: 03/20/23  9:42 AM   Result Value Ref Range    Sodium 138 136 - 145 mmol/L    Potassium 4.4 3.4 - 5.3 mmol/L    Chloride 103 98 - 107 mmol/L    Carbon Dioxide (CO2) 25 22 - 29 mmol/L    Anion Gap 10 7 - 15 mmol/L    Urea Nitrogen 12.4 6.0 - 20.0 mg/dL    Creatinine 0.75 0.51 - 0.95 mg/dL    Calcium 8.9 8.6 - 10.0 mg/dL    Glucose 158 (H) 70 - 99 mg/dL    Alkaline Phosphatase 68 35 - 104 U/L    AST 18 10 - 35 U/L    ALT 29 10 - 35 U/L    Protein Total 7.1 6.4 - 8.3 g/dL    Albumin 4.1  3.5 - 5.2 g/dL    Bilirubin Total 1.0 <=1.2 mg/dL    GFR Estimate >90 >60 mL/min/1.73m2   CBC with platelets    Collection Time: 03/20/23  9:42 AM   Result Value Ref Range    WBC Count 8.2 4.0 - 11.0 10e3/uL    RBC Count 5.28 (H) 3.80 - 5.20 10e6/uL    Hemoglobin 15.9 (H) 11.7 - 15.7 g/dL    Hematocrit 46.8 35.0 - 47.0 %    MCV 89 78 - 100 fL    MCH 30.1 26.5 - 33.0 pg    MCHC 34.0 31.5 - 36.5 g/dL    RDW 12.8 10.0 - 15.0 %    Platelet Count 214 150 - 450 10e3/uL   UA Macro with Reflex to Micro and Culture - lab collect    Collection Time: 03/20/23  9:46 AM    Specimen: Urine, NOS   Result Value Ref Range    Color Urine Yellow Colorless, Straw, Light Yellow, Yellow    Appearance Urine Clear Clear    Glucose Urine >499 (A) Negative mg/dL    Bilirubin Urine Negative Negative    Ketones Urine Negative Negative mg/dL    Specific Gravity Urine 1.031 1.003 - 1.035    Blood Urine Negative Negative    pH Urine 5.0 5.0 - 7.0    Protein Albumin Urine Negative Negative mg/dL    Urobilinogen Urine Normal Normal, 2.0 mg/dL    Nitrite Urine Negative Negative    Leukocyte Esterase Urine Negative Negative      No EKG required, no history of coronary heart disease, significant arrhythmia, peripheral arterial disease or other structural heart disease.    Revised Cardiac Risk Index (RCRI):  The patient has the following serious cardiovascular risks for perioperative complications:   - No serious cardiac risks = 0 points     RCRI Interpretation: 0 points: Class I (very low risk - 0.4% complication rate)          Assessment & Plan     The proposed surgical procedure is considered INTERMEDIATE risk.    Pre-op exam    Recurrent herniation of lumbar disc    Type 2 diabetes mellitus without complication, without long-term current use of insulin (H)  - CBC with platelets; Future  - Comprehensive metabolic panel (BMP + Alb, Alk Phos, ALT, AST, Total. Bili, TP); Future  - Hemoglobin A1c; Future  - UA Macro with Reflex to Micro and Culture -  lab collect; Future  - UA Macro with Reflex to Micro and Culture - lab collect  - Hemoglobin A1c  - Comprehensive metabolic panel (BMP + Alb, Alk Phos, ALT, AST, Total. Bili, TP)  - CBC with platelets    Morbid (severe) obesity due to excess calories (H)      Risks and Recommendations:  The patient has the following additional risks and recommendations for perioperative complications:   - No identified additional risk factors other than previously addressed    Medication Instructions:  Patient is to take all scheduled medications on the day of surgery EXCEPT for modifications listed below:   - metformin: HOLD day of surgery.   - SGLT2 Inhibitor (canagliflozin, dapagliflozin, or empagliflozin): HOLD 3 days before surgery.     RECOMMENDATION:  APPROVAL GIVEN to proceed with proposed procedure, without further diagnostic evaluation.    Signed Electronically by: Destini Esparza PA-C  Copy of this evaluation report is provided to requesting physician.

## 2023-03-20 NOTE — H&P (VIEW-ONLY)
[address] [address]  Phone: [phone]  Fax: [phone]  Primary Provider: Greg Ford  Pre-op Performing Provider: FLAVIA WORRELL    PREOPERATIVE EVALUATION:  Today's date: 3/20/2023    Fallon Rivera is a 42 year old female who presents for a preoperative evaluation.    Surgical Information:  Surgery/Procedure: lumbar disc repair  Surgery Location: [facility]  Surgeon: Dr. Roe  Surgery Date: 4/05/23  Time of Surgery: 9:15am  Where patient plans to recover: At home with family  Fax number for surgical facility: N/A    Type of Anesthesia Anticipated: General    Subjective     HPI related to upcoming procedure: herniated disc    Preop Questions 3/16/2023   1. Have you ever had a heart attack or stroke? No   2. Have you ever had surgery on your heart or blood vessels, such as a stent placement, a coronary artery bypass, or surgery on an artery in your head, neck, heart, or legs? No   3. Do you have chest pain with activity? No   4. Do you have a history of  heart failure? No   5. Do you currently have a cold, bronchitis or symptoms of other infection? No   6. Do you have a cough, shortness of breath, or wheezing? No   7. Do you or anyone in your family have previous history of blood clots? UNKNOWN - no personal clots, strokes in family history   8. Do you or does anyone in your family have a serious bleeding problem such as prolonged bleeding following surgeries or cuts? No   9. Have you ever had problems with anemia or been told to take iron pills? No   10. Have you had any abnormal blood loss such as black, tarry or bloody stools, or abnormal vaginal bleeding? No   11. Have you ever had a blood transfusion? No   12. Are you willing to have a blood transfusion if it is medically needed before, during, or after your surgery? Yes   13. Have you or any of your relatives ever had problems with anesthesia? YES - slow to wake/anxiety    14. Do you have sleep apnea, excessive snoring or daytime drowsiness? No   15. Do you have any artifical heart valves or other implanted medical devices like a pacemaker, defibrillator, or continuous glucose monitor? No   16. Do you have artificial joints? No   17. Are you allergic to latex? No   18. Is there any chance that you may be pregnant? No       Health Care Directive:  Patient does not have a Health Care Directive or Living Will: Discussed advance care planning with patient; information given to patient to review.    Preoperative Review of :   reviewed - no record of controlled substances prescribed.  \  Status of Chronic Conditions:  See problem list for active medical problems.  Problems all longstanding and stable, except as noted/documented.  See ROS for pertinent symptoms related to these conditions.    DIABETES - Patient has a longstanding history of DiabetesType Type II . Patient is being treated with oral agents and denies significant side effects. Control has been fair. Complicating factors include but are not limited to: morbid obesity .       Review of Systems  CONSTITUTIONAL: NEGATIVE for fever, chills, change in weight  INTEGUMENTARY/SKIN: NEGATIVE for worrisome rashes, moles or lesions  EYES: NEGATIVE for vision changes or irritation  ENT/MOUTH: NEGATIVE for ear, mouth and throat problems  RESP: NEGATIVE for significant cough or SOB  CV: NEGATIVE for chest pain, palpitations or peripheral edema  GI: NEGATIVE for nausea, abdominal pain, heartburn, or change in bowel habits  : NEGATIVE for frequency, dysuria, or hematuria  MUSCULOSKELETAL: NEGATIVE for significant arthralgias or myalgia  NEURO: NEGATIVE for weakness, dizziness or paresthesias  ENDOCRINE: NEGATIVE for temperature intolerance, skin/hair changes  HEME: NEGATIVE for bleeding problems  PSYCHIATRIC: NEGATIVE for changes in mood or affect    Patient Active Problem List    Diagnosis Date Noted     Left ovarian cyst 05/25/2021      Priority: Medium     Added automatically from request for surgery [id number]       Dyspareunia, female 12/01/2020     Priority: Medium     Rectal bleeding 09/30/2020     Priority: Medium     Added automatically from request for surgery [id number]       Chronic diarrhea 09/30/2020     Priority: Medium     Added automatically from request for surgery [id number]       Abnormal CT scan, small bowel 09/30/2020     Priority: Medium     Added automatically from request for surgery [id number]       Endometrial hyperplasia, unspecified 06/18/2020     Priority: Medium     Added automatically from request for surgery [id number]       Pelvic pain in female 06/18/2020     Priority: Medium     Added automatically from request for surgery [id number]       Endometrial intraepithelial neoplasia (EIN) 03/02/2020     Priority: Medium     Obesity (BMI 35.0-39.9) with comorbidity (H) 10/16/2018     Priority: Medium     Non morbid obesity due to excess calories 07/05/2016     Priority: Medium     Type 2 diabetes mellitus without complication (H) 04/29/2016     Priority: Medium     Glucosuria 04/27/2016     Priority: Medium     Right ovarian cyst 09/15/2015     Priority: Medium     Hyperlipidemia LDL goal <130 07/18/2012     Priority: Medium     Mild major depression (H) 07/18/2012     Priority: Medium     GERD (gastroesophageal reflux disease) 07/03/2012     Priority: Medium     CARDIOVASCULAR SCREENING; LDL GOAL LESS THAN 160 10/31/2010     Priority: Medium     Dyspnea 11/24/2009     Priority: Medium     Kidney stones 02/17/2009     Priority: Medium     S/P conization of cervix- KAYLA 2/3 in 2011 06/27/2007     Priority: Medium     1/24/06 pap: ASC-H  2/9/06 colposcopy/bx (negative)/ECC (negative) pap- ASC-H  5/24/06 pap- ASC-H  9/6/07 pap NIL  1/23/07 pap ASCUS + HPV 45 (high risk)  3/15/07 pap ASCUS + HPV 45 (high risk) colposcopy- bx (negative)/ECC (koilocytosis)  6/19/07 ECC- koilocytosis.  Pap- AGS  10/23/07 pap- ASC-H, ECC- negative  2/14/08 pap  NIL- return to yearly paps  2/17/09 pap NIL- repeat 1 year  3/8/10 pap NIL  3/3/11 pap ASCUS + HPV 45 (high risk)- colposcopy recommended  5/16/11 colpo- bx (KAYLA 2/3/HSIL) ECC (ASCUS)  pap (ASC-H)  6/2/11 recommend: [facility]  7/1/11 [facility]: path: KAYLA 2/3 with possible involvement of the endocervical margin.  ECC- neg.  Endometrium- neg.  7/14/11 plan: HIPOLITO in approx 2 months  10/10/11- hysterectomy planned.  (cancelled)  11/7/11 pap-- NIL. Plan--repeat pap in 6 months. (5/7/12)  5/7/12 pap NIL. Plan-- pap in 1 year (due 5/7/13)   5/8/13 pap NIL. Plan-pap in 1 year  5/8/14 NIL pap, neg HR HPV.  Plan- repeat pap/HPV in 1 year (due 5/8/15)  10/14/14 NIL pap, neg HR HPV. Endometrial biopsy- WNL   1/6/16  NIL pap, neg HR HPV.  Plan: paps yearly, per Dr. Ford.  1/30/17 NIL pap, neg HR HPV. Plan: pap in 1 year.   3/12/18 NIL pap, neg HR HPV. Plan: cotest annually, per Dr. Ford.  3/11/19 ECC: negative. NIL pap, neg HR HPV. Plan: cotest in 1 yr  2/11/20 NIL Pap, Neg HPV. EMB: intraepithelial neoplasia. Plan: Hysterectomy.   7/28/20 Hysterectomy with cervix removed. Hx of KAYLA 2 in 2011 Plan: Cotest Q3y until 2036.           Papanicolaou smear of cervix with atypical squamous cells cannot exclude high grade squamous intraepithelial lesion (ASC-H) 09/07/2006     Priority: Medium     Female infertility 07/24/2006     Priority: Medium     Problem list name updated by automated process. Provider to review       Hemorrhage of rectum and anus 12/02/2004     Priority: Medium      Past Medical History:   Diagnosis Date     Abnormal Pap smear 2006, 2007,     ASC-H, ASCUS + HPV 45     Calculus of kidney 01/01/2002     Cervical high risk HPV (human papillomavirus) test positive     + HPV 45 (high risk)     History of colposcopy with cervical biopsy 2/9/06, 3/15/07    koilocytosis 2007     Past Surgical History:   Procedure Laterality Date     CHOLECYSTECTOMY, LAPOROSCOPIC  5/11/2007    Cholecystectomy, Laparoscopic     COLONOSCOPY   05/17/10     COLONOSCOPY N/A 8/27/2019    Procedure: COLONOSCOPY;  Surgeon: Paramjit Kingston DO;  Location: [facility]     COLONOSCOPY N/A 10/23/2020    Procedure: COLONOSCOPY, WITH  BIOPSY;  Surgeon: Ba Dickinson MD;  Location: [facility]     CONIZATION  7/1/2011    Procedure:CONIZATION; cold knife and punch biopsy of mole on back; Surgeon:SYDNEY FORD; Location:[facility]     DILATION AND CURETTAGE, HYSTEROSCOPY, LAPAROSCOPY, COMBINED Right 9/24/2015    Procedure: COMBINED DILATION AND CURETTAGE, HYSTEROSCOPY, LAPAROSCOPY;  Surgeon: Sydney Ford MD;  [facility]: [facility]     DISCECTOMY LUMBAR MINIMALLY INVASIVE ONE LEVEL Left 1/23/2019    Procedure: Minimally Invasive Surgery Left Lumbar 5-Sacral 1 microdiscectomy;  Surgeon: Mason Roe MD;  Location: [facility]     ESOPHAGOSCOPY, GASTROSCOPY, DUODENOSCOPY (EGD), COMBINED  5/21/2014    Procedure: COMBINED ESOPHAGOSCOPY, GASTROSCOPY, DUODENOSCOPY (EGD), BIOPSY SINGLE OR MULTIPLE;  Surgeon: Earl Lane MD;  [facility]     ESOPHAGOSCOPY, GASTROSCOPY, DUODENOSCOPY (EGD), COMBINED N/A 10/23/2020    Procedure: Esophagogastroduodenoscopy with  biopsy;  Surgeon: Ba Dickinson MD;  [facility]: [facility]     EXCISE LESION TRUNK  7/1/2011    Procedure:EXCISE LESION TRUNK; Surgeon:SYDNEY FORD; Location:[facility]     HC FRAGMENTING OF KIDNEY STONE  11/30/2005     HC RX ECTOP PREG BY SCOPE,RMV TUBE/OVRY  12/20/2007    Right sided salpingectomy and removal of ruptured ectopic pregnancy. D&C.     HYSTERECTOMY VAGINAL       LAPAROSCOPIC APPENDECTOMY N/A 9/24/2015    Procedure: LAPAROSCOPIC APPENDECTOMY;  Surgeon: James Cuellar MD;  [facility]     LAPAROSCOPIC CYSTECTOMY OVARIAN (BENIGN) N/A 9/24/2015    Procedure: LAPAROSCOPIC CYSTECTOMY OVARIAN (BENIGN);  Surgeon: Sydney Ford MD;  [facility]     LAPAROSCOPIC HYSTERECTOMY TOTAL, BILATERAL SALPINGO-OOPHORECTOMY, COMBINED N/A 7/28/2020    Procedure:  laparoscpic assisted vaginal hysterectomy, cystoscopy;  Surgeon: Greg Ford MD;  Location: [facility]     LAPAROSCOPIC OOPHORECTOMY Right 9/24/2015    Procedure: LAPAROSCOPIC OOPHORECTOMY;  Surgeon: Greg Ford MD;  [facility]     LITHOTRIPSY  01/17/2007     PELVIS LAPAROSCOPY,DX  10/16/2000    Diagnostic laparoscopy, Endometrial biopsy.     TUBAL/ECTOPIC PREGNANCY  01/23/2007    Lap removal of left ruptured ectopic pregnancy & salpingectomy, D&C     [facility] REMOVAL OF KIDNEY STONE      two surgeries, 2002,2003     [facility] UGI ENDOSCOPY, SIMPLE EXAM  09/01/09     Current Outpatient Medications   Medication Sig Dispense Refill     blood glucose (CONTOUR NEXT TEST) test strip USE TO TEST BLOOD GLUCOSE FOUR TIMES A  strip 3     blood glucose monitoring (NO BRAND SPECIFIED) meter device kit Use to test blood sugar 2 times daily or as directed. 1 kit 0     cyclobenzaprine (FLEXERIL) 10 MG tablet Take 1 tablet (10 mg) by mouth nightly as needed for muscle spasms 30 tablet 4     ibuprofen (ADVIL/MOTRIN) 200 MG tablet Take 800 mg by mouth nightly as needed (sleep)       JARDIANCE 10 MG TABS tablet TAKE 1 TABLET (10 MG) BY MOUTH DAILY 90 tablet 1     metFORMIN (GLUCOPHAGE XR) 500 MG 24 hr tablet Take 2 tablets (1,000 mg) by mouth 2 times daily (with meals) 360 tablet 3     multivitamin w/minerals (THERA-VIT-M) tablet Take 1 tablet by mouth daily       nystatin (MYCOSTATIN) 463129 UNIT/GM external powder Apply topically 2 times daily as needed 60 g 2     omeprazole (PRILOSEC) 20 MG DR capsule TAKE 1 CAPSULE BY MOUTH DAILY, 30 TO 60 MINUTES BEFORE A MEAL. 30 capsule 5       Allergies   Allergen Reactions     Amoxicillin Hives     Anti-Nausea      Anxiety, agitation,severe paranoia. Zofran, Reglan are some of them.  DRAMAMINE WITHOUT ISSUES       Demerol Hcl [Meperidine Hcl] Nausea     Indomethacin Nausea and Vomiting     Macrobid [Nitrofuran Derivatives] Hives     Reglan [Metoclopramide] Other  "(See Comments)     Acute paranoia, severe     Zofran [Ondansetron] Other (See Comments)     Severe anxiety, agitation     Vancomycin Other (See Comments)     maria del rosario syndrome        Social History     Tobacco Use     Smoking status: Some Days     Packs/day: 0.00     Years: 12.00     Pack years: 0.00     Types: Cigarettes     Smokeless tobacco: Never     Tobacco comments:     As of 10 /2014, pt has had a few cigs here and there   Substance Use Topics     Alcohol use: Yes     Alcohol/week: 0.0 standard drinks     Comment: every few months     Family History   Problem Relation Age of Onset     Diabetes Maternal Grandmother         adult onset     Anesthesia Reaction Maternal Grandmother         can't tolerate Novacaine.     Respiratory Maternal Grandmother         COPD and emphysema.     Thyroid Disease Maternal Grandmother      Heart Disease Maternal Grandmother         CHF     Diabetes Paternal Grandfather         adult o nset     Hypertension Paternal Grandfather      Cerebrovascular Disease Paternal Grandfather      Heart Disease Paternal Grandfather         MI, replaced valve,angioplasty     Thyroid Disease Paternal Grandfather      Cancer Maternal Grandfather         skin cancer     Heart Disease Maternal Grandfather         MI     Obesity Mother         on thyroid medication     Thyroid Disease Mother      Diabetes Mother      Rheumatologic Disease Mother      Heart Disease Father      Hypertension Father         and TIA     Lipids Father      Breast Cancer Paternal Grandmother      Arrhythmia Other      Cancer Maternal Aunt         breast/brain cancer     Depression Paternal Aunt      Cerebrovascular Disease Paternal Uncle      Cerebrovascular Disease Paternal Aunt      History   Drug Use No         Objective     /72   Pulse 95   Resp 15   Ht 1.64 m (5' 4.57\")   Wt 90 kg (198 lb 6.4 oz)   LMP 07/25/2020 (Exact Date)   SpO2 99%   BMI 33.46 kg/m      Physical Exam    GENERAL APPEARANCE: healthy, alert " and no distress     EYES: EOMI, PERRL     HENT: ear canals and TM's normal and nose and mouth without ulcers or lesions     NECK: no adenopathy, no asymmetry, masses, or scars and thyroid normal to palpation     RESP: lungs clear to auscultation - no rales, rhonchi or wheezes     CV: regular rates and rhythm, normal S1 S2, no S3 or S4 and no murmur, click or rub     ABDOMEN:  soft, nontender, no HSM or masses and bowel sounds normal     MS: extremities normal- no gross deformities noted, no evidence of inflammation in joints, FROM in all extremities.     SKIN: no suspicious lesions or rashes     NEURO: Normal strength and tone, sensory exam grossly normal, mentation intact and speech normal     PSYCH: mentation appears normal. and affect normal/bright     LYMPHATICS: No cervical adenopathy    Recent Labs   Lab Test 11/08/22  1429 10/24/22  0940 10/09/22  2026 07/19/22  0957 05/25/22  1000 03/31/22  1340   HGB  --   --  16.2*  --   --  15.6   PLT  --   --  260  --   --  244     --  139  --    < >  --    POTASSIUM 3.6  --  3.6  --    < >  --    CR 0.65  --  0.72  --    < > 0.92   A1C  --  7.1*  --  7.6*   < >  --     < > = values in this interval not displayed.        Diagnostics:  Recent Results (from the past 24 hour(s))   Hemoglobin A1c    Collection Time: 03/20/23  9:42 AM   Result Value Ref Range    Hemoglobin A1C 7.0 (H) <5.7 %   Comprehensive metabolic panel (BMP + Alb, Alk Phos, ALT, AST, Total. Bili, TP)    Collection Time: 03/20/23  9:42 AM   Result Value Ref Range    Sodium 138 136 - 145 mmol/L    Potassium 4.4 3.4 - 5.3 mmol/L    Chloride 103 98 - 107 mmol/L    Carbon Dioxide (CO2) 25 22 - 29 mmol/L    Anion Gap 10 7 - 15 mmol/L    Urea Nitrogen 12.4 6.0 - 20.0 mg/dL    Creatinine 0.75 0.51 - 0.95 mg/dL    Calcium 8.9 8.6 - 10.0 mg/dL    Glucose 158 (H) 70 - 99 mg/dL    Alkaline Phosphatase 68 35 - 104 U/L    AST 18 10 - 35 U/L    ALT 29 10 - 35 U/L    Protein Total 7.1 6.4 - 8.3 g/dL    Albumin 4.1  3.5 - 5.2 g/dL    Bilirubin Total 1.0 <=1.2 mg/dL    GFR Estimate >90 >60 mL/min/1.73m2   CBC with platelets    Collection Time: 03/20/23  9:42 AM   Result Value Ref Range    WBC Count 8.2 4.0 - 11.0 10e3/uL    RBC Count 5.28 (H) 3.80 - 5.20 10e6/uL    Hemoglobin 15.9 (H) 11.7 - 15.7 g/dL    Hematocrit 46.8 35.0 - 47.0 %    MCV 89 78 - 100 fL    MCH 30.1 26.5 - 33.0 pg    MCHC 34.0 31.5 - 36.5 g/dL    RDW 12.8 10.0 - 15.0 %    Platelet Count 214 150 - 450 10e3/uL   UA Macro with Reflex to Micro and Culture - lab collect    Collection Time: 03/20/23  9:46 AM    Specimen: Urine, NOS   Result Value Ref Range    Color Urine Yellow Colorless, Straw, Light Yellow, Yellow    Appearance Urine Clear Clear    Glucose Urine >499 (A) Negative mg/dL    Bilirubin Urine Negative Negative    Ketones Urine Negative Negative mg/dL    Specific Gravity Urine 1.031 1.003 - 1.035    Blood Urine Negative Negative    pH Urine 5.0 5.0 - 7.0    Protein Albumin Urine Negative Negative mg/dL    Urobilinogen Urine Normal Normal, 2.0 mg/dL    Nitrite Urine Negative Negative    Leukocyte Esterase Urine Negative Negative      No EKG required, no history of coronary heart disease, significant arrhythmia, peripheral arterial disease or other structural heart disease.    Revised Cardiac Risk Index (RCRI):  The patient has the following serious cardiovascular risks for perioperative complications:   - No serious cardiac risks = 0 points     RCRI Interpretation: 0 points: Class I (very low risk - 0.4% complication rate)          Assessment & Plan     The proposed surgical procedure is considered INTERMEDIATE risk.    Pre-op exam    Recurrent herniation of lumbar disc    Type 2 diabetes mellitus without complication, without long-term current use of insulin (H)  - CBC with platelets; Future  - Comprehensive metabolic panel (BMP + Alb, Alk Phos, ALT, AST, Total. Bili, TP); Future  - Hemoglobin A1c; Future  - UA Macro with Reflex to Micro and Culture -  lab collect; Future  - UA Macro with Reflex to Micro and Culture - lab collect  - Hemoglobin A1c  - Comprehensive metabolic panel (BMP + Alb, Alk Phos, ALT, AST, Total. Bili, TP)  - CBC with platelets    Morbid (severe) obesity due to excess calories (H)      Risks and Recommendations:  The patient has the following additional risks and recommendations for perioperative complications:   - No identified additional risk factors other than previously addressed    Medication Instructions:  Patient is to take all scheduled medications on the day of surgery EXCEPT for modifications listed below:   - metformin: HOLD day of surgery.   - SGLT2 Inhibitor (canagliflozin, dapagliflozin, or empagliflozin): HOLD 3 days before surgery.     RECOMMENDATION:  APPROVAL GIVEN to proceed with proposed procedure, without further diagnostic evaluation.    Signed Electronically by: Destini Esparza PA-C  Copy of this evaluation report is provided to requesting physician.

## 2023-04-04 ENCOUNTER — ANESTHESIA EVENT (OUTPATIENT)
Dept: SURGERY | Facility: CLINIC | Age: 43
End: 2023-04-04
Payer: COMMERCIAL

## 2023-04-05 ENCOUNTER — ANESTHESIA (OUTPATIENT)
Dept: SURGERY | Facility: CLINIC | Age: 43
End: 2023-04-05
Payer: COMMERCIAL

## 2023-04-05 ENCOUNTER — HOSPITAL ENCOUNTER (OUTPATIENT)
Facility: CLINIC | Age: 43
Discharge: HOME | End: 2023-04-05
Attending: NEUROLOGICAL SURGERY | Admitting: NEUROLOGICAL SURGERY
Payer: COMMERCIAL

## 2023-04-05 ENCOUNTER — HOSPITAL ENCOUNTER (OUTPATIENT)
Dept: GENERAL RADIOLOGY | Facility: CLINIC | Age: 43
Discharge: HOME | End: 2023-04-05
Attending: NEUROLOGICAL SURGERY | Admitting: NEUROLOGICAL SURGERY
Payer: COMMERCIAL

## 2023-04-05 VITALS
SYSTOLIC BLOOD PRESSURE: 110 MMHG | RESPIRATION RATE: 16 BRPM | OXYGEN SATURATION: 94 % | HEART RATE: 102 BPM | DIASTOLIC BLOOD PRESSURE: 73 MMHG | TEMPERATURE: 97.1 F

## 2023-04-05 DIAGNOSIS — Z98.890 S/P LUMBAR MICRODISCECTOMY: Primary | ICD-10-CM

## 2023-04-05 DIAGNOSIS — M51.26 RECURRENT HERNIATION OF LUMBAR DISC: ICD-10-CM

## 2023-04-05 LAB
GLUCOSE BLDC GLUCOMTR-MCNC: 144 MG/DL (ref 70–99)
GLUCOSE BLDC GLUCOMTR-MCNC: 161 MG/DL (ref 70–99)

## 2023-04-05 PROCEDURE — 258N000003 HC RX IP 258 OP 636: Performed by: NURSE ANESTHETIST, CERTIFIED REGISTERED

## 2023-04-05 PROCEDURE — 999N000179 XR SURGERY CARM FLUORO LESS THAN 5 MIN W STILLS: Mod: TC

## 2023-04-05 PROCEDURE — 63042 LAMINOTOMY SINGLE LUMBAR: CPT | Mod: LT | Performed by: NEUROLOGICAL SURGERY

## 2023-04-05 PROCEDURE — 250N000011 HC RX IP 250 OP 636: Performed by: NEUROLOGICAL SURGERY

## 2023-04-05 PROCEDURE — 272N000001 HC OR GENERAL SUPPLY STERILE: Performed by: NEUROLOGICAL SURGERY

## 2023-04-05 PROCEDURE — 250N000011 HC RX IP 250 OP 636: Performed by: NURSE ANESTHETIST, CERTIFIED REGISTERED

## 2023-04-05 PROCEDURE — 250N000013 HC RX MED GY IP 250 OP 250 PS 637: Performed by: PHYSICIAN ASSISTANT

## 2023-04-05 PROCEDURE — 360N000084 HC SURGERY LEVEL 4 W/ FLUORO, PER MIN: Performed by: NEUROLOGICAL SURGERY

## 2023-04-05 PROCEDURE — 82962 GLUCOSE BLOOD TEST: CPT

## 2023-04-05 PROCEDURE — 710N000012 HC RECOVERY PHASE 2, PER MINUTE: Performed by: NEUROLOGICAL SURGERY

## 2023-04-05 PROCEDURE — 271N000001 HC OR GENERAL SUPPLY NON-STERILE: Performed by: NEUROLOGICAL SURGERY

## 2023-04-05 PROCEDURE — 710N000010 HC RECOVERY PHASE 1, LEVEL 2, PER MIN: Performed by: NEUROLOGICAL SURGERY

## 2023-04-05 PROCEDURE — C1894 INTRO/SHEATH, NON-LASER: HCPCS | Performed by: NEUROLOGICAL SURGERY

## 2023-04-05 PROCEDURE — 250N000011 HC RX IP 250 OP 636: Performed by: PHYSICIAN ASSISTANT

## 2023-04-05 PROCEDURE — 370N000017 HC ANESTHESIA TECHNICAL FEE, PER MIN: Performed by: NEUROLOGICAL SURGERY

## 2023-04-05 PROCEDURE — 2894A PR VOIDCORRECT: CPT | Mod: 59 | Performed by: NEUROLOGICAL SURGERY

## 2023-04-05 PROCEDURE — 250N000009 HC RX 250: Performed by: NEUROLOGICAL SURGERY

## 2023-04-05 PROCEDURE — 63042 LAMINOTOMY SINGLE LUMBAR: CPT | Mod: AS | Performed by: PHYSICIAN ASSISTANT

## 2023-04-05 PROCEDURE — 250N000025 HC SEVOFLURANE, PER MIN: Performed by: NEUROLOGICAL SURGERY

## 2023-04-05 PROCEDURE — 250N000009 HC RX 250: Performed by: NURSE ANESTHETIST, CERTIFIED REGISTERED

## 2023-04-05 PROCEDURE — 250N000026 HC DESFLURANE, PER MIN: Performed by: NEUROLOGICAL SURGERY

## 2023-04-05 PROCEDURE — 999N000141 HC STATISTIC PRE-PROCEDURE NURSING ASSESSMENT: Performed by: NEUROLOGICAL SURGERY

## 2023-04-05 RX ORDER — OXYCODONE HYDROCHLORIDE 5 MG/1
5-10 TABLET ORAL
Status: COMPLETED | OUTPATIENT
Start: 2023-04-05 | End: 2023-04-05

## 2023-04-05 RX ORDER — SODIUM CHLORIDE, SODIUM LACTATE, POTASSIUM CHLORIDE, CALCIUM CHLORIDE 600; 310; 30; 20 MG/100ML; MG/100ML; MG/100ML; MG/100ML
INJECTION, SOLUTION INTRAVENOUS CONTINUOUS
Status: DISCONTINUED | OUTPATIENT
Start: 2023-04-05 | End: 2023-04-05 | Stop reason: HOSPADM

## 2023-04-05 RX ORDER — METHYLPREDNISOLONE ACETATE 40 MG/ML
INJECTION, SUSPENSION INTRA-ARTICULAR; INTRALESIONAL; INTRAMUSCULAR; SOFT TISSUE PRN
Status: DISCONTINUED | OUTPATIENT
Start: 2023-04-05 | End: 2023-04-05 | Stop reason: HOSPADM

## 2023-04-05 RX ORDER — METHOCARBAMOL 750 MG/1
750 TABLET, FILM COATED ORAL
Status: DISCONTINUED | OUTPATIENT
Start: 2023-04-05 | End: 2023-04-05 | Stop reason: HOSPADM

## 2023-04-05 RX ORDER — FENTANYL CITRATE 50 UG/ML
50 INJECTION, SOLUTION INTRAMUSCULAR; INTRAVENOUS EVERY 5 MIN PRN
Status: DISCONTINUED | OUTPATIENT
Start: 2023-04-05 | End: 2023-04-05 | Stop reason: HOSPADM

## 2023-04-05 RX ORDER — OXYCODONE HYDROCHLORIDE 5 MG/1
5-10 TABLET ORAL EVERY 4 HOURS PRN
Qty: 36 TABLET | Refills: 0 | Status: SHIPPED | OUTPATIENT
Start: 2023-04-05 | End: 2023-04-12

## 2023-04-05 RX ORDER — DIPHENHYDRAMINE HYDROCHLORIDE 50 MG/ML
INJECTION INTRAMUSCULAR; INTRAVENOUS PRN
Status: DISCONTINUED | OUTPATIENT
Start: 2023-04-05 | End: 2023-04-05

## 2023-04-05 RX ORDER — LIDOCAINE HYDROCHLORIDE AND EPINEPHRINE 10; 10 MG/ML; UG/ML
INJECTION, SOLUTION INFILTRATION; PERINEURAL PRN
Status: DISCONTINUED | OUTPATIENT
Start: 2023-04-05 | End: 2023-04-05 | Stop reason: HOSPADM

## 2023-04-05 RX ORDER — DIMENHYDRINATE 50 MG/ML
25 INJECTION, SOLUTION INTRAMUSCULAR; INTRAVENOUS
Status: DISCONTINUED | OUTPATIENT
Start: 2023-04-05 | End: 2023-04-05 | Stop reason: HOSPADM

## 2023-04-05 RX ORDER — CLINDAMYCIN PHOSPHATE 900 MG/50ML
900 INJECTION, SOLUTION INTRAVENOUS
Status: COMPLETED | OUTPATIENT
Start: 2023-04-05 | End: 2023-04-05

## 2023-04-05 RX ORDER — FENTANYL CITRATE 50 UG/ML
INJECTION, SOLUTION INTRAMUSCULAR; INTRAVENOUS PRN
Status: DISCONTINUED | OUTPATIENT
Start: 2023-04-05 | End: 2023-04-05

## 2023-04-05 RX ORDER — AMOXICILLIN 250 MG
1-2 CAPSULE ORAL DAILY PRN
Qty: 30 TABLET | Refills: 1 | Status: SHIPPED | OUTPATIENT
Start: 2023-04-05 | End: 2023-07-06

## 2023-04-05 RX ORDER — HYDROXYZINE HYDROCHLORIDE 25 MG/1
25 TABLET, FILM COATED ORAL
Status: DISCONTINUED | OUTPATIENT
Start: 2023-04-05 | End: 2023-04-05 | Stop reason: HOSPADM

## 2023-04-05 RX ORDER — TIZANIDINE 2 MG/1
2-4 TABLET ORAL 3 TIMES DAILY
Qty: 60 TABLET | Refills: 2 | Status: SHIPPED | OUTPATIENT
Start: 2023-04-05 | End: 2023-05-19

## 2023-04-05 RX ORDER — PROPOFOL 10 MG/ML
INJECTION, EMULSION INTRAVENOUS PRN
Status: DISCONTINUED | OUTPATIENT
Start: 2023-04-05 | End: 2023-04-05

## 2023-04-05 RX ORDER — HYDROMORPHONE HYDROCHLORIDE 1 MG/ML
0.25 INJECTION, SOLUTION INTRAMUSCULAR; INTRAVENOUS; SUBCUTANEOUS EVERY 5 MIN PRN
Status: DISCONTINUED | OUTPATIENT
Start: 2023-04-05 | End: 2023-04-05 | Stop reason: HOSPADM

## 2023-04-05 RX ORDER — CLINDAMYCIN PHOSPHATE 900 MG/50ML
900 INJECTION, SOLUTION INTRAVENOUS SEE ADMIN INSTRUCTIONS
Status: DISCONTINUED | OUTPATIENT
Start: 2023-04-05 | End: 2023-04-05 | Stop reason: HOSPADM

## 2023-04-05 RX ORDER — PROPOFOL 10 MG/ML
INJECTION, EMULSION INTRAVENOUS CONTINUOUS PRN
Status: DISCONTINUED | OUTPATIENT
Start: 2023-04-05 | End: 2023-04-05

## 2023-04-05 RX ORDER — HYDROMORPHONE HYDROCHLORIDE 1 MG/ML
0.5 INJECTION, SOLUTION INTRAMUSCULAR; INTRAVENOUS; SUBCUTANEOUS EVERY 5 MIN PRN
Status: DISCONTINUED | OUTPATIENT
Start: 2023-04-05 | End: 2023-04-05 | Stop reason: HOSPADM

## 2023-04-05 RX ORDER — LIDOCAINE 40 MG/G
CREAM TOPICAL
Status: DISCONTINUED | OUTPATIENT
Start: 2023-04-05 | End: 2023-04-05 | Stop reason: HOSPADM

## 2023-04-05 RX ORDER — LIDOCAINE HYDROCHLORIDE 20 MG/ML
INJECTION, SOLUTION INFILTRATION; PERINEURAL PRN
Status: DISCONTINUED | OUTPATIENT
Start: 2023-04-05 | End: 2023-04-05

## 2023-04-05 RX ORDER — SODIUM CHLORIDE, SODIUM LACTATE, POTASSIUM CHLORIDE, CALCIUM CHLORIDE 600; 310; 30; 20 MG/100ML; MG/100ML; MG/100ML; MG/100ML
INJECTION, SOLUTION INTRAVENOUS CONTINUOUS PRN
Status: DISCONTINUED | OUTPATIENT
Start: 2023-04-05 | End: 2023-04-05

## 2023-04-05 RX ORDER — FENTANYL CITRATE 50 UG/ML
25 INJECTION, SOLUTION INTRAMUSCULAR; INTRAVENOUS EVERY 5 MIN PRN
Status: DISCONTINUED | OUTPATIENT
Start: 2023-04-05 | End: 2023-04-05 | Stop reason: HOSPADM

## 2023-04-05 RX ORDER — KETAMINE HYDROCHLORIDE 10 MG/ML
INJECTION INTRAMUSCULAR; INTRAVENOUS PRN
Status: DISCONTINUED | OUTPATIENT
Start: 2023-04-05 | End: 2023-04-05

## 2023-04-05 RX ORDER — DEXAMETHASONE SODIUM PHOSPHATE 10 MG/ML
INJECTION, SOLUTION INTRAMUSCULAR; INTRAVENOUS PRN
Status: DISCONTINUED | OUTPATIENT
Start: 2023-04-05 | End: 2023-04-05

## 2023-04-05 RX ADMIN — FENTANYL CITRATE 25 MCG: 50 INJECTION, SOLUTION INTRAMUSCULAR; INTRAVENOUS at 11:30

## 2023-04-05 RX ADMIN — ROCURONIUM BROMIDE 20 MG: 50 INJECTION, SOLUTION INTRAVENOUS at 10:04

## 2023-04-05 RX ADMIN — OXYCODONE HYDROCHLORIDE 10 MG: 5 TABLET ORAL at 11:52

## 2023-04-05 RX ADMIN — DEXMEDETOMIDINE HYDROCHLORIDE 10 MCG: 100 INJECTION, SOLUTION INTRAVENOUS at 09:30

## 2023-04-05 RX ADMIN — PROPOFOL 200 MG: 10 INJECTION, EMULSION INTRAVENOUS at 09:36

## 2023-04-05 RX ADMIN — SODIUM CHLORIDE, POTASSIUM CHLORIDE, SODIUM LACTATE AND CALCIUM CHLORIDE: 600; 310; 30; 20 INJECTION, SOLUTION INTRAVENOUS at 08:30

## 2023-04-05 RX ADMIN — PROPOFOL 100 MCG/KG/MIN: 10 INJECTION, EMULSION INTRAVENOUS at 10:16

## 2023-04-05 RX ADMIN — DEXAMETHASONE SODIUM PHOSPHATE 10 MG: 10 INJECTION, SOLUTION INTRAMUSCULAR; INTRAVENOUS at 09:51

## 2023-04-05 RX ADMIN — CLINDAMYCIN PHOSPHATE 900 MG: 900 INJECTION, SOLUTION INTRAVENOUS at 09:30

## 2023-04-05 RX ADMIN — FENTANYL CITRATE 50 MCG: 50 INJECTION, SOLUTION INTRAMUSCULAR; INTRAVENOUS at 10:04

## 2023-04-05 RX ADMIN — CLINDAMYCIN PHOSPHATE 900 MG: 900 INJECTION, SOLUTION INTRAVENOUS at 08:21

## 2023-04-05 RX ADMIN — DIPHENHYDRAMINE HYDROCHLORIDE 25 MG: 50 INJECTION, SOLUTION INTRAMUSCULAR; INTRAVENOUS at 09:55

## 2023-04-05 RX ADMIN — SODIUM CHLORIDE, POTASSIUM CHLORIDE, SODIUM LACTATE AND CALCIUM CHLORIDE: 600; 310; 30; 20 INJECTION, SOLUTION INTRAVENOUS at 10:54

## 2023-04-05 RX ADMIN — FENTANYL CITRATE 50 MCG: 50 INJECTION, SOLUTION INTRAMUSCULAR; INTRAVENOUS at 09:30

## 2023-04-05 RX ADMIN — Medication 30 MG: at 09:40

## 2023-04-05 RX ADMIN — ROCURONIUM BROMIDE 50 MG: 50 INJECTION, SOLUTION INTRAVENOUS at 09:37

## 2023-04-05 RX ADMIN — SODIUM CHLORIDE, POTASSIUM CHLORIDE, SODIUM LACTATE AND CALCIUM CHLORIDE: 600; 310; 30; 20 INJECTION, SOLUTION INTRAVENOUS at 09:15

## 2023-04-05 RX ADMIN — FENTANYL CITRATE 25 MCG: 50 INJECTION, SOLUTION INTRAMUSCULAR; INTRAVENOUS at 11:50

## 2023-04-05 RX ADMIN — LIDOCAINE HYDROCHLORIDE 40 MG: 20 INJECTION, SOLUTION INFILTRATION; PERINEURAL at 09:36

## 2023-04-05 RX ADMIN — MIDAZOLAM 2 MG: 1 INJECTION INTRAMUSCULAR; INTRAVENOUS at 09:30

## 2023-04-05 ASSESSMENT — ACTIVITIES OF DAILY LIVING (ADL)
ADLS_ACUITY_SCORE: 35

## 2023-04-05 NOTE — ANESTHESIA CARE TRANSFER NOTE
Patient: Fallon Rivera    Procedure: Procedure(s):  Minimally Invasive Left Lumbar 5 to Sacral 1 evacuation of recurrent disc herniation  and foraminotomy       Diagnosis: Recurrent herniation of lumbar disc [M51.26]  Diagnosis Additional Information: No value filed.    Anesthesia Type:   General     Note:    Oropharynx: oropharynx clear of all foreign objects  Level of Consciousness: awake  Oxygen Supplementation: face mask  Level of Supplemental Oxygen (L/min / FiO2): 10  Independent Airway: airway patency satisfactory and stable  Dentition: dentition unchanged  Vital Signs Stable: post-procedure vital signs reviewed and stable  Report to RN Given: handoff report given  Patient transferred to: PACU    Handoff Report: Identifed the Patient, Identified the Reponsible Provider, Reviewed the pertinent medical history, Discussed the surgical course, Reviewed Intra-OP anesthesia mangement and issues during anesthesia, Set expectations for post-procedure period and Allowed opportunity for questions and acknowledgement of understanding      Vitals:  Vitals Value Taken Time                            Vitals shown include unvalidated device data.    Electronically Signed By: SERENE Tate CRNA  April 5, 2023

## 2023-04-05 NOTE — ANESTHESIA PREPROCEDURE EVALUATION
Anesthesia Pre-Procedure Evaluation    Patient: Fallon Rivera   MRN: [id number] : 1980        Procedure : Procedure(s):  Minimally Invasive Left Lumbar 5 to Sacral 1 evacuation of recurrent disc herniation  and foraminotomy          Past Medical History:   Diagnosis Date     Abnormal Pap smear , ,     ASC-H, ASCUS + HPV 45     Calculus of kidney 2002     Cervical high risk HPV (human papillomavirus) test positive     + HPV 45 (high risk)     History of colposcopy with cervical biopsy 06, 3/15/07    koilocytosis       Past Surgical History:   Procedure Laterality Date     CHOLECYSTECTOMY, LAPOROSCOPIC  2007    Cholecystectomy, Laparoscopic     COLONOSCOPY  05/17/10     COLONOSCOPY N/A 2019    Procedure: COLONOSCOPY;  Surgeon: Paramjit Kingston DO;  Location:  GI     COLONOSCOPY N/A 10/23/2020    Procedure: COLONOSCOPY, WITH  BIOPSY;  Surgeon: Ba Dickinson MD;  Location:  GI     CONIZATION  2011    Procedure:CONIZATION; cold knife and punch biopsy of mole on back; Surgeon:SYDNEY FORD; Location:[facility]     DILATION AND CURETTAGE, HYSTEROSCOPY, LAPAROSCOPY, COMBINED Right 2015    Procedure: COMBINED DILATION AND CURETTAGE, HYSTEROSCOPY, LAPAROSCOPY;  Surgeon: Sydney Ford MD;  Location: [facility]     DISCECTOMY LUMBAR MINIMALLY INVASIVE ONE LEVEL Left 2019    Procedure: Minimally Invasive Surgery Left Lumbar 5-Sacral 1 microdiscectomy;  Surgeon: Mason Roe MD;  Location: [facility]     ESOPHAGOSCOPY, GASTROSCOPY, DUODENOSCOPY (EGD), COMBINED  2014    Procedure: COMBINED ESOPHAGOSCOPY, GASTROSCOPY, DUODENOSCOPY (EGD), BIOPSY SINGLE OR MULTIPLE;  Surgeon: Earl Lane MD;  Location:  GI     ESOPHAGOSCOPY, GASTROSCOPY, DUODENOSCOPY (EGD), COMBINED N/A 10/23/2020    Procedure: Esophagogastroduodenoscopy with  biopsy;  Surgeon: Ba Dickinson MD;  Location:  GI     EXCISE LESION TRUNK  2011     Procedure:EXCISE LESION TRUNK; Surgeon:GREG GRAFF; Location:[facility]     HC FRAGMENTING OF KIDNEY STONE  11/30/2005     HC RX ECTOP PREG BY SCOPE,RMV TUBE/OVRY  12/20/2007    Right sided salpingectomy and removal of ruptured ectopic pregnancy. D&C.     HYSTERECTOMY VAGINAL       LAPAROSCOPIC APPENDECTOMY N/A 9/24/2015    Procedure: LAPAROSCOPIC APPENDECTOMY;  Surgeon: James Cuellar MD;  [facility]     LAPAROSCOPIC CYSTECTOMY OVARIAN (BENIGN) N/A 9/24/2015    Procedure: LAPAROSCOPIC CYSTECTOMY OVARIAN (BENIGN);  Surgeon: Greg Graff MD;  [facility]: [facility]     LAPAROSCOPIC HYSTERECTOMY TOTAL, BILATERAL SALPINGO-OOPHORECTOMY, COMBINED N/A 7/28/2020    Procedure: laparoscpic assisted vaginal hysterectomy, cystoscopy;  Surgeon: Greg Graff MD;  Location: [facility]     LAPAROSCOPIC OOPHORECTOMY Right 9/24/2015    Procedure: LAPAROSCOPIC OOPHORECTOMY;  Surgeon: Greg Graff MD;  Location[facility]     LITHOTRIPSY  01/17/2007     PELVIS LAPAROSCOPY,DX  10/16/2000    Diagnostic laparoscopy, Endometrial biopsy.     TUBAL/ECTOPIC PREGNANCY  01/23/2007    Lap removal of left ruptured ectopic pregnancy & salpingectomy, D&C     ZZC REMOVAL OF KIDNEY STONE      two surgeries, 2002,2003     ZZHC UGI ENDOSCOPY, SIMPLE EXAM  09/01/09      Allergies   Allergen Reactions     Amoxicillin Hives     Anti-Nausea      Anxiety, agitation,severe paranoia. Zofran, Reglan are some of them.  DRAMAMINE WITHOUT ISSUES       Demerol Hcl [Meperidine Hcl] Nausea     Indomethacin Nausea and Vomiting     Macrobid [Nitrofuran Derivatives] Hives     Reglan [Metoclopramide] Other (See Comments)     Acute paranoia, severe     Zofran [Ondansetron] Other (See Comments)     Severe anxiety, agitation     Vancomycin Other (See Comments)     maria del rosario syndrome      Social History     Tobacco Use     Smoking status: Some Days     Packs/day: 0.00     Years: 12.00     Pack years: 0.00     Types: Cigarettes      Smokeless tobacco: Never     Tobacco comments:     As of 10 /2014, pt has had a few cigs here and there   Vaping Use     Vaping status: Never Used   Substance Use Topics     Alcohol use: Yes     Alcohol/week: 0.0 standard drinks of alcohol     Comment: every few months      Wt Readings from Last 1 Encounters:   03/20/23 91 kg (200 lb 9.6 oz)        Anesthesia Evaluation   Pt has had prior anesthetic. Type: MAC and General.    No history of anesthetic complications       ROS/MED HX  ENT/Pulmonary:     (+) ELIZABETH risk factors, snores loudly, obese,     Neurologic:  - neg neurologic ROS     Cardiovascular:     (+) Dyslipidemia -----Previous cardiac testing   Echo: Date: Results:    Stress Test: Date: Results:    ECG Reviewed: Date: 10/9/22 Results:  ST  HR-106  Cath: Date: Results:      METS/Exercise Tolerance:     Hematologic:  - neg hematologic  ROS     Musculoskeletal: Comment: CLBP      GI/Hepatic:     (+) GERD, Asymptomatic on medication,     Renal/Genitourinary:     (+) Nephrolithiasis ,  (-) renal disease   Endo:     (+) type II DM, Last HgA1c: 7, date: 3/20/23, Not using insulin, - not using insulin pump. Normal glucose range: <200, not previously admitted for DM/DKA. Obesity,  (-) Type I DM   Psychiatric/Substance Use:     (+) psychiatric history depression     Infectious Disease:  - neg infectious disease ROS     Malignancy:  - neg malignancy ROS     Other:  - neg other ROS          Physical Exam    Airway  airway exam normal      Mallampati: II   TM distance: > 3 FB   Neck ROM: full   Mouth opening: > 3 cm    Respiratory Devices and Support         Dental       (+) Modest Abnormalities - crowns, retainers, 1 or 2 missing teeth      Cardiovascular   cardiovascular exam normal       Rhythm and rate: regular and normal     Pulmonary   pulmonary exam normal        breath sounds clear to auscultation           OUTSIDE LABS:  CBC:   Lab Results   Component Value Date    WBC 8.2 03/20/2023    WBC 8.8 10/09/2022     HGB 15.9 (H) 03/20/2023    HGB 16.2 (H) 10/09/2022    HCT 46.8 03/20/2023    HCT 44.6 10/09/2022     03/20/2023     10/09/2022     BMP:   Lab Results   Component Value Date     03/20/2023     11/08/2022    POTASSIUM 4.4 03/20/2023    POTASSIUM 3.6 11/08/2022    CHLORIDE 103 03/20/2023    CHLORIDE 105 11/08/2022    CO2 25 03/20/2023    CO2 26 11/08/2022    BUN 12.4 03/20/2023    BUN 13 11/08/2022    CR 0.75 03/20/2023    CR 0.65 11/08/2022     (H) 03/20/2023     (H) 11/08/2022     COAGS:   Lab Results   Component Value Date    PTT 31 07/11/2011    INR 0.99 07/11/2011     POC:   Lab Results   Component Value Date     (H) 10/23/2020    HCG Negative 07/28/2020     HEPATIC:   Lab Results   Component Value Date    ALBUMIN 4.1 03/20/2023    PROTTOTAL 7.1 03/20/2023    ALT 29 03/20/2023    AST 18 03/20/2023    ALKPHOS 68 03/20/2023    BILITOTAL 1.0 03/20/2023     OTHER:   Lab Results   Component Value Date    PH 6.0 09/13/2012    LACT 1.6 09/05/2019    A1C 7.0 (H) 03/20/2023    SHADE 8.9 03/20/2023    LIPASE 242 10/09/2022    AMYLASE 66 05/08/2014    TSH 1.93 01/26/2021    CRP 4.4 02/08/2022    SED 7 02/08/2022       Anesthesia Plan    ASA Status:  2   NPO Status:  NPO Appropriate    Anesthesia Type: General.     - Airway: ETT   Induction: Intravenous, Propofol.   Maintenance: Balanced.        Consents    Anesthesia Plan(s) and associated risks, benefits, and realistic alternatives discussed. Questions answered and patient/representative(s) expressed understanding.    - Discussed:     - Discussed with:  Patient    Use of blood products discussed: No .     Postoperative Care    Pain management: IV analgesics, Oral pain medications.   PONV prophylaxis: Dexamethasone or Solumedrol, Background Propofol Infusion     Comments:    Other Comments: The risks and benefits of anesthesia, and the alternatives where applicable, have been discussed with the patient, and they wish to proceed.             Marco Sena, APRN CRNA

## 2023-04-05 NOTE — INTERVAL H&P NOTE
"I have reviewed the surgical (or preoperative) H&P that is linked to this encounter, and examined the patient. There are no significant changes    Clinical Conditions Present on Arrival:  Clinically Significant Risk Factors Present on Admission                    # DMII: A1C = 7.0 % (Ref range: <5.7 %) within past 6 months  # Obesity: Estimated body mass index is 33.9 kg/m  as calculated from the following:    Height as of 3/20/23: 1.638 m (5' 4.5\").    Weight as of 3/20/23: 91 kg (200 lb 9.6 oz).       "

## 2023-04-05 NOTE — ANESTHESIA POSTPROCEDURE EVALUATION
Patient: Fallon Rivera    Procedure: Procedure(s):  Minimally Invasive Left Lumbar 5 to Sacral 1 evacuation of recurrent disc herniation  and foraminotomy       Anesthesia Type:  General    Note:  Disposition: Outpatient   Postop Pain Control: Uneventful            Sign Out: Well controlled pain   PONV: No   Neuro/Psych: Uneventful            Sign Out: Acceptable/Baseline neuro status   Airway/Respiratory: Uneventful            Sign Out: Acceptable/Baseline resp. status   CV/Hemodynamics: Uneventful            Sign Out: Acceptable CV status   Other NRE: NONE   DID A NON-ROUTINE EVENT OCCUR? No    Event details/Postop Comments:  Pt was happy with anesthesia care.  No complications.  I will follow up with the pt if needed.           Last vitals:  Vitals Value Taken Time   /67 04/05/23 1205   Temp 97.1  F (36.2  C) 04/05/23 1150   Pulse 98 04/05/23 1207   Resp 18 04/05/23 1207   SpO2 96 % 04/05/23 1209   Vitals shown include unvalidated device data.    Electronically Signed By: SERENE Llanos CRNA  April 5, 2023  2:06 PM

## 2023-04-05 NOTE — OP NOTE
DATE: 4/5/23      SURGEON:  Mason Roe MD   ASSISTANT:  AIDEN Pedraza  (Note: The assistant was present for and assisted with the entire surgery, and his/her role as an assistant was crucial for aid in positioning, exposure, suctioning, retraction, and closure)      PREOPERATIVE DIAGNOSIS:  Recurrent lumbar disc herniation.       POSTOPERATIVE DIAGNOSIS:  Recurrent lumbar disc herniation.       PROCEDURES:   1.  Left L5-S1 minimally invasive revision laminotomies, medial facetectomy, foraminotomy and microdiscectomy for recurrent disk herniation.   2.  Use of intraoperative microscope and fluoroscopy.       ESTIMATED BLOOD LOSS:  25 cc.       INDICATIONS: 42 year old female who had undergone previous microdiscectomy, developed severe recurrent leg pain.   MRI demonstrated recurrent disk herniation.  Underwent nonoperative management with failure to improve.  Risks, benefits, indications, and alternatives were discussed with the patient and family in detail.  All of their questions were answered, and they wished to proceed with surgery.      DESCRIPTION OF SURGERY:  The patient was positioned prone on a Bob frame.  Sterile prepping and draping procedures performed.  Antibiotics were administered and timeout was performed.  The 10 blade was used to reopen the previous incision and the Metrx system was docked.  The monopolar was used to perform subperiosteal dissection exposing the hemilamina and medial facet.  The microscope was brought into the field, and under microscopy, the high speed drill was used to drill the more cranial portion of the lamina and the medial facet.  In this manner, normal ligamentum flavum was identified around the bed of scar tissue.  The Kerrison rongeur was used to remove this normal ligamentum flavum with exposure of the dura.  The nerve hook and [place] elevator were then used to create a plane between the scar tissue and the dura and scar tissue was elevated with the Kerrison  rongeurs.  The lateral thecal sac and nerve root were identified and the nerve root was followed along its entire course and decompressed with the Kerrison rongeurs.  The nerve root was then retracted medially with the D'Errico retractor and the pituitary rongeur was used to remove disc material.  Foraminotomy was performed with downbiting pituitary rongeurs and kerrison rongeurs.  Hemostasis was achieved and antibiotic irrigation was performed and the nerve was lined with Depo-Medrol.  The fascia was closed with 0 Vicryl sutures.  The dermal layer was closed with 2-0 Vicryl sutures and the skin was closed with a running subcuticular stitch.  There were no intra-procedural complications.

## 2023-04-05 NOTE — BRIEF OP NOTE
[facility]    Brief Operative Note    Pre-operative diagnosis: Recurrent herniation of lumbar disc [M51.26]  Post-operative diagnosis Same as pre-operative diagnosis    Procedure: Procedure(s):  Minimally Invasive Left Lumbar 5 to Sacral 1 evacuation of recurrent disc herniation  and foraminotomy  Surgeon: Surgeon(s) and Role:     * Mason Roe MD - Primary     * Thierno Sampson PA-C - Assisting  Anesthesia: General   Estimated Blood Loss: 25 mL from 4/5/2023  9:33 AM to 4/5/2023 10:58 AM      Drains: None  Specimens: * No specimens in log *  Findings:   None.  Complications: None.  Implants: * No implants in log *      Thierno Sampson PA-C  [facility]  [facility]      Tel [phone]

## 2023-04-05 NOTE — ANESTHESIA PROCEDURE NOTES
Airway       Patient location during procedure: OR       Procedure Start/Stop Times: 4/5/2023 9:39 AM  Staff -        CRNA: Marco Sena APRN CRNA       Performed By: CRNA  Consent for Airway        Urgency: elective  Indications and Patient Condition       Indications for airway management: lawrence-procedural       Induction type:intravenous       Mask difficulty assessment: 1 - vent by mask    Final Airway Details       Final airway type: endotracheal airway       Successful airway: ETT - single  Endotracheal Airway Details        ETT size (mm): 7.0       Cuffed: yes       Cuff volume (mL): 10       Successful intubation technique: direct laryngoscopy and video laryngoscopy       VL Blade Size: Glidescope 3       Grade View of Cords: 1       Adjucts: stylet       Position: Right       Measured from: lips       Secured at (cm): 22       Bite block used: Oral Airway    Post intubation assessment        Placement verified by: capnometry, equal breath sounds and chest rise        Number of attempts at approach: 1       Number of other approaches attempted: 0       Secured with: plastic tape       Ease of procedure: easy       Dentition: Intact and Unchanged    Medication(s) Administered   Medication Administration Time: 4/5/2023 9:39 AM

## 2023-04-06 NOTE — PROGRESS NOTES
Lynn Please inform Fallon/ or caretaker  that this result(s) is/are NOT NORMAL=L5-S1 disc protrusion--she needs to be referred to Dr. Roe please-Thanks. SHARAN Ford MD   all other ROS negative except as per HPI

## 2023-04-12 ENCOUNTER — TELEPHONE (OUTPATIENT)
Dept: NEUROSURGERY | Facility: CLINIC | Age: 43
End: 2023-04-12

## 2023-04-12 DIAGNOSIS — Z98.890 S/P LUMBAR MICRODISCECTOMY: ICD-10-CM

## 2023-04-12 RX ORDER — OXYCODONE HYDROCHLORIDE 5 MG/1
5-10 TABLET ORAL EVERY 4 HOURS PRN
Qty: 36 TABLET | Refills: 0 | Status: SHIPPED | OUTPATIENT
Start: 2023-04-12 | End: 2023-04-21

## 2023-04-12 NOTE — TELEPHONE ENCOUNTER
Patient calling for a refill of Oxycodone.     DOS: 4/5/23  Procedure: Left L5-S1 minimally invasive revision laminotomies, medial facetectomy, foraminotomy and microdiscectomy for recurrent disk herniation  Surgeon: Dr Roe  Weeks Post Op: 1    Current symptom(s): low back pain 5-6/10 worse with ambulation. Muscle spasms around incision site radiating to left side. Reports general weakness the more she moves, uses stairs. No falls. No focal weakness, denies foot drop or drag, denies bowel bladder symptoms.   Current pain management: Had been spreading out pain medicine. Only using Tylenol in the day. Using muscle relaxer and Oxycodone in evenings and before bed. Utilizing ice.   Advised to take Oxycodone and Zanaflex more as prescribed to stay ahead of pain/symptoms.   CTM. Reviewed Red Flags. To contact clinic if new/worsening symptoms.     Last fill: 4/5/23  Next visit: 4/21/23    Medication pended for your approval, if appropriate. Pharmacy verified.     Any patient questions or concerns:     Informed patient request will be forwarded to care team.

## 2023-04-12 NOTE — TELEPHONE ENCOUNTER
Patient had surgery with  on 4/5/23 Minimally Invasive Left Lumbar 5 to Sacral 1 evacuation of recurrent disc herniation and left foraminotomy Lumbar 5 to Sacral 1. Patient said she is experiencing severe muscle spasms around her incision area and they go down her buttocks and legs. She had surgery 4-5 years ago and doesn't remember having the same symptoms and is worried if this is normal or not. Patient gets more pain when she tries to walk and has weakness in her legs and it's very hard for her to walk. Patient needs to constantly use muscle relaxer and pain medication. Patient is requesting call back from nursing team about her post surgery symptoms.     Patient is also requesting refill for oxycodone.

## 2023-04-20 NOTE — PROGRESS NOTES
Post-op Nurse Visit:    Patient seen today per the order of  Mason Roe MD .   DOS: 4/5/23  Procedure: MI left L5-S1 evacuation of recurrent disc herniation. Left foraminotomy L5-S1    Pain/Neuro Assessment  4/10 to low back  Radiating left buttocks down to left toes described as intermittent shooting. Worse with ambulation. Muscle spasms near incision radiating to left side.  Numbness/tingling: intermittent numbness/tingling to left posterior.   Strength: Equal strength to bilateral lower extremities. Reports general weakness. Difficulty with stairs. Was having some drop foot preop intermittently.    Pain Relief Measures:  Oxycodone: 1-2 tabs per day during the evenings and before bed  Tylenol: 1-4 tabs throughout the daytime  tizanidine: 1 tab at 4pm and 2 tabs at HS  Ice: multiple times per day     Incision   Incision inspected. Edges well-approximated. No redness, swelling, drainage, or warmth noted.     Activity  Following restrictions   Falls:  none  Patient is walking frequently without difficulty.   Denies redness, swelling, or warmth to bilateral calves.     GI/  Patient's appetite is normal  Bowel/bladder problems? No  Taking stool softeners? No     Refills/x-rays/return to work  Refills given at this appointment? 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    All of patient's questions addressed today. Patient was instructed to call with any additional questions/concerns.     Sia Maxwell RN on 4/21/2023 at 10:49 AM

## 2023-04-21 ENCOUNTER — ALLIED HEALTH/NURSE VISIT (OUTPATIENT)
Dept: NEUROSURGERY | Facility: CLINIC | Age: 43
End: 2023-04-21
Payer: COMMERCIAL

## 2023-04-21 VITALS — TEMPERATURE: 97.8 F

## 2023-04-21 DIAGNOSIS — Z98.890 S/P LUMBAR MICRODISCECTOMY: Primary | ICD-10-CM

## 2023-04-21 PROCEDURE — 99207 PR NO CHARGE NURSE ONLY: CPT

## 2023-04-21 RX ORDER — OXYCODONE HYDROCHLORIDE 5 MG/1
5-10 TABLET ORAL EVERY 4 HOURS PRN
Qty: 36 TABLET | Refills: 0 | Status: SHIPPED | OUTPATIENT
Start: 2023-04-21 | End: 2023-05-19

## 2023-04-21 ASSESSMENT — PAIN SCALES - GENERAL: PAINLEVEL: MODERATE PAIN (4)

## 2023-04-21 NOTE — PATIENT INSTRUCTIONS
Instructions for Patient    Incision  Keep your incision clean and dry at all times.   It is okay to shower, just pat the incision dry   No submerging incision in water such as pools, hot tubs, or baths for at least 8 weeks and until the incision is healed  Do not apply lotions or ointments to incision    Activity  No lifting greater than 10 pounds. Limited bending, twisting, or overhead reaching.  Walking is the best way to start exercise after surgery. Take short frequent walks. You may gradually increase the distance as tolerated. If you feel pain, decrease your activity, but DO NOT stop walking. Walking will help you gain strength, prevent muscle soreness and spasms, and prevent blood clots.   Avoid bed rest and prolonged sitting for longer than 30 minutes (change positions frequently while awake)  No contact sports or high impact activities such as; running/jogging, snowmobile or 4 mart riding or any other recreational vehicles until after given clearance at one of your follow up visits    Medications   Refills of pain medication:   Please call the neurosurgery clinic to request 2-3 days before you run out  Weaning from narcotic pain medications  When it is time, start weaning by extending the time between doses.   For example, if you're taking 2 tabs every 4 hours, spread it out to 2 tabs over 4.5, 5, 6 hours. At that point you can certainly cut down to 1 tab, then wean to an as needed basis until completely done with them.Refills: call our clinic 2-3 business days before you are out of medication. A nurse will call you back to obtain a pain assessment.   Don't take more than 3000mg of Acetaminophen in 24 hours  Ok to begin taking Aspirin and NSAIDs (ex: ibuprofen/Advil)  Encouraged icing for at least 3-4 times throughout the day for 20-30 minutes at a time. Avoid heat to the incision area.   Taking stool softeners regularly can reduce constipation commonly caused by narcotic pain medications.    Contact  clinic or Emergency Room if you develop:   Infection (redness, swelling, warmth, drainage, fever over 101 F)  New injury  Bladder or bowel changes or loss of control    Signs of blood clot:  Swelling and/or warmth in one or both legs  Pain or tenderness in your leg, ankle, foot, or arm   Red or discolored skin     Go to the Emergency Room   If sudden onset of severe headache, weakness, confusion, change in level of consciousness, pain, or loss of movement.  Chest pain  Trouble breathing     Post-operative appointments  6 week and 3 months post-op    [facility]  [facility]  [address]  [place] [address]  Telephone:  [phone]   Fax:  [phone]

## 2023-04-29 ENCOUNTER — OFFICE VISIT (OUTPATIENT)
Dept: URGENT CARE | Facility: URGENT CARE | Age: 43
End: 2023-04-29
Payer: COMMERCIAL

## 2023-04-29 ENCOUNTER — ANCILLARY PROCEDURE (OUTPATIENT)
Dept: GENERAL RADIOLOGY | Facility: CLINIC | Age: 43
End: 2023-04-29
Attending: EMERGENCY MEDICINE
Payer: COMMERCIAL

## 2023-04-29 VITALS
DIASTOLIC BLOOD PRESSURE: 86 MMHG | TEMPERATURE: 98.9 F | SYSTOLIC BLOOD PRESSURE: 124 MMHG | HEART RATE: 101 BPM | BODY MASS INDEX: 33.12 KG/M2 | WEIGHT: 196 LBS | OXYGEN SATURATION: 99 %

## 2023-04-29 DIAGNOSIS — S93.105A DISLOCATION OF PHALANX OF LEFT FOOT, INITIAL ENCOUNTER: ICD-10-CM

## 2023-04-29 DIAGNOSIS — T14.90XA TRAUMA: Primary | ICD-10-CM

## 2023-04-29 PROCEDURE — 99213 OFFICE O/P EST LOW 20 MIN: CPT | Mod: 25 | Performed by: EMERGENCY MEDICINE

## 2023-04-29 PROCEDURE — 29550 STRAPPING OF TOES: CPT | Mod: LT | Performed by: EMERGENCY MEDICINE

## 2023-04-29 PROCEDURE — 73660 X-RAY EXAM OF TOE(S): CPT | Mod: TC | Performed by: STUDENT IN AN ORGANIZED HEALTH CARE EDUCATION/TRAINING PROGRAM

## 2023-04-29 NOTE — PROGRESS NOTES
Note: Subsequent radiology report noted with suspicion for fracture.  Patient notified.  No change in treatment plan and the patient seeing her podiatrist in 1 week.    TAMMIE Treviño MD    CHIEF COMPLAINT: Injury left fifth toe      HPI: Patient is a 42-year-old female who struck her left fifth toe on the bathroom door 4 days ago.  It was deformed requiring her to push it back straight.  She continues to have pain at the mid and proximal aspect of her fifth toe.  No other injuries.      ROS: See HPI otherwise normal.    Allergies   Allergen Reactions     Amoxicillin Hives     Demerol Hcl [Meperidine Hcl] Nausea     Emetrol      Anxiety, agitation,severe paranoia. Zofran, Reglan are some of them.  DRAMAMINE WITHOUT ISSUES       Indomethacin Nausea and Vomiting     Macrobid [Nitrofuran Derivatives] Hives     Reglan [Metoclopramide] Other (See Comments)     Acute paranoia, severe     Zofran [Ondansetron] Other (See Comments)     Severe anxiety, agitation     Vancomycin Other (See Comments)     maria del rosario syndrome      Current Outpatient Medications   Medication Sig Dispense Refill     blood glucose (CONTOUR NEXT TEST) test strip USE TO TEST BLOOD GLUCOSE FOUR TIMES A  strip 3     blood glucose monitoring (NO BRAND SPECIFIED) meter device kit Use to test blood sugar 2 times daily or as directed. 1 kit 0     cyclobenzaprine (FLEXERIL) 10 MG tablet Take 1 tablet (10 mg) by mouth nightly as needed for muscle spasms (Patient not taking: Reported on 4/21/2023) 30 tablet 4     JARDIANCE 10 MG TABS tablet TAKE 1 TABLET (10 MG) BY MOUTH DAILY 90 tablet 1     metFORMIN (GLUCOPHAGE XR) 500 MG 24 hr tablet Take 2 tablets (1,000 mg) by mouth 2 times daily (with meals) 360 tablet 3     multivitamin w/minerals (THERA-VIT-M) tablet Take 1 tablet by mouth daily       nystatin (MYCOSTATIN) 000557 UNIT/GM external powder Apply topically 2 times daily as needed 60 g 2     omeprazole (PRILOSEC) 20 MG DR capsule TAKE 1 CAPSULE BY MOUTH  DAILY, 30 TO 60 MINUTES BEFORE A MEAL. 30 capsule 5     oxyCODONE (ROXICODONE) 5 MG tablet Take 1-2 tablets (5-10 mg) by mouth every 4 hours as needed for moderate to severe pain 36 tablet 0     senna-docusate (SENOKOT-S/PERICOLACE) 8.6-50 MG tablet Take 1-2 tablets by mouth daily as needed for constipation (While on oral opioids.) 30 tablet 1     tiZANidine (ZANAFLEX) 2 MG tablet Take 1-2 tablets (2-4 mg) by mouth 3 times daily 60 tablet 2         PE: No acute distress on exam.  Examination left fifth toe reveals ecchymosis of the mid and proximal aspect of the toe.  Good capillary refill.  No deformity.  There is very slight tenderness of the distal fifth metatarsal.  Overlying skin reveals no wound.        TREATMENT: X-ray left fifth toe: Negative for fracture with good alignment.    Buddy taping and postop shoe fitted      ASSESSMENT: 42-year-old female who is status post left toe dislocation, self reduced.      DIAGNOSIS: Dislocation left fifth toe-reduced      PLAN: Buddy tape plus postop shoe.  Contact own podiatrist Monday for follow-up in 1 week

## 2023-04-29 NOTE — PATIENT INSTRUCTIONS
Keep isai taped for 3 to 4 weeks or per instructions by on podiatrist  Quite activity with minimal walking

## 2023-05-08 ENCOUNTER — OFFICE VISIT (OUTPATIENT)
Dept: PODIATRY | Facility: CLINIC | Age: 43
End: 2023-05-08
Payer: COMMERCIAL

## 2023-05-08 VITALS
BODY MASS INDEX: 32.65 KG/M2 | WEIGHT: 196 LBS | SYSTOLIC BLOOD PRESSURE: 108 MMHG | HEIGHT: 65 IN | DIASTOLIC BLOOD PRESSURE: 80 MMHG

## 2023-05-08 DIAGNOSIS — Q66.6 PES VALGUS: ICD-10-CM

## 2023-05-08 DIAGNOSIS — E11.43 TYPE 2 DIABETES MELLITUS WITH DIABETIC AUTONOMIC NEUROPATHY, WITHOUT LONG-TERM CURRENT USE OF INSULIN (H): Primary | ICD-10-CM

## 2023-05-08 DIAGNOSIS — S92.502A CLOSED FRACTURE OF PHALANX OF LEFT FIFTH TOE, INITIAL ENCOUNTER: ICD-10-CM

## 2023-05-08 PROCEDURE — 99203 OFFICE O/P NEW LOW 30 MIN: CPT | Performed by: PODIATRIST

## 2023-05-08 ASSESSMENT — PAIN SCALES - GENERAL: PAINLEVEL: NO PAIN (0)

## 2023-05-08 NOTE — PROGRESS NOTES
HPI:  Fallon Rivera is a 42 year old female who is seen in consultation at the request of self.    Pt presents for eval of:   (Onset, Location, L/R, Character, Treatments, Injury if yes)    XR Left toe 4/29/2023    DM type 2    Back surgery 4/5/2023     Onset 4/25/2023, stubbed Left 5th toe into bathroom door. Presents WB w/post op shoe and isai taping toe.     Homemaker.    ROS:  10 point ROS neg other than the symptoms noted above in the HPI.    Patient Active Problem List   Diagnosis     Hemorrhage of rectum and anus     Female infertility     Papanicolaou smear of cervix with atypical squamous cells cannot exclude high grade squamous intraepithelial lesion (ASC-H)     S/P conization of cervix- KAYLA 2/3 in 2011     Kidney stones     Dyspnea     CARDIOVASCULAR SCREENING; LDL GOAL LESS THAN 160     GERD (gastroesophageal reflux disease)     Hyperlipidemia LDL goal <130     Mild major depression (H)     Right ovarian cyst     Glucosuria     Type 2 diabetes mellitus without complication (H)     Non morbid obesity due to excess calories     Obesity (BMI 35.0-39.9) with comorbidity (H)     Endometrial intraepithelial neoplasia (EIN)     Endometrial hyperplasia, unspecified     Pelvic pain in female     Rectal bleeding     Chronic diarrhea     Abnormal CT scan, small bowel     Dyspareunia, female     Left ovarian cyst       PAST MEDICAL HISTORY:   Past Medical History:   Diagnosis Date     Abnormal Pap smear 2006, 2007,     ASC-H, ASCUS + HPV 45     Calculus of kidney 01/01/2002     Cervical high risk HPV (human papillomavirus) test positive     + HPV 45 (high risk)     History of colposcopy with cervical biopsy 2/9/06, 3/15/07    koilocytosis 2007        PAST SURGICAL HISTORY:   Past Surgical History:   Procedure Laterality Date     CHOLECYSTECTOMY, LAPOROSCOPIC  5/11/2007    Cholecystectomy, Laparoscopic     COLONOSCOPY  05/17/10     COLONOSCOPY N/A 8/27/2019    Procedure: COLONOSCOPY;  Surgeon: Thee  Paramjit Beasley DO;  Location: [facility]     COLONOSCOPY N/A 10/23/2020    Procedure: COLONOSCOPY, WITH  BIOPSY;  Surgeon: Ba Dickinson MD;  Location: [facility]     CONIZATION  7/1/2011    Procedure:CONIZATION; cold knife and punch biopsy of mole on back; Surgeon:SYDNEY FORD; Location:[facility]     DILATION AND CURETTAGE, HYSTEROSCOPY, LAPAROSCOPY, COMBINED Right 9/24/2015    Procedure: COMBINED DILATION AND CURETTAGE, HYSTEROSCOPY, LAPAROSCOPY;  Surgeon: Sydney Ford MD;  [facility]     DISCECTOMY LUMBAR MINIMALLY INVASIVE ONE LEVEL Left 1/23/2019    Procedure: Minimally Invasive Surgery Left Lumbar 5-Sacral 1 microdiscectomy;  Surgeon: Mason Roe MD;  [facility]     DISCECTOMY LUMBAR POSTERIOR MICROSCOPIC ONE LEVEL Left 4/5/2023    Procedure: Minimally Invasive Left Lumbar 5 to Sacral 1 evacuation of recurrent disc herniation;  Surgeon: Mason Roe MD;  [facility]     ESOPHAGOSCOPY, GASTROSCOPY, DUODENOSCOPY (EGD), COMBINED  5/21/2014    Procedure: COMBINED ESOPHAGOSCOPY, GASTROSCOPY, DUODENOSCOPY (EGD), BIOPSY SINGLE OR MULTIPLE;  Surgeon: Earl Lane MD;  [facility]     ESOPHAGOSCOPY, GASTROSCOPY, DUODENOSCOPY (EGD), COMBINED N/A 10/23/2020    Procedure: Esophagogastroduodenoscopy with  biopsy;  Surgeon: Ba Dickinson MD;  [facility]     EXCISE LESION TRUNK  7/1/2011    Procedure:EXCISE LESION TRUNK; Surgeon:SYDNEY FORD; [facility]     FORAMINOTOMY LUMBAR POSTERIOR ONE LEVEL Left 4/5/2023    Procedure: left foraminotomy Lumbar 5 to Sacral 1;  Surgeon: Mason Roe MD;  [facility]     HC FRAGMENTING OF KIDNEY STONE  11/30/2005     HC RX ECTOP PREG BY SCOPE,RMV TUBE/OVRY  12/20/2007    Right sided salpingectomy and removal of ruptured ectopic pregnancy. D&C.     HYSTERECTOMY VAGINAL       LAPAROSCOPIC APPENDECTOMY N/A 9/24/2015    Procedure: LAPAROSCOPIC APPENDECTOMY;  Surgeon: James Cuellar MD;   [facility]     LAPAROSCOPIC CYSTECTOMY OVARIAN (BENIGN) N/A 9/24/2015    Procedure: LAPAROSCOPIC CYSTECTOMY OVARIAN (BENIGN);  Surgeon: Greg Ford MD;  [facility]     LAPAROSCOPIC HYSTERECTOMY TOTAL, BILATERAL SALPINGO-OOPHORECTOMY, COMBINED N/A 7/28/2020    Procedure: laparoscpic assisted vaginal hysterectomy, cystoscopy;  Surgeon: Greg Ford MD;  Location: [facility]     LAPAROSCOPIC OOPHORECTOMY Right 9/24/2015    Procedure: LAPAROSCOPIC OOPHORECTOMY;  Surgeon: Greg Ford MD;  [facility]     LITHOTRIPSY  01/17/2007     PELVIS LAPAROSCOPY,DX  10/16/2000    Diagnostic laparoscopy, Endometrial biopsy.     TUBAL/ECTOPIC PREGNANCY  01/23/2007    Lap removal of left ruptured ectopic pregnancy & salpingectomy, D&C     ZZC REMOVAL OF KIDNEY STONE      two surgeries, 2002,2003     ZZ [facility]GI ENDOSCOPY, SIMPLE EXAM  09/01/09        MEDICATIONS:   Current Outpatient Medications:      blood glucose (CONTOUR NEXT TEST) test strip, USE TO TEST BLOOD GLUCOSE FOUR TIMES A DAY, Disp: 150 strip, Rfl: 3     blood glucose monitoring (NO BRAND SPECIFIED) meter device kit, Use to test blood sugar 2 times daily or as directed., Disp: 1 kit, Rfl: 0     JARDIANCE 10 MG TABS tablet, TAKE 1 TABLET (10 MG) BY MOUTH DAILY, Disp: 90 tablet, Rfl: 1     metFORMIN (GLUCOPHAGE XR) 500 MG 24 hr tablet, Take 2 tablets (1,000 mg) by mouth 2 times daily (with meals), Disp: 360 tablet, Rfl: 3     multivitamin w/minerals (THERA-VIT-M) tablet, Take 1 tablet by mouth daily, Disp: , Rfl:      nystatin (MYCOSTATIN) 668271 UNIT/GM external powder, Apply topically 2 times daily as needed, Disp: 60 g, Rfl: 2     omeprazole (PRILOSEC) 20 MG DR capsule, TAKE 1 CAPSULE BY MOUTH DAILY, 30 TO 60 MINUTES BEFORE A MEAL., Disp: 30 capsule, Rfl: 5     oxyCODONE (ROXICODONE) 5 MG tablet, Take 1-2 tablets (5-10 mg) by mouth every 4 hours as needed for moderate to severe pain, Disp: 36 tablet, Rfl: 0     tiZANidine  (ZANAFLEX) 2 MG tablet, Take 1-2 tablets (2-4 mg) by mouth 3 times daily, Disp: 60 tablet, Rfl: 2     cyclobenzaprine (FLEXERIL) 10 MG tablet, Take 1 tablet (10 mg) by mouth nightly as needed for muscle spasms (Patient not taking: Reported on 4/21/2023), Disp: 30 tablet, Rfl: 4     senna-docusate (SENOKOT-S/PERICOLACE) 8.6-50 MG tablet, Take 1-2 tablets by mouth daily as needed for constipation (While on oral opioids.), Disp: 30 tablet, Rfl: 1     ALLERGIES:    Allergies   Allergen Reactions     Amoxicillin Hives     Demerol Hcl [Meperidine Hcl] Nausea     Emetrol      Anxiety, agitation,severe paranoia. Zofran, Reglan are some of them.  DRAMAMINE WITHOUT ISSUES       Indomethacin Nausea and Vomiting     Macrobid [Nitrofuran Derivatives] Hives     Reglan [Metoclopramide] Other (See Comments)     Acute paranoia, severe     Zofran [Ondansetron] Other (See Comments)     Severe anxiety, agitation     Vancomycin Other (See Comments)     maria del rosario syndrome        SOCIAL HISTORY:   Social History     Socioeconomic History     Marital status:      Spouse name: Joseph     Number of children: 1     Years of education: 9     Highest education level: Not on file   Occupational History     Employer: NONE   Tobacco Use     Smoking status: Some Days     Packs/day: 0.00     Years: 12.00     Pack years: 0.00     Types: Cigarettes     Smokeless tobacco: Never     Tobacco comments:     As of 10 /2014, pt has had a few cigs here and there   Vaping Use     Vaping status: Never Used   Substance and Sexual Activity     Alcohol use: Yes     Alcohol/week: 0.0 standard drinks of alcohol     Comment: every few months     Drug use: No     Sexual activity: Yes     Partners: Male     Birth control/protection: Female Surgical   Other Topics Concern      Service No     Blood Transfusions No     Caffeine Concern Yes     Comment: Reports 1 can soda/week  Advised not more than 16 ounces caffeien/day.     Occupational Exposure No     Hobby  Hazards No     Sleep Concern No     Stress Concern Yes     Comment: will discuss with      Weight Concern Yes     Comment: Eating disorder in childhood.  Hx. gestational diab.     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     Exercise No     Comment: Advised walking 30 min/day.     Bike Helmet No     Seat Belt Yes     Self-Exams Not Asked     Parent/sibling w/ CABG, MI or angioplasty before 65F 55M? Not Asked   Social History Narrative     and lives at home with her  and daughter.     Social Determinants of Health     Financial Resource Strain: Not on file   Food Insecurity: Not on file   Transportation Needs: Not on file   Physical Activity: Not on file   Stress: Not on file   Social Connections: Not on file   Intimate Partner Violence: Not on file   Housing Stability: Not on file        FAMILY HISTORY:   Family History   Problem Relation Age of Onset     Diabetes Maternal Grandmother         adult onset     Anesthesia Reaction Maternal Grandmother         can't tolerate Novacaine.     Respiratory Maternal Grandmother         COPD and emphysema.     Thyroid Disease Maternal Grandmother      Heart Disease Maternal Grandmother         CHF     Diabetes Paternal Grandfather         adult o nset     Hypertension Paternal Grandfather      Cerebrovascular Disease Paternal Grandfather      Heart Disease Paternal Grandfather         MI, replaced valve,angioplasty     Thyroid Disease Paternal Grandfather      Cancer Maternal Grandfather         skin cancer     Heart Disease Maternal Grandfather         MI     Obesity Mother         on thyroid medication     Thyroid Disease Mother      Diabetes Mother      Rheumatologic Disease Mother      Heart Disease Father      Hypertension Father         and TIA     Lipids Father      Breast Cancer Paternal Grandmother      Arrhythmia Other      Cancer Maternal Aunt         breast/brain cancer     Depression  "Paternal Aunt      Cerebrovascular Disease Paternal Uncle      Cerebrovascular Disease Paternal Aunt         EXAM:Vitals: /80 (BP Location: Left arm, Patient Position: Sitting, Cuff Size: Adult Large)   Ht 1.638 m (5' 4.5\")   Wt 88.9 kg (196 lb)   LMP 07/25/2020 (Exact Date)   BMI 33.12 kg/m    BMI= Body mass index is 33.12 kg/m .    General appearance: Patient is alert and fully cooperative with history & exam.  No sign of distress is noted during the visit.     Psychiatric: Affect is pleasant & appropriate.  Patient appears motivated to improve health.     Respiratory: Breathing is regular & unlabored while sitting.     HEENT: Hearing is intact to spoken word.  Speech is clear.  No gross evidence of visual impairment that would impact ambulation.     Vascular: DP & PT pulses are intact & regular bilaterally.  No significant edema or varicosities noted.  CFT and skin temperature is normal to both lower extremities.     Neurologic: Lower extremity sensation is intact to light touch.  No evidence of weakness or contracture in the lower extremities.  No evidence of neuropathy.    Dermatologic: Skin is intact to both lower extremities with adequate texture, turgor and tone about the integument.  No paronychia or evidence of soft tissue infection is noted.     Musculoskeletal: Patient is ambulatory without assistive device or brace.  Mild forefoot valgus is noted.  Discomfort is noted with firm palpation about the distal aspect and proximal phalanx of the left fifth toe.  Minimal contracture is noted.  No pain is noted with firm palpation of the right fifth toe.  Protective threshold remains intact in and around the fifth ray.  This was tested with a 5.07 monofilament with a history of neuropathy.  Recent back surgery.  Also has pes valgus and is requesting new orthotics.    Hemoglobin A1C (%)   Date Value   03/20/2023 7.0 (H)   10/24/2022 7.1 (H)   07/19/2022 7.6 (H)   05/25/2022 8.7 (H)   09/28/2021 7.7 (H) "   01/26/2021 7.2 (H)   07/27/2020 8.0 (H)   03/05/2020 7.8 (H)   11/06/2019 7.6 (H)   08/01/2019 8.4 (H)   03/16/2019 8.7 (H)   10/16/2018 7.3 (H)   05/21/2018 8.2 (H)     Creatinine (mg/dL)   Date Value   03/20/2023 0.75   11/08/2022 0.65   10/09/2022 0.72   05/25/2022 0.74   03/31/2022 0.92   09/28/2021 0.82   01/26/2021 0.70   09/09/2020 0.80   08/11/2020 0.81   07/27/2020 0.76   06/13/2020 0.79   03/05/2020 0.76        ASSESSMENT:       ICD-10-CM    1. Type 2 diabetes mellitus with diabetic autonomic neuropathy, without long-term current use of insulin (H)  E11.43 [facility] Orthotic; Foot Orthotics      2. Closed fracture of phalanx of left fifth toe, initial encounter  S92.502A       3. Pes valgus  Q66.6            PLAN:  Reviewed patient's chart in [facility].      5/8/2023   Would recommend that she stays in the postoperative shoe for all weightbearing activity for as long as 4-6 weeks or until no pain or edema is noted in her left fifth toe.  Recommend buddy wrapping and demonstrating how to do this with the fourth and fifth toes  May follow-up at 6 weeks post injury if still symptomatic otherwise as needed    Also placed order for custom molded orthotics at her request secondary to pes valgus  Recommend follow-up once yearly for diabetic foot exam      Valentino Toribio DPM

## 2023-05-08 NOTE — LETTER
5/8/2023         RE: Fallon Rivera  [address] [facility] [address]        Dear Colleague,    Thank you for referring your patient, Fallon Rivera, to the [facility]. Please see a copy of my visit note below.    HPI:  Fallon Rivera is a 42 year old female who is seen in consultation at the request of self.    Pt presents for eval of:   (Onset, Location, L/R, Character, Treatments, Injury if yes)    XR Left toe 4/29/2023    DM type 2    Back surgery 4/5/2023     Onset 4/25/2023, stubbed Left 5th toe into bathroom door. Presents WB w/post op shoe and isai taping toe.     Homemaker.    ROS:  10 point ROS neg other than the symptoms noted above in the HPI.    Patient Active Problem List   Diagnosis     Hemorrhage of rectum and anus     Female infertility     Papanicolaou smear of cervix with atypical squamous cells cannot exclude high grade squamous intraepithelial lesion (ASC-H)     S/P conization of cervix- KAYLA 2/3 in 2011     Kidney stones     Dyspnea     CARDIOVASCULAR SCREENING; LDL GOAL LESS THAN 160     GERD (gastroesophageal reflux disease)     Hyperlipidemia LDL goal <130     Mild major depression (H)     Right ovarian cyst     Glucosuria     Type 2 diabetes mellitus without complication (H)     Non morbid obesity due to excess calories     Obesity (BMI 35.0-39.9) with comorbidity (H)     Endometrial intraepithelial neoplasia (EIN)     Endometrial hyperplasia, unspecified     Pelvic pain in female     Rectal bleeding     Chronic diarrhea     Abnormal CT scan, small bowel     Dyspareunia, female     Left ovarian cyst       PAST MEDICAL HISTORY:   Past Medical History:   Diagnosis Date     Abnormal Pap smear 2006, 2007,     ASC-H, ASCUS + HPV 45     Calculus of kidney 01/01/2002     Cervical high risk HPV (human papillomavirus) test positive     + HPV 45 (high risk)     History of colposcopy with cervical biopsy 2/9/06, 3/15/07    koilocytosis 2007        PAST  SURGICAL HISTORY:   Past Surgical History:   Procedure Laterality Date     CHOLECYSTECTOMY, LAPOROSCOPIC  5/11/2007    Cholecystectomy, Laparoscopic     COLONOSCOPY  05/17/10     COLONOSCOPY N/A 8/27/2019    Procedure: COLONOSCOPY;  Surgeon: Paramjit Kingston DO;  [facility]     COLONOSCOPY N/A 10/23/2020    Procedure: COLONOSCOPY, WITH  BIOPSY;  Surgeon: Ba Dickinson MD;  [facility]     CONIZATION  7/1/2011    Procedure:CONIZATION; cold knife and punch biopsy of mole on back; Surgeon:SYDNEY FORD; [facility]     DILATION AND CURETTAGE, HYSTEROSCOPY, LAPAROSCOPY, COMBINED Right 9/24/2015    Procedure: COMBINED DILATION AND CURETTAGE, HYSTEROSCOPY, LAPAROSCOPY;  Surgeon: Sydney Ford MD;  [facility]     DISCECTOMY LUMBAR MINIMALLY INVASIVE ONE LEVEL Left 1/23/2019    Procedure: Minimally Invasive Surgery Left Lumbar 5-Sacral 1 microdiscectomy;  Surgeon: Mason Roe MD;  [facility]     DISCECTOMY LUMBAR POSTERIOR MICROSCOPIC ONE LEVEL Left 4/5/2023    Procedure: Minimally Invasive Left Lumbar 5 to Sacral 1 evacuation of recurrent disc herniation;  Surgeon: Mason Roe MD;  [facility]     ESOPHAGOSCOPY, GASTROSCOPY, DUODENOSCOPY (EGD), COMBINED  5/21/2014    Procedure: COMBINED ESOPHAGOSCOPY, GASTROSCOPY, DUODENOSCOPY (EGD), BIOPSY SINGLE OR MULTIPLE;  Surgeon: Earl Lane MD;  [facility]     ESOPHAGOSCOPY, GASTROSCOPY, DUODENOSCOPY (EGD), COMBINED N/A 10/23/2020    Procedure: Esophagogastroduodenoscopy with  biopsy;  Surgeon: Ba Dickinson MD;  [facility]     EXCISE LESION TRUNK  7/1/2011    Procedure:EXCISE LESION TRUNK; Surgeon:SYDNEY FORD; [facility]     FORAMINOTOMY LUMBAR POSTERIOR ONE LEVEL Left 4/5/2023    Procedure: left foraminotomy Lumbar 5 to Sacral 1;  Surgeon: Mason Roe MD;  [facility]     HC FRAGMENTING OF KIDNEY STONE  11/30/2005     HC RX ECTOP PREG BY SCOPE,RMV  TUBE/OVRY  12/20/2007    Right sided salpingectomy and removal of ruptured ectopic pregnancy. D&C.     HYSTERECTOMY VAGINAL       LAPAROSCOPIC APPENDECTOMY N/A 9/24/2015    Procedure: LAPAROSCOPIC APPENDECTOMY;  Surgeon: James Cuellar MD;  [facility]     LAPAROSCOPIC CYSTECTOMY OVARIAN (BENIGN) N/A 9/24/2015    Procedure: LAPAROSCOPIC CYSTECTOMY OVARIAN (BENIGN);  Surgeon: Greg Ford MD;  [facility]     LAPAROSCOPIC HYSTERECTOMY TOTAL, BILATERAL SALPINGO-OOPHORECTOMY, COMBINED N/A 7/28/2020    Procedure: laparoscpic assisted vaginal hysterectomy, cystoscopy;  Surgeon: Greg Ford MD;  Location: [facility]     LAPAROSCOPIC OOPHORECTOMY Right 9/24/2015    Procedure: LAPAROSCOPIC OOPHORECTOMY;  Surgeon: Greg Ford MD;  [facility]     LITHOTRIPSY  01/17/2007     PELVIS LAPAROSCOPY,DX  10/16/2000    Diagnostic laparoscopy, Endometrial biopsy.     TUBAL/ECTOPIC PREGNANCY  01/23/2007    Lap removal of left ruptured ectopic pregnancy & salpingectomy, D&C     [facility] REMOVAL OF KIDNEY STONE      two surgeries, 2002,2003     [facility] UGI ENDOSCOPY, SIMPLE EXAM  09/01/09        MEDICATIONS:   Current Outpatient Medications:      blood glucose (CONTOUR NEXT TEST) test strip, USE TO TEST BLOOD GLUCOSE FOUR TIMES A DAY, Disp: 150 strip, Rfl: 3     blood glucose monitoring (NO BRAND SPECIFIED) meter device kit, Use to test blood sugar 2 times daily or as directed., Disp: 1 kit, Rfl: 0     JARDIANCE 10 MG TABS tablet, TAKE 1 TABLET (10 MG) BY MOUTH DAILY, Disp: 90 tablet, Rfl: 1     metFORMIN (GLUCOPHAGE XR) 500 MG 24 hr tablet, Take 2 tablets (1,000 mg) by mouth 2 times daily (with meals), Disp: 360 tablet, Rfl: 3     multivitamin w/minerals (THERA-VIT-M) tablet, Take 1 tablet by mouth daily, Disp: , Rfl:      nystatin (MYCOSTATIN) 594036 UNIT/GM external powder, Apply topically 2 times daily as needed, Disp: 60 g, Rfl: 2     omeprazole (PRILOSEC) 20 MG DR capsule, TAKE 1  CAPSULE BY MOUTH DAILY, 30 TO 60 MINUTES BEFORE A MEAL., Disp: 30 capsule, Rfl: 5     oxyCODONE (ROXICODONE) 5 MG tablet, Take 1-2 tablets (5-10 mg) by mouth every 4 hours as needed for moderate to severe pain, Disp: 36 tablet, Rfl: 0     tiZANidine (ZANAFLEX) 2 MG tablet, Take 1-2 tablets (2-4 mg) by mouth 3 times daily, Disp: 60 tablet, Rfl: 2     cyclobenzaprine (FLEXERIL) 10 MG tablet, Take 1 tablet (10 mg) by mouth nightly as needed for muscle spasms (Patient not taking: Reported on 4/21/2023), Disp: 30 tablet, Rfl: 4     senna-docusate (SENOKOT-S/PERICOLACE) 8.6-50 MG tablet, Take 1-2 tablets by mouth daily as needed for constipation (While on oral opioids.), Disp: 30 tablet, Rfl: 1     ALLERGIES:    Allergies   Allergen Reactions     Amoxicillin Hives     Demerol Hcl [Meperidine Hcl] Nausea     Emetrol      Anxiety, agitation,severe paranoia. Zofran, Reglan are some of them.  DRAMAMINE WITHOUT ISSUES       Indomethacin Nausea and Vomiting     Macrobid [Nitrofuran Derivatives] Hives     Reglan [Metoclopramide] Other (See Comments)     Acute paranoia, severe     Zofran [Ondansetron] Other (See Comments)     Severe anxiety, agitation     Vancomycin Other (See Comments)     maria del rosario syndrome        SOCIAL HISTORY:   Social History     Socioeconomic History     Marital status:      Spouse name: Joseph     Number of children: 1     Years of education: 9     Highest education level: Not on file   Occupational History     Employer: NONE   Tobacco Use     Smoking status: Some Days     Packs/day: 0.00     Years: 12.00     Pack years: 0.00     Types: Cigarettes     Smokeless tobacco: Never     Tobacco comments:     As of 10 /2014, pt has had a few cigs here and there   Vaping Use     Vaping status: Never Used   Substance and Sexual Activity     Alcohol use: Yes     Alcohol/week: 0.0 standard drinks of alcohol     Comment: every few months     Drug use: No     Sexual activity: Yes     Partners: Male     Birth  control/protection: Female Surgical   Other Topics Concern      Service No     Blood Transfusions No     Caffeine Concern Yes     Comment: Reports 1 can soda/week  Advised not more than 16 ounces caffeien/day.     Occupational Exposure No     Hobby Hazards No     Sleep Concern No     Stress Concern Yes     Comment: will discuss with      Weight Concern Yes     Comment: Eating disorder in childhood.  Hx. gestational diab.     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     Exercise No     Comment: Advised walking 30 min/day.     Bike Helmet No     Seat Belt Yes     Self-Exams Not Asked     Parent/sibling w/ CABG, MI or angioplasty before 65F 55M? Not Asked   Social History Narrative     and lives at home with her  and daughter.     Social Determinants of Health     Financial Resource Strain: Not on file   Food Insecurity: Not on file   Transportation Needs: Not on file   Physical Activity: Not on file   Stress: Not on file   Social Connections: Not on file   Intimate Partner Violence: Not on file   Housing Stability: Not on file        FAMILY HISTORY:   Family History   Problem Relation Age of Onset     Diabetes Maternal Grandmother         adult onset     Anesthesia Reaction Maternal Grandmother         can't tolerate Novacaine.     Respiratory Maternal Grandmother         COPD and emphysema.     Thyroid Disease Maternal Grandmother      Heart Disease Maternal Grandmother         CHF     Diabetes Paternal Grandfather         adult o nset     Hypertension Paternal Grandfather      Cerebrovascular Disease Paternal Grandfather      Heart Disease Paternal Grandfather         MI, replaced valve,angioplasty     Thyroid Disease Paternal Grandfather      Cancer Maternal Grandfather         skin cancer     Heart Disease Maternal Grandfather         MI     Obesity Mother         on thyroid medication     Thyroid Disease Mother      Diabetes  "Mother      Rheumatologic Disease Mother      Heart Disease Father      Hypertension Father         and TIA     Lipids Father      Breast Cancer Paternal Grandmother      Arrhythmia Other      Cancer Maternal Aunt         breast/brain cancer     Depression Paternal Aunt      Cerebrovascular Disease Paternal Uncle      Cerebrovascular Disease Paternal Aunt         EXAM:Vitals: /80 (BP Location: Left arm, Patient Position: Sitting, Cuff Size: Adult Large)   Ht 1.638 m (5' 4.5\")   Wt 88.9 kg (196 lb)   LMP 07/25/2020 (Exact Date)   BMI 33.12 kg/m    BMI= Body mass index is 33.12 kg/m .    General appearance: Patient is alert and fully cooperative with history & exam.  No sign of distress is noted during the visit.     Psychiatric: Affect is pleasant & appropriate.  Patient appears motivated to improve health.     Respiratory: Breathing is regular & unlabored while sitting.     HEENT: Hearing is intact to spoken word.  Speech is clear.  No gross evidence of visual impairment that would impact ambulation.     Vascular: DP & PT pulses are intact & regular bilaterally.  No significant edema or varicosities noted.  CFT and skin temperature is normal to both lower extremities.     Neurologic: Lower extremity sensation is intact to light touch.  No evidence of weakness or contracture in the lower extremities.  No evidence of neuropathy.    Dermatologic: Skin is intact to both lower extremities with adequate texture, turgor and tone about the integument.  No paronychia or evidence of soft tissue infection is noted.     Musculoskeletal: Patient is ambulatory without assistive device or brace.  Mild forefoot valgus is noted.  Discomfort is noted with firm palpation about the distal aspect and proximal phalanx of the left fifth toe.  Minimal contracture is noted.  No pain is noted with firm palpation of the right fifth toe.  Protective threshold remains intact in and around the fifth ray.  This was tested with a 5.07 " monofilament with a history of neuropathy.  Recent back surgery.  Also has pes valgus and is requesting new orthotics.    Hemoglobin A1C (%)   Date Value   03/20/2023 7.0 (H)   10/24/2022 7.1 (H)   07/19/2022 7.6 (H)   05/25/2022 8.7 (H)   09/28/2021 7.7 (H)   01/26/2021 7.2 (H)   07/27/2020 8.0 (H)   03/05/2020 7.8 (H)   11/06/2019 7.6 (H)   08/01/2019 8.4 (H)   03/16/2019 8.7 (H)   10/16/2018 7.3 (H)   05/21/2018 8.2 (H)     Creatinine (mg/dL)   Date Value   03/20/2023 0.75   11/08/2022 0.65   10/09/2022 0.72   05/25/2022 0.74   03/31/2022 0.92   09/28/2021 0.82   01/26/2021 0.70   09/09/2020 0.80   08/11/2020 0.81   07/27/2020 0.76   06/13/2020 0.79   03/05/2020 0.76        ASSESSMENT:       ICD-10-CM    1. Type 2 diabetes mellitus with diabetic autonomic neuropathy, without long-term current use of insulin (H)  E11.43 [facility] DME Orthotic; Foot Orthotics      2. Closed fracture of phalanx of left fifth toe, initial encounter  S92.502A       3. Pes valgus  Q66.6            PLAN:  Reviewed patient's chart in [facility].      5/8/2023   Would recommend that she stays in the postoperative shoe for all weightbearing activity for as long as 4-6 weeks or until no pain or edema is noted in her left fifth toe.  Recommend buddy wrapping and demonstrating how to do this with the fourth and fifth toes  May follow-up at 6 weeks post injury if still symptomatic otherwise as needed    Also placed order for custom molded orthotics at her request secondary to pes valgus  Recommend follow-up once yearly for diabetic foot exam      Valentino Toribio DPM            Again, thank you for allowing me to participate in the care of your patient.        Sincerely,        Valentino Toribio DPM

## 2023-05-19 ENCOUNTER — HOSPITAL ENCOUNTER (OUTPATIENT)
Dept: MAMMOGRAPHY | Facility: CLINIC | Age: 43
Discharge: HOME | End: 2023-05-19
Attending: OBSTETRICS & GYNECOLOGY | Admitting: OBSTETRICS & GYNECOLOGY
Payer: COMMERCIAL

## 2023-05-19 ENCOUNTER — OFFICE VISIT (OUTPATIENT)
Dept: NEUROSURGERY | Facility: CLINIC | Age: 43
End: 2023-05-19
Payer: COMMERCIAL

## 2023-05-19 VITALS — DIASTOLIC BLOOD PRESSURE: 80 MMHG | OXYGEN SATURATION: 99 % | SYSTOLIC BLOOD PRESSURE: 108 MMHG | HEART RATE: 101 BPM

## 2023-05-19 DIAGNOSIS — Z98.890 S/P LUMBAR MICRODISCECTOMY: Primary | ICD-10-CM

## 2023-05-19 DIAGNOSIS — M54.16 LUMBAR RADICULOPATHY: ICD-10-CM

## 2023-05-19 DIAGNOSIS — Z12.31 VISIT FOR SCREENING MAMMOGRAM: ICD-10-CM

## 2023-05-19 DIAGNOSIS — E11.9 TYPE 2 DIABETES MELLITUS WITHOUT COMPLICATION, WITHOUT LONG-TERM CURRENT USE OF INSULIN (H): ICD-10-CM

## 2023-05-19 PROCEDURE — 77067 SCR MAMMO BI INCL CAD: CPT

## 2023-05-19 PROCEDURE — 99024 POSTOP FOLLOW-UP VISIT: CPT | Performed by: PHYSICIAN ASSISTANT

## 2023-05-19 RX ORDER — OXYCODONE HYDROCHLORIDE 5 MG/1
5-10 TABLET ORAL EVERY 4 HOURS PRN
Qty: 36 TABLET | Refills: 0 | Status: SHIPPED | OUTPATIENT
Start: 2023-05-19 | End: 2024-01-19

## 2023-05-19 ASSESSMENT — PAIN SCALES - GENERAL: PAINLEVEL: EXTREME PAIN (8)

## 2023-05-19 NOTE — NURSING NOTE
"Fallon Rivera is a 43 year old female who presents for:  Chief Complaint   Patient presents with     Surgical Followup     6 week post op follow up; DOS 4/5/2023         Initial Vitals:  /80   Pulse 101   LMP 07/25/2020 (Exact Date)   SpO2 99%  Estimated body mass index is 33.12 kg/m  as calculated from the following:    Height as of 5/8/23: 5' 4.5\" (1.638 m).    Weight as of 5/8/23: 196 lb (88.9 kg).. There is no height or weight on file to calculate BSA. BP completed using cuff size: regular  Extreme Pain (8)    Nursing Comments:     Phan Stoddard MA   "

## 2023-05-19 NOTE — PROGRESS NOTES
Neurosurgery Clinic  Neurosurgery followup:    HPI: 6 week follow-up for left L5-S1 foraminotomy and discectomy.  She did well up until she broke a toe on her foot a couple weeks after surgery.  She was then placed into a walking boot for several weeks, and noticed an increase in her back and leg pain since then.  She does think that things have calmed down over the last week or so, since she has been out of the boot, but does still have a lot of posterior thigh and calf pain on the left.  She was considering going to the ED, as she ran out of her pain pills a couple of days ago.  She does note some constipation, but has not noted any loss of continence or any saddle anesthesia.      Exam:  Constitutional:  Alert, well nourished, NAD.  HEENT: Normocephalic, atraumatic.   Pulm:  Without shortness of breath   CV:  No pitting edema of BLE.      Neurological:  Awake  Alert  Oriented x 3  Motor exam:        IP Q DF PF EHL  R   5  5   5   5    5  L   5  5   5   5    5     Reflexes are 2+ in the patellar and Achilles. There is no clonus. Downgoing Babinski.      Able to spontaneously move L/E bilaterally  Sensation intact throughout all L/E dermatomes     Incision: Well-healed    Imaging:     A/P:    6 weeks status post left L5-S1 foraminotomies and discectomy.  We did have a discussion regarding the progression of her symptoms.  She has noticed a slight improvement since getting out of the walking boot a week ago.  We did provide her with a refill on her oxycodone, and we also increased her Zanaflex from 4 mg to 8 mg.  She does feel like the Zanaflex is helpful.  We discussed a Medrol Dosepak as well, but given her diabetes, she is reluctant to try this, understandably.  We also ordered some physical therapy per her request.  She will continue to monitor her symptoms over the next several weeks, and if things do not improve, she will contact our office to discuss next steps.  She voiced agreement and understanding.      -  Call the clinic at [phone] for increasing redness, swelling or pus draining from the incision, increased pain or any other questions and concerns.      Thierno Sampson PA-C  [facility] Neurosurgery  [address] [address]    Tel [phone]  Pager [phone]      The use of Dragon/[employer] dictation services may have been used to construct the content in this note; any grammatical or spelling errors are non-intentional. Please contact the author of this note directly if you are in need of any clarification.

## 2023-05-19 NOTE — LETTER
5/19/2023         RE: Fallon Rivera  [address] [facility] [address]        Dear Colleague,    Thank you for referring your patient, Fallon Rivera, to the [facility] [place]. Please see a copy of my visit note below.    Neurosurgery Clinic  Neurosurgery followup:    HPI: 6 week follow-up for left L5-S1 foraminotomy and discectomy.  She did well up until she broke a toe on her foot a couple weeks after surgery.  She was then placed into a walking boot for several weeks, and noticed an increase in her back and leg pain since then.  She does think that things have calmed down over the last week or so, since she has been out of the boot, but does still have a lot of posterior thigh and calf pain on the left.  She was considering going to the ED, as she ran out of her pain pills a couple of days ago.  She does note some constipation, but has not noted any loss of continence or any saddle anesthesia.      Exam:  Constitutional:  Alert, well nourished, NAD.  HEENT: Normocephalic, atraumatic.   Pulm:  Without shortness of breath   CV:  No pitting edema of BLE.      Neurological:  Awake  Alert  Oriented x 3  Motor exam:        IP Q DF PF EHL  R   5  5   5   5    5  L   5  5   5   5    5     Reflexes are 2+ in the patellar and Achilles. There is no clonus. Downgoing Babinski.      Able to spontaneously move L/E bilaterally  Sensation intact throughout all L/E dermatomes     Incision: Well-healed    Imaging:     A/P:    6 weeks status post left L5-S1 foraminotomies and discectomy.  We did have a discussion regarding the progression of her symptoms.  She has noticed a slight improvement since getting out of the walking boot a week ago.  We did provide her with a refill on her oxycodone, and we also increased her Zanaflex from 4 mg to 8 mg.  She does feel like the Zanaflex is helpful.  We discussed a Medrol Dosepak as well, but given her diabetes, she is reluctant to try this,  understandably.  We also ordered some physical therapy per her request.  She will continue to monitor her symptoms over the next several weeks, and if things do not improve, she will contact our office to discuss next steps.  She voiced agreement and understanding.      - Call the clinic at [phone] for increasing redness, swelling or pus draining from the incision, increased pain or any other questions and concerns.      Thierno Sampson PA-C  [facility] Neurosurgery  [address] [address]    Tel [phone]  Pager [phone]      The use of Dragon/[employer] dictation services may have been used to construct the content in this note; any grammatical or spelling errors are non-intentional. Please contact the author of this note directly if you are in need of any clarification.        Again, thank you for allowing me to participate in the care of your patient.        Sincerely,        Thierno Sampson PA-C

## 2023-05-23 DIAGNOSIS — K21.9 GASTROESOPHAGEAL REFLUX DISEASE WITHOUT ESOPHAGITIS: ICD-10-CM

## 2023-05-25 NOTE — TELEPHONE ENCOUNTER
Pending Prescriptions:                       Disp   Refills    omeprazole (PRILOSEC) 20 MG DR capsule [Ph*30 cap*5        Sig: TAKE 1 CAPSULE BY MOUTH DAILY, 30 TO 60 MINUTES           BEFORE A MEAL.      Routing refill request to provider for review/approval because:  Drug allergy warning    Tammy Liang RN on 5/25/2023 at 9:00 AM

## 2023-05-31 DIAGNOSIS — E11.9 TYPE 2 DIABETES MELLITUS WITHOUT COMPLICATION, WITHOUT LONG-TERM CURRENT USE OF INSULIN (H): ICD-10-CM

## 2023-06-02 RX ORDER — EMPAGLIFLOZIN 10 MG/1
TABLET, FILM COATED ORAL
Qty: 90 TABLET | Refills: 1 | Status: SHIPPED | OUTPATIENT
Start: 2023-06-02 | End: 2023-11-29

## 2023-06-27 ENCOUNTER — OFFICE VISIT (OUTPATIENT)
Dept: FAMILY MEDICINE | Facility: CLINIC | Age: 43
End: 2023-06-27
Payer: COMMERCIAL

## 2023-06-27 ENCOUNTER — THERAPY VISIT (OUTPATIENT)
Dept: PHYSICAL THERAPY | Facility: CLINIC | Age: 43
End: 2023-06-27
Attending: PHYSICIAN ASSISTANT
Payer: COMMERCIAL

## 2023-06-27 DIAGNOSIS — Z98.890 S/P LUMBAR MICRODISCECTOMY: ICD-10-CM

## 2023-06-27 DIAGNOSIS — Z23 NEED FOR IMMUNIZATION WITH DIPHTHERIA, TETANUS, AND POLIOVIRUS VACCINE: ICD-10-CM

## 2023-06-27 DIAGNOSIS — M54.16 LUMBAR RADICULOPATHY: ICD-10-CM

## 2023-06-27 DIAGNOSIS — N94.2 VAGINISMUS: Primary | ICD-10-CM

## 2023-06-27 PROCEDURE — 97161 PT EVAL LOW COMPLEX 20 MIN: CPT | Mod: GP | Performed by: PHYSICAL THERAPIST

## 2023-06-27 PROCEDURE — 97110 THERAPEUTIC EXERCISES: CPT | Mod: GP | Performed by: PHYSICAL THERAPIST

## 2023-06-27 PROCEDURE — 99213 OFFICE O/P EST LOW 20 MIN: CPT | Mod: 25 | Performed by: OBSTETRICS & GYNECOLOGY

## 2023-06-27 PROCEDURE — 90471 IMMUNIZATION ADMIN: CPT | Performed by: OBSTETRICS & GYNECOLOGY

## 2023-06-27 PROCEDURE — 90715 TDAP VACCINE 7 YRS/> IM: CPT | Performed by: OBSTETRICS & GYNECOLOGY

## 2023-06-27 ASSESSMENT — ENCOUNTER SYMPTOMS
DIZZINESS: 1
EYE PAIN: 0
SORE THROAT: 0
FEVER: 0
PARESTHESIAS: 0
COUGH: 0
CHILLS: 0
WEAKNESS: 1
NERVOUS/ANXIOUS: 1
ABDOMINAL PAIN: 1
HEADACHES: 0
HEMATOCHEZIA: 0
NAUSEA: 0
ARTHRALGIAS: 1
HEMATURIA: 1
CONSTIPATION: 0
HEARTBURN: 1
DIARRHEA: 1
BREAST MASS: 0
SHORTNESS OF BREATH: 0
FREQUENCY: 0
PALPITATIONS: 0
DYSURIA: 0
JOINT SWELLING: 1
MYALGIAS: 1

## 2023-06-27 ASSESSMENT — ANXIETY QUESTIONNAIRES
4. TROUBLE RELAXING: SEVERAL DAYS
IF YOU CHECKED OFF ANY PROBLEMS ON THIS QUESTIONNAIRE, HOW DIFFICULT HAVE THESE PROBLEMS MADE IT FOR YOU TO DO YOUR WORK, TAKE CARE OF THINGS AT HOME, OR GET ALONG WITH OTHER PEOPLE: SOMEWHAT DIFFICULT
5. BEING SO RESTLESS THAT IT IS HARD TO SIT STILL: SEVERAL DAYS
GAD7 TOTAL SCORE: 5
7. FEELING AFRAID AS IF SOMETHING AWFUL MIGHT HAPPEN: NOT AT ALL
7. FEELING AFRAID AS IF SOMETHING AWFUL MIGHT HAPPEN: NOT AT ALL
8. IF YOU CHECKED OFF ANY PROBLEMS, HOW DIFFICULT HAVE THESE MADE IT FOR YOU TO DO YOUR WORK, TAKE CARE OF THINGS AT HOME, OR GET ALONG WITH OTHER PEOPLE?: SOMEWHAT DIFFICULT
GAD7 TOTAL SCORE: 5
3. WORRYING TOO MUCH ABOUT DIFFERENT THINGS: SEVERAL DAYS
GAD7 TOTAL SCORE: 5
2. NOT BEING ABLE TO STOP OR CONTROL WORRYING: SEVERAL DAYS
1. FEELING NERVOUS, ANXIOUS, OR ON EDGE: SEVERAL DAYS
6. BECOMING EASILY ANNOYED OR IRRITABLE: NOT AT ALL

## 2023-06-27 NOTE — NURSING NOTE
Prior to immunization administration, verified patients identity using patient s name and date of birth. Please see Immunization Activity for additional information.     Screening Questionnaire for Adult Immunization    Are you sick today?   No   Do you have allergies to medications, food, a vaccine component or latex?   No   Have you ever had a serious reaction after receiving a vaccination?   No   Do you have a long-term health problem with heart, lung, kidney, or metabolic disease (e.g., diabetes), asthma, a blood disorder, no spleen, complement component deficiency, a cochlear implant, or a spinal fluid leak?  Are you on long-term aspirin therapy?   No   Do you have cancer, leukemia, HIV/AIDS, or any other immune system problem?   No   Do you have a parent, brother, or sister with an immune system problem?   No   In the past 3 months, have you taken medications that affect  your immune system, such as prednisone, other steroids, or anticancer drugs; drugs for the treatment of rheumatoid arthritis, Crohn s disease, or psoriasis; or have you had radiation treatments?   No   Have you had a seizure, or a brain or other nervous system problem?   No   During the past year, have you received a transfusion of blood or blood    products, or been given immune (gamma) globulin or antiviral drug?   No   For women: Are you pregnant or is there a chance you could become       pregnant during the next month?   No   Have you received any vaccinations in the past 4 weeks?   No     Immunization questionnaire answers were all negative.      Patient instructed to remain in clinic for 15 minutes afterwards, and to report any adverse reactions.     Screening performed by Brittany Babb CMA on 6/27/2023 at 2:12 PM.

## 2023-06-27 NOTE — PROGRESS NOTES
ASSESSMENT/PLAN:                                                        1.  Vaginismus-discussed solutions to decrease this pelvic pain.              SUBJECTIVE:                                                      She has had her follow-up for diabetes.  She sees Dr. Spencer Mcintyre.  She is here today because she has some pelvic pain and would like a female exam.  She has had some pain with intercourse.  She had a hysterectomy 2 years ago because of endometrial hyperplasia.  Pathology was benign.  Last Pap was normal.  She had HPV 15 years ago but none since.  If her I do not think she needs a Pap anymore.        Patient Active Problem List   Diagnosis     Hemorrhage of rectum and anus     Female infertility     Papanicolaou smear of cervix with atypical squamous cells cannot exclude high grade squamous intraepithelial lesion (ASC-H)     S/P conization of cervix- KAYLA 2/3 in 2011     Kidney stones     Dyspnea     CARDIOVASCULAR SCREENING; LDL GOAL LESS THAN 160     GERD (gastroesophageal reflux disease)     Hyperlipidemia LDL goal <130     Mild major depression (H)     Right ovarian cyst     Glucosuria     Type 2 diabetes mellitus without complication (H)     Non morbid obesity due to excess calories     Obesity (BMI 35.0-39.9) with comorbidity (H)     Endometrial intraepithelial neoplasia (EIN)     Endometrial hyperplasia, unspecified     Pelvic pain in female     Rectal bleeding     Chronic diarrhea     Abnormal CT scan, small bowel     Dyspareunia, female     Left ovarian cyst     Past Surgical History:   Procedure Laterality Date     CHOLECYSTECTOMY, LAPOROSCOPIC  5/11/2007    Cholecystectomy, Laparoscopic     COLONOSCOPY  05/17/10     COLONOSCOPY N/A 8/27/2019    Procedure: COLONOSCOPY;  Surgeon: Paramjit Kingston DO;  Location:  GI     COLONOSCOPY N/A 10/23/2020    Procedure: COLONOSCOPY, WITH  BIOPSY;  Surgeon: Ba Dickinson MD;  Location:  GI     CONIZATION  7/1/2011    Procedure:CONIZATION;  cold knife and punch biopsy of mole on back; Surgeon:SYDNEY FORD; Location:[facility]     DILATION AND CURETTAGE, HYSTEROSCOPY, LAPAROSCOPY, COMBINED Right 9/24/2015    Procedure: COMBINED DILATION AND CURETTAGE, HYSTEROSCOPY, LAPAROSCOPY;  Surgeon: Sydney Ford MD;  Location: [facility]     DISCECTOMY LUMBAR MINIMALLY INVASIVE ONE LEVEL Left 1/23/2019    Procedure: Minimally Invasive Surgery Left Lumbar 5-Sacral 1 microdiscectomy;  Surgeon: Mason Roe MD;  [facility]     DISCECTOMY LUMBAR POSTERIOR MICROSCOPIC ONE LEVEL Left 4/5/2023    Procedure: Minimally Invasive Left Lumbar 5 to Sacral 1 evacuation of recurrent disc herniation;  Surgeon: Mason Roe MD;  [facility]     ESOPHAGOSCOPY, GASTROSCOPY, DUODENOSCOPY (EGD), COMBINED  5/21/2014    Procedure: COMBINED ESOPHAGOSCOPY, GASTROSCOPY, DUODENOSCOPY (EGD), BIOPSY SINGLE OR MULTIPLE;  Surgeon: Earl Lane MD;  Location: [facility]     ESOPHAGOSCOPY, GASTROSCOPY, DUODENOSCOPY (EGD), COMBINED N/A 10/23/2020    Procedure: Esophagogastroduodenoscopy with  biopsy;  Surgeon: Ba Dickinson MD;  [facility]     EXCISE LESION TRUNK  7/1/2011    Procedure:EXCISE LESION TRUNK; Surgeon:SYDNEY FORD; [facility]     FORAMINOTOMY LUMBAR POSTERIOR ONE LEVEL Left 4/5/2023    Procedure: left foraminotomy Lumbar 5 to Sacral 1;  Surgeon: Mason Roe MD;  [facility]     HC FRAGMENTING OF KIDNEY STONE  11/30/2005     HC RX ECTOP PREG BY SCOPE,RMV TUBE/OVRY  12/20/2007    Right sided salpingectomy and removal of ruptured ectopic pregnancy. D&C.     HYSTERECTOMY VAGINAL       LAPAROSCOPIC APPENDECTOMY N/A 9/24/2015    Procedure: LAPAROSCOPIC APPENDECTOMY;  Surgeon: James Cuellar MD;  [facility]     LAPAROSCOPIC CYSTECTOMY OVARIAN (BENIGN) N/A 9/24/2015    Procedure: LAPAROSCOPIC CYSTECTOMY OVARIAN (BENIGN);  Surgeon: Sydney Ford MD;  [facility]     LAPAROSCOPIC  HYSTERECTOMY TOTAL, BILATERAL SALPINGO-OOPHORECTOMY, COMBINED N/A 7/28/2020    Procedure: laparoscpic assisted vaginal hysterectomy, cystoscopy;  Surgeon: Greg Ford MD;  Location: [facility]     LAPAROSCOPIC OOPHORECTOMY Right 9/24/2015    Procedure: LAPAROSCOPIC OOPHORECTOMY;  Surgeon: Greg Ford MD;  [facility]     LITHOTRIPSY  01/17/2007     PELVIS LAPAROSCOPY,DX  10/16/2000    Diagnostic laparoscopy, Endometrial biopsy.     TUBAL/ECTOPIC PREGNANCY  01/23/2007    Lap removal of left ruptured ectopic pregnancy & salpingectomy, D&C     ZZC REMOVAL OF KIDNEY STONE      two surgeries, 2002,2003     ZZ UGI ENDOSCOPY, SIMPLE EXAM  09/01/09       Social History     Tobacco Use     Smoking status: Some Days     Packs/day: 0.00     Years: 12.00     Pack years: 0.00     Types: Cigarettes     Smokeless tobacco: Never     Tobacco comments:     As of 10 /2014, pt has had a few cigs here and there   Substance Use Topics     Alcohol use: Yes     Alcohol/week: 0.0 standard drinks of alcohol     Comment: every few months     Family History   Problem Relation Age of Onset     Diabetes Maternal Grandmother         adult onset     Anesthesia Reaction Maternal Grandmother         can't tolerate Novacaine.     Respiratory Maternal Grandmother         COPD and emphysema.     Thyroid Disease Maternal Grandmother      Heart Disease Maternal Grandmother         CHF     Diabetes Paternal Grandfather         adult o nset     Hypertension Paternal Grandfather      Cerebrovascular Disease Paternal Grandfather      Heart Disease Paternal Grandfather         MI, replaced valve,angioplasty     Thyroid Disease Paternal Grandfather      Cancer Maternal Grandfather         skin cancer     Heart Disease Maternal Grandfather         MI     Obesity Mother         on thyroid medication     Thyroid Disease Mother      Diabetes Mother      Rheumatologic Disease Mother      Heart Disease Father      Hypertension Father          and TIA     Lipids Father      Breast Cancer Paternal Grandmother      Arrhythmia Other      Cancer Maternal Aunt         breast/brain cancer     Depression Paternal Aunt      Cerebrovascular Disease Paternal Uncle      Cerebrovascular Disease Paternal Aunt          Current Outpatient Medications   Medication Sig Dispense Refill     blood glucose (CONTOUR NEXT TEST) test strip USE TO TEST BLOOD GLUCOSE FOUR TIMES A  strip 3     blood glucose monitoring (NO BRAND SPECIFIED) meter device kit Use to test blood sugar 2 times daily or as directed. 1 kit 0     JARDIANCE 10 MG TABS tablet TAKE ONE TABLET (10 MG) BY MOUTH DAILY 90 tablet 1     metFORMIN (GLUCOPHAGE XR) 500 MG 24 hr tablet Take 2 tablets (1,000 mg) by mouth 2 times daily (with meals) 360 tablet 3     multivitamin w/minerals (THERA-VIT-M) tablet Take 1 tablet by mouth daily       nystatin (MYCOSTATIN) 455752 UNIT/GM external powder Apply topically 2 times daily as needed 60 g 2     omeprazole (PRILOSEC) 20 MG DR capsule TAKE 1 CAPSULE BY MOUTH DAILY, 30 TO 60 MINUTES BEFORE A MEAL. 30 capsule 5     oxyCODONE (ROXICODONE) 5 MG tablet Take 1-2 tablets (5-10 mg) by mouth every 4 hours as needed for moderate to severe pain 36 tablet 0     senna-docusate (SENOKOT-S/PERICOLACE) 8.6-50 MG tablet Take 1-2 tablets by mouth daily as needed for constipation (While on oral opioids.) 30 tablet 1     tiZANidine (ZANAFLEX) 4 MG tablet Take 1-2 tablets (4-8 mg) by mouth 3 times daily as needed for muscle spasms 60 tablet 2       ROS:  A 12 point systems review is negative except for what is listed above in the Subjective history.        Wt Readings from Last 3 Encounters:   05/08/23 88.9 kg (196 lb)   04/29/23 88.9 kg (196 lb)   03/20/23 91 kg (200 lb 9.6 oz)                     BP Readings from Last 6 Encounters:   05/19/23 108/80   05/08/23 108/80   04/29/23 124/86   04/05/23 110/73   03/20/23 116/80   03/20/23 118/72          OBJECTIVE:                                                     Vital signs: LMP 07/25/2020 (Exact Date)       /70        Pelvic exam was done with her  Leander  present.  External genitalia look normal.  Vaginal vault is without bleeding or discharge.  Vaginal cuff is intact.  No adnexal masses felt.  She is somewhat tender to insertion of the speculum and I think she is tensing the pelvic muscles in response to insertion.        A total of 20 minutes were spent in today's visit including the time spent with  the patient in addition to the time spent just prior to the visit reviewing the chart  and then charting afterwards on this patient today.      Greg Ford MD  [facility]       The above information was dictating using Dragon voice software and as a result there may be some irregularities that were not detected in my review of this note.

## 2023-06-27 NOTE — PROGRESS NOTES
PHYSICAL THERAPY EVALUATION  Type of Visit: Evaluation    See electronic medical record for Abuse and Falls Screening details.    Subjective      Presenting condition or subjective complaint: Pt reports initially not thinking she would require treatment following disectomy but started having pain after needing to walk in the boot after foot injury. Pt was in the boot 3 weeks, now toe healed.  With activity will have symptoms down bilateral LE; it used to only be L side.  Pt reports L lateral upper calf cramping 3-4hours/night and it will wake her up.  Pt drinkes 6 x 20oz water and is not dehydrated.  Pt reports symptoms from mid calf to foot and lateral most toe.  (also the toe that patient broke)  Pt will have a good day after tennis shoes with inserts (non custom- but is being fit for custom).  Symptoms are there daily, 100% of the day.  Denies symptoms in mid hip and thigh unless more active during day may wake up with both legs numb.  R LE numbness now in toes; sparatic and inconsistent, 2 days/wk and a couple hours a day).  Date of onset: 04/05/23    Relevant medical history: Depression; Diabetes; Overweight   Dates & types of surgery: 2 ectopic pregnancy with tube removal, kidney stone surgeries, back surgery, appendix, and gallbladder surgery, hysterectomy, ovarian removal    Prior diagnostic imaging/testing results: MRI none since surgery   Prior therapy history for the same diagnosis, illness or injury: Yes Jan 2023, and about 6 years ago      Living Environment  Social support: With family members   Type of home: 2-story   Stairs to enter the home: Yes 2 Is there a railing: No   Ramp: No   Stairs inside the home: Yes 22 Is there a railing: Yes   Help at home: None  Equipment owned:       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  Hobbies/Interests: Crafting    Patient goals for therapy: walking for       Objective   LUMBAR SPINE EVALUATION    INTEGUMENTARY (edema, incisions): WNL  POSTURE: Standing  Posture: Rounded shoulders, Forward head, Lordosis increased, guarded with transfers and standing  Sitting Posture: Rounded shoulders, Forward head, Lordosis increased, guarded posture and changes  GAIT:   Weightbearing Status:   Assistive Device(s):   Gait Deviations:   BALANCE/PROPRIOCEPTION:   WEIGHTBEARING ALIGNMENT:   NON-WEIGHTBEARING ALIGNMENT:    ROM: 50% ROM with pain and increased radiating symptoms after each direction  PELVIC/SI SCREEN: Sacroiliac Provocation Test: positive  STRENGTH: Hip Ext and Abd (B) 3+/10 (R) 4-/5; L LE 4/5    MYOTOMES:    Left Right   T12-L3 (Hip Flexion) 4    L2-4 (Quads)  4    L4 (Ankle DF) 4 4   L5 (Great Toe Ext) 4    S1 (Toe Raise)       DTR S: WNL  CORD SIGNS: WNL  DERMATOMES:   NEURAL TENSION:    Left Right   SLR Positive    SLR with DF     Femoral Nerve     Slump Positive    Devyn (Lumbar)     Devyn (Thoracic)     Devyn (Cervical)     Median     Ulnar     Radial        FLEXIBILITY:   LUMBAR/HIP Special Tests: Post-surgical not required at this time  PELVIS/SI SPECIAL TESTS: Post-surgical not required at this time  FUNCTIONAL TESTS: Post-surgical not required at this time  PALPATION: TTP to (B) QL, lumbar paraspinals, glut med, piriformis (B) (B) SIJ  SPINAL SEGMENTAL CONCLUSIONS: Globally hypomobile      Assessment & Plan   CLINICAL IMPRESSIONS   Medical Diagnosis: S/P lumbar microdiscectomy  Lumbar radiculopathy    Treatment Diagnosis: S/P lumbar microdiscectomy  Lumbar radiculopathy   Impression/Assessment: Patient is a 43 year old female with low back pain with radiculopathy complaints.  The following significant findings have been identified: Pain, Decreased ROM/flexibility, Decreased joint mobility, Decreased strength, Impaired balance, Impaired gait, Impaired muscle performance, Decreased activity tolerance and Impaired posture. These impairments interfere with their ability to perform self care tasks, work tasks, recreational activities, household chores, household  mobility and community mobility as compared to previous level of function.     Clinical Decision Making (Complexity):   Clinical Presentation: Stable/Uncomplicated  Clinical Presentation Rationale: based on medical and personal factors listed in PT evaluation  Clinical Decision Making (Complexity): Low complexity    PLAN OF CARE  Treatment Interventions:  Modalities: as required  Interventions: Gait Training, Manual Therapy, Neuromuscular Re-education, Therapeutic Activity, Therapeutic Exercise, Self-Care/Home Management, Aquatic Therapy    Long Term Goals     PT Goal 1  Goal Identifier: 1  Goal Description: The patient will improve MARILYNN to <15% limitation with functional activities in order to improve tolerance of daily activities with her family.  Target Date: 08/19/23  PT Goal 2  Goal Identifier: 2  Goal Description: The patient will deny radiating symptoms into R Le in order to demosntrate healing and improve tolerance with general mobility and work duties.  Target Date: 09/03/23  PT Goal 3  Goal Identifier: 3  Goal Description: The patient will report radiating symptoms into L LE with only 10% of daily activities in order to improve centralization and tolerance with daily activities.  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   Patient/Family/caregiver agrees with Plan of Care.     Evaluation Time:            Signing Clinician:   Fozia Gould, PT, DPT, OCS, CSCS  [facility]  [facility]  [phone]

## 2023-07-05 ENCOUNTER — THERAPY VISIT (OUTPATIENT)
Dept: PHYSICAL THERAPY | Facility: CLINIC | Age: 43
End: 2023-07-05
Attending: PHYSICIAN ASSISTANT
Payer: COMMERCIAL

## 2023-07-05 DIAGNOSIS — Z98.890 S/P LUMBAR MICRODISCECTOMY: ICD-10-CM

## 2023-07-05 DIAGNOSIS — M54.16 LUMBAR RADICULOPATHY: ICD-10-CM

## 2023-07-05 PROCEDURE — 97140 MANUAL THERAPY 1/> REGIONS: CPT | Mod: GP | Performed by: PHYSICAL THERAPIST

## 2023-07-05 PROCEDURE — 97110 THERAPEUTIC EXERCISES: CPT | Mod: GP | Performed by: PHYSICAL THERAPIST

## 2023-07-06 ENCOUNTER — LAB (OUTPATIENT)
Dept: LAB | Facility: CLINIC | Age: 43
End: 2023-07-06
Payer: COMMERCIAL

## 2023-07-06 ENCOUNTER — OFFICE VISIT (OUTPATIENT)
Dept: NEUROSURGERY | Facility: CLINIC | Age: 43
End: 2023-07-06
Payer: COMMERCIAL

## 2023-07-06 VITALS — OXYGEN SATURATION: 97 % | SYSTOLIC BLOOD PRESSURE: 125 MMHG | HEART RATE: 105 BPM | DIASTOLIC BLOOD PRESSURE: 80 MMHG

## 2023-07-06 DIAGNOSIS — M51.26 RECURRENT HERNIATION OF LUMBAR DISC: ICD-10-CM

## 2023-07-06 DIAGNOSIS — M54.16 LUMBAR RADICULOPATHY: ICD-10-CM

## 2023-07-06 DIAGNOSIS — E11.9 TYPE 2 DIABETES MELLITUS WITHOUT COMPLICATION (H): ICD-10-CM

## 2023-07-06 DIAGNOSIS — Z98.890 S/P LUMBAR MICRODISCECTOMY: Primary | ICD-10-CM

## 2023-07-06 DIAGNOSIS — E11.9 TYPE 2 DIABETES MELLITUS WITHOUT COMPLICATION, WITHOUT LONG-TERM CURRENT USE OF INSULIN (H): ICD-10-CM

## 2023-07-06 LAB
CHOLEST SERPL-MCNC: 163 MG/DL
CREAT UR-MCNC: 80.2 MG/DL
HBA1C MFR BLD: 7.2 %
HDLC SERPL-MCNC: 60 MG/DL
LDLC SERPL CALC-MCNC: 73 MG/DL
MICROALBUMIN UR-MCNC: <12 MG/L
MICROALBUMIN/CREAT UR: NORMAL MG/G{CREAT}
NONHDLC SERPL-MCNC: 103 MG/DL
TRIGL SERPL-MCNC: 150 MG/DL

## 2023-07-06 PROCEDURE — 99214 OFFICE O/P EST MOD 30 MIN: CPT | Performed by: NURSE PRACTITIONER

## 2023-07-06 PROCEDURE — 36415 COLL VENOUS BLD VENIPUNCTURE: CPT

## 2023-07-06 PROCEDURE — 82043 UR ALBUMIN QUANTITATIVE: CPT

## 2023-07-06 PROCEDURE — 80061 LIPID PANEL: CPT

## 2023-07-06 PROCEDURE — 82570 ASSAY OF URINE CREATININE: CPT

## 2023-07-06 PROCEDURE — 83036 HEMOGLOBIN GLYCOSYLATED A1C: CPT

## 2023-07-06 ASSESSMENT — PAIN SCALES - GENERAL: PAINLEVEL: MODERATE PAIN (4)

## 2023-07-06 NOTE — LETTER
7/6/2023         RE: Fallon Rivera  [address] [facility] [address]        Dear Colleague,    Thank you for referring your patient, Fallon Rivera, to the [facility] [place]. Please see a copy of my visit note below.    [facility] Neurosurgery  Neurosurgery Follow Up:    HPI: 3 months s/p left L5-S1 foraminontomy and discectomy with Dr. Roe on 4/5/2023. She was doing well postoperatively until a couple weeks after surgery when she broke a toe. She was placed in a walking boot. Since that time has noticed the return of the symptoms in her left leg. She also reports some right SIJ pain as well. At her last visit she was recommended to start PT as well as consider MDP. Due to diabetes she declined the MDP. Today she continues to report symptoms as above.     Medical, surgical, family, and social history unchanged since prior exam.  Exam:  Constitutional:  Alert, well nourished, NAD.  HEENT: Normocephalic, atraumatic.   Pulm:  Without shortness of breath   CV:  No pitting edema of BLE.      Vital Signs:  LMP 07/25/2020 (Exact Date)     Neurological:  Awake  Alert  Oriented x 3  Motor exam:        IP Q DF PF EHL  R   5  5   5   5    5  L   5  5   5   5    5     Able to spontaneously move L/E bilaterally  Sensation intact throughout all L/E dermatomes     Incisions:  Healing nicely.  Imaging: No new imaging to review    A/P: s/p lumbar discectomy  Will proceed with lumbar MRI at this time. Discussed muscle relaxer use for premedication. I will contact her with the results and further recommendations. Pending lumbar MRI, could consider referral to neurology and/or EMG. She verbalized understanding and agreement.  Patient Instructions   -Lumbar MRI ordered. They will contact you to schedule or you may contact them. I will contact you with the results and further recommendations.  -Please contact our clinic with questions or concerns at [phone].      Fallon HENDRIX  AMRITA Craig  [facility] Neurosurgery  [address] [place] [address]  Tel [phone]  Fax [phone]        Again, thank you for allowing me to participate in the care of your patient.        Sincerely,        Fallon Craig NP

## 2023-07-06 NOTE — NURSING NOTE
"Fallon Rivera is a 43 year old female who presents for:  Chief Complaint   Patient presents with     RECHECK        Initial Vitals:  /80   Pulse 105   LMP 07/25/2020 (Exact Date)   SpO2 97%  Estimated body mass index is 33.12 kg/m  as calculated from the following:    Height as of 5/8/23: 5' 4.5\" (1.638 m).    Weight as of 5/8/23: 196 lb (88.9 kg).. There is no height or weight on file to calculate BSA. BP completed using cuff size: regular  Moderate Pain (4)    Mehul Barbosa    " Hemostasis: Electrocautery

## 2023-07-06 NOTE — PROGRESS NOTES
[facility] Neurosurgery  Neurosurgery Follow Up:    HPI: 3 months s/p left L5-S1 foraminontomy and discectomy with Dr. Roe on 4/5/2023. She was doing well postoperatively until a couple weeks after surgery when she broke a toe. She was placed in a walking boot. Since that time has noticed the return of the symptoms in her left leg. She also reports some right SIJ pain as well. At her last visit she was recommended to start PT as well as consider MDP. Due to diabetes she declined the MDP. Today she continues to report symptoms as above.     Medical, surgical, family, and social history unchanged since prior exam.  Exam:  Constitutional:  Alert, well nourished, NAD.  HEENT: Normocephalic, atraumatic.   Pulm:  Without shortness of breath   CV:  No pitting edema of BLE.      Vital Signs:  [facility] 07/25/2020 (Exact Date)     Neurological:  Awake  Alert  Oriented x 3  Motor exam:        IP Q DF PF EHL  R   5  5   5   5    5  L   5  5   5   5    5     Able to spontaneously move L/E bilaterally  Sensation intact throughout all L/E dermatomes     Incisions:  Healing nicely.  Imaging: No new imaging to review    A/P: s/p lumbar discectomy  Will proceed with lumbar MRI at this time. Discussed muscle relaxer use for premedication. I will contact her with the results and further recommendations. Pending lumbar MRI, could consider referral to neurology and/or EMG. She verbalized understanding and agreement.  Patient Instructions   -Lumbar MRI ordered. They will contact you to schedule or you may contact them. I will contact you with the results and further recommendations.  -Please contact our clinic with questions or concerns at [phone].      Fallon Craig, AMRITA  [facility] Neurosurgery  [address] [place] [address]  [address] [place] [id number]  Tel [phone]  Fax [phone]

## 2023-07-06 NOTE — PATIENT INSTRUCTIONS
-Lumbar MRI ordered. They will contact you to schedule or you may contact them. I will contact you with the results and further recommendations.  -Please contact our clinic with questions or concerns at [phone].

## 2023-07-12 DIAGNOSIS — E11.9 TYPE 2 DIABETES MELLITUS WITHOUT COMPLICATION, WITHOUT LONG-TERM CURRENT USE OF INSULIN (H): ICD-10-CM

## 2023-07-13 ENCOUNTER — HOSPITAL ENCOUNTER (OUTPATIENT)
Dept: MRI IMAGING | Facility: CLINIC | Age: 43
Discharge: HOME | End: 2023-07-13
Attending: NURSE PRACTITIONER | Admitting: NURSE PRACTITIONER
Payer: COMMERCIAL

## 2023-07-13 DIAGNOSIS — M51.26 RECURRENT HERNIATION OF LUMBAR DISC: ICD-10-CM

## 2023-07-13 DIAGNOSIS — M54.16 LUMBAR RADICULOPATHY: ICD-10-CM

## 2023-07-13 DIAGNOSIS — Z98.890 S/P LUMBAR MICRODISCECTOMY: ICD-10-CM

## 2023-07-13 PROCEDURE — 72148 MRI LUMBAR SPINE W/O DYE: CPT

## 2023-07-14 RX ORDER — METFORMIN HCL 500 MG
1000 TABLET, EXTENDED RELEASE 24 HR ORAL 2 TIMES DAILY WITH MEALS
Qty: 360 TABLET | Refills: 1 | Status: SHIPPED | OUTPATIENT
Start: 2023-07-14 | End: 2023-12-12

## 2023-07-14 NOTE — TELEPHONE ENCOUNTER
Prescription approved per [facility] Refill Protocol.  Tammy Liang RN on 7/14/2023 at 11:59 AM

## 2023-07-27 DIAGNOSIS — M54.16 LUMBAR RADICULOPATHY: ICD-10-CM

## 2023-07-27 DIAGNOSIS — Z98.890 S/P LUMBAR MICRODISCECTOMY: Primary | ICD-10-CM

## 2023-09-24 ENCOUNTER — HOSPITAL ENCOUNTER (EMERGENCY)
Facility: CLINIC | Age: 43
Discharge: HOME | End: 2023-09-24
Attending: STUDENT IN AN ORGANIZED HEALTH CARE EDUCATION/TRAINING PROGRAM | Admitting: STUDENT IN AN ORGANIZED HEALTH CARE EDUCATION/TRAINING PROGRAM
Payer: COMMERCIAL

## 2023-09-24 VITALS
TEMPERATURE: 98 F | SYSTOLIC BLOOD PRESSURE: 120 MMHG | OXYGEN SATURATION: 96 % | BODY MASS INDEX: 32.87 KG/M2 | RESPIRATION RATE: 18 BRPM | DIASTOLIC BLOOD PRESSURE: 88 MMHG | HEART RATE: 104 BPM | WEIGHT: 194.5 LBS

## 2023-09-24 DIAGNOSIS — M54.42 ACUTE LEFT-SIDED LOW BACK PAIN WITH LEFT-SIDED SCIATICA: ICD-10-CM

## 2023-09-24 PROCEDURE — 99283 EMERGENCY DEPT VISIT LOW MDM: CPT | Performed by: STUDENT IN AN ORGANIZED HEALTH CARE EDUCATION/TRAINING PROGRAM

## 2023-09-24 PROCEDURE — 99284 EMERGENCY DEPT VISIT MOD MDM: CPT | Performed by: STUDENT IN AN ORGANIZED HEALTH CARE EDUCATION/TRAINING PROGRAM

## 2023-09-24 RX ORDER — HYDROCODONE BITARTRATE AND ACETAMINOPHEN 5; 325 MG/1; MG/1
1 TABLET ORAL EVERY 6 HOURS PRN
Qty: 10 TABLET | Refills: 0 | Status: SHIPPED | OUTPATIENT
Start: 2023-09-24 | End: 2024-04-11

## 2023-09-24 RX ORDER — METHYLPREDNISOLONE 4 MG
TABLET, DOSE PACK ORAL
Qty: 21 TABLET | Refills: 0 | Status: SHIPPED | OUTPATIENT
Start: 2023-09-24 | End: 2023-10-09

## 2023-09-24 RX ORDER — PREDNISONE 20 MG/1
40 TABLET ORAL ONCE
Status: DISCONTINUED | OUTPATIENT
Start: 2023-09-24 | End: 2023-09-24 | Stop reason: HOSPADM

## 2023-09-24 RX ORDER — HYDROCODONE BITARTRATE AND ACETAMINOPHEN 5; 325 MG/1; MG/1
1 TABLET ORAL ONCE
Status: DISCONTINUED | OUTPATIENT
Start: 2023-09-24 | End: 2023-09-24 | Stop reason: HOSPADM

## 2023-09-24 RX ORDER — HYDROCODONE BITARTRATE AND ACETAMINOPHEN 5; 325 MG/1; MG/1
1 TABLET ORAL EVERY 6 HOURS PRN
Qty: 10 TABLET | Refills: 0 | Status: SHIPPED | OUTPATIENT
Start: 2023-09-24 | End: 2023-09-24

## 2023-09-24 RX ORDER — METHYLPREDNISOLONE 4 MG
TABLET, DOSE PACK ORAL
Qty: 21 TABLET | Refills: 0 | Status: SHIPPED | OUTPATIENT
Start: 2023-09-24 | End: 2023-09-24

## 2023-09-24 ASSESSMENT — ENCOUNTER SYMPTOMS: BACK PAIN: 1

## 2023-09-24 NOTE — ED TRIAGE NOTES
Hx of back surgery, scar tissue and pain. C/o coughing last couple days - coughing so hard her back is hurting. Taking RX and PO meds OTC.      Triage Assessment       Row Name 09/24/23 0192       Triage Assessment (Adult)    Airway WDL WDL       Respiratory WDL    Respiratory WDL WDL       Cardiac WDL    Cardiac WDL WDL

## 2023-09-24 NOTE — ED PROVIDER NOTES
History     Chief Complaint   Patient presents with    Back Pain     HPI  Fallon Rivera is a 43 year old female who presents with right back pain.  Patient notes that she has a history of sciatic back pain and has been seen by orthopedic surgeons before in the past for lumbar microdiscectomies.  She has not had any loss of urine or stool.  She has no numbness tingling inside her groin.  She has shooting pains down the back right side of her right leg.  Pain is localized to the mid to right lower area of her upper buttock.  She noticed this started after a sneezing episode which is consistent with her previous episode requiring outpatient follow-up with spinal surgery for evaluation and management.  She has been told that she may have this procedure done in the future however is not followed up.  Took 1 dose of tizanidine today.    Allergies:  Allergies   Allergen Reactions    Amoxicillin Hives    Demerol Hcl [Meperidine Hcl] Nausea    Emetrol      Anxiety, agitation,severe paranoia. Zofran, Reglan are some of them.  DRAMAMINE WITHOUT ISSUES      Indomethacin Nausea and Vomiting    Macrobid [Nitrofuran Derivatives] Hives    Reglan [Metoclopramide] Other (See Comments)     Acute paranoia, severe    Zofran [Ondansetron] Other (See Comments)     Severe anxiety, agitation    Vancomycin Other (See Comments)     maria del rosario syndrome       Problem List:    Patient Active Problem List    Diagnosis Date Noted    S/P lumbar microdiscectomy 07/05/2023     Priority: Medium    Lumbar radiculopathy 07/05/2023     Priority: Medium    Left ovarian cyst 05/25/2021     Priority: Medium     Added automatically from request for surgery [id number]      Dyspareunia, female 12/01/2020     Priority: Medium    Rectal bleeding 09/30/2020     Priority: Medium     Added automatically from request for surgery [id number]      Chronic diarrhea 09/30/2020     Priority: Medium     Added automatically from request for surgery [id number]      Abnormal CT  scan, small bowel 09/30/2020     Priority: Medium     Added automatically from request for surgery [id number]      Endometrial hyperplasia, unspecified 06/18/2020     Priority: Medium     Added automatically from request for surgery [id number]      Pelvic pain in female 06/18/2020     Priority: Medium     Added automatically from request for surgery [id number]      Endometrial intraepithelial neoplasia (EIN) 03/02/2020     Priority: Medium    Obesity (BMI 35.0-39.9) with comorbidity (H) 10/16/2018     Priority: Medium    Non morbid obesity due to excess calories 07/05/2016     Priority: Medium    Type 2 diabetes mellitus without complication (H) 04/29/2016     Priority: Medium    Glucosuria 04/27/2016     Priority: Medium    Right ovarian cyst 09/15/2015     Priority: Medium    Hyperlipidemia LDL goal <130 07/18/2012     Priority: Medium    Mild major depression (H) 07/18/2012     Priority: Medium    GERD (gastroesophageal reflux disease) 07/03/2012     Priority: Medium    CARDIOVASCULAR SCREENING; LDL GOAL LESS THAN 160 10/31/2010     Priority: Medium    Dyspnea 11/24/2009     Priority: Medium    Kidney stones 02/17/2009     Priority: Medium    S/P conization of cervix- KAYLA 2/3 in 2011 06/27/2007     Priority: Medium     1/24/06 pap: ASC-H  2/9/06 colposcopy/bx (negative)/ECC (negative) pap- ASC-H  5/24/06 pap- ASC-H  9/6/07 pap NIL  1/23/07 pap ASCUS + HPV 45 (high risk)  3/15/07 pap ASCUS + HPV 45 (high risk) colposcopy- bx (negative)/ECC (koilocytosis)  6/19/07 ECC- koilocytosis.  Pap- AGS  10/23/07 pap- ASC-H, ECC- negative  2/14/08 pap NIL- return to yearly paps  2/17/09 pap NIL- repeat 1 year  3/8/10 pap NIL  3/3/11 pap ASCUS + HPV 45 (high risk)- colposcopy recommended  5/16/11 colpo- bx (KAYLA 2/3/HSIL) ECC (ASCUS)  pap (ASC-H)  6/2/11 recommend: [facility]  7/1/11 [facility]: path: KAYLA 2/3 with possible involvement of the endocervical margin.  ECC- neg.  Endometrium- neg.  7/14/11 plan: HIPOLITO in approx 2 months  10/10/11-  hysterectomy planned.  (cancelled)  11/7/11 pap-- NIL. Plan--repeat pap in 6 months. (5/7/12)  5/7/12 pap NIL. Plan-- pap in 1 year (due 5/7/13)   5/8/13 pap NIL. Plan-pap in 1 year  5/8/14 NIL pap, neg HR HPV.  Plan- repeat pap/HPV in 1 year (due 5/8/15)  10/14/14 NIL pap, neg HR HPV. Endometrial biopsy- WNL   1/6/16  NIL pap, neg HR HPV.  Plan: paps yearly, per Dr. Ford.  1/30/17 NIL pap, neg HR HPV. Plan: pap in 1 year.   3/12/18 NIL pap, neg HR HPV. Plan: cotest annually, per Dr. Ford.  3/11/19 ECC: negative. NIL pap, neg HR HPV. Plan: cotest in 1 yr  2/11/20 NIL Pap, Neg HPV. EMB: intraepithelial neoplasia. Plan: Hysterectomy.   7/28/20 Hysterectomy with cervix removed. Hx of KAYLA 2 in 2011 Plan: Cotest Q3y until 2036.          Papanicolaou smear of cervix with atypical squamous cells cannot exclude high grade squamous intraepithelial lesion (ASC-H) 09/07/2006     Priority: Medium    Female infertility 07/24/2006     Priority: Medium     Problem list name updated by automated process. Provider to review      Hemorrhage of rectum and anus 12/02/2004     Priority: Medium        Past Medical History:    Past Medical History:   Diagnosis Date    Abnormal Pap smear 2006, 2007,     Calculus of kidney 01/01/2002    Cervical high risk HPV (human papillomavirus) test positive     History of colposcopy with cervical biopsy 2/9/06, 3/15/07       Past Surgical History:    Past Surgical History:   Procedure Laterality Date    CHOLECYSTECTOMY, LAPOROSCOPIC  5/11/2007    Cholecystectomy, Laparoscopic    COLONOSCOPY  05/17/10    COLONOSCOPY N/A 8/27/2019    Procedure: COLONOSCOPY;  Surgeon: Paramjit Kingston DO;  Location:  GI    COLONOSCOPY N/A 10/23/2020    Procedure: COLONOSCOPY, WITH  BIOPSY;  Surgeon: Ba Dickinson MD;  Location:  GI    CONIZATION  7/1/2011    Procedure:CONIZATION; cold knife and punch biopsy of mole on back; Surgeon:SYDNEY FORD; Location: OR    DILATION AND  CURETTAGE, HYSTEROSCOPY, LAPAROSCOPY, COMBINED Right 9/24/2015    Procedure: COMBINED DILATION AND CURETTAGE, HYSTEROSCOPY, LAPAROSCOPY;  Surgeon: Sydney Ford MD;  Location: [facility]    DISCECTOMY LUMBAR MINIMALLY INVASIVE ONE LEVEL Left 1/23/2019    Procedure: Minimally Invasive Surgery Left Lumbar 5-Sacral 1 microdiscectomy;  Surgeon: Mason Roe MD;  Location: [facility]    DISCECTOMY LUMBAR POSTERIOR MICROSCOPIC ONE LEVEL Left 4/5/2023    Procedure: Minimally Invasive Left Lumbar 5 to Sacral 1 evacuation of recurrent disc herniation;  Surgeon: Mason Roe MD;  Location: [facility]    ESOPHAGOSCOPY, GASTROSCOPY, DUODENOSCOPY (EGD), COMBINED  5/21/2014    Procedure: COMBINED ESOPHAGOSCOPY, GASTROSCOPY, DUODENOSCOPY (EGD), BIOPSY SINGLE OR MULTIPLE;  Surgeon: Earl Lane MD;  [facility]    ESOPHAGOSCOPY, GASTROSCOPY, DUODENOSCOPY (EGD), COMBINED N/A 10/23/2020    Procedure: Esophagogastroduodenoscopy with  biopsy;  Surgeon: Ba Dickinson MD;  [facility]    EXCISE LESION TRUNK  7/1/2011    Procedure:EXCISE LESION TRUNK; Surgeon:SYDNEY FORD; Location:[facility]    FORAMINOTOMY LUMBAR POSTERIOR ONE LEVEL Left 4/5/2023    Procedure: left foraminotomy Lumbar 5 to Sacral 1;  Surgeon: Mason Roe MD;  [facility]: [facility]    HC FRAGMENTING OF KIDNEY STONE  11/30/2005    HC RX ECTOP PREG BY SCOPE,RMV TUBE/OVRY  12/20/2007    Right sided salpingectomy and removal of ruptured ectopic pregnancy. D&C.    HYSTERECTOMY VAGINAL      LAPAROSCOPIC APPENDECTOMY N/A 9/24/2015    Procedure: LAPAROSCOPIC APPENDECTOMY;  Surgeon: James Cuellar MD;  [facility]    LAPAROSCOPIC CYSTECTOMY OVARIAN (BENIGN) N/A 9/24/2015    Procedure: LAPAROSCOPIC CYSTECTOMY OVARIAN (BENIGN);  Surgeon: Sydney Ford MD;  [facility]    LAPAROSCOPIC HYSTERECTOMY TOTAL, BILATERAL SALPINGO-OOPHORECTOMY, COMBINED N/A 7/28/2020    Procedure: laparoscpic assisted vaginal  hysterectomy, cystoscopy;  Surgeon: Greg Ford MD;  Location: [facility]    LAPAROSCOPIC OOPHORECTOMY Right 9/24/2015    Procedure: LAPAROSCOPIC OOPHORECTOMY;  Surgeon: Greg Ford MD;  [facility]    LITHOTRIPSY  01/17/2007    PELVIS LAPAROSCOPY,DX  10/16/2000    Diagnostic laparoscopy, Endometrial biopsy.    TUBAL/ECTOPIC PREGNANCY  01/23/2007    Lap removal of left ruptured ectopic pregnancy & salpingectomy, D&C    ZZ REMOVAL OF KIDNEY STONE      two surgeries, 2002,[facility]    [facility]  [facility]       Family History:    Family History   Problem Relation Age of Onset    Diabetes Maternal Grandmother         adult onset    Anesthesia Reaction Maternal Grandmother         can't tolerate Novacaine.    Respiratory Maternal Grandmother         COPD and emphysema.    Thyroid Disease Maternal Grandmother     Heart Disease Maternal Grandmother         CHF    Diabetes Paternal Grandfather         adult o nset    Hypertension Paternal Grandfather     Cerebrovascular Disease Paternal Grandfather     Heart Disease Paternal Grandfather         MI, replaced valve,angioplasty    Thyroid Disease Paternal Grandfather     Cancer Maternal Grandfather         skin cancer    Heart Disease Maternal Grandfather         MI    Obesity Mother         on thyroid medication    Thyroid Disease Mother     Diabetes Mother     Rheumatologic Disease Mother     Heart Disease Father     Hypertension Father         and TIA    Lipids Father     Breast Cancer Paternal Grandmother     Arrhythmia Other     Cancer Maternal Aunt         breast/brain cancer    Depression Paternal Aunt     Cerebrovascular Disease Paternal Uncle     Cerebrovascular Disease Paternal Aunt        Social History:  Marital Status:   [2]  Social History     Tobacco Use    Smoking status: Some Days     Packs/day: 0.00     Years: 12.00     Pack years: 0.00     Types: Cigarettes    Smokeless tobacco: Never    Tobacco comments:      As of 10 /2014, pt has had a few cigs here and there   Vaping Use    Vaping Use: Never used   Substance Use Topics    Alcohol use: Yes     Alcohol/week: 0.0 standard drinks of alcohol     Comment: every few months    Drug use: No        Medications:    HYDROcodone-acetaminophen (NORCO) 5-325 MG tablet  methylPREDNISolone (MEDROL DOSEPAK) 4 MG tablet therapy pack  blood glucose (CONTOUR NEXT TEST) test strip  blood glucose monitoring (NO BRAND SPECIFIED) meter device kit  JARDIANCE 10 MG TABS tablet  metFORMIN (GLUCOPHAGE XR) 500 MG 24 hr tablet  multivitamin w/minerals (THERA-VIT-M) tablet  nystatin (MYCOSTATIN) 327190 UNIT/GM external powder  omeprazole (PRILOSEC) 20 MG DR capsule  oxyCODONE (ROXICODONE) 5 MG tablet  tiZANidine (ZANAFLEX) 4 MG tablet          Review of Systems   Musculoskeletal:  Positive for back pain.   All other systems reviewed and are negative.      Physical Exam   BP: (!) 122/91  Pulse: (!) 122  Temp: 98  F (36.7  C)  Resp: 18  Weight: 88.2 kg (194 lb 8 oz)  SpO2: 99 %      Physical Exam  Vitals and nursing note reviewed.   Constitutional:       General: She is not in acute distress.     Appearance: Normal appearance. She is normal weight. She is not ill-appearing or toxic-appearing.   Cardiovascular:      Rate and Rhythm: Normal rate.      Pulses: Normal pulses.      Heart sounds: Normal heart sounds.   Pulmonary:      Effort: Pulmonary effort is normal.      Breath sounds: Normal breath sounds.   Abdominal:      General: Abdomen is flat. Bowel sounds are normal.      Palpations: Abdomen is soft.   Neurological:      Mental Status: She is alert.   Psychiatric:         Mood and Affect: Mood normal.         Behavior: Behavior normal.         Thought Content: Thought content normal.         ED Course                 Procedures                No results found for this or any previous visit (from the past 24 hour(s)).    Medications   HYDROcodone-acetaminophen (NORCO) 5-325 MG per tablet 1  tablet (has no administration in time range)   predniSONE (DELTASONE) tablet 40 mg (has no administration in time range)       Assessments & Plan (with Medical Decision Making)     I have reviewed the nursing notes.    I have reviewed the findings, diagnosis, plan and need for follow up with the patient.           Medical Decision Making  43-year-old female presenting with right back pain.  History of sciatica.  Evaluation without red flag symptoms.  Vitals with initially tachycardic however resolved after pain medications provided.  Patient understands medications being provided and understands outpatient follow-up instructions.  No acute signs of deformity or fractures or trauma.  No acute signs of cauda equina or fevers consistent with any type of abscess or colitis.  Patient discharged home      Discharge Medication List as of 9/24/2023  6:03 PM        START taking these medications    Details   HYDROcodone-acetaminophen (NORCO) 5-325 MG tablet Take 1 tablet by mouth every 6 hours as needed for severe pain, Disp-10 tablet, R-0, E-Prescribe      methylPREDNISolone (MEDROL DOSEPAK) 4 MG tablet therapy pack Follow Package Directions, Disp-21 tablet, R-0, E-Prescribe             Final diagnoses:   Acute left-sided low back pain with left-sided sciatica       9/24/2023   [facility] EMERGENCY DEPT       Alvin Alvarez MD  09/24/23 [id number]

## 2023-09-24 NOTE — DISCHARGE INSTRUCTIONS
This is a provide back pain.  You are provided with Norco and its short course of steroids.  Please follow-up with your primary care and/or your orthopedic physician that you been seen for consideration of local injection after course of completion of medications.  Otherwise rest and alleviation of the pain with as needed ibuprofen Tylenol over the next 1 to 7 days.

## 2023-09-27 NOTE — PROGRESS NOTES
DISCHARGE  Reason for Discharge: Patient has failed to schedule further appointments.  Financial concerns on appropriate follow through with POC    Equipment Issued:       Discharge Plan: Patient to continue home program.    Referring Provider:  Thierno Sampson      07/05/23 0500   Appointment Info   Signing clinician's name / credentials Fozia Gould, PT, DPT, OCS, CSCS   Visits Used 2   Medical Diagnosis S/P lumbar microdiscectomy  Lumbar radiculopathy   PT Tx Diagnosis S/P lumbar microdiscectomy  Lumbar radiculopathy   Progress Note/Certification   Onset of illness/injury or Date of Surgery 04/05/23   Therapy Frequency 1x/wk   Predicted Duration 90 days   Progress Note Completed Date 06/27/23   GOALS   PT Goals 2;3;4   PT Goal 1   Goal Identifier 1   Goal Description The patient will improve MARILYNN to <15% limitation with functional activities in order to improve tolerance of daily activities with her family.   Target Date 08/19/23   PT Goal 2   Goal Identifier 2   Goal Description The patient will deny radiating symptoms into R Le in order to demosntrate healing and improve tolerance with general mobility and work duties.   Target Date 09/03/23   PT Goal 3   Goal Identifier 3   Goal Description The patient will report radiating symptoms into L LE with only 10% of daily activities in order to improve centralization and tolerance with daily activities.   Target Date 09/25/23   PT Goal 4   Goal Identifier 4   Goal Description The patient will report 75% improvement since start of therapy in order to improve tolerance of work duties and childcare   Target Date 09/25/23   Subjective Report   Subjective Report Pt continues to have calf twitching on L side.  Pt reports electrolytes were fine at MD.  Pt reports heaviness with B LE   Objective Measures   Objective Measures Objective Measure 1;Objective Measure 2   Objective Measure 1   Objective Measure Radiating symptoms   Details L % of the day intensity 7/10-  cramping to calf, heaviness; R LE more at night and heaviness not numbness and only when laying down   Treatment Interventions (PT)   Interventions Manual Therapy;Therapeutic Procedure/Exercise   Therapeutic Procedure/Exercise   Ther Proc 1 - Details reviewed pelvic tilts, discussion on ankle support for differential support/diagnosis; education on glut activiation with previous HEP to improve sacral mobility   Therapeutic Procedures: strength, endurance, ROM, flexibillity minutes (69678) 15   Skilled Intervention education, exercise selection   Patient Response/Progress Pt tolerated well without pain   Manual Therapy   Manual Therapy 1 - Details MET for L sacral base dysfunction, innominate neutral; TPR and STM to L gastroc, glut med and piriformis   Manual Therapy: Mobilization, MFR, MLD, friction massage minutes (31733) 25   Skilled Intervention TPR, mobilizations   Patient Response/Progress Patient had relief of current symptoms and will track   Education   Learner/Method Patient;Listening;Demonstration;Pictures/Video;No Barriers to Learning   Plan   Home program centralization of symptoms, prone glut isometric, pelvic tilts   Plan for next session continue with pain relieving positionins, manual techniques as required; progress strengthening   Total Session Time   Timed Code Treatment Minutes 40   Total Treatment Time (sum of timed and untimed services) 40     Fozia Gould, PT, DPT, OCS, CSCS  [facility]  [phone]    Detail Level: Detailed General Sunscreen Counseling: I recommended a broad spectrum sunscreen with a SPF of 30 or higher.  I explained that SPF 30 sunscreens block approximately 97 percent of the sun's harmful rays.  Sunscreens should be applied at least 15 minutes prior to expected sun exposure and then every 2 hours after that as long as sun exposure continues. If swimming or exercising sunscreen should be reapplied every 45 minutes to an hour after getting wet or sweating.  One ounce, or the equivalent of a shot glass full of sunscreen, is adequate to protect the skin not covered by a bathing suit. I also recommended a lip balm with a sunscreen as well. Sun protective clothing can be used in lieu of sunscreen but must be worn the entire time you are exposed to the sun's rays.

## 2023-09-28 ENCOUNTER — TELEPHONE (OUTPATIENT)
Dept: NEUROSURGERY | Facility: CLINIC | Age: 43
End: 2023-09-28
Payer: COMMERCIAL

## 2023-09-28 DIAGNOSIS — Z98.890 S/P LUMBAR MICRODISCECTOMY: Primary | ICD-10-CM

## 2023-09-28 DIAGNOSIS — M54.16 LUMBAR RADICULOPATHY: ICD-10-CM

## 2023-09-28 RX ORDER — METHOCARBAMOL 500 MG/1
500 TABLET, FILM COATED ORAL 3 TIMES DAILY PRN
Qty: 10 TABLET | Refills: 1 | Status: SHIPPED | OUTPATIENT
Start: 2023-09-28 | End: 2023-10-09

## 2023-09-28 NOTE — TELEPHONE ENCOUNTER
Last Visit: 7/6/23    Next Visit:     Name of Provider:  Dr. Roe and Fallon Craig DNP    Assessment: was doing fairly well since surgery until Saturday when she laid down and started coughing hard. She noticed increased back pain to low back to right hip and numbness that radiates down the lateral aspect of right leg to toes (new). Reports new numbness to low back -intermittent. Noticed when spouse applied lidocaine patch and patient didn't feel it being placed. Denies additional red flag symptoms. 6/10 pain to low back when moving. Goes down to 4/10 with sitting/lying. Low back and right hip and left tailbone pain the same as previous. Having difficulty standing up straight    Didn't do ARMANDO as was worried about doing it considering her diabetes. Did PT but wasn't helpful so stopped.    Recommendation given: reviewed with Fallon Craig DNP who agrees with plan to obtain lumbar MRI w/wo contrast and order a muscle relaxer. Refrain from the ARMANDO until we know what's going on.    Patient agrees with plan. Orders placed.    Upon placing Robaxin, there was a contraindication to her allergy emetrol. Reaction to emetrol has been anxiety, agitation, severe paranoia. Spoke with pharmacist and agreed it is ok to have her try and if she develops anxiety, she should stop usage. Patient notified and agrees to plan. She also shared with me that she has not had any side effects with flexeril which has the same contraindication warning as robaxin.

## 2023-10-06 ENCOUNTER — HOSPITAL ENCOUNTER (OUTPATIENT)
Dept: MRI IMAGING | Facility: CLINIC | Age: 43
Discharge: HOME | End: 2023-10-06
Attending: NURSE PRACTITIONER | Admitting: NURSE PRACTITIONER
Payer: COMMERCIAL

## 2023-10-06 DIAGNOSIS — M54.16 LUMBAR RADICULOPATHY: ICD-10-CM

## 2023-10-06 DIAGNOSIS — Z98.890 S/P LUMBAR MICRODISCECTOMY: ICD-10-CM

## 2023-10-06 PROCEDURE — 255N000002 HC RX 255 OP 636: Mod: JZ | Performed by: NURSE PRACTITIONER

## 2023-10-06 PROCEDURE — A9585 GADOBUTROL INJECTION: HCPCS | Mod: JZ | Performed by: NURSE PRACTITIONER

## 2023-10-06 PROCEDURE — 72158 MRI LUMBAR SPINE W/O & W/DYE: CPT

## 2023-10-06 RX ORDER — GADOBUTROL 604.72 MG/ML
10 INJECTION INTRAVENOUS ONCE
Status: COMPLETED | OUTPATIENT
Start: 2023-10-06 | End: 2023-10-06

## 2023-10-06 RX ADMIN — GADOBUTROL 9 ML: 604.72 INJECTION INTRAVENOUS at 18:05

## 2023-10-09 ENCOUNTER — TELEPHONE (OUTPATIENT)
Dept: NEUROSURGERY | Facility: CLINIC | Age: 43
End: 2023-10-09
Payer: COMMERCIAL

## 2023-10-09 DIAGNOSIS — Z98.890 S/P LUMBAR MICRODISCECTOMY: ICD-10-CM

## 2023-10-09 NOTE — TELEPHONE ENCOUNTER
Patient stated she was returning call for her MRI results. Please call patient back at [phone]. Thank you~

## 2023-10-09 NOTE — TELEPHONE ENCOUNTER
Returned patient call. Will proceed with LESI at this time. Consideration for SIJ injection if fails LESI. She verbalized understanding and agreement.

## 2023-10-26 DIAGNOSIS — E11.9 TYPE 2 DIABETES MELLITUS WITHOUT COMPLICATION, WITHOUT LONG-TERM CURRENT USE OF INSULIN (H): ICD-10-CM

## 2023-11-09 DIAGNOSIS — K21.9 GASTROESOPHAGEAL REFLUX DISEASE WITHOUT ESOPHAGITIS: ICD-10-CM

## 2023-11-09 NOTE — TELEPHONE ENCOUNTER
Patient has been notifed of the note below via [employer]. Reminder set for 3 days to send letter if not read.

## 2023-11-29 DIAGNOSIS — E11.9 TYPE 2 DIABETES MELLITUS WITHOUT COMPLICATION, WITHOUT LONG-TERM CURRENT USE OF INSULIN (H): ICD-10-CM

## 2023-11-29 RX ORDER — EMPAGLIFLOZIN 10 MG/1
TABLET, FILM COATED ORAL
Qty: 90 TABLET | Refills: 1 | Status: SHIPPED | OUTPATIENT
Start: 2023-11-29 | End: 2023-12-12

## 2023-12-12 ENCOUNTER — VIRTUAL VISIT (OUTPATIENT)
Dept: FAMILY MEDICINE | Facility: CLINIC | Age: 43
End: 2023-12-12
Payer: COMMERCIAL

## 2023-12-12 DIAGNOSIS — Z98.890 S/P LUMBAR MICRODISCECTOMY: Primary | ICD-10-CM

## 2023-12-12 DIAGNOSIS — N83.202 LEFT OVARIAN CYST: ICD-10-CM

## 2023-12-12 DIAGNOSIS — G62.9 NEUROPATHY: ICD-10-CM

## 2023-12-12 DIAGNOSIS — E11.9 TYPE 2 DIABETES MELLITUS WITHOUT COMPLICATION, WITHOUT LONG-TERM CURRENT USE OF INSULIN (H): ICD-10-CM

## 2023-12-12 PROCEDURE — 99214 OFFICE O/P EST MOD 30 MIN: CPT | Mod: VID | Performed by: PHYSICIAN ASSISTANT

## 2023-12-12 RX ORDER — METFORMIN HCL 500 MG
1000 TABLET, EXTENDED RELEASE 24 HR ORAL 2 TIMES DAILY WITH MEALS
Qty: 360 TABLET | Refills: 1 | Status: SHIPPED | OUTPATIENT
Start: 2023-12-12 | End: 2024-06-04

## 2023-12-12 ASSESSMENT — PATIENT HEALTH QUESTIONNAIRE - PHQ9
10. IF YOU CHECKED OFF ANY PROBLEMS, HOW DIFFICULT HAVE THESE PROBLEMS MADE IT FOR YOU TO DO YOUR WORK, TAKE CARE OF THINGS AT HOME, OR GET ALONG WITH OTHER PEOPLE: SOMEWHAT DIFFICULT
SUM OF ALL RESPONSES TO PHQ QUESTIONS 1-9: 6
SUM OF ALL RESPONSES TO PHQ QUESTIONS 1-9: 6

## 2023-12-12 NOTE — PROGRESS NOTES
"    Instructions Relayed to Patient by Virtual Roomer:     Patient is active on [employer]:   Relayed following to patient: \"It looks like you are active on [employer], are you able to join the visit this way? If not, do you need us to send you a link now or would you like your provider to send a link via text or email when they are ready to initiate the visit?\"    Reminded patient to ensure they were logged on to virtual visit by arrival time listed. Documented in appointment notes if patient had flexibility to initiate visit sooner than arrival time. If pediatric virtual visit, ensured pediatric patient along with parent/guardian will be present for video visit.     Patient offered the website [email or web address] and/or phone number to [employer] Help line: [phone]    Fallon is a 43 year old who is being evaluated via a billable video visit.      How would you like to obtain your AVS? Outline AppharClerk  If the video visit is dropped, the invitation should be resent by: Text to cell phone: [phone]  Will anyone else be joining your video visit? No        Subjective   Fallon is a 43 year old, presenting for the following health issues:  Recheck Medication and Referral (Neurologist - muscle spasm with leg weakness)  - Blood sugars have been pretty stable      12/12/2023    10:32 AM   Additional Questions   Roomed by Austin SANTOS   Accompanied by Self         12/12/2023    10:32 AM   Patient Reported Additional Medications   Patient reports taking the following new medications None       History of Present Illness       Reason for visit:  Left sided pain with bloating plus med refill    She eats 2-3 servings of fruits and vegetables daily.She consumes 1 sweetened beverage(s) daily.She exercises with enough effort to increase her heart rate 60 or more minutes per day.  She exercises with enough effort to increase her heart rate 4 days per week.   She is taking medications regularly.       Concern - Left sided " pain with bloating  Onset: History of ovarian cysts. Also one of her Kidneys has a cyst, believe it was the left. Not measured at last MRI  Description: Left sided pain with bloating  Intensity: moderate  Progression of Symptoms:  same  Accompanying Signs & Symptoms: Gas pain was so bad it was going into the shoulder, Nausea and vomiting due to the pain. Loss of appetite, Weight loss -5 lbs within the last 3 weeks  Previous history of similar problem: Yes - Hx of ovarian cyst. Hx of walking Pneumonia with No fever - Started mid October, breathing can be slightly painful or hard to  breath  Precipitating factors:        Worsened by: None  Alleviating factors:        Improved by: Laying down, relaxing, breathing  Therapies tried and outcome: Tried Gas x - Extra strength. Makes her dizzy, does not take when she really does not have to    Gets ovarian cysts - one has one. September started having pain. More bloating then normal. Last Thursday was really sweaty/clammy and radiating to shoulder. Had a hard time passing gas. Did have normal BM that morning. Pain on left side but squeezing. Intense from 8-midnight. Next morning it was improving but still bloating. Weighed herself this morning and was 189.5 lbs. Pain has been better since.     Still having  a lot of trouble with neuropathy affecting her left leg. Had a microdisectomy but no resolution. Has tried injections as well. Has seen Dr. Roe for follow-up but feels like she is not getting answers. Worries if the pain is coming from her back at all.     Diabetes has been doing well. Feeling well with current medications. Due for labs.       Review of Systems   Constitutional, HEENT, cardiovascular, pulmonary, GI, , musculoskeletal, neuro, skin, endocrine and psych systems are negative, except as otherwise noted.      Objective           Vitals:  No vitals were obtained today due to virtual visit.    Physical Exam   GENERAL: Healthy, alert and no distress  EYES: Eyes  grossly normal to inspection.  No discharge or erythema, or obvious scleral/conjunctival abnormalities.  RESP: No audible wheeze, cough, or visible cyanosis.  No visible retractions or increased work of breathing.    SKIN: Visible skin clear. No significant rash, abnormal pigmentation or lesions.  NEURO: Cranial nerves grossly intact.  Mentation and speech appropriate for age.  PSYCH: Mentation appears normal, affect normal/bright, judgement and insight intact, normal speech and appearance well-groomed.        Assessment & Plan     S/P lumbar microdiscectomy  Patient has seen neurosurgery for follow-up and also has had injections but continues to have neuropathy and weakness affecting her left lower extremity. Recommended neurology consult at this time. Referral placed as below.   - tiZANidine (ZANAFLEX) 4 MG tablet; Take 1-2 tablets (4-8 mg) by mouth 3 times daily as needed for muscle spasms    Type 2 diabetes mellitus without complication, without long-term current use of insulin (H)  Lab visit scheduled. Continue medications as below. Ongoing diet and exercise encouraged.   - metFORMIN (GLUCOPHAGE XR) 500 MG 24 hr tablet; Take 2 tablets (1,000 mg) by mouth 2 times daily (with meals)  - empagliflozin (JARDIANCE) 10 MG TABS tablet; TAKE ONE TABLET (10 MG) BY MOUTH DAILY  - blood glucose (CONTOUR NEXT TEST) test strip; USE TO TEST BLOOD GLUCOSE FOUR TIMES A DAY  - Hemoglobin A1c; Future  - Basic metabolic panel  (Ca, Cl, CO2, Creat, Gluc, K, Na, BUN); Future    Neuropathy  - Adult Neurology  Referral; Future    Left ovarian cyst  Suspect symptoms related to ovarian cyst rupture. Pain has since resolved. If this recurs then ultrasound recommended.     The patient indicates understanding of these issues and agrees with the plan.    CHARY [facility]  Video-Visit Details    Type of service:  Video Visit     Originating Location (pt. Location): Home    Distant Location  (provider location):  On-site  Platform used for Video Visit: Nixon

## 2023-12-20 ENCOUNTER — TELEPHONE (OUTPATIENT)
Dept: FAMILY MEDICINE | Facility: CLINIC | Age: 43
End: 2023-12-20
Payer: COMMERCIAL

## 2023-12-20 DIAGNOSIS — R10.2 PELVIC PAIN IN FEMALE: Primary | ICD-10-CM

## 2023-12-20 NOTE — TELEPHONE ENCOUNTER
Order placed. Please give scheduling instructions or help get this on the books for patient.     Destini Esparza PA-C

## 2023-12-20 NOTE — TELEPHONE ENCOUNTER
Janak sent to patient with radiologies phone number to schedule. Will call later today if not read

## 2023-12-20 NOTE — TELEPHONE ENCOUNTER
Pt called in with increase in pain and bloating on left side. History of ovarian cysts. Looking for order for ultrasound to get done with week to try and avoid going to ED. Says this was talked about at last visit.       Ok to leave detailed message. Can schedule for patient also if able for Friday around her lab appointment. Leave info on voicemail if no answer or send mychart also.

## 2023-12-21 ENCOUNTER — HOSPITAL ENCOUNTER (OUTPATIENT)
Dept: ULTRASOUND IMAGING | Facility: CLINIC | Age: 43
Discharge: HOME | End: 2023-12-21
Attending: PHYSICIAN ASSISTANT | Admitting: PHYSICIAN ASSISTANT
Payer: COMMERCIAL

## 2023-12-21 DIAGNOSIS — R10.2 PELVIC PAIN IN FEMALE: ICD-10-CM

## 2023-12-21 PROCEDURE — 76830 TRANSVAGINAL US NON-OB: CPT

## 2023-12-22 ENCOUNTER — MYC MEDICAL ADVICE (OUTPATIENT)
Dept: FAMILY MEDICINE | Facility: CLINIC | Age: 43
End: 2023-12-22

## 2023-12-22 ENCOUNTER — LAB (OUTPATIENT)
Dept: LAB | Facility: CLINIC | Age: 43
End: 2023-12-22
Payer: COMMERCIAL

## 2023-12-22 DIAGNOSIS — E11.9 TYPE 2 DIABETES MELLITUS WITHOUT COMPLICATION, WITHOUT LONG-TERM CURRENT USE OF INSULIN (H): ICD-10-CM

## 2023-12-22 LAB
ANION GAP SERPL CALCULATED.3IONS-SCNC: 15 MMOL/L (ref 7–15)
BUN SERPL-MCNC: 11.5 MG/DL (ref 6–20)
CALCIUM SERPL-MCNC: 8.9 MG/DL (ref 8.6–10)
CHLORIDE SERPL-SCNC: 102 MMOL/L (ref 98–107)
CREAT SERPL-MCNC: 0.76 MG/DL (ref 0.51–0.95)
DEPRECATED HCO3 PLAS-SCNC: 21 MMOL/L (ref 22–29)
EGFRCR SERPLBLD CKD-EPI 2021: >90 ML/MIN/1.73M2
GLUCOSE SERPL-MCNC: 148 MG/DL (ref 70–99)
HBA1C MFR BLD: 6.6 %
POTASSIUM SERPL-SCNC: 3.8 MMOL/L (ref 3.4–5.3)
SODIUM SERPL-SCNC: 138 MMOL/L (ref 135–145)

## 2023-12-22 PROCEDURE — 83036 HEMOGLOBIN GLYCOSYLATED A1C: CPT

## 2023-12-22 PROCEDURE — 80048 BASIC METABOLIC PNL TOTAL CA: CPT

## 2023-12-22 PROCEDURE — 36415 COLL VENOUS BLD VENIPUNCTURE: CPT

## 2024-01-05 DIAGNOSIS — K21.9 GASTROESOPHAGEAL REFLUX DISEASE WITHOUT ESOPHAGITIS: ICD-10-CM

## 2024-01-06 DIAGNOSIS — K21.9 GASTROESOPHAGEAL REFLUX DISEASE WITHOUT ESOPHAGITIS: ICD-10-CM

## 2024-01-08 DIAGNOSIS — E11.9 TYPE 2 DIABETES MELLITUS WITHOUT COMPLICATION, WITHOUT LONG-TERM CURRENT USE OF INSULIN (H): ICD-10-CM

## 2024-01-19 ENCOUNTER — HOSPITAL ENCOUNTER (EMERGENCY)
Facility: CLINIC | Age: 44
Discharge: HOME | End: 2024-01-19
Attending: FAMILY MEDICINE | Admitting: FAMILY MEDICINE
Payer: COMMERCIAL

## 2024-01-19 ENCOUNTER — APPOINTMENT (OUTPATIENT)
Dept: GENERAL RADIOLOGY | Facility: CLINIC | Age: 44
End: 2024-01-19
Attending: FAMILY MEDICINE
Payer: COMMERCIAL

## 2024-01-19 VITALS
HEART RATE: 86 BPM | DIASTOLIC BLOOD PRESSURE: 73 MMHG | SYSTOLIC BLOOD PRESSURE: 124 MMHG | TEMPERATURE: 97 F | HEIGHT: 64 IN | WEIGHT: 195 LBS | OXYGEN SATURATION: 97 % | BODY MASS INDEX: 33.29 KG/M2 | RESPIRATION RATE: 14 BRPM

## 2024-01-19 DIAGNOSIS — S29.012A UPPER BACK STRAIN, INITIAL ENCOUNTER: ICD-10-CM

## 2024-01-19 LAB
ANION GAP SERPL CALCULATED.3IONS-SCNC: 14 MMOL/L (ref 7–15)
BASOPHILS # BLD AUTO: 0.1 10E3/UL (ref 0–0.2)
BASOPHILS NFR BLD AUTO: 1 %
BUN SERPL-MCNC: 10.6 MG/DL (ref 6–20)
CALCIUM SERPL-MCNC: 9.2 MG/DL (ref 8.6–10)
CHLORIDE SERPL-SCNC: 104 MMOL/L (ref 98–107)
CREAT SERPL-MCNC: 0.81 MG/DL (ref 0.51–0.95)
D DIMER PPP FEU-MCNC: <0.27 UG/ML FEU (ref 0–0.5)
DEPRECATED HCO3 PLAS-SCNC: 22 MMOL/L (ref 22–29)
EGFRCR SERPLBLD CKD-EPI 2021: >90 ML/MIN/1.73M2
EOSINOPHIL # BLD AUTO: 0.1 10E3/UL (ref 0–0.7)
EOSINOPHIL NFR BLD AUTO: 1 %
ERYTHROCYTE [DISTWIDTH] IN BLOOD BY AUTOMATED COUNT: 12.8 % (ref 10–15)
GLUCOSE SERPL-MCNC: 129 MG/DL (ref 70–99)
HCT VFR BLD AUTO: 46.1 % (ref 35–47)
HGB BLD-MCNC: 16.2 G/DL (ref 11.7–15.7)
IMM GRANULOCYTES # BLD: 0 10E3/UL
IMM GRANULOCYTES NFR BLD: 0 %
LYMPHOCYTES # BLD AUTO: 1.8 10E3/UL (ref 0.8–5.3)
LYMPHOCYTES NFR BLD AUTO: 29 %
MCH RBC QN AUTO: 30.6 PG (ref 26.5–33)
MCHC RBC AUTO-ENTMCNC: 35.1 G/DL (ref 31.5–36.5)
MCV RBC AUTO: 87 FL (ref 78–100)
MONOCYTES # BLD AUTO: 0.4 10E3/UL (ref 0–1.3)
MONOCYTES NFR BLD AUTO: 6 %
NEUTROPHILS # BLD AUTO: 4.1 10E3/UL (ref 1.6–8.3)
NEUTROPHILS NFR BLD AUTO: 63 %
NRBC # BLD AUTO: 0 10E3/UL
NRBC BLD AUTO-RTO: 0 /100
PLATELET # BLD AUTO: 235 10E3/UL (ref 150–450)
POTASSIUM SERPL-SCNC: 3.9 MMOL/L (ref 3.4–5.3)
RBC # BLD AUTO: 5.29 10E6/UL (ref 3.8–5.2)
SODIUM SERPL-SCNC: 140 MMOL/L (ref 135–145)
TROPONIN T SERPL HS-MCNC: <6 NG/L
WBC # BLD AUTO: 6.4 10E3/UL (ref 4–11)

## 2024-01-19 PROCEDURE — 99284 EMERGENCY DEPT VISIT MOD MDM: CPT | Mod: 25 | Performed by: FAMILY MEDICINE

## 2024-01-19 PROCEDURE — 250N000013 HC RX MED GY IP 250 OP 250 PS 637: Performed by: FAMILY MEDICINE

## 2024-01-19 PROCEDURE — 84484 ASSAY OF TROPONIN QUANT: CPT | Performed by: FAMILY MEDICINE

## 2024-01-19 PROCEDURE — 93005 ELECTROCARDIOGRAM TRACING: CPT | Performed by: FAMILY MEDICINE

## 2024-01-19 PROCEDURE — 93010 ELECTROCARDIOGRAM REPORT: CPT | Performed by: FAMILY MEDICINE

## 2024-01-19 PROCEDURE — 85379 FIBRIN DEGRADATION QUANT: CPT | Performed by: FAMILY MEDICINE

## 2024-01-19 PROCEDURE — 71045 X-RAY EXAM CHEST 1 VIEW: CPT

## 2024-01-19 PROCEDURE — 250N000011 HC RX IP 250 OP 636: Performed by: FAMILY MEDICINE

## 2024-01-19 PROCEDURE — 80048 BASIC METABOLIC PNL TOTAL CA: CPT | Performed by: FAMILY MEDICINE

## 2024-01-19 PROCEDURE — 85025 COMPLETE CBC W/AUTO DIFF WBC: CPT | Performed by: FAMILY MEDICINE

## 2024-01-19 PROCEDURE — 36415 COLL VENOUS BLD VENIPUNCTURE: CPT | Performed by: FAMILY MEDICINE

## 2024-01-19 PROCEDURE — 96374 THER/PROPH/DIAG INJ IV PUSH: CPT | Performed by: FAMILY MEDICINE

## 2024-01-19 RX ORDER — LIDOCAINE 4 G/G
2 PATCH TOPICAL ONCE
Status: DISCONTINUED | OUTPATIENT
Start: 2024-01-19 | End: 2024-01-19 | Stop reason: HOSPADM

## 2024-01-19 RX ORDER — OXYCODONE HYDROCHLORIDE 5 MG/1
5 TABLET ORAL EVERY 6 HOURS PRN
Qty: 10 TABLET | Refills: 0 | Status: SHIPPED | OUTPATIENT
Start: 2024-01-19 | End: 2024-01-22

## 2024-01-19 RX ORDER — HYDROMORPHONE HYDROCHLORIDE 1 MG/ML
0.5 INJECTION, SOLUTION INTRAMUSCULAR; INTRAVENOUS; SUBCUTANEOUS EVERY 30 MIN PRN
Status: DISCONTINUED | OUTPATIENT
Start: 2024-01-19 | End: 2024-01-19 | Stop reason: HOSPADM

## 2024-01-19 RX ADMIN — HYDROMORPHONE HYDROCHLORIDE 0.5 MG: 1 INJECTION, SOLUTION INTRAMUSCULAR; INTRAVENOUS; SUBCUTANEOUS at 12:00

## 2024-01-19 RX ADMIN — LIDOCAINE 2 PATCH: 4 PATCH TOPICAL at 12:01

## 2024-01-19 ASSESSMENT — ACTIVITIES OF DAILY LIVING (ADL)
ADLS_ACUITY_SCORE: 35
ADLS_ACUITY_SCORE: 33

## 2024-01-19 NOTE — ED PROVIDER NOTES
History     Chief Complaint   Patient presents with    Back Pain    Neck Pain     HPI  Fallon Rivera is a 43 year old female who presents with upper back pain that radiates around to the chest this been going on for the past 7 days.  Pain seems to be worse with taking a deep breath or moving.  Patient does have some muscle relaxants and oxycodone at home but she has been fearful to take them because she thinks it lowers her heart rate too much.  Denies any shortness of breath this week.  She has also been concerned because her Apple Watch is said that her heart rates been very erratic this week.  Denies any nausea or any vomiting.  Denies any belly pain.    Allergies:  Allergies   Allergen Reactions    Amoxicillin Hives    Demerol Hcl [Meperidine Hcl] Nausea    Emetrol      Anxiety, agitation,severe paranoia. Zofran, Reglan are some of them.  DRAMAMINE WITHOUT ISSUES      Indomethacin Nausea and Vomiting    Macrobid [Nitrofuran Derivatives] Hives    Reglan [Metoclopramide] Other (See Comments)     Acute paranoia, severe    Zofran [Ondansetron] Other (See Comments)     Severe anxiety, agitation    Vancomycin Other (See Comments)     maria del rosario syndrome       Problem List:    Patient Active Problem List    Diagnosis Date Noted    S/P lumbar microdiscectomy 07/05/2023     Priority: Medium    Lumbar radiculopathy 07/05/2023     Priority: Medium    Left ovarian cyst 05/25/2021     Priority: Medium     Added automatically from request for surgery [id number]      Dyspareunia, female 12/01/2020     Priority: Medium    Rectal bleeding 09/30/2020     Priority: Medium     Added automatically from request for surgery [id number]      Chronic diarrhea 09/30/2020     Priority: Medium     Added automatically from request for surgery [id number]      Abnormal CT scan, small bowel 09/30/2020     Priority: Medium     Added automatically from request for surgery [id number]      Endometrial hyperplasia, unspecified 06/18/2020     Priority:  Medium     Added automatically from request for surgery [id number]      Pelvic pain in female 06/18/2020     Priority: Medium     Added automatically from request for surgery [id number]      Endometrial intraepithelial neoplasia (EIN) 03/02/2020     Priority: Medium    Obesity (BMI 35.0-39.9) with comorbidity (H) 10/16/2018     Priority: Medium    Non morbid obesity due to excess calories 07/05/2016     Priority: Medium    Type 2 diabetes mellitus without complication (H) 04/29/2016     Priority: Medium    Glucosuria 04/27/2016     Priority: Medium    Right ovarian cyst 09/15/2015     Priority: Medium    Hyperlipidemia LDL goal <130 07/18/2012     Priority: Medium    Mild major depression (H) 07/18/2012     Priority: Medium    GERD (gastroesophageal reflux disease) 07/03/2012     Priority: Medium    CARDIOVASCULAR SCREENING; LDL GOAL LESS THAN 160 10/31/2010     Priority: Medium    Dyspnea 11/24/2009     Priority: Medium    Kidney stones 02/17/2009     Priority: Medium    S/P conization of cervix- KAYLA 2/3 in 2011 06/27/2007     Priority: Medium     1/24/06 pap: ASC-H  2/9/06 colposcopy/bx (negative)/ECC (negative) pap- ASC-H  5/24/06 pap- ASC-H  9/6/07 pap NIL  1/23/07 pap ASCUS + HPV 45 (high risk)  3/15/07 pap ASCUS + HPV 45 (high risk) colposcopy- bx (negative)/ECC (koilocytosis)  6/19/07 ECC- koilocytosis.  Pap- AGS  10/23/07 pap- ASC-H, ECC- negative  2/14/08 pap NIL- return to yearly paps  2/17/09 pap NIL- repeat 1 year  3/8/10 pap NIL  3/3/11 pap ASCUS + HPV 45 (high risk)- colposcopy recommended  5/16/11 colpo- bx (KAYLA 2/3/HSIL) ECC (ASCUS)  pap (ASC-H)  6/2/11 recommend: [facility]  7/1/11 [facility]: path: KAYLA 2/3 with possible involvement of the endocervical margin.  ECC- neg.  Endometrium- neg.  7/14/11 plan: HIPOLITO in approx 2 months  10/10/11- hysterectomy planned.  (cancelled)  11/7/11 pap-- NIL. Plan--repeat pap in 6 months. (5/7/12)  5/7/12 pap NIL. Plan-- pap in 1 year (due 5/7/13)   5/8/13 pap NIL. Plan-pap in 1  year  5/8/14 NIL pap, neg HR HPV.  Plan- repeat pap/HPV in 1 year (due 5/8/15)  10/14/14 NIL pap, neg HR HPV. Endometrial biopsy- WNL   1/6/16  NIL pap, neg HR HPV.  Plan: paps yearly, per Dr. Ford.  1/30/17 NIL pap, neg HR HPV. Plan: pap in 1 year.   3/12/18 NIL pap, neg HR HPV. Plan: cotest annually, per Dr. Ford.  3/11/19 ECC: negative. NIL pap, neg HR HPV. Plan: cotest in 1 yr  2/11/20 NIL Pap, Neg HPV. EMB: intraepithelial neoplasia. Plan: Hysterectomy.   7/28/20 Hysterectomy with cervix removed. Hx of KAYLA 2 in 2011 Plan: Cotest Q3y until 2036.          Papanicolaou smear of cervix with atypical squamous cells cannot exclude high grade squamous intraepithelial lesion (ASC-H) 09/07/2006     Priority: Medium    Female infertility 07/24/2006     Priority: Medium     Problem list name updated by automated process. Provider to review      Hemorrhage of rectum and anus 12/02/2004     Priority: Medium        Past Medical History:    Past Medical History:   Diagnosis Date    Abnormal Pap smear 2006, 2007,     Calculus of kidney 01/01/2002    Cervical high risk HPV (human papillomavirus) test positive     History of colposcopy with cervical biopsy 2/9/06, 3/15/07       Past Surgical History:    Past Surgical History:   Procedure Laterality Date    CHOLECYSTECTOMY, LAPOROSCOPIC  5/11/2007    Cholecystectomy, Laparoscopic    COLONOSCOPY  05/17/10    COLONOSCOPY N/A 8/27/2019    Procedure: COLONOSCOPY;  Surgeon: Paramjit Kingston DO;  Location:  GI    COLONOSCOPY N/A 10/23/2020    Procedure: COLONOSCOPY, WITH  BIOPSY;  Surgeon: Ba Dickinson MD;  Location:  GI    CONIZATION  7/1/2011    Procedure:CONIZATION; cold knife and punch biopsy of mole on back; Surgeon:SYDNEY FORD; Location: OR    DILATION AND CURETTAGE, HYSTEROSCOPY, LAPAROSCOPY, COMBINED Right 9/24/2015    Procedure: COMBINED DILATION AND CURETTAGE, HYSTEROSCOPY, LAPAROSCOPY;  Surgeon: Sydney Ford MD;   Location: [facility]    DISCECTOMY LUMBAR MINIMALLY INVASIVE ONE LEVEL Left 1/23/2019    Procedure: Minimally Invasive Surgery Left Lumbar 5-Sacral 1 microdiscectomy;  Surgeon: Mason Roe MD;  Location: [facility]    DISCECTOMY LUMBAR POSTERIOR MICROSCOPIC ONE LEVEL Left 4/5/2023    Procedure: Minimally Invasive Left Lumbar 5 to Sacral 1 evacuation of recurrent disc herniation;  Surgeon: Mason Roe MD;  Location: [facility]    ESOPHAGOSCOPY, GASTROSCOPY, DUODENOSCOPY (EGD), COMBINED  5/21/2014    Procedure: COMBINED ESOPHAGOSCOPY, GASTROSCOPY, DUODENOSCOPY (EGD), BIOPSY SINGLE OR MULTIPLE;  Surgeon: Earl Lane MD;  Location: [facility]    ESOPHAGOSCOPY, GASTROSCOPY, DUODENOSCOPY (EGD), COMBINED N/A 10/23/2020    Procedure: Esophagogastroduodenoscopy with  biopsy;  Surgeon: Ba Dickinson MD;  Location[facility]    EXCISE LESION TRUNK  7/1/2011    Procedure:EXCISE LESION TRUNK; Surgeon:SYDNEY FORD; Location:[facility]    FORAMINOTOMY LUMBAR POSTERIOR ONE LEVEL Left 4/5/2023    Procedure: left foraminotomy Lumbar 5 to Sacral 1;  Surgeon: Mason Roe MD;  Location: [facility]    HC FRAGMENTING OF KIDNEY STONE  11/30/2005    HC RX ECTOP PREG BY SCOPE,RMV TUBE/OVRY  12/20/2007    Right sided salpingectomy and removal of ruptured ectopic pregnancy. D&C.    HYSTERECTOMY VAGINAL      LAPAROSCOPIC APPENDECTOMY N/A 9/24/2015    Procedure: LAPAROSCOPIC APPENDECTOMY;  Surgeon: James Cuellar MD;  [facility]    LAPAROSCOPIC CYSTECTOMY OVARIAN (BENIGN) N/A 9/24/2015    Procedure: LAPAROSCOPIC CYSTECTOMY OVARIAN (BENIGN);  Surgeon: Sydney Ford MD;  [facility]    LAPAROSCOPIC HYSTERECTOMY TOTAL, BILATERAL SALPINGO-OOPHORECTOMY, COMBINED N/A 7/28/2020    Procedure: laparoscpic assisted vaginal hysterectomy, cystoscopy;  Surgeon: Sydney Ford MD;  Location: [facility]    LAPAROSCOPIC OOPHORECTOMY Right 9/24/2015    Procedure: LAPAROSCOPIC OOPHORECTOMY;  Surgeon:  Greg Ford MD;  Location: [facility]    LITHOTRIPSY  01/17/2007    PELVIS LAPAROSCOPY,DX  10/16/2000    Diagnostic laparoscopy, Endometrial biopsy.    TUBAL/ECTOPIC PREGNANCY  01/23/2007    Lap removal of left ruptured ectopic pregnancy & salpingectomy, D&C    Z REMOVAL OF KIDNEY STONE      two surgeries, 2002,2003    ZZ UGI ENDOSCOPY, SIMPLE EXAM  09/01/09       Family History:    Family History   Problem Relation Age of Onset    Diabetes Maternal Grandmother         adult onset    Anesthesia Reaction Maternal Grandmother         can't tolerate Novacaine.    Respiratory Maternal Grandmother         COPD and emphysema.    Thyroid Disease Maternal Grandmother     Heart Disease Maternal Grandmother         CHF    Diabetes Paternal Grandfather         adult o nset    Hypertension Paternal Grandfather     Cerebrovascular Disease Paternal Grandfather     Heart Disease Paternal Grandfather         MI, replaced valve,angioplasty    Thyroid Disease Paternal Grandfather     Cancer Maternal Grandfather         skin cancer    Heart Disease Maternal Grandfather         MI    Obesity Mother         on thyroid medication    Thyroid Disease Mother     Diabetes Mother     Rheumatologic Disease Mother     Heart Disease Father     Hypertension Father         and TIA    Lipids Father     Breast Cancer Paternal Grandmother     Arrhythmia Other     Cancer Maternal Aunt         breast/brain cancer    Depression Paternal Aunt     Cerebrovascular Disease Paternal Uncle     Cerebrovascular Disease Paternal Aunt        Social History:  Marital Status:   [2]  Social History     Tobacco Use    Smoking status: Some Days     Packs/day: 0.00     Years: 12.00     Additional pack years: 0.00     Total pack years: 0.00     Types: Cigarettes    Smokeless tobacco: Never    Tobacco comments:     As of 10 /2014, pt has had a few cigs here and there   Vaping Use    Vaping Use: Never used   Substance Use Topics    Alcohol use: Yes     " Alcohol/week: 0.0 standard drinks of alcohol     Comment: every few months    Drug use: No        Medications:    oxyCODONE (ROXICODONE) 5 MG tablet  blood glucose (CONTOUR NEXT TEST) test strip  blood glucose monitoring (NO BRAND SPECIFIED) meter device kit  empagliflozin (JARDIANCE) 10 MG TABS tablet  HYDROcodone-acetaminophen (NORCO) 5-325 MG tablet  metFORMIN (GLUCOPHAGE XR) 500 MG 24 hr tablet  multivitamin w/minerals (THERA-VIT-M) tablet  nystatin (MYCOSTATIN) 379980 UNIT/GM external powder  omeprazole (PRILOSEC) 20 MG DR capsule  STATIN NOT PRESCRIBED (INTENTIONAL)  tiZANidine (ZANAFLEX) 4 MG tablet          Review of Systems   All other systems reviewed and are negative.      Physical Exam   BP: (!) 151/109  Pulse: 94  Temp: 97  F (36.1  C)  Resp: 16  Height: 162.6 cm (5' 4\")  Weight: 88.5 kg (195 lb)  SpO2: 100 %      Physical Exam  Vitals and nursing note reviewed.   Constitutional:       General: She is not in acute distress.     Appearance: She is well-developed. She is not diaphoretic.   HENT:      Head: Normocephalic and atraumatic.      Nose: Nose normal.      Mouth/Throat:      Pharynx: No oropharyngeal exudate.   Eyes:      Conjunctiva/sclera: Conjunctivae normal.   Cardiovascular:      Rate and Rhythm: Normal rate and regular rhythm.      Heart sounds: Normal heart sounds. No murmur heard.     No friction rub.   Pulmonary:      Effort: Pulmonary effort is normal. No respiratory distress.      Breath sounds: Normal breath sounds. No stridor. No wheezing or rales.   Abdominal:      General: Bowel sounds are normal. There is no distension.      Palpations: Abdomen is soft. There is no mass.      Tenderness: There is no abdominal tenderness. There is no guarding.   Musculoskeletal:         General: Tenderness (Over the upper back area, midline and paraspinal tenderness) present. Normal range of motion.      Cervical back: Normal range of motion and neck supple.   Skin:     General: Skin is warm and " dry.      Capillary Refill: Capillary refill takes less than 2 seconds.      Findings: No erythema.   Neurological:      Mental Status: She is alert and oriented to person, place, and time.   Psychiatric:         Judgment: Judgment normal.         ED Course        .         Procedures       EKG Interpretation:      Interpreted by Edgardo Ferraro  Time reviewed: now   Symptoms at time of EKG: now   Rhythm: normal sinus   Rate: normal  Axis: NORMAL  Ectopy: none  Conduction: normal  ST Segments/ T Waves: No ST-T wave changes  Q Waves: none  Comparison to prior: No old EKG available    Clinical Impression: normal EKG        Results for orders placed or performed during the hospital encounter of 01/19/24 (from the past 24 hour(s))   CBC with platelets differential    Narrative    The following orders were created for panel order CBC with platelets differential.  Procedure                               Abnormality         Status                     ---------                               -----------         ------                     CBC with platelets and d...[[id number]]  Abnormal            Final result                 Please view results for these tests on the individual orders.   D dimer quantitative   Result Value Ref Range    D-Dimer Quantitative <0.27 0.00 - 0.50 ug/mL FEU    Narrative    This D-dimer assay is intended for use in conjunction with a clinical pretest probability assessment model to exclude pulmonary embolism (PE) and deep venous thrombosis (DVT) in outpatients suspected of PE or DVT. The cut-off value is 0.50 ug/mL FEU.   Basic metabolic panel   Result Value Ref Range    Sodium 140 135 - 145 mmol/L    Potassium 3.9 3.4 - 5.3 mmol/L    Chloride 104 98 - 107 mmol/L    Carbon Dioxide (CO2) 22 22 - 29 mmol/L    Anion Gap 14 7 - 15 mmol/L    Urea Nitrogen 10.6 6.0 - 20.0 mg/dL    Creatinine 0.81 0.51 - 0.95 mg/dL    GFR Estimate >90 >60 mL/min/1.73m2    Calcium 9.2 8.6 - 10.0 mg/dL    Glucose 129  (H) 70 - 99 mg/dL   Troponin T, High Sensitivity   Result Value Ref Range    Troponin T, High Sensitivity <6 <=14 ng/L   CBC with platelets and differential   Result Value Ref Range    WBC Count 6.4 4.0 - 11.0 10e3/uL    RBC Count 5.29 (H) 3.80 - 5.20 10e6/uL    Hemoglobin 16.2 (H) 11.7 - 15.7 g/dL    Hematocrit 46.1 35.0 - 47.0 %    MCV 87 78 - 100 fL    MCH 30.6 26.5 - 33.0 pg    MCHC 35.1 31.5 - 36.5 g/dL    RDW 12.8 10.0 - 15.0 %    Platelet Count 235 150 - 450 10e3/uL    % Neutrophils 63 %    % Lymphocytes 29 %    % Monocytes 6 %    % Eosinophils 1 %    % Basophils 1 %    % Immature Granulocytes 0 %    NRBCs per 100 WBC 0 <1 /100    Absolute Neutrophils 4.1 1.6 - 8.3 10e3/uL    Absolute Lymphocytes 1.8 0.8 - 5.3 10e3/uL    Absolute Monocytes 0.4 0.0 - 1.3 10e3/uL    Absolute Eosinophils 0.1 0.0 - 0.7 10e3/uL    Absolute Basophils 0.1 0.0 - 0.2 10e3/uL    Absolute Immature Granulocytes 0.0 <=0.4 10e3/uL    Absolute NRBCs 0.0 10e3/uL   XR Chest Port 1 View    Narrative    CHEST ONE VIEW  1/19/2024 12:52 PM     HISTORY: back pain, chest pain    COMPARISON: 9/28/2021      Impression    IMPRESSION: No acute disease.    NIRMALA PEOPLES MD         SYSTEM ID:  [id number]     *Note: Due to a large number of results and/or encounters for the requested time period, some results have not been displayed. A complete set of results can be found in Results Review.       Medications   Lidocaine (LIDOCARE) 4 % Patch 2 patch (2 patches Transdermal $Patch/Med Applied 1/19/24 1201)   HYDROmorphone (PF) (DILAUDID) injection 0.5 mg (0.5 mg Intravenous $Given 1/19/24 1200)     Labs came back and was unermarkable.  D-dimer and trop was neg.  At this point I think the pain is likely from more muscular, recommend patient start taking her muscle relaxants.  She requested a few oxycodone pills that she has been on these and is helpful with her back.  Will go ahead and send her home with 10.  Patient is told to follow-up with her doctor next  week if things or not improving.  May benefit from some physical therapy on her upper back or we need to consider possible MRI or other imaging of her upper back.  Patient will be discharged at this time.    Assessments & Plan (with Medical Decision Making)  Upper back strain     I have reviewed the nursing notes.    I have reviewed the findings, diagnosis, plan and need for follow up with the patient.        New Prescriptions    OXYCODONE (ROXICODONE) 5 MG TABLET    Take 1 tablet (5 mg) by mouth every 6 hours as needed for pain       Final diagnoses:   Upper back strain, initial encounter       1/19/2024   [facility] EMERGENCY DEPT       Edgardo Ferraro MD  01/19/24 1833       Edgardo Ferraro MD  02/01/24 [id number]

## 2024-01-19 NOTE — ED TRIAGE NOTES
Here with upper back pain and neck. States she has been having issues since her fall around 3 years ago. Hx of lower back surgery     Triage Assessment (Adult)       Row Name 01/19/24 1048          Triage Assessment    Airway WDL WDL        Respiratory WDL    Respiratory WDL WDL        Skin Circulation/Temperature WDL    Skin Circulation/Temperature WDL WDL        Cardiac WDL    Cardiac WDL WDL        Peripheral/Neurovascular WDL    Peripheral Neurovascular WDL WDL        Cognitive/Neuro/Behavioral WDL    Cognitive/Neuro/Behavioral WDL WDL        Jose C Coma Scale    Best Eye Response 4-->(E4) spontaneous     Best Motor Response 6-->(M6) obeys commands     Best Verbal Response 5-->(V5) oriented     [place] Coma Scale Score 15

## 2024-02-12 ENCOUNTER — TRANSFERRED RECORDS (OUTPATIENT)
Dept: HEALTH INFORMATION MANAGEMENT | Facility: CLINIC | Age: 44
End: 2024-02-12
Payer: COMMERCIAL

## 2024-02-12 LAB — RETINOPATHY: NEGATIVE

## 2024-02-15 DIAGNOSIS — E11.9 TYPE 2 DIABETES MELLITUS WITHOUT COMPLICATION, WITHOUT LONG-TERM CURRENT USE OF INSULIN (H): ICD-10-CM

## 2024-02-19 DIAGNOSIS — E11.9 TYPE 2 DIABETES MELLITUS WITHOUT COMPLICATION, WITHOUT LONG-TERM CURRENT USE OF INSULIN (H): ICD-10-CM

## 2024-03-03 ENCOUNTER — MYC REFILL (OUTPATIENT)
Dept: FAMILY MEDICINE | Facility: CLINIC | Age: 44
End: 2024-03-03
Payer: COMMERCIAL

## 2024-03-03 DIAGNOSIS — E11.9 TYPE 2 DIABETES MELLITUS WITHOUT COMPLICATION, WITHOUT LONG-TERM CURRENT USE OF INSULIN (H): ICD-10-CM

## 2024-03-08 DIAGNOSIS — K21.9 GASTROESOPHAGEAL REFLUX DISEASE WITHOUT ESOPHAGITIS: ICD-10-CM

## 2024-03-10 DIAGNOSIS — K21.9 GASTROESOPHAGEAL REFLUX DISEASE WITHOUT ESOPHAGITIS: ICD-10-CM

## 2024-03-16 ENCOUNTER — HEALTH MAINTENANCE LETTER (OUTPATIENT)
Age: 44
End: 2024-03-16

## 2024-03-19 ENCOUNTER — TRANSFERRED RECORDS (OUTPATIENT)
Dept: HEALTH INFORMATION MANAGEMENT | Facility: CLINIC | Age: 44
End: 2024-03-19
Payer: COMMERCIAL

## 2024-03-20 ENCOUNTER — MEDICAL CORRESPONDENCE (OUTPATIENT)
Dept: SCHEDULING | Facility: CLINIC | Age: 44
End: 2024-03-20
Payer: COMMERCIAL

## 2024-03-21 ENCOUNTER — TELEPHONE (OUTPATIENT)
Dept: FAMILY MEDICINE | Facility: CLINIC | Age: 44
End: 2024-03-21
Payer: COMMERCIAL

## 2024-03-21 NOTE — TELEPHONE ENCOUNTER
Reason for Call:  Appointment Request    Patient requesting this type of appt: Pre-op    Requested provider: Destini Esparza    Reason patient unable to be scheduled: Not with their preferred provider    When does patient want to be seen/preferred time: needs a preop before 4/20 surgery on April 26      Could we send this information to you in [facility] or would you prefer to receive a phone call?:   Patient would prefer a phone call   Okay to leave a detailed message?: Yes at Cell number on file:    Telephone Information:   Mobile [phone]       Call taken on 3/21/2024 at 3:07 PM by Angela Malloy

## 2024-03-24 NOTE — TELEPHONE ENCOUNTER
Please offer visit on 4/9 at 2:40 PM arrival time. This has been scheduled.     Destini Esparza PA-C

## 2024-04-09 ENCOUNTER — TELEPHONE (OUTPATIENT)
Dept: FAMILY MEDICINE | Facility: CLINIC | Age: 44
End: 2024-04-09

## 2024-04-09 NOTE — TELEPHONE ENCOUNTER
Please apologize to patient that I can to cancel clinic today. Please offer visit tomorrow at 8:40 AM. This has been scheduled.    Destini Esparza PA-C

## 2024-04-09 NOTE — TELEPHONE ENCOUNTER
Reason for Call:  Other appointment    Detailed comments: PATIENT HAD HER APPT CANCELLED BY PROVIDER AND WANTS TO RESCHEDULE    PATIENT WANTS TO KNOW IF SHE CAN BE RESCHEDULED FLAVIA WORRELL PA-C    PLEASE CALL     Phone Number Patient can be reached at: Cell number on file:    Telephone Information:   Mobile [phone]       Best Time: ASAP    Can we leave a detailed message on this number? YES    Call taken on 4/9/2024 at 8:07 AM by Shelly Zamora

## 2024-04-10 ENCOUNTER — OFFICE VISIT (OUTPATIENT)
Dept: FAMILY MEDICINE | Facility: CLINIC | Age: 44
End: 2024-04-10
Payer: COMMERCIAL

## 2024-04-10 VITALS
HEIGHT: 65 IN | HEART RATE: 90 BPM | TEMPERATURE: 97.4 F | SYSTOLIC BLOOD PRESSURE: 118 MMHG | BODY MASS INDEX: 32.15 KG/M2 | DIASTOLIC BLOOD PRESSURE: 76 MMHG | WEIGHT: 193 LBS | OXYGEN SATURATION: 100 %

## 2024-04-10 DIAGNOSIS — E11.43 TYPE 2 DIABETES MELLITUS WITH DIABETIC AUTONOMIC NEUROPATHY, WITHOUT LONG-TERM CURRENT USE OF INSULIN (H): ICD-10-CM

## 2024-04-10 DIAGNOSIS — E66.01 MORBID OBESITY (H): ICD-10-CM

## 2024-04-10 DIAGNOSIS — G37.9 DEMYELINATING DISEASE (H): ICD-10-CM

## 2024-04-10 DIAGNOSIS — R29.2 HYPERREFLEXIA: ICD-10-CM

## 2024-04-10 DIAGNOSIS — F33.0 MILD EPISODE OF RECURRENT MAJOR DEPRESSIVE DISORDER (H): ICD-10-CM

## 2024-04-10 DIAGNOSIS — Z01.818 PRE-OP EXAM: Primary | ICD-10-CM

## 2024-04-10 PROBLEM — K62.5 RECTAL BLEEDING: Status: RESOLVED | Noted: 2020-09-30 | Resolved: 2024-04-10

## 2024-04-10 PROBLEM — R10.2 PELVIC PAIN IN FEMALE: Status: RESOLVED | Noted: 2020-06-18 | Resolved: 2024-04-10

## 2024-04-10 PROCEDURE — 99214 OFFICE O/P EST MOD 30 MIN: CPT | Performed by: PHYSICIAN ASSISTANT

## 2024-04-10 RX ORDER — LANCETS
EACH MISCELLANEOUS
Qty: 100 EACH | Refills: 6 | Status: SHIPPED | OUTPATIENT
Start: 2024-04-10

## 2024-04-10 ASSESSMENT — PAIN SCALES - GENERAL: PAINLEVEL: MODERATE PAIN (4)

## 2024-04-10 NOTE — PROGRESS NOTES
Preoperative Evaluation  [address] [address]  Phone: [phone]  Fax: [phone]  Primary Provider: Flavia Esparza  Pre-op Performing Provider: FLAVIA ESPARZA  Apr 10, 2024     Fallon is a 43 year old, presenting for the following:  Pre-Op Exam        4/10/2024     8:41 AM   Additional Questions   Roomed by Flavia ORTEGA     Surgical Information  Surgery/Procedure: MRI OF Brain,Cervical and Thoracic Spine   Surgery Location: [facility]  Surgeon: GENERIC ANESTHESIA PROVIDER   Surgery Date: 4/26/24  Time of Surgery: 8:45 AM  Where patient plans to recover: At home with family  Fax number for surgical facility: Note does not need to be faxed, will be available electronically in Epic.    Subjective       HPI related to upcoming procedure: sedated MRI - r/o demyelinating disease        4/9/2024     9:54 PM   Preop Questions   1. Have you ever had a heart attack or stroke? No   2. Have you ever had surgery on your heart or blood vessels, such as a stent placement, a coronary artery bypass, or surgery on an artery in your head, neck, heart, or legs? No   3. Do you have chest pain with activity? No   4. Do you have a history of  heart failure? No   5. Do you currently have a cold, bronchitis or symptoms of other infection? No   6. Do you have a cough, shortness of breath, or wheezing? No   7. Do you or anyone in your family have previous history of blood clots? YES - aunt/grandfather (strokes)   8. Do you or does anyone in your family have a serious bleeding problem such as prolonged bleeding following surgeries or cuts? No   9. Have you ever had problems with anemia or been told to take iron pills? No   10. Have you had any abnormal blood loss such as black, tarry or bloody stools, or abnormal vaginal bleeding? No   11. Have you ever had a blood transfusion? No   12. Are you willing to have a blood transfusion if it is medically needed  before, during, or after your surgery? Yes   13. Have you or any of your relatives ever had problems with anesthesia? No   14. Do you have sleep apnea, excessive snoring or daytime drowsiness? No   15. Do you have any artifical heart valves or other implanted medical devices like a pacemaker, defibrillator, or continuous glucose monitor? No   16. Do you have artificial joints? No   17. Are you allergic to latex? No   18. Is there any chance that you may be pregnant? No       Health Care Directive  Patient does not have a Health Care Directive or Living Will: Discussed advance care planning with patient; information given to patient to review.    Preoperative Review of    reviewed - no record of controlled substances prescribed.    Status of Chronic Conditions:  See problem list for active medical problems.  Problems all longstanding and stable, except as noted/documented.  See ROS for pertinent symptoms related to these conditions.    DEPRESSION - Patient has a long history of Depression of moderate severity requiring medication for control with recent symptoms being stable..Current symptoms of depression include none.     DIABETES - Patient has a longstanding history of DiabetesType Type II . Patient is being treated with diet and oral agents and denies significant side effects. Control has been good. Complicating factors include but are not limited to: hyperlipidemia.     Patient Active Problem List    Diagnosis Date Noted    S/P lumbar microdiscectomy 07/05/2023     Priority: Medium    Lumbar radiculopathy 07/05/2023     Priority: Medium    Left ovarian cyst 05/25/2021     Priority: Medium     Added automatically from request for surgery [id number]      Dyspareunia, female 12/01/2020     Priority: Medium    Rectal bleeding 09/30/2020     Priority: Medium     Added automatically from request for surgery [id number]      Chronic diarrhea 09/30/2020     Priority: Medium     Added automatically from request for  surgery [id number]      Abnormal CT scan, small bowel 09/30/2020     Priority: Medium     Added automatically from request for surgery [id number]      Endometrial hyperplasia, unspecified 06/18/2020     Priority: Medium     Added automatically from request for surgery [id number]      Pelvic pain in female 06/18/2020     Priority: Medium     Added automatically from request for surgery [id number]      Endometrial intraepithelial neoplasia (EIN) 03/02/2020     Priority: Medium    Obesity (BMI 35.0-39.9) with comorbidity (H) 10/16/2018     Priority: Medium    Non morbid obesity due to excess calories 07/05/2016     Priority: Medium    Type 2 diabetes mellitus without complication (H) 04/29/2016     Priority: Medium    Glucosuria 04/27/2016     Priority: Medium    Right ovarian cyst 09/15/2015     Priority: Medium    Hyperlipidemia LDL goal <130 07/18/2012     Priority: Medium    Mild major depression (H) 07/18/2012     Priority: Medium    GERD (gastroesophageal reflux disease) 07/03/2012     Priority: Medium    CARDIOVASCULAR SCREENING; LDL GOAL LESS THAN 160 10/31/2010     Priority: Medium    Dyspnea 11/24/2009     Priority: Medium    Kidney stones 02/17/2009     Priority: Medium    S/P conization of cervix- KAYLA 2/3 in 2011 06/27/2007     Priority: Medium     1/24/06 pap: ASC-H  2/9/06 colposcopy/bx (negative)/ECC (negative) pap- ASC-H  5/24/06 pap- ASC-H  9/6/07 pap NIL  1/23/07 pap ASCUS + HPV 45 (high risk)  3/15/07 pap ASCUS + HPV 45 (high risk) colposcopy- bx (negative)/ECC (koilocytosis)  6/19/07 ECC- koilocytosis.  Pap- AGS  10/23/07 pap- ASC-H, ECC- negative  2/14/08 pap NIL- return to yearly paps  2/17/09 pap NIL- repeat 1 year  3/8/10 pap NIL  3/3/11 pap ASCUS + HPV 45 (high risk)- colposcopy recommended  5/16/11 colpo- bx (KAYLA 2/3/HSIL) ECC (ASCUS)  pap (ASC-H)  6/2/11 recommend: [facility]  7/1/11 CKC: path: KAYLA 2/3 with possible involvement of the endocervical margin.  ECC- neg.  Endometrium- neg.  7/14/11 plan: HIPOLITO in  approx 2 months  10/10/11- hysterectomy planned.  (cancelled)  11/7/11 pap-- NIL. Plan--repeat pap in 6 months. (5/7/12)  5/7/12 pap NIL. Plan-- pap in 1 year (due 5/7/13)   5/8/13 pap NIL. Plan-pap in 1 year  5/8/14 NIL pap, neg HR HPV.  Plan- repeat pap/HPV in 1 year (due 5/8/15)  10/14/14 NIL pap, neg HR HPV. Endometrial biopsy- WNL   1/6/16  NIL pap, neg HR HPV.  Plan: paps yearly, per Dr. Ford.  1/30/17 NIL pap, neg HR HPV. Plan: pap in 1 year.   3/12/18 NIL pap, neg HR HPV. Plan: cotest annually, per Dr. Ford.  3/11/19 ECC: negative. NIL pap, neg HR HPV. Plan: cotest in 1 yr  2/11/20 NIL Pap, Neg HPV. EMB: intraepithelial neoplasia. Plan: Hysterectomy.   7/28/20 Hysterectomy with cervix removed. Hx of KAYLA 2 in 2011 Plan: Cotest Q3y until 2036.          Papanicolaou smear of cervix with atypical squamous cells cannot exclude high grade squamous intraepithelial lesion (ASC-H) 09/07/2006     Priority: Medium    Female infertility 07/24/2006     Priority: Medium     Problem list name updated by automated process. Provider to review      Hemorrhage of rectum and anus 12/02/2004     Priority: Medium      Past Medical History:   Diagnosis Date    Abnormal Pap smear 2006, 2007,     ASC-H, ASCUS + HPV 45    Calculus of kidney 01/01/2002    Cervical high risk HPV (human papillomavirus) test positive     + HPV 45 (high risk)    History of colposcopy with cervical biopsy 2/9/06, 3/15/07    koilocytosis 2007     Past Surgical History:   Procedure Laterality Date    CHOLECYSTECTOMY, LAPOROSCOPIC  5/11/2007    Cholecystectomy, Laparoscopic    COLONOSCOPY  05/17/10    COLONOSCOPY N/A 8/27/2019    Procedure: COLONOSCOPY;  Surgeon: Paramjit Kingston DO;  Location:  GI    COLONOSCOPY N/A 10/23/2020    Procedure: COLONOSCOPY, WITH  BIOPSY;  Surgeon: Ba Dickinson MD;  Location:  GI    CONIZATION  7/1/2011    Procedure:CONIZATION; cold knife and punch biopsy of mole on back; Surgeon:SYDNEY FORD  JERSON; Location:[facility]    DILATION AND CURETTAGE, HYSTEROSCOPY, LAPAROSCOPY, COMBINED Right 9/24/2015    Procedure: COMBINED DILATION AND CURETTAGE, HYSTEROSCOPY, LAPAROSCOPY;  Surgeon: Sydney Ford MD;  Location: [facility]    DISCECTOMY LUMBAR MINIMALLY INVASIVE ONE LEVEL Left 1/23/2019    Procedure: Minimally Invasive Surgery Left Lumbar 5-Sacral 1 microdiscectomy;  Surgeon: Mason Roe MD;  Location: [facility]    DISCECTOMY LUMBAR POSTERIOR MICROSCOPIC ONE LEVEL Left 4/5/2023    Procedure: Minimally Invasive Left Lumbar 5 to Sacral 1 evacuation of recurrent disc herniation;  Surgeon: Mason Roe MD;  Location: [facility]    ESOPHAGOSCOPY, GASTROSCOPY, DUODENOSCOPY (EGD), COMBINED  5/21/2014    Procedure: COMBINED ESOPHAGOSCOPY, GASTROSCOPY, DUODENOSCOPY (EGD), BIOPSY SINGLE OR MULTIPLE;  Surgeon: Earl Lane MD;  [facility]    ESOPHAGOSCOPY, GASTROSCOPY, DUODENOSCOPY (EGD), COMBINED N/A 10/23/2020    Procedure: Esophagogastroduodenoscopy with  biopsy;  Surgeon: Ba Dickinson MD;  [facility]: [facility]    EXCISE LESION TRUNK  7/1/2011    Procedure:EXCISE LESION TRUNK; Surgeon:SYDNEY FORD; Location:[facility]    FORAMINOTOMY LUMBAR POSTERIOR ONE LEVEL Left 4/5/2023    Procedure: left foraminotomy Lumbar 5 to Sacral 1;  Surgeon: Mason Roe MD;  [facility]: [facility]    HC FRAGMENTING OF KIDNEY STONE  11/30/2005    HC RX ECTOP PREG BY SCOPE,RMV TUBE/OVRY  12/20/2007    Right sided salpingectomy and removal of ruptured ectopic pregnancy. D&C.    HYSTERECTOMY VAGINAL      LAPAROSCOPIC APPENDECTOMY N/A 9/24/2015    Procedure: LAPAROSCOPIC APPENDECTOMY;  Surgeon: James Cuellar MD;  [facility]    LAPAROSCOPIC CYSTECTOMY OVARIAN (BENIGN) N/A 9/24/2015    Procedure: LAPAROSCOPIC CYSTECTOMY OVARIAN (BENIGN);  Surgeon: Sydney Ford MD;  [facility]    LAPAROSCOPIC HYSTERECTOMY TOTAL, BILATERAL SALPINGO-OOPHORECTOMY, COMBINED N/A 7/28/2020    Procedure:  laparoscpic assisted vaginal hysterectomy, cystoscopy;  Surgeon: Greg Ford MD;  Location: [facility]    LAPAROSCOPIC OOPHORECTOMY Right 9/24/2015    Procedure: LAPAROSCOPIC OOPHORECTOMY;  Surgeon: Greg Ford MD;  [facility]    LITHOTRIPSY  01/17/2007    PELVIS LAPAROSCOPY,DX  10/16/2000    Diagnostic laparoscopy, Endometrial biopsy.    TUBAL/ECTOPIC PREGNANCY  01/23/2007    Lap removal of left ruptured ectopic pregnancy & salpingectomy, D&C    [facility] REMOVAL OF KIDNEY STONE      two surgeries, 2002,2003    [facility] UGI ENDOSCOPY, SIMPLE EXAM  09/01/09     Current Outpatient Medications   Medication Sig Dispense Refill    blood glucose (CONTOUR NEXT TEST) test strip USE TO TEST BLOOD GLUCOSE 4 TIMES A  strip 0    blood glucose monitoring (NO BRAND SPECIFIED) meter device kit Use to test blood sugar 2 times daily or as directed. 1 kit 0    empagliflozin (JARDIANCE) 10 MG TABS tablet TAKE ONE TABLET (10 MG) BY MOUTH DAILY 90 tablet 1    HYDROcodone-acetaminophen (NORCO) 5-325 MG tablet Take 1 tablet by mouth every 6 hours as needed for severe pain (Patient not taking: Reported on 12/12/2023) 10 tablet 0    metFORMIN (GLUCOPHAGE XR) 500 MG 24 hr tablet Take 2 tablets (1,000 mg) by mouth 2 times daily (with meals) 360 tablet 1    multivitamin w/minerals (THERA-VIT-M) tablet Take 1 tablet by mouth daily      nystatin (MYCOSTATIN) 758586 UNIT/GM external powder Apply topically 2 times daily as needed 60 g 2    omeprazole (PRILOSEC) 20 MG DR capsule TAKE ONE CAPSULE BY MOUTH ONCE DAILY, 30 TO 60 MINUTES BEFORE A MEAL. 90 capsule 0    STATIN NOT PRESCRIBED (INTENTIONAL) Please choose reason not prescribed from choices below.      tiZANidine (ZANAFLEX) 4 MG tablet Take 1-2 tablets (4-8 mg) by mouth 3 times daily as needed for muscle spasms 60 tablet 0       Allergies   Allergen Reactions    Amoxicillin Hives    Demerol Hcl [Meperidine Hcl] Nausea    Emetrol      Anxiety, agitation,severe  paranoia. Zofran, Reglan are some of them.  DRAMAMINE WITHOUT ISSUES      Indomethacin Nausea and Vomiting    Macrobid [Nitrofuran Derivatives] Hives    Reglan [Metoclopramide] Other (See Comments)     Acute paranoia, severe    Zofran [Ondansetron] Other (See Comments)     Severe anxiety, agitation    Vancomycin Other (See Comments)     maria del rosario syndrome        Social History     Tobacco Use    Smoking status: Some Days     Current packs/day: 0.00     Types: Cigarettes    Smokeless tobacco: Never    Tobacco comments:     As of 10 /2014, pt has had a few cigs here and there   Substance Use Topics    Alcohol use: Yes     Alcohol/week: 0.0 standard drinks of alcohol     Comment: every few months     Family History   Problem Relation Age of Onset    Diabetes Maternal Grandmother         adult onset    Anesthesia Reaction Maternal Grandmother         can't tolerate Novacaine.    Respiratory Maternal Grandmother         COPD and emphysema.    Thyroid Disease Maternal Grandmother     Heart Disease Maternal Grandmother         CHF    Diabetes Paternal Grandfather         adult o nset    Hypertension Paternal Grandfather     Cerebrovascular Disease Paternal Grandfather     Heart Disease Paternal Grandfather         MI, replaced valve,angioplasty    Thyroid Disease Paternal Grandfather     Cancer Maternal Grandfather         skin cancer    Heart Disease Maternal Grandfather         MI    Obesity Mother         on thyroid medication    Thyroid Disease Mother     Diabetes Mother     Rheumatologic Disease Mother     Heart Disease Father     Hypertension Father         and TIA    Lipids Father     Breast Cancer Paternal Grandmother     Arrhythmia Other     Cancer Maternal Aunt         breast/brain cancer    Depression Paternal Aunt     Cerebrovascular Disease Paternal Uncle     Cerebrovascular Disease Paternal Aunt      History   Drug Use No         Review of Systems    Review of Systems  Constitutional, HEENT, cardiovascular,  "pulmonary, GI, , musculoskeletal, neuro, skin, endocrine and psych systems are negative, except as otherwise noted.    Objective    LMP 07/25/2020 (Exact Date)    Estimated body mass index is 33.47 kg/m  as calculated from the following:    Height as of 1/19/24: 1.626 m (5' 4\").    Weight as of 1/19/24: 88.5 kg (195 lb).  Physical Exam  GENERAL: alert and no distress  EYES: Eyes grossly normal to inspection, PERRL and conjunctivae and sclerae normal  HENT: ear canals and TM's normal, nose and mouth without ulcers or lesions  NECK: no adenopathy, no asymmetry, masses, or scars  RESP: lungs clear to auscultation - no rales, rhonchi or wheezes  CV: regular rate and rhythm, normal S1 S2, no S3 or S4, no murmur, click or rub, no peripheral edema  ABDOMEN: soft, nontender, no hepatosplenomegaly, no masses and bowel sounds normal  MS: no gross musculoskeletal defects noted, no edema  SKIN: no suspicious lesions or rashes  NEURO: Normal strength and tone, mentation intact and speech normal  PSYCH: mentation appears normal, affect normal/bright    Recent Labs   Lab Test 01/19/24  1137 12/22/23  0821 07/06/23  0911 03/20/23  0942   HGB 16.2*  --   --  15.9*     --   --  214    138  --  138   POTASSIUM 3.9 3.8  --  4.4   CR 0.81 0.76  --  0.75   A1C  --  6.6* 7.2* 7.0*        Diagnostics  No labs were ordered during this visit.   No EKG this visit, completed in the last 90 days.    Revised Cardiac Risk Index (RCRI)  The patient has the following serious cardiovascular risks for perioperative complications:   - No serious cardiac risks = 0 points     RCRI Interpretation: 0 points: Class I (very low risk - 0.4% complication rate)     Assessment & Plan     The proposed surgical procedure is considered LOW risk.    Pre-op exam    Demyelinating disease (H)    Type 2 diabetes mellitus with diabetic autonomic neuropathy, without long-term current use of insulin (H)  - blood glucose monitoring (NO BRAND SPECIFIED) meter " device kit; Use to test blood sugar 3 times daily or as directed. Preferred blood glucose meter OR supplies to accompany: Blood Glucose Monitor Brands: per insurance.  - blood glucose (NO BRAND SPECIFIED) test strip; Use to test blood sugar 3 times daily or as directed. To accompany: Blood Glucose Monitor Brands: per insurance.  - thin (NO BRAND SPECIFIED) lancets; Use with lanceting device. To accompany: Blood Glucose Monitor Brands: per insurance.    Hyperreflexia    Morbid obesity (H)    Mild episode of recurrent major depressive disorder (H24)        - No identified additional risk factors other than previously addressed    Antiplatelet or Anticoagulation Medication Instructions   - Patient is on no antiplatelet or anticoagulation medications.    Additional Medication Instructions  Patient is to take all scheduled medications on the day of surgery EXCEPT for modifications listed below:   - metformin: HOLD day of surgery.   - SGLT2 Inhibitor (canagliflozin, dapagliflozin, or empagliflozin): HOLD 3 days before surgery.     Recommendation  APPROVAL GIVEN to proceed with proposed procedure, without further diagnostic evaluation.    Signed Electronically by: Destini Esparza PA-C  Copy of this evaluation report is provided to requesting physician.

## 2024-04-11 ENCOUNTER — APPOINTMENT (OUTPATIENT)
Dept: CT IMAGING | Facility: CLINIC | Age: 44
End: 2024-04-11
Attending: EMERGENCY MEDICINE
Payer: COMMERCIAL

## 2024-04-11 ENCOUNTER — HOSPITAL ENCOUNTER (EMERGENCY)
Facility: CLINIC | Age: 44
Discharge: HOME | End: 2024-04-11
Attending: EMERGENCY MEDICINE | Admitting: EMERGENCY MEDICINE
Payer: COMMERCIAL

## 2024-04-11 VITALS
TEMPERATURE: 98.6 F | HEART RATE: 87 BPM | SYSTOLIC BLOOD PRESSURE: 107 MMHG | RESPIRATION RATE: 22 BRPM | OXYGEN SATURATION: 97 % | DIASTOLIC BLOOD PRESSURE: 76 MMHG

## 2024-04-11 DIAGNOSIS — R10.9 RIGHT FLANK PAIN: ICD-10-CM

## 2024-04-11 DIAGNOSIS — M54.50 CHRONIC LOW BACK PAIN WITHOUT SCIATICA, UNSPECIFIED BACK PAIN LATERALITY: ICD-10-CM

## 2024-04-11 DIAGNOSIS — G89.29 CHRONIC LOW BACK PAIN WITHOUT SCIATICA, UNSPECIFIED BACK PAIN LATERALITY: ICD-10-CM

## 2024-04-11 DIAGNOSIS — M62.830 BACK MUSCLE SPASM: ICD-10-CM

## 2024-04-11 LAB
ALBUMIN SERPL BCG-MCNC: 4.2 G/DL (ref 3.5–5.2)
ALP SERPL-CCNC: 57 U/L (ref 40–150)
ALT SERPL W P-5'-P-CCNC: 21 U/L (ref 0–50)
ANION GAP SERPL CALCULATED.3IONS-SCNC: 14 MMOL/L (ref 7–15)
AST SERPL W P-5'-P-CCNC: 16 U/L (ref 0–45)
BASOPHILS # BLD AUTO: 0 10E3/UL (ref 0–0.2)
BASOPHILS NFR BLD AUTO: 1 %
BILIRUB SERPL-MCNC: 1 MG/DL
BUN SERPL-MCNC: 17.4 MG/DL (ref 6–20)
CALCIUM SERPL-MCNC: 8.8 MG/DL (ref 8.6–10)
CHLORIDE SERPL-SCNC: 105 MMOL/L (ref 98–107)
CREAT SERPL-MCNC: 0.75 MG/DL (ref 0.51–0.95)
DEPRECATED HCO3 PLAS-SCNC: 20 MMOL/L (ref 22–29)
EGFRCR SERPLBLD CKD-EPI 2021: >90 ML/MIN/1.73M2
EOSINOPHIL # BLD AUTO: 0.2 10E3/UL (ref 0–0.7)
EOSINOPHIL NFR BLD AUTO: 2 %
ERYTHROCYTE [DISTWIDTH] IN BLOOD BY AUTOMATED COUNT: 13.3 % (ref 10–15)
GLUCOSE SERPL-MCNC: 152 MG/DL (ref 70–99)
HCT VFR BLD AUTO: 44.4 % (ref 35–47)
HGB BLD-MCNC: 15.6 G/DL (ref 11.7–15.7)
IMM GRANULOCYTES # BLD: 0 10E3/UL
IMM GRANULOCYTES NFR BLD: 0 %
LYMPHOCYTES # BLD AUTO: 1.6 10E3/UL (ref 0.8–5.3)
LYMPHOCYTES NFR BLD AUTO: 23 %
MCH RBC QN AUTO: 30.9 PG (ref 26.5–33)
MCHC RBC AUTO-ENTMCNC: 35.1 G/DL (ref 31.5–36.5)
MCV RBC AUTO: 88 FL (ref 78–100)
MONOCYTES # BLD AUTO: 0.4 10E3/UL (ref 0–1.3)
MONOCYTES NFR BLD AUTO: 5 %
NEUTROPHILS # BLD AUTO: 5.1 10E3/UL (ref 1.6–8.3)
NEUTROPHILS NFR BLD AUTO: 70 %
NRBC # BLD AUTO: 0 10E3/UL
NRBC BLD AUTO-RTO: 0 /100
PLATELET # BLD AUTO: 187 10E3/UL (ref 150–450)
POTASSIUM SERPL-SCNC: 4.2 MMOL/L (ref 3.4–5.3)
PROT SERPL-MCNC: 7.5 G/DL (ref 6.4–8.3)
RBC # BLD AUTO: 5.05 10E6/UL (ref 3.8–5.2)
SODIUM SERPL-SCNC: 139 MMOL/L (ref 135–145)
WBC # BLD AUTO: 7.3 10E3/UL (ref 4–11)

## 2024-04-11 PROCEDURE — 96374 THER/PROPH/DIAG INJ IV PUSH: CPT | Mod: 59 | Performed by: EMERGENCY MEDICINE

## 2024-04-11 PROCEDURE — 74177 CT ABD & PELVIS W/CONTRAST: CPT

## 2024-04-11 PROCEDURE — 85025 COMPLETE CBC W/AUTO DIFF WBC: CPT | Performed by: EMERGENCY MEDICINE

## 2024-04-11 PROCEDURE — 84155 ASSAY OF PROTEIN SERUM: CPT | Performed by: EMERGENCY MEDICINE

## 2024-04-11 PROCEDURE — 84450 TRANSFERASE (AST) (SGOT): CPT | Performed by: EMERGENCY MEDICINE

## 2024-04-11 PROCEDURE — 99285 EMERGENCY DEPT VISIT HI MDM: CPT | Mod: 25 | Performed by: EMERGENCY MEDICINE

## 2024-04-11 PROCEDURE — 96375 TX/PRO/DX INJ NEW DRUG ADDON: CPT | Performed by: EMERGENCY MEDICINE

## 2024-04-11 PROCEDURE — 250N000011 HC RX IP 250 OP 636: Performed by: EMERGENCY MEDICINE

## 2024-04-11 PROCEDURE — 250N000009 HC RX 250: Performed by: EMERGENCY MEDICINE

## 2024-04-11 PROCEDURE — 72131 CT LUMBAR SPINE W/O DYE: CPT

## 2024-04-11 PROCEDURE — 36415 COLL VENOUS BLD VENIPUNCTURE: CPT | Performed by: EMERGENCY MEDICINE

## 2024-04-11 PROCEDURE — 96361 HYDRATE IV INFUSION ADD-ON: CPT | Performed by: EMERGENCY MEDICINE

## 2024-04-11 PROCEDURE — 258N000003 HC RX IP 258 OP 636: Performed by: EMERGENCY MEDICINE

## 2024-04-11 PROCEDURE — 99284 EMERGENCY DEPT VISIT MOD MDM: CPT | Performed by: EMERGENCY MEDICINE

## 2024-04-11 RX ORDER — HYDROMORPHONE HYDROCHLORIDE 1 MG/ML
0.5 INJECTION, SOLUTION INTRAMUSCULAR; INTRAVENOUS; SUBCUTANEOUS
Status: COMPLETED | OUTPATIENT
Start: 2024-04-11 | End: 2024-04-11

## 2024-04-11 RX ORDER — IOPAMIDOL 755 MG/ML
500 INJECTION, SOLUTION INTRAVASCULAR ONCE
Status: COMPLETED | OUTPATIENT
Start: 2024-04-11 | End: 2024-04-11

## 2024-04-11 RX ORDER — OXYCODONE HYDROCHLORIDE 5 MG/1
5 TABLET ORAL EVERY 6 HOURS PRN
Qty: 12 TABLET | Refills: 0 | Status: SHIPPED | OUTPATIENT
Start: 2024-04-11 | End: 2024-04-14

## 2024-04-11 RX ORDER — LORAZEPAM 2 MG/ML
1 INJECTION INTRAMUSCULAR ONCE
Status: COMPLETED | OUTPATIENT
Start: 2024-04-11 | End: 2024-04-11

## 2024-04-11 RX ADMIN — IOPAMIDOL 100 ML: 755 INJECTION, SOLUTION INTRAVENOUS at 12:09

## 2024-04-11 RX ADMIN — HYDROMORPHONE HYDROCHLORIDE 0.5 MG: 1 INJECTION, SOLUTION INTRAMUSCULAR; INTRAVENOUS; SUBCUTANEOUS at 11:54

## 2024-04-11 RX ADMIN — SODIUM CHLORIDE 60 ML: 9 INJECTION, SOLUTION INTRAVENOUS at 12:08

## 2024-04-11 RX ADMIN — LORAZEPAM 1 MG: 2 INJECTION INTRAMUSCULAR; INTRAVENOUS at 10:51

## 2024-04-11 RX ADMIN — SODIUM CHLORIDE 1000 ML: 9 INJECTION, SOLUTION INTRAVENOUS at 10:49

## 2024-04-11 ASSESSMENT — ACTIVITIES OF DAILY LIVING (ADL)
ADLS_ACUITY_SCORE: 33
ADLS_ACUITY_SCORE: 35

## 2024-04-11 ASSESSMENT — COLUMBIA-SUICIDE SEVERITY RATING SCALE - C-SSRS
1. IN THE PAST MONTH, HAVE YOU WISHED YOU WERE DEAD OR WISHED YOU COULD GO TO SLEEP AND NOT WAKE UP?: NO
2. HAVE YOU ACTUALLY HAD ANY THOUGHTS OF KILLING YOURSELF IN THE PAST MONTH?: NO

## 2024-04-11 NOTE — MEDICATION SCRIBE - ADMISSION MEDICATION HISTORY
Medication Scribe Admission Medication History    Admission medication history is complete. The information provided in this note is only as accurate as the sources available at the time of the update.    Information Source(s): Patient and CareEverywhere/SureScripts via in-person    Pertinent Information: n/a    Changes made to PTA medication list:  Added: None  Deleted: MVI  Changed: tizanidine dose from 4-8mg to 6-8mg per dose    Allergies reviewed with patient and updates made in EHR: no    Medication History Completed By: BHARATH CARRERA 4/11/2024 12:46 PM    PTA Med List   Medication Sig Last Dose    blood glucose (NO BRAND SPECIFIED) test strip Use to test blood sugar 3 times daily or as directed. To accompany: Blood Glucose Monitor Brands: per insurance. 4/10/2024 at supper #[id number]    blood glucose monitoring (NO BRAND SPECIFIED) meter device kit Use to test blood sugar 3 times daily or as directed. Preferred blood glucose meter OR supplies to accompany: Blood Glucose Monitor Brands: per insurance. 4/10/2024 at supper    empagliflozin (JARDIANCE) 10 MG TABS tablet TAKE ONE TABLET (10 MG) BY MOUTH DAILY 4/11/2024 at 0900    metFORMIN (GLUCOPHAGE XR) 500 MG 24 hr tablet Take 2 tablets (1,000 mg) by mouth 2 times daily (with meals) 4/11/2024 at 0900    nystatin (MYCOSTATIN) 120349 UNIT/GM external powder Apply topically 2 times daily as needed More than a month at unkn    omeprazole (PRILOSEC) 20 MG DR capsule TAKE ONE CAPSULE BY MOUTH ONCE DAILY, 30 TO 60 MINUTES BEFORE A MEAL. (Patient taking differently: Take 20 mg by mouth daily 30 TO 60 MINUTES BEFORE A MEAL.) 4/11/2024 at 0430    thin (NO BRAND SPECIFIED) lancets Use with lanceting device. To accompany: Blood Glucose Monitor Brands: per insurance. 4/10/2024 at supper    tiZANidine (ZANAFLEX) 4 MG tablet Take 1-2 tablets (4-8 mg) by mouth 3 times daily as needed for muscle spasms (Patient taking differently: Take 6-8 mg by mouth 3 times daily as needed for  muscle spasms) 4/11/2024 at 0900

## 2024-04-11 NOTE — ED PROVIDER NOTES
"  History     Chief Complaint   Patient presents with    Back Pain     HPI  Fallon Rivera is a 43 year old female who presents with daughter over concern of back pain.  Said that she was trying to get off the toilet yesterday when she fell to the side and \"tweaked my back\".  Has a history of low back pain and back issues, has previously had back surgery.  Pain has been persistent since yesterday despite utilizing her prescribed tizanidine 8 mg and over-the-counter ibuprofen 800 mg.  Also took doses of these medications today but continues to have pain.  Is due to have MRI under general anesthesia, as she is working with neurology and they are evaluating for possible MS.  Has had issues with sensory changes in her left lower extremity, intermittent muscle spasms and weakness, memory issues, speech issues.  She does have known bowel incontinence, but this has not changed since the injury yesterday.  She says that it is intermittent, but there will be times where she coughs or sneezes and will empty her entire bladder.  Will also need to quickly get to the bathroom to urinate when she gets the urge.  Denies any bowel incontinence issues.  Is not noting any new weakness or sensory changes of her lower extremities since the injury sustained yesterday.    Allergies:  Allergies   Allergen Reactions    Amoxicillin Hives    Emetrol      Anxiety, agitation,severe paranoia. Zofran, Reglan are some of them.  DRAMAMINE WITHOUT ISSUES      Indomethacin Nausea and Vomiting    Macrobid [Nitrofuran Derivatives] Hives    Meperidine Hcl     Metoclopramide Other (See Comments)     Acute paranoia, severe    Nitrofurantoin Hives    Ondansetron Other (See Comments)     Severe anxiety, agitation    Vancomycin Other (See Comments)     maria del rosario syndrome    Vancomycin Other (See Comments)     maria del rosario syndrome       Problem List:    Patient Active Problem List    Diagnosis Date Noted    S/P lumbar microdiscectomy 07/05/2023     Priority: " Medium    Lumbar radiculopathy 07/05/2023     Priority: Medium    Left ovarian cyst 05/25/2021     Priority: Medium     Added automatically from request for surgery [id number]      Dyspareunia, female 12/01/2020     Priority: Medium    Chronic diarrhea 09/30/2020     Priority: Medium     Added automatically from request for surgery [id number]      Abnormal CT scan, small bowel 09/30/2020     Priority: Medium     Added automatically from request for surgery [id number]      Endometrial hyperplasia, unspecified 06/18/2020     Priority: Medium     Added automatically from request for surgery [id number]      Endometrial intraepithelial neoplasia (EIN) 03/02/2020     Priority: Medium    Obesity (BMI 35.0-39.9) with comorbidity (H) 10/16/2018     Priority: Medium    Non morbid obesity due to excess calories 07/05/2016     Priority: Medium    Type 2 diabetes mellitus with diabetic autonomic neuropathy, without long-term current use of insulin (H) 04/29/2016     Priority: Medium    Glucosuria 04/27/2016     Priority: Medium    Hyperlipidemia LDL goal <130 07/18/2012     Priority: Medium    Mild major depression (H) 07/18/2012     Priority: Medium    GERD (gastroesophageal reflux disease) 07/03/2012     Priority: Medium    CARDIOVASCULAR SCREENING; LDL GOAL LESS THAN 160 10/31/2010     Priority: Medium    Dyspnea 11/24/2009     Priority: Medium    Kidney stones 02/17/2009     Priority: Medium    S/P conization of cervix- KAYLA 2/3 in 2011 06/27/2007     Priority: Medium     1/24/06 pap: ASC-H  2/9/06 colposcopy/bx (negative)/ECC (negative) pap- ASC-H  5/24/06 pap- ASC-H  9/6/07 pap NIL  1/23/07 pap ASCUS + HPV 45 (high risk)  3/15/07 pap ASCUS + HPV 45 (high risk) colposcopy- bx (negative)/ECC (koilocytosis)  6/19/07 ECC- koilocytosis.  Pap- AGS  10/23/07 pap- ASC-H, ECC- negative  2/14/08 pap NIL- return to yearly paps  2/17/09 pap NIL- repeat 1 year  3/8/10 pap NIL  3/3/11 pap ASCUS + HPV 45 (high risk)- colposcopy recommended  5/16/11  colpo- bx (KAYLA 2/3/HSIL) ECC (ASCUS)  pap (ASC-H)  6/2/11 recommend: [facility]  7/1/11 [facility]: path: KAYLA 2/3 with possible involvement of the endocervical margin.  ECC- neg.  Endometrium- neg.  7/14/11 plan: HIPOLITO in approx 2 months  10/10/11- hysterectomy planned.  (cancelled)  11/7/11 pap-- NIL. Plan--repeat pap in 6 months. (5/7/12)  5/7/12 pap NIL. Plan-- pap in 1 year (due 5/7/13)   5/8/13 pap NIL. Plan-pap in 1 year  5/8/14 NIL pap, neg HR HPV.  Plan- repeat pap/HPV in 1 year (due 5/8/15)  10/14/14 NIL pap, neg HR HPV. Endometrial biopsy- WNL   1/6/16  NIL pap, neg HR HPV.  Plan: paps yearly, per Dr. Ford.  1/30/17 NIL pap, neg HR HPV. Plan: pap in 1 year.   3/12/18 NIL pap, neg HR HPV. Plan: cotest annually, per Dr. Ford.  3/11/19 ECC: negative. NIL pap, neg HR HPV. Plan: cotest in 1 yr  2/11/20 NIL Pap, Neg HPV. EMB: intraepithelial neoplasia. Plan: Hysterectomy.   7/28/20 Hysterectomy with cervix removed. Hx of KAYLA 2 in 2011 Plan: Cotest Q3y until 2036.          Papanicolaou smear of cervix with atypical squamous cells cannot exclude high grade squamous intraepithelial lesion (ASC-H) 09/07/2006     Priority: Medium    Female infertility 07/24/2006     Priority: Medium     Problem list name updated by automated process. Provider to review      Hemorrhage of rectum and anus 12/02/2004     Priority: Medium        Past Medical History:    Past Medical History:   Diagnosis Date    Abnormal Pap smear 2006, 2007,     Calculus of kidney 01/01/2002    Cervical high risk HPV (human papillomavirus) test positive     History of colposcopy with cervical biopsy 2/9/06, 3/15/07       Past Surgical History:    Past Surgical History:   Procedure Laterality Date    CHOLECYSTECTOMY, LAPOROSCOPIC  5/11/2007    Cholecystectomy, Laparoscopic    COLONOSCOPY  05/17/10    COLONOSCOPY N/A 8/27/2019    Procedure: COLONOSCOPY;  Surgeon: Paramjit Kingston DO;  Location: [facility]    COLONOSCOPY N/A 10/23/2020    Procedure:  COLONOSCOPY, WITH  BIOPSY;  Surgeon: Ba Dickinson MD;  Location: [facility]    CONIZATION  7/1/2011    Procedure:CONIZATION; cold knife and punch biopsy of mole on back; Surgeon:SYDNEY FORD; Location:[facility]    DILATION AND CURETTAGE, HYSTEROSCOPY, LAPAROSCOPY, COMBINED Right 9/24/2015    Procedure: COMBINED DILATION AND CURETTAGE, HYSTEROSCOPY, LAPAROSCOPY;  Surgeon: Sydney Ford MD;  [facility]    DISCECTOMY LUMBAR MINIMALLY INVASIVE ONE LEVEL Left 1/23/2019    Procedure: Minimally Invasive Surgery Left Lumbar 5-Sacral 1 microdiscectomy;  Surgeon: Mason Roe MD;  [facility]    DISCECTOMY LUMBAR POSTERIOR MICROSCOPIC ONE LEVEL Left 4/5/2023    Procedure: Minimally Invasive Left Lumbar 5 to Sacral 1 evacuation of recurrent disc herniation;  Surgeon: Mason Roe MD;  [facility]    ESOPHAGOSCOPY, GASTROSCOPY, DUODENOSCOPY (EGD), COMBINED  5/21/2014    Procedure: COMBINED ESOPHAGOSCOPY, GASTROSCOPY, DUODENOSCOPY (EGD), BIOPSY SINGLE OR MULTIPLE;  Surgeon: Earl Lane MD;  [facility]    ESOPHAGOSCOPY, GASTROSCOPY, DUODENOSCOPY (EGD), COMBINED N/A 10/23/2020    Procedure: Esophagogastroduodenoscopy with  biopsy;  Surgeon: Ba Dickinson MD;  [facility]    EXCISE LESION TRUNK  7/1/2011    Procedure:EXCISE LESION TRUNK; Surgeon:SYDNEY FORD; [facility]    FORAMINOTOMY LUMBAR POSTERIOR ONE LEVEL Left 4/5/2023    Procedure: left foraminotomy Lumbar 5 to Sacral 1;  Surgeon: Mason Roe MD;  [facility]    HC FRAGMENTING OF KIDNEY STONE  11/30/2005    HC RX ECTOP PREG BY SCOPE,RMV TUBE/OVRY  12/20/2007    Right sided salpingectomy and removal of ruptured ectopic pregnancy. D&C.    HYSTERECTOMY VAGINAL      LAPAROSCOPIC APPENDECTOMY N/A 9/24/2015    Procedure: LAPAROSCOPIC APPENDECTOMY;  Surgeon: James Cuellar MD;  [facility]    LAPAROSCOPIC CYSTECTOMY OVARIAN (BENIGN) N/A 9/24/2015    Procedure:  LAPAROSCOPIC CYSTECTOMY OVARIAN (BENIGN);  Surgeon: Greg Ford MD;  [facility]    LAPAROSCOPIC HYSTERECTOMY TOTAL, BILATERAL SALPINGO-OOPHORECTOMY, COMBINED N/A 7/28/2020    Procedure: laparoscpic assisted vaginal hysterectomy, cystoscopy;  Surgeon: Greg Ford MD;  Location: [facility]    LAPAROSCOPIC OOPHORECTOMY Right 9/24/2015    Procedure: LAPAROSCOPIC OOPHORECTOMY;  Surgeon: Greg Ford MD;  [facility]    LITHOTRIPSY  01/17/2007    PELVIS LAPAROSCOPY,DX  10/16/2000    Diagnostic laparoscopy, Endometrial biopsy.    TUBAL/ECTOPIC PREGNANCY  01/23/2007    Lap removal of left ruptured ectopic pregnancy & salpingectomy, D&C    ZZC REMOVAL OF KIDNEY STONE      two surgeries, 2002,2003    ZZ UGI ENDOSCOPY, SIMPLE EXAM  09/01/09       Family History:    Family History   Problem Relation Age of Onset    Diabetes Maternal Grandmother         adult onset    Anesthesia Reaction Maternal Grandmother         can't tolerate Novacaine.    Respiratory Maternal Grandmother         COPD and emphysema.    Thyroid Disease Maternal Grandmother     Heart Disease Maternal Grandmother         CHF    Diabetes Paternal Grandfather         adult o nset    Hypertension Paternal Grandfather     Cerebrovascular Disease Paternal Grandfather     Heart Disease Paternal Grandfather         MI, replaced valve,angioplasty    Thyroid Disease Paternal Grandfather     Cancer Maternal Grandfather         skin cancer    Heart Disease Maternal Grandfather         MI    Obesity Mother         on thyroid medication    Thyroid Disease Mother     Diabetes Mother     Rheumatologic Disease Mother     Heart Disease Father     Hypertension Father         and TIA    Lipids Father     Breast Cancer Paternal Grandmother     Arrhythmia Other     Cancer Maternal Aunt         breast/brain cancer    Depression Paternal Aunt     Cerebrovascular Disease Paternal Uncle     Cerebrovascular Disease Paternal Aunt         Social History:  Marital Status:   [2]  Social History     Tobacco Use    Smoking status: Some Days     Current packs/day: 0.00     Types: Cigarettes    Smokeless tobacco: Never    Tobacco comments:     As of 10 /2014, pt has had a few cigs here and there   Vaping Use    Vaping status: Never Used   Substance Use Topics    Alcohol use: Yes     Comment: Once or twice a year    Drug use: No        Medications:    blood glucose (NO BRAND SPECIFIED) test strip  blood glucose monitoring (NO BRAND SPECIFIED) meter device kit  empagliflozin (JARDIANCE) 10 MG TABS tablet  metFORMIN (GLUCOPHAGE XR) 500 MG 24 hr tablet  nystatin (MYCOSTATIN) 119440 UNIT/GM external powder  omeprazole (PRILOSEC) 20 MG DR capsule  oxyCODONE (ROXICODONE) 5 MG tablet  thin (NO BRAND SPECIFIED) lancets  tiZANidine (ZANAFLEX) 4 MG tablet  STATIN NOT PRESCRIBED (INTENTIONAL)          Review of Systems   All other systems reviewed and are negative.      Physical Exam   BP: (!) 148/97  Pulse: 114  Temp: 98.6  F (37  C)  Resp: 22  SpO2: 100 %      Physical Exam  Vitals and nursing note reviewed.   Constitutional:       General: She is not in acute distress.  HENT:      Head: Normocephalic.   Cardiovascular:      Pulses: Normal pulses.   Pulmonary:      Effort: Pulmonary effort is normal.   Chest:      Chest wall: No tenderness.   Musculoskeletal:         General: No swelling.      Cervical back: Neck supple. No rigidity.        Back:       Comments: Circled area on diagram indicates area of pain, has muscular tenderness and spasm, slight fullness, no crepitus, no overlying lesion   Neurological:      Mental Status: She is alert.      Comments: Intermittent spasms of bilateral lower extremities.  No obvious deformity present.  Has altered sensation and discomfort with touch of lateral left lower extremity which is chronic.  Strong DP pulses bilaterally.  Normal dorsi and plantarflexion of bilateral lower extremities       ED Course         Procedures              Critical Care time:  none               Results for orders placed or performed during the hospital encounter of 04/11/24 (from the past 24 hour(s))   CBC with platelets differential    Narrative    The following orders were created for panel order CBC with platelets differential.  Procedure                               Abnormality         Status                     ---------                               -----------         ------                     CBC with platelets and d...[[id number]]                      Final result                 Please view results for these tests on the individual orders.   Comprehensive metabolic panel   Result Value Ref Range    Sodium 139 135 - 145 mmol/L    Potassium 4.2 3.4 - 5.3 mmol/L    Carbon Dioxide (CO2) 20 (L) 22 - 29 mmol/L    Anion Gap 14 7 - 15 mmol/L    Urea Nitrogen 17.4 6.0 - 20.0 mg/dL    Creatinine 0.75 0.51 - 0.95 mg/dL    GFR Estimate >90 >60 mL/min/1.73m2    Calcium 8.8 8.6 - 10.0 mg/dL    Chloride 105 98 - 107 mmol/L    Glucose 152 (H) 70 - 99 mg/dL    Alkaline Phosphatase 57 40 - 150 U/L    AST 16 0 - 45 U/L    ALT 21 0 - 50 U/L    Protein Total 7.5 6.4 - 8.3 g/dL    Albumin 4.2 3.5 - 5.2 g/dL    Bilirubin Total 1.0 <=1.2 mg/dL   CBC with platelets and differential   Result Value Ref Range    WBC Count 7.3 4.0 - 11.0 10e3/uL    RBC Count 5.05 3.80 - 5.20 10e6/uL    Hemoglobin 15.6 11.7 - 15.7 g/dL    Hematocrit 44.4 35.0 - 47.0 %    MCV 88 78 - 100 fL    MCH 30.9 26.5 - 33.0 pg    MCHC 35.1 31.5 - 36.5 g/dL    RDW 13.3 10.0 - 15.0 %    Platelet Count 187 150 - 450 10e3/uL    % Neutrophils 70 %    % Lymphocytes 23 %    % Monocytes 5 %    % Eosinophils 2 %    % Basophils 1 %    % Immature Granulocytes 0 %    NRBCs per 100 WBC 0 <1 /100    Absolute Neutrophils 5.1 1.6 - 8.3 10e3/uL    Absolute Lymphocytes 1.6 0.8 - 5.3 10e3/uL    Absolute Monocytes 0.4 0.0 - 1.3 10e3/uL    Absolute Eosinophils 0.2 0.0 - 0.7 10e3/uL    Absolute Basophils 0.0  0.0 - 0.2 10e3/uL    Absolute Immature Granulocytes 0.0 <=0.4 10e3/uL    Absolute NRBCs 0.0 10e3/uL   CT ABDOMEN PELVIS W CONTRAST    Narrative    CT ABDOMEN/PELVIS WITH CONTRAST April 11, 2024 12:24 PM    CLINICAL HISTORY: Right flank pain, fall.    TECHNIQUE: CT scan of the abdomen and pelvis was performed following  injection of IV contrast. Multiplanar reformats were obtained. Dose  reduction techniques were used.  CONTRAST: 100mL, Isovue 370.    COMPARISON: Pelvic ultrasound 12/21/2023. CT abdomen and pelvis  5/18/2021.    FINDINGS:   LOWER CHEST: Unremarkable.    HEPATOBILIARY: Cholecystectomy.    PANCREAS: No significant mass, duct dilatation, or inflammatory  change.    SPLEEN: Unremarkable.    ADRENAL GLANDS: No significant nodules.    KIDNEYS: Left superior pole cyst; no follow-up necessary.    BOWEL: No obstruction or inflammatory change.    VASCULATURE: Nonaneurysmal abdominal aorta.    LYMPH NODE AND PERITONEUM: No enlarged lymph node. No free fluid.    PELVIS: Uterus is absent. Left adnexal/ovarian cyst measuring 2.4 cm.    MUSCULOSKELETAL: No aggressive osseous lesion. Degenerative changes of  the lower lumbar spine, including a L5/S1 osteophyte resulting in  mild-to-moderate neural foraminal narrowing. Prior left laminotomy.    OTHER: None.      Impression    IMPRESSION: No acute findings within the abdomen or pelvis.    ROBBIE ROMANO MD         SYSTEM ID:  [id number]   CT Lumbar Spine w/o Contrast    Narrative    CT OF THE LUMBAR SPINE WITHOUT CONTRAST April 11, 2024 12:24 PM     HISTORY: Fall, back pain, history of back surgery. Low back pain.  Trauma and/or suspected fracture. Mild/moderate trauma.  Spondyloarthropathy. No known/automatically detected potential  contraindications to CT.     TECHNIQUE: Axial images of the lumbar spine were acquired without  intravenous contrast. Multiplanar reformations were created from the  axial source images. Radiation dose for this scan was reduced  "using  automated exposure control, adjustment of the mA and/or kV according  to patient size, or iterative reconstruction technique.    COMPARISON: Lumbar spine MR 10/6/2023.      Impression    IMPRESSION: Five lumbar type vertebrae. Alignment remains normal.  Vertebral body heights normal. No fractures. Loss of disc space height  and degenerative endplate spurring at L4-L5. Facet arthropathy at  L4-L5 and L5-S1. No high-grade spinal canal stenosis of the lumbar  spine. Moderate degenerative neural foraminal stenosis on the left at  L5-S1 again noted.      JUN CROOK MD         SYSTEM ID:  [id number]     *Note: Due to a large number of results and/or encounters for the requested time period, some results have not been displayed. A complete set of results can be found in Results Review.       Medications   sodium chloride 0.9% BOLUS 1,000 mL (0 mLs Intravenous Stopped 4/11/24 1259)   HYDROmorphone (PF) (DILAUDID) injection 0.5 mg (0.5 mg Intravenous $Given 4/11/24 1154)   LORazepam (ATIVAN) injection 1 mg (1 mg Intravenous $Given 4/11/24 1051)   iopamidol (ISOVUE-370) solution 500 mL (100 mLs Intravenous $Given 4/11/24 1209)   sodium chloride 0.9 % bag 100mL for CT scan flush use (60 mLs Intravenous $Given 4/11/24 1208)       Assessments & Plan (with Medical Decision Making)  Fallon is a 43-year-old female presenting to the emergency room accompanied by her daughter after slipping and falling yesterday, and \"tweaking\" her back.  See history and physical exam as above  Pleasant 43-year-old female in no acute distress but is occasionally wincing in pain and does have intermittent spasms of muscles of her lower extremities.  She does admit to neurologic changes and what could be considered red flag symptoms such as urinary incontinence, weakness, numbness or tingling of the lower extremities and perineum, but after discussing these ongoing symptoms with the patient and reviewing her chart, see that these have been " "documented and were present prior to this injury.  She is being followed by neurology team and they are investigating a diagnosis of MS.  She is due to have MRI under general anesthesia on 4/26/2024.  On examination today, since her pain and area of concern it seems to be more muscular in nature, located  over the right 1 flank and lower latissimus musculature, suspect that this is an acute muscle spasm but do not have high suspicion for emergent process such as cauda equina syndrome.  Reviewed patient's previous MRI of the lumbar spine.  Do not see indication that she needs repeat MRI emergently today.  Will trial pain medication to see if this helps, since she has been trying prescribed tizanidine and NSAIDs at home without much relief.  If pain medication provided does not give adequate relief, may need to consider CT scan of the lumbar spine as well as CT of abdomen and pelvis to look for a retroperitoneal process or acute process such as right ureterolithiasis that may be contributing to her symptoms.  She is agreeable to proceeding with medication first and considering imaging if needed.  Patient was given IV Ativan and fluids.  The Ativan did help with some of the spasms and a \"queasy\" feeling patient was mentioning, but she was still having some pain with movement.  RN had administered Ativan first and was monitoring patient's blood pressure before administering Dilaudid.  After a discussion with patient and her daughter, we will proceed with giving a dose of Dilaudid and will proceed to CT.  CT results as above.  She does have findings of left renal cyst and left adnexal cyst which have been noted on previous imaging.  No acute findings on the right side in the area of concern with patient's new pain.  Do not see acute concerning process of CT of the lumbar spine.  She does have some improvement in her pain with the IV Dilaudid that has been given.  Remains vitally stable.  At this point she feels " comfortable with plan for discharge home with pain medication.  Had a long discussion with the patient about dosing of the narcotic medication and  doses from her prescribed tizanidine.  She is welcome to continue the tizanidine, but should not use tizanidine and oxycodone together so that she does not have significant respiratory depression or drowsiness.  She already notes that taking the tizanidine does tend to make her drowsy.  She will pare that with Advil as it does tend to provide her some relief, and if needed will use the oxycodone.  She will keep planned follow-up with neurology and still go forward with MRI that has been ordered.  Had a discussion about warning signs and symptoms that would indicate she should return more promptly to the emergency room.  All questions were answered to the best my ability.  Patient was discharged in stable condition.       I have reviewed the nursing notes.    I have reviewed the findings, diagnosis, plan and need for follow up with the patient.           Medical Decision Making  The patient's presentation was of moderate complexity (a chronic illness mild to moderate exacerbation, progression, or side effect of treatment).    The patient's evaluation involved:  ordering and/or review of 3+ test(s) in this encounter (see separate area of note for details)    The patient's management necessitated moderate risk (prescription drug management including medications given in the ED).        Discharge Medication List as of 4/11/2024  1:21 PM        START taking these medications    Details   oxyCODONE (ROXICODONE) 5 MG tablet Take 1 tablet (5 mg) by mouth every 6 hours as needed for pain, Disp-12 tablet, R-0, E-Prescribe             Final diagnoses:   Right flank pain   Chronic low back pain without sciatica, unspecified back pain laterality   Back muscle spasm       4/11/2024   [facility] EMERGENCY DEPT       Felisha Talley,   04/12/24  1201

## 2024-04-11 NOTE — DISCHARGE INSTRUCTIONS
Your CT scan did not show any acute findings in your back or the right side of your abdomen or flank that could be causing your acute symptoms.  It is likely due to muscle spasm or acute muscular pain    CT showed that your left renal cyst is about 2.4 cm.  You also have a left adnexal cyst that is about 2.4 cm.  These do not appear to be worrisome, and can discuss with Destini Esparza any appropriate follow-up that may be necessary    You may take the oxycodone every 6 hours as needed for acute severe pain.  Use caution as this medicine can make you drowsy.  Try to space out any oxycodone and tizanidine doses by about 2 hours.  It is okay to take oxycodone with over-the-counter Tylenol or ibuprofen, or to alternate them    I hope that your MRI gives your neurologist more information to provide you with a diagnosis so you are able to get appropriate treatment    If you have any new or worsening symptoms do not hesitate to return to the emergency room for evaluation

## 2024-04-11 NOTE — ED TRIAGE NOTES
Pt slipped and fell yesterday in the bathroom  Right lower back pain, previous back surgeries x2  Having gait and urinary inconvenience issues.  Seeing neurologist, getting MRI 4/26 to rule out MS.    Daughter brought patient in.     Triage Assessment (Adult)       Row Name 04/11/24 0955          Triage Assessment    Airway WDL WDL        Skin Circulation/Temperature WDL    Skin Circulation/Temperature WDL WDL        Cognitive/Neuro/Behavioral WDL    Cognitive/Neuro/Behavioral WDL WDL

## 2024-04-12 ENCOUNTER — PATIENT OUTREACH (OUTPATIENT)
Dept: CARE COORDINATION | Facility: CLINIC | Age: 44
End: 2024-04-12
Payer: COMMERCIAL

## 2024-04-12 NOTE — PROGRESS NOTES
Clinic Care Coordination Contact  Community Health Worker Initial Outreach    CHW Initial Information Gathering:  Referral Source: ED Follow-Up  CHW Additional Questions  If ED/Hospital discharge, follow-up appointment scheduled as recommended?: No  Patient agreeable to assistance with scheduling?: Yes  CHW assisted patient to schedule (specify): CHW [facility] ED follow-up with PCP on 05/10/2024  Reubent active?: Yes    Patient accepts CC: No, patient declined at this time. Patient will be sent Care Coordination introduction letter for future reference.       Tonja Contreras  Community Health Worker  [facility], [facility]    *Connected Care Resource Team does NOT follow patient ongoing. Referrals are identified based on internal discharge reports and the outreach is to ensure patient has an understanding of their discharge instructions.

## 2024-04-12 NOTE — LETTER
Fallon Rivera  [address]  [facility] [address]    Dear Fallon Rivera,      I am a team member within the [facility] with [facility]. I recently contacted you to ensure you are doing well following a visit within our health system. I also wanted to take this chance to introduce Clinic Care Coordination should you have any interest in this program in the future.    Below is a description of Clinic Care Coordination and how this team can further assist you:       The Clinic Care Coordination team is made up of a Registered Nurse, , Financial Resource Worker, and a Community Health Worker who understand and can help navigate the health care system. The goal of clinic care coordination is to help you manage your health, improve access to care, and achieve optimal health outcomes. They work alongside your provider to assist you in determining your health and social needs, obtain health care and community resources, and provide you with necessary information and education. Clinic Care Coordination can work with you through any barriers and develop a care plan that helps coordinate and strengthen the relationship between you and your care team.    If you wish to connect with the Clinic Care Coordination Team, please let your [facility] Primary Care Provider or Clinic Care Team know and they can place a referral. The Clinic Care Coordination team will then reach out by phone to further support you.    We are focused on providing you with the highest-quality healthcare experience possible.    Sincerely,   Your care team with [facility]

## 2024-04-18 ENCOUNTER — ANESTHESIA EVENT (OUTPATIENT)
Dept: SURGERY | Facility: CLINIC | Age: 44
End: 2024-04-18
Payer: COMMERCIAL

## 2024-04-18 RX ORDER — SODIUM CHLORIDE, SODIUM LACTATE, POTASSIUM CHLORIDE, CALCIUM CHLORIDE 600; 310; 30; 20 MG/100ML; MG/100ML; MG/100ML; MG/100ML
INJECTION, SOLUTION INTRAVENOUS CONTINUOUS
Status: CANCELLED | OUTPATIENT
Start: 2024-04-18

## 2024-04-18 RX ORDER — LIDOCAINE 40 MG/G
CREAM TOPICAL
Status: CANCELLED | OUTPATIENT
Start: 2024-04-18

## 2024-04-18 NOTE — ANESTHESIA PREPROCEDURE EVALUATION
Anesthesia Pre-Procedure Evaluation    Patient: Fallon Rivera   MRN: [id number] : 1980        Procedure : Procedure(s):  MRI OF Brain,Cervical and Thoracic Spine          Past Medical History:   Diagnosis Date     Abnormal Pap smear , ,     ASC-H, ASCUS + HPV 45     Calculus of kidney 2002     Cervical high risk HPV (human papillomavirus) test positive     + HPV 45 (high risk)     History of colposcopy with cervical biopsy 06, 3/15/07    koilocytosis       Past Surgical History:   Procedure Laterality Date     CHOLECYSTECTOMY, LAPOROSCOPIC  2007    Cholecystectomy, Laparoscopic     COLONOSCOPY  05/17/10     COLONOSCOPY N/A 2019    Procedure: COLONOSCOPY;  Surgeon: Paramjit Kingston DO;  Location:  GI     COLONOSCOPY N/A 10/23/2020    Procedure: COLONOSCOPY, WITH  BIOPSY;  Surgeon: Ba Dickinson MD;  Location:  GI     CONIZATION  2011    Procedure:CONIZATION; cold knife and punch biopsy of mole on back; Surgeon:SYDNEY FORD; Location:[facility]     DILATION AND CURETTAGE, HYSTEROSCOPY, LAPAROSCOPY, COMBINED Right 2015    Procedure: COMBINED DILATION AND CURETTAGE, HYSTEROSCOPY, LAPAROSCOPY;  Surgeon: Sydney Ford MD;  Location: [facility]     DISCECTOMY LUMBAR MINIMALLY INVASIVE ONE LEVEL Left 2019    Procedure: Minimally Invasive Surgery Left Lumbar 5-Sacral 1 microdiscectomy;  Surgeon: Mason Roe MD;  Location: [facility]     DISCECTOMY LUMBAR POSTERIOR MICROSCOPIC ONE LEVEL Left 2023    Procedure: Minimally Invasive Left Lumbar 5 to Sacral 1 evacuation of recurrent disc herniation;  Surgeon: Masno Roe MD;  Location: [facility]     ESOPHAGOSCOPY, GASTROSCOPY, DUODENOSCOPY (EGD), COMBINED  2014    Procedure: COMBINED ESOPHAGOSCOPY, GASTROSCOPY, DUODENOSCOPY (EGD), BIOPSY SINGLE OR MULTIPLE;  Surgeon: Earl Lane MD;  Location:  GI     ESOPHAGOSCOPY, GASTROSCOPY, DUODENOSCOPY (EGD), COMBINED N/A  10/23/2020    Procedure: Esophagogastroduodenoscopy with  biopsy;  Surgeon: Ba Dickinson MD;  Location: [facility]     EXCISE LESION TRUNK  7/1/2011    Procedure:EXCISE LESION TRUNK; Surgeon:SYDNEY FORD; Location:[facility]     FORAMINOTOMY LUMBAR POSTERIOR ONE LEVEL Left 4/5/2023    Procedure: left foraminotomy Lumbar 5 to Sacral 1;  Surgeon: Mason Roe MD;  Location: [facility]     HC FRAGMENTING OF KIDNEY STONE  11/30/2005     HC RX ECTOP PREG BY SCOPE,RMV TUBE/OVRY  12/20/2007    Right sided salpingectomy and removal of ruptured ectopic pregnancy. D&C.     HYSTERECTOMY VAGINAL       LAPAROSCOPIC APPENDECTOMY N/A 9/24/2015    Procedure: LAPAROSCOPIC APPENDECTOMY;  Surgeon: James Cuellar MD;  Location: [facility]     LAPAROSCOPIC CYSTECTOMY OVARIAN (BENIGN) N/A 9/24/2015    Procedure: LAPAROSCOPIC CYSTECTOMY OVARIAN (BENIGN);  Surgeon: Sydney Ford MD;  [facility]     LAPAROSCOPIC HYSTERECTOMY TOTAL, BILATERAL SALPINGO-OOPHORECTOMY, COMBINED N/A 7/28/2020    Procedure: laparoscpic assisted vaginal hysterectomy, cystoscopy;  Surgeon: Sydney Ford MD;  Location: [facility]     LAPAROSCOPIC OOPHORECTOMY Right 9/24/2015    Procedure: LAPAROSCOPIC OOPHORECTOMY;  Surgeon: Sydney Ford MD;  [facility]: [facility]     LITHOTRIPSY  01/17/2007     PELVIS LAPAROSCOPY,DX  10/16/2000    Diagnostic laparoscopy, Endometrial biopsy.     TUBAL/ECTOPIC PREGNANCY  01/23/2007    Lap removal of left ruptured ectopic pregnancy & salpingectomy, D&C     [facility] REMOVAL OF KIDNEY STONE      two surgeries, 2002,2003     [facility] UGI ENDOSCOPY, SIMPLE EXAM  09/01/09      Allergies   Allergen Reactions     Amoxicillin Hives     Emetrol      Anxiety, agitation,severe paranoia. Zofran, Reglan are some of them.  DRAMAMINE WITHOUT ISSUES       Indomethacin Nausea and Vomiting     Macrobid [Nitrofuran Derivatives] Hives     Meperidine Hcl      Metoclopramide Other (See Comments)     Acute paranoia,  severe     Nitrofurantoin Hives     Ondansetron Other (See Comments)     Severe anxiety, agitation     Vancomycin Other (See Comments)     maria del rosario syndrome     Vancomycin Other (See Comments)     maria del rosario syndrome      Social History     Tobacco Use     Smoking status: Some Days     Current packs/day: 0.00     Types: Cigarettes     Smokeless tobacco: Never     Tobacco comments:     As of 10 /2014, pt has had a few cigs here and there   Substance Use Topics     Alcohol use: Yes     Comment: Once or twice a year      Wt Readings from Last 1 Encounters:   04/10/24 87.5 kg (193 lb)        Anesthesia Evaluation   Pt has had prior anesthetic. Type: General and MAC.        ROS/MED HX  ENT/Pulmonary:       Neurologic: Comment: Lumbar radiculopathy    MRI for hyperreflexia and demyelinating disease  - neg neurologic ROS     Cardiovascular:     (+) Dyslipidemia - -   -  - -           ZHANG.                           METS/Exercise Tolerance:     Hematologic:  - neg hematologic  ROS     Musculoskeletal:  - neg musculoskeletal ROS     GI/Hepatic:     (+) GERD,      Inflammatory bowel disease,             Renal/Genitourinary:     (+) renal disease,             Endo:     (+)  type II DM, Last HgA1c: 6.6, date: 12/22/2023, Not using insulin,    Diabetic complications: nephropathy.      Obesity,       Psychiatric/Substance Use:     (+) psychiatric history depression       Infectious Disease:       Malignancy:  - neg malignancy ROS     Other:            Physical Exam    Airway        Mallampati: II   TM distance: > 3 FB   Neck ROM: full   Mouth opening: > 3 cm    Respiratory Devices and Support         Dental    unable to assess        Cardiovascular   cardiovascular exam normal          Pulmonary   pulmonary exam normal            OUTSIDE LABS:  CBC:   Lab Results   Component Value Date    WBC 7.3 04/11/2024    WBC 6.4 01/19/2024    HGB 15.6 04/11/2024    HGB 16.2 (H) 01/19/2024    HCT 44.4 04/11/2024    HCT 46.1 01/19/2024      04/11/2024     01/19/2024     BMP:   Lab Results   Component Value Date     04/11/2024     01/19/2024    POTASSIUM 4.2 04/11/2024    POTASSIUM 3.9 01/19/2024    CHLORIDE 105 04/11/2024    CHLORIDE 104 01/19/2024    CO2 20 (L) 04/11/2024    CO2 22 01/19/2024    BUN 17.4 04/11/2024    BUN 10.6 01/19/2024    CR 0.75 04/11/2024    CR 0.81 01/19/2024     (H) 04/11/2024     (H) 01/19/2024     COAGS:   Lab Results   Component Value Date    PTT 31 07/11/2011    INR 0.99 07/11/2011     POC:   Lab Results   Component Value Date     (H) 10/23/2020    HCG Negative 07/28/2020     HEPATIC:   Lab Results   Component Value Date    ALBUMIN 4.2 04/11/2024    PROTTOTAL 7.5 04/11/2024    ALT 21 04/11/2024    AST 16 04/11/2024    ALKPHOS 57 04/11/2024    BILITOTAL 1.0 04/11/2024     OTHER:   Lab Results   Component Value Date    PH 6.0 09/13/2012    LACT 1.6 09/05/2019    A1C 6.6 (H) 12/22/2023    SHADE 8.8 04/11/2024    LIPASE 242 10/09/2022    AMYLASE 66 05/08/2014    TSH 1.93 01/26/2021    CRP 4.4 02/08/2022    SED 7 02/08/2022       Anesthesia Plan    ASA Status:  3       Anesthesia Type: General.     - Airway: LMA   Induction: Propofol.   Maintenance: TIVA.        Consents    Anesthesia Plan(s) and associated risks, benefits, and realistic alternatives discussed. Questions answered and patient/representative(s) expressed understanding.     - Discussed: Risks, Benefits and Alternatives for BOTH SEDATION and the PROCEDURE were discussed     - Discussed with:  Patient            Postoperative Care            Comments:             SERENE Wolfe CRNA    I have reviewed the pertinent notes and labs in the chart from the past 30 days and (re)examined the patient.  Any updates or changes from those notes are reflected in this note.              # DMII: A1C = N/A within past 6 months  # Obesity: Estimated body mass index is 32.55 kg/m  as calculated from the following:    Height as of 4/10/24:  "1.64 m (5' 4.57\").    Weight as of 4/10/24: 87.5 kg (193 lb).      "

## 2024-04-19 ENCOUNTER — MYC REFILL (OUTPATIENT)
Dept: FAMILY MEDICINE | Facility: CLINIC | Age: 44
End: 2024-04-19

## 2024-04-19 DIAGNOSIS — Z98.890 S/P LUMBAR MICRODISCECTOMY: ICD-10-CM

## 2024-04-26 ENCOUNTER — ANESTHESIA (OUTPATIENT)
Dept: SURGERY | Facility: CLINIC | Age: 44
End: 2024-04-26
Payer: COMMERCIAL

## 2024-04-26 ENCOUNTER — HOSPITAL ENCOUNTER (OUTPATIENT)
Dept: MRI IMAGING | Facility: CLINIC | Age: 44
Discharge: HOME | End: 2024-04-26
Attending: PSYCHIATRY & NEUROLOGY
Payer: COMMERCIAL

## 2024-04-26 ENCOUNTER — HOSPITAL ENCOUNTER (OUTPATIENT)
Facility: CLINIC | Age: 44
Discharge: HOME | End: 2024-04-26
Admitting: NURSE ANESTHETIST, CERTIFIED REGISTERED
Payer: COMMERCIAL

## 2024-04-26 VITALS
RESPIRATION RATE: 16 BRPM | DIASTOLIC BLOOD PRESSURE: 83 MMHG | HEART RATE: 80 BPM | TEMPERATURE: 96.3 F | SYSTOLIC BLOOD PRESSURE: 114 MMHG | OXYGEN SATURATION: 97 %

## 2024-04-26 VITALS
HEART RATE: 76 BPM | TEMPERATURE: 98 F | OXYGEN SATURATION: 98 % | SYSTOLIC BLOOD PRESSURE: 110 MMHG | DIASTOLIC BLOOD PRESSURE: 72 MMHG | RESPIRATION RATE: 14 BRPM

## 2024-04-26 DIAGNOSIS — R29.2 HYPERREFLEXIA: ICD-10-CM

## 2024-04-26 DIAGNOSIS — G37.9 DEMYELINATING DISEASE (H): ICD-10-CM

## 2024-04-26 LAB
GLUCOSE BLDC GLUCOMTR-MCNC: 135 MG/DL (ref 70–99)
GLUCOSE BLDC GLUCOMTR-MCNC: 138 MG/DL (ref 70–99)

## 2024-04-26 PROCEDURE — 710N000010 HC RECOVERY PHASE 1, LEVEL 2, PER MIN

## 2024-04-26 PROCEDURE — 258N000003 HC RX IP 258 OP 636: Performed by: NURSE ANESTHETIST, CERTIFIED REGISTERED

## 2024-04-26 PROCEDURE — 70553 MRI BRAIN STEM W/O & W/DYE: CPT

## 2024-04-26 PROCEDURE — 82962 GLUCOSE BLOOD TEST: CPT

## 2024-04-26 PROCEDURE — A9585 GADOBUTROL INJECTION: HCPCS | Performed by: PSYCHIATRY & NEUROLOGY

## 2024-04-26 PROCEDURE — 250N000011 HC RX IP 250 OP 636: Performed by: NURSE ANESTHETIST, CERTIFIED REGISTERED

## 2024-04-26 PROCEDURE — 255N000002 HC RX 255 OP 636: Performed by: PSYCHIATRY & NEUROLOGY

## 2024-04-26 PROCEDURE — 250N000009 HC RX 250: Performed by: NURSE ANESTHETIST, CERTIFIED REGISTERED

## 2024-04-26 PROCEDURE — 72157 MRI CHEST SPINE W/O & W/DYE: CPT

## 2024-04-26 PROCEDURE — 72156 MRI NECK SPINE W/O & W/DYE: CPT

## 2024-04-26 PROCEDURE — 370N000017 HC ANESTHESIA TECHNICAL FEE, PER MIN

## 2024-04-26 RX ORDER — SODIUM CHLORIDE, SODIUM LACTATE, POTASSIUM CHLORIDE, CALCIUM CHLORIDE 600; 310; 30; 20 MG/100ML; MG/100ML; MG/100ML; MG/100ML
INJECTION, SOLUTION INTRAVENOUS CONTINUOUS PRN
Status: DISCONTINUED | OUTPATIENT
Start: 2024-04-26 | End: 2024-04-26

## 2024-04-26 RX ORDER — LIDOCAINE HYDROCHLORIDE 20 MG/ML
INJECTION, SOLUTION INFILTRATION; PERINEURAL PRN
Status: DISCONTINUED | OUTPATIENT
Start: 2024-04-26 | End: 2024-04-26

## 2024-04-26 RX ORDER — SODIUM CHLORIDE, SODIUM LACTATE, POTASSIUM CHLORIDE, CALCIUM CHLORIDE 600; 310; 30; 20 MG/100ML; MG/100ML; MG/100ML; MG/100ML
INJECTION, SOLUTION INTRAVENOUS CONTINUOUS
Status: DISCONTINUED | OUTPATIENT
Start: 2024-04-26 | End: 2024-05-01 | Stop reason: HOSPADM

## 2024-04-26 RX ORDER — PROPOFOL 10 MG/ML
INJECTION, EMULSION INTRAVENOUS PRN
Status: DISCONTINUED | OUTPATIENT
Start: 2024-04-26 | End: 2024-04-26

## 2024-04-26 RX ORDER — FENTANYL CITRATE 50 UG/ML
25 INJECTION, SOLUTION INTRAMUSCULAR; INTRAVENOUS
Status: CANCELLED | OUTPATIENT
Start: 2024-04-26

## 2024-04-26 RX ORDER — NALOXONE HYDROCHLORIDE 0.4 MG/ML
0.1 INJECTION, SOLUTION INTRAMUSCULAR; INTRAVENOUS; SUBCUTANEOUS
Status: CANCELLED | OUTPATIENT
Start: 2024-04-26

## 2024-04-26 RX ORDER — FENTANYL CITRATE 50 UG/ML
INJECTION, SOLUTION INTRAMUSCULAR; INTRAVENOUS PRN
Status: DISCONTINUED | OUTPATIENT
Start: 2024-04-26 | End: 2024-04-26

## 2024-04-26 RX ORDER — METOPROLOL TARTRATE 1 MG/ML
1-2 INJECTION, SOLUTION INTRAVENOUS EVERY 5 MIN PRN
Status: DISCONTINUED | OUTPATIENT
Start: 2024-04-26 | End: 2024-05-01 | Stop reason: HOSPADM

## 2024-04-26 RX ORDER — GADOBUTROL 604.72 MG/ML
8.5 INJECTION INTRAVENOUS ONCE
Status: COMPLETED | OUTPATIENT
Start: 2024-04-26 | End: 2024-04-26

## 2024-04-26 RX ORDER — PROPOFOL 10 MG/ML
INJECTION, EMULSION INTRAVENOUS CONTINUOUS PRN
Status: DISCONTINUED | OUTPATIENT
Start: 2024-04-26 | End: 2024-04-26

## 2024-04-26 RX ORDER — DEXAMETHASONE SODIUM PHOSPHATE 4 MG/ML
INJECTION, SOLUTION INTRA-ARTICULAR; INTRALESIONAL; INTRAMUSCULAR; INTRAVENOUS; SOFT TISSUE PRN
Status: DISCONTINUED | OUTPATIENT
Start: 2024-04-26 | End: 2024-04-26

## 2024-04-26 RX ORDER — NALOXONE HYDROCHLORIDE 0.4 MG/ML
0.1 INJECTION, SOLUTION INTRAMUSCULAR; INTRAVENOUS; SUBCUTANEOUS
Status: DISCONTINUED | OUTPATIENT
Start: 2024-04-26 | End: 2024-05-01 | Stop reason: HOSPADM

## 2024-04-26 RX ADMIN — MIDAZOLAM 2 MG: 1 INJECTION INTRAMUSCULAR; INTRAVENOUS at 09:17

## 2024-04-26 RX ADMIN — PROPOFOL 200 MG: 10 INJECTION, EMULSION INTRAVENOUS at 09:19

## 2024-04-26 RX ADMIN — PROPOFOL 100 MG: 10 INJECTION, EMULSION INTRAVENOUS at 09:24

## 2024-04-26 RX ADMIN — LIDOCAINE HYDROCHLORIDE 100 MG: 20 INJECTION, SOLUTION INFILTRATION; PERINEURAL at 09:19

## 2024-04-26 RX ADMIN — SODIUM CHLORIDE, POTASSIUM CHLORIDE, SODIUM LACTATE AND CALCIUM CHLORIDE: 600; 310; 30; 20 INJECTION, SOLUTION INTRAVENOUS at 09:14

## 2024-04-26 RX ADMIN — FENTANYL CITRATE 100 MCG: 50 INJECTION INTRAMUSCULAR; INTRAVENOUS at 09:45

## 2024-04-26 RX ADMIN — DEXAMETHASONE SODIUM PHOSPHATE 10 MG: 4 INJECTION, SOLUTION INTRA-ARTICULAR; INTRALESIONAL; INTRAMUSCULAR; INTRAVENOUS; SOFT TISSUE at 10:01

## 2024-04-26 RX ADMIN — PROPOFOL 200 MCG/KG/MIN: 10 INJECTION, EMULSION INTRAVENOUS at 09:19

## 2024-04-26 RX ADMIN — GADOBUTROL 8.5 ML: 604.72 INJECTION INTRAVENOUS at 09:16

## 2024-04-26 ASSESSMENT — ACTIVITIES OF DAILY LIVING (ADL)
ADLS_ACUITY_SCORE: 35

## 2024-04-26 NOTE — DISCHARGE INSTRUCTIONS
Radiology  Discharge Instructions Following Sedation for an Adult    Procedure performed: Sedation for MRI     Sedative medication given: Fentanyl, Versed, Propofol, Lidocaine, Dexamethasone    AFTER YOU ARE HOME:  Relax and take it easy for 24 hours.  Drink plenty of fluids.  Eat your normal diet, unless otherwise instructed by your primary provider.  DO NOT smoke for at least 24 hours (if you have been given any medications that were to help you relax or sedate you during your procedure).  DO NOT drink alcoholic beverages for the remainder of the day.  DO NOT drive or operate machinery at home or work for 24 hours.  DO NOT make any important or legal decisions for 24 hours.    CALL YOUR PRIMARY PROVIDER IF:  You develop excessive nausea, vomiting or dizziness.   The nausea, vomiting or dizziness continues longer than 24 hours.    AFTER HOURS CALL [facility] NURSE ADVISORS AT [phone].

## 2024-04-26 NOTE — PROGRESS NOTES
Pt alert and oriented. Vitals stable. Tolerating water. Report given to radiology KRISTOFER Aldridge. Pt agreeable to transfer. All questions answered.

## 2024-04-26 NOTE — ANESTHESIA CARE TRANSFER NOTE
Patient: Fallon Rivera    Procedure: Procedure(s):  MRI OF Brain,Cervical and Thoracic Spine       Diagnosis: Hyperreflexia [R29.2]  Demyelinating disease (H) [G37.9]  Diagnosis Additional Information: No value filed.    Anesthesia Type:   General     Note:      Level of Consciousness: awake  Oxygen Supplementation: room air        Vital Signs Stable: post-procedure vital signs reviewed and stable  Report to RN Given: handoff report given  Patient transferred to: PACU    Handoff Report: Identifed the Patient, Identified the Reponsible Provider, Reviewed the pertinent medical history, Discussed the surgical course, Reviewed Intra-OP anesthesia mangement and issues during anesthesia, Set expectations for post-procedure period and Allowed opportunity for questions and acknowledgement of understanding  Vitals:  Vitals Value Taken Time   BP     Temp     Pulse 89 04/26/24 1122   Resp 16 04/26/24 1122   SpO2 96 % 04/26/24 1122   Vitals shown include unfiled device data.    Electronically Signed By: SERENE Whelan CRNA  April 26, 2024  11:22 AM

## 2024-04-26 NOTE — ANESTHESIA PROCEDURE NOTES
Airway    Staff -        CRNA: Juju Conroy APRN CRNA       Performed By: CRNA  Consent for Airway        Urgency: elective  Indications and Patient Condition       Indications for airway management: lawrence-procedural         Mask difficulty assessment: 0 - not attempted    Final Airway Details       Final airway type: supraglottic airway    Supraglottic Airway Details        Type: LMA       Brand: Air-Q       LMA size: 4    Post intubation assessment        Placement verified by: capnometry and equal breath sounds        Number of attempts at approach: 1       Secured with: tape       Ease of procedure: easy       Dentition: Intact

## 2024-04-26 NOTE — ANESTHESIA POSTPROCEDURE EVALUATION
Patient: Fallon Rivera    Procedure: Procedure(s):  MRI OF Brain,Cervical and Thoracic Spine       Anesthesia Type:  General    Note:  Disposition: Outpatient   Postop Pain Control: Uneventful            Sign Out: Well controlled pain   PONV: No   Neuro/Psych: Uneventful            Sign Out: Acceptable/Baseline neuro status   Airway/Respiratory: Uneventful            Sign Out: Acceptable/Baseline resp. status   CV/Hemodynamics: Uneventful            Sign Out: Acceptable CV status; No obvious hypovolemia; No obvious fluid overload   Other NRE: NONE   DID A NON-ROUTINE EVENT OCCUR? No       Last vitals:  Vitals Value Taken Time   /72 04/26/24 1145   Temp 36.7  C (98  F) 04/26/24 1145   Pulse 75 04/26/24 1147   Resp 0 04/26/24 1147   SpO2 97 % 04/26/24 1147   Vitals shown include unfiled device data.    Electronically Signed By: SERENE Whelan CRNA  April 26, 2024  12:29 PM

## 2024-05-04 DIAGNOSIS — E11.43 TYPE 2 DIABETES MELLITUS WITH DIABETIC AUTONOMIC NEUROPATHY, WITHOUT LONG-TERM CURRENT USE OF INSULIN (H): ICD-10-CM

## 2024-05-06 RX ORDER — BLOOD-GLUCOSE METER
EACH MISCELLANEOUS
Qty: 1 KIT | Refills: 0 | OUTPATIENT
Start: 2024-05-06

## 2024-05-10 ENCOUNTER — OFFICE VISIT (OUTPATIENT)
Dept: FAMILY MEDICINE | Facility: CLINIC | Age: 44
End: 2024-05-10
Payer: COMMERCIAL

## 2024-05-10 VITALS
WEIGHT: 194.6 LBS | HEART RATE: 99 BPM | OXYGEN SATURATION: 98 % | TEMPERATURE: 97 F | BODY MASS INDEX: 32.42 KG/M2 | DIASTOLIC BLOOD PRESSURE: 64 MMHG | HEIGHT: 65 IN | RESPIRATION RATE: 16 BRPM | SYSTOLIC BLOOD PRESSURE: 118 MMHG

## 2024-05-10 DIAGNOSIS — R10.9 RIGHT FLANK PAIN: ICD-10-CM

## 2024-05-10 DIAGNOSIS — G37.9 DEMYELINATING DISEASE (H): Primary | ICD-10-CM

## 2024-05-10 DIAGNOSIS — M54.16 LUMBAR RADICULOPATHY: ICD-10-CM

## 2024-05-10 DIAGNOSIS — Z98.890 S/P LUMBAR MICRODISCECTOMY: ICD-10-CM

## 2024-05-10 PROCEDURE — 99214 OFFICE O/P EST MOD 30 MIN: CPT | Performed by: PHYSICIAN ASSISTANT

## 2024-05-10 RX ORDER — OXYCODONE HYDROCHLORIDE 5 MG/1
5 TABLET ORAL EVERY 6 HOURS PRN
Qty: 12 TABLET | Refills: 0 | Status: SHIPPED | OUTPATIENT
Start: 2024-05-10 | End: 2024-05-13

## 2024-05-10 RX ORDER — ROPINIROLE 0.5 MG/1
.5-2 TABLET, FILM COATED ORAL 3 TIMES DAILY
Qty: 90 TABLET | Refills: 1 | Status: SHIPPED | OUTPATIENT
Start: 2024-05-10 | End: 2024-07-09

## 2024-05-10 ASSESSMENT — PAIN SCALES - GENERAL: PAINLEVEL: MODERATE PAIN (4)

## 2024-05-10 NOTE — PROGRESS NOTES
"  Praful Lehman is a 43 year old, presenting for the following health issues:  ER F/U        5/10/2024     9:42 AM   Additional Questions   Roomed by carlos   Accompanied by son     HPI       ED/UC Followup:    Facility:  [facility]  Date of visit: 4/11/2024  Reason for visit: Back Pain, CT done showing left renal cyst and left adnexal cyst, no findings on right side where pain is   Current Status: Back pain has gotten better, she states being wobbly, muscular issues     Read MRI results- hasn't heard from Neurologist.     Had MRI completed 04/26/2024 ordered by her neurologist for evaluation of possible MS. Brain MRI shows few tiny area of abnormal T2 signal hyperintensity that could represent MS but also could be migraine, small vessel disease, trauma, etc. MRI of the cervical, thoracic and lumbar CT with degenerative changes and few bulged discs but  no other concerning findings for demyelinating disease. Patient reports symptoms continue to occur. Episodes of profound weakness, falls, spasms in her body, increased pain, fatigue. Feels muscles are weak. Not sleeping well at night. She is very frustrated she has not been contacted by neurology yet.         Review of Systems  Constitutional, HEENT, cardiovascular, pulmonary, GI, , musculoskeletal, neuro, skin, endocrine and psych systems are negative, except as otherwise noted.      Objective    /64 (Patient Position: Sitting, Cuff Size: Adult Regular)   Pulse 99   Temp 97  F (36.1  C) (Temporal)   Resp 16   Ht 5' 4.57\" (1.64 m)   Wt 194 lb 9.6 oz (88.3 kg)   LMP 07/25/2020 (Exact Date)   SpO2 98%   BMI 32.82 kg/m    Body mass index is 32.82 kg/m .  Physical Exam   GENERAL: alert and no distress  EYES: Eyes grossly normal to inspection, PERRL and conjunctivae and sclerae normal  HENT: ear canals and TM's normal, nose and mouth without ulcers or lesions  NECK: no adenopathy, no asymmetry, masses, or scars  RESP: lungs clear to auscultation - " "no rales, rhonchi or wheezes  CV: regular rate and rhythm, normal S1 S2, no S3 or S4, no murmur, click or rub, no peripheral edema  ABDOMEN: soft, nontender, no hepatosplenomegaly, no masses and bowel sounds normal  MS: no gross musculoskeletal defects noted, no edema  SKIN: no suspicious lesions or rashes  NEURO: Normal strength and tone, mentation intact and speech normal  PSYCH: mentation appears normal, affect normal/bright        Assessment & Plan     Demyelinating disease (H)  Patient is awaiting response from neurology with next steps. Very frustrated in how long this has been taking and worried about ongoing symptoms. Recommended starting PT. She is agreeable to this. She would also like to trial Requip for the nerve spasms/restlessness that is especially worse at night.  Appropriate use, side effects and dose titration if needed discussed. Refill given on Oxycodone for the severe pain only until PT can be effective.   - rOPINIRole (REQUIP) 0.5 MG tablet; Take 1-4 tablets (0.5-2 mg) by mouth 3 times daily  - oxyCODONE (ROXICODONE) 5 MG tablet; Take 1 tablet (5 mg) by mouth every 6 hours as needed for pain  - Adult Neurology  Referral; Future  - Physical Therapy  Referral; Future    Right flank pain    S/P lumbar microdiscectomy  - tiZANidine (ZANAFLEX) 4 MG tablet; Take 1-2 tablets (4-8 mg) by mouth 3 times daily as needed for muscle spasms    Lumbar radiculopathy    MED REC REQUIRED  Post Medication Reconciliation Status: discharge medications reconciled, continue medications without change  Nicotine/Tobacco Cessation  She reports that she has been smoking cigarettes. She has never used smokeless tobacco.  Nicotine/Tobacco Cessation Plan  Information offered: Patient not interested at this time      BMI  Estimated body mass index is 32.82 kg/m  as calculated from the following:    Height as of this encounter: 1.64 m (5' 4.57\").    Weight as of this encounter: 88.3 kg (194 lb 9.6 oz). "   Weight management plan: Discussed healthy diet and exercise guidelines    The patient indicates understanding of these issues and agrees with the plan.    Signed Electronically by: Destini Esparza PA-C

## 2024-05-20 ENCOUNTER — HOSPITAL ENCOUNTER (OUTPATIENT)
Dept: MAMMOGRAPHY | Facility: CLINIC | Age: 44
Discharge: HOME | End: 2024-05-20
Attending: PHYSICIAN ASSISTANT | Admitting: PHYSICIAN ASSISTANT
Payer: COMMERCIAL

## 2024-05-20 ENCOUNTER — OFFICE VISIT (OUTPATIENT)
Dept: PODIATRY | Facility: CLINIC | Age: 44
End: 2024-05-20
Payer: COMMERCIAL

## 2024-05-20 VITALS
BODY MASS INDEX: 31.99 KG/M2 | SYSTOLIC BLOOD PRESSURE: 108 MMHG | HEIGHT: 65 IN | WEIGHT: 192 LBS | DIASTOLIC BLOOD PRESSURE: 76 MMHG

## 2024-05-20 DIAGNOSIS — E11.43 TYPE 2 DIABETES MELLITUS WITH DIABETIC AUTONOMIC NEUROPATHY, WITHOUT LONG-TERM CURRENT USE OF INSULIN (H): ICD-10-CM

## 2024-05-20 DIAGNOSIS — Z12.31 VISIT FOR SCREENING MAMMOGRAM: ICD-10-CM

## 2024-05-20 DIAGNOSIS — Q66.6 PES VALGUS: Primary | ICD-10-CM

## 2024-05-20 PROCEDURE — 99213 OFFICE O/P EST LOW 20 MIN: CPT | Performed by: PODIATRIST

## 2024-05-20 PROCEDURE — 77063 BREAST TOMOSYNTHESIS BI: CPT

## 2024-05-20 ASSESSMENT — PAIN SCALES - GENERAL: PAINLEVEL: NO PAIN (0)

## 2024-05-20 NOTE — PROGRESS NOTES
Chief Complaint   Patient presents with    Diabetes     Type 2     HPI:  Fallon Rivera is a 42 year old female who is seen in consultation at the request of self.    Pt presents for eval of:   (Onset, Location, L/R, Character, Treatments, Injury if yes)    XR Left toe 4/29/2023    DM type 2    Back surgery 4/5/2023     Onset 4/25/2023, stubbed Left 5th toe into bathroom door. Presents WB w/post op shoe and isai taping toe.     Homemaker.    ROS:  10 point ROS neg other than the symptoms noted above in the HPI.    Patient Active Problem List   Diagnosis    Hemorrhage of rectum and anus    Female infertility    Papanicolaou smear of cervix with atypical squamous cells cannot exclude high grade squamous intraepithelial lesion (ASC-H)    S/P conization of cervix- KAYLA 2/3 in 2011    Kidney stones    Dyspnea    CARDIOVASCULAR SCREENING; LDL GOAL LESS THAN 160    GERD (gastroesophageal reflux disease)    Hyperlipidemia LDL goal <130    Mild major depression (H)    Glucosuria    Type 2 diabetes mellitus with diabetic autonomic neuropathy, without long-term current use of insulin (H)    Non morbid obesity due to excess calories    Obesity (BMI 35.0-39.9) with comorbidity (H)    Endometrial intraepithelial neoplasia (EIN)    Endometrial hyperplasia, unspecified    Chronic diarrhea    Abnormal CT scan, small bowel    Dyspareunia, female    Left ovarian cyst    S/P lumbar microdiscectomy    Lumbar radiculopathy       PAST MEDICAL HISTORY:   Past Medical History:   Diagnosis Date    Abnormal Pap smear 2006, 2007,     ASC-H, ASCUS + HPV 45    Calculus of kidney 01/01/2002    Cervical high risk HPV (human papillomavirus) test positive     + HPV 45 (high risk)    History of colposcopy with cervical biopsy 2/9/06, 3/15/07    koilocytosis 2007        PAST SURGICAL HISTORY:   Past Surgical History:   Procedure Laterality Date    ANESTHESIA OUT OF OR MRI N/A 4/26/2024    Procedure: MRI OF Brain,Cervical and Thoracic Spine;   Surgeon: GENERIC ANESTHESIA PROVIDER;  Location: [facility]    CHOLECYSTECTOMY, LAPOROSCOPIC  5/11/2007    Cholecystectomy, Laparoscopic    COLONOSCOPY  05/17/10    COLONOSCOPY N/A 8/27/2019    Procedure: COLONOSCOPY;  Surgeon: Paramjit Kingston DO;  Location: [facility]    COLONOSCOPY N/A 10/23/2020    Procedure: COLONOSCOPY, WITH  BIOPSY;  Surgeon: Ba Dickinson MD;  [facility]: [facility]    CONIZATION  7/1/2011    Procedure:CONIZATION; cold knife and punch biopsy of mole on back; Surgeon:SYDNEY FORD; Location:[facility]    DILATION AND CURETTAGE, HYSTEROSCOPY, LAPAROSCOPY, COMBINED Right 9/24/2015    Procedure: COMBINED DILATION AND CURETTAGE, HYSTEROSCOPY, LAPAROSCOPY;  Surgeon: Sydney Ford MD;  [facility]    DISCECTOMY LUMBAR MINIMALLY INVASIVE ONE LEVEL Left 1/23/2019    Procedure: Minimally Invasive Surgery Left Lumbar 5-Sacral 1 microdiscectomy;  Surgeon: Mason Roe MD;  [facility]    DISCECTOMY LUMBAR POSTERIOR MICROSCOPIC ONE LEVEL Left 4/5/2023    Procedure: Minimally Invasive Left Lumbar 5 to Sacral 1 evacuation of recurrent disc herniation;  Surgeon: Mason Roe MD;  [facility]    ESOPHAGOSCOPY, GASTROSCOPY, DUODENOSCOPY (EGD), COMBINED  5/21/2014    Procedure: COMBINED ESOPHAGOSCOPY, GASTROSCOPY, DUODENOSCOPY (EGD), BIOPSY SINGLE OR MULTIPLE;  Surgeon: Earl Lane MD;  [facility]    ESOPHAGOSCOPY, GASTROSCOPY, DUODENOSCOPY (EGD), COMBINED N/A 10/23/2020    Procedure: Esophagogastroduodenoscopy with  biopsy;  Surgeon: Ba Dickinson MD;  [facility]    EXCISE LESION TRUNK  7/1/2011    Procedure:EXCISE LESION TRUNK; Surgeon:SYDNEY FORD; [facility]    FORAMINOTOMY LUMBAR POSTERIOR ONE LEVEL Left 4/5/2023    Procedure: left foraminotomy Lumbar 5 to Sacral 1;  Surgeon: Mason Roe MD;  [facility]    HC FRAGMENTING OF KIDNEY STONE  11/30/2005    HC RX ECTOP PREG BY SCOPE,RMV TUBE/OVRY  12/20/2007    Right  sided salpingectomy and removal of ruptured ectopic pregnancy. D&C.    HYSTERECTOMY VAGINAL      LAPAROSCOPIC APPENDECTOMY N/A 9/24/2015    Procedure: LAPAROSCOPIC APPENDECTOMY;  Surgeon: James Cuellar MD;  [facility]    LAPAROSCOPIC CYSTECTOMY OVARIAN (BENIGN) N/A 9/24/2015    Procedure: LAPAROSCOPIC CYSTECTOMY OVARIAN (BENIGN);  Surgeon: Greg Ford MD;  [facility]    LAPAROSCOPIC HYSTERECTOMY TOTAL, BILATERAL SALPINGO-OOPHORECTOMY, COMBINED N/A 7/28/2020    Procedure: laparoscpic assisted vaginal hysterectomy, cystoscopy;  Surgeon: Greg Ford MD;  Location: [facility]    LAPAROSCOPIC OOPHORECTOMY Right 9/24/2015    Procedure: LAPAROSCOPIC OOPHORECTOMY;  Surgeon: Greg Ford MD;  [facility]    LITHOTRIPSY  01/17/2007    PELVIS LAPAROSCOPY,DX  10/16/2000    Diagnostic laparoscopy, Endometrial biopsy.    TUBAL/ECTOPIC PREGNANCY  01/23/2007    Lap removal of left ruptured ectopic pregnancy & salpingectomy, D&C    [facility] REMOVAL OF KIDNEY STONE      two surgeries, 2002,2003    [facility]  [facility]        MEDICATIONS:   Current Outpatient Medications:     blood glucose (NO BRAND SPECIFIED) test strip, Use to test blood sugar 3 times daily or as directed. To accompany: Blood Glucose Monitor Brands: per insurance., Disp: 100 strip, Rfl: 6    blood glucose monitoring (NO BRAND SPECIFIED) meter device kit, Use to test blood sugar 3 times daily or as directed. Preferred blood glucose meter OR supplies to accompany: Blood Glucose Monitor Brands: per insurance., Disp: 1 kit, Rfl: 0    empagliflozin (JARDIANCE) 10 MG TABS tablet, TAKE ONE TABLET (10 MG) BY MOUTH DAILY, Disp: 90 tablet, Rfl: 1    metFORMIN (GLUCOPHAGE XR) 500 MG 24 hr tablet, Take 2 tablets (1,000 mg) by mouth 2 times daily (with meals), Disp: 360 tablet, Rfl: 1    nystatin (MYCOSTATIN) 018717 UNIT/GM external powder, Apply topically 2 times daily as needed, Disp: 60 g, Rfl:  2    omeprazole (PRILOSEC) 20 MG DR capsule, TAKE ONE CAPSULE BY MOUTH ONCE DAILY, 30 TO 60 MINUTES BEFORE A MEAL. (Patient taking differently: Take 20 mg by mouth daily 30 TO 60 MINUTES BEFORE A MEAL.), Disp: 90 capsule, Rfl: 0    rOPINIRole (REQUIP) 0.5 MG tablet, Take 1-4 tablets (0.5-2 mg) by mouth 3 times daily, Disp: 90 tablet, Rfl: 1    STATIN NOT PRESCRIBED (INTENTIONAL), Please choose reason not prescribed from choices below., Disp: , Rfl:     thin (NO BRAND SPECIFIED) lancets, Use with lanceting device. To accompany: Blood Glucose Monitor Brands: per insurance., Disp: 100 each, Rfl: 6    tiZANidine (ZANAFLEX) 4 MG tablet, Take 1-2 tablets (4-8 mg) by mouth 3 times daily as needed for muscle spasms, Disp: 60 tablet, Rfl: 2     ALLERGIES:    Allergies   Allergen Reactions    Amoxicillin Hives    Emetrol      Anxiety, agitation,severe paranoia. Zofran, Reglan are some of them.  DRAMAMINE WITHOUT ISSUES      Indomethacin Nausea and Vomiting    Macrobid [Nitrofuran Derivatives] Hives    Meperidine Hcl     Metoclopramide Other (See Comments)     Acute paranoia, severe    Nitrofurantoin Hives    Ondansetron Other (See Comments)     Severe anxiety, agitation    Vancomycin Other (See Comments)     maria del rosario syndrome    Vancomycin Other (See Comments)     maria del rosario syndrome        SOCIAL HISTORY:   Social History     Socioeconomic History    Marital status:      Spouse name: Joseph    Number of children: 1    Years of education: 9    Highest education level: Not on file   Occupational History     Employer: NONE   Tobacco Use    Smoking status: Some Days     Current packs/day: 0.00     Types: Cigarettes    Smokeless tobacco: Never    Tobacco comments:     As of 10 /2014, pt has had a few cigs here and there   Vaping Use    Vaping status: Never Used   Substance and Sexual Activity    Alcohol use: Yes     Comment: Once or twice a year    Drug use: No    Sexual activity: Yes     Partners: Male     Birth  control/protection: Female Surgical   Other Topics Concern     Service No    Blood Transfusions No    Caffeine Concern Yes     Comment: Reports 1 can soda/week  Advised not more than 16 ounces caffeien/day.    Occupational Exposure No    Hobby Hazards No    Sleep Concern No    Stress Concern Yes     Comment: will discuss with     Weight Concern Yes     Comment: Eating disorder in childhood.  Hx. gestational diab.    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    Exercise No     Comment: Advised walking 30 min/day.    Bike Helmet No    Seat Belt Yes    Self-Exams Not Asked    Parent/sibling w/ CABG, MI or angioplasty before 65F 55M? Yes     Comment: Aunt  at 46 of heart attack   Social History Narrative     and lives at home with her  and daughter.     Social Determinants of Health     Financial Resource Strain: Low Risk  (12/10/2023)    Financial Resource Strain     Within the past 12 months, have you or your family members you live with been unable to get utilities (heat, electricity) when it was really needed?: No   Food Insecurity: Unknown (12/10/2023)    Food Insecurity     Within the past 12 months, did you worry that your food would run out before you got money to buy more?: Patient refused     Within the past 12 months, did the food you bought just not last and you didn t have money to get more?: Patient refused   Transportation Needs: Unknown (12/10/2023)    Transportation Needs     Within the past 12 months, has lack of transportation kept you from medical appointments, getting your medicines, non-medical meetings or appointments, work, or from getting things that you need?: Patient refused   Physical Activity: Not on file   Stress: Not on file   Social Connections: Not on file   Interpersonal Safety: Low Risk  (4/10/2024)    Interpersonal Safety     Do you feel physically and emotionally safe where you currently live?: Yes  "    Within the past 12 months, have you been hit, slapped, kicked or otherwise physically hurt by someone?: No     Within the past 12 months, have you been humiliated or emotionally abused in other ways by your partner or ex-partner?: No   Housing Stability: Low Risk  (12/10/2023)    Housing Stability     Do you have housing? : Yes     Are you worried about losing your housing?: No        FAMILY HISTORY:   Family History   Problem Relation Age of Onset    Diabetes Maternal Grandmother         adult onset    Anesthesia Reaction Maternal Grandmother         can't tolerate Novacaine.    Respiratory Maternal Grandmother         COPD and emphysema.    Thyroid Disease Maternal Grandmother     Heart Disease Maternal Grandmother         CHF    Diabetes Paternal Grandfather         adult o nset    Hypertension Paternal Grandfather     Cerebrovascular Disease Paternal Grandfather     Heart Disease Paternal Grandfather         MI, replaced valve,angioplasty    Thyroid Disease Paternal Grandfather     Cancer Maternal Grandfather         skin cancer    Heart Disease Maternal Grandfather         MI    Obesity Mother         on thyroid medication    Thyroid Disease Mother     Diabetes Mother     Rheumatologic Disease Mother     Heart Disease Father     Hypertension Father         and TIA    Lipids Father     Breast Cancer Paternal Grandmother     Arrhythmia Other     Cancer Maternal Aunt         breast/brain cancer    Depression Paternal Aunt     Cerebrovascular Disease Paternal Uncle     Cerebrovascular Disease Paternal Aunt      EXAM:Vitals: /76 (BP Location: Left arm, Patient Position: Sitting, Cuff Size: Adult Regular)   Ht 1.64 m (5' 4.57\")   Wt 87.1 kg (192 lb)   LMP 07/25/2020 (Exact Date)   BMI 32.38 kg/m    BMI= Body mass index is 32.38 kg/m .    General appearance: Patient is alert and fully cooperative with history & exam.  No sign of distress is noted during the visit.     Psychiatric: Affect is pleasant & " appropriate.  Patient appears motivated to improve health.     Respiratory: Breathing is regular & unlabored while sitting.     HEENT: Hearing is intact to spoken word.  Speech is clear.  No gross evidence of visual impairment that would impact ambulation.     Vascular: DP & PT pulses are intact & regular bilaterally.  No significant edema or varicosities noted.  CFT and skin temperature is normal to both lower extremities.     Neurologic: Lower extremity sensation is intact to light touch.  No evidence of weakness or contracture in the lower extremities.  No evidence of neuropathy.    Dermatologic: Skin is intact to both lower extremities with adequate texture, turgor and tone about the integument.  No paronychia or evidence of soft tissue infection is noted.     Musculoskeletal: Patient is ambulatory without assistive device or brace.  Mild forefoot valgus is noted.  Discomfort is noted with firm palpation about the distal aspect and proximal phalanx of the left fifth toe.  Minimal contracture is noted.  No pain is noted with firm palpation of the right fifth toe.  Protective threshold remains intact in and around the fifth ray.  This was tested with a 5.07 monofilament with a history of neuropathy.  Recent back surgery.  Also has pes valgus and is requesting new orthotics.    Hemoglobin A1C (%)   Date Value   12/22/2023 6.6 (H)   07/06/2023 7.2 (H)   03/20/2023 7.0 (H)   10/24/2022 7.1 (H)   07/19/2022 7.6 (H)   05/25/2022 8.7 (H)   09/28/2021 7.7 (H)   01/26/2021 7.2 (H)   07/27/2020 8.0 (H)   03/05/2020 7.8 (H)   11/06/2019 7.6 (H)   08/01/2019 8.4 (H)   03/16/2019 8.7 (H)   10/16/2018 7.3 (H)   05/21/2018 8.2 (H)     Creatinine (mg/dL)   Date Value   04/11/2024 0.75   01/19/2024 0.81   12/22/2023 0.76   03/20/2023 0.75   11/08/2022 0.65   10/09/2022 0.72   01/26/2021 0.70   09/09/2020 0.80   08/11/2020 0.81   07/27/2020 0.76   06/13/2020 0.79   03/05/2020 0.76     ASSESSMENT:       ICD-10-CM    1. Pes valgus   Q66.6 Orthotics, Mastectomy and Custom Compression Orders      2. Type 2 diabetes mellitus with diabetic autonomic neuropathy, without long-term current use of insulin (H)  E11.43 Orthotics, Mastectomy and Custom Compression Orders          PLAN:  Reviewed patient's chart in [facility].      5/8/2023   Would recommend that she stays in the postoperative shoe for all weightbearing activity for as long as 4-6 weeks or until no pain or edema is noted in her left fifth toe.  Recommend buddy wrapping and demonstrating how to do this with the fourth and fifth toes  May follow-up at 6 weeks post injury if still symptomatic otherwise as needed    Also placed order for custom molded orthotics at her request secondary to pes valgus  Recommend follow-up once yearly for diabetic foot exam.     5/20/2024  Has custom orthotics but not with her today and does not wear them much and they ore more than 5 years old.   Is wearing slip on sandals today  So order for new orthotics and encourage sturdy  shoes with good support and good cushion which are not the same thing.   Is using requip and not helping much.   She is falling and going to neurology and now starting PT.          Valentino Toribio DPM

## 2024-05-20 NOTE — LETTER
5/20/2024         RE: Fallon Rivera  [address] [facility] [address]        Dear Colleague,    Thank you for referring your patient, Fallon Rivera, to the [facility]. Please see a copy of my visit note below.    Chief Complaint   Patient presents with     Diabetes     Type 2     HPI:  Fallon Rivera is a 42 year old female who is seen in consultation at the request of self.    Pt presents for eval of:   (Onset, Location, L/R, Character, Treatments, Injury if yes)    XR Left toe 4/29/2023    DM type 2    Back surgery 4/5/2023     Onset 4/25/2023, stubbed Left 5th toe into bathroom door. Presents WB w/post op shoe and isai taping toe.     Homemaker.    ROS:  10 point ROS neg other than the symptoms noted above in the HPI.    Patient Active Problem List   Diagnosis     Hemorrhage of rectum and anus     Female infertility     Papanicolaou smear of cervix with atypical squamous cells cannot exclude high grade squamous intraepithelial lesion (ASC-H)     S/P conization of cervix- KAYLA 2/3 in 2011     Kidney stones     Dyspnea     CARDIOVASCULAR SCREENING; LDL GOAL LESS THAN 160     GERD (gastroesophageal reflux disease)     Hyperlipidemia LDL goal <130     Mild major depression (H)     Glucosuria     Type 2 diabetes mellitus with diabetic autonomic neuropathy, without long-term current use of insulin (H)     Non morbid obesity due to excess calories     Obesity (BMI 35.0-39.9) with comorbidity (H)     Endometrial intraepithelial neoplasia (EIN)     Endometrial hyperplasia, unspecified     Chronic diarrhea     Abnormal CT scan, small bowel     Dyspareunia, female     Left ovarian cyst     S/P lumbar microdiscectomy     Lumbar radiculopathy       PAST MEDICAL HISTORY:   Past Medical History:   Diagnosis Date     Abnormal Pap smear 2006, 2007,     ASC-H, ASCUS + HPV 45     Calculus of kidney 01/01/2002     Cervical high risk HPV (human papillomavirus) test positive     + HPV  45 (high risk)     History of colposcopy with cervical biopsy 2/9/06, 3/15/07    koilocytosis 2007        PAST SURGICAL HISTORY:   Past Surgical History:   Procedure Laterality Date     ANESTHESIA OUT OF OR MRI N/A 4/26/2024    Procedure: MRI OF Brain,Cervical and Thoracic Spine;  Surgeon: GENERIC ANESTHESIA PROVIDER;  Location: [facility]     CHOLECYSTECTOMY, LAPOROSCOPIC  5/11/2007    Cholecystectomy, Laparoscopic     COLONOSCOPY  05/17/10     COLONOSCOPY N/A 8/27/2019    Procedure: COLONOSCOPY;  Surgeon: Paramjit Kingston DO;  Location:  GI     COLONOSCOPY N/A 10/23/2020    Procedure: COLONOSCOPY, WITH  BIOPSY;  Surgeon: Ba Dickinson MD;  Location:  GI     CONIZATION  7/1/2011    Procedure:CONIZATION; cold knife and punch biopsy of mole on back; Surgeon:SYDNEY FORD; [facility]     DILATION AND CURETTAGE, HYSTEROSCOPY, LAPAROSCOPY, COMBINED Right 9/24/2015    Procedure: COMBINED DILATION AND CURETTAGE, HYSTEROSCOPY, LAPAROSCOPY;  Surgeon: Sydney Ford MD;  [facility]: [facility]     DISCECTOMY LUMBAR MINIMALLY INVASIVE ONE LEVEL Left 1/23/2019    Procedure: Minimally Invasive Surgery Left Lumbar 5-Sacral 1 microdiscectomy;  Surgeon: Mason Roe MD;  [facility]     DISCECTOMY LUMBAR POSTERIOR MICROSCOPIC ONE LEVEL Left 4/5/2023    Procedure: Minimally Invasive Left Lumbar 5 to Sacral 1 evacuation of recurrent disc herniation;  Surgeon: Mason Roe MD;  [facility]     ESOPHAGOSCOPY, GASTROSCOPY, DUODENOSCOPY (EGD), COMBINED  5/21/2014    Procedure: COMBINED ESOPHAGOSCOPY, GASTROSCOPY, DUODENOSCOPY (EGD), BIOPSY SINGLE OR MULTIPLE;  Surgeon: Earl Lane MD;  Location:  GI     ESOPHAGOSCOPY, GASTROSCOPY, DUODENOSCOPY (EGD), COMBINED N/A 10/23/2020    Procedure: Esophagogastroduodenoscopy with  biopsy;  Surgeon: Ba Dickinson MD;  Location:  GI     EXCISE LESION TRUNK  7/1/2011    Procedure:EXCISE LESION TRUNK; Surgeon:SYDNEY FORD  JERSON; [facility]     FORAMINOTOMY LUMBAR POSTERIOR ONE LEVEL Left 4/5/2023    Procedure: left foraminotomy Lumbar 5 to Sacral 1;  Surgeon: Mason Roe MD;  [facility]     HC FRAGMENTING OF KIDNEY STONE  11/30/2005     HC RX ECTOP PREG BY SCOPE,RMV TUBE/OVRY  12/20/2007    Right sided salpingectomy and removal of ruptured ectopic pregnancy. D&C.     HYSTERECTOMY VAGINAL       LAPAROSCOPIC APPENDECTOMY N/A 9/24/2015    Procedure: LAPAROSCOPIC APPENDECTOMY;  Surgeon: James Cuellar MD;  [facility]     LAPAROSCOPIC CYSTECTOMY OVARIAN (BENIGN) N/A 9/24/2015    Procedure: LAPAROSCOPIC CYSTECTOMY OVARIAN (BENIGN);  Surgeon: Greg Ford MD;  [facility]     LAPAROSCOPIC HYSTERECTOMY TOTAL, BILATERAL SALPINGO-OOPHORECTOMY, COMBINED N/A 7/28/2020    Procedure: laparoscpic assisted vaginal hysterectomy, cystoscopy;  Surgeon: Greg Ford MD;  Location: [facility]     LAPAROSCOPIC OOPHORECTOMY Right 9/24/2015    Procedure: LAPAROSCOPIC OOPHORECTOMY;  Surgeon: Greg Ford MD;  [facility]     LITHOTRIPSY  01/17/2007     PELVIS LAPAROSCOPY,DX  10/16/2000    Diagnostic laparoscopy, Endometrial biopsy.     TUBAL/ECTOPIC PREGNANCY  01/23/2007    Lap removal of left ruptured ectopic pregnancy & salpingectomy, D&C     ZZC REMOVAL OF KIDNEY STONE      two surgeries, 2002,2003     ZZ UGI ENDOSCOPY, SIMPLE EXAM  09/01/09        MEDICATIONS:   Current Outpatient Medications:      blood glucose (NO BRAND SPECIFIED) test strip, Use to test blood sugar 3 times daily or as directed. To accompany: Blood Glucose Monitor Brands: per insurance., Disp: 100 strip, Rfl: 6     blood glucose monitoring (NO BRAND SPECIFIED) meter device kit, Use to test blood sugar 3 times daily or as directed. Preferred blood glucose meter OR supplies to accompany: Blood Glucose Monitor Brands: per insurance., Disp: 1 kit, Rfl: 0     empagliflozin (JARDIANCE) 10 MG TABS tablet, TAKE ONE  TABLET (10 MG) BY MOUTH DAILY, Disp: 90 tablet, Rfl: 1     metFORMIN (GLUCOPHAGE XR) 500 MG 24 hr tablet, Take 2 tablets (1,000 mg) by mouth 2 times daily (with meals), Disp: 360 tablet, Rfl: 1     nystatin (MYCOSTATIN) 003299 UNIT/GM external powder, Apply topically 2 times daily as needed, Disp: 60 g, Rfl: 2     omeprazole (PRILOSEC) 20 MG DR capsule, TAKE ONE CAPSULE BY MOUTH ONCE DAILY, 30 TO 60 MINUTES BEFORE A MEAL. (Patient taking differently: Take 20 mg by mouth daily 30 TO 60 MINUTES BEFORE A MEAL.), Disp: 90 capsule, Rfl: 0     rOPINIRole (REQUIP) 0.5 MG tablet, Take 1-4 tablets (0.5-2 mg) by mouth 3 times daily, Disp: 90 tablet, Rfl: 1     STATIN NOT PRESCRIBED (INTENTIONAL), Please choose reason not prescribed from choices below., Disp: , Rfl:      thin (NO BRAND SPECIFIED) lancets, Use with lanceting device. To accompany: Blood Glucose Monitor Brands: per insurance., Disp: 100 each, Rfl: 6     tiZANidine (ZANAFLEX) 4 MG tablet, Take 1-2 tablets (4-8 mg) by mouth 3 times daily as needed for muscle spasms, Disp: 60 tablet, Rfl: 2     ALLERGIES:    Allergies   Allergen Reactions     Amoxicillin Hives     Emetrol      Anxiety, agitation,severe paranoia. Zofran, Reglan are some of them.  DRAMAMINE WITHOUT ISSUES       Indomethacin Nausea and Vomiting     Macrobid [Nitrofuran Derivatives] Hives     Meperidine Hcl      Metoclopramide Other (See Comments)     Acute paranoia, severe     Nitrofurantoin Hives     Ondansetron Other (See Comments)     Severe anxiety, agitation     Vancomycin Other (See Comments)     maria del rosario syndrome     Vancomycin Other (See Comments)     maria del rosario syndrome        SOCIAL HISTORY:   Social History     Socioeconomic History     Marital status:      Spouse name: Joseph     Number of children: 1     Years of education: 9     Highest education level: Not on file   Occupational History     Employer: NONE   Tobacco Use     Smoking status: Some Days     Current packs/day: 0.00     Types:  Cigarettes     Smokeless tobacco: Never     Tobacco comments:     As of 10 /2014, pt has had a few cigs here and there   Vaping Use     Vaping status: Never Used   Substance and Sexual Activity     Alcohol use: Yes     Comment: Once or twice a year     Drug use: No     Sexual activity: Yes     Partners: Male     Birth control/protection: Female Surgical   Other Topics Concern      Service No     Blood Transfusions No     Caffeine Concern Yes     Comment: Reports 1 can soda/week  Advised not more than 16 ounces caffeien/day.     Occupational Exposure No     Hobby Hazards No     Sleep Concern No     Stress Concern Yes     Comment: will discuss with      Weight Concern Yes     Comment: Eating disorder in childhood.  Hx. gestational diab.     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     Exercise No     Comment: Advised walking 30 min/day.     Bike Helmet No     Seat Belt Yes     Self-Exams Not Asked     Parent/sibling w/ CABG, MI or angioplasty before 65F 55M? Yes     Comment: Aunt  at 46 of heart attack   Social History Narrative     and lives at home with her  and daughter.     Social Determinants of Health     Financial Resource Strain: Low Risk  (12/10/2023)    Financial Resource Strain      Within the past 12 months, have you or your family members you live with been unable to get utilities (heat, electricity) when it was really needed?: No   Food Insecurity: Unknown (12/10/2023)    Food Insecurity      Within the past 12 months, did you worry that your food would run out before you got money to buy more?: Patient refused      Within the past 12 months, did the food you bought just not last and you didn t have money to get more?: Patient refused   Transportation Needs: Unknown (12/10/2023)    Transportation Needs      Within the past 12 months, has lack of transportation kept you from medical appointments, getting your  medicines, non-medical meetings or appointments, work, or from getting things that you need?: Patient refused   Physical Activity: Not on file   Stress: Not on file   Social Connections: Not on file   Interpersonal Safety: Low Risk  (4/10/2024)    Interpersonal Safety      Do you feel physically and emotionally safe where you currently live?: Yes      Within the past 12 months, have you been hit, slapped, kicked or otherwise physically hurt by someone?: No      Within the past 12 months, have you been humiliated or emotionally abused in other ways by your partner or ex-partner?: No   Housing Stability: Low Risk  (12/10/2023)    Housing Stability      Do you have housing? : Yes      Are you worried about losing your housing?: No        FAMILY HISTORY:   Family History   Problem Relation Age of Onset     Diabetes Maternal Grandmother         adult onset     Anesthesia Reaction Maternal Grandmother         can't tolerate Novacaine.     Respiratory Maternal Grandmother         COPD and emphysema.     Thyroid Disease Maternal Grandmother      Heart Disease Maternal Grandmother         CHF     Diabetes Paternal Grandfather         adult o nset     Hypertension Paternal Grandfather      Cerebrovascular Disease Paternal Grandfather      Heart Disease Paternal Grandfather         MI, replaced valve,angioplasty     Thyroid Disease Paternal Grandfather      Cancer Maternal Grandfather         skin cancer     Heart Disease Maternal Grandfather         MI     Obesity Mother         on thyroid medication     Thyroid Disease Mother      Diabetes Mother      Rheumatologic Disease Mother      Heart Disease Father      Hypertension Father         and TIA     Lipids Father      Breast Cancer Paternal Grandmother      Arrhythmia Other      Cancer Maternal Aunt         breast/brain cancer     Depression Paternal Aunt      Cerebrovascular Disease Paternal Uncle      Cerebrovascular Disease Paternal Aunt      EXAM:Vitals: /76 (BP  "Location: Left arm, Patient Position: Sitting, Cuff Size: Adult Regular)   Ht 1.64 m (5' 4.57\")   Wt 87.1 kg (192 lb)   LMP 07/25/2020 (Exact Date)   BMI 32.38 kg/m    BMI= Body mass index is 32.38 kg/m .    General appearance: Patient is alert and fully cooperative with history & exam.  No sign of distress is noted during the visit.     Psychiatric: Affect is pleasant & appropriate.  Patient appears motivated to improve health.     Respiratory: Breathing is regular & unlabored while sitting.     HEENT: Hearing is intact to spoken word.  Speech is clear.  No gross evidence of visual impairment that would impact ambulation.     Vascular: DP & PT pulses are intact & regular bilaterally.  No significant edema or varicosities noted.  CFT and skin temperature is normal to both lower extremities.     Neurologic: Lower extremity sensation is intact to light touch.  No evidence of weakness or contracture in the lower extremities.  No evidence of neuropathy.    Dermatologic: Skin is intact to both lower extremities with adequate texture, turgor and tone about the integument.  No paronychia or evidence of soft tissue infection is noted.     Musculoskeletal: Patient is ambulatory without assistive device or brace.  Mild forefoot valgus is noted.  Discomfort is noted with firm palpation about the distal aspect and proximal phalanx of the left fifth toe.  Minimal contracture is noted.  No pain is noted with firm palpation of the right fifth toe.  Protective threshold remains intact in and around the fifth ray.  This was tested with a 5.07 monofilament with a history of neuropathy.  Recent back surgery.  Also has pes valgus and is requesting new orthotics.    Hemoglobin A1C (%)   Date Value   12/22/2023 6.6 (H)   07/06/2023 7.2 (H)   03/20/2023 7.0 (H)   10/24/2022 7.1 (H)   07/19/2022 7.6 (H)   05/25/2022 8.7 (H)   09/28/2021 7.7 (H)   01/26/2021 7.2 (H)   07/27/2020 8.0 (H)   03/05/2020 7.8 (H)   11/06/2019 7.6 (H) "   08/01/2019 8.4 (H)   03/16/2019 8.7 (H)   10/16/2018 7.3 (H)   05/21/2018 8.2 (H)     Creatinine (mg/dL)   Date Value   04/11/2024 0.75   01/19/2024 0.81   12/22/2023 0.76   03/20/2023 0.75   11/08/2022 0.65   10/09/2022 0.72   01/26/2021 0.70   09/09/2020 0.80   08/11/2020 0.81   07/27/2020 0.76   06/13/2020 0.79   03/05/2020 0.76     ASSESSMENT:       ICD-10-CM    1. Pes valgus  Q66.6 Orthotics, Mastectomy and Custom Compression Orders      2. Type 2 diabetes mellitus with diabetic autonomic neuropathy, without long-term current use of insulin (H)  E11.43 Orthotics, Mastectomy and Custom Compression Orders          PLAN:  Reviewed patient's chart in epic.      5/8/2023   Would recommend that she stays in the postoperative shoe for all weightbearing activity for as long as 4-6 weeks or until no pain or edema is noted in her left fifth toe.  Recommend buddy wrapping and demonstrating how to do this with the fourth and fifth toes  May follow-up at 6 weeks post injury if still symptomatic otherwise as needed    Also placed order for custom molded orthotics at her request secondary to pes valgus  Recommend follow-up once yearly for diabetic foot exam.     5/20/2024  Has custom orthotics but not with her today and does not wear them much and they ore more than 5 years old.   Is wearing slip on sandals today  So order for new orthotics and encourage sturdy  shoes with good support and good cushion which are not the same thing.   Is using requip and not helping much.   She is falling and going to neurology and now starting PT.          Valentino Toribio DPM              Again, thank you for allowing me to participate in the care of your patient.        Sincerely,        Valentino Toribio DPM

## 2024-05-20 NOTE — PATIENT INSTRUCTIONS
"DIABETES AND YOUR FEET    What effect does diabetes have on the feet?  Diabetes can result in several problems in the feet including contractures of the tendons leading to deformities and reduced function of the bones, skin ulcers or open sores on pressure points or prominent deformities, reduced sensation, reduced blood flow and thus reduced oxygen and immune cells to the tiny vessels in our feet. This all leads to higher risk of hospitalization, infections, and amputations.     What is neuropathy?  Neuropathy is a term used to describe a loss of nerve function.  Patients with diabetes are at risk of developing neuropathy if their sugars continue to run high and are above the normal value of 140.  The elevated blood sugar in the body enters the nerves causing it to swell and impair nerve function.  The higher the blood sugar and the longer it is elevated, the more damage is done to nerves.  This damage is permanent and irreversible.  These damaged sensory nerves can then cause reduced feeling or cause pain.  Damaged motor nerves can reduce blood flow and white blood cells into into your foot, skin and bones reducing your ability to heal a small problem. And neuropathy can cause tendons to become unbalanced and contribute to the formation of deformity and contractures in our feet. Often times, neuropathy can be prevented by controlling your blood sugar.  Your risk of developing neuropathy goes up dramatically as your hemoglobin A1C raises above 7.5.      How do I know if I have neuropathy?  When a person develops neuropathy, they usually begin to feel numbness or tingling in their feet and sometimes in their legs.  Other symptoms may include painful burning or hot feet, tingling, electrical sensations or feeling like insects or ants are crawling on your feet or legs.  If blood sugar remains above 140  for long periods of time, neuropathy can also occur in the hands.  When a person loses their \"protective threshold\" " or ability to detect a 5.[address] [place] [address] monofilament is when they have elevated risk for developing foot deformity, contractures, foot infections, amputations, Charcot arthropathy, or other complications. Keep your hemoglobin A1C below 7.5 to reduce this risk.    What is vascular disease?  Peripheral vascular disease is a term used to describe a loss or decrease in circulation (blood flow).  There is a problem in getting blood, immune cells, and oxygen to areas that need it.  Similar to neuropathy, sugars can build up in the walls of the arteries (blood vessels) and cause them to become swollen, thickened and hardened.  This decreases the amount of blood that can go to an area that needs it.  Though this is common in the legs of diabetic patients, it can also affect other arteries (blood vessels) in the body such as in the heart, kidney, eyes, and the blood flow into bones.  It is often seen first in the small vessels of her body notably our feet and toes.    How do I know if I have vascular disease?  In the legs, vascular disease usually results in cramping.  Patients who develop leg cramps after walking the same distance every time (i.e. One block, half a mile, ect.) need to let their doctors know so that their circulation may be checked.  Cramps causing severe pain in the feet and/or legs while sleeping and the cramps go away when you stand or hang your legs off the side of the bed, may also be a sign of poor blood circulation.  Occasional cramping in cold weather or on rare occasions with activity may not be due to poor circulation, but you should inform your doctor.    How can these problems be prevented?  The key to prevention is good blood sugar control all day every day.  Inadequate blood sugar control is the most common way patients experience these problems. Reducing, controlling and measuring your daily consumption of sugar or carbohydrates is essential to understanding and managing diabetes.   Physical activity (exercise) is a very good way to help decrease your blood sugars.  Exercise can lower your blood sugar, blood pressure, and cholesterol.  It also reduces your risk for heart disease and stroke, relieves stress, and strengthens your heart, muscles and bones. Physical activity also increases your balance and reduces development of contractures and foot deformities over time. In addition, regular activity helps insulin work better, improves your blood circulation, and keeps your joints flexible.  If you're trying to lose weight, a combination of exercise and wise food choices can help you reach your target weight and maintain it.  Activity and exercise alone can not make up for poor diet choices, eating too much, or eating too many sugars or carbohydrates.  Ask your doctor for help when you are not meeting your blood sugar goals. Changes or increases in medication are powerful tools in reducing your blood sugar.    Know your blood sugar and hemoglobin A1C trend.  Upon first diagnosis or during acute illness, checking your blood sugar 4 times a day can help you understand how your diet, activity, and lifestyle affect your blood sugar.  Monitoring your hemoglobin A1C can help you understand how well you are managing blood sugar over the long run.  Your hemoglobin A1C tells you what your blood sugar averages all day, every day, over the past 90 days.       To experience the lowest risk of complications associated with diabetes such as neuropathy, loss of blood flow, bone or joint infection, charcot arthropathy, or amputation, the American Diabetes Association recommends a target hemoglobin A1C of less than 7.0%, while the American Association of Clinical Endocrinologists' recommendation is 6.5% or less.  Both organizations advise that the goals be individualized based on patient factors such as other health conditions, history of hypoglycemia, education, and life expectancy.  A patients risk of  experiencing complications associated with diabetes is only slightly elevated with a hemoglobin A1C above 6.0.  However, this risk goes up exponentially when the hemoglobin A1C is above 7.5.  The longer the hemoglobin A1C is elevated, the more risk that patient will experience in their lifetime. The damage that occurs to nerves, blood vessels, tendons, bones and body organs, while their hemoglobin A1C is elevated is mostly irreversible and worsens with each additional time period of elevated hemoglobin A1C.     You must understand and manage your disease.  Your health insurance or medical team cannot manage this disease for you.  When you take responsibility for understanding and managing your disease, you can expect to experience fewer problems associated with diabetes in your lifetime.  You will  Also experience a higher quality of life and health and reduced cost of health care.    Diabetic Foot Care Recommendations  The following are recommendations for avoiding serious foot problems or injury    DO'S  1. Be aware of your hemoglobin A1C and continue to follow up with your medical team for adjustments in your lifestyle and medication until your reach your A1C goal.  Keep this below 7.5 to reduce your risk of developing complications associated with diabetes.    2.  Wash your feet with lukewarm water and a mild soap and then dry them thoroughly, especially between the toes.  Gently floss your towel or washcloth between each toe at every bath.  Soaking your feet in water cannot clean dead skin, debris, and bacteria from your feet and is not necessary.   3. Examine your feet daily looking for cuts, corns, blisters, cracks, ect..., especially after wearing new shoes or increased or changed activities.  Make sure to look between your toes.  If you cannot see the bottom of your feet, set a mirror on the floor and hold your foot over it, or ask a family member to examine your feet for you daily.  Contact your doctor  immediately if new problems are noted or if sores are not healing.  4.  Immediately apply moisturizer cream such as Cetaphil to the tops and bottoms of your feet, avoiding areas between the toes.  Apply sunscreen or cover your feet if they will be exposed to extended sunlight.  5.Use clean comfortable shoes.  Socks should not have thick seams or cut off the circulation around the leg.  Break in new shoes slowly and rotate with older shoes until broken in.  Check the inside of your shoes with your hand to look for areas of irritation or objects that may have fallen into your shoes.    6. Keep slippers by the side of your bed for use during the night.  7. Shoes should be fitted by a professional and should not cause areas of irritation.  Check your feet regularly when wearing a new pair of shoes and replace them as needed.  8.  Talk to your doctor about proper exercise.  Exercise and stretching stimulate blood flow to your feet and maintain proper glucose levels.  Use it or lose it!  9.  Monitor your blood glucose level and your hemoglobin A1C.  Notify your doctor immediately if your blood sugar is abnormally high or low.  10.  Cut your nails straight across, but then gently round any sharp edges with a nail file.  If you have neuropathy, peripheral vascular disease or cannot see that well to trim your own toenails, see a medical professional for care.    DONT'S  1.  Do not soak your feet if you have an open sore or your provider has informed you that you have neuropathy or loss of protective threshold.  Use only lukewarm water and always check the temperature with your hand as hot water can easily burn your feet.    2.  Never use a hot water bottle or heating pad on your feet.  Also do not apply hot or cold compresses to your feet.  With decreased sensation, you could burn or freeze your feet.  Do not rest your feet near a heat source such as a heater or heat register.    3.  Do not apply any of these to your  feet:    - over the counter medicine for corns or warts    -  Harsh chemicals like boric acid    -  Do not self-treat corns, cuts, blisters or infections.  Always consult your doctor.   4.  Do not wear sandals, slippers or walk barefoot, especially on harsh surfaces.  5.  If you smoke, stop!!!         Reliable shoe stores: To maximize your experience and provide the best possible fit.  Be sure to show them your foot concerns and tell them Dr. Toribio sent you.      Stores listed in bold have only athletic shoes, and stores that are not bold are mostly casual or variety of shoes    [employer]  [address] [place] [address]  [phone]     [employer] [facility]  [address] [place] [address]  [phone]    [facility] [facility]  [address] [place] [address]  [phone]    [employer] [facility]  [address] [facility] [address]  [phone]    [employer]  [address] [place] [address]  [phone]    [employer]  [address] [place] [address]  [phone]                         [employer] Locations:     [address] [place] [address]   [phone]     [address] [place] [address] [address] [place] [address]   [phone]     [address] [place] [address]   [phone]     [address] [place] [address]   [phone]     [address] [place] [address]   [phone]     [address] [facility] [place] [address]   [phone]     [address] [facility] [address]   [place] [address]   [phone]     [address]   [place], [place] [id number] [phone]             How to find reasonable shoes          The correct width    Correct Fitting    Correct Length      Foot Distortion    Posture Distortion                          Torsional Rigidity      Grasp behind the heel and underneath the foot and twist      Bad    Excessive torsion/twist in midfoot     Less torsion/twist  in midfoot is better                   Heel Counter Rigidity      Grasp just above   midsole and squeeze      Bad    Soft heel counter      Good    Rigid Heel Counter      Flexion Rigidity      Grasp shoe and bend from forefoot to rearfoot

## 2024-05-22 ENCOUNTER — THERAPY VISIT (OUTPATIENT)
Dept: PHYSICAL THERAPY | Facility: CLINIC | Age: 44
End: 2024-05-22
Attending: PHYSICIAN ASSISTANT
Payer: COMMERCIAL

## 2024-05-22 DIAGNOSIS — G37.9 DEMYELINATING DISEASE (H): ICD-10-CM

## 2024-05-22 PROCEDURE — 97161 PT EVAL LOW COMPLEX 20 MIN: CPT | Mod: GP

## 2024-05-22 PROCEDURE — 97110 THERAPEUTIC EXERCISES: CPT | Mod: GP

## 2024-05-22 NOTE — PROGRESS NOTES
PHYSICAL THERAPY EVALUATION  Type of Visit: Evaluation    See electronic medical record for Abuse and Falls Screening details.    Subjective          Patient is a 44 year old female presenting to physical therapy with chronic onset bilateral lower extremity weakness. Patient has had advanced imaging with MRI and CT demonstrating lumbar degenerative change, reports neurology team states her brain hasn't changed enough to give her a specific MS diagnosis. Patient reports diffuse BUE numbness and BLE muscle fasciculations chronically. Patient reports wishing to participate in physical therapy services for improved BLE strengthening and endurance for improved performance with going for longer walks. Patient will benefit from skilled physical therapy services and has sufficient social support.     Presenting condition or subjective complaint: Weakness in legs with falls and stumbling    Date of onset: 05/10/24 (Date of order. Chronic Onset over past 6-7 years.)      Relevant medical history:   Type II diabetes, dyspareunia, lumbar radiculopathy, dyspnea, GERD, obesity, hyperlipidemia, left ovarian cyst, kidney stones, endometrial intraepithelial neoplasia, endometrial hyperplasia, depression, Papanicolaou smear of cervix with atypical squamous cells cannot exclude high grade squamous intraepithelial lesion (ASC-H) , s/p conization of cervix, cardiovascular screening with LDL goal less than 160, glucosuria, abnormal small bowel CT scan, s/p lumbar microdiscectomy.     Dates & types of surgery: Two lower back surgery, appendix taken out hysterectomy gallbladder surgery    Past Surgical History:   Procedure Laterality Date    ANESTHESIA OUT OF OR MRI N/A 4/26/2024    Procedure: MRI OF Brain,Cervical and Thoracic Spine;  Surgeon: GENERIC ANESTHESIA PROVIDER;  Location: [facility]    CHOLECYSTECTOMY, LAPOROSCOPIC  5/11/2007    Cholecystectomy, Laparoscopic    COLONOSCOPY  05/17/10    COLONOSCOPY N/A 8/27/2019    Procedure:  COLONOSCOPY;  Surgeon: Paramjit Kingston DO;  Location: [facility]    COLONOSCOPY N/A 10/23/2020    Procedure: COLONOSCOPY, WITH  BIOPSY;  Surgeon: Ba Dickinson MD;  Location: [facility]    CONIZATION  7/1/2011    Procedure:CONIZATION; cold knife and punch biopsy of mole on back; Surgeon:SYDNEY FORD; Location:[facility]    DILATION AND CURETTAGE, HYSTEROSCOPY, LAPAROSCOPY, COMBINED Right 9/24/2015    Procedure: COMBINED DILATION AND CURETTAGE, HYSTEROSCOPY, LAPAROSCOPY;  Surgeon: Sydney Ford MD;  [facility]    DISCECTOMY LUMBAR MINIMALLY INVASIVE ONE LEVEL Left 1/23/2019    Procedure: Minimally Invasive Surgery Left Lumbar 5-Sacral 1 microdiscectomy;  Surgeon: Mason Roe MD;  [facility]    DISCECTOMY LUMBAR POSTERIOR MICROSCOPIC ONE LEVEL Left 4/5/2023    Procedure: Minimally Invasive Left Lumbar 5 to Sacral 1 evacuation of recurrent disc herniation;  Surgeon: Mason Roe MD;  [facility]    ESOPHAGOSCOPY, GASTROSCOPY, DUODENOSCOPY (EGD), COMBINED  5/21/2014    Procedure: COMBINED ESOPHAGOSCOPY, GASTROSCOPY, DUODENOSCOPY (EGD), BIOPSY SINGLE OR MULTIPLE;  Surgeon: Earl Lane MD;  [facility]    ESOPHAGOSCOPY, GASTROSCOPY, DUODENOSCOPY (EGD), COMBINED N/A 10/23/2020    Procedure: Esophagogastroduodenoscopy with  biopsy;  Surgeon: Ba Dickinson MD;  [facility]: [facility]    EXCISE LESION TRUNK  7/1/2011    Procedure:EXCISE LESION TRUNK; Surgeon:SYDNEY FORD; Location:[facility]    FORAMINOTOMY LUMBAR POSTERIOR ONE LEVEL Left 4/5/2023    Procedure: left foraminotomy Lumbar 5 to Sacral 1;  Surgeon: Mason Roe MD;  [facility]    HC FRAGMENTING OF KIDNEY STONE  11/30/2005    HC RX ECTOP PREG BY SCOPE,RMV TUBE/OVRY  12/20/2007    Right sided salpingectomy and removal of ruptured ectopic pregnancy. D&C.    HYSTERECTOMY VAGINAL      LAPAROSCOPIC APPENDECTOMY N/A 9/24/2015    Procedure: LAPAROSCOPIC APPENDECTOMY;  Surgeon: Alisa  James Lara MD;  [facility]    LAPAROSCOPIC CYSTECTOMY OVARIAN (BENIGN) N/A 9/24/2015    Procedure: LAPAROSCOPIC CYSTECTOMY OVARIAN (BENIGN);  Surgeon: Greg Ford MD;  [facility]    LAPAROSCOPIC HYSTERECTOMY TOTAL, BILATERAL SALPINGO-OOPHORECTOMY, COMBINED N/A 7/28/2020    Procedure: laparoscpic assisted vaginal hysterectomy, cystoscopy;  Surgeon: Greg Ford MD;  Location: [facility]    LAPAROSCOPIC OOPHORECTOMY Right 9/24/2015    Procedure: LAPAROSCOPIC OOPHORECTOMY;  Surgeon: Greg Ford MD;  [facility]    LITHOTRIPSY  01/17/2007    PELVIS LAPAROSCOPY,DX  10/16/2000    Diagnostic laparoscopy, Endometrial biopsy.    TUBAL/ECTOPIC PREGNANCY  01/23/2007    Lap removal of left ruptured ectopic pregnancy & salpingectomy, D&C    ZZC REMOVAL OF KIDNEY STONE      two surgeries, 2002,2003    ZZHC UGI ENDOSCOPY, SIMPLE EXAM  09/01/09        Prior diagnostic imaging/testing results: MRI       MRI Cervical Spine:  IMPRESSION:   1. Degenerative change of the cervical spine as detailed above.   2. No high-grade spinal canal stenosis at any level of the cervical   spine. However, there are posterior disc herniations at the C4-C5 and   C5-C6 levels that mildly indent the anterior aspect of the cervical   spinal cord.   3. No high-grade neural foraminal stenosis at any level of the   cervical spine.     MRI Thoracic Spine:  IMPRESSION: Mild degenerative change of the thoracic spine. No spinal  canal or neural foraminal stenosis. No evidence for abnormality of the  thoracic spinal cord.     MRI Brain:  IMPRESSION: Nonspecific cerebral white matter changes with   differential as above. Otherwise, normal brain MRI. No evidence for   acute intracranial pathology.     CT Lumbar Spine:  IMPRESSION: Five lumbar type vertebrae. Alignment remains normal.  Vertebral body heights normal. No fractures. Loss of disc space height  and degenerative endplate spurring at L4-L5. Facet  arthropathy at  L4-L5 and L5-S1. No high-grade spinal canal stenosis of the lumbar  spine. Moderate degenerative neural foraminal stenosis on the left at  L5-S1 again noted.    Prior therapy history for the same diagnosis, illness or injury: No      Prior Level of Function  Transfers: Independent  Ambulation: Independent  ADL: Independent  IADL: Childcare, Driving, Finances, Housekeeping, Laundry, Meal preparation, Medication management, Work    Living Environment  Social support: With family members   Type of home: House   Stairs to enter the home: Yes 2 Is there a railing: No   Ramp: No   Stairs inside the home: Yes 20 Is there a railing: Yes   Help at home: None  Equipment owned:  None listed.     Employment: No    Hobbies/Interests: Hawk, hiking, photography    Patient goals for therapy: Be able to have strength in my legs with less falls    Pain assessment: Pain present  Location: Diffuse in calves/Rating: Numeric Value Not given.  See objective evaluation for additional pain details     Objective      Cognitive Status Examination  Orientation: Oriented to person, place and time   Level of Consciousness: Alert  Follows Commands and Answers Questions: 100% of the time  Personal Safety and Judgement: Intact  Memory: Intact    OBSERVATION: Patient in no acute distress throughout today's session.   INTEGUMENTARY: Intact  POSTURE: Sitting Posture: Rounded shoulders, Mild in nature  PALPATION: Patient is grossly non- TTP today.  RANGE OF MOTION:  Grossly WFL's for BLE's without pain exacerbation.   STRENGTH:   Pain: - none + mild ++ moderate +++ severe  Strength Scale: 0-5/5 Left Right   Hip Flexion 4, - (none) 4, - (none)   Hip Extension     Hip Abduction 4, - (none) 4, - (none)   Hip Adduction 4-, - (none) 4-, - (none)   Hip Internal Rotation     Hip External Rotation     Knee Flexion 4-, - (none) 4-, - (none)   Knee Extension 4, - (none) 4, - (none)     - BLE ankle MMT's are grossly 4/5 in sitting without pain.  "    BED MOBILITY: WNL, Independent    TRANSFERS: WNL, Independent    WHEELCHAIR MOBILITY: Not applicable, patient in ambulatory.     GAIT:   Level of [place]: WNL, SBA, SBA for longer distance due to BLE muscular fatigue and unsteadiness.  Assistive Device(s): None  Gait Deviations: Kandis decreased; Worsening BLE toe and ankle clearance, as well as hip flexion height in swing phase with increased muscular fatigue after walking further distances.   Gait Distance: 50 feet  Stairs: Not assessed today.     BALANCE: Sitting Balance (static):Normal  Sitting Balance (dynamic):Normal  Sit to Stand Balance:Good  Standing Balance (static):Good  Standing Balance (dynamic):Fair    SPECIAL TESTS  6 Minute Walk Test 1175 feet without rest breaks   Semi-Tandem Stance 30 seconds with each foot forward; SBA due to BLE instability   Single Leg Stance Right (sec) 10 seconds with SBA due to BLE instability   Single Leg Stance Left (sec) 10 seconds with SBA due to BLE instability   Romberg  (sec) 30 seconds with IND    30 Second Sit to Stand (reps/height) 7 reps in 25 seconds, patient reports not being able to complete full 30 seconds today due to BLE muscular fatigue     SENSATION: WNL's for seated BUE and BLE light touch sensation testing during today's session.     REFLEXES: Hypertonic BLE patellar reflexes (3+/4 bilaterally in sitting today)  COORDINATION: No deficits indentified.  MUSCLE TONE: Hypertonic, LLE tone abnormal, RLE tone abnormal; Patient shows therapist video today of excessive and continuous left calf fasciculations in the afternoon of one of her reported \"bad days\", demonstrates mild left gastroc fasciculations after performance of 6MWT assessment during today's session as well.     Assessment & Plan   CLINICAL IMPRESSIONS  Medical Diagnosis: Demyelinating disease (H) (G37.9)    Treatment Diagnosis: Demyelinating disease (H) (G37.9)   Impression/Assessment: Patient is a 44 year old female with low back pain " and bilateral lower extremity weakness complaints.  The following significant findings have been identified: Pain, Decreased ROM/flexibility, Decreased joint mobility, Decreased strength, Impaired balance, Impaired sensation, Impaired gait, Impaired muscle performance, Decreased activity tolerance, Impaired posture, and Instability. These impairments interfere with their ability to perform work tasks, recreational activities, household chores, household mobility, and community mobility as compared to previous level of function.     Clinical Decision Making (Complexity):  Clinical Presentation: Stable/Uncomplicated  Clinical Presentation Rationale: based on medical and personal factors listed in PT evaluation  Clinical Decision Making (Complexity): Low complexity    PLAN OF CARE  Treatment Interventions:  Modalities: Cryotherapy, Hot Pack  Interventions: Gait Training, Manual Therapy, Neuromuscular Re-education, Therapeutic Activity, Therapeutic Exercise    Long Term Goals     PT Goal 1  Goal Identifier: Home Exercise Programs  Goal Description: Patient will demonstrate proper performance and good adherence to her HEP's for 10 weeks for 5 of 7 days per week to demonstrate improved long term independence with management of her bilateral lower extremity weakness.  Rationale: to maximize safety and independence with performance of ADLs and functional tasks;to maximize safety and independence within the home;to maximize safety and independence within the community  Goal Progress: Patient tolerated today's selected scapulothoracic stretching exercises without abnormal pain exacerbation during today's session.  Target Date: 07/31/24  PT Goal 2  Goal Identifier: 6 Minute Walk Test  Goal Description: Patient will ambulation 1500 feet or greater without rest breaks in order to demonstrate improved bilateral muscular endruance with her required and recreational ambulation demands.  Rationale: to maximize safety and  independence with performance of ADLs and functional tasks;to maximize safety and independence within the home;to maximize safety and independence within the community  Goal Progress: See initial evaluation for initial 6MWT assessment distance.  Target Date: 07/31/24  PT Goal 3  Goal Identifier: 30 Second Sit to Stand  Goal Description: Patient will improve her initial 30 second STS assessment by 5 reps or more in order to demonstrate improved bilateral muscular endurance with her required functional mobility demands with improved energy conservation for days when she experiences increased neurological symptoms in her legs.  Rationale: to maximize safety and independence with performance of ADLs and functional tasks;to maximize safety and independence within the home;to maximize safety and independence within the community  Goal Progress: 7 repetitions (05/22/2024)  Target Date: 07/31/24      Frequency of Treatment: 1 visit per week  Duration of Treatment: 10 weeks    Recommended Referrals to Other Professionals: Not at this time, in communication with neurology regarding disease course of potential multiple sclerosis diagnosis at this time.   Education Assessment:   Learner/Method: Patient;Family;Listening;Demonstration;No Barriers to Learning  Education Comments: Patient reports understanding of her future therapeutic progression.    Risks and benefits of evaluation/treatment have been explained.   Patient/Family/caregiver agrees with Plan of Care.     Evaluation Time:     PT Eval, Low Complexity Minutes (03317): 40     Signing Clinician:     Geovanni Macias PT, DPT    [facility] [place]  O: [phone]  E: [email or web address]

## 2024-05-31 ENCOUNTER — MYC REFILL (OUTPATIENT)
Dept: FAMILY MEDICINE | Facility: CLINIC | Age: 44
End: 2024-05-31
Payer: COMMERCIAL

## 2024-05-31 DIAGNOSIS — K21.9 GASTROESOPHAGEAL REFLUX DISEASE WITHOUT ESOPHAGITIS: ICD-10-CM

## 2024-06-04 DIAGNOSIS — E11.9 TYPE 2 DIABETES MELLITUS WITHOUT COMPLICATION, WITHOUT LONG-TERM CURRENT USE OF INSULIN (H): ICD-10-CM

## 2024-06-04 RX ORDER — METFORMIN HCL 500 MG
1000 TABLET, EXTENDED RELEASE 24 HR ORAL 2 TIMES DAILY WITH MEALS
Qty: 360 TABLET | Refills: 1 | Status: SHIPPED | OUTPATIENT
Start: 2024-06-04

## 2024-06-05 ENCOUNTER — THERAPY VISIT (OUTPATIENT)
Dept: PHYSICAL THERAPY | Facility: CLINIC | Age: 44
End: 2024-06-05
Attending: PHYSICIAN ASSISTANT
Payer: COMMERCIAL

## 2024-06-05 DIAGNOSIS — G37.9 DEMYELINATING DISEASE (H): Primary | ICD-10-CM

## 2024-06-05 PROCEDURE — 97112 NEUROMUSCULAR REEDUCATION: CPT | Mod: GP

## 2024-06-05 PROCEDURE — 97110 THERAPEUTIC EXERCISES: CPT | Mod: GP

## 2024-06-10 ENCOUNTER — THERAPY VISIT (OUTPATIENT)
Dept: PHYSICAL THERAPY | Facility: CLINIC | Age: 44
End: 2024-06-10
Attending: PHYSICIAN ASSISTANT
Payer: COMMERCIAL

## 2024-06-10 DIAGNOSIS — G37.9 DEMYELINATING DISEASE (H): Primary | ICD-10-CM

## 2024-06-10 PROCEDURE — 97110 THERAPEUTIC EXERCISES: CPT | Mod: GP

## 2024-06-24 ENCOUNTER — THERAPY VISIT (OUTPATIENT)
Dept: PHYSICAL THERAPY | Facility: CLINIC | Age: 44
End: 2024-06-24
Attending: PHYSICIAN ASSISTANT
Payer: COMMERCIAL

## 2024-06-24 DIAGNOSIS — G37.9 DEMYELINATING DISEASE (H): Primary | ICD-10-CM

## 2024-06-24 PROCEDURE — 97112 NEUROMUSCULAR REEDUCATION: CPT | Mod: GP

## 2024-06-24 PROCEDURE — 97110 THERAPEUTIC EXERCISES: CPT | Mod: GP

## 2024-07-09 ENCOUNTER — OFFICE VISIT (OUTPATIENT)
Dept: FAMILY MEDICINE | Facility: CLINIC | Age: 44
End: 2024-07-09
Payer: COMMERCIAL

## 2024-07-09 VITALS
TEMPERATURE: 97.5 F | HEIGHT: 65 IN | HEART RATE: 74 BPM | OXYGEN SATURATION: 99 % | BODY MASS INDEX: 32.36 KG/M2 | WEIGHT: 194.25 LBS | RESPIRATION RATE: 18 BRPM | DIASTOLIC BLOOD PRESSURE: 72 MMHG | SYSTOLIC BLOOD PRESSURE: 110 MMHG

## 2024-07-09 DIAGNOSIS — E11.9 TYPE 2 DIABETES MELLITUS WITHOUT COMPLICATION, WITHOUT LONG-TERM CURRENT USE OF INSULIN (H): ICD-10-CM

## 2024-07-09 DIAGNOSIS — E11.43 TYPE 2 DIABETES MELLITUS WITH DIABETIC AUTONOMIC NEUROPATHY, WITHOUT LONG-TERM CURRENT USE OF INSULIN (H): ICD-10-CM

## 2024-07-09 DIAGNOSIS — Z12.4 CERVICAL CANCER SCREENING: ICD-10-CM

## 2024-07-09 DIAGNOSIS — M54.16 LUMBAR RADICULOPATHY: ICD-10-CM

## 2024-07-09 DIAGNOSIS — R76.8 ANA POSITIVE: ICD-10-CM

## 2024-07-09 DIAGNOSIS — Z98.890 S/P LUMBAR MICRODISCECTOMY: ICD-10-CM

## 2024-07-09 DIAGNOSIS — G37.9 DEMYELINATING DISEASE (H): ICD-10-CM

## 2024-07-09 DIAGNOSIS — Z00.00 ROUTINE GENERAL MEDICAL EXAMINATION AT A HEALTH CARE FACILITY: Primary | ICD-10-CM

## 2024-07-09 DIAGNOSIS — K21.9 GASTROESOPHAGEAL REFLUX DISEASE WITHOUT ESOPHAGITIS: ICD-10-CM

## 2024-07-09 DIAGNOSIS — F33.0 MILD EPISODE OF RECURRENT MAJOR DEPRESSIVE DISORDER (H): ICD-10-CM

## 2024-07-09 DIAGNOSIS — R31.9 HEMATURIA, UNSPECIFIED TYPE: ICD-10-CM

## 2024-07-09 DIAGNOSIS — R23.2 HOT FLASHES: ICD-10-CM

## 2024-07-09 LAB
ALBUMIN UR-MCNC: NEGATIVE MG/DL
APPEARANCE UR: CLEAR
BILIRUB UR QL STRIP: NEGATIVE
CHOLEST SERPL-MCNC: 198 MG/DL
COLOR UR AUTO: YELLOW
CREAT UR-MCNC: 77.7 MG/DL
ESTRADIOL SERPL-MCNC: 24 PG/ML
FASTING STATUS PATIENT QL REPORTED: YES
FSH SERPL IRP2-ACNC: 11.2 MIU/ML
GLUCOSE UR STRIP-MCNC: >499 MG/DL
HBA1C MFR BLD: 6.5 %
HDLC SERPL-MCNC: 56 MG/DL
HGB UR QL STRIP: NEGATIVE
KETONES UR STRIP-MCNC: NEGATIVE MG/DL
LDLC SERPL CALC-MCNC: 118 MG/DL
LEUKOCYTE ESTERASE UR QL STRIP: NEGATIVE
MICROALBUMIN UR-MCNC: <12 MG/L
MICROALBUMIN/CREAT UR: NORMAL MG/G{CREAT}
NITRATE UR QL: NEGATIVE
NONHDLC SERPL-MCNC: 142 MG/DL
PH UR STRIP: 5 [PH] (ref 5–7)
PROGEST SERPL-MCNC: 0.3 NG/ML
SP GR UR STRIP: 1.03 (ref 1–1.03)
TRIGL SERPL-MCNC: 121 MG/DL
UROBILINOGEN UR STRIP-MCNC: NORMAL MG/DL

## 2024-07-09 PROCEDURE — 83036 HEMOGLOBIN GLYCOSYLATED A1C: CPT | Performed by: PHYSICIAN ASSISTANT

## 2024-07-09 PROCEDURE — 99207 PR FOOT EXAM NO CHARGE: CPT | Performed by: PHYSICIAN ASSISTANT

## 2024-07-09 PROCEDURE — 83001 ASSAY OF GONADOTROPIN (FSH): CPT | Performed by: PHYSICIAN ASSISTANT

## 2024-07-09 PROCEDURE — 36415 COLL VENOUS BLD VENIPUNCTURE: CPT | Performed by: PHYSICIAN ASSISTANT

## 2024-07-09 PROCEDURE — 82043 UR ALBUMIN QUANTITATIVE: CPT | Performed by: PHYSICIAN ASSISTANT

## 2024-07-09 PROCEDURE — 99396 PREV VISIT EST AGE 40-64: CPT | Performed by: PHYSICIAN ASSISTANT

## 2024-07-09 PROCEDURE — 82570 ASSAY OF URINE CREATININE: CPT | Performed by: PHYSICIAN ASSISTANT

## 2024-07-09 PROCEDURE — 86039 ANTINUCLEAR ANTIBODIES (ANA): CPT | Performed by: PHYSICIAN ASSISTANT

## 2024-07-09 PROCEDURE — G0145 SCR C/V CYTO,THINLAYER,RESCR: HCPCS | Performed by: PHYSICIAN ASSISTANT

## 2024-07-09 PROCEDURE — 96127 BRIEF EMOTIONAL/BEHAV ASSMT: CPT | Performed by: PHYSICIAN ASSISTANT

## 2024-07-09 PROCEDURE — 84144 ASSAY OF PROGESTERONE: CPT | Performed by: PHYSICIAN ASSISTANT

## 2024-07-09 PROCEDURE — 87624 HPV HI-RISK TYP POOLED RSLT: CPT | Performed by: PHYSICIAN ASSISTANT

## 2024-07-09 PROCEDURE — 82670 ASSAY OF TOTAL ESTRADIOL: CPT | Performed by: PHYSICIAN ASSISTANT

## 2024-07-09 PROCEDURE — 99214 OFFICE O/P EST MOD 30 MIN: CPT | Mod: 25 | Performed by: PHYSICIAN ASSISTANT

## 2024-07-09 PROCEDURE — 81003 URINALYSIS AUTO W/O SCOPE: CPT | Performed by: PHYSICIAN ASSISTANT

## 2024-07-09 PROCEDURE — 86038 ANTINUCLEAR ANTIBODIES: CPT | Performed by: PHYSICIAN ASSISTANT

## 2024-07-09 PROCEDURE — 80061 LIPID PANEL: CPT | Performed by: PHYSICIAN ASSISTANT

## 2024-07-09 RX ORDER — OXYCODONE HYDROCHLORIDE 5 MG/1
5 TABLET ORAL EVERY 6 HOURS PRN
Qty: 12 TABLET | Refills: 0 | Status: SHIPPED | OUTPATIENT
Start: 2024-07-09 | End: 2024-07-12

## 2024-07-09 SDOH — HEALTH STABILITY: PHYSICAL HEALTH: ON AVERAGE, HOW MANY DAYS PER WEEK DO YOU ENGAGE IN MODERATE TO STRENUOUS EXERCISE (LIKE A BRISK WALK)?: 3 DAYS

## 2024-07-09 SDOH — HEALTH STABILITY: PHYSICAL HEALTH: ON AVERAGE, HOW MANY MINUTES DO YOU ENGAGE IN EXERCISE AT THIS LEVEL?: 30 MIN

## 2024-07-09 ASSESSMENT — PAIN SCALES - GENERAL: PAINLEVEL: MODERATE PAIN (4)

## 2024-07-09 ASSESSMENT — SOCIAL DETERMINANTS OF HEALTH (SDOH): HOW OFTEN DO YOU GET TOGETHER WITH FRIENDS OR RELATIVES?: MORE THAN THREE TIMES A WEEK

## 2024-07-09 ASSESSMENT — PATIENT HEALTH QUESTIONNAIRE - PHQ9
10. IF YOU CHECKED OFF ANY PROBLEMS, HOW DIFFICULT HAVE THESE PROBLEMS MADE IT FOR YOU TO DO YOUR WORK, TAKE CARE OF THINGS AT HOME, OR GET ALONG WITH OTHER PEOPLE: SOMEWHAT DIFFICULT
SUM OF ALL RESPONSES TO PHQ QUESTIONS 1-9: 7
SUM OF ALL RESPONSES TO PHQ QUESTIONS 1-9: 7

## 2024-07-09 NOTE — PROGRESS NOTES
Preventive Care Visit  [facility]  Destini Esparza PA-C, Family Medicine  Jul 9, 2024      Praful Lehman is a 44 year old, presenting for the following:  Physical        7/9/2024     7:16 AM   Additional Questions   Roomed by Karli        Health Care Yi  Patient does not have a Health Care Directive or Living Will: Discussed advance care planning with patient; information given to patient to review.    HPI    Doing OK. Has visit with neurology in September to continue evaluation for possible demyelinating disease. Symptoms along with pain levels wax and wane with this. She continues to work with PT which has been helpful. Sometimes feels a bit down as cannot do everything she once did.     Overall blood sugars have been stable. She notes they do increase when pain levels are high but averages have been around 140. Tolerating current medications well.     Would like to check BRENNEN along with hormone levels.     Due for pap today. Was told by Dr. Ford if clear today can discontinue any further pap screening given she has had surgical intervention.      Will have dilated eye exam at the end of the month.         7/9/2024   General Health   How would you rate your overall physical health? (!) FAIR   Feel stress (tense, anxious, or unable to sleep) To some extent      (!) STRESS CONCERN      7/9/2024   Nutrition   Three or more servings of calcium each day? Yes   Diet: Diabetic   How many servings of fruit and vegetables per day? (!) 2-3   How many sweetened beverages each day? 0-1            7/9/2024   Exercise   Days per week of moderate/strenous exercise 3 days   Average minutes spent exercising at this level 30 min            7/9/2024   Social Factors   Frequency of gathering with friends or relatives More than three times a week   Worry food won't last until get money to buy more No   Food not last or not have enough money for food? No   Do you have housing?  (Housing is defined as stable permanent housing and does not include staying ouside in a car, in a tent, in an abandoned building, in an overnight shelter, or couch-surfing.) No   Are you worried about losing your housing? No   Lack of transportation? No   Unable to get utilities (heat,electricity)? No   Want help with housing or utility concern? No      (!) HOUSING CONCERN PRESENT      7/9/2024   Dental   Dentist two times every year? Yes            7/9/2024   TB Screening   Were you born outside of the [place]? No          Today's PHQ-9 Score:       7/9/2024     6:19 AM   PHQ-9 SCORE   PHQ-9 Total Score MyChart 7 (Mild depression)   PHQ-9 Total Score 7         7/9/2024   Substance Use   Alcohol more than 3/day or more than 7/wk Not Applicable   Do you use any other substances recreationally? No        Social History     Tobacco Use    Smoking status: Some Days     Current packs/day: 0.00     Types: Cigarettes    Smokeless tobacco: Never    Tobacco comments:     As of 10 /2014, pt has had a few cigs here and there   Vaping Use    Vaping status: Never Used   Substance Use Topics    Alcohol use: Yes     Comment: Once or twice a year    Drug use: No           5/20/2024   LAST FHS-7 RESULTS   1st degree relative breast or ovarian cancer No   Any relative bilateral breast cancer No   Any male have breast cancer No   Any ONE woman have BOTH breast AND ovarian cancer No   Any woman with breast cancer before 50yrs No   2 or more relatives with breast AND/OR ovarian cancer No   2 or more relatives with breast AND/OR bowel cancer No           Mammogram Screening - Mammogram every 1-2 years updated in Health Maintenance based on mutual decision making        7/9/2024   STI Screening   New sexual partner(s) since last STI/HIV test? No        History of abnormal Pap smear: See bartoloove        Latest Ref Rng & Units 2/11/2020     1:56 PM 2/11/2020     1:48 PM 3/11/2019     2:39 PM   PAP / HPV   PAP (Historical)  NIL   NIL    HPV 16 DNA  NEG^Negative  Negative     HPV 18 DNA NEG^Negative  Negative     Other HR HPV NEG^Negative  Negative       ASCVD Risk   The 10-year ASCVD risk score (Leda OLGUIN, et al., 2019) is: 2%    Values used to calculate the score:      Age: 44 years      Sex: Female      Is Non- : No      Diabetic: Yes      Tobacco smoker: Yes      Systolic Blood Pressure: 108 mmHg      Is BP treated: No      HDL Cholesterol: 60 mg/dL      Total Cholesterol: 163 mg/dL       Reviewed and updated as needed this visit by Provider                    BP Readings from Last 3 Encounters:   07/09/24 110/72   05/20/24 108/76   05/10/24 118/64    Wt Readings from Last 3 Encounters:   07/09/24 88.1 kg (194 lb 4 oz)   05/20/24 87.1 kg (192 lb)   05/10/24 88.3 kg (194 lb 9.6 oz)                  Patient Active Problem List   Diagnosis    Hemorrhage of rectum and anus    Female infertility    Papanicolaou smear of cervix with atypical squamous cells cannot exclude high grade squamous intraepithelial lesion (ASC-H)    S/P conization of cervix- KAYLA 2/3 in 2011    Kidney stones    Dyspnea    CARDIOVASCULAR SCREENING; LDL GOAL LESS THAN 160    GERD (gastroesophageal reflux disease)    Hyperlipidemia LDL goal <130    Mild major depression (H)    Glucosuria    Type 2 diabetes mellitus with diabetic autonomic neuropathy, without long-term current use of insulin (H)    Non morbid obesity due to excess calories    Obesity (BMI 35.0-39.9) with comorbidity (H)    Endometrial intraepithelial neoplasia (EIN)    Endometrial hyperplasia, unspecified    Chronic diarrhea    Abnormal CT scan, small bowel    Dyspareunia, female    Left ovarian cyst    S/P lumbar microdiscectomy    Lumbar radiculopathy     Past Surgical History:   Procedure Laterality Date    ANESTHESIA OUT OF OR MRI N/A 4/26/2024    Procedure: MRI OF Brain,Cervical and Thoracic Spine;  Surgeon: GENERIC ANESTHESIA PROVIDER;  Location: [facility]    CHOLECYSTECTOMY, LAPOROSCOPIC   5/11/2007    Cholecystectomy, Laparoscopic    COLONOSCOPY  05/17/10    COLONOSCOPY N/A 8/27/2019    Procedure: COLONOSCOPY;  Surgeon: Paramjit Kingston DO;  [facility]: [facility]    COLONOSCOPY N/A 10/23/2020    Procedure: COLONOSCOPY, WITH  BIOPSY;  Surgeon: Ba Dickinson MD;  Location:  GI    CONIZATION  7/1/2011    Procedure:CONIZATION; cold knife and punch biopsy of mole on back; Surgeon:SYDNEY FORD; Location:[facility]    DILATION AND CURETTAGE, HYSTEROSCOPY, LAPAROSCOPY, COMBINED Right 9/24/2015    Procedure: COMBINED DILATION AND CURETTAGE, HYSTEROSCOPY, LAPAROSCOPY;  Surgeon: Sydney Ford MD;  [facility]    DISCECTOMY LUMBAR MINIMALLY INVASIVE ONE LEVEL Left 1/23/2019    Procedure: Minimally Invasive Surgery Left Lumbar 5-Sacral 1 microdiscectomy;  Surgeon: Mason Roe MD;  [facility]    DISCECTOMY LUMBAR POSTERIOR MICROSCOPIC ONE LEVEL Left 4/5/2023    Procedure: Minimally Invasive Left Lumbar 5 to Sacral 1 evacuation of recurrent disc herniation;  Surgeon: Mason Roe MD;  [facility]    ESOPHAGOSCOPY, GASTROSCOPY, DUODENOSCOPY (EGD), COMBINED  5/21/2014    Procedure: COMBINED ESOPHAGOSCOPY, GASTROSCOPY, DUODENOSCOPY (EGD), BIOPSY SINGLE OR MULTIPLE;  Surgeon: Earl Lane MD;  Location:  GI    ESOPHAGOSCOPY, GASTROSCOPY, DUODENOSCOPY (EGD), COMBINED N/A 10/23/2020    Procedure: Esophagogastroduodenoscopy with  biopsy;  Surgeon: Ba Dickinson MD;  [facility]    EXCISE LESION TRUNK  7/1/2011    Procedure:EXCISE LESION TRUNK; Surgeon:SYDNEY FORD; [facility]    FORAMINOTOMY LUMBAR POSTERIOR ONE LEVEL Left 4/5/2023    Procedure: left foraminotomy Lumbar 5 to Sacral 1;  Surgeon: Mason Roe MD;  [facility]    HC FRAGMENTING OF KIDNEY STONE  11/30/2005    HC RX ECTOP PREG BY SCOPE,RMV TUBE/OVRY  12/20/2007    Right sided salpingectomy and removal of ruptured ectopic pregnancy. D&C.    HYSTERECTOMY  VAGINAL      LAPAROSCOPIC APPENDECTOMY N/A 9/24/2015    Procedure: LAPAROSCOPIC APPENDECTOMY;  Surgeon: James Cuellar MD;  [facility]    LAPAROSCOPIC CYSTECTOMY OVARIAN (BENIGN) N/A 9/24/2015    Procedure: LAPAROSCOPIC CYSTECTOMY OVARIAN (BENIGN);  Surgeon: Greg Ford MD;  [facility]    LAPAROSCOPIC HYSTERECTOMY TOTAL, BILATERAL SALPINGO-OOPHORECTOMY, COMBINED N/A 7/28/2020    Procedure: laparoscpic assisted vaginal hysterectomy, cystoscopy;  Surgeon: Greg Ford MD;  Location: [facility]    LAPAROSCOPIC OOPHORECTOMY Right 9/24/2015    Procedure: LAPAROSCOPIC OOPHORECTOMY;  Surgeon: Greg Ford MD;  [facility]    LITHOTRIPSY  01/17/2007    PELVIS LAPAROSCOPY,DX  10/16/2000    Diagnostic laparoscopy, Endometrial biopsy.    TUBAL/ECTOPIC PREGNANCY  01/23/2007    Lap removal of left ruptured ectopic pregnancy & salpingectomy, D&C    ZZC REMOVAL OF KIDNEY STONE      two surgeries, 2002,2003    ZZ UGI ENDOSCOPY, SIMPLE EXAM  09/01/09       Social History     Tobacco Use    Smoking status: Some Days     Current packs/day: 0.00     Types: Cigarettes    Smokeless tobacco: Never    Tobacco comments:     As of 10 /2014, pt has had a few cigs here and there   Substance Use Topics    Alcohol use: Yes     Comment: Once or twice a year     Family History   Problem Relation Age of Onset    Obesity Mother         on thyroid medication    Thyroid Disease Mother     Diabetes Mother     Rheumatologic Disease Mother         PMR    Heart Disease Father     Hypertension Father         and TIA    Lipids Father     Diabetes Maternal Grandmother         adult onset    Anesthesia Reaction Maternal Grandmother         can't tolerate Novacaine.    Respiratory Maternal Grandmother         COPD and emphysema.    Thyroid Disease Maternal Grandmother     Heart Disease Maternal Grandmother         CHF    Cancer Maternal Grandfather         skin cancer    Heart Disease Maternal  Grandfather         MI    Breast Cancer Paternal Grandmother     Diabetes Paternal Grandfather         adult o nset    Hypertension Paternal Grandfather     Cerebrovascular Disease Paternal Grandfather     Heart Disease Paternal Grandfather         MI, replaced valve,angioplasty    Thyroid Disease Paternal Grandfather     Cancer Maternal Aunt         breast/brain cancer    Depression Paternal Aunt     Cerebrovascular Disease Paternal Aunt     Cerebrovascular Disease Paternal Uncle     Arrhythmia Other          Current Outpatient Medications   Medication Sig Dispense Refill    blood glucose (NO BRAND SPECIFIED) test strip Use to test blood sugar 3 times daily or as directed. To accompany: Blood Glucose Monitor Brands: per insurance. 300 strip 6    blood glucose monitoring (NO BRAND SPECIFIED) meter device kit Use to test blood sugar 3 times daily or as directed. Preferred blood glucose meter OR supplies to accompany: Blood Glucose Monitor Brands: per insurance. 1 kit 0    empagliflozin (JARDIANCE) 10 MG TABS tablet TAKE ONE TABLET (10 MG) BY MOUTH DAILY 90 tablet 1    metFORMIN (GLUCOPHAGE XR) 500 MG 24 hr tablet TAKE 2 TABLETS (1,000 MG) BY MOUTH 2 TIMES DAILY (WITH MEALS) 360 tablet 1    omeprazole (PRILOSEC) 20 MG DR capsule TAKE ONE CAPSULE BY MOUTH ONCE DAILY, 30 TO 60 MINUTES BEFORE A MEAL. 90 capsule 4    oxyCODONE (ROXICODONE) 5 MG tablet Take 1 tablet (5 mg) by mouth every 6 hours as needed for pain 12 tablet 0    STATIN NOT PRESCRIBED (INTENTIONAL) Please choose reason not prescribed from choices below.      thin (NO BRAND SPECIFIED) lancets Use with lanceting device. To accompany: Blood Glucose Monitor Brands: per insurance. 100 each 6    tiZANidine (ZANAFLEX) 4 MG tablet Take 1-2 tablets (4-8 mg) by mouth 3 times daily as needed for muscle spasms 60 tablet 2         Review of Systems  Constitutional, HEENT, cardiovascular, pulmonary, GI, , musculoskeletal, neuro, skin, endocrine and psych systems are  "negative, except as otherwise noted.     Objective    Exam  LMP 07/25/2020 (Exact Date)    Estimated body mass index is 32.38 kg/m  as calculated from the following:    Height as of 5/20/24: 1.64 m (5' 4.57\").    Weight as of 5/20/24: 87.1 kg (192 lb).    Physical Exam  GENERAL: alert and no distress  EYES: Eyes grossly normal to inspection, PERRL and conjunctivae and sclerae normal  HENT: ear canals and TM's normal, nose and mouth without ulcers or lesions  NECK: no adenopathy, no asymmetry, masses, or scars  RESP: lungs clear to auscultation - no rales, rhonchi or wheezes  CV: regular rate and rhythm, normal S1 S2, no S3 or S4, no murmur, click or rub, no peripheral edema  ABDOMEN: soft, nontender, no hepatosplenomegaly, no masses and bowel sounds normal   (female) w/bimanual: normal female external genitalia, normal urethral meatus, normal vaginal mucosa, and normal cervix/adnexa/uterus without masses or discharge  MS: no gross musculoskeletal defects noted, no edema  SKIN: no suspicious lesions or rashes  NEURO: Normal strength and tone, mentation intact and speech normal  PSYCH: mentation appears normal, affect normal/bright    Assessment & Plan     Routine general medical examination at a health care facility  Vaccinations reviewed and declined at this time.    Cervical cancer screening  Pap completed today. If normal can discontinue further pap screenings.   - Pap Screen with HPV - Recommended Age 30 - 65 Years    Demyelinating disease (H)  Has follow-up with neurology this Fall. Will continue to work with PT for the time being.     Type 2 diabetes mellitus with diabetic autonomic neuropathy, without long-term current use of insulin (H)  Labs updated today. Dose adjustment pending results. Encouraged ongoing diet and exercise modifications.   - HEMOGLOBIN A1C; Future  - Lipid panel reflex to direct LDL Non-fasting; Future  - Albumin Random Urine Quantitative with Creat Ratio; Future  - FOOT EXAM  - blood " glucose (NO BRAND SPECIFIED) test strip; Use to test blood sugar 3 times daily or as directed. To accompany: Blood Glucose Monitor Brands: per insurance.  - HEMOGLOBIN A1C  - Lipid panel reflex to direct LDL Non-fasting  - Albumin Random Urine Quantitative with Creat Ratio    Mild episode of recurrent major depressive disorder (H24)  Stable - no acute concerns.     BRENNEN positive  - Anti Nuclear Liv IgG by IFA with Reflex; Future  - Anti Nuclear Liv IgG by IFA with Reflex    Lumbar radiculopathy  Rare use as needed.   - oxyCODONE (ROXICODONE) 5 MG tablet; Take 1 tablet (5 mg) by mouth every 6 hours as needed for pain    Type 2 diabetes mellitus without complication, without long-term current use of insulin (H)  - empagliflozin (JARDIANCE) 10 MG TABS tablet; TAKE ONE TABLET (10 MG) BY MOUTH DAILY    Hot flashes  - Estradiol; Future  - Progesterone; Future  - Follicle stimulating hormone; Future  - Estradiol  - Progesterone  - Follicle stimulating hormone    Hematuria, unspecified type  - UA Macroscopic with reflex to Microscopic and Culture - Lab Collect; Future  - UA Macroscopic with reflex to Microscopic and Culture - Lab Collect    Gastroesophageal reflux disease without esophagitis  - omeprazole (PRILOSEC) 20 MG DR capsule; TAKE ONE CAPSULE BY MOUTH ONCE DAILY, 30 TO 60 MINUTES BEFORE A MEAL.    S/P lumbar microdiscectomy  - tiZANidine (ZANAFLEX) 4 MG tablet; Take 1-2 tablets (4-8 mg) by mouth 3 times daily as needed for muscle spasms    Patient has been advised of split billing requirements and indicates understanding: Yes        Counseling  Appropriate preventive services were discussed with this patient, including applicable screening as appropriate for fall prevention, nutrition, physical activity, Tobacco-use cessation, weight loss and cognition.  Checklist reviewing preventive services available has been given to the patient.  Reviewed patient's diet, addressing concerns and/or questions.   She is at risk for  lack of exercise and has been provided with information to increase physical activity for the benefit of her well-being.   The patient's PHQ-9 score is consistent with mild depression. She was provided with information regarding depression.       Signed Electronically by: Destini Esparza PA-C    Answers submitted by the patient for this visit:  Patient Health Questionnaire (Submitted on 7/9/2024)  If you checked off any problems, how difficult have these problems made it for you to do your work, take care of things at home, or get along with other people?: Somewhat difficult  PHQ9 TOTAL SCORE: 7

## 2024-07-10 LAB
ANA PAT SER IF-IMP: ABNORMAL
ANA SER QL IF: ABNORMAL
ANA TITR SER IF: ABNORMAL {TITER}
HPV HR 12 DNA CVX QL NAA+PROBE: NEGATIVE
HPV16 DNA CVX QL NAA+PROBE: NEGATIVE
HPV18 DNA CVX QL NAA+PROBE: NEGATIVE
HUMAN PAPILLOMA VIRUS FINAL DIAGNOSIS: NORMAL

## 2024-07-12 LAB
BKR LAB AP GYN ADEQUACY: NORMAL
BKR LAB AP GYN INTERPRETATION: NORMAL
BKR LAB AP PREVIOUS ABNORMAL: NORMAL
PATH REPORT.COMMENTS IMP SPEC: NORMAL
PATH REPORT.COMMENTS IMP SPEC: NORMAL
PATH REPORT.RELEVANT HX SPEC: NORMAL

## 2024-07-15 ENCOUNTER — THERAPY VISIT (OUTPATIENT)
Dept: PHYSICAL THERAPY | Facility: CLINIC | Age: 44
End: 2024-07-15
Attending: PHYSICIAN ASSISTANT
Payer: COMMERCIAL

## 2024-07-15 DIAGNOSIS — G37.9 DEMYELINATING DISEASE (H): Primary | ICD-10-CM

## 2024-07-15 PROCEDURE — 97112 NEUROMUSCULAR REEDUCATION: CPT | Mod: GP

## 2024-07-15 PROCEDURE — 97110 THERAPEUTIC EXERCISES: CPT | Mod: GP

## 2024-07-22 ENCOUNTER — THERAPY VISIT (OUTPATIENT)
Dept: PHYSICAL THERAPY | Facility: CLINIC | Age: 44
End: 2024-07-22
Attending: PHYSICIAN ASSISTANT
Payer: COMMERCIAL

## 2024-07-22 DIAGNOSIS — G37.9 DEMYELINATING DISEASE (H): Primary | ICD-10-CM

## 2024-07-22 PROCEDURE — 97112 NEUROMUSCULAR REEDUCATION: CPT | Mod: GP

## 2024-07-22 PROCEDURE — 97110 THERAPEUTIC EXERCISES: CPT | Mod: GP

## 2024-07-23 ENCOUNTER — TRANSFERRED RECORDS (OUTPATIENT)
Dept: HEALTH INFORMATION MANAGEMENT | Facility: CLINIC | Age: 44
End: 2024-07-23
Payer: COMMERCIAL

## 2024-08-08 ENCOUNTER — THERAPY VISIT (OUTPATIENT)
Dept: PHYSICAL THERAPY | Facility: CLINIC | Age: 44
End: 2024-08-08
Attending: PHYSICIAN ASSISTANT
Payer: COMMERCIAL

## 2024-08-08 DIAGNOSIS — G37.9 DEMYELINATING DISEASE (H): Primary | ICD-10-CM

## 2024-08-08 PROCEDURE — 97110 THERAPEUTIC EXERCISES: CPT | Mod: GP

## 2024-08-08 PROCEDURE — 97530 THERAPEUTIC ACTIVITIES: CPT | Mod: GP

## 2024-08-08 PROCEDURE — 97112 NEUROMUSCULAR REEDUCATION: CPT | Mod: GP

## 2024-08-08 NOTE — PROGRESS NOTES
PLAN  Continue therapy per current plan of care.    Beginning/End Dates of Progress Note Reporting Period:  05/22/2024 to 08/08/2024 08/08/24 0500   Appointment Info   Signing clinician's name / credentials Geovanni Macias, PT, DPT   Total/Authorized Visits [employer] [place]   Visits Used 7   Medical Diagnosis Demyelinating disease (H) (G37.9)   PT Tx Diagnosis Demyelinating disease (H) (G37.9)   Progress Note/Certification   Onset of illness/injury or Date of Surgery 05/10/24  (Date of order. Chronic Onset over past 6-7 years.)   Therapy Frequency 1 visit per week   Predicted Duration 10 weeks   Progress Note Due Date 10/09/24       Present No   GOALS   PT Goals 2;3   PT Goal 1   Goal Identifier Home Exercise Programs   Goal Description Patient will demonstrate proper performance and good adherence to her HEP's for 10 weeks for 5 of 7 days per week to demonstrate improved long term independence with management of her bilateral lower extremity weakness.   Rationale to maximize safety and independence with performance of ADLs and functional tasks;to maximize safety and independence within the home;to maximize safety and independence within the community   Goal Progress Patient tolerated today's selected scapulothoracic stretching exercises without abnormal pain exacerbation during today's session.   Target Date 10/09/24   PT Goal 2   Goal Identifier 6 Minute Walk Test   Goal Description Patient will ambulation 1500 feet or greater without rest breaks in order to demonstrate improved bilateral muscular endruance with her required and recreational ambulation demands.   Rationale to maximize safety and independence with performance of ADLs and functional tasks;to maximize safety and independence within the home;to maximize safety and independence within the community   Goal Progress 1004 feet (07/15/2024)   Target Date 10/09/24   PT Goal 3   Goal Identifier 30 Second Sit to Stand   Goal Description  Patient will improve her initial 30 second STS assessment by 5 reps or more in order to demonstrate improved bilateral muscular endurance with her required functional mobility demands with improved energy conservation for days when she experiences increased neurological symptoms in her legs.   Rationale to maximize safety and independence with performance of ADLs and functional tasks;to maximize safety and independence within the home;to maximize safety and independence within the community   Goal Progress 8 repetitions (07/15/2024)   Target Date 10/09/24   Subjective Report   Subjective Report Patient in good spirits during today's session. Patient reports mod adherence to her HEP since her previous session. Patient reports mild global soreness today from her neurologic symptoms, reports having 3 day art fair starting tomorrow so is wishing to work through less intense HEP strengthening and conditioning variations today.   Objective Measures   Objective Measures Objective Measure 1;Objective Measure 2;Objective Measure 3   Objective Measure 1   Objective Measure Pain   Details Numeric value not given by patient during today's session.   Objective Measure 2   Objective Measure 6 Minute Walk Test   Details 1004 feet (07/15/2024)   Objective Measure 3   Objective Measure 30 Second Sit to Stand   Details 8 repetitions (07/15/2024)   Treatment Interventions (PT)   Interventions Therapeutic Procedure/Exercise;Neuromuscular Re-education;Therapeutic Activity   Therapeutic Procedure/Exercise   Therapeutic Procedures: strength, endurance, ROM, flexibility minutes (77387) 25   Ther Proc 1 - Details Nustep x10 minutes at 3 workload (554 steps); Tippy toe walking x60 feet without UE support within parallel bars; Standing toe raises x15 reps; 4-inch lateral and forwards step up and downs x10 reps each; Therapeutic rest breaks taken between bouts of exercises during today's session due to patient reported BLE muscle fatigue and  involuntary muscle twitching during today's session.   Skilled Intervention Selection, instruction, and modification of selected exercises for optimal therapeutic benefit. Education and cueing as detailed above.   Patient Response/Progress Patient reports and demonstrates understanding of today's given education.   Therapeutic Activity   Therapeutic Activities: dynamic activities to improve functional performance minutes (93329) 10   Ther Act 1 - Details Education regarding initiation of home walking program to flat surfaces x10-15 minutes per bout on good days with subsequent rest break till involuntary twitching in her LE's or 10-15 minutes has passed before continuing with the rest of her day with required household ambulation demands, or by continuing on her recreational walks up at the erazo in [place] for her daughter's wedding photos; Instructed to avoid heavy demand changes in elevation due to increase LE strengthening demand to climb and descend stairs at the outdoor erazo as best she can, of which will assist with reducing risk of exacerbation of her LE muscle fatigue and neurologic symptoms while visiting these er[place], that staying on flatter ground will reduce this risk.   Skilled Intervention Activity tolerance and walking program initiation education.   Patient Response/Progress Patient reports understanding of today's given education.   Neuromuscular Re-education   Neuromuscular re-ed of mvmt, balance, coord, kinesthetic sense, posture, proprioception minutes (64373) 20   Neuro Re-ed 1 - Details Romberg stance x2 minutes while standing on airex pad; Semi tandem stance x1 minute with each foot forward; Ambulation over airex pad course within parallel bars x40 feet without UE support, repeated with 30 degrees each direction; BLE lateral step offs onto and off of airex pad x20 repetitions each; Trial of mediolateral small tilt board x10 reps each direction with patient needing BUE tapping support during  "today's session, able to progress to LUE support with appropriate balance intensity challenge; Therapeutic rest breaks taken between bouts of exercises during today's session due to patient reported BLE muscle fatigue and involuntary muscle twitching during today's session.   Skilled Intervention Selection, instruction, and modification of selected exercises for optimal therapeutic benefit. Education and cueing as detailed above.   Patient Response/Progress Patient reports and demonstrates understanding of today's given education.   Education   Learner/Method Patient;Family;Listening;Demonstration;No Barriers to Learning   Education Comments Patient reports understanding of her future therapeutic progression.   Plan   Home program Seated BUE elbows on table lat dorsi stretch x1 minute; Seated bilateral levator scapulae and cross body midscapular stretches x1 minute each; STS's, BLE heel raises, bridges. and sidelying hip ABD SLR's x10 reps each; Tandem stance x30 seconds with each foot forward; 6-inch toe taps x10 reps with BLE's; Add shoe box kickers and narrow stance walking as tolerated for balance progressions between sessions; Exercises each performed 2-3 times per day; \"Bad day HEP program\" with seated exercises from 06/10/2024 visit on such days.   Plan for next session Progress gross BLE strengthening and aerobic conditioning as tolerated by the patient; Assess MS like symptoms and patient journal of activity capacity on good and bad days.   Total Session Time   Timed Code Treatment Minutes 55   Total Treatment Time (sum of timed and untimed services) 55     Geovanni Macias PT, DPT    [facility]  O: [phone]  E: [email or web address]     Referring Provider:  Destini Esparza PA-C   "

## 2024-08-13 NOTE — PROGRESS NOTES
Subjective:      Patient ID: Nancy Crowell is a 59 y.o. male.    Chief Complaint: Follow-up for vaccine counseling    History of Present Illness  59-year-old male with history of MM s/p autoSCT 7/29/2022, T2DM, HTN, presents for follow-up vaccine counseling.    Patient with no history of chickenpox - pre-transplant VZV IgG negative.     Patient has completed all non-live vaccine series.    Patient responded to Strep pneumo vaccines.    He did not respond to Twinrix vaccine, Heplisav-B vaccine given.    Immunization History   Administered Date(s) Administered    DTaP 03/08/2023, 05/24/2023, 08/09/2023    Hepatitis A / Hepatitis B 03/15/2023, 03/22/2023, 10/06/2023    Hepatitis B (recombinant) Adjuvanted, 2 dose 12/07/2023, 01/11/2024    HiB PRP-T 03/08/2023, 05/17/2023, 07/24/2023    IPV 03/15/2023, 05/24/2023, 08/09/2023    Influenza (FLUAD) - Quadrivalent - Adjuvanted - PF *Preferred* (65+) 12/07/2023    Meningococcal Conjugate (MCV4O) 2 Vial (2mo-55yr) 03/15/2023, 05/31/2023    Pneumococcal Conjugate - 13 Valent 03/22/2023, 05/17/2023, 07/24/2023    Pneumococcal Polysaccharide - 23 Valent 10/06/2023         Review of Systems   Constitutional: Negative for chills, decreased appetite, fever, malaise/fatigue, night sweats, weight gain and weight loss.   HENT:  Negative for congestion, ear pain, hearing loss, hoarse voice, sore throat and tinnitus.    Eyes:  Negative for blurred vision, redness and visual disturbance.   Cardiovascular:  Positive for leg swelling. Negative for chest pain and palpitations.   Respiratory:  Negative for cough, hemoptysis, shortness of breath, sputum production and wheezing.    Hematologic/Lymphatic: Negative for adenopathy. Does not bruise/bleed easily.   Skin:  Negative for dry skin, itching, rash and suspicious lesions.   Musculoskeletal:  Negative for back pain, joint pain, myalgias and neck pain.   Gastrointestinal:  Negative for abdominal pain, constipation, diarrhea, heartburn,  Subjective:Fallon is here for yearly physical. Current concerns are:  She gets occasional wheezing.  Also to rechekc diabetes. Takes metformin. FBS- 140-160      After meals 120-150.   no recent kiodney stone issues.        Past Medical History   Diagnosis Date     Abnormal Pap smear 2006, 2007,      ASC-H, ASCUS + HPV 45     Calculus of kidney 2002     Cervical high risk HPV (human papillomavirus) test positive      + HPV 45 (high risk)     History of colposcopy with cervical biopsy 2/9/06, 3/15/07     koilocytosis 2007      Current Outpatient Prescriptions   Medication Sig Dispense Refill     blood glucose monitoring (LILLIAN CONTOUR NEXT) test strip Use to test blood sugar 2 times daily or as directed. 100 strip 3     blood glucose monitoring (LILLIAN MICROLET) lancets Use to check blood sugar 1-2 times daily 1 Box 12     metFORMIN (GLUCOPHAGE) 500 MG tablet TAKE ONE TABLET BY MOUTH EVERY DAY WITH DINNER 30 tablet 1     Multiple Vitamins-Minerals (MULTIPLE VITAMINS/WOMENS PO)        albuterol (PROAIR HFA, PROVENTIL HFA, VENTOLIN HFA) 108 (90 BASE) MCG/ACT inhaler Inhale 2 puffs into the lungs every 6 hours as needed for shortness of breath / dyspnea or wheezing 1 Inhaler 1     omeprazole 20 MG tablet Take 1 tablet (20 mg) by mouth daily Take 30-60 minutes before a meal. 30 tablet 9     oxyCODONE-acetaminophen (PERCOCET) 5-325 MG per tablet Take 1-2 tablets by mouth every 6 hours as needed for moderate to severe pain 40 tablet 0     Ibuprofen (ADVIL PO) Take 600 mg by mouth every 6 hours as needed         Allergies   Allergen Reactions     Amoxicillin Hives     Anti-Nausea      Anxiety, agitation,severe paranoia. Zofran, Reglan are some of them.       Demerol Hcl [Meperidine Hcl] Nausea     Indomethacin Nausea and Vomiting     Macrobid [Nitrofuran Derivatives] Hives     Reglan [Metoclopramide] Other (See Comments)     Acute paranoia, severe     Zofran [Ondansetron] Other (See Comments)     Severe anxiety, agitation       History   Smoking status     Current Some Day Smoker -- 12 years     Types: Cigarettes     Last Attempt to Quit: 07/14/2009   Smokeless tobacco     Never Used     Comment: As of 10 /2014, pt has had a few cigs here and there      Past Surgical History   Procedure Laterality Date     Pelvis laparoscopy,dx  10/16/2000     Diagnostic laparoscopy, Endometrial biopsy.     C removal of kidney stone       two surgeries, 2002,2003     Hc fragmenting of kidney stone  11/30/2005     Lithotripsy  01/17/2007     Cholecystectomy, laporoscopic  5/11/2007     Cholecystectomy, Laparoscopic     Hc ugi endoscopy, simple exam  09/01/09     Colonoscopy  05/17/10     Excise lesion trunk  7/1/2011     Procedure:EXCISE LESION TRUNK; Surgeon:GREG FORD; Location:[facility]     Tubal/ectopic pregnancy  01/23/2007     Lap removal of left ruptured ectopic pregnancy & salpingectomy, D&C     Hc rx ectop preg by scope,rmv tube/ovry  12/20/2007     Right sided salpingectomy and removal of ruptured ectopic pregnancy. D&C.     Conization  7/1/2011     Procedure:CONIZATION; cold knife and punch biopsy of mole on back; Surgeon:GREG FORD; Location:[facility]     Esophagoscopy, gastroscopy, duodenoscopy (egd), combined  5/21/2014     Procedure: COMBINED ESOPHAGOSCOPY, GASTROSCOPY, DUODENOSCOPY (EGD), BIOPSY SINGLE OR MULTIPLE;  Surgeon: Earl Lane MD;  Location: [facility]     Dilation and curettage, hysteroscopy, laparoscopy, combined Right 9/24/2015     Procedure: COMBINED DILATION AND CURETTAGE, HYSTEROSCOPY, LAPAROSCOPY;  Surgeon: Greg Ford MD;  Location: [facility]     Laparoscopic cystectomy ovarian (benign) N/A 9/24/2015     Procedure: LAPAROSCOPIC CYSTECTOMY OVARIAN (BENIGN);  Surgeon: Greg Ford MD;  Location: [facility]     Laparoscopic appendectomy N/A 9/24/2015     Procedure: LAPAROSCOPIC APPENDECTOMY;  Surgeon: James Cuellar MD;  Location: [facility]     Laparoscopic oophorectomy  nausea and vomiting.   Genitourinary:  Negative for dysuria, flank pain, frequency, hematuria, hesitancy and urgency.   Neurological:  Negative for dizziness, headaches, numbness, paresthesias and weakness.   Psychiatric/Behavioral:  Negative for depression and memory loss. The patient does not have insomnia and is not nervous/anxious.      Objective:   Physical Exam  Constitutional:       General: He is not in acute distress.     Appearance: He is well-developed. He is not diaphoretic.   HENT:      Head: Normocephalic and atraumatic.   Eyes:      Conjunctiva/sclera: Conjunctivae normal.   Pulmonary:      Effort: Pulmonary effort is normal. No respiratory distress.   Abdominal:      General: There is no distension.      Palpations: Abdomen is soft.   Skin:     General: Skin is warm and dry.      Findings: No erythema or rash.   Neurological:      Mental Status: He is alert.   Psychiatric:         Behavior: Behavior normal.         Sodium   Date Value Ref Range Status   08/12/2024 139 136 - 145 mmol/L Final   07/11/2024 138 136 - 145 mmol/L Final   06/13/2024 136 136 - 145 mmol/L Final      Potassium   Date Value Ref Range Status   08/12/2024 3.7 3.5 - 5.1 mmol/L Final   07/11/2024 4.0 3.5 - 5.1 mmol/L Final   06/13/2024 3.9 3.5 - 5.1 mmol/L Final      Chloride   Date Value Ref Range Status   08/12/2024 105 95 - 110 mmol/L Final   07/11/2024 103 95 - 110 mmol/L Final   06/13/2024 102 95 - 110 mmol/L Final      CO2   Date Value Ref Range Status   08/12/2024 25 23 - 29 mmol/L Final   07/11/2024 28 23 - 29 mmol/L Final   06/13/2024 25 23 - 29 mmol/L Final      BUN   Date Value Ref Range Status   08/12/2024 9 6 - 20 mg/dL Final   07/11/2024 10 6 - 20 mg/dL Final   06/13/2024 13 6 - 20 mg/dL Final      Creatinine   Date Value Ref Range Status   08/12/2024 0.9 0.5 - 1.4 mg/dL Final   07/11/2024 0.8 0.5 - 1.4 mg/dL Final   06/13/2024 1.0 0.5 - 1.4 mg/dL Final      Glucose   Date Value Ref Range Status   08/12/2024 200 (H)  "Right 9/24/2015     Procedure: LAPAROSCOPIC OOPHORECTOMY;  Surgeon: Greg Ford MD;  Location: [facility]      Social History   Substance Use Topics     Smoking status: Current Some Day Smoker -- 12 years     Types: Cigarettes     Last Attempt to Quit: 07/14/2009     Smokeless tobacco: Never Used      Comment: As of 10 /2014, pt has had a few cigs here and there     Alcohol Use: 0.0 oz/week     0 Standard drinks or equivalent per week      Comment: every few months      Family History   Problem Relation Age of Onset     DIABETES Maternal Grandmother      adult onset     Anesthesia Reaction Maternal Grandmother      can't tolerate Novacaine.     Respiratory Maternal Grandmother      COPD and emphysema.     Thyroid Disease Maternal Grandmother      HEART DISEASE Maternal Grandmother      CHF     DIABETES Paternal Grandfather      adult o nset     Hypertension Paternal Grandfather      CEREBROVASCULAR DISEASE Paternal Grandfather      HEART DISEASE Paternal Grandfather      MI, replaced valve,angioplasty     Thyroid Disease Paternal Grandfather      CANCER Maternal Grandfather      skin cancer     HEART DISEASE Maternal Grandfather      MI     CANCER Maternal Aunt      breast/brain cancer     Depression Paternal Aunt      Obesity Mother      on thyroid medication     Thyroid Disease Mother      DIABETES Mother      CEREBROVASCULAR DISEASE Paternal Uncle      CEREBROVASCULAR DISEASE Paternal Aunt      HEART DISEASE Father      Hypertension Father      and TIA     Lipids Father      Breast Cancer Paternal Grandmother      Arrhythmia Other        ROS: A 12 point review of systems is negative except for the following:  See above      Physical Exam: /74 mmHg  Pulse 86  Temp(Src) 97.3  F (36.3  C) (Temporal)  Resp 14  Ht 5' 4\" (1.626 m)  Wt 229 lb (103.874 kg)  BMI 39.29 kg/m2  SpO2 96%  LMP 01/01/2017  Breastfeeding? No  HEENT:  Normal   Sclerae and conjunctiva are normal.   Ear canals and TMs look " 70 - 110 mg/dL Final   07/11/2024 187 (H) 70 - 110 mg/dL Final   06/13/2024 200 (H) 70 - 110 mg/dL Final       ALT   Date Value Ref Range Status   08/12/2024 13 10 - 44 U/L Final   07/11/2024 15 10 - 44 U/L Final   06/13/2024 17 10 - 44 U/L Final      AST   Date Value Ref Range Status   08/12/2024 12 10 - 40 U/L Final   07/11/2024 13 10 - 40 U/L Final   06/13/2024 15 10 - 40 U/L Final      Total Bilirubin   Date Value Ref Range Status   08/12/2024 0.4 0.1 - 1.0 mg/dL Final     Comment:     For infants and newborns, interpretation of results should be based  on gestational age, weight and in agreement with clinical  observations.    Premature Infant recommended reference ranges:  Up to 24 hours.............<8.0 mg/dL  Up to 48 hours............<12.0 mg/dL  3-5 days..................<15.0 mg/dL  6-29 days.................<15.0 mg/dL     07/11/2024 0.4 0.1 - 1.0 mg/dL Final     Comment:     For infants and newborns, interpretation of results should be based  on gestational age, weight and in agreement with clinical  observations.    Premature Infant recommended reference ranges:  Up to 24 hours.............<8.0 mg/dL  Up to 48 hours............<12.0 mg/dL  3-5 days..................<15.0 mg/dL  6-29 days.................<15.0 mg/dL     06/13/2024 0.4 0.1 - 1.0 mg/dL Final     Comment:     For infants and newborns, interpretation of results should be based  on gestational age, weight and in agreement with clinical  observations.    Premature Infant recommended reference ranges:  Up to 24 hours.............<8.0 mg/dL  Up to 48 hours............<12.0 mg/dL  3-5 days..................<15.0 mg/dL  6-29 days.................<15.0 mg/dL        Albumin   Date Value Ref Range Status   08/12/2024 3.4 (L) 3.5 - 5.2 g/dL Final   07/11/2024 3.6 3.5 - 5.2 g/dL Final   06/13/2024 3.7 3.5 - 5.2 g/dL Final      Total Protein   Date Value Ref Range Status   08/12/2024 7.9 6.0 - 8.4 g/dL Final   07/11/2024 8.4 6.0 - 8.4 g/dL Final    06/13/2024 8.4 6.0 - 8.4 g/dL Final      Alkaline Phosphatase   Date Value Ref Range Status   08/12/2024 126 55 - 135 U/L Final   07/11/2024 219 (H) 55 - 135 U/L Final   06/13/2024 122 55 - 135 U/L Final        WBC   Date Value Ref Range Status   08/12/2024 3.12 (L) 3.90 - 12.70 K/uL Final   07/11/2024 3.30 (L) 3.90 - 12.70 K/uL Final   06/13/2024 3.45 (L) 3.90 - 12.70 K/uL Final      Hemoglobin   Date Value Ref Range Status   08/12/2024 9.4 (L) 14.0 - 18.0 g/dL Final   07/11/2024 10.2 (L) 14.0 - 18.0 g/dL Final   06/13/2024 10.6 (L) 14.0 - 18.0 g/dL Final      POC Hematocrit   Date Value Ref Range Status   11/30/2021 31 (L) 36 - 54 %PCV Final   11/30/2021 31 (L) 36 - 54 %PCV Final   11/24/2021 39 36 - 54 %PCV Final     Hematocrit   Date Value Ref Range Status   08/12/2024 30.8 (L) 40.0 - 54.0 % Final   07/11/2024 33.0 (L) 40.0 - 54.0 % Final   06/13/2024 32.0 (L) 40.0 - 54.0 % Final      Platelets   Date Value Ref Range Status   08/12/2024 242 150 - 450 K/uL Final   07/11/2024 287 150 - 450 K/uL Final   06/13/2024 220 150 - 450 K/uL Final          VZV IgG pre-transplant 6/2022 negative    Lab Results   Component Value Date    VARICELLAINT Negative 06/23/2022       Lab Results   Component Value Date    HEPBSAB <3.00 11/22/2023    HEPBSAB Non-reactive 11/22/2023        Strep Pneumonia Antibodies  Lab Results   Component Value Date    SPNEUMONIAET >139.0 11/22/2023    SPNEUMONIAET 13.6 11/22/2023    SPNEUMONIAET <0.3 11/22/2023    SPNEUMONIAET 1.0 11/22/2023    SPNEUMONIAET 7.5 11/22/2023    SPNEUMONIAET 31.6 11/22/2023       Humoral Immunity Panel  Lab Results   Component Value Date    HINFLUENZABA >9.00 11/22/2023    DIPHTHERIATO >2.00 11/22/2023    TETANUSAB 4.24 11/22/2023    SPNEUMONIAET >139.0 11/22/2023    SPNEUMONIAET 13.6 11/22/2023    SPNEUMONIAET <0.3 11/22/2023    SPNEUMONIAET 1.0 11/22/2023    SPNEUMONIAET 7.5 11/22/2023    SPNEUMONIAET 31.6 11/22/2023    LABSTRE 4.0 11/22/2023    LABSTRE 6.4 11/22/2023  normal.  Nasal mucosa is pink  - no polyps or masses seen.  sinuses are non tender to palpation.  Throat is unremarkable . Mucous membranes are moist.   Neck is supple, mobile, no adenoapthy or masses palpable. Normal range of motion noted.  Chest is clear to auscultation. No wheezes, rales or rhonchi heard.  cardiac exam is normal with s1, s2, no murmurs or adventitious sounds.Normal rate and rhythm is heard.  Abdomen is soft,  nondistended, No masses felt.No HSM. No guarding or rigidity or rebound noted. Palpation reveals  no    tenderness   Normal bowel sounds heard.   Pelvic exam:My nurse Lynn   was present to chaperone the exam.    The external genitalia appeared normal.      The vaginal vault was without bleeding  or   discharge or odor.      The cervix was smooth and shiny and normal in appearance.      A pap was obtained.       No vaginal support defects were noted,      Bimanual exam revealed a  6 week  sized uterus. It does not   descend   well in the vaginal vault.      No adnexal masses were felt.      There was no  cervical motion tenderness.      Exam was significantly limited by the patient's body habitus.            Breast exam:   An exam was performed in supine position.   My nurse was present as a chaperone.  No dominant masses were felt.   No asymmetry noted.   No nipple discharge noted.      fibrocystic changes were felt-   pt reassured that these are common and considered normal.            Assessment/Plan:      The exam is normal with the following exceptions:    1. Type 2 Diabetes- control uncertain- she has been noncompliant with followup and labs- will check labs today.  Will give flu vax and pneumovax    2. Morbid obesity- i advised walking 5 miles a day    3. Bronchospasm- comes and goes- rx  Albuterol inhaler to use prn- The potential side effects and risks of the medication were thoroughly discussed with the patient.      4. History of kidney stones- currently stable- continue to drink     SERO6 <0.3 11/22/2023    SERO56 3.0 11/22/2023    SERO57 5.2 11/22/2023       Immunization History   Administered Date(s) Administered    COVID-19, MRNA, LN-S, PF (Pfizer) (Purple Cap) 06/23/2021, 07/14/2021    DTaP 03/08/2023, 05/24/2023, 08/09/2023    Hepatitis A / Hepatitis B 03/15/2023, 03/22/2023, 10/06/2023    Hepatitis B (recombinant) Adjuvanted, 2 dose 12/07/2023, 01/11/2024    HiB PRP-T 03/08/2023, 05/17/2023, 07/24/2023    IPV 03/15/2023, 05/24/2023, 08/09/2023    Influenza (FLUAD) - Quadrivalent - Adjuvanted - PF *Preferred* (65+) 12/07/2023    Meningococcal Conjugate (MCV4O) 2 Vial (2mo-55yr) 03/15/2023, 05/31/2023    Pneumococcal Conjugate - 13 Valent 03/22/2023, 05/17/2023, 07/24/2023    Pneumococcal Polysaccharide - 23 Valent 10/06/2023    Td (ADULT) 09/29/2005    Td - PF (ADULT) 09/29/2005          Assessment:   59-year-old male with history of MM s/p autoSCT 7/29/2022, T2DM, HTN, presents for follow-up of vaccine counseling.    Plan:   - Start varivax vaccine series  - Start MMR vaccine series  - Will check VZV IgG titer 2 months after varivax vaccine  - If VZV IgG positive, will administer shingrix vaccine series    RTC 6 months    Eileen Gonzalez MD MPH  Infectious Diseases - [facility] [place]    40 minutes of total time spent on the encounter, which includes face to face time and non-face to face time preparing to see the patient (eg, review of tests), obtaining and reviewing separately obtained history, documenting clinical information in the electronic or other health record, independently interpreting results (not separately reported) and communicating results to the patient/family/caregiver, and care coordination (not separately reported).          lots of fluids.      Additional Plan:Diet and rest and exercise discussed.           See labs and orders.    .Immunizations reviewed(TDAP, pneumovax, shingles vaccine,etc)and discussed-including advice for yearly flu shot/tetanus update.     The following vaccines given today:  Flu vax and pneumovax.    Vaccines were discussed and  declined        Calcium supplementation advised         Mammogram  Is advised beginning at age 40-pt to be responsible for getting this done       Labs done: see orders      I advised the following exams with specialists:    1. Dental evaluation yearly    2. Dermatology evaluation for mole exam yearly    3. Ophthalmology exam yearly to check for glaucoma, etc - she has had one recently.         Living will was discussed.         Followup in 3  months, sooner if concerns arise.   SHARAN Ford MD(electronic signature)

## 2024-08-21 NOTE — TELEPHONE ENCOUNTER
RECORDS RECEIVED FROM: internal   REASON FOR VISIT: , Neuropathy    PROVIDER: Dr. Ren Gonzalez   DATE OF APPT: 9/4/24   NOTES (FOR ALL VISITS) STATUS DETAILS   OFFICE NOTE from referring provider Internal Destini WOLFE @ [facility]:  5/10/24   OFFICE NOTE from other specialist [facility] Dr Hemalatha Oropeza @ [facility]:  3/19/24   MEDICATION LIST Internal    IMAGING  (FOR ALL VISITS)     MRI (HEAD, NECK, SPINE) Internal [facility]:  MRI Thoracic Spine 4/26/24  MRI Cervical Spine 4/26/24  MRI Brain 4/26/24  MRI Lumbar Spine 10/6/23  MRI Lumbar Spine 7/13/23  MRI Lumbar Spine 12/12/22   CT (HEAD, NECK, SPINE) Internal [facility]:  CT Lumbar Spine 4/11/24  CT Cervical Spine 1/24/22

## 2024-08-22 ENCOUNTER — OFFICE VISIT (OUTPATIENT)
Dept: FAMILY MEDICINE | Facility: CLINIC | Age: 44
End: 2024-08-22
Payer: COMMERCIAL

## 2024-08-22 ENCOUNTER — HOSPITAL ENCOUNTER (OUTPATIENT)
Dept: ULTRASOUND IMAGING | Facility: CLINIC | Age: 44
Discharge: HOME | End: 2024-08-22
Payer: COMMERCIAL

## 2024-08-22 VITALS
HEART RATE: 95 BPM | OXYGEN SATURATION: 100 % | SYSTOLIC BLOOD PRESSURE: 110 MMHG | DIASTOLIC BLOOD PRESSURE: 78 MMHG | HEIGHT: 64 IN | BODY MASS INDEX: 33.39 KG/M2 | RESPIRATION RATE: 20 BRPM | WEIGHT: 195.6 LBS

## 2024-08-22 DIAGNOSIS — R14.0 BLOATING: ICD-10-CM

## 2024-08-22 DIAGNOSIS — R19.7 DIARRHEA, UNSPECIFIED TYPE: ICD-10-CM

## 2024-08-22 DIAGNOSIS — R10.32 LLQ ABDOMINAL PAIN: ICD-10-CM

## 2024-08-22 DIAGNOSIS — K59.00 CONSTIPATION, UNSPECIFIED CONSTIPATION TYPE: Primary | ICD-10-CM

## 2024-08-22 DIAGNOSIS — R10.11 RUQ ABDOMINAL PAIN: ICD-10-CM

## 2024-08-22 DIAGNOSIS — R10.84 ABDOMINAL PAIN, GENERALIZED: ICD-10-CM

## 2024-08-22 DIAGNOSIS — K30 INDIGESTION: ICD-10-CM

## 2024-08-22 LAB
ALBUMIN SERPL BCG-MCNC: 4.4 G/DL (ref 3.5–5.2)
ALP SERPL-CCNC: 68 U/L (ref 40–150)
ALT SERPL W P-5'-P-CCNC: 22 U/L (ref 0–50)
AMYLASE SERPL-CCNC: 44 U/L (ref 28–100)
ANION GAP SERPL CALCULATED.3IONS-SCNC: 13 MMOL/L (ref 7–15)
AST SERPL W P-5'-P-CCNC: 13 U/L (ref 0–45)
BASOPHILS # BLD AUTO: 0.1 10E3/UL (ref 0–0.2)
BASOPHILS NFR BLD AUTO: 1 %
BILIRUB SERPL-MCNC: 1 MG/DL
BUN SERPL-MCNC: 13.8 MG/DL (ref 6–20)
CALCIUM SERPL-MCNC: 9.4 MG/DL (ref 8.8–10.4)
CHLORIDE SERPL-SCNC: 101 MMOL/L (ref 98–107)
CREAT SERPL-MCNC: 0.9 MG/DL (ref 0.51–0.95)
EGFRCR SERPLBLD CKD-EPI 2021: 81 ML/MIN/1.73M2
EOSINOPHIL # BLD AUTO: 0.1 10E3/UL (ref 0–0.7)
EOSINOPHIL NFR BLD AUTO: 1 %
ERYTHROCYTE [DISTWIDTH] IN BLOOD BY AUTOMATED COUNT: 12.4 % (ref 10–15)
GLUCOSE SERPL-MCNC: 117 MG/DL (ref 70–99)
HCO3 SERPL-SCNC: 24 MMOL/L (ref 22–29)
HCT VFR BLD AUTO: 46.3 % (ref 35–47)
HGB BLD-MCNC: 16.3 G/DL (ref 11.7–15.7)
IMM GRANULOCYTES # BLD: 0 10E3/UL
IMM GRANULOCYTES NFR BLD: 0 %
LIPASE SERPL-CCNC: 35 U/L (ref 13–60)
LYMPHOCYTES # BLD AUTO: 2.4 10E3/UL (ref 0.8–5.3)
LYMPHOCYTES NFR BLD AUTO: 24 %
MCH RBC QN AUTO: 31.5 PG (ref 26.5–33)
MCHC RBC AUTO-ENTMCNC: 35.2 G/DL (ref 31.5–36.5)
MCV RBC AUTO: 89 FL (ref 78–100)
MONOCYTES # BLD AUTO: 0.5 10E3/UL (ref 0–1.3)
MONOCYTES NFR BLD AUTO: 5 %
NEUTROPHILS # BLD AUTO: 7 10E3/UL (ref 1.6–8.3)
NEUTROPHILS NFR BLD AUTO: 69 %
NRBC # BLD AUTO: 0 10E3/UL
NRBC BLD AUTO-RTO: 0 /100
PLATELET # BLD AUTO: 227 10E3/UL (ref 150–450)
POTASSIUM SERPL-SCNC: 3.8 MMOL/L (ref 3.4–5.3)
PROT SERPL-MCNC: 7.7 G/DL (ref 6.4–8.3)
RBC # BLD AUTO: 5.18 10E6/UL (ref 3.8–5.2)
SODIUM SERPL-SCNC: 138 MMOL/L (ref 135–145)
WBC # BLD AUTO: 10.1 10E3/UL (ref 4–11)

## 2024-08-22 PROCEDURE — 85025 COMPLETE CBC W/AUTO DIFF WBC: CPT

## 2024-08-22 PROCEDURE — 99214 OFFICE O/P EST MOD 30 MIN: CPT

## 2024-08-22 PROCEDURE — 76700 US EXAM ABDOM COMPLETE: CPT

## 2024-08-22 PROCEDURE — 36415 COLL VENOUS BLD VENIPUNCTURE: CPT

## 2024-08-22 PROCEDURE — 82150 ASSAY OF AMYLASE: CPT

## 2024-08-22 PROCEDURE — 76830 TRANSVAGINAL US NON-OB: CPT

## 2024-08-22 PROCEDURE — 80053 COMPREHEN METABOLIC PANEL: CPT

## 2024-08-22 PROCEDURE — 76856 US EXAM PELVIC COMPLETE: CPT

## 2024-08-22 PROCEDURE — 83690 ASSAY OF LIPASE: CPT

## 2024-08-22 RX ORDER — OMEPRAZOLE 40 MG/1
40 CAPSULE, DELAYED RELEASE ORAL DAILY
Qty: 90 CAPSULE | Refills: 3 | Status: SHIPPED | OUTPATIENT
Start: 2024-08-22

## 2024-08-22 RX ORDER — OXYCODONE HYDROCHLORIDE 5 MG/1
5 TABLET ORAL EVERY 6 HOURS PRN
Qty: 12 TABLET | Refills: 0 | Status: SHIPPED | OUTPATIENT
Start: 2024-08-22

## 2024-08-22 ASSESSMENT — PATIENT HEALTH QUESTIONNAIRE - PHQ9
SUM OF ALL RESPONSES TO PHQ QUESTIONS 1-9: 5
10. IF YOU CHECKED OFF ANY PROBLEMS, HOW DIFFICULT HAVE THESE PROBLEMS MADE IT FOR YOU TO DO YOUR WORK, TAKE CARE OF THINGS AT HOME, OR GET ALONG WITH OTHER PEOPLE: NOT DIFFICULT AT ALL
SUM OF ALL RESPONSES TO PHQ QUESTIONS 1-9: 5

## 2024-08-22 ASSESSMENT — PAIN SCALES - GENERAL: PAINLEVEL: MODERATE PAIN (5)

## 2024-08-22 NOTE — RESULT ENCOUNTER NOTE
Hello -    Here are my comments about the recent results.  Your hemoglobin is elevated.  Otherwise your complete blood count is normal    Please let us know if you have any questions or concerns.    Regards,  Leslie Acuña PA-C

## 2024-08-22 NOTE — RESULT ENCOUNTER NOTE
Hello -    Here are my comments about the recent results.  Prior right hysterectomy with right ovary removal.  There is no definite visualized explanation for patient's symptoms.    Please let us know if you have any questions or concerns.    Regards,  Leslie Acuña PA-C

## 2024-08-22 NOTE — PATIENT INSTRUCTIONS
Plan  - Blood work to assess liver function and other potential issues.   - Ultrasound of the liver and abdominal area to assess for any abnormalities, including potential issues with the liver or ovary.   - Increase omeprazole to 40 mg daily to help with indigestion and stomach pain.   - Refill of pain medication (Oxy prescription), limited to 12 at a time.   - Follow-up appointment to discuss results and next steps.   - If pain worsens or does not improve, patient should return to the clinic or go to the ER.   - Patient to avoid Tylenol and alcohol until liver function results are available.   - Patient to avoid Advil and Aleve until further notice.  -Gas X OTC  - miralax 1 capful daily as needed for constipation    Follow-up with your primary care provider next week  Follow-up sooner with any new or worsening symptoms  Go to urgent care or ER over the weekend with any new or worsening symptoms    [place]    Patient understood and verbally consented to the treatment plan. Discussed symptoms that would warrant an urgent or emergent visit. All of the patients' questions were answered. Patient was instructed to contact the clinic if questions or concerns arise. Recommend follow up appointments if symptoms worsen or fail to improve. Recommend follow up as needed. Recommend ER in the case of an emergency.    Leslie Acuña PA-C    Please note: Voice recognition software may have been used in preparing this note, unintended word substitutions may be present.

## 2024-08-22 NOTE — RESULT ENCOUNTER NOTE
Hello -    Here are my comments about the recent results. IMPRESSION:  1.  Hepatic steatosis.  (Fatty liver)  2.  No definitive cause for abdominal pain is identified.      Please let us know if you have any questions or concerns.    Regards,  Leslie Acuña PA-C

## 2024-08-22 NOTE — PROGRESS NOTES
Assessment & Plan     Constipation, unspecified constipation type    Abdominal pain, generalized  - Comprehensive metabolic panel; Future  - CBC with Platelets & Differential; Future  - Amylase; Future  - Lipase; Future  - US Abdomen Complete; Future  - oxyCODONE (ROXICODONE) 5 MG tablet; Take 1 tablet (5 mg) by mouth every 6 hours as needed for severe pain.    Bloating    Diarrhea, unspecified type    RUQ abdominal pain    LLQ abdominal pain  - POC US PELVIC NON-OB LIMITED; Future    Indigestion  - omeprazole (PRILOSEC) 40 MG DR capsule; Take 1 capsule (40 mg) by mouth daily.      I spent a total of 22 minutes on the day of the visit.   Time spent by me doing chart review, history and exam, documentation and further activities per the note        See Patient Instructions  Patient Instructions   Plan  - Blood work to assess liver function and other potential issues.   - Ultrasound of the liver and abdominal area to assess for any abnormalities, including potential issues with the liver or ovary.   - Increase omeprazole to 40 mg daily to help with indigestion and stomach pain.   - Refill of pain medication (Oxy prescription), limited to 12 at a time.   - Follow-up appointment to discuss results and next steps.   - If pain worsens or does not improve, patient should return to the clinic or go to the ER.   - Patient to avoid Tylenol and alcohol until liver function results are available.   - Patient to avoid Advil and Aleve until further notice.  -Gas X OTC  - miralax 1 capful daily as needed for constipation    Follow-up with your primary care provider next week  Follow-up sooner with any new or worsening symptoms  Go to urgent care or ER over the weekend with any new or worsening symptoms    [place]    Patient understood and verbally consented to the treatment plan. Discussed symptoms that would warrant an urgent or emergent visit. All of the patients' questions were answered. Patient was instructed to contact  the clinic if questions or concerns arise. Recommend follow up appointments if symptoms worsen or fail to improve. Recommend follow up as needed. Recommend ER in the case of an emergency.    Leslie Acuña PA-C    Please note: Voice recognition software may have been used in preparing this note, unintended word substitutions may be present.      Subjective   Fallon is a 44 year old, presenting for the following health issues:  Abdominal Pain        8/22/2024    10:01 AM   Additional Questions   Roomed by Nicolás GRANADO     History of Present Illness       Reason for visit:  Abdominal pain with severe gas and bloating for over a month  Symptom onset:  More than a month  Symptoms include:  Abdominal pain joint pain, gas mix between diarrhea and constipation  Symptom intensity:  Moderate  Symptom progression:  Worsening  Had these symptoms before:  No  What makes it worse:  Eating, drinking  What makes it better:  Not really   She is taking medications regularly.     Joint pain- in the right shoulder that she is concerned about, can't sleep on that side or her hands go numb.     Subjective  The patient reports experiencing severe pain in the liver area for the past month and a half, which has been progressively worsening. The pain is so severe that it has become unbearable. The patient has been taking high doses of ibuprofen and muscle relaxers to manage the pain, but has recently stopped taking ibuprofen. This has resulted in more flare-ups as the pain is not being controlled.     The patient is also experiencing indigestion, lightheadedness, gas, and constipation. The constipation varies from being severe to the point of passing pencil-thin stools to having diarrhea. The patient has been trying to manage these symptoms with tea, gas ex, and over-the-counter stomach ease from [employer]. The patient also takes omeprazole in the morning. The patient reports that the indigestion has been lasting longer than it ever has  before.    The patient has had scopings done in the past due to similar symptoms. The patient also reports pain in the area where the ovary is located. The patient has a history of polycystic ovary syndrome and has experienced similar pain when a large cyst was present. The patient is unsure if the current pain is due to a large cyst or another issue.    The patient has been spending more time in bed due to the pain and has even had to cancel physical therapy sessions. The patient has been prescribed Oxy for severe pain but tries to use it sparingly. The patient has four pills left and gets 12 at a time. The patient is waiting for a neurologist appointment on September 4th for further testing.    The patient has stopped taking Aleve due to experiencing rectal bleeding. The patient 's liver enzymes, ALT and AST, have been elevated but not to a concerning level. The patient has not had these levels tested in about a year. The patient also has an allergy to Amytal, which causes severe nausea and vomiting. The patient also breaks out in hives when taking anything closely related to penicillins or amoxicillins. The patient has had negative reactions to Zofran and Marquis, but can take Dramamine.    The patient has a history of fatty liver and has had an abdominal scan done in the past when experiencing similar pain. The patient also has a history of kidney stones but has not had one in a long time. The patient 's gallbladder, appendix, uterus, cervix, fallopian tubes, and right ovary have been removed. The patient 's grandmother has had gallstones in her gallbladder, and the patient is concerned about having a similar issue.    The patient has been on omeprazole for 15-20 years and has not had to increase the dosage until now. The patient is open to increasing the dosage to 40 mg daily. The patient is also requesting a refill of pain medication. The patient is open to having a CT scan but prefers an ultrasound due to a  "family member working at the hospital and not wanting them to know about the current health issues. The patient is also open to having blood work done.    Objective  Physical exam: Abdominal pain in the liver area, pain in the area of the left ovary, muscle twitching, difficulty getting out of bed due to pain, lightheadedness, constipation alternating with diarrhea, indigestion, rapid satiety, pain during deep breathing    Assessment  - Chronic abdominal pain, particularly in the liver area, possibly related to high ibuprofen use and potential liver issues. Patient has a history of fatty liver and elevated liver enzymes (ALT and AST).   - Pain in the area of the left ovary, possibly related to a history of polycystic ovary syndrome and cysts.   - Constipation alternating with diarrhea, indigestion, and rapid satiety, possibly related to gastrointestinal issues.   - Lightheadedness, possibly related to pain or gastrointestinal issues.   - Muscle twitching and difficulty getting out of bed due to pain, possibly related to neurological issues. Patient is awaiting further testing from a neurologist.    Plan  - Blood work to assess liver function and other potential issues.   - Ultrasound of the liver and abdominal area to assess for any abnormalities, including potential issues with the liver or ovary.   - Increase omeprazole to 40 mg daily to help with indigestion and stomach pain.   - Refill of pain medication (Oxy prescription), limited to 12 at a time.   - Follow-up appointment to discuss results and next steps.   - If pain worsens or does not improve, patient should return to the clinic or go to the ER.   - Patient to avoid Tylenol and alcohol until liver function results are available.   - Patient to avoid Advil and Aleve until further notice.                  Objective    /78   Pulse 95   Resp 20   Ht 1.626 m (5' 4\")   Wt 88.7 kg (195 lb 9.6 oz)   LMP 07/25/2020 (Exact Date)   SpO2 100%   BMI 33.57 " kg/m    Body mass index is 33.57 kg/m .  Physical Exam     Tender to palpation in the right upper quadrant and left lower quadrant.  Positive Dozier sign.  Office Visit on 07/09/2024   Component Date Value Ref Range Status    Human Papilloma Virus 16 DNA 07/09/2024 Negative  Negative Final    Human Papilloma Virus 18 DNA 07/09/2024 Negative  Negative Final    Human Papilloma Virus Other 07/09/2024 Negative  Negative Final    FINAL DIAGNOSIS 07/09/2024    Final                    Value:This result contains rich text formatting which cannot be displayed here.    Hemoglobin A1C 07/09/2024 6.5 (H)  <5.7 % Final    Normal <5.7%   Prediabetes 5.7-6.4%    Diabetes 6.5% or higher     Note: Adopted from ADA consensus guidelines.    Cholesterol 07/09/2024 198  <200 mg/dL Final    Triglycerides 07/09/2024 121  <150 mg/dL Final    Direct Measure HDL 07/09/2024 56  >=50 mg/dL Final    LDL Cholesterol Calculated 07/09/2024 118 (H)  <=100 mg/dL Final    Non HDL Cholesterol 07/09/2024 142 (H)  <130 mg/dL Final    Patient Fasting > 8hrs? 07/09/2024 Yes   Final    Creatinine Urine mg/dL 07/09/2024 77.7  mg/dL Final    The reference ranges have not been established in urine creatinine. The results should be integrated into the clinical context for interpretation.    Albumin Urine mg/L 07/09/2024 <12.0  mg/L Final    The reference ranges have not been established in urine albumin. The results should be integrated into the clinical context for interpretation.    Albumin Urine mg/g Cr 07/09/2024    Final    Unable to calculate, urine albumin and/or urine creatinine is outside detectable limits.  Microalbuminuria is defined as an albumin:creatinine ratio of 17 to 299 for males and 25 to 299 for females. A ratio of albumin:creatinine of 300 or higher is indicative of overt proteinuria.  Due to biologic variability, positive results should be confirmed by a second, first-morning random or 24-hour timed urine specimen. If there is  discrepancy, a third specimen is recommended. When 2 out of 3 results are in the microalbuminuria range, this is evidence for incipient nephropathy and warrants increased efforts at glucose control, blood pressure control, and institution of therapy with an angiotensin-converting-enzyme (ACE) inhibitor (if the patient can tolerate it).      BRENNEN interpretation 07/09/2024 Borderline Positive (A)  Negative Final      Negative:              <1:40  Borderline Positive:   1:40 - 1:80  Positive:              >1:80    BRENNEN pattern 1 07/09/2024 Speckled   Final    BRENNEN titer 1 07/09/2024 1:40   Final    Estradiol 07/09/2024 24  pg/mL Final    Healthy Men:   11.3-43.2 pg/mL    Healthy Postmenopausal Women:  Postmenopause: <5-138 pg/mL    Healthy Pregnant Women:  1st trimester: 154-3243 pg/mL  2nd trimester: 1561-62184 pg/mL  3rd trimester: 8525->00860 pg/mL    Healthy Women Cycle Phase:  Follicular: 30.9-90.4 pg/mL  Ovulation: 60.4-533 pg/mL  Luteal: 60.4-232 pg/mL    Healthy Women Cycle Sub-Phase:  Early Follicular: 20.5-62.8 pg/mL  Intermediate Follicular: 26-79.8 pg/mL  Late Follicular: 49.5-233 pg/mL  Ovulation: 60.4-602 pg/mL  Early Luteal: 51.1-179 pg/mL  Intermediate Luteal: 66.5-305 pg/mL  Late Luteal: 30.2-222 pg/mL    Progesterone 07/09/2024 0.3  ng/mL Final    Healthy Postmenopausal Women  Postmenopause: <0.1-0.126  Healthy Pregnant Women  1st Trimester: 11.0-44.3  2nd Trimester: 25.4-83.4  3rd Trimester: 58.7-214  Healthy Women Cycle Phase  Follicular: <0.1-0.2  Ovulation: 0.1-4.1  Luteal: 4.1-14.5  Healthy Women Cycle Sub Phase  Early Follicular: <0.1-0.3  Intermediate Follicular: <0.1-0.2  Late Follicular: <0.1-0.2  Ovulation: <0.1-2.4  Early Luteal: 2.4-15.1  Intermediate Luteal: 4.8-20.9  Late Luteal: 0.5-13.5    FSH 07/09/2024 11.2  mIU/mL Final    19 years and older:   Follicular phase: 3.5-12.5 mIU/mL   Ovulation phase: 4.7-21.5 mIU/mL   Luteal phase: 1.7-7.7 mIU/mL   Postmenopause: 25.8-134.8 mIU/mL        Color Urine 07/09/2024 Yellow  Colorless, Straw, Light Yellow, Yellow Final    Appearance Urine 07/09/2024 Clear  Clear Final    Glucose Urine 07/09/2024 >499 (A)  Negative mg/dL Final    Bilirubin Urine 07/09/2024 Negative  Negative Final    Ketones Urine 07/09/2024 Negative  Negative mg/dL Final    Specific Gravity Urine 07/09/2024 1.026  1.003 - 1.035 Final    Blood Urine 07/09/2024 Negative  Negative Final    pH Urine 07/09/2024 5.0  5.0 - 7.0 Final    Protein Albumin Urine 07/09/2024 Negative  Negative mg/dL Final    Urobilinogen Urine 07/09/2024 Normal  Normal, 2.0 mg/dL Final    Nitrite Urine 07/09/2024 Negative  Negative Final    Leukocyte Esterase Urine 07/09/2024 Negative  Negative Final    Interpretation 07/09/2024 Negative for Intraepithelial Lesion or Malignancy (NILM)    Final    Comment 07/09/2024    Final                    Value:This result contains rich text formatting which cannot be displayed here.    Specimen Adequacy 07/09/2024 Satisfactory for evaluation, endocervical/transformation zone component absent   Final    Clinical Information 07/09/2024    Final                    Value:This result contains rich text formatting which cannot be displayed here.    Previous Abnormal? 07/09/2024    Final                    Value:This result contains rich text formatting which cannot be displayed here.    Performing Labs 07/09/2024    Final                    Value:This result contains rich text formatting which cannot be displayed here.     No results found for any visits on 08/22/24.  No results found.        Signed Electronically by: Leslie Acuña PA-C

## 2024-08-29 ENCOUNTER — TELEPHONE (OUTPATIENT)
Dept: FAMILY MEDICINE | Facility: CLINIC | Age: 44
End: 2024-08-29
Payer: COMMERCIAL

## 2024-08-29 NOTE — TELEPHONE ENCOUNTER
Patient Quality Outreach    Patient is due for the following:   Diabetes -  A1C and LDL (Fasting)    Next Steps:   Patient has upcoming appointment, these items will be addressed at that time.    Type of outreach:    Chart review performed, no outreach needed.      Questions for provider review:    None           Marilu Knight, CMA

## 2024-09-04 ENCOUNTER — PRE VISIT (OUTPATIENT)
Dept: NEUROLOGY | Facility: CLINIC | Age: 44
End: 2024-09-04

## 2024-09-04 ENCOUNTER — LAB (OUTPATIENT)
Dept: LAB | Facility: CLINIC | Age: 44
End: 2024-09-04
Payer: COMMERCIAL

## 2024-09-04 ENCOUNTER — OFFICE VISIT (OUTPATIENT)
Dept: NEUROLOGY | Facility: CLINIC | Age: 44
End: 2024-09-04
Attending: PHYSICIAN ASSISTANT
Payer: COMMERCIAL

## 2024-09-04 VITALS
OXYGEN SATURATION: 99 % | DIASTOLIC BLOOD PRESSURE: 84 MMHG | SYSTOLIC BLOOD PRESSURE: 128 MMHG | RESPIRATION RATE: 16 BRPM | HEART RATE: 114 BPM

## 2024-09-04 DIAGNOSIS — R25.3 FASCICULATIONS OF MUSCLE: Primary | ICD-10-CM

## 2024-09-04 DIAGNOSIS — G37.9 DEMYELINATING DISEASE (H): ICD-10-CM

## 2024-09-04 DIAGNOSIS — R25.3 FASCICULATIONS OF MUSCLE: ICD-10-CM

## 2024-09-04 LAB
Lab: NORMAL
PERFORMING LABORATORY: NORMAL
SPECIMEN STATUS: NORMAL
TEST NAME: NORMAL

## 2024-09-04 PROCEDURE — 36415 COLL VENOUS BLD VENIPUNCTURE: CPT | Performed by: PATHOLOGY

## 2024-09-04 PROCEDURE — 83519 RIA NONANTIBODY: CPT | Performed by: PATHOLOGY

## 2024-09-04 PROCEDURE — 86596 VOLTAGE-GTD CA CHNL ANTB EA: CPT | Performed by: PATHOLOGY

## 2024-09-04 PROCEDURE — 86255 FLUORESCENT ANTIBODY SCREEN: CPT | Performed by: PATHOLOGY

## 2024-09-04 PROCEDURE — 99205 OFFICE O/P NEW HI 60 MIN: CPT | Mod: GC | Performed by: PSYCHIATRY & NEUROLOGY

## 2024-09-04 ASSESSMENT — PAIN SCALES - GENERAL: PAINLEVEL: MILD PAIN (3)

## 2024-09-04 NOTE — PROGRESS NOTES
"Chief Complaint: leg numbness, intermittent leg weakness, twitching, spasms     History of Present Illness:    Fallon Rivera is a 44 year old woman who we are seeing at the request of Destini Esparza PA-C for evaluation of demyelinating disease. However, patient was also seen by outside neurologist Dr. Oropeza who recommended neuromuscular specialist referral for evaluation of muscle spasm, intermittent weakness, twitching and sensory changes.     Symptoms started about 6 years ago, after first back surgery post laminectomy for radiculopathy for sciatic neuropathy. She had lost control of bowel movement once. She has residual numbness of lateral L leg extending down to left 4th - 5th digits. After this surgery, she and family (, daughter) started to notice decrease muscle bulk. Shortly after within 1 month of surgery, she noticed constant twitching sensation of her L leg. She has performed PT after all her back surgeries and PT has noticed that balance has progressively worsened. After the first back surgery, bowel incontinence improves. She clarifies that bowel > bladder incontinence happens intermittently, not constantly - typically during \"bad days\" of stress / severe twitches. At times she senses the urge to go but other times she does not. No loss of sensation in perineal area. She has had hysterectomy in the past with stress incontinence and intermittently feels she may not have orgasm.     4 years ago, she started to feel light headed - not worsened with different postures, as though she was about to pass out - this only occurs during prolonged workouts / severe stress. She also developed joint pain which would be debilitating to the point where she would just have to lay in bed the whole day. She had rheumatology workup which was reportedly unremarkable.     About 1 year ago, she then developed R leg twitching for which her and her family feel there has been muscle loss. Periodic paresthesias in R leg " "but no permanent loss of sensation.    This year, she developed twitching in her R arm and underneath her chest. Nothing in the left arm for now. At times, she has also not been able to sleep well because she feels her muscles are continually twitching. However, she does notice when she's worn out from PT she can sleep for long durations of time.     Patient used to walk regularly and do family hikes; now has good days and bad days. Family is concerned about progressive loss of muscle bulk. She will feel that her legs are weak on \"bad days\" and will end up slipping down the stairs; bad days also associated with significant muscle twitching. Unclear what triggers a \"bad day\". Doesn't necessarily feel it's always worse after a long workout, if she misses meals, or if dehydrated.    She is on muscle relaxers to help, this is mildly helpful. When she has the twitching and tight episodes, it gets so bothersome that she feels she cannot get her words out. She feels she has had hyper-reflexes for the past 10 years. Has tried requip (50 mg) in the past but that was not helpful and led to weird dreams. When she has muscle twitching flare ups, does endorse breathing difficulties - hurts to take a deep breath. No orthopnea. Appetite fluctuates, not necessarily dependent on her \"good\"  or \"bad\" days. Has had 20-30 pound weight loss since being on Jardiance.    Patient endorses 2-3 migraine throughout her life but has not had one for the past 10 years.    There is tenderness to muscle palpation throughout her entire body. Endorses muscle stiffness. No recent blood in urine. Has had prior hematuria in setting of nephrolithiasis.    Prior pertinent laboratory work-up:  2/2022: Normal/negative RF, SSb, CRP. ESR, CK (48)  3/2022: SSa 8.6. Negative/normal DS DNA, C3, C4  7/9/24: Hba1c 6.5. BRENNEN 1:40     Prior pertinent imaging:  10/6/23: MRI LS spine showed postoperative changes at L5-S1. There is enhancing granulation tissue " identified in the dorsal laminotomy bed extending about the traversing left S1 nerve root.  There is slight contact and mass effect suggested upon the exiting left L5 and traversing proximal left S1 nerve root due to a nonenhancing hypointense T1/T2 signal process in the left medial foraminal zone unchanged from a prior study.   4/26/24: MRI C/T spine showed generally mild degenerative changes. There was a posterior disc herniations at the C4-C5 and  C5-C6 levels that mildly indent the anterior aspect of the cervical spinal cord. No spinal canal or high grade neural foraminal stenosis. No spinal cord signal change.  4/26/24: MRI brain showed nonspecific cerebral white matter changes      Prior electrophysiologic work-up:  None    Past Medical History:    Past Medical History:   Diagnosis Date    Abnormal Pap smear 2006, 2007,     ASC-H, ASCUS + HPV 45    Calculus of kidney 01/01/2002    Cervical high risk HPV (human papillomavirus) test positive     + HPV 45 (high risk)    History of colposcopy with cervical biopsy 2/9/06, 3/15/07    koilocytosis 2007     Past Surgical History:  Past Surgical History:   Procedure Laterality Date    ANESTHESIA OUT OF OR MRI N/A 4/26/2024    Procedure: MRI OF Brain,Cervical and Thoracic Spine;  Surgeon: GENERIC ANESTHESIA PROVIDER;  Location: [facility]    CHOLECYSTECTOMY, LAPOROSCOPIC  5/11/2007    Cholecystectomy, Laparoscopic    COLONOSCOPY  05/17/10    COLONOSCOPY N/A 8/27/2019    Procedure: COLONOSCOPY;  Surgeon: Paramjit Kingston DO;  Location:  GI    COLONOSCOPY N/A 10/23/2020    Procedure: COLONOSCOPY, WITH  BIOPSY;  Surgeon: Ba Dickinson MD;  Location:  GI    CONIZATION  7/1/2011    Procedure:CONIZATION; cold knife and punch biopsy of mole on back; Surgeon:SYDNEY GRAFF; Location: OR    DILATION AND CURETTAGE, HYSTEROSCOPY, LAPAROSCOPY, COMBINED Right 9/24/2015    Procedure: COMBINED DILATION AND CURETTAGE, HYSTEROSCOPY, LAPAROSCOPY;  Surgeon:  Sydney Ford MD;  Location: [facility]    DISCECTOMY LUMBAR MINIMALLY INVASIVE ONE LEVEL Left 1/23/2019    Procedure: Minimally Invasive Surgery Left Lumbar 5-Sacral 1 microdiscectomy;  Surgeon: Mason Roe MD;  Location: [facility]    DISCECTOMY LUMBAR POSTERIOR MICROSCOPIC ONE LEVEL Left 4/5/2023    Procedure: Minimally Invasive Left Lumbar 5 to Sacral 1 evacuation of recurrent disc herniation;  Surgeon: Mason Roe MD;  Location: [facility]    ESOPHAGOSCOPY, GASTROSCOPY, DUODENOSCOPY (EGD), COMBINED  5/21/2014    Procedure: COMBINED ESOPHAGOSCOPY, GASTROSCOPY, DUODENOSCOPY (EGD), BIOPSY SINGLE OR MULTIPLE;  Surgeon: Earl Lane MD;  Location: [facility]    ESOPHAGOSCOPY, GASTROSCOPY, DUODENOSCOPY (EGD), COMBINED N/A 10/23/2020    Procedure: Esophagogastroduodenoscopy with  biopsy;  Surgeon: Ba Dickinson MD;  Location: [facility]    EXCISE LESION TRUNK  7/1/2011    Procedure:EXCISE LESION TRUNK; Surgeon:SYDNEY FORD; Location:[facility]    FORAMINOTOMY LUMBAR POSTERIOR ONE LEVEL Left 4/5/2023    Procedure: left foraminotomy Lumbar 5 to Sacral 1;  Surgeon: Mason Roe MD;  Location: [facility]    HC FRAGMENTING OF KIDNEY STONE  11/30/2005    HC RX ECTOP PREG BY SCOPE,RMV TUBE/OVRY  12/20/2007    Right sided salpingectomy and removal of ruptured ectopic pregnancy. D&C.    HYSTERECTOMY VAGINAL      LAPAROSCOPIC APPENDECTOMY N/A 9/24/2015    Procedure: LAPAROSCOPIC APPENDECTOMY;  Surgeon: James Cuellar MD;  [facility]: [facility]    LAPAROSCOPIC CYSTECTOMY OVARIAN (BENIGN) N/A 9/24/2015    Procedure: LAPAROSCOPIC CYSTECTOMY OVARIAN (BENIGN);  Surgeon: Sydney Ford MD;  [facility]: [facility]    LAPAROSCOPIC HYSTERECTOMY TOTAL, BILATERAL SALPINGO-OOPHORECTOMY, COMBINED N/A 7/28/2020    Procedure: laparoscpic assisted vaginal hysterectomy, cystoscopy;  Surgeon: Sydney Ford MD;  Location: [facility]    LAPAROSCOPIC OOPHORECTOMY Right 9/24/2015    Procedure: LAPAROSCOPIC  OOPHORECTOMY;  Surgeon: Greg Ford MD;  Location: [facility]    LITHOTRIPSY  01/17/2007    PELVIS LAPAROSCOPY,DX  10/16/2000    Diagnostic laparoscopy, Endometrial biopsy.    TUBAL/ECTOPIC PREGNANCY  01/23/2007    Lap removal of left ruptured ectopic pregnancy & salpingectomy, D&C    [facility] REMOVAL OF KIDNEY STONE      two surgeries, 2002,2003    [facility]  [facility]     Family history:    There is no known family history of myopathy or other neuromuscular disorders but patient states family does not discuss much  -Dad with multiple TIA's. Stroke runs in paternal side of family.   -Maternal side with history of brain cancer (great-aunt). Mom with history of PMR (polymyalgia rheumatica)   -Grandmother with Alzheimer's in older age (70 - 80's)      Social History:    -Smokes cigarettes when stressed - on average 1-2 cigarettes at night.   -Rare alcohol use, once / year  -No recreational drug use has not tried marijuana    -Is a housewife, participates in craft fairs, [employer]     Medical Allergies:    Allergies   Allergen Reactions    Amoxicillin Hives    Emetrol      Anxiety, agitation,severe paranoia. Zofran, Reglan are some of them.  DRAMAMINE WITHOUT ISSUES      Indomethacin Nausea and Vomiting    Macrobid [Nitrofuran Derivatives] Hives    Meperidine Hcl     Metoclopramide Other (See Comments)     Acute paranoia, severe    Nitrofurantoin Hives    Ondansetron Other (See Comments)     Severe anxiety, agitation    Vancomycin Other (See Comments)     maria del rosario syndrome    Vancomycin Other (See Comments)     maria del rosario syndrome     Current Medications:    Current Outpatient Medications   Medication Sig Dispense Refill    blood glucose (NO BRAND SPECIFIED) test strip Use to test blood sugar 3 times daily or as directed. To accompany: Blood Glucose Monitor Brands: per insurance. 300 strip 6    blood glucose monitoring (NO BRAND SPECIFIED) meter device kit Use to test blood sugar 3 times daily or as  directed. Preferred blood glucose meter OR supplies to accompany: Blood Glucose Monitor Brands: per insurance. 1 kit 0    empagliflozin (JARDIANCE) 10 MG TABS tablet TAKE ONE TABLET (10 MG) BY MOUTH DAILY 90 tablet 1    metFORMIN (GLUCOPHAGE XR) 500 MG 24 hr tablet TAKE 2 TABLETS (1,000 MG) BY MOUTH 2 TIMES DAILY (WITH MEALS) 360 tablet 1    omeprazole (PRILOSEC) 20 MG DR capsule TAKE ONE CAPSULE BY MOUTH ONCE DAILY, 30 TO 60 MINUTES BEFORE A MEAL. 90 capsule 4    omeprazole (PRILOSEC) 40 MG DR capsule Take 1 capsule (40 mg) by mouth daily. 90 capsule 3    oxyCODONE (ROXICODONE) 5 MG tablet Take 1 tablet (5 mg) by mouth every 6 hours as needed for severe pain. 12 tablet 0    STATIN NOT PRESCRIBED (INTENTIONAL) Please choose reason not prescribed from choices below.      thin (NO BRAND SPECIFIED) lancets Use with lanceting device. To accompany: Blood Glucose Monitor Brands: per insurance. 100 each 6    tiZANidine (ZANAFLEX) 4 MG tablet Take 1-2 tablets (4-8 mg) by mouth 3 times daily as needed for muscle spasms 60 tablet 2     No current facility-administered medications for this visit.     Review of Systems: A complete review of systems was obtained and was negative except for what was noted above.     Physical examination:    /84   Pulse 114   Resp 16   LMP 07/25/2020 (Exact Date)   SpO2 99%      General Appearance: NAD    Skin: There are no rashes or other skin lesions.    Musculoskeletal:  There is no pes cavus, or hammertoes.    Neurologic examination:    Mental status:  Patient is alert, attentive, and oriented x 3.  Language is coherent and fluent without aphasia.  Memory, comprehension and ability to follow commands were intact.       Cranial nerves:  Pupils were round and reacted to light.  Extraocular movements were full. VFF. There was no face, jaw, palate or tongue weakness or atrophy. No dysarthria. Hearing was grossly intact.  Shoulder shrug was normal.       Motor exam: No atrophy. A few  fasciculations noted in the left calf. No fasciculations noted in other limbs. No mounding or rippling of muscle.  No action or percussion myotonia or paramyotonia.  Manual muscle testing revealed the following MRC grade muscle power:   Right Left   Shoulder abduction:  5 5   Elbow extension: 5 5   Elbow flexion:  5 5   Wrist flexion:  5 5   Wrist extension:  5 5   Finger flexion 5 5   Finger abduction:  5 5   FDI 5 5   APB 5 5   Hip flexion 4+ 4+   Hip extension 5 5   Knee flexion 5 5   Knee extension 5 5   Dorsiflexion 5 5   Plantar flexion 5 5   Ankle Inversion 5 5   Ankle Eversion 5 5     Coordination: No ataxia with FNF or heel to shin    Sensory exam: Pin reduced in both legs below mid calf. Vibration only slightly reduced in the toes. Normal in fingers.     Gait: Not formally tested today, did not require an assistive device to ambulate to clinic     Deep tendon reflexes:   Right Left   Triceps 2 2   Biceps 3 3   Brachioradialis 3 3   Knee jerk 3 3   Ankle jerk 1 0   Plantar responses were flexor bilaterally.  Jaw jerk normal. Vivas Absent.      Assessment:    Fallon Rivera is a 44 year old woman with history of L S1 radiculopathy s/p laminectomy who presents as a new consult for muscle spasms and concerns regarding possible demyelinating disease; exam and patient's video revealed intermittent L calf fasciculations and absent L achilles reflex, which can all be explained by the patient's known chronic L S1 radiculopathy. However, patient subjectively endorses subacute to chronic spread of fasciculations in the past year, now including her R leg and R arm (not visualized on exam today). Differential for more generalized causes of fasciculations include benign fasciculation syndrome, fasciculations as a result of metabolic abnormalities (dehydration, missing meals, etc.), and hyperexcitable nerve conditions. Additionally, the patient has clinical findings of length dependent sensory polyneuropathy  "affecting her distal legs from her feet up to about her mid-calves, likely as a result of chronic diabetic neuropathy. This is likely leading to progressive concerns regarding imbalance.    At present, suspect that patient has predominant L calf fasciculations as a result of L S1 radiculopathy with likely overlay of more generalized, normal fasciculations as a result of various metabolic abnormalities v.s. benign fasciculations syndrome. Will investigate further with EMG. Although suspect this is much less likely, to be thorough, will check for VGKC and if needed associated antibodies to assess for hyperexcitable nerve disorders. If workup is overall unremarkable aside from expected findings of chronic L S1 radiculopathy and changes attributable to diabetic neuropathy, can provide patient thorough re-assurance that her fasciculations are benign.      Of note, the patient carries a diagnosis of \"demyelinating disease\" in her chart; however, the white matter lesions in her brain are non-specific, not clearly periventricular and can be seen with small vessel ischemic disease in setting of chronic cardiovascular risk factors such as diabetes and HLD. Her clinical history and exam are not suggestive of multiple sclerosis; do not feel that she actually has demyelinating disease.    Plan:      1. Labs: VGKC ab with reflex to LGI1 and CASPR2 if positive  2. Nerve conduction studies/EMG: Evaluate for length dependent polyneuropathy and left S1 radiculopathy. Will also look for more side spread peripheral nerve hyper-excitability disorder  3. If back pain returns then will ask her to follow up with [facility] for further management of left S1 radiculopathy  4. Encourage normalization of blood sugars to minimize risk of neuropathy worsening  5. No specific intervention is needed for fasciculations as these are likely benign. They may be more prominent after exercise, caffeine ingestion, skipped meals or other activities that result " in metabolic disturbances.  This is a normal phenomenon. Pending EMG will comment further.   6. We have not arranged follow up in the neuropathy clinic, but will comment further pending results of above and arrange follow up if needed.     Rosalinda Lo MD  PGY-4 Neurology Resident     Seen and discussed with Dr. Gonzalez. His addendum follows.   ---    ADDENDUM:  I personally interviewed and examined the patient. I agree with the history, physical, assessment, and plan as documented above. Changes to the physical examination, assessment and plan have been incorporated into the note by myself, as to make it a single cohesive document. In brief, this 44 year old woman has clincial and radiographic evidence of a left-sided S1 radiculopathy. This is chronic, and at this point she is without back pain. The chronic S1 radiculopathy is superimposed upon a mild length-dependant secondary to her long standing diabetes. She also endorses fasciculations, which are predominantly in her left calf (these may be a consequence of the old S1 radiculopathy) but she also notices them in other places. Her fasciculations are likely benign. We discussed factors that may lead to more prominent fasciculations. These include exercise, caffeine ingestion, skipped meals or other activities that result in metabolic disturbances. This is a normal phenomenon. They can also be seen after an old radicuopathy and may be ore common (especially in the calves) in those with polyneuropathy. We note that her upper limb DTR are on the brisk side, but otherwise we do not find neurologic signs suggestive of a widespread neuromuscular disorder that can have fasciculations as part of the symptomatology. CNS imaging has been performed and other than some modest nonspecific white matter changes did not provide an explanation for her symptoms. Although our suspicion for a generalized neuromuscular disorder is low, we would like to explore this further. NCS/EMG will  be very helpful here. We would also like to obtain VGKC ab, and if those are positive reflex to LGI-1 and CASPR2 antibody testing. She does not harbor many of the symptoms that those with VGKC complex hyper-excitability disorders typically manifest - but these are nonetheless worth exploring. Should these antibodies be absent and if the EMG does not show widespread peripheral nerve hyperexcitable then this would lend further support to a benign explanation for her fasciculations. This would be hopefully provide her with some reassurance. We will comment further on this and additional work up, if needed, pending the laboratory and EMG results.     Perfecto Gonzalez MD    9/25/24: NCS/EMG was normal. No electrophysiologic evidence of a focal neuropathy, radiculopathy, or disorder of peripheral nerve hyperexcitability.

## 2024-09-04 NOTE — PROGRESS NOTES
Chief Complaint    History of Present Illness:    Fallon Rivera is a 44 year old woman who we are seeing at the request of XXX for evaluation of XXX    She denies atrophy, cramps, fasciculations, myalgias, dysarthria, dysphagia, diplopia, or difficulty with muscle stiffness and muscle relaxation. She has not experienced myoglobinuria or exercise intolerance. She denies breathing difficulties or shortness of breath while lying flat and has not experienced chest pain, syncope, or palpitations. Appetite is good and weight is relatively stable. She denies persistent numbness or pain, has not experienced changes in bowel or bladder function, and denies sleep complaints or excessive daytime fatigue. She is independent, has no assistive devices, able to ambulate independently, and is not falling.     Prior pertinent laboratory work-up:  2/2022: Normal/negative RF, SSb, CRP. ESR, CK (48)  3/2022: SSa 8.6. Negative/normal DS DNA, C3, C4  7/9/24: Hba1c 6.5. BRENNEN 1:40    Prior pertinent imaging:  10/6/23: MRI LS spine showed postoperative changes at L5-S1. There is enhancing granulation tissue identified in the dorsal laminotomy bed extending about the traversing left S1 nerve root.  There is slight contact and mass effect suggested upon the exiting left L5 and traversing proximal left S1 nerve root due to a nonenhancing hypointense T1/T2 signal process in the left medial foraminal zone unchanged from a prior study.   4/26/24: MRI C/T spine showed generally mild degenerative changes. There was a posterior disc herniations at the C4-C5 and  C5-C6 levels that mildly indent the anterior aspect of the cervical spinal cord. No spinal canal or high grade neural foraminal stenosis. No spinal cord signal change.  4/26/24: MRI brain showed nonspecific cerebral white matter changes     Prior electrophysiologic work-up:  None    Past Medical History:     Past Surgical History:    Family history:    There is no known family history  of myopathy or other neuromuscular disorders.    Social History:    She denies tobacco, alcohol, or illicit drug use. There is no known exposure to toxins or heavy metals.     Medical Allergies:  NKDA     Current Medications:      Review of Systems: A complete review of systems was obtained and was negative except for what was noted above.    Physical examination:    [facility] 07/25/2020 (Exact Date)

## 2024-09-04 NOTE — LETTER
"9/4/2024       RE: Fallon Rivera  [address] [facility] [address]     Dear Colleague,    Thank you for referring your patient, Fallon Rivera, to the [facility] [place] at [facility]. Please see a copy of my visit note below.    Chief Complaint: leg numbness, intermittent leg weakness, twitching, spasms     History of Present Illness:    Fallon Rivera is a 44 year old woman who we are seeing at the request of Destini Esparza PA-C for evaluation of demyelinating disease. However, patient was also seen by outside neurologist Dr. Oropeza who recommended neuromuscular specialist referral for evaluation of muscle spasm, intermittent weakness, twitching and sensory changes.     Symptoms started about 6 years ago, after first back surgery post laminectomy for radiculopathy for sciatic neuropathy. She had lost control of bowel movement once. She has residual numbness of lateral L leg extending down to left 4th - 5th digits. After this surgery, she and family (, daughter) started to notice decrease muscle bulk. Shortly after within 1 month of surgery, she noticed constant twitching sensation of her L leg. She has performed PT after all her back surgeries and PT has noticed that balance has progressively worsened. After the first back surgery, bowel incontinence improves. She clarifies that bowel > bladder incontinence happens intermittently, not constantly - typically during \"bad days\" of stress / severe twitches. At times she senses the urge to go but other times she does not. No loss of sensation in perineal area. She has had hysterectomy in the past with stress incontinence and intermittently feels she may not have orgasm.     4 years ago, she started to feel light headed - not worsened with different postures, as though she was about to pass out - this only occurs during prolonged workouts / severe stress. She also developed " "joint pain which would be debilitating to the point where she would just have to lay in bed the whole day. She had rheumatology workup which was reportedly unremarkable.     About 1 year ago, she then developed R leg twitching for which her and her family feel there has been muscle loss. Periodic paresthesias in R leg but no permanent loss of sensation.    This year, she developed twitching in her R arm and underneath her chest. Nothing in the left arm for now. At times, she has also not been able to sleep well because she feels her muscles are continually twitching. However, she does notice when she's worn out from PT she can sleep for long durations of time.     Patient used to walk regularly and do family hikes; now has good days and bad days. Family is concerned about progressive loss of muscle bulk. She will feel that her legs are weak on \"bad days\" and will end up slipping down the stairs; bad days also associated with significant muscle twitching. Unclear what triggers a \"bad day\". Doesn't necessarily feel it's always worse after a long workout, if she misses meals, or if dehydrated.    She is on muscle relaxers to help, this is mildly helpful. When she has the twitching and tight episodes, it gets so bothersome that she feels she cannot get her words out. She feels she has had hyper-reflexes for the past 10 years. Has tried requip (50 mg) in the past but that was not helpful and led to weird dreams. When she has muscle twitching flare ups, does endorse breathing difficulties - hurts to take a deep breath. No orthopnea. Appetite fluctuates, not necessarily dependent on her \"good\"  or \"bad\" days. Has had 20-30 pound weight loss since being on Jardiance.    Patient endorses 2-3 migraine throughout her life but has not had one for the past 10 years.    There is tenderness to muscle palpation throughout her entire body. Endorses muscle stiffness. No recent blood in urine. Has had prior hematuria in setting of " nephrolithiasis.    Prior pertinent laboratory work-up:  2/2022: Normal/negative RF, SSb, CRP. ESR, CK (48)  3/2022: SSa 8.6. Negative/normal DS DNA, C3, C4  7/9/24: Hba1c 6.5. BRENNEN 1:40     Prior pertinent imaging:  10/6/23: MRI LS spine showed postoperative changes at L5-S1. There is enhancing granulation tissue identified in the dorsal laminotomy bed extending about the traversing left S1 nerve root.  There is slight contact and mass effect suggested upon the exiting left L5 and traversing proximal left S1 nerve root due to a nonenhancing hypointense T1/T2 signal process in the left medial foraminal zone unchanged from a prior study.   4/26/24: MRI C/T spine showed generally mild degenerative changes. There was a posterior disc herniations at the C4-C5 and  C5-C6 levels that mildly indent the anterior aspect of the cervical spinal cord. No spinal canal or high grade neural foraminal stenosis. No spinal cord signal change.  4/26/24: MRI brain showed nonspecific cerebral white matter changes      Prior electrophysiologic work-up:  None    Past Medical History:    Past Medical History:   Diagnosis Date     Abnormal Pap smear 2006, 2007,     ASC-H, ASCUS + HPV 45     Calculus of kidney 01/01/2002     Cervical high risk HPV (human papillomavirus) test positive     + HPV 45 (high risk)     History of colposcopy with cervical biopsy 2/9/06, 3/15/07    koilocytosis 2007     Past Surgical History:  Past Surgical History:   Procedure Laterality Date     ANESTHESIA OUT OF OR MRI N/A 4/26/2024    Procedure: MRI OF Brain,Cervical and Thoracic Spine;  Surgeon: GENERIC ANESTHESIA PROVIDER;  Location: [facility]     CHOLECYSTECTOMY, LAPOROSCOPIC  5/11/2007    Cholecystectomy, Laparoscopic     COLONOSCOPY  05/17/10     COLONOSCOPY N/A 8/27/2019    Procedure: COLONOSCOPY;  Surgeon: Paramjit Kingston DO;  Location:  GI     COLONOSCOPY N/A 10/23/2020    Procedure: COLONOSCOPY, WITH  BIOPSY;  Surgeon: Ba Dickinson MD;   Location: [facility]     CONIZATION  7/1/2011    Procedure:CONIZATION; cold knife and punch biopsy of mole on back; Surgeon:SYDNEY FORD; Location:[facility]     DILATION AND CURETTAGE, HYSTEROSCOPY, LAPAROSCOPY, COMBINED Right 9/24/2015    Procedure: COMBINED DILATION AND CURETTAGE, HYSTEROSCOPY, LAPAROSCOPY;  Surgeon: Sydney Ford MD;  Location: [facility]     DISCECTOMY LUMBAR MINIMALLY INVASIVE ONE LEVEL Left 1/23/2019    Procedure: Minimally Invasive Surgery Left Lumbar 5-Sacral 1 microdiscectomy;  Surgeon: Mason Roe MD;  [facility]: [facility]     DISCECTOMY LUMBAR POSTERIOR MICROSCOPIC ONE LEVEL Left 4/5/2023    Procedure: Minimally Invasive Left Lumbar 5 to Sacral 1 evacuation of recurrent disc herniation;  Surgeon: Mason Roe MD;  Location: [facility]     ESOPHAGOSCOPY, GASTROSCOPY, DUODENOSCOPY (EGD), COMBINED  5/21/2014    Procedure: COMBINED ESOPHAGOSCOPY, GASTROSCOPY, DUODENOSCOPY (EGD), BIOPSY SINGLE OR MULTIPLE;  Surgeon: Earl Lane MD;  Location: [facility]     ESOPHAGOSCOPY, GASTROSCOPY, DUODENOSCOPY (EGD), COMBINED N/A 10/23/2020    Procedure: Esophagogastroduodenoscopy with  biopsy;  Surgeon: Ba Dickinson MD;  Location: [facility]     EXCISE LESION TRUNK  7/1/2011    Procedure:EXCISE LESION TRUNK; Surgeon:SYDNEY FORD; Location:[facility]     FORAMINOTOMY LUMBAR POSTERIOR ONE LEVEL Left 4/5/2023    Procedure: left foraminotomy Lumbar 5 to Sacral 1;  Surgeon: Mason Roe MD;  Location: [facility]     HC FRAGMENTING OF KIDNEY STONE  11/30/2005     HC RX ECTOP PREG BY SCOPE,RMV TUBE/OVRY  12/20/2007    Right sided salpingectomy and removal of ruptured ectopic pregnancy. D&C.     HYSTERECTOMY VAGINAL       LAPAROSCOPIC APPENDECTOMY N/A 9/24/2015    Procedure: LAPAROSCOPIC APPENDECTOMY;  Surgeon: James Cuellar MD;  [facility]     LAPAROSCOPIC CYSTECTOMY OVARIAN (BENIGN) N/A 9/24/2015    Procedure: LAPAROSCOPIC CYSTECTOMY OVARIAN (BENIGN);  Surgeon:  Greg Ford MD;  [facility]: [facility]     LAPAROSCOPIC HYSTERECTOMY TOTAL, BILATERAL SALPINGO-OOPHORECTOMY, COMBINED N/A 7/28/2020    Procedure: laparoscpic assisted vaginal hysterectomy, cystoscopy;  Surgeon: Greg Ford MD;  Location: [facility]     LAPAROSCOPIC OOPHORECTOMY Right 9/24/2015    Procedure: LAPAROSCOPIC OOPHORECTOMY;  Surgeon: Greg Ford MD;  [facility]: [facility]     LITHOTRIPSY  01/17/2007     PELVIS LAPAROSCOPY,DX  10/16/2000    Diagnostic laparoscopy, Endometrial biopsy.     TUBAL/ECTOPIC PREGNANCY  01/23/2007    Lap removal of left ruptured ectopic pregnancy & salpingectomy, D&C     ZZC REMOVAL OF KIDNEY STONE      two surgeries, 2002,2003     [facility]  [facility]     Family history:    There is no known family history of myopathy or other neuromuscular disorders but patient states family does not discuss much  -Dad with multiple TIA's. Stroke runs in paternal side of family.   -Maternal side with history of brain cancer (great-aunt). Mom with history of PMR (polymyalgia rheumatica)   -Grandmother with Alzheimer's in older age (70 - 80's)      Social History:    -Smokes cigarettes when stressed - on average 1-2 cigarettes at night.   -Rare alcohol use, once / year  -No recreational drug use has not tried marijuana    -Is a housewife, participates in craft fairs, [employer]     Medical Allergies:    Allergies   Allergen Reactions     Amoxicillin Hives     Emetrol      Anxiety, agitation,severe paranoia. Zofran, Reglan are some of them.  DRAMAMINE WITHOUT ISSUES       Indomethacin Nausea and Vomiting     Macrobid [Nitrofuran Derivatives] Hives     Meperidine Hcl      Metoclopramide Other (See Comments)     Acute paranoia, severe     Nitrofurantoin Hives     Ondansetron Other (See Comments)     Severe anxiety, agitation     Vancomycin Other (See Comments)     maria del rosario syndrome     Vancomycin Other (See Comments)     maria del rosario syndrome     Current  Medications:    Current Outpatient Medications   Medication Sig Dispense Refill     blood glucose (NO BRAND SPECIFIED) test strip Use to test blood sugar 3 times daily or as directed. To accompany: Blood Glucose Monitor Brands: per insurance. 300 strip 6     blood glucose monitoring (NO BRAND SPECIFIED) meter device kit Use to test blood sugar 3 times daily or as directed. Preferred blood glucose meter OR supplies to accompany: Blood Glucose Monitor Brands: per insurance. 1 kit 0     empagliflozin (JARDIANCE) 10 MG TABS tablet TAKE ONE TABLET (10 MG) BY MOUTH DAILY 90 tablet 1     metFORMIN (GLUCOPHAGE XR) 500 MG 24 hr tablet TAKE 2 TABLETS (1,000 MG) BY MOUTH 2 TIMES DAILY (WITH MEALS) 360 tablet 1     omeprazole (PRILOSEC) 20 MG DR capsule TAKE ONE CAPSULE BY MOUTH ONCE DAILY, 30 TO 60 MINUTES BEFORE A MEAL. 90 capsule 4     omeprazole (PRILOSEC) 40 MG DR capsule Take 1 capsule (40 mg) by mouth daily. 90 capsule 3     oxyCODONE (ROXICODONE) 5 MG tablet Take 1 tablet (5 mg) by mouth every 6 hours as needed for severe pain. 12 tablet 0     STATIN NOT PRESCRIBED (INTENTIONAL) Please choose reason not prescribed from choices below.       thin (NO BRAND SPECIFIED) lancets Use with lanceting device. To accompany: Blood Glucose Monitor Brands: per insurance. 100 each 6     tiZANidine (ZANAFLEX) 4 MG tablet Take 1-2 tablets (4-8 mg) by mouth 3 times daily as needed for muscle spasms 60 tablet 2     No current facility-administered medications for this visit.     Review of Systems: A complete review of systems was obtained and was negative except for what was noted above.     Physical examination:    /84   Pulse 114   Resp 16   LMP 07/25/2020 (Exact Date)   SpO2 99%      General Appearance: NAD    Skin: There are no rashes or other skin lesions.    Musculoskeletal:  There is no pes cavus, or hammertoes.    Neurologic examination:    Mental status:  Patient is alert, attentive, and oriented x 3.  Language is  coherent and fluent without aphasia.  Memory, comprehension and ability to follow commands were intact.       Cranial nerves:  Pupils were round and reacted to light.  Extraocular movements were full. VFF. There was no face, jaw, palate or tongue weakness or atrophy. No dysarthria. Hearing was grossly intact.  Shoulder shrug was normal.       Motor exam: No atrophy. A few fasciculations noted in the left calf. No fasciculations noted in other limbs. No mounding or rippling of muscle.  No action or percussion myotonia or paramyotonia.  Manual muscle testing revealed the following MRC grade muscle power:   Right Left   Shoulder abduction:  5 5   Elbow extension: 5 5   Elbow flexion:  5 5   Wrist flexion:  5 5   Wrist extension:  5 5   Finger flexion 5 5   Finger abduction:  5 5   FDI 5 5   APB 5 5   Hip flexion 4+ 4+   Hip extension 5 5   Knee flexion 5 5   Knee extension 5 5   Dorsiflexion 5 5   Plantar flexion 5 5   Ankle Inversion 5 5   Ankle Eversion 5 5     Coordination: No ataxia with FNF or heel to shin    Sensory exam: Pin reduced in both legs below mid calf. Vibration only slightly reduced in the toes. Normal in fingers.     Gait: Not formally tested today, did not require an assistive device to ambulate to clinic     Deep tendon reflexes:   Right Left   Triceps 2 2   Biceps 3 3   Brachioradialis 3 3   Knee jerk 3 3   Ankle jerk 1 0   Plantar responses were flexor bilaterally.  Jaw jerk normal. Vivas Absent.      Assessment:    Fallon Rivera is a 44 year old woman with history of L S1 radiculopathy s/p laminectomy who presents as a new consult for muscle spasms and concerns regarding possible demyelinating disease; exam and patient's video revealed intermittent L calf fasciculations and absent L achilles reflex, which can all be explained by the patient's known chronic L S1 radiculopathy. However, patient subjectively endorses subacute to chronic spread of fasciculations in the past year, now  "including her R leg and R arm (not visualized on exam today). Differential for more generalized causes of fasciculations include benign fasciculation syndrome, fasciculations as a result of metabolic abnormalities (dehydration, missing meals, etc.), and hyperexcitable nerve conditions. 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 This is likely leading to progressive concerns regarding imbalance.    At present, suspect that patient has predominant L calf fasciculations as a result of L S1 radiculopathy with likely overlay of more generalized, normal fasciculations as a result of various metabolic abnormalities v.s. benign fasciculations syndrome. Will investigate further with EMG. Although suspect this is much less likely, to be thorough, will check for VGKC and if needed associated antibodies to assess for hyperexcitable nerve disorders. If workup is overall unremarkable aside from expected findings of chronic L S1 radiculopathy and changes attributable to diabetic neuropathy, can provide patient thorough re-assurance that her fasciculations are benign.      Of note, the patient carries a diagnosis of \"demyelinating disease\" in her chart; however, the white matter lesions in her brain are non-specific, not clearly periventricular and can be seen with small vessel ischemic disease in setting of chronic cardiovascular risk factors such as diabetes and HLD. Her clinical history and exam are not suggestive of multiple sclerosis; do not feel that she actually has demyelinating disease.    Plan:      1. Labs: VGKC ab with reflex to LGI1 and CASPR2 if positive  2. Nerve conduction studies/EMG: Evaluate for length dependent polyneuropathy and left S1 radiculopathy. Will also look for more side spread peripheral nerve hyper-excitability disorder  3. If back pain " returns then will ask her to follow up with [facility] for further management of left S1 radiculopathy  4. Encourage normalization of blood sugars to minimize risk of neuropathy worsening  5. No specific intervention is needed for fasciculations as these are likely benign. They may be more prominent after exercise, caffeine ingestion, skipped meals or other activities that result in metabolic disturbances.  This is a normal phenomenon. Pending EMG will comment further.   6. We have not arranged follow up in the neuropathy clinic, but will comment further pending results of above and arrange follow up if needed.     Rosalinda Lo MD  PGY-4 Neurology Resident     Seen and discussed with Dr. Gonzalez. His addendum follows.   ---    ADDENDUM:  I personally interviewed and examined the patient. I agree with the history, physical, assessment, and plan as documented above. Changes to the physical examination, assessment and plan have been incorporated into the note by myself, as to make it a single cohesive document. In brief, this 44 year old woman has clincial and radiographic evidence of a left-sided S1 radiculopathy. This is chronic, and at this point she is without back pain. The chronic S1 radiculopathy is superimposed upon a mild length-dependant secondary to her long standing diabetes. She also endorses fasciculations, which are predominantly in her left calf (these may be a consequence of the old S1 radiculopathy) but she also notices them in other places. Her fasciculations are likely benign. We discussed factors that may lead to more prominent fasciculations. These include exercise, caffeine ingestion, skipped meals or other activities that result in metabolic disturbances. This is a normal phenomenon. They can also be seen after an old radicuopathy and may be ore common (especially in the calves) in those with polyneuropathy. We note that her upper limb DTR are on the brisk side, but otherwise we do not find neurologic  signs suggestive of a widespread neuromuscular disorder that can have fasciculations as part of the symptomatology. CNS imaging has been performed and other than some modest nonspecific white matter changes did not provide an explanation for her symptoms. Although our suspicion for a generalized neuromuscular disorder is low, we would like to explore this further. NCS/EMG will be very helpful here. We would also like to obtain VGKC ab, and if those are positive reflex to LGI-1 and CASPR2 antibody testing. She does not harbor many of the symptoms that those with VGKC complex hyper-excitability disorders typically manifest - but these are nonetheless worth exploring. Should these antibodies be absent and if the EMG does not show widespread peripheral nerve hyperexcitable then this would lend further support to a benign explanation for her fasciculations. This would be hopefully provide her with some reassurance. We will comment further on this and additional work up, if needed, pending the laboratory and EMG results.     Perfecto Gonzalez MD      Again, thank you for allowing me to participate in the care of your patient.      Sincerely,    Perfecto Gonzalez MD

## 2024-09-05 ENCOUNTER — THERAPY VISIT (OUTPATIENT)
Dept: PHYSICAL THERAPY | Facility: CLINIC | Age: 44
End: 2024-09-05
Attending: PHYSICIAN ASSISTANT
Payer: COMMERCIAL

## 2024-09-05 DIAGNOSIS — G37.9 DEMYELINATING DISEASE (H): Primary | ICD-10-CM

## 2024-09-05 PROCEDURE — 97110 THERAPEUTIC EXERCISES: CPT | Mod: GP

## 2024-09-05 PROCEDURE — 97112 NEUROMUSCULAR REEDUCATION: CPT | Mod: GP

## 2024-09-10 LAB — MAYO MISC RESULT: NORMAL

## 2024-09-17 ENCOUNTER — TRANSFERRED RECORDS (OUTPATIENT)
Dept: HEALTH INFORMATION MANAGEMENT | Facility: CLINIC | Age: 44
End: 2024-09-17
Payer: COMMERCIAL

## 2024-09-17 LAB — RETINOPATHY: NEGATIVE

## 2024-09-25 ENCOUNTER — OFFICE VISIT (OUTPATIENT)
Dept: NEUROLOGY | Facility: CLINIC | Age: 44
End: 2024-09-25
Attending: PSYCHIATRY & NEUROLOGY
Payer: COMMERCIAL

## 2024-09-25 DIAGNOSIS — R25.3 FASCICULATIONS OF MUSCLE: ICD-10-CM

## 2024-09-25 DIAGNOSIS — M54.16 LUMBAR RADICULOPATHY: Primary | ICD-10-CM

## 2024-09-25 PROCEDURE — 95886 MUSC TEST DONE W/N TEST COMP: CPT | Performed by: PSYCHIATRY & NEUROLOGY

## 2024-09-25 PROCEDURE — 95885 MUSC TST DONE W/NERV TST LIM: CPT | Performed by: PSYCHIATRY & NEUROLOGY

## 2024-09-25 PROCEDURE — 95911 NRV CNDJ TEST 9-10 STUDIES: CPT | Performed by: PSYCHIATRY & NEUROLOGY

## 2024-09-25 NOTE — PROGRESS NOTES
[facility]  Electrodiagnostic Laboratory                 Department of Neurology                                                                                                         Test Date:  2024    Patient: Fallon Rivera : 1980 Physician: Perfecto Gonzalez MD   Sex: Female AGE: 44 year Ref Phys: Perfecto Gonzalez MD   ID#: [id number]   Technician: Adrianne Lucas     History and Examination:  44 year old with chronic low back pain s/p laminectomy who reports ongoing pain and fasciculations in calf. The left side is more affected than the right. This study is being performed to investigate for radiculopathy vs focal neuropathy vs nerve hyperexcitability disorder.     Techniques:  Motor and sensory conduction studies were done with surface recording electrodes. EMG was done with a concentric needle electrode.     Results:  Nerve conduction studies:  1. Left median-D2, ulnar-D5, radial, sural, and superficial peroneal sensory responses are normal.   2. Left median-APB, ulnar-ADM, peroneal-EDB, and tibial-AH motor responses are normal.     Needle EMG of selected proximal and distal left lower and upper limb muscles was performed as tabulated below. No abnormal spontaneous activity was observed in the sampled muscles. Motor unit potential morphology and recruitment patterns were normal.     Interpretation:  This is a normal study. There is no electrophysiologic evidence of a focal neuropathy, radiculopathy, or disorder of peripheral nerve hyperexcitability on the basis of this study.     Perfecto Gonzalez MD  Department of Neurology        Nerve Conduction Studies  Motor Sites      Latency Neg. Amp Neg. Amp Diff Segment Distance Velocity Neg. Dur Neg Area Diff Temperature Comment   Site (ms) Norm (mV) Norm (%)  cm m/s Norm (ms) (%) ( C)    Left Fibular (EDB) Motor   Ankle 4.3  < 6.0 6.7 -  Ankle-EDB 8   5.9  31.2    Bel Fibular Head 10.1 - 6.4 - -4 Bel Fibular Head-Ankle  28.9 50  > 38 6.0 -5 31.3    Pop Fossa 11.8 - 6.2 - -3 Pop Fossa-Bel Fibular Head 8.5 50  > 38 6.1 -1 31.3    Left Median (APB) Motor   Wrist 3.5  < 4.4 11.1  > 5.0  Wrist-APB 8.2   6.4  32.5    Elbow 6.8 - 10.5  > 5.0 -5 Elbow-Wrist 19.3 58  > 48 6.4 -5 33.8    Left Tibial (AHB) Motor   Ankle 4.8  < 6.5 15.8  > 5.0  Ankle-AH 6.8   4.5  31.5    Knee 12.7 - 12.0 - -24 Knee-Ankle 36.2 46  > 38 5.0 -11 31.5    Left Ulnar (ADM) Motor   Wrist 1.93  < 3.5 11.6  > 5.0  Wrist-ADM 8   5.4  32.7    Below Elbow 5.2 - 10.9 - -6 Below Elbow-Wrist 17.5 53  > 48 5.2 -3 32.2    Above Elbow 6.9 - 9.9 - -9 Above Elbow-Below Elbow 8.2 48  > 48 5.5 -7 32.1      F-Wave Sites      Min F-Lat Max-Min F-Lat Mean F-Lat   Site (ms) (ms) (ms)   Left Fibular F-Wave   Ankle 48.4 0 -   Left Median F-Wave   Wrist 20.5 5.0 23.4   Left Tibial F-Wave   Ankle 47.3 0.90 -   Left Ulnar F-Wave   Wrist 22.5 2.6 24.0     Sensory Sites      Onset Lat Peak Lat Amp (O-P) Amp (P-P) Segment Distance Velocity Temperature Comment   Site ms (ms)  V Norm ( V)  cm m/s Norm ( C)    Left Median Sensory   Wrist-Dig II 2.2 3.3 39  > 10 60 Wrist-Dig II 14 64  > 48 33.5    Left Radial Sensory   Forearm-Wrist 1.58 2.2 29  > 15 22 Forearm-Wrist 10 63 - 32.9    Left Superficial Fibular Sensory   Lower Leg-Ankle 3.0 3.9 12  > 3 10 Lower Leg-Ankle 14 47 - 30.6    Left Sural Sensory   Calf-Lat Malleolus 2.7 3.7 17  > 5 20 Calf-Lat Malleolus 14 52  > 38 30.9    Left Ulnar Sensory   Wrist-Dig V 1.93 2.5 31  > 8 58 Wrist-Dig V 12.5 65  > 48 34.1          Electromyography     Side Muscle Ins Act Fibs/PSW Fasc HF Amp Dur Poly Recrt Int Pat   Left Tib ant Nml None Nml 0 Nml Nml 0 Nml Nml   Left Vastus lat Nml None Nml 0 Nml Nml 0 Nml Nml   Left Gastroc Nml None Nml 0 Nml Nml 0 Nml Nml   Left Gluteus max Nml None Nml 0 Nml Nml 0 Nml Nml   Left Tib post Nml None Nml 0 Nml Nml 0 Nml Nml   Left Deltoid Nml None Nml 0 Nml Nml 0 Nml Nml   Left Triceps Nml None Nml 0 Nml Nml 0 Nml Nml    Left FDI Nml None Nml 0 Nml Nml 0 Nml Nml         NCS Waveforms:    Motor                Sensory                  F-Wave                  Ultrasound Images:

## 2024-09-25 NOTE — LETTER
2024       RE: Fallon Rivera  [address]  [facility] [address]     Dear Colleague,    Thank you for referring your patient, Fallon Rivera, to the [facility] EMG [facility] [place] [facility]. Please see a copy of my visit note below.                        [facility]  Electrodiagnostic Laboratory                 Department of Neurology                                                                                                         Test Date:  2024    Patient: Fallon Rivera : 1980 Physician: Perfecto Gonzalez MD   Sex: Female AGE: 44 year Ref Phys: Perfecto Gonzalez MD   ID#: [id number]   Technician: Adrianne Lucas     History and Examination:  44 year old with chronic low back pain s/p laminectomy who reports ongoing pain and fasciculations in calf. The left side is more affected than the right. This study is being performed to investigate for radiculopathy vs focal neuropathy vs nerve hyperexcitability disorder.     Techniques:  Motor and sensory conduction studies were done with surface recording electrodes. EMG was done with a concentric needle electrode.     Results:  Nerve conduction studies:  1. Left median-D2, ulnar-D5, radial, sural, and superficial peroneal sensory responses are normal.   2. Left median-APB, ulnar-ADM, peroneal-EDB, and tibial-AH motor responses are normal.     Needle EMG of selected proximal and distal left lower and upper limb muscles was performed as tabulated below. No abnormal spontaneous activity was observed in the sampled muscles. Motor unit potential morphology and recruitment patterns were normal.     Interpretation:  This is a normal study. There is no electrophysiologic evidence of a focal neuropathy, radiculopathy, or disorder of peripheral nerve hyperexcitability on the basis of this study.     Perfecto Gonzalez MD  Department of Neurology        Nerve Conduction  Studies  Motor Sites      Latency Neg. Amp Neg. Amp Diff Segment Distance Velocity Neg. Dur Neg Area Diff Temperature Comment   Site (ms) Norm (mV) Norm (%)  cm m/s Norm (ms) (%) ( C)    Left Fibular (EDB) Motor   Ankle 4.3  < 6.0 6.7 -  Ankle-EDB 8   5.9  31.2    Bel Fibular Head 10.1 - 6.4 - -4 Bel Fibular Head-Ankle 28.9 50  > 38 6.0 -5 31.3    Pop Fossa 11.8 - 6.2 - -3 Pop Fossa-Bel Fibular Head 8.5 50  > 38 6.1 -1 31.3    Left Median (APB) Motor   Wrist 3.5  < 4.4 11.1  > 5.0  Wrist-APB 8.2   6.4  32.5    Elbow 6.8 - 10.5  > 5.0 -5 Elbow-Wrist 19.3 58  > 48 6.4 -5 33.8    Left Tibial (AHB) Motor   Ankle 4.8  < 6.5 15.8  > 5.0  Ankle-AH 6.8   4.5  31.5    Knee 12.7 - 12.0 - -24 Knee-Ankle 36.2 46  > 38 5.0 -11 31.5    Left Ulnar (ADM) Motor   Wrist 1.93  < 3.5 11.6  > 5.0  Wrist-ADM 8   5.4  32.7    Below Elbow 5.2 - 10.9 - -6 Below Elbow-Wrist 17.5 53  > 48 5.2 -3 32.2    Above Elbow 6.9 - 9.9 - -9 Above Elbow-Below Elbow 8.2 48  > 48 5.5 -7 32.1      F-Wave Sites      Min F-Lat Max-Min F-Lat Mean F-Lat   Site (ms) (ms) (ms)   Left Fibular F-Wave   Ankle 48.4 0 -   Left Median F-Wave   Wrist 20.5 5.0 23.4   Left Tibial F-Wave   Ankle 47.3 0.90 -   Left Ulnar F-Wave   Wrist 22.5 2.6 24.0     Sensory Sites      Onset Lat Peak Lat Amp (O-P) Amp (P-P) Segment Distance Velocity Temperature Comment   Site ms (ms)  V Norm ( V)  cm m/s Norm ( C)    Left Median Sensory   Wrist-Dig II 2.2 3.3 39  > 10 60 Wrist-Dig II 14 64  > 48 33.5    Left Radial Sensory   Forearm-Wrist 1.58 2.2 29  > 15 22 Forearm-Wrist 10 63 - 32.9    Left Superficial Fibular Sensory   Lower Leg-Ankle 3.0 3.9 12  > 3 10 Lower Leg-Ankle 14 47 - 30.6    Left Sural Sensory   Calf-Lat Malleolus 2.7 3.7 17  > 5 20 Calf-Lat Malleolus 14 52  > 38 30.9    Left Ulnar Sensory   Wrist-Dig V 1.93 2.5 31  > 8 58 Wrist-Dig V 12.5 65  > 48 34.1          Electromyography     Side Muscle Ins Act Fibs/PSW Fasc HF Amp Dur Poly Recrt Int Pat   Left Tib ant Nml None Nml  0 Nml Nml 0 Nml Nml   Left Vastus lat Nml None Nml 0 Nml Nml 0 Nml Nml   Left Gastroc Nml None Nml 0 Nml Nml 0 Nml Nml   Left Gluteus max Nml None Nml 0 Nml Nml 0 Nml Nml   Left Tib post Nml None Nml 0 Nml Nml 0 Nml Nml   Left Deltoid Nml None Nml 0 Nml Nml 0 Nml Nml   Left Triceps Nml None Nml 0 Nml Nml 0 Nml Nml   Left FDI Nml None Nml 0 Nml Nml 0 Nml Nml         NCS Waveforms:    Motor                Sensory                  F-Wave                  Ultrasound Images:            Again, thank you for allowing me to participate in the care of your patient.      Sincerely,    Perfecto Gonzalez MD

## 2024-09-27 ENCOUNTER — THERAPY VISIT (OUTPATIENT)
Dept: PHYSICAL THERAPY | Facility: CLINIC | Age: 44
End: 2024-09-27
Attending: PHYSICIAN ASSISTANT
Payer: COMMERCIAL

## 2024-09-27 DIAGNOSIS — G37.9 DEMYELINATING DISEASE (H): Primary | ICD-10-CM

## 2024-09-27 PROCEDURE — 97110 THERAPEUTIC EXERCISES: CPT | Mod: GP

## 2024-09-27 PROCEDURE — 97112 NEUROMUSCULAR REEDUCATION: CPT | Mod: GP

## 2024-10-01 DIAGNOSIS — Z98.890 S/P LUMBAR MICRODISCECTOMY: ICD-10-CM

## 2024-12-03 DIAGNOSIS — E11.9 TYPE 2 DIABETES MELLITUS WITHOUT COMPLICATION, WITHOUT LONG-TERM CURRENT USE OF INSULIN (H): ICD-10-CM

## 2024-12-03 RX ORDER — METFORMIN HYDROCHLORIDE 500 MG/1
1000 TABLET, EXTENDED RELEASE ORAL 2 TIMES DAILY WITH MEALS
Qty: 360 TABLET | Refills: 0 | Status: SHIPPED | OUTPATIENT
Start: 2024-12-03

## 2024-12-09 ENCOUNTER — OFFICE VISIT (OUTPATIENT)
Dept: FAMILY MEDICINE | Facility: CLINIC | Age: 44
End: 2024-12-09
Payer: COMMERCIAL

## 2024-12-09 VITALS
OXYGEN SATURATION: 99 % | HEART RATE: 88 BPM | RESPIRATION RATE: 16 BRPM | DIASTOLIC BLOOD PRESSURE: 84 MMHG | WEIGHT: 195 LBS | SYSTOLIC BLOOD PRESSURE: 120 MMHG | TEMPERATURE: 97.6 F | BODY MASS INDEX: 33.29 KG/M2 | HEIGHT: 64 IN

## 2024-12-09 DIAGNOSIS — Z98.890 S/P LUMBAR MICRODISCECTOMY: ICD-10-CM

## 2024-12-09 DIAGNOSIS — E66.01 MORBID (SEVERE) OBESITY DUE TO EXCESS CALORIES (H): ICD-10-CM

## 2024-12-09 DIAGNOSIS — E11.9 TYPE 2 DIABETES MELLITUS WITHOUT COMPLICATION, WITHOUT LONG-TERM CURRENT USE OF INSULIN (H): Primary | ICD-10-CM

## 2024-12-09 DIAGNOSIS — R10.84 ABDOMINAL PAIN, GENERALIZED: ICD-10-CM

## 2024-12-09 DIAGNOSIS — K30 INDIGESTION: ICD-10-CM

## 2024-12-09 DIAGNOSIS — E11.43 TYPE 2 DIABETES MELLITUS WITH DIABETIC AUTONOMIC NEUROPATHY, WITHOUT LONG-TERM CURRENT USE OF INSULIN (H): ICD-10-CM

## 2024-12-09 DIAGNOSIS — M25.50 MULTIPLE JOINT PAIN: ICD-10-CM

## 2024-12-09 DIAGNOSIS — R53.82 CHRONIC FATIGUE: ICD-10-CM

## 2024-12-09 DIAGNOSIS — R29.2 HYPERREFLEXIA: ICD-10-CM

## 2024-12-09 DIAGNOSIS — E53.8 VITAMIN B12 DEFICIENCY (NON ANEMIC): ICD-10-CM

## 2024-12-09 LAB
ALBUMIN SERPL BCG-MCNC: 4.4 G/DL (ref 3.5–5.2)
ALP SERPL-CCNC: 57 U/L (ref 40–150)
ALT SERPL W P-5'-P-CCNC: 19 U/L (ref 0–50)
ANION GAP SERPL CALCULATED.3IONS-SCNC: 13 MMOL/L (ref 7–15)
AST SERPL W P-5'-P-CCNC: 16 U/L (ref 0–45)
BILIRUB SERPL-MCNC: 1.2 MG/DL
BUN SERPL-MCNC: 15.9 MG/DL (ref 6–20)
CALCIUM SERPL-MCNC: 8.9 MG/DL (ref 8.8–10.4)
CHLORIDE SERPL-SCNC: 103 MMOL/L (ref 98–107)
CREAT SERPL-MCNC: 0.7 MG/DL (ref 0.51–0.95)
EGFRCR SERPLBLD CKD-EPI 2021: >90 ML/MIN/1.73M2
EST. AVERAGE GLUCOSE BLD GHB EST-MCNC: 140 MG/DL
FERRITIN SERPL-MCNC: 28 NG/ML (ref 6–175)
GLUCOSE SERPL-MCNC: 117 MG/DL (ref 70–99)
HBA1C MFR BLD: 6.5 %
HCO3 SERPL-SCNC: 23 MMOL/L (ref 22–29)
IRON BINDING CAPACITY (ROCHE): 401 UG/DL (ref 240–430)
IRON SATN MFR SERPL: 25 % (ref 15–46)
IRON SERPL-MCNC: 101 UG/DL (ref 37–145)
MAGNESIUM SERPL-MCNC: 1.7 MG/DL (ref 1.7–2.3)
POTASSIUM SERPL-SCNC: 3.9 MMOL/L (ref 3.4–5.3)
PROT SERPL-MCNC: 7.2 G/DL (ref 6.4–8.3)
SODIUM SERPL-SCNC: 139 MMOL/L (ref 135–145)
VIT B12 SERPL-MCNC: 200 PG/ML (ref 232–1245)
VIT D+METAB SERPL-MCNC: 20 NG/ML (ref 20–50)

## 2024-12-09 PROCEDURE — 83735 ASSAY OF MAGNESIUM: CPT | Performed by: PHYSICIAN ASSISTANT

## 2024-12-09 PROCEDURE — 82607 VITAMIN B-12: CPT | Performed by: PHYSICIAN ASSISTANT

## 2024-12-09 PROCEDURE — 83036 HEMOGLOBIN GLYCOSYLATED A1C: CPT | Performed by: PHYSICIAN ASSISTANT

## 2024-12-09 PROCEDURE — G2211 COMPLEX E/M VISIT ADD ON: HCPCS | Performed by: PHYSICIAN ASSISTANT

## 2024-12-09 PROCEDURE — 83550 IRON BINDING TEST: CPT | Performed by: PHYSICIAN ASSISTANT

## 2024-12-09 PROCEDURE — 83540 ASSAY OF IRON: CPT | Performed by: PHYSICIAN ASSISTANT

## 2024-12-09 PROCEDURE — 80053 COMPREHEN METABOLIC PANEL: CPT | Performed by: PHYSICIAN ASSISTANT

## 2024-12-09 PROCEDURE — 82728 ASSAY OF FERRITIN: CPT | Performed by: PHYSICIAN ASSISTANT

## 2024-12-09 PROCEDURE — 99214 OFFICE O/P EST MOD 30 MIN: CPT | Performed by: PHYSICIAN ASSISTANT

## 2024-12-09 PROCEDURE — 82306 VITAMIN D 25 HYDROXY: CPT | Performed by: PHYSICIAN ASSISTANT

## 2024-12-09 PROCEDURE — 36415 COLL VENOUS BLD VENIPUNCTURE: CPT | Performed by: PHYSICIAN ASSISTANT

## 2024-12-09 RX ORDER — METFORMIN HYDROCHLORIDE 500 MG/1
1000 TABLET, EXTENDED RELEASE ORAL 2 TIMES DAILY WITH MEALS
Qty: 360 TABLET | Refills: 0 | Status: SHIPPED | OUTPATIENT
Start: 2024-12-09

## 2024-12-09 RX ORDER — OMEPRAZOLE 40 MG/1
40 CAPSULE, DELAYED RELEASE ORAL DAILY
Qty: 90 CAPSULE | Refills: 3 | Status: SHIPPED | OUTPATIENT
Start: 2024-12-09

## 2024-12-09 RX ORDER — OXYCODONE HYDROCHLORIDE 5 MG/1
5 TABLET ORAL EVERY 6 HOURS PRN
Qty: 12 TABLET | Refills: 0 | Status: SHIPPED | OUTPATIENT
Start: 2024-12-09

## 2024-12-09 ASSESSMENT — PAIN SCALES - GENERAL: PAINLEVEL_OUTOF10: MILD PAIN (3)

## 2024-12-09 NOTE — PROGRESS NOTES
Praful Lehman is a 44 year old, presenting for the following health issues:  Diabetes      12/9/2024     9:56 AM   Additional Questions   Roomed by Heather     History of Present Illness       Diabetes:   She presents for follow up of diabetes.  She is checking home blood glucose two times daily.   She checks blood glucose before meals and at bedtime.  Blood glucose is never over 200 and never under 70. She is aware of hypoglycemia symptoms including dizziness and blurred vision.   She is concerned about other.    She is not experiencing numbness or burning in feet, excessive thirst, blurry vision, weight changes or redness, sores or blisters on feet.           Reason for visit:  Check up    She eats 2-3 servings of fruits and vegetables daily.She consumes 1 sweetened beverage(s) daily.She exercises with enough effort to increase her heart rate 60 or more minutes per day.  She exercises with enough effort to increase her heart rate 4 days per week.   She is taking medications regularly.     Doing OK - saw neurology and was really not impressed with this visit. States that during the EMG testing he held her foot down and then later told her that the fasculations and neuropathy/weakness is probably all related with her diabetes. Diabetes has been under good control. She did stop PT. Continues to do exercises at home and overall feels things are stable at this time. Does have days where she is still really unsteady on her feet and days where all of her joints hurt and she is much more fatigued. Wonders about checking for a vitamin deficiency. Also wonders about seeing rheumatology again - has one specifically she is requesting.       Review of Systems  Constitutional, HEENT, cardiovascular, pulmonary, GI, , musculoskeletal, neuro, skin, endocrine and psych systems are negative, except as otherwise noted.      Objective    /84   Pulse 88   Temp 97.6  F (36.4  C) (Temporal)   Resp 16   Ht 1.626 m (5'  "4\")   Wt 88.5 kg (195 lb)   LMP 07/25/2020 (Exact Date)   SpO2 99%   BMI 33.47 kg/m    Body mass index is 33.47 kg/m .  Physical Exam   GENERAL: alert and no distress  HENT: ear canals and TM's normal, nose and mouth without ulcers or lesions  NECK: no adenopathy, no asymmetry, masses, or scars  RESP: lungs clear to auscultation - no rales, rhonchi or wheezes  CV: regular rate and rhythm, normal S1 S2, no S3 or S4, no murmur, click or rub, no peripheral edema   ABDOMEN: soft, nontender, no hepatosplenomegaly, no masses and bowel sounds normal  MS: no gross musculoskeletal defects noted, no edema  SKIN: no suspicious lesions or rashes  PSYCH: mentation appears normal, affect normal/bright        Assessment & Plan     Type 2 diabetes mellitus without complication, without long-term current use of insulin (H)  Labs updated today. This has been stable overall with current medication regimen. Encouraged ongoing diet and exercise modifications as able.  - empagliflozin (JARDIANCE) 10 MG TABS tablet; TAKE ONE TABLET (10 MG) BY MOUTH DAILY  - metFORMIN (GLUCOPHAGE XR) 500 MG 24 hr tablet; Take 2 tablets (1,000 mg) by mouth 2 times daily (with meals).    Type 2 diabetes mellitus with diabetic autonomic neuropathy, without long-term current use of insulin (H)  See above.   - Hemoglobin A1c; Future  - Comprehensive metabolic panel (BMP + Alb, Alk Phos, ALT, AST, Total. Bili, TP); Future  - Hemoglobin A1c  - Comprehensive metabolic panel (BMP + Alb, Alk Phos, ALT, AST, Total. Bili, TP)    Morbid (severe) obesity due to excess calories (H)  Encouraged ongoing diet and exercise modifications as able.     Hyperreflexia  Unclear etiology. Additional labs ordered as below and referral placed to rheumatology.  - Adult Rheumatology  Referral; Future    Multiple joint pain  - Adult Rheumatology  Referral; Future    Chronic fatigue  - Adult Rheumatology  Referral; Future  - Vitamin B12; Future  - Ferritin; " Future  - Vitamin D Deficiency; Future  - Iron and iron binding capacity; Future  - Magnesium; Future  - Vitamin B12  - Ferritin  - Vitamin D Deficiency  - Iron and iron binding capacity  - Magnesium    Abdominal pain, generalized  Very rare use as needed.   - oxyCODONE (ROXICODONE) 5 MG tablet; Take 1 tablet (5 mg) by mouth every 6 hours as needed for severe pain.    Indigestion  - omeprazole (PRILOSEC) 40 MG DR capsule; Take 1 capsule (40 mg) by mouth daily.    S/P lumbar microdiscectomy  - tiZANidine (ZANAFLEX) 4 MG tablet; Take 1-2 tablets (4-8 mg) by mouth 3 times daily as needed for muscle spasms.    Vitamin B12 deficiency (non anemic)  - vitamin B-12 (CYANOCOBALAMIN) 2500 MCG sublingual tablet; Take 1 tablet (2,500 mcg) by mouth daily.    The longitudinal plan of care for the diagnosis(es)/condition(s) as documented were addressed during this visit. Due to the added complexity in care, I will continue to support Fallon in the subsequent management and with ongoing continuity of care.    The patient indicates understanding of these issues and agrees with the plan.    Signed Electronically by: Destini Esparza PA-C

## 2024-12-10 RX ORDER — CYANOCOBALAMIN (VITAMIN B-12) 2500 MCG
2500 TABLET, SUBLINGUAL SUBLINGUAL DAILY
Qty: 90 TABLET | Refills: 1 | Status: SHIPPED | OUTPATIENT
Start: 2024-12-10

## 2025-02-03 ENCOUNTER — NURSE TRIAGE (OUTPATIENT)
Dept: FAMILY MEDICINE | Facility: CLINIC | Age: 45
End: 2025-02-03
Payer: COMMERCIAL

## 2025-02-03 NOTE — TELEPHONE ENCOUNTER
"Nurse Triage SBAR    Is this a 2nd Level Triage? NO    Situation: Night Sweats >3 weeks    Background: Patient states she has night sweats that drench her clothing and bedding 4-5x a week, increased fatigue, weakness. Patient states she has used her pain medication more frequently because of the muscle pain.     Assessment: Patient reports night sweats that drench her clothing and bedding 4-5x a week, increased fatigue, weakness, cough, muscle pain, hurts to swallow, hurts to take a deep breath, chest tightness when coughing, low grade fever 99.5F, diabetic hx, pain under left jaw - hx of tonsil stones. Patient states this is not cardiac - her  is an EMT and assess her daily.     Protocol Recommended Disposition:   See PCP Within 24 Hours Patients PCP is out of office, appointment scheduled with Dr. Sharpe on 02/04/2025 with arrival time of 1420. Patient verbalized understanding, agreeable to plan and no further questions at this time.  Patient does not drive, therefore, rely's on her family for transportation- patients son will bring her to the appointment.     Recommendation: RN reviewed red flag symptoms with patient per RN protocol. Advised patient to seek emergency care if symptoms worsen or if pain becomes unmanageable. Patient agreed to be seen in ED if symptoms worsen.     Caro Muller RN on 2/3/2025 at 1:42 PM      Reason for Disposition   [1] NIGHT SWEATS occur (e.g., drenching sweat that occurs at night and has to change bed clothes or bed sheets) AND [2] cause unknown    Additional Information   Negative: SEVERE difficulty breathing (e.g., struggling for each breath, speaks in single words)   Negative: Shock suspected (e.g., cold/pale/clammy skin, too weak to stand, low BP, rapid pulse)   Negative: Sounds like a life-threatening emergency to the triager    Answer Assessment - Initial Assessment Questions  1. ONSET: \"When did the sweating start?\"       01/01/2025  2. LOCATION: \"What part " "of your body has excessive sweating?\" (e.g., entire body; just face, underarms, palms, or soles of feet).       Whole body  3. SEVERITY: \"How bad is the sweating?\"    (Scale 1-10; or mild, moderate, severe)    -  SEVERE (8-10): Drenching sweat and has to change bed clothes or bed linens.    - NIGHT SWEATS: Drenching sweat that occurs at night and has to change bed clothes or bed sheets.      Yes  4. CAUSE: \"What do you think is causing the sweating?\"      Unknown - COVID  5. FEVER: \"Have you been having fevers?\" If Yes, ask: \"What is your temperature, how was it measured, and when did it start?\"      Low grade 99.5F  6. OTHER SYMPTOMS: \"Do you have any other symptoms?\" (e.g., chest pain, difficulty breathing, lightheadedness, weight loss)      Reports pain with breathing, lest side lower lobe, chest tightness, hurts to swallow, fatigue, winded, tired    Protocols used: [id number]    "

## 2025-02-04 ENCOUNTER — OFFICE VISIT (OUTPATIENT)
Dept: FAMILY MEDICINE | Facility: CLINIC | Age: 45
End: 2025-02-04
Payer: COMMERCIAL

## 2025-02-04 ENCOUNTER — HOSPITAL ENCOUNTER (OUTPATIENT)
Dept: GENERAL RADIOLOGY | Facility: CLINIC | Age: 45
Discharge: HOME | End: 2025-02-04
Attending: STUDENT IN AN ORGANIZED HEALTH CARE EDUCATION/TRAINING PROGRAM
Payer: COMMERCIAL

## 2025-02-04 VITALS
TEMPERATURE: 97.8 F | OXYGEN SATURATION: 99 % | RESPIRATION RATE: 18 BRPM | WEIGHT: 196.4 LBS | HEIGHT: 64 IN | SYSTOLIC BLOOD PRESSURE: 124 MMHG | HEART RATE: 88 BPM | DIASTOLIC BLOOD PRESSURE: 70 MMHG | BODY MASS INDEX: 33.53 KG/M2

## 2025-02-04 DIAGNOSIS — E78.5 HYPERLIPIDEMIA LDL GOAL <130: ICD-10-CM

## 2025-02-04 DIAGNOSIS — R05.1 ACUTE COUGH: ICD-10-CM

## 2025-02-04 DIAGNOSIS — R05.1 ACUTE COUGH: Primary | ICD-10-CM

## 2025-02-04 DIAGNOSIS — U07.1 INFECTION DUE TO 2019 NOVEL CORONAVIRUS: ICD-10-CM

## 2025-02-04 DIAGNOSIS — K21.9 GASTROESOPHAGEAL REFLUX DISEASE WITHOUT ESOPHAGITIS: ICD-10-CM

## 2025-02-04 DIAGNOSIS — E53.8 VITAMIN B12 DEFICIENCY (NON ANEMIC): ICD-10-CM

## 2025-02-04 DIAGNOSIS — Z87.891 HX OF SMOKING: ICD-10-CM

## 2025-02-04 DIAGNOSIS — E11.43 TYPE 2 DIABETES MELLITUS WITH DIABETIC AUTONOMIC NEUROPATHY, WITHOUT LONG-TERM CURRENT USE OF INSULIN (H): ICD-10-CM

## 2025-02-04 PROCEDURE — 99214 OFFICE O/P EST MOD 30 MIN: CPT | Performed by: STUDENT IN AN ORGANIZED HEALTH CARE EDUCATION/TRAINING PROGRAM

## 2025-02-04 PROCEDURE — 71046 X-RAY EXAM CHEST 2 VIEWS: CPT

## 2025-02-04 PROCEDURE — G2211 COMPLEX E/M VISIT ADD ON: HCPCS | Performed by: STUDENT IN AN ORGANIZED HEALTH CARE EDUCATION/TRAINING PROGRAM

## 2025-02-04 RX ORDER — ALBUTEROL SULFATE 90 UG/1
2 INHALANT RESPIRATORY (INHALATION) EVERY 6 HOURS PRN
Qty: 18 G | Refills: 0 | Status: SHIPPED | OUTPATIENT
Start: 2025-02-04

## 2025-02-04 ASSESSMENT — PATIENT HEALTH QUESTIONNAIRE - PHQ9
SUM OF ALL RESPONSES TO PHQ QUESTIONS 1-9: 4
SUM OF ALL RESPONSES TO PHQ QUESTIONS 1-9: 4
10. IF YOU CHECKED OFF ANY PROBLEMS, HOW DIFFICULT HAVE THESE PROBLEMS MADE IT FOR YOU TO DO YOUR WORK, TAKE CARE OF THINGS AT HOME, OR GET ALONG WITH OTHER PEOPLE: NOT DIFFICULT AT ALL

## 2025-02-04 ASSESSMENT — ENCOUNTER SYMPTOMS
FATIGUE: 1
FEVER: 1

## 2025-02-04 ASSESSMENT — PAIN SCALES - GENERAL: PAINLEVEL_OUTOF10: MODERATE PAIN (4)

## 2025-02-04 NOTE — PROGRESS NOTES
"  Assessment & Plan   Problem List Items Addressed This Visit          Nervous and Auditory    Type 2 diabetes mellitus with diabetic autonomic neuropathy, without long-term current use of insulin (H)       Digestive    GERD (gastroesophageal reflux disease)       Endocrine    Hyperlipidemia LDL goal <130     Other Visit Diagnoses       Acute cough    -  Primary    Relevant Medications    albuterol (PROAIR HFA/PROVENTIL HFA/VENTOLIN HFA) 108 (90 Base) MCG/ACT inhaler    Other Relevant Orders    XR Chest 2 Views (Completed)    Infection due to 2019 novel coronavirus        Hx of smoking        Vitamin B12 deficiency (non anemic)               Lungs clear on exam today and x-ray unremarkable.  Did provide her with albuterol and suspect some underlying obstructive lung disease given her significant smoking history.  Ongoing post-COVID symptoms now.  We did discuss Flovent versus course of steroids which she does have significant elevation of sugars when being on steroids in the past.  Will avoid this now but if things not improving would do course of Flovent moving forward.  Follow-up sooner if new or worsening issues arise or things not improving..    The longitudinal plan of care for the diagnosis(es)/condition(s) as documented were addressed during this visit. Due to the added complexity in care, I will continue to support Fallon in the subsequent management and with ongoing continuity of care.       BMI  Estimated body mass index is 33.71 kg/m  as calculated from the following:    Height as of this encounter: 1.626 m (5' 4\").    Weight as of this encounter: 89.1 kg (196 lb 6.4 oz).   Weight management plan: Discussed healthy diet and exercise guidelines        Subjective   Fallon is a 44 year old, presenting for the following health issues:  Fever (Low grade fever in the evening since having COVID in the beginning of the year.), Fatigue, Night Sweats, and Breathing Problem (tightness)        2/4/2025     2:24 " "PM   Additional Questions   Roomed by Sharifa LOWE   Accompanied by John, son         2/4/2025     2:24 PM   Patient Reported Additional Medications   Patient reports taking the following new medications fish oil     History of Present Illness       Reason for visit:  Tired tightness when breathing since covid  Symptom onset:  More than a month  Symptoms include:  Tightness of breath low grade fever at night with sweats sore throught  Symptom intensity:  Moderate  Symptom progression:  Staying the same  Had these symptoms before:  No  What makes it worse:  Rest  What makes it better:  After doing work or walikg for a long time   She is taking medications regularly.       Patient with positive home COVID test in January and notes fevers for short time.  Has since had persistent cough does feel wheezy at times.  She is coughing up junk.  Does have tightness into her chest without specific chest pain or palpitations.  Sore throat but no ear pain.  Minimal congestion.  She does have significant smoking history but has never been diagnosed with asthma or used inhalers recently.  She does note feeling warm at night with night sweats which she has not had prior to the infection.  No significant weight changes.  Does have ongoing fatigue which has been persistent since prior to infection.  No new GI symptoms.  Does have B12 deficiency and on oral supplementation now.    Review of Systems  Constitutional, HEENT, cardiovascular, pulmonary, GI, , musculoskeletal, neuro, skin, endocrine and psych systems are negative, except as otherwise noted.      Objective    /70   Pulse 88   Temp 97.8  F (36.6  C) (Temporal)   Resp 18   Ht 1.626 m (5' 4\")   Wt 89.1 kg (196 lb 6.4 oz)   LMP 07/25/2020 (Exact Date)   SpO2 99%   BMI 33.71 kg/m    Body mass index is 33.71 kg/m .  Physical Exam   GENERAL: alert and no distress  EYES: Eyes grossly normal to inspection, PERRL and conjunctivae and sclerae normal  HENT: ear canals " and TM's normal, nose and mouth without ulcers or lesions  NECK: no adenopathy, no asymmetry, masses, or scars  RESP: lungs clear to auscultation - no rales, rhonchi or wheezes  CV: regular rate and rhythm, normal S1 S2, no murmur, click or rub, no peripheral edema  ABDOMEN: soft, nontender, no hepatosplenomegaly, no masses and bowel sounds normal  MS: no gross musculoskeletal defects noted, no edema  SKIN: no suspicious lesions or rashes  NEURO:  mentation intact and speech normal  PSYCH: mentation appears normal, affect normal/bright          Signed Electronically by: Braxton Sharpe MD

## 2025-02-04 NOTE — NURSING NOTE
Prior to immunization administration, verified patients identity using patient s name and date of birth. Please see Immunization Activity for additional information.     Screening Questionnaire for Adult Immunization    Are you sick today?   Yes   Do you have allergies to medications, food, a vaccine component or latex?   Yes   Have you ever had a serious reaction after receiving a vaccination?   No   Do you have a long-term health problem with heart, lung, kidney, or metabolic disease (e.g., diabetes), asthma, a blood disorder, no spleen, complement component deficiency, a cochlear implant, or a spinal fluid leak?  Are you on long-term aspirin therapy?   Yes   Do you have cancer, leukemia, HIV/AIDS, or any other immune system problem?   No   Do you have a parent, brother, or sister with an immune system problem?   Yes, mother   In the past 3 months, have you taken medications that affect  your immune system, such as prednisone, other steroids, or anticancer drugs; drugs for the treatment of rheumatoid arthritis, Crohn s disease, or psoriasis; or have you had radiation treatments?   No   Have you had a seizure, or a brain or other nervous system problem?   No   During the past year, have you received a transfusion of blood or blood    products, or been given immune (gamma) globulin or antiviral drug?   No   For women: Are you pregnant or is there a chance you could become       pregnant during the next month?   No   Have you received any vaccinations in the past 4 weeks?   No     Immunization questionnaire was positive for at least one answer.  Notified Dr. Sharpe.      Patient instructed to remain in clinic for 15 minutes afterwards, and to report any adverse reactions.     Screening performed by Sharifa Adhikari LPN on 2/4/2025 at 2:30 PM.

## 2025-02-06 ENCOUNTER — MYC MEDICAL ADVICE (OUTPATIENT)
Dept: FAMILY MEDICINE | Facility: CLINIC | Age: 45
End: 2025-02-06
Payer: COMMERCIAL

## 2025-02-06 ENCOUNTER — MYC REFILL (OUTPATIENT)
Dept: FAMILY MEDICINE | Facility: CLINIC | Age: 45
End: 2025-02-06
Payer: COMMERCIAL

## 2025-02-06 DIAGNOSIS — R10.84 ABDOMINAL PAIN, GENERALIZED: ICD-10-CM

## 2025-02-06 RX ORDER — OXYCODONE HYDROCHLORIDE 5 MG/1
5 TABLET ORAL EVERY 6 HOURS PRN
Qty: 12 TABLET | Refills: 0 | Status: SHIPPED | OUTPATIENT
Start: 2025-02-06

## 2025-02-22 DIAGNOSIS — R05.1 ACUTE COUGH: ICD-10-CM

## 2025-02-25 NOTE — TELEPHONE ENCOUNTER
Called patient to gather more information on request for albuterol inhaler request.     Patient declined triage for symptoms, but would like an update sent to PCP. She states she is willing to come in for an appointment, do a virtual appointment, or wait it out if PCP thinks that is best.     Patient states she continues to have tightness, states the albuterol really hasn't helped anyway. She states she went to  after her last OV because her lymph nodes in her neck and behind her ear became really swollen. She states they insisted she had strep but she did not. She states she also developed blisters along where her lymph nodes have been swollen, so  gave her a steroid cream and antivirals for this. She states she has not started the antivirals but has used the steroid cream, stating the blisters are much improved but she may have 1 or 2 left since she has been picking at them.     She states she has also continued to have sore throat, fatigue, rash on her face that flares up when not feeling well (states she has been told this is possibly rosacea), and increased pain since having COVID. She states she has been trying to rest on her more fatigued days, increasing her water intake, and taking Advil PRN but notes this is infrequent due to her acid reflux.     Routing to PCP for review.     Mackenzie Bhakta, BSN, RN

## 2025-02-26 RX ORDER — ALBUTEROL SULFATE 90 UG/1
AEROSOL, METERED RESPIRATORY (INHALATION)
Qty: 18 G | Refills: 0 | Status: SHIPPED | OUTPATIENT
Start: 2025-02-26

## 2025-03-03 DIAGNOSIS — E11.9 TYPE 2 DIABETES MELLITUS WITHOUT COMPLICATION, WITHOUT LONG-TERM CURRENT USE OF INSULIN (H): ICD-10-CM

## 2025-03-03 RX ORDER — METFORMIN HYDROCHLORIDE 500 MG/1
1000 TABLET, EXTENDED RELEASE ORAL 2 TIMES DAILY WITH MEALS
Qty: 360 TABLET | Refills: 0 | Status: SHIPPED | OUTPATIENT
Start: 2025-03-03

## 2025-04-08 DIAGNOSIS — Z98.890 S/P LUMBAR MICRODISCECTOMY: ICD-10-CM

## 2025-04-15 ENCOUNTER — TRANSFERRED RECORDS (OUTPATIENT)
Dept: HEALTH INFORMATION MANAGEMENT | Facility: CLINIC | Age: 45
End: 2025-04-15
Payer: COMMERCIAL

## 2025-04-30 ENCOUNTER — HOSPITAL ENCOUNTER (EMERGENCY)
Facility: CLINIC | Age: 45
Discharge: HOME | End: 2025-04-30
Attending: PHYSICIAN ASSISTANT | Admitting: PHYSICIAN ASSISTANT
Payer: COMMERCIAL

## 2025-04-30 ENCOUNTER — NURSE TRIAGE (OUTPATIENT)
Dept: FAMILY MEDICINE | Facility: CLINIC | Age: 45
End: 2025-04-30
Payer: COMMERCIAL

## 2025-04-30 VITALS
SYSTOLIC BLOOD PRESSURE: 120 MMHG | TEMPERATURE: 97.8 F | WEIGHT: 201.8 LBS | BODY MASS INDEX: 34.64 KG/M2 | DIASTOLIC BLOOD PRESSURE: 60 MMHG | HEART RATE: 94 BPM | OXYGEN SATURATION: 96 % | RESPIRATION RATE: 18 BRPM

## 2025-04-30 DIAGNOSIS — G43.909 MIGRAINE: ICD-10-CM

## 2025-04-30 DIAGNOSIS — E11.43 TYPE 2 DIABETES MELLITUS WITH DIABETIC AUTONOMIC NEUROPATHY, WITHOUT LONG-TERM CURRENT USE OF INSULIN (H): ICD-10-CM

## 2025-04-30 DIAGNOSIS — M62.838 NECK MUSCLE SPASM: ICD-10-CM

## 2025-04-30 LAB
ANION GAP SERPL CALCULATED.3IONS-SCNC: 11 MMOL/L (ref 7–15)
BASOPHILS # BLD AUTO: 0 10E3/UL (ref 0–0.2)
BASOPHILS NFR BLD AUTO: 0 %
BUN SERPL-MCNC: 12.4 MG/DL (ref 6–20)
CALCIUM SERPL-MCNC: 9.7 MG/DL (ref 8.8–10.4)
CHLORIDE SERPL-SCNC: 103 MMOL/L (ref 98–107)
CREAT SERPL-MCNC: 0.73 MG/DL (ref 0.51–0.95)
EGFRCR SERPLBLD CKD-EPI 2021: >90 ML/MIN/1.73M2
EOSINOPHIL # BLD AUTO: 0.1 10E3/UL (ref 0–0.7)
EOSINOPHIL NFR BLD AUTO: 1 %
ERYTHROCYTE [DISTWIDTH] IN BLOOD BY AUTOMATED COUNT: 12.5 % (ref 10–15)
GLUCOSE SERPL-MCNC: 150 MG/DL (ref 70–99)
HCO3 SERPL-SCNC: 24 MMOL/L (ref 22–29)
HCT VFR BLD AUTO: 47.3 % (ref 35–47)
HGB BLD-MCNC: 17 G/DL (ref 11.7–15.7)
IMM GRANULOCYTES # BLD: 0 10E3/UL
IMM GRANULOCYTES NFR BLD: 0 %
LYMPHOCYTES # BLD AUTO: 1.8 10E3/UL (ref 0.8–5.3)
LYMPHOCYTES NFR BLD AUTO: 19 %
MCH RBC QN AUTO: 31 PG (ref 26.5–33)
MCHC RBC AUTO-ENTMCNC: 35.9 G/DL (ref 31.5–36.5)
MCV RBC AUTO: 86 FL (ref 78–100)
MONOCYTES # BLD AUTO: 0.4 10E3/UL (ref 0–1.3)
MONOCYTES NFR BLD AUTO: 4 %
NEUTROPHILS # BLD AUTO: 6.9 10E3/UL (ref 1.6–8.3)
NEUTROPHILS NFR BLD AUTO: 75 %
NRBC # BLD AUTO: 0 10E3/UL
NRBC BLD AUTO-RTO: 0 /100
PLATELET # BLD AUTO: 224 10E3/UL (ref 150–450)
POTASSIUM SERPL-SCNC: 3.9 MMOL/L (ref 3.4–5.3)
RBC # BLD AUTO: 5.48 10E6/UL (ref 3.8–5.2)
SODIUM SERPL-SCNC: 138 MMOL/L (ref 135–145)
WBC # BLD AUTO: 9.3 10E3/UL (ref 4–11)

## 2025-04-30 PROCEDURE — 99284 EMERGENCY DEPT VISIT MOD MDM: CPT | Performed by: PHYSICIAN ASSISTANT

## 2025-04-30 PROCEDURE — 258N000003 HC RX IP 258 OP 636: Performed by: PHYSICIAN ASSISTANT

## 2025-04-30 PROCEDURE — 250N000013 HC RX MED GY IP 250 OP 250 PS 637: Performed by: PHYSICIAN ASSISTANT

## 2025-04-30 PROCEDURE — 250N000011 HC RX IP 250 OP 636: Performed by: PHYSICIAN ASSISTANT

## 2025-04-30 PROCEDURE — 99285 EMERGENCY DEPT VISIT HI MDM: CPT | Mod: 25 | Performed by: PHYSICIAN ASSISTANT

## 2025-04-30 PROCEDURE — 85004 AUTOMATED DIFF WBC COUNT: CPT | Performed by: PHYSICIAN ASSISTANT

## 2025-04-30 PROCEDURE — 36415 COLL VENOUS BLD VENIPUNCTURE: CPT | Performed by: PHYSICIAN ASSISTANT

## 2025-04-30 PROCEDURE — 83036 HEMOGLOBIN GLYCOSYLATED A1C: CPT

## 2025-04-30 PROCEDURE — 80048 BASIC METABOLIC PNL TOTAL CA: CPT | Performed by: PHYSICIAN ASSISTANT

## 2025-04-30 PROCEDURE — 96365 THER/PROPH/DIAG IV INF INIT: CPT | Performed by: PHYSICIAN ASSISTANT

## 2025-04-30 PROCEDURE — 96375 TX/PRO/DX INJ NEW DRUG ADDON: CPT | Performed by: PHYSICIAN ASSISTANT

## 2025-04-30 PROCEDURE — 96361 HYDRATE IV INFUSION ADD-ON: CPT | Performed by: PHYSICIAN ASSISTANT

## 2025-04-30 RX ORDER — LORAZEPAM 2 MG/ML
1 INJECTION INTRAMUSCULAR ONCE
Status: COMPLETED | OUTPATIENT
Start: 2025-04-30 | End: 2025-04-30

## 2025-04-30 RX ORDER — KETOROLAC TROMETHAMINE 15 MG/ML
15 INJECTION, SOLUTION INTRAMUSCULAR; INTRAVENOUS ONCE
Status: COMPLETED | OUTPATIENT
Start: 2025-04-30 | End: 2025-04-30

## 2025-04-30 RX ORDER — LIDOCAINE 4 G/G
1 PATCH TOPICAL ONCE
Status: DISCONTINUED | OUTPATIENT
Start: 2025-04-30 | End: 2025-04-30 | Stop reason: HOSPADM

## 2025-04-30 RX ORDER — DIPHENHYDRAMINE HYDROCHLORIDE 50 MG/ML
25 INJECTION, SOLUTION INTRAMUSCULAR; INTRAVENOUS ONCE
Status: COMPLETED | OUTPATIENT
Start: 2025-04-30 | End: 2025-04-30

## 2025-04-30 RX ORDER — HALOPERIDOL 5 MG/ML
5 INJECTION INTRAMUSCULAR ONCE
Status: COMPLETED | OUTPATIENT
Start: 2025-04-30 | End: 2025-04-30

## 2025-04-30 RX ORDER — LORAZEPAM 1 MG/1
1 TABLET ORAL EVERY 6 HOURS PRN
Qty: 5 TABLET | Refills: 0 | Status: SHIPPED | OUTPATIENT
Start: 2025-04-30 | End: 2025-05-01

## 2025-04-30 RX ORDER — MAGNESIUM SULFATE 1 G/100ML
1 INJECTION INTRAVENOUS ONCE
Status: COMPLETED | OUTPATIENT
Start: 2025-04-30 | End: 2025-04-30

## 2025-04-30 RX ADMIN — SODIUM CHLORIDE 1000 ML: 0.9 INJECTION, SOLUTION INTRAVENOUS at 15:02

## 2025-04-30 RX ADMIN — MAGNESIUM SULFATE HEPTAHYDRATE 1 G: 1 INJECTION, SOLUTION INTRAVENOUS at 15:03

## 2025-04-30 RX ADMIN — KETOROLAC TROMETHAMINE 15 MG: 15 INJECTION, SOLUTION INTRAMUSCULAR; INTRAVENOUS at 15:03

## 2025-04-30 RX ADMIN — DIPHENHYDRAMINE HYDROCHLORIDE 25 MG: 50 INJECTION, SOLUTION INTRAMUSCULAR; INTRAVENOUS at 15:03

## 2025-04-30 RX ADMIN — LORAZEPAM 1 MG: 2 INJECTION INTRAMUSCULAR; INTRAVENOUS at 16:52

## 2025-04-30 RX ADMIN — HALOPERIDOL LACTATE 5 MG: 5 INJECTION, SOLUTION INTRAMUSCULAR at 16:25

## 2025-04-30 RX ADMIN — LIDOCAINE 1 PATCH: 4 PATCH TOPICAL at 16:23

## 2025-04-30 ASSESSMENT — COLUMBIA-SUICIDE SEVERITY RATING SCALE - C-SSRS
2. HAVE YOU ACTUALLY HAD ANY THOUGHTS OF KILLING YOURSELF IN THE PAST MONTH?: NO
6. HAVE YOU EVER DONE ANYTHING, STARTED TO DO ANYTHING, OR PREPARED TO DO ANYTHING TO END YOUR LIFE?: NO
1. IN THE PAST MONTH, HAVE YOU WISHED YOU WERE DEAD OR WISHED YOU COULD GO TO SLEEP AND NOT WAKE UP?: NO

## 2025-04-30 ASSESSMENT — ACTIVITIES OF DAILY LIVING (ADL)
ADLS_ACUITY_SCORE: 41

## 2025-04-30 NOTE — ED PROVIDER NOTES
History     Chief Complaint   Patient presents with    Headache     HPI  Fallon Rivera is a 44 year old female with a history of type 2 diabetes, chronic back pain, recurrent kidney stones, and previous migraines presenting for evaluation of a headache that has worsened over the past 2 days.  She is described as a typical light headache at onset 2 days ago that did respond to ibuprofen.  Today, symptoms are worse not responding to ibuprofen.  She describes bilateral postoccipital pounding pain radiating through to the bifrontal space rated 6 on a scale of 10.  Nausea without vomiting.  Some episodes of blurry vision.  No diplopia or peripheral visual field defect.  Reports getting regular headaches, but has not had a migraine for about 10 years based on her memory.  She does admit to severe reactions to Reglan and antinausea medication in the past.  She has not had any recent falls or head injury.  Does not take regular anticoagulant medication.  Fasting blood sugars have been anywhere between the 120-170 range which is higher than typical for her.  She tried exercising today to get her blood sugar down, but this made the headache worse.  Denies any recent fevers or chills.        Allergies:  Allergies   Allergen Reactions    Amoxicillin Hives    Emetrol      Anxiety, agitation,severe paranoia. Zofran, Reglan are some of them.  DRAMAMINE WITHOUT ISSUES      Indomethacin Nausea and Vomiting    Macrobid [Nitrofuran Derivatives] Hives    Meperidine Hcl     Metoclopramide Other (See Comments)     Acute paranoia, severe    Nitrofurantoin Hives    Ondansetron Other (See Comments)     Severe anxiety, agitation    Vancomycin Other (See Comments)     maria del rosario syndrome    Vancomycin Other (See Comments)     maria del rosario syndrome       Problem List:    Patient Active Problem List    Diagnosis Date Noted    S/P lumbar microdiscectomy 07/05/2023     Priority: Medium    Lumbar radiculopathy 07/05/2023     Priority: Medium     Left ovarian cyst 05/25/2021     Priority: Medium     Added automatically from request for surgery [id number]      Dyspareunia, female 12/01/2020     Priority: Medium    Chronic diarrhea 09/30/2020     Priority: Medium     Added automatically from request for surgery [id number]      Abnormal CT scan, small bowel 09/30/2020     Priority: Medium     Added automatically from request for surgery [id number]      Endometrial hyperplasia, unspecified 06/18/2020     Priority: Medium     Added automatically from request for surgery [id number]      Endometrial intraepithelial neoplasia (EIN) 03/02/2020     Priority: Medium    Obesity (BMI 35.0-39.9) with comorbidity (H) 10/16/2018     Priority: Medium    Non morbid obesity due to excess calories 07/05/2016     Priority: Medium    Type 2 diabetes mellitus with diabetic autonomic neuropathy, without long-term current use of insulin (H) 04/29/2016     Priority: Medium    Glucosuria 04/27/2016     Priority: Medium    Hyperlipidemia LDL goal <130 07/18/2012     Priority: Medium    Mild major depression (H) 07/18/2012     Priority: Medium    GERD (gastroesophageal reflux disease) 07/03/2012     Priority: Medium    CARDIOVASCULAR SCREENING; LDL GOAL LESS THAN 160 10/31/2010     Priority: Medium    Dyspnea 11/24/2009     Priority: Medium    Kidney stones 02/17/2009     Priority: Medium    S/P conization of cervix- KAYLA 2/3 in 2011 06/27/2007     Priority: Medium     1/24/06 pap: ASC-H  2/9/06 colposcopy/bx (negative)/ECC (negative) pap- ASC-H  5/24/06 pap- ASC-H  9/6/07 pap NIL  1/23/07 pap ASCUS + HPV 45 (high risk)  3/15/07 pap ASCUS + HPV 45 (high risk) colposcopy- bx (negative)/ECC (koilocytosis)  6/19/07 ECC- koilocytosis.  Pap- AGS  10/23/07 pap- ASC-H, ECC- negative  2/14/08 pap NIL- return to yearly paps  2/17/09 pap NIL- repeat 1 year  3/8/10 pap NIL  3/3/11 pap ASCUS + HPV 45 (high risk)- colposcopy recommended  5/16/11 colpo- bx (KAYLA 2/3/HSIL) ECC (ASCUS)  pap (ASC-H)  6/2/11  recommend: [facility]  7/1/11 [facility]: path: KAYLA 2/3 with possible involvement of the endocervical margin.  ECC- neg.  Endometrium- neg.  7/14/11 plan: HIPOLITO in approx 2 months  10/10/11- hysterectomy planned.  (cancelled)  11/7/11 pap-- NIL. Plan--repeat pap in 6 months. (5/7/12)  5/7/12 pap NIL. Plan-- pap in 1 year (due 5/7/13)   5/8/13 pap NIL. Plan-pap in 1 year  5/8/14 NIL pap, neg HR HPV.  Plan- repeat pap/HPV in 1 year (due 5/8/15)  10/14/14 NIL pap, neg HR HPV. Endometrial biopsy- WNL   1/6/16  NIL pap, neg HR HPV.  Plan: paps yearly, per Dr. Ford.  1/30/17 NIL pap, neg HR HPV. Plan: pap in 1 year.   3/12/18 NIL pap, neg HR HPV. Plan: cotest annually, per Dr. Ford.  3/11/19 ECC: negative. NIL pap, neg HR HPV. Plan: cotest in 1 yr  2/11/20 NIL Pap, Neg HPV. EMB: intraepithelial neoplasia. Plan: Hysterectomy.   7/28/20 Hysterectomy with cervix removed. Hx of KAYLA 2 in 2011 Plan: Cotest Q3y until 2036.  7/9/24 NIL vag pap, Neg HPV. Plan 3 yr co-test      Papanicolaou smear of cervix with atypical squamous cells cannot exclude high grade squamous intraepithelial lesion (ASC-H) 09/07/2006     Priority: Medium    Female infertility 07/24/2006     Priority: Medium     Problem list name updated by automated process. Provider to review      Hemorrhage of rectum and anus 12/02/2004     Priority: Medium        Past Medical History:    Past Medical History:   Diagnosis Date    Abnormal Pap smear 2006, 2007,     Calculus of kidney 01/01/2002    Cervical high risk HPV (human papillomavirus) test positive     History of colposcopy with cervical biopsy 2/9/06, 3/15/07       Past Surgical History:    Past Surgical History:   Procedure Laterality Date    ANESTHESIA OUT OF OR MRI N/A 4/26/2024    Procedure: MRI OF Brain,Cervical and Thoracic Spine;  Surgeon: GENERIC ANESTHESIA PROVIDER;  Location: [facility]    CHOLECYSTECTOMY, LAPOROSCOPIC  5/11/2007    Cholecystectomy, Laparoscopic    COLONOSCOPY  05/17/10    COLONOSCOPY N/A  8/27/2019    Procedure: COLONOSCOPY;  Surgeon: Paramjit Kingston DO;  Location: [facility]    COLONOSCOPY N/A 10/23/2020    Procedure: COLONOSCOPY, WITH  BIOPSY;  Surgeon: Ba Dickinson MD;  [facility]: [facility]    CONIZATION  7/1/2011    Procedure:CONIZATION; cold knife and punch biopsy of mole on back; Surgeon:SYDNEY FORD; Location:[facility]    DILATION AND CURETTAGE, HYSTEROSCOPY, LAPAROSCOPY, COMBINED Right 9/24/2015    Procedure: COMBINED DILATION AND CURETTAGE, HYSTEROSCOPY, LAPAROSCOPY;  Surgeon: Sydney Ford MD;  [facility]    DISCECTOMY LUMBAR MINIMALLY INVASIVE ONE LEVEL Left 1/23/2019    Procedure: Minimally Invasive Surgery Left Lumbar 5-Sacral 1 microdiscectomy;  Surgeon: Mason Roe MD;  [facility]    DISCECTOMY LUMBAR POSTERIOR MICROSCOPIC ONE LEVEL Left 4/5/2023    Procedure: Minimally Invasive Left Lumbar 5 to Sacral 1 evacuation of recurrent disc herniation;  Surgeon: Mason Roe MD;  [facility]    ESOPHAGOSCOPY, GASTROSCOPY, DUODENOSCOPY (EGD), COMBINED  5/21/2014    Procedure: COMBINED ESOPHAGOSCOPY, GASTROSCOPY, DUODENOSCOPY (EGD), BIOPSY SINGLE OR MULTIPLE;  Surgeon: Earl Lane MD;  [facility]    ESOPHAGOSCOPY, GASTROSCOPY, DUODENOSCOPY (EGD), COMBINED N/A 10/23/2020    Procedure: Esophagogastroduodenoscopy with  biopsy;  Surgeon: Ba Dickinson MD;  [facility]    EXCISE LESION TRUNK  7/1/2011    Procedure:EXCISE LESION TRUNK; Surgeon:SYDNEY FORD; [facility]    FORAMINOTOMY LUMBAR POSTERIOR ONE LEVEL Left 4/5/2023    Procedure: left foraminotomy Lumbar 5 to Sacral 1;  Surgeon: Mason Roe MD;  [facility]    HC FRAGMENTING OF KIDNEY STONE  11/30/2005    HC RX ECTOP PREG BY SCOPE,RMV TUBE/OVRY  12/20/2007    Right sided salpingectomy and removal of ruptured ectopic pregnancy. D&C.    HYSTERECTOMY VAGINAL      LAPAROSCOPIC APPENDECTOMY N/A 9/24/2015    Procedure: LAPAROSCOPIC APPENDECTOMY;   Surgeon: James Cuellar MD;  [facility]    LAPAROSCOPIC CYSTECTOMY OVARIAN (BENIGN) N/A 9/24/2015    Procedure: LAPAROSCOPIC CYSTECTOMY OVARIAN (BENIGN);  Surgeon: Greg Ford MD;  [facility]    LAPAROSCOPIC HYSTERECTOMY TOTAL, BILATERAL SALPINGO-OOPHORECTOMY, COMBINED N/A 7/28/2020    Procedure: laparoscpic assisted vaginal hysterectomy, cystoscopy;  Surgeon: Greg Ford MD;  Location: [facility]    LAPAROSCOPIC OOPHORECTOMY Right 9/24/2015    Procedure: LAPAROSCOPIC OOPHORECTOMY;  Surgeon: Greg Ford MD;  [facility]    LITHOTRIPSY  01/17/2007    PELVIS LAPAROSCOPY,DX  10/16/2000    Diagnostic laparoscopy, Endometrial biopsy.    TUBAL/ECTOPIC PREGNANCY  01/23/2007    Lap removal of left ruptured ectopic pregnancy & salpingectomy, D&C    ZZC REMOVAL OF KIDNEY STONE      two surgeries, 2002,2003    ZZHC UGI ENDOSCOPY, SIMPLE EXAM  09/01/09       Family History:    Family History   Problem Relation Age of Onset    Obesity Mother         on thyroid medication    Thyroid Disease Mother     Diabetes Mother     Rheumatologic Disease Mother         PMR    Heart Disease Father     Hypertension Father         and TIA    Lipids Father     Diabetes Maternal Grandmother         adult onset    Anesthesia Reaction Maternal Grandmother         can't tolerate Novacaine.    Respiratory Maternal Grandmother         COPD and emphysema.    Thyroid Disease Maternal Grandmother     Heart Disease Maternal Grandmother         CHF    Cancer Maternal Grandfather         skin cancer    Heart Disease Maternal Grandfather         MI    Breast Cancer Paternal Grandmother     Diabetes Paternal Grandfather         adult o nset    Hypertension Paternal Grandfather     Cerebrovascular Disease Paternal Grandfather     Heart Disease Paternal Grandfather         MI, replaced valve,angioplasty    Thyroid Disease Paternal Grandfather     Cancer Maternal Aunt         breast/brain cancer     Depression Paternal Aunt     Cerebrovascular Disease Paternal Aunt     Cerebrovascular Disease Paternal Uncle     Arrhythmia Other        Social History:  Marital Status:   [2]  Social History     Tobacco Use    Smoking status: Former     Current packs/day: 0.00     Types: Cigarettes    Smokeless tobacco: Never    Tobacco comments:     As of 10 /2014, pt has had a few cigs here and there   Vaping Use    Vaping status: Never Used   Substance Use Topics    Alcohol use: Yes     Comment: Once or twice a year    Drug use: No        Medications:    blood glucose (NO BRAND SPECIFIED) test strip  blood glucose monitoring (NO BRAND SPECIFIED) meter device kit  empagliflozin (JARDIANCE) 10 MG TABS tablet  metFORMIN (GLUCOPHAGE XR) 500 MG 24 hr tablet  omeprazole (PRILOSEC) 40 MG DR capsule  oxyCODONE (ROXICODONE) 5 MG tablet  STATIN NOT PRESCRIBED (INTENTIONAL)  thin (NO BRAND SPECIFIED) lancets  tiZANidine (ZANAFLEX) 4 MG tablet  VENTOLIN  (90 Base) MCG/ACT inhaler  vitamin B-12 (CYANOCOBALAMIN) 2500 MCG sublingual tablet          Review of Systems   All other systems reviewed and are negative.      Physical Exam   BP: (!) 132/111  Pulse: 104  Temp: 97.8  F (36.6  C)  Resp: 16  Weight: 91.5 kg (201 lb 12.8 oz)  SpO2: 99 %      Physical Exam  Vitals and nursing note reviewed.   Constitutional:       General: She is not in acute distress.     Appearance: She is not diaphoretic.   HENT:      Head: Normocephalic and atraumatic.      Right Ear: Tympanic membrane, ear canal and external ear normal.      Left Ear: Tympanic membrane, ear canal and external ear normal.      Nose: Nose normal. No congestion.      Mouth/Throat:      Mouth: Mucous membranes are moist.      Pharynx: No oropharyngeal exudate or posterior oropharyngeal erythema.   Eyes:      General: No scleral icterus.        Right eye: No discharge.         Left eye: No discharge.      Extraocular Movements: Extraocular movements intact.       Conjunctiva/sclera: Conjunctivae normal.      Pupils: Pupils are equal, round, and reactive to light.   Neck:      Thyroid: No thyromegaly.   Cardiovascular:      Rate and Rhythm: Normal rate and regular rhythm.      Heart sounds: Normal heart sounds. No murmur heard.  Pulmonary:      Effort: Pulmonary effort is normal. No respiratory distress.      Breath sounds: Normal breath sounds. No wheezing or rales.   Chest:      Chest wall: No tenderness.   Abdominal:      General: Bowel sounds are normal. There is no distension.      Palpations: Abdomen is soft. There is no mass.      Tenderness: There is no abdominal tenderness. There is no right CVA tenderness, left CVA tenderness, guarding or rebound.   Musculoskeletal:         General: No tenderness or deformity. Normal range of motion.      Cervical back: Normal range of motion and neck supple. No rigidity or tenderness.   Lymphadenopathy:      Cervical: No cervical adenopathy.   Skin:     General: Skin is warm and dry.      Capillary Refill: Capillary refill takes less than 2 seconds.      Findings: No erythema or rash.   Neurological:      Mental Status: She is alert and oriented to person, place, and time.      Cranial Nerves: No cranial nerve deficit.      Comments: Neuro:  Cranial nerves II-XII grossly intact.  Romberg is steady.  Gait WNL's.  Cerebellar testing is WNL's.  Sensation is intact to light touch throughout.  Bicep, brachioradialis, patellar, and achilles DTR's 2/4 without clonus.  No acute neurological abnormality identified.     Psychiatric:         Behavior: Behavior normal.         Thought Content: Thought content normal.         ED Course        Procedures              Critical Care time:  none     None         Results for orders placed or performed during the hospital encounter of 04/30/25 (from the past 24 hours)   CBC with platelets differential    Narrative    The following orders were created for panel order CBC with platelets  differential.  Procedure                               Abnormality         Status                     ---------                               -----------         ------                     CBC with platelets and ...[[id number]]  Abnormal            Final result                 Please view results for these tests on the individual orders.   Basic metabolic panel   Result Value Ref Range    Sodium 138 135 - 145 mmol/L    Potassium 3.9 3.4 - 5.3 mmol/L    Chloride 103 98 - 107 mmol/L    Carbon Dioxide (CO2) 24 22 - 29 mmol/L    Anion Gap 11 7 - 15 mmol/L    Urea Nitrogen 12.4 6.0 - 20.0 mg/dL    Creatinine 0.73 0.51 - 0.95 mg/dL    GFR Estimate >90 >60 mL/min/1.73m2    Calcium 9.7 8.8 - 10.4 mg/dL    Glucose 150 (H) 70 - 99 mg/dL   CBC with platelets and differential   Result Value Ref Range    WBC Count 9.3 4.0 - 11.0 10e3/uL    RBC Count 5.48 (H) 3.80 - 5.20 10e6/uL    Hemoglobin 17.0 (H) 11.7 - 15.7 g/dL    Hematocrit 47.3 (H) 35.0 - 47.0 %    MCV 86 78 - 100 fL    MCH 31.0 26.5 - 33.0 pg    MCHC 35.9 31.5 - 36.5 g/dL    RDW 12.5 10.0 - 15.0 %    Platelet Count 224 150 - 450 10e3/uL    % Neutrophils 75 %    % Lymphocytes 19 %    % Monocytes 4 %    % Eosinophils 1 %    % Basophils 0 %    % Immature Granulocytes 0 %    NRBCs per 100 WBC 0 <1 /100    Absolute Neutrophils 6.9 1.6 - 8.3 10e3/uL    Absolute Lymphocytes 1.8 0.8 - 5.3 10e3/uL    Absolute Monocytes 0.4 0.0 - 1.3 10e3/uL    Absolute Eosinophils 0.1 0.0 - 0.7 10e3/uL    Absolute Basophils 0.0 0.0 - 0.2 10e3/uL    Absolute Immature Granulocytes 0.0 <=0.4 10e3/uL    Absolute NRBCs 0.0 10e3/uL   CT Head w/o Contrast    Narrative    EXAM: CT HEAD W/O CONTRAST  LOCATION: [facility]  DATE: 4/30/2025    INDICATION: severe headache  COMPARISON: Brain MRI 4/26/2024  TECHNIQUE: Routine CT Head without IV contrast. Multiplanar reformats. Dose reduction techniques were used.    FINDINGS:  INTRACRANIAL CONTENTS: No intracranial hemorrhage,  extraaxial collection, or mass effect.  No CT evidence of acute infarct. Normal parenchymal attenuation. Normal ventricles and sulci.     VISUALIZED ORBITS/SINUSES/MASTOIDS: No intraorbital abnormality. No paranasal sinus mucosal disease. No middle ear or mastoid effusion.    BONES/SOFT TISSUES: No acute abnormality.      Impression    IMPRESSION:  No acute intracranial injury, hemorrhage, mass, or CT evidence of recent ischemia.     *Note: Due to a large number of results and/or encounters for the requested time period, some results have not been displayed. A complete set of results can be found in Results Review.       Medications   Lidocaine (LIDOCARE) 4 % Patch 1 patch (1 patch Transdermal $Patch/Med Applied 4/30/25 1623)   sodium chloride 0.9% BOLUS 1,000 mL (0 mLs Intravenous Stopped 4/30/25 1622)   ketorolac (TORADOL) injection 15 mg (15 mg Intravenous $Given 4/30/25 1503)   diphenhydrAMINE (BENADRYL) injection 25 mg (25 mg Intravenous $Given 4/30/25 1503)   magnesium sulfate 1 g in 100 mL D5W intermittent infusion (0 g Intravenous Stopped 4/30/25 1546)   haloperidol lactate (HALDOL) injection 5 mg (5 mg Intravenous $Given 4/30/25 1625)   LORazepam (ATIVAN) injection 1 mg (1 mg Intravenous $Given 4/30/25 1652)       Assessments & Plan (with Medical Decision Making)     Migraine  Neck muscle spasm     44 year old female with a history of type 2 diabetes, chronic back pain, recurrent kidney stones, and previous migraines presenting for evaluation of an increasing headache over the past 2 days without recent fall or head injury.  Does not take anticoagulant medication.  Symptoms of nausea and blurred vision off-and-on.  Bilateral postoccipital pain radiating through to the bifrontal space rated 6 on a scale of 10.  See HPI above for details.  /60   Pulse 94   Temp 97.8  F (36.6  C) (Oral)   Resp 18   Wt 91.5 kg (201 lb 12.8 oz)   LMP 07/25/2020 (Exact Date)   SpO2 96%   BMI 34.64 kg/m     Generally  healthy-appearing female in no acute distress.  Skin without rash.  Neurological exam is nonfocal.  No acute neurological abnormalities noted.  ENT exam negative.  No nuchal rigidity.  Cardiopulmonary Tani normal.  Chart was reviewed in detail.  In 2016 she was seen in the emergency department for headache and was given IV Benadryl, IV Decadron, IV Toradol, and IV normal saline with headache resolution.  She was diagnosed with type 2 diabetes a few months after that.  Based on presentation, this most likely represents migraine.  Given her risk factors, I am going to get a head CT to rule out intracranial abnormality.  We will start with IV normal saline, IV Benadryl, IV magnesium, and IV Toradol initially for symptom management and then reassess.  4:14 PM -I reevaluated the patient.  She reports about 20-30% improvement of her headache.  She still has a lot of tension and muscle spasm at the base of her skull posteriorly.  She does have muscle spasm noted on exam which recreates her pain pattern.  Given her multiple medication allergies and sensitivities, we did discuss the option of a trigger point injection to see if we could release the muscles.  She states that she gets a panic attack with even the thought of needles.  She would rather trial another IV medication.  We discussed that Haldol would be a decent option given that she is diabetic and we would preferably stay away from IV steroids.  After reviewing risks and benefits, she did want to proceed with this.  She also requested a lidocaine patch rather than an injection on the neck to see if this would help over the coming hours.  Unfortunately, the patient did develop some akathisia after the Haldol.  This improved significantly with IV Ativan.  She did not develop any hives, oropharyngeal swelling, dyspnea, or chest pain.  1 hour after the Haldol/Ativan injection, the patient reported resolution of her headache.  She was sitting upright in the bed and was  laughing during conversation.  She stated she was ready to go home.  Rest recommended.  Push clear fluids.  She will try some stretching exercises for her neck.  Lidocaine patch can stay on for up to 12 hours.  Indications for ED return reviewed.  Patient was in agreement.     I have reviewed the nursing notes.    I have reviewed the findings, diagnosis, plan and need for follow up with the patient.           Medical Decision Making  The patient's presentation was of moderate complexity (an acute illness with systemic symptoms).    The patient's evaluation involved:  ordering and/or review of 3+ test(s) in this encounter (see separate area of note for details)    The patient's management necessitated high risk (drug therapy requiring intensive monitoring (see separate area of note for details)).        Discharge Medication List as of 4/30/2025  5:40 PM          Final diagnoses:   Migraine   Neck muscle spasm     Disclaimer: This note consists of symbols derived from keyboarding, dictation and/or voice recognition software. As a result, there may be errors in the script that have gone undetected. Please consider this when interpreting information found in this chart.    4/30/2025   [facility] EMERGENCY DEPT       Freddy Dewitt PA-C  04/30/25 1942

## 2025-04-30 NOTE — TELEPHONE ENCOUNTER
I do not have capacity to see patient today but could see her tomorrow at 11:20 AM arrival time. This has been scheduled.    Destini Esparza PA-C

## 2025-04-30 NOTE — DISCHARGE INSTRUCTIONS
It was a pleasure working with you today!  I am thankful you are feeling much better!  Please try and get 9+ hours of sleep tonight.  Also, try and drink at least 32 ounces of fluid tonight to continue the hydration process.

## 2025-04-30 NOTE — TELEPHONE ENCOUNTER
Nurse Triage SBAR    Is this a 2nd Level Triage? NO    Situation: patient has had a headache since Monday and her blood sugars have been 170. She states the headache has increased.  She states blood sugar is high for her.    Background: patient rarely has headaches. Patient has a history of muscle weakness.    Assessment: her vision is blurred and she is sensitive to light. Until today patient had relief with ibuprofen but has had no relief. She has had this headache since 4 am constantly.  Current headache pain 6 out of 10.  She has had nausea.  No fever.    Protocol Recommended Disposition:   See in Office Today or Tomorrow    Recommendation: per protocol patient advised to be seen today.  Patient would prefer to be seen by provider. Please advise.    Tammy Liang RN on 4/30/2025 at 1:06 PM      Reason for Disposition   MODERATE headache (e.g., interferes with normal activities) present > 24 hours and unexplained    Additional Information   Negative: Difficult to awaken or acting confused (e.g., disoriented, slurred speech)   Negative: Weakness of the face, arm or leg on one side of the body and new-onset   Negative: Numbness of the face, arm or leg on one side of the body and new-onset   Negative: Loss of speech or garbled speech and new-onset   Negative: Passed out (e.g., fainted, lost consciousness, blacked out and was not responding)   Negative: Sounds like a life-threatening emergency to the triager   Negative: Followed a head injury within last 3 days   Negative: Traumatic Brain Injury (TBI) is suspected   Negative: Sinus pain or congestion is main symptom(s)   Negative: Influenza suspected (i.e., cough, fever, other respiratory symptoms; probable influenza exposure)   Negative: Pregnant   Negative: Unable to walk without falling   Negative: Stiff neck (can't touch chin to chest)   Negative: Other family members (or people in same household) with headaches and possibility of carbon monoxide exposure    Negative: SEVERE headache, states 'worst headache' of life   Negative: SEVERE headache, sudden-onset (i.e., reaching maximum intensity within seconds to 1 hour)   Negative: Severe pain in one eye   Negative: Loss of vision or double vision (Exception: Same as previously diagnosed migraines.)   Negative: Patient sounds very sick or weak to the triager   Negative: Fever > 103 F (39.4 C)   Negative: Fever > 100 F (37.8 C) and has diabetes mellitus or a weak immune system (e.g., HIV positive, cancer chemotherapy, organ transplant, splenectomy, chronic steroids)   Negative: SEVERE headache (e.g., excruciating) and has had severe headaches before   Negative: SEVERE headache and not relieved by pain meds   Negative: SEVERE headache and vomiting   Negative: SEVERE headache and fever   Negative: New-onset headache and weak immune system (e.g., HIV positive, cancer chemo, splenectomy, organ transplant, chronic steroids)   Negative: Fever present > 3 days (72 hours)   Negative: Patient wants to be seen    Protocols used: Headache-A-[place]

## 2025-04-30 NOTE — ED TRIAGE NOTES
Pt presents with headache/migraine. Pt states history of migraine but not for 10 years. Pt states started on Monday. Pt states blurred vision at 0400. Pt is also diabetic and states blood sugars 170's. Pt states headache left occipital area into jaw. Left eye pain. Pt states she has muscle weakness for many years.      Triage Assessment (Adult)       Row Name 04/30/25 1407          Triage Assessment    Airway WDL WDL        Respiratory WDL    Respiratory WDL WDL        Skin Circulation/Temperature WDL    Skin Circulation/Temperature WDL WDL        Cardiac WDL    Cardiac WDL WDL        Peripheral/Neurovascular WDL    Peripheral Neurovascular WDL WDL        Cognitive/Neuro/Behavioral WDL    Cognitive/Neuro/Behavioral WDL WDL

## 2025-04-30 NOTE — TELEPHONE ENCOUNTER
Phoned patient and informed her of documentation per SEN Jarvis as stated below.  Patient stated understanding and will keep offered visit scheduled at this time.  Patient stated she is in the emergency room at the time of this call, but has been informed to follow up after ED visit, so will keep office visit scheduled tomorrow.    Ananya Samuel RN

## 2025-04-30 NOTE — ED NOTES
"Patient calling to state she is feeling very anxious and like she \"needs to get out of the room\", was pacing around feeling like she just needs to move around. Provided ice pack to back of neck and advised provider of patient's symptoms.  "

## 2025-05-01 ENCOUNTER — OFFICE VISIT (OUTPATIENT)
Dept: FAMILY MEDICINE | Facility: CLINIC | Age: 45
End: 2025-05-01
Payer: COMMERCIAL

## 2025-05-01 VITALS
HEART RATE: 114 BPM | OXYGEN SATURATION: 99 % | RESPIRATION RATE: 12 BRPM | TEMPERATURE: 97.8 F | HEIGHT: 65 IN | SYSTOLIC BLOOD PRESSURE: 124 MMHG | DIASTOLIC BLOOD PRESSURE: 87 MMHG | WEIGHT: 196.2 LBS | BODY MASS INDEX: 32.69 KG/M2

## 2025-05-01 DIAGNOSIS — F41.9 ANXIETY: Primary | ICD-10-CM

## 2025-05-01 DIAGNOSIS — G37.9 DEMYELINATING DISEASE (H): ICD-10-CM

## 2025-05-01 DIAGNOSIS — G44.209 TENSION HEADACHE: ICD-10-CM

## 2025-05-01 DIAGNOSIS — E11.43 TYPE 2 DIABETES MELLITUS WITH DIABETIC AUTONOMIC NEUROPATHY, WITHOUT LONG-TERM CURRENT USE OF INSULIN (H): ICD-10-CM

## 2025-05-01 PROBLEM — E66.01 MORBID OBESITY (H): Status: RESOLVED | Noted: 2018-10-16 | Resolved: 2025-05-01

## 2025-05-01 LAB
EST. AVERAGE GLUCOSE BLD GHB EST-MCNC: 151 MG/DL
HBA1C MFR BLD: 6.9 %

## 2025-05-01 RX ORDER — OXYCODONE HYDROCHLORIDE 5 MG/1
5 TABLET ORAL EVERY 6 HOURS PRN
Qty: 12 TABLET | Refills: 0 | Status: SHIPPED | OUTPATIENT
Start: 2025-05-01

## 2025-05-01 RX ORDER — LORAZEPAM 1 MG/1
1 TABLET ORAL EVERY 6 HOURS PRN
Qty: 5 TABLET | Refills: 0 | Status: SHIPPED | OUTPATIENT
Start: 2025-05-01

## 2025-05-01 ASSESSMENT — ENCOUNTER SYMPTOMS
NAUSEA: 1
HEADACHES: 1

## 2025-05-01 ASSESSMENT — ANXIETY QUESTIONNAIRES
7. FEELING AFRAID AS IF SOMETHING AWFUL MIGHT HAPPEN: SEVERAL DAYS
GAD7 TOTAL SCORE: 7
2. NOT BEING ABLE TO STOP OR CONTROL WORRYING: SEVERAL DAYS
5. BEING SO RESTLESS THAT IT IS HARD TO SIT STILL: SEVERAL DAYS
3. WORRYING TOO MUCH ABOUT DIFFERENT THINGS: SEVERAL DAYS
1. FEELING NERVOUS, ANXIOUS, OR ON EDGE: SEVERAL DAYS
6. BECOMING EASILY ANNOYED OR IRRITABLE: SEVERAL DAYS
GAD7 TOTAL SCORE: 7
IF YOU CHECKED OFF ANY PROBLEMS ON THIS QUESTIONNAIRE, HOW DIFFICULT HAVE THESE PROBLEMS MADE IT FOR YOU TO DO YOUR WORK, TAKE CARE OF THINGS AT HOME, OR GET ALONG WITH OTHER PEOPLE: EXTREMELY DIFFICULT

## 2025-05-01 ASSESSMENT — PATIENT HEALTH QUESTIONNAIRE - PHQ9: 5. POOR APPETITE OR OVEREATING: SEVERAL DAYS

## 2025-05-01 ASSESSMENT — PAIN SCALES - GENERAL: PAINLEVEL_OUTOF10: NO PAIN (0)

## 2025-05-01 NOTE — PROGRESS NOTES
Praful Lehman is a 44 year old, presenting for the following health issues:  Headache, Diabetes, and Anxiety        5/1/2025    11:22 AM   Additional Questions   Roomed by Surekha     History of Present Illness       Diabetes:   She presents for follow up of diabetes.  She is checking home blood glucose two times daily.   She checks blood glucose before meals and before and after meals.  Blood glucose is never over 200 and never under 70. She is aware of hypoglycemia symptoms including shakiness and other.   She is concerned about other.    She is not experiencing numbness or burning in feet, excessive thirst, blurry vision, weight changes or redness, sores or blisters on feet.           Reason for visit:  Blood sugar high  Symptom onset:  3-4 weeks ago    She eats 2-3 servings of fruits and vegetables daily.She consumes 1 sweetened beverage(s) daily.She exercises with enough effort to increase her heart rate 60 or more minutes per day.  She exercises with enough effort to increase her heart rate 4 days per week.   She is taking medications regularly.        ED/UC Followup:    Facility:  [facility] Emergency Dept   Date of visit: 4/30/2025  Reason for visit: Headache   Current Status: high blood surges  , headache has gone away, increasing high anxiety     Faloln Rivera, a 44-year-old female, reported experiencing a severe headache starting on Monday, which led her to visit the ER. The headache was alleviated, but she continued to experience neck tension. She was hesitant to try trigger point therapy and instead was administered Haldol, which she now realizes does not work well for her. This resulted in a severe anxiety attack that has persisted, causing her heart rate to remain elevated and affecting her sleep. She has only managed to sleep for about 30 minutes despite taking Ativan and muscle relaxers. Fallon mentioned that her blood sugars have been elevated, with fasting  "levels reaching 170, and she is concerned about whether her medication was changed to a fast-release version, which previously caused diarrhea. She also noted a weight gain of approximately 10 pounds over the winter, which she attributes to the harsh weather conditions. Fallon has a history of medication reactions and is cautious about the medications she takes. We discusssed A1c today and then adjusting her medication next month if needed.     Review of Systems  Constitutional, HEENT, cardiovascular, pulmonary, GI, , musculoskeletal, neuro, skin, endocrine and psych systems are negative, except as otherwise noted.      Objective    /87   Pulse 114   Temp 97.8  F (36.6  C) (Temporal)   Resp 12   Ht 1.65 m (5' 4.96\")   Wt 89 kg (196 lb 3.2 oz)   LMP 07/25/2020 (Exact Date)   SpO2 99%   BMI 32.69 kg/m    Body mass index is 32.69 kg/m .  Physical Exam   GENERAL: alert and no distress  EYES: Eyes grossly normal to inspection, PERRL and conjunctivae and sclerae normal  NECK: no adenopathy, no asymmetry, masses, or scars  RESP: lungs clear to auscultation - no rales, rhonchi or wheezes  CV: regular rate and rhythm, normal S1 S2, no S3 or S4, no murmur, click or rub, no peripheral edema  MS: no gross musculoskeletal defects noted, no edema  SKIN: no suspicious lesions or rashes  NEURO: Normal strength and tone, mentation intact and speech normal  PSYCH: mentation appears normal, affect normal/bright, anxious, and pacing the room        Assessment & Plan     Anxiety  Acute anxiety related to receiving Haldol in the ER. This was added to allergy list. Reviewed this should be out of her system in 24 hours. Will rotate Ativan and Benadryl as needed in the meantime. Encouraged pushing fluids.   - LORazepam (ATIVAN) 1 MG tablet; Take 1 tablet (1 mg) by mouth every 6 hours as needed for anxiety.    Tension headache  Headache resolved. Still has neck strain. Will continue her muscle relaxants and supportive " cares as needed.     Type 2 diabetes mellitus with diabetic autonomic neuropathy, without long-term current use of insulin (H)  A1c added to lab from ER visit. Encouraged continued work on diet and exercise and monitoring of sugars at this time.   - Hemoglobin A1c; Future    Demyelinating disease (H)  Stable- working with PT. Not a clear diagnosis but suspected.     The patient indicates understanding of these issues and agrees with the plan.    Signed Electronically by: Destini Esparza PA-C

## 2025-06-03 DIAGNOSIS — E11.9 TYPE 2 DIABETES MELLITUS WITHOUT COMPLICATION, WITHOUT LONG-TERM CURRENT USE OF INSULIN (H): ICD-10-CM

## 2025-06-03 RX ORDER — METFORMIN HYDROCHLORIDE 500 MG/1
1000 TABLET, EXTENDED RELEASE ORAL 2 TIMES DAILY WITH MEALS
Qty: 360 TABLET | Refills: 1 | Status: SHIPPED | OUTPATIENT
Start: 2025-06-03

## 2025-06-09 ENCOUNTER — PATIENT OUTREACH (OUTPATIENT)
Dept: CARE COORDINATION | Facility: CLINIC | Age: 45
End: 2025-06-09
Payer: COMMERCIAL

## 2025-06-10 ENCOUNTER — TELEPHONE (OUTPATIENT)
Dept: FAMILY MEDICINE | Facility: CLINIC | Age: 45
End: 2025-06-10
Payer: COMMERCIAL

## 2025-06-10 NOTE — TELEPHONE ENCOUNTER
Reason for Call:  Appointment Request    Patient requesting this type of appt: Chronic Diease Management/Medication/Follow-Up    Requested provider: Destini Esparza    Reason patient unable to be scheduled: Not within requested timeframe    When does patient want to be seen/preferred time: Anytime available     Comments: She had an appt on June 11th, but had to cancel due to a family emergency, [place] was [place]. She is hoping to be home next week, but not for certain on that. This would be a diabetic follow up. She said she is ok if the appt would be scheduled out to Sept or Oct. She said if she needs to do labs prior to the appt, she can make work.    Could we send this information to you in [employer] or would you prefer to receive a phone call?:   No preference   Okay to leave a detailed message?: Yes at Cell number on file:    Telephone Information:   Mobile [phone]       Call taken on 6/10/2025 at 9:03 AM by Allegra Zuniga

## 2025-06-11 ENCOUNTER — PATIENT OUTREACH (OUTPATIENT)
Dept: CARE COORDINATION | Facility: CLINIC | Age: 45
End: 2025-06-11

## 2025-08-05 ENCOUNTER — HOSPITAL ENCOUNTER (OUTPATIENT)
Dept: MAMMOGRAPHY | Facility: CLINIC | Age: 45
Discharge: HOME | End: 2025-08-05
Attending: PHYSICIAN ASSISTANT
Payer: COMMERCIAL

## 2025-08-05 DIAGNOSIS — Z12.31 VISIT FOR SCREENING MAMMOGRAM: ICD-10-CM

## 2025-08-05 DIAGNOSIS — E11.9 TYPE 2 DIABETES MELLITUS WITHOUT COMPLICATION, WITHOUT LONG-TERM CURRENT USE OF INSULIN (H): ICD-10-CM

## 2025-08-05 DIAGNOSIS — Z98.890 S/P LUMBAR MICRODISCECTOMY: Primary | ICD-10-CM

## 2025-08-05 PROCEDURE — 77063 BREAST TOMOSYNTHESIS BI: CPT

## 2025-08-05 RX ORDER — OXYCODONE HYDROCHLORIDE 5 MG/1
5 TABLET ORAL EVERY 6 HOURS PRN
Qty: 12 TABLET | Refills: 0 | Status: SHIPPED | OUTPATIENT
Start: 2025-08-05

## 2025-08-05 RX ORDER — EMPAGLIFLOZIN 10 MG/1
10 TABLET, FILM COATED ORAL DAILY
Qty: 90 TABLET | Refills: 1 | Status: SHIPPED | OUTPATIENT
Start: 2025-08-05

## (undated) DEVICE — KNIFE MEDT BAYONETED DISCECTOMY 134MM 1564-00

## (undated) DEVICE — DRAPE MICRO ZEISS OPMI 137X381CM 306070-0000-000

## (undated) DEVICE — ESU LIGASURE MARYLAND LAPAROSCOPIC SLR/DVDR 5MMX37CM LF1937

## (undated) DEVICE — TOWEL SURG 17INW X 26INL CTTN BLUE STRL 8324B

## (undated) DEVICE — Device

## (undated) DEVICE — PREP CHLORAPREP 26ML TINTED ORANGE  260815

## (undated) DEVICE — GLOVE PROTEXIS W/NEU-THERA 7.5  2D73TE75

## (undated) DEVICE — SUCTION CURETTE 3MM ENDOMETRIAL MX140

## (undated) DEVICE — TUBING SUCTION 12"X1/4" N612

## (undated) DEVICE — SYR BULB IRRIG DOVER 60 ML LATEX FREE 67000

## (undated) DEVICE — DRAPE MAYO STAND 23X54 8337

## (undated) DEVICE — ESU LIGASURE IMPACT OPEN SEALER/DVDR CVD LG JAW 36MM LF4318

## (undated) DEVICE — INTRODUCER CATH TAUT 2.0MMX1.6MMX7.6CM PI-63

## (undated) DEVICE — SYR 03ML LL W/O NDL

## (undated) DEVICE — STOCKING SLEEVE COMPRESSION CALF MED

## (undated) DEVICE — CATH TRAY FOLEY SURESTEP 16FR W/URINE MTR STATLK LF A303416A

## (undated) DEVICE — SU MONOCRYL 4-0 PS-2 18" UND Y496G

## (undated) DEVICE — SU VICRYL 0 CT-2 CR 8X18" J727D

## (undated) DEVICE — DRSG STERI STRIP 1/2X4" R1547

## (undated) DEVICE — PACK LAPAROSCOPY/PELVISCOPY STD

## (undated) DEVICE — SOL WATER IRRIG 1000ML BOTTLE 07139-09

## (undated) DEVICE — BLADE KNIFE SURG 15 371115

## (undated) DEVICE — NDL SPINAL 18GA 3.5" 405184

## (undated) DEVICE — ENDO TROCAR SLEEVE KII Z-THREADED 05X100MM CTS02

## (undated) DEVICE — SOL NACL 0.9% INJ 1000ML BAG 07983-09

## (undated) DEVICE — ADH LIQUID MASTISOL TOPICAL VIAL 2-3ML 0523-48

## (undated) DEVICE — BUR MATCHSTICK 3.0MM FLUTED 15CM ANSPACH MA15-8NS

## (undated) DEVICE — ENDO FLOSEAL APPLICATOR 1500181

## (undated) DEVICE — GLOVE ESTEEM BLUE W/NEU-THERA 8.5  2D73PB85

## (undated) DEVICE — TUBING CYSTO/BLADDER IRRIG SET 80" 06544-01

## (undated) DEVICE — GLOVE ESTEEM BLUE W/NEU-THERA 8.0  2D73PB80

## (undated) DEVICE — BUR STRK CARBIDE MATCH HEAD 3.0MM 5820-107-530C

## (undated) DEVICE — ESU ELEC BLADE 6" COATED E1450-6

## (undated) DEVICE — ENDO TROCAR SHIELDED BLADED KII ADV FIX 05X100MM CFB03

## (undated) DEVICE — GLOVE PROTEXIS BLUE W/NEU-THERA 7.5  2D73EB75

## (undated) DEVICE — BONE WAX 2.5GM W31G

## (undated) DEVICE — SU VICRYL 4-0 PS-2 18" UND J496H

## (undated) DEVICE — DRAPE POUCH INSTRUMENT 1018

## (undated) DEVICE — GLOVE ESTEEM POWDER FREE 7.5  2D72PL75

## (undated) DEVICE — DRAPE C-ARM

## (undated) DEVICE — ADH FLOSEAL W/HUMAN THROMBIN 5ML

## (undated) DEVICE — GOWN XXL 9575

## (undated) DEVICE — NDL ECLIPSE 18GA 1.5"

## (undated) DEVICE — NDL INSUFFLATION 120MM VERRES ULTRA

## (undated) DEVICE — SYR 10ML LL W/O NDL

## (undated) DEVICE — GLOVE ESTEEM POWDER FREE 8.5  2D72PL85

## (undated) DEVICE — ESU ELEC BLADE 2.75" COATED/INSULATED E1455

## (undated) DEVICE — DRSG TEGADERM 2 3/8X2 3/4" 1624W

## (undated) DEVICE — SUCTION TIP YANKAUER STR K87

## (undated) DEVICE — DRAPE POUCH INSTRUMENT 3 POCKET 1018L

## (undated) DEVICE — DRSG GAUZE 2X2" 8042

## (undated) DEVICE — SUCTION IRR STRYKERFLOW II W/TIP 250-070-520

## (undated) DEVICE — SU VICRYL 0 CT-1 36" J946H

## (undated) DEVICE — SU VICRYL 2-0 CT-2 CR 8X18" J726D

## (undated) DEVICE — DECANTER VIAL 2006S

## (undated) DEVICE — LUBRICATING JELLY 4.25OZ

## (undated) DEVICE — ADH FLOSEAL W/HUMAN THROMBIN 5ML 1501825

## (undated) DEVICE — TUBING INSUFFLATION W/FILTER CPC TO LUER 620-030-301

## (undated) RX ORDER — PROPOFOL 10 MG/ML
INJECTION, EMULSION INTRAVENOUS
Status: DISPENSED
Start: 2024-04-26

## (undated) RX ORDER — FENTANYL CITRATE 50 UG/ML
INJECTION, SOLUTION INTRAMUSCULAR; INTRAVENOUS
Status: DISPENSED
Start: 2020-10-23

## (undated) RX ORDER — PROPOFOL 10 MG/ML
INJECTION, EMULSION INTRAVENOUS
Status: DISPENSED
Start: 2019-01-23

## (undated) RX ORDER — HYDROXYZINE HYDROCHLORIDE 50 MG/1
TABLET, FILM COATED ORAL
Status: DISPENSED
Start: 2020-07-28

## (undated) RX ORDER — KETOROLAC TROMETHAMINE 30 MG/ML
INJECTION, SOLUTION INTRAMUSCULAR; INTRAVENOUS
Status: DISPENSED
Start: 2020-07-28

## (undated) RX ORDER — METHYLPREDNISOLONE ACETATE 40 MG/ML
INJECTION, SUSPENSION INTRA-ARTICULAR; INTRALESIONAL; INTRAMUSCULAR; SOFT TISSUE
Status: DISPENSED
Start: 2023-04-05

## (undated) RX ORDER — DEXAMETHASONE SODIUM PHOSPHATE 10 MG/ML
INJECTION, SOLUTION INTRAMUSCULAR; INTRAVENOUS
Status: DISPENSED
Start: 2024-04-26

## (undated) RX ORDER — PHENYLEPHRINE HCL IN 0.9% NACL 1 MG/10 ML
SYRINGE (ML) INTRAVENOUS
Status: DISPENSED
Start: 2019-01-23

## (undated) RX ORDER — LIDOCAINE HYDROCHLORIDE 20 MG/ML
INJECTION, SOLUTION EPIDURAL; INFILTRATION; INTRACAUDAL; PERINEURAL
Status: DISPENSED
Start: 2019-01-23

## (undated) RX ORDER — DEXAMETHASONE SODIUM PHOSPHATE 10 MG/ML
INJECTION INTRAMUSCULAR; INTRAVENOUS
Status: DISPENSED
Start: 2019-01-23

## (undated) RX ORDER — DIMENHYDRINATE 50 MG/ML
INJECTION, SOLUTION INTRAMUSCULAR; INTRAVENOUS
Status: DISPENSED
Start: 2023-04-05

## (undated) RX ORDER — METHYLPREDNISOLONE ACETATE 40 MG/ML
INJECTION, SUSPENSION INTRA-ARTICULAR; INTRALESIONAL; INTRAMUSCULAR; SOFT TISSUE
Status: DISPENSED
Start: 2019-01-23

## (undated) RX ORDER — FENTANYL CITRATE 50 UG/ML
INJECTION, SOLUTION INTRAMUSCULAR; INTRAVENOUS
Status: DISPENSED
Start: 2020-07-28

## (undated) RX ORDER — PROPOFOL 10 MG/ML
INJECTION, EMULSION INTRAVENOUS
Status: DISPENSED
Start: 2020-07-28

## (undated) RX ORDER — FENTANYL CITRATE 50 UG/ML
INJECTION, SOLUTION INTRAMUSCULAR; INTRAVENOUS
Status: DISPENSED
Start: 2023-04-05

## (undated) RX ORDER — HYDROCODONE BITARTRATE AND ACETAMINOPHEN 5; 325 MG/1; MG/1
TABLET ORAL
Status: DISPENSED
Start: 2020-07-28

## (undated) RX ORDER — FENTANYL CITRATE 50 UG/ML
INJECTION, SOLUTION INTRAMUSCULAR; INTRAVENOUS
Status: DISPENSED
Start: 2024-04-26

## (undated) RX ORDER — LIDOCAINE HYDROCHLORIDE 10 MG/ML
INJECTION, SOLUTION EPIDURAL; INFILTRATION; INTRACAUDAL; PERINEURAL
Status: DISPENSED
Start: 2020-07-28

## (undated) RX ORDER — DEXAMETHASONE SODIUM PHOSPHATE 10 MG/ML
INJECTION, SOLUTION INTRAMUSCULAR; INTRAVENOUS
Status: DISPENSED
Start: 2020-07-28

## (undated) RX ORDER — FENTANYL CITRATE 50 UG/ML
INJECTION, SOLUTION INTRAMUSCULAR; INTRAVENOUS
Status: DISPENSED
Start: 2019-01-23

## (undated) RX ORDER — PROPOFOL 10 MG/ML
INJECTION, EMULSION INTRAVENOUS
Status: DISPENSED
Start: 2023-04-05

## (undated) RX ORDER — SCOLOPAMINE TRANSDERMAL SYSTEM 1 MG/1
PATCH, EXTENDED RELEASE TRANSDERMAL
Status: DISPENSED
Start: 2020-07-28

## (undated) RX ORDER — HYDROMORPHONE HYDROCHLORIDE 1 MG/ML
INJECTION, SOLUTION INTRAMUSCULAR; INTRAVENOUS; SUBCUTANEOUS
Status: DISPENSED
Start: 2020-07-28

## (undated) RX ORDER — BUPIVACAINE HYDROCHLORIDE AND EPINEPHRINE 5; 5 MG/ML; UG/ML
INJECTION, SOLUTION EPIDURAL; INTRACAUDAL; PERINEURAL
Status: DISPENSED
Start: 2020-07-28

## (undated) RX ORDER — LIDOCAINE HYDROCHLORIDE 20 MG/ML
SOLUTION OROPHARYNGEAL
Status: DISPENSED
Start: 2020-10-23

## (undated) RX ORDER — LIDOCAINE HYDROCHLORIDE 10 MG/ML
INJECTION, SOLUTION EPIDURAL; INFILTRATION; INTRACAUDAL; PERINEURAL
Status: DISPENSED
Start: 2019-08-27

## (undated) RX ORDER — DEXAMETHASONE SODIUM PHOSPHATE 4 MG/ML
INJECTION, SOLUTION INTRA-ARTICULAR; INTRALESIONAL; INTRAMUSCULAR; INTRAVENOUS; SOFT TISSUE
Status: DISPENSED
Start: 2020-07-28

## (undated) RX ORDER — HYDROMORPHONE HYDROCHLORIDE 1 MG/ML
INJECTION, SOLUTION INTRAMUSCULAR; INTRAVENOUS; SUBCUTANEOUS
Status: DISPENSED
Start: 2019-01-23

## (undated) RX ORDER — DEXAMETHASONE SODIUM PHOSPHATE 10 MG/ML
INJECTION, SOLUTION INTRAMUSCULAR; INTRAVENOUS
Status: DISPENSED
Start: 2023-04-05

## (undated) RX ORDER — EPHEDRINE SULFATE 50 MG/ML
INJECTION, SOLUTION INTRAMUSCULAR; INTRAVENOUS; SUBCUTANEOUS
Status: DISPENSED
Start: 2019-01-23

## (undated) RX ORDER — CLINDAMYCIN PHOSPHATE 900 MG/50ML
INJECTION, SOLUTION INTRAVENOUS
Status: DISPENSED
Start: 2020-07-28